# Patient Record
Sex: FEMALE | Race: WHITE | NOT HISPANIC OR LATINO | Employment: OTHER | URBAN - METROPOLITAN AREA
[De-identification: names, ages, dates, MRNs, and addresses within clinical notes are randomized per-mention and may not be internally consistent; named-entity substitution may affect disease eponyms.]

---

## 2017-02-09 ENCOUNTER — OFFICE VISIT (OUTPATIENT)
Dept: PSYCHIATRY | Facility: CLINIC | Age: 62
End: 2017-02-09
Payer: MEDICARE

## 2017-02-09 VITALS
DIASTOLIC BLOOD PRESSURE: 69 MMHG | HEART RATE: 94 BPM | SYSTOLIC BLOOD PRESSURE: 132 MMHG | HEIGHT: 66 IN | BODY MASS INDEX: 23.03 KG/M2 | WEIGHT: 143.31 LBS

## 2017-02-09 DIAGNOSIS — F42.9 OBSESSIVE-COMPULSIVE DISORDER, UNSPECIFIED TYPE: ICD-10-CM

## 2017-02-09 DIAGNOSIS — F33.1 MODERATE RECURRENT MAJOR DEPRESSION: ICD-10-CM

## 2017-02-09 DIAGNOSIS — F41.1 GENERALIZED ANXIETY DISORDER: ICD-10-CM

## 2017-02-09 PROCEDURE — 99213 OFFICE O/P EST LOW 20 MIN: CPT | Mod: S$GLB,,, | Performed by: PSYCHIATRY & NEUROLOGY

## 2017-02-09 PROCEDURE — 99999 PR PBB SHADOW E&M-EST. PATIENT-LVL III: CPT | Mod: PBBFAC,,, | Performed by: PSYCHIATRY & NEUROLOGY

## 2017-02-09 PROCEDURE — 99499 UNLISTED E&M SERVICE: CPT | Mod: S$GLB,,, | Performed by: PSYCHIATRY & NEUROLOGY

## 2017-02-09 RX ORDER — LAMOTRIGINE 100 MG/1
TABLET ORAL
Qty: 270 TABLET | Refills: 1 | Status: SHIPPED | OUTPATIENT
Start: 2017-02-09 | End: 2017-11-09 | Stop reason: SDUPTHER

## 2017-02-09 RX ORDER — VENLAFAXINE HYDROCHLORIDE 75 MG/1
75 CAPSULE, EXTENDED RELEASE ORAL DAILY
Qty: 90 CAPSULE | Refills: 1 | Status: SHIPPED | OUTPATIENT
Start: 2017-02-09 | End: 2017-08-01 | Stop reason: SDUPTHER

## 2017-02-09 RX ORDER — MIRTAZAPINE 30 MG/1
30 TABLET, FILM COATED ORAL NIGHTLY
Qty: 90 TABLET | Refills: 1 | Status: SHIPPED | OUTPATIENT
Start: 2017-02-09 | End: 2017-08-01 | Stop reason: SDUPTHER

## 2017-02-09 RX ORDER — DEXTROAMPHETAMINE SACCHARATE, AMPHETAMINE ASPARTATE, DEXTROAMPHETAMINE SULFATE AND AMPHETAMINE SULFATE 3.75; 3.75; 3.75; 3.75 MG/1; MG/1; MG/1; MG/1
15 TABLET ORAL 2 TIMES DAILY
Qty: 60 TABLET | Refills: 0 | Status: SHIPPED | OUTPATIENT
Start: 2017-02-09 | End: 2017-03-11

## 2017-02-09 RX ORDER — DEXTROAMPHETAMINE SACCHARATE, AMPHETAMINE ASPARTATE, DEXTROAMPHETAMINE SULFATE AND AMPHETAMINE SULFATE 3.75; 3.75; 3.75; 3.75 MG/1; MG/1; MG/1; MG/1
15 TABLET ORAL 2 TIMES DAILY
Qty: 60 TABLET | Refills: 0 | Status: SHIPPED | OUTPATIENT
Start: 2017-03-11 | End: 2017-03-14

## 2017-02-09 RX ORDER — CLONAZEPAM 1 MG/1
1 TABLET ORAL 3 TIMES DAILY PRN
Qty: 270 TABLET | Refills: 0 | Status: SHIPPED | OUTPATIENT
Start: 2017-02-09 | End: 2017-05-04 | Stop reason: SDUPTHER

## 2017-02-09 RX ORDER — DEXTROAMPHETAMINE SACCHARATE, AMPHETAMINE ASPARTATE, DEXTROAMPHETAMINE SULFATE AND AMPHETAMINE SULFATE 3.75; 3.75; 3.75; 3.75 MG/1; MG/1; MG/1; MG/1
15 TABLET ORAL 2 TIMES DAILY
Qty: 60 TABLET | Refills: 0 | Status: SHIPPED | OUTPATIENT
Start: 2017-04-10 | End: 2017-05-04 | Stop reason: SDUPTHER

## 2017-02-09 RX ORDER — SERTRALINE HYDROCHLORIDE 100 MG/1
TABLET, FILM COATED ORAL
Qty: 180 TABLET | Refills: 1 | Status: SHIPPED | OUTPATIENT
Start: 2017-02-09 | End: 2017-05-04 | Stop reason: SDUPTHER

## 2017-02-09 NOTE — PATIENT INSTRUCTIONS
Philomena Granda, [facility]  [phone]  Fiona Hook, [facility] [phone]  Karen Patricia [facility] [phone]  Cyndy Ca, [facility]   [phone]  Diana Atkins, [facility]  ·[phone]

## 2017-02-09 NOTE — PROGRESS NOTES
"Outpatient Psychiatry Follow-Up Visit (MD/NP)    2/9/2017    Clinical Status of Patient:  Outpatient (Ambulatory)    Chief Complaint:  Sheri Broderick is a 61 y.o. female who presents today for follow-up of anxiety, depression.   Met with patient.      Interval History and Content of Current Session:  Interim Events/Subjective Report/Content of Current Session:  follow up appointment.     No medication changes last visit.     Ups and downs;  She has not seen much of Dr Jennings.  Her therapy sessions are frequently cancelled.  She is getting discouraged.    Once she gets to core therapy issues; appointments get cancelled by provider, typically due to illness.     She has noticed some improvement with titration of Sertraline;  She alternates dose of Mirtazapine to 15 mg x 7 days on occasion to mitigate side effects (wt gain)    Mood is brighter, sings to cats;  Peaks and valleys now;  She is feeling better b/c Wally is now cancer free with  Every 3 month appts ; he has accepted a job and left today;   She helped him to get ready - he is out of [place].   He made reservations for a week.   She is hoping that work continues for Wally.  She is smiling, bright today.  She tends to be fearful of him at times;  She thinks he has violent tendencies in past - not to her.  He has been in her face.   He is very emotionally reactive.      She would like him out of her life long term;.  She has put her looking on hold; one was a foreclosure     Sleep is fair  Occasional SI - a bad January - Wally said he did not go the MD b/c of her;  No active SI. "I just don't want to wake up, she has an outfit she wants to be buried in."  She dwells on her death - some emptiness vs peace with this; she feels sad with being alone   Hopeless, status quo, I will be here forever; God put me here for a reason   Appetite - high - tapered back on chocolate some days - craves   She is more active now - pushes herself - does not nap a lot "   She remains irritable with Wally and situation;  On edge, tense, panic attacks are a whole lot better   Nightmares are infrequent   Muscle relaxants  are helping - taking BID     Still hears music and background noises - always at night - stillness - she cannot identify it (before it was [place])     Seen by Dr Cruz Pitts 2 - Cardiology, ECHO, EKG - all negative     Adderall: helps her keep her focus and get things done - this has significantly helped her to function.  Only takes it once daily when she is very anxious.  Still unable to read   Denies alcohol/drug use.    Prior meds: Abilify (no benefit), Buspirone (intolerance) Trazodone (intolerance), Paroxetine (intolerance), Quetiapine, Bupropion (intolerance), Fluoxetine (no benefit), Duloxetine Foltx (no longer covered)     Review of Systems   · PSYCHIATRIC: Pertinant items are noted in the narrative.  · CONSTITUTIONAL:  Wt stable  · MUSCULOSKELETAL: right shoulder,elbow, back,  low back pain, bilateral UE pain - pain level 3/10    · CARDIOVASCULAR: no current chest pain, no palpitations       Past Medical, Family and Social History: The patient's past medical, family and social history have been reviewed and updated as appropriate within the electronic medical record - see encounter notes.    Effexor XR 75 mg daily  Sertraline 200 mg daily   Klonopin 1 mg bid - tid prn anxiety   Remeron 30 mg QHS   Lamictal 100 mg QAM, 200 mg QHS   Adderall 15 mg once to twice daily     Compliance: yes     Side effects: dry mouth (uses biotene), ? Elevated HR, no rashes     Risk Parameters:  Patient reports no suicidal ideations today   Patient reports no homicidal ideation  Patient reports no self-injurious behavior  Patient reports no violent behavior      Exam (detailed: at least 9 elements; comprehensive: all 15 elements)   Constitutional  Vitals:  Most recent vital signs, dated less than 90 days prior to this appointment, were reviewed.     Epic for today's  "vitals.       General:  age appropriate, casual attire, improved grooming, good eye contact, anxious         Musculoskeletal  Muscle Strength/Tone:  No tremor noted today, intermittently tapping her right leg on floor, PMA    Gait & Station:  non-ataxic     Psychiatric  Speech:  no latency; no press , spontaneous, talkative, conversational    Mood & Affect:  "ok"  Brighter today    Thought Process:  Linear   Associations:  intact   Thought Content:  no SI, no homicidality, delusions, or paranoia, hallucinations: (auditory: no currently, visual: no) - none currently       Insight:  Improved    Judgement: Adequate to circumstances    Orientation:  grossly intact. Alert and oriented x 4    Memory: intact for content of interview   Language: grossly intact, can repeat    Attention Span & Concentration:  able to focus   Fund of Knowledge:  intact and appropriate to age and level of education     Assessment and Diagnosis   Status/Progress: Based on the examination today, the patient's problem(s) is/are inadequately controlled, but some improvement.    New problems have not been presented today.   Comorbidities are complicating management of the primary condition.    The working differential for this patient includes Cluster B and C traits, personality disorder, PTSD, MDD, OCD    General Impression :  Pt improved with addition of SNRI and potential for decreasing stressor with roommate going back to work - she plans to ask him to move out soon;  She is having difficulty accessing care with therapist; safe for outpatient treatment     Major Depressive Disorder, Recurrent, partial remission   Panic Disorder without Agoraphobia.   Generalized Anxiety Disorder.   R/O PTSD  Hx OCD  Personality Disorder, unspecified     Hepaitis B, tremors,  osteoporosis, myalgias, arthralgia, poor balance, back pain, bulging disc, fibromyalgia, osteoarthritis, neuoropathy    GAF: 53   Intervention/Counseling/Treatment Plan   · Medication " Management: The risks and benefits of medication were discussed with the patient.  · Counseling provided with patient as follows: importance of compliance with chosen treatment options was emphasized, risks and benefits of treatment options, including medications, were discussed with the patient     1. Continue Effexor XR 75 mg daily for now   2. Continue Remeron 30 mg QHS   3. Continue Klonopin 1 mg BID - TID prn.  Discussed risk of decreased RT, sedation, addictive potential, and not to mix with alcohol.   4. Continue Lamictal 100 mg QAM and  200 mg daily.  Discussed risks of life threatening SJS, need for compliance.   5. Continue Psychotherapy with Kaye Jennings   6. Adderall 15 mg BID - Target  low energy, motivation, depression.  Discussed risks of abuse potential, insomnia, anxiety, elevated BP, HR, arrthymias, MI, stroke, sudden death.    7.  Pt instructed to go to ED/911 with thoughts of wanting harm self/others  8. Call to report any worsening of symptoms or problems with the medication  9. Continue Sertraline 200 mg daily.   Target depression, anxiety. Discussed risk of serotonin syndrome, GI upset, headaches   10. Patient returned Rx for Adderall dated 12/13/16 which will be shredded   11. Pt given contact names/# for multiple local therapists should she continue to have difficulty scheduling with current provider - she is encouraged to discuss issue directly with therapist     Return to Clinic: 3 months

## 2017-02-09 NOTE — MR AVS SNAPSHOT
[facility]  [address]  [place] [address]  Phone: [phone]                  Sheri Broderick   2017 10:00 AM   Office Visit    Description:  Female : 1955   Provider:  Beulah Hawley MD   Department:  [facility]           Diagnoses this Visit        Comments    Generalized anxiety disorder         Moderate recurrent major depression         Obsessive-compulsive disorder, unspecified type                To Do List           Future Appointments        Provider Department Dept Phone    3/7/2017 11:00 AM RAÚL ROMERO 1 [facility] [phone]    3/14/2017 10:40 AM Francis Pack Jr., MD [facility] [phone]    2017 10:30 AM Demetrio Pleitez Jr., MD [facility] [phone]      Goals (5 Years of Data)     None      Follow-Up and Disposition     Return in about 3 months (around 2017).       These Medications        Disp Refills Start End    venlafaxine (EFFEXOR-XR) 75 MG 24 hr capsule 90 capsule 1 2017     Take 1 capsule (75 mg total) by mouth once daily. - Oral    Pharmacy: [facility] - [address] Ph #: [phone]       sertraline (ZOLOFT) 100 MG tablet 180 tablet 1 2017     Take two tablets PO daily    Pharmacy: [facility] [place] [id number] [facility] Ph #: [phone]       mirtazapine (REMERON) 30 MG tablet 90 tablet 1 2017     Take 1 tablet (30 mg total) by mouth every evening. - Oral    Pharmacy: [facility] [place] [id number] [facility] Ph #: [phone]       lamotrigine (LAMICTAL) 100 MG tablet 270 tablet 1 2017     Take one tablet PO in the morning and two tablets PO in the evening    Pharmacy: [facility] [place] [id number] [facility] Ph #: [phone]       clonazePAM (KLONOPIN) 1 MG tablet 270 tablet 0 2017     Take 1 tablet (1 mg total) by  mouth 3 (three) times daily as needed for Anxiety. - Oral    Pharmacy: [facility] - [facility] [address] [facility] Ph #: [phone]       dextroamphetamine-amphetamine (ADDERALL) 15 mg tablet 60 tablet 0 2/9/2017 3/11/2017    Take 1 tablet (15 mg total) by mouth 2 (two) times daily. - Oral    Pharmacy: [id number] - [place], [place] - [address] Ph #: [phone]       dextroamphetamine-amphetamine (ADDERALL) 15 mg tablet 60 tablet 0 3/11/2017 4/10/2017    Take 1 tablet (15 mg total) by mouth 2 (two) times daily. - Oral    Pharmacy: [id number] - [place], [place] - [address] Ph #: [phone]       dextroamphetamine-amphetamine (ADDERALL) 15 mg tablet 60 tablet 0 4/10/2017 5/10/2017    Take 1 tablet (15 mg total) by mouth 2 (two) times daily. - Oral    Pharmacy: [id number] - [place] - [address] Ph #: [phone]         [facility]     [facility] Nurse [facility] - 24/7 Assistance  Registered nurses in the [facility] provide clinical advisement, health education, appointment booking, and other advisory services.  Call for this free service at [phone].             Medications           Message regarding Medications     Verify the changes and/or additions to your medication regime listed below are the same as discussed with your clinician today.  If any of these changes or additions are incorrect, please notify your healthcare provider.        START taking these NEW medications        Refills    dextroamphetamine-amphetamine (ADDERALL) 15 mg tablet 0    Starting on: 3/11/2017    Sig: Take 1 tablet (15 mg total) by mouth 2 (two) times daily.    Class: Print    Route: Oral    dextroamphetamine-amphetamine (ADDERALL) 15 mg tablet 0    Starting on: 4/10/2017    Sig: Take 1 tablet (15 mg total) by mouth 2 (two) times daily.    Class: Print    Route: Oral           Verify that the below list of medications is an accurate  representation of the medications you are currently taking.  If none reported, the list may be blank. If incorrect, please contact your healthcare provider. Carry this list with you in case of emergency.           Current Medications     b complex vitamins tablet Take 1 tablet by mouth once daily.    CHEWABLE VITAMIN C 500 mg Chew     clonazePAM (KLONOPIN) 1 MG tablet Take 1 tablet (1 mg total) by mouth 3 (three) times daily as needed for Anxiety.    dextroamphetamine-amphetamine (ADDERALL) 15 mg tablet Take 1 tablet (15 mg total) by mouth 2 (two) times daily.    dextroamphetamine-amphetamine (ADDERALL) 15 mg tablet Starting on Mar 11, 2017. Take 1 tablet (15 mg total) by mouth 2 (two) times daily.    dextroamphetamine-amphetamine (ADDERALL) 15 mg tablet Starting on Apr 10, 2017. Take 1 tablet (15 mg total) by mouth 2 (two) times daily.    ibuprofen (ADVIL,MOTRIN) 800 MG tablet Take 1 tablet (800 mg total) by mouth 2 (two) times daily as needed for Pain.    lamotrigine (LAMICTAL) 100 MG tablet Take one tablet PO in the morning and two tablets PO in the evening    LINZESS 145 mcg Cap capsule Take 1 capsule by mouth once daily.    MAALOX ADVANCED 1,000-60 mg Chew     meclizine (ANTIVERT) 25 mg tablet Take 1 tablet (25 mg total) by mouth every 8 (eight) hours as needed for Dizziness. Twice a day    mirtazapine (REMERON) 30 MG tablet Take 1 tablet (30 mg total) by mouth every evening.    OXYMETAZOLINE HCL (DECONGESTANT NASAL SPRAY NASL)     PEPTO-BISMOL 262 mg Chew     sertraline (ZOLOFT) 100 MG tablet Take two tablets PO daily    tizanidine (ZANAFLEX) 4 MG tablet Take 1 tablet (4 mg total) by mouth 2 (two) times daily.    venlafaxine (EFFEXOR-XR) 75 MG 24 hr capsule Take 1 capsule (75 mg total) by mouth once daily.    ZYRTEC 10 mg Cap continuous prn.    minocycline (MINOCIN,DYNACIN) 100 MG capsule Take 1 capsule (100 mg total) by mouth once daily.    multivitamin Chew Take by mouth once daily.           Clinical  "Reference Information           Your Vitals Were     BP Pulse Height Weight BMI    132/69 94 5' 5.5" (1.664 m) 65 kg (143 lb 4.8 oz) 23.48 kg/m2      Blood Pressure          Most Recent Value    BP  132/69      Allergies as of 2/9/2017     Penicillins    Trazodone      Immunizations Administered on Date of Encounter - 2/9/2017     None      Instructions    Philomena Jesus Alberto, [facility]  [phone]  Fiona Hook, [facility] [phone]  Karen Patricia [facility] [phone]  Cyndy Ca, [facility]   [phone]  Diana Atkins, [facility]  ·[phone]       Language Assistance Services     ATTENTION: Language assistance services are available, free of charge. Please call [phone].      ATENCIÓN: Si habla rosenda, tiene a gunter disposición servicios gratuitos de asistencia lingüística. Llame al [phone].     CHÚ Ý: N?u b?n nói Ti?ng Vi?t, có các d?ch v? h? tr? ngôn ng? mi?n phí dành cho b?n. G?i s? [phone].         [facility] complies with applicable Federal civil rights laws and does not discriminate on the basis of race, color, national origin, age, disability, or sex.        "

## 2017-04-19 ENCOUNTER — TELEPHONE (OUTPATIENT)
Dept: FAMILY MEDICINE | Facility: CLINIC | Age: 62
End: 2017-04-19

## 2017-04-19 NOTE — TELEPHONE ENCOUNTER
Patient informed, verbalized understanding.  We will call her as soon as Dr. Pleitez gives us a date to reschedule people.

## 2017-04-19 NOTE — TELEPHONE ENCOUNTER
----- Message from Jigar David sent at 4/19/2017  4:14 PM CDT -----  Contact: Patient  Patient states that she had an appointment for tomorrow in which was cancelled and the next available appointment is not until November.  Can you please call the patient to re-schedule the 6 month follow up.  Her number is [phone].  Thank you

## 2017-05-04 ENCOUNTER — OFFICE VISIT (OUTPATIENT)
Dept: PSYCHIATRY | Facility: CLINIC | Age: 62
End: 2017-05-04
Payer: MEDICARE

## 2017-05-04 VITALS
BODY MASS INDEX: 22.71 KG/M2 | WEIGHT: 141.31 LBS | SYSTOLIC BLOOD PRESSURE: 105 MMHG | HEIGHT: 66 IN | DIASTOLIC BLOOD PRESSURE: 65 MMHG | HEART RATE: 103 BPM

## 2017-05-04 DIAGNOSIS — F42.9 OBSESSIVE-COMPULSIVE DISORDER, UNSPECIFIED TYPE: ICD-10-CM

## 2017-05-04 DIAGNOSIS — F33.1 MAJOR DEPRESSIVE DISORDER, RECURRENT EPISODE, MODERATE: ICD-10-CM

## 2017-05-04 DIAGNOSIS — F41.1 GENERALIZED ANXIETY DISORDER: ICD-10-CM

## 2017-05-04 PROCEDURE — 99499 UNLISTED E&M SERVICE: CPT | Mod: S$GLB,,, | Performed by: PSYCHIATRY & NEUROLOGY

## 2017-05-04 PROCEDURE — 99999 PR PBB SHADOW E&M-EST. PATIENT-LVL III: CPT | Mod: PBBFAC,,, | Performed by: PSYCHIATRY & NEUROLOGY

## 2017-05-04 PROCEDURE — 99213 OFFICE O/P EST LOW 20 MIN: CPT | Mod: S$GLB,,, | Performed by: PSYCHIATRY & NEUROLOGY

## 2017-05-04 PROCEDURE — 1160F RVW MEDS BY RX/DR IN RCRD: CPT | Mod: S$GLB,,, | Performed by: PSYCHIATRY & NEUROLOGY

## 2017-05-04 RX ORDER — SERTRALINE HYDROCHLORIDE 100 MG/1
TABLET, FILM COATED ORAL
Qty: 180 TABLET | Refills: 1 | Status: SHIPPED | OUTPATIENT
Start: 2017-05-04 | End: 2017-08-01 | Stop reason: SDUPTHER

## 2017-05-04 RX ORDER — DEXTROAMPHETAMINE SACCHARATE, AMPHETAMINE ASPARTATE, DEXTROAMPHETAMINE SULFATE AND AMPHETAMINE SULFATE 3.75; 3.75; 3.75; 3.75 MG/1; MG/1; MG/1; MG/1
15 TABLET ORAL 2 TIMES DAILY
Qty: 60 TABLET | Refills: 0 | Status: SHIPPED | OUTPATIENT
Start: 2017-07-10 | End: 2017-08-01 | Stop reason: SDUPTHER

## 2017-05-04 RX ORDER — DEXTROAMPHETAMINE SACCHARATE, AMPHETAMINE ASPARTATE, DEXTROAMPHETAMINE SULFATE AND AMPHETAMINE SULFATE 3.75; 3.75; 3.75; 3.75 MG/1; MG/1; MG/1; MG/1
15 TABLET ORAL 2 TIMES DAILY
Qty: 60 TABLET | Refills: 0 | Status: SHIPPED | OUTPATIENT
Start: 2017-06-10 | End: 2017-07-10

## 2017-05-04 RX ORDER — DEXTROAMPHETAMINE SACCHARATE, AMPHETAMINE ASPARTATE, DEXTROAMPHETAMINE SULFATE AND AMPHETAMINE SULFATE 3.75; 3.75; 3.75; 3.75 MG/1; MG/1; MG/1; MG/1
15 TABLET ORAL 2 TIMES DAILY
Qty: 60 TABLET | Refills: 0 | Status: SHIPPED | OUTPATIENT
Start: 2017-05-11 | End: 2017-06-10

## 2017-05-04 RX ORDER — CLONAZEPAM 1 MG/1
1 TABLET ORAL 3 TIMES DAILY PRN
Qty: 270 TABLET | Refills: 0 | Status: SHIPPED | OUTPATIENT
Start: 2017-05-04 | End: 2017-08-01 | Stop reason: SDUPTHER
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
## 2017-05-04 NOTE — Clinical Note
Pt is requesting to be contacted about titrating her tizanidine dose; her appt with PCP was rescheduled for November (from 4/17)

## 2017-05-04 NOTE — MR AVS SNAPSHOT
[facility]  [address]  [place] [address]  Phone: [phone]                  Sheri Broderick   2017 10:30 AM   Office Visit    Description:  Female : 1955   Provider:  Beulah Hawley MD   Department:  [facility]           Diagnoses this Visit        Comments    Obsessive-compulsive disorder, unspecified type         Generalized anxiety disorder         Major depressive disorder, recurrent episode, moderate                To Do List           Future Appointments        Provider Department Dept Phone    2017 8:30 AM Demetrio Pleitez Jr., MD [facility] [phone]      Goals (5 Years of Data)     None      Follow-Up and Disposition     Return in about 3 months (around 2017).       These Medications        Disp Refills Start End    dextroamphetamine-amphetamine (ADDERALL) 15 mg tablet 60 tablet 0 2017 6/10/2017    Take 1 tablet (15 mg total) by mouth 2 (two) times daily. - Oral    Pharmacy: [id number] - [place] - [address] Ph #: [phone]       dextroamphetamine-amphetamine (ADDERALL) 15 mg tablet 60 tablet 0 6/10/2017 7/10/2017    Take 1 tablet (15 mg total) by mouth 2 (two) times daily. - Oral    Pharmacy: [id number] - [place] - [address] Ph #: [phone]       dextroamphetamine-amphetamine (ADDERALL) 15 mg tablet 60 tablet 0 7/10/2017 2017    Take 1 tablet (15 mg total) by mouth 2 (two) times daily. - Oral    Pharmacy: [id number] - [place] - [address] Ph #: [phone]       sertraline (ZOLOFT) 100 MG tablet 180 tablet 1 2017     Take two tablets PO daily    Pharmacy: [facility] - [facility] [address] [facility] Ph #: [phone]       clonazePAM (KLONOPIN) 1 MG tablet 270 tablet 0 2017     Take 1 tablet (1 mg total) by mouth 3 (three) times daily as needed for Anxiety. - Oral    Pharmacy: [facility] -  [place], [place] - [address] [facility] #: [phone]         [facility]     [facility] Nurse Care Line - 24/7 Assistance  Unless otherwise directed by your provider, please contact [facility], our nurse care line that is available for 24/7 assistance.     Registered nurses in the [facility] provide: appointment scheduling, clinical advisement, health education, and other advisory services.  Call: [phone] (toll free)               Medications           Message regarding Medications     Verify the changes and/or additions to your medication regime listed below are the same as discussed with your clinician today.  If any of these changes or additions are incorrect, please notify your healthcare provider.        START taking these NEW medications        Refills    dextroamphetamine-amphetamine (ADDERALL) 15 mg tablet 0    Starting on: 6/10/2017    Sig: Take 1 tablet (15 mg total) by mouth 2 (two) times daily.    Class: Print    Route: Oral    dextroamphetamine-amphetamine (ADDERALL) 15 mg tablet 0    Starting on: 7/10/2017    Sig: Take 1 tablet (15 mg total) by mouth 2 (two) times daily.    Class: Print    Route: Oral           Verify that the below list of medications is an accurate representation of the medications you are currently taking.  If none reported, the list may be blank. If incorrect, please contact your healthcare provider. Carry this list with you in case of emergency.           Current Medications     B-complex with vitamin C (Z-BEC OR EQUIV) tablet Take 1 tablet by mouth once daily.    CHEWABLE VITAMIN C 500 mg Chew     clonazePAM (KLONOPIN) 1 MG tablet Take 1 tablet (1 mg total) by mouth 3 (three) times daily as needed for Anxiety.    dextroamphetamine-amphetamine (ADDERALL) 15 mg tablet Starting on May 11, 2017. Take 1 tablet (15 mg total) by mouth 2 (two) times daily.    dextroamphetamine-amphetamine (ADDERALL) 15 mg tablet Starting on Marquis 10, 2017. Take 1 tablet  "(15 mg total) by mouth 2 (two) times daily.    dextroamphetamine-amphetamine (ADDERALL) 15 mg tablet Starting on Jul 10, 2017. Take 1 tablet (15 mg total) by mouth 2 (two) times daily.    ibuprofen (ADVIL,MOTRIN) 800 MG tablet Take 1 tablet (800 mg total) by mouth 2 (two) times daily as needed for Pain.    lamotrigine (LAMICTAL) 100 MG tablet Take one tablet PO in the morning and two tablets PO in the evening    LINZESS 145 mcg Cap capsule Take 1 capsule by mouth once daily.    MAALOX ADVANCED 1,000-60 mg Chew     meclizine (ANTIVERT) 25 mg tablet Take 1 tablet (25 mg total) by mouth every 8 (eight) hours as needed for Dizziness. Twice a day    mirtazapine (REMERON) 30 MG tablet Take 1 tablet (30 mg total) by mouth every evening.    OXYMETAZOLINE HCL (DECONGESTANT NASAL SPRAY NASL)     PEPTO-BISMOL 262 mg Chew     sertraline (ZOLOFT) 100 MG tablet Take two tablets PO daily    tizanidine (ZANAFLEX) 4 MG tablet Take 1 tablet (4 mg total) by mouth 2 (two) times daily.    venlafaxine (EFFEXOR-XR) 75 MG 24 hr capsule Take 1 capsule (75 mg total) by mouth once daily.    ZYRTEC 10 mg Cap continuous prn.           Clinical Reference Information           Your Vitals Were     BP Pulse Height Weight BMI    105/65 103 5' 5.5" (1.664 m) 64.1 kg (141 lb 5 oz) 23.16 kg/m2      Blood Pressure          Most Recent Value    BP  105/65      Allergies as of 5/4/2017     Penicillins    Trazodone      Immunizations Administered on Date of Encounter - 5/4/2017     None      Language Assistance Services     ATTENTION: Language assistance services are available, free of charge. Please call [phone].      ATENCIÓN: Si amaris herzog, tiene a gunter disposición servicios gratuitos de asistencia lingüística. Llame al [phone].     TUSHAR Ý: N?u b?n nói Ti?ng Vi?t, có các d?ch v? h? tr? ngôn ng? mi?n phí dành cho b?n. G?i s? [phone].         [facility] complies with applicable Federal civil rights laws and does not " discriminate on the basis of race, color, national origin, age, disability, or sex.
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
## 2017-06-20 ENCOUNTER — DOCUMENTATION ONLY (OUTPATIENT)
Dept: FAMILY MEDICINE | Facility: CLINIC | Age: 62
End: 2017-06-20

## 2017-06-20 NOTE — PROGRESS NOTES
Pre-Visit Chart Review  For Appointment Scheduled on (date) 6/24/17    Health Maintenance Due   Topic Date Due    Pap Smear with HPV Cotest  08/29/1975

## 2017-06-24 ENCOUNTER — OFFICE VISIT (OUTPATIENT)
Dept: FAMILY MEDICINE | Facility: CLINIC | Age: 62
End: 2017-06-24
Payer: MEDICARE

## 2017-06-24 VITALS
DIASTOLIC BLOOD PRESSURE: 71 MMHG | BODY MASS INDEX: 22.92 KG/M2 | HEART RATE: 101 BPM | HEIGHT: 66 IN | WEIGHT: 142.63 LBS | SYSTOLIC BLOOD PRESSURE: 113 MMHG

## 2017-06-24 DIAGNOSIS — F33.1 MODERATE RECURRENT MAJOR DEPRESSION: ICD-10-CM

## 2017-06-24 DIAGNOSIS — M81.0 OSTEOPOROSIS, SENILE: ICD-10-CM

## 2017-06-24 DIAGNOSIS — S54.12XD: ICD-10-CM

## 2017-06-24 DIAGNOSIS — M79.7 FIBROMYALGIA: Primary | ICD-10-CM

## 2017-06-24 DIAGNOSIS — F41.1 GENERALIZED ANXIETY DISORDER: ICD-10-CM

## 2017-06-24 PROCEDURE — 99499 UNLISTED E&M SERVICE: CPT | Mod: S$GLB,,, | Performed by: FAMILY MEDICINE

## 2017-06-24 PROCEDURE — 99213 OFFICE O/P EST LOW 20 MIN: CPT | Mod: S$GLB,,, | Performed by: FAMILY MEDICINE

## 2017-06-24 PROCEDURE — 99999 PR PBB SHADOW E&M-EST. PATIENT-LVL III: CPT | Mod: PBBFAC,,, | Performed by: FAMILY MEDICINE

## 2017-06-24 RX ORDER — HYDROCODONE BITARTRATE AND ACETAMINOPHEN 10; 325 MG/1; MG/1
TABLET ORAL
COMMUNITY
Start: 2017-06-15 | End: 2017-08-01

## 2017-06-24 RX ORDER — MECLIZINE HYDROCHLORIDE 25 MG/1
25 TABLET ORAL EVERY 8 HOURS PRN
Qty: 90 TABLET | Refills: 3 | Status: SHIPPED | OUTPATIENT
Start: 2017-06-24 | End: 2019-05-28 | Stop reason: SDUPTHER

## 2017-06-24 NOTE — PROGRESS NOTES
Subjective:       Patient ID: Sheri Broderick is a 61 y.o. female.    Chief Complaint: Fibromyalgia; Depression; Anxiety; and Osteoporosis    Patient presents here for six-month follow-up of fibromyalgia, depression, anxiety, and osteoporosis.  She underwent carpal tunnel surgery on the left hand on 6/15/17 and is recovering well.  This has resolved the pain that she was having her left hand and arm.  She also saw neurology at my suggestion after her last visit due to some abnormal neurological findings, but nothing was found except the carpal tunnel syndrome.  Her fibromyalgia is stable with her present medications.  She has gotten some relief with the use of tizanidine, a muscle relaxant.  She continues to be followed by psychiatry for her depression and anxiety and these are stable on her present medications.  She is having some left hip pain at times, but it is usually at rest and not when she is ambulating.  She did have some hydrocodone from her carpal tunnel syndrome and she has used this occasionally but I have encouraged her not to use this on a regular basis as it can be habit forming.  She is urged only to use Tylenol at this time.  She cannot take any anti-inflammatories right now due to the fact that she is having some GERD symptoms, and I don't want to use a short course of steroids due to her recent surgery, as it made her leg wound healing.  She is having some GERD symptoms which is usually worse at night.  She does get some relief with antiacids, although it is very short-lived.  As far as screening, she is up-to-date with all of her routine screening exams and immunizations.      Review of Systems   Constitutional: Positive for fatigue. Negative for chills, fever and unexpected weight change.   HENT: Negative for congestion, ear pain, postnasal drip and sore throat.    Respiratory: Negative for cough and shortness of breath.    Cardiovascular: Negative for chest pain and palpitations.    Gastrointestinal: Positive for abdominal pain (epigastric pain). Negative for diarrhea, nausea and vomiting.   Genitourinary: Negative for difficulty urinating, dysuria, flank pain and pelvic pain.   Musculoskeletal: Positive for arthralgias, back pain and myalgias. Negative for neck pain.   Neurological: Positive for numbness (still with mild numbness in left hand after carpal tunnel surgery). Negative for dizziness, tremors, light-headedness and headaches.   Psychiatric/Behavioral: Positive for sleep disturbance. The patient is nervous/anxious.         Depression is stable on her present medications.       Objective:      Physical Exam   Constitutional: She is oriented to person, place, and time. She appears well-developed and well-nourished.   HENT:   Head: Normocephalic and atraumatic.   Right Ear: External ear normal.   Left Ear: External ear normal.   Nose: Nose normal.   Mouth/Throat: Oropharynx is clear and moist.   Neck: Normal range of motion. Neck supple. No thyromegaly present.   Cardiovascular: Normal rate, regular rhythm, normal heart sounds and intact distal pulses.    No murmur heard.  Pulmonary/Chest: Effort normal and breath sounds normal. She has no wheezes. She has no rales.   Abdominal: Soft. Bowel sounds are normal. She exhibits no mass. There is tenderness (epigastric tenderness). There is no rebound.   Musculoskeletal: Normal range of motion. She exhibits no edema or tenderness.   Lymphadenopathy:     She has no cervical adenopathy.   Neurological: She is alert and oriented to person, place, and time. No cranial nerve deficit.   Psychiatric: She has a normal mood and affect. Her behavior is normal.   Vitals reviewed.      Assessment:       1. Fibromyalgia    2. Neuropraxia of median nerve, left, subsequent encounter    3. Moderate recurrent major depression    4. Generalized anxiety disorder    5. Osteoporosis, senile        Plan:       1.  Continue present medications as her chronic  medical problems noted above are stable  2.  Continue follow-up with orthopedics as she continues to recover from her carpal tunnel syndrome surgery  3.  Trial of Zantac 300 mg daily at bedtime ×2 months  4.  Continue follow-up with psychiatry  5.  Follow-up with me in 6 months or when necessary

## 2017-08-01 ENCOUNTER — OFFICE VISIT (OUTPATIENT)
Dept: PSYCHIATRY | Facility: CLINIC | Age: 62
End: 2017-08-01
Payer: MEDICARE

## 2017-08-01 ENCOUNTER — TELEPHONE (OUTPATIENT)
Dept: PSYCHIATRY | Facility: CLINIC | Age: 62
End: 2017-08-01

## 2017-08-01 ENCOUNTER — LAB VISIT (OUTPATIENT)
Dept: LAB | Facility: HOSPITAL | Age: 62
End: 2017-08-01
Attending: PSYCHIATRY & NEUROLOGY
Payer: MEDICARE

## 2017-08-01 VITALS
BODY MASS INDEX: 23.06 KG/M2 | HEART RATE: 86 BPM | DIASTOLIC BLOOD PRESSURE: 70 MMHG | WEIGHT: 143.5 LBS | HEIGHT: 66 IN | SYSTOLIC BLOOD PRESSURE: 124 MMHG

## 2017-08-01 DIAGNOSIS — F33.1 MDD (MAJOR DEPRESSIVE DISORDER), RECURRENT EPISODE, MODERATE: ICD-10-CM

## 2017-08-01 DIAGNOSIS — R79.89 LOW VITAMIN D LEVEL: ICD-10-CM

## 2017-08-01 DIAGNOSIS — F42.9 OBSESSIVE-COMPULSIVE DISORDER, UNSPECIFIED TYPE: ICD-10-CM

## 2017-08-01 DIAGNOSIS — G62.9 NEUROPATHY: ICD-10-CM

## 2017-08-01 DIAGNOSIS — F41.1 GENERALIZED ANXIETY DISORDER: ICD-10-CM

## 2017-08-01 LAB — 25(OH)D3+25(OH)D2 SERPL-MCNC: 51 NG/ML

## 2017-08-01 PROCEDURE — 99213 OFFICE O/P EST LOW 20 MIN: CPT | Mod: S$GLB,,, | Performed by: PSYCHIATRY & NEUROLOGY

## 2017-08-01 PROCEDURE — 99999 PR PBB SHADOW E&M-EST. PATIENT-LVL III: CPT | Mod: PBBFAC,,, | Performed by: PSYCHIATRY & NEUROLOGY

## 2017-08-01 PROCEDURE — 99499 UNLISTED E&M SERVICE: CPT | Mod: S$GLB,,, | Performed by: PSYCHIATRY & NEUROLOGY

## 2017-08-01 PROCEDURE — 82306 VITAMIN D 25 HYDROXY: CPT

## 2017-08-01 PROCEDURE — 36415 COLL VENOUS BLD VENIPUNCTURE: CPT | Mod: PO

## 2017-08-01 RX ORDER — MIRTAZAPINE 30 MG/1
30 TABLET, FILM COATED ORAL NIGHTLY
Qty: 90 TABLET | Refills: 1 | Status: SHIPPED | OUTPATIENT
Start: 2017-08-01 | End: 2017-11-09 | Stop reason: SDUPTHER

## 2017-08-01 RX ORDER — VENLAFAXINE HYDROCHLORIDE 75 MG/1
75 CAPSULE, EXTENDED RELEASE ORAL DAILY
Qty: 90 CAPSULE | Refills: 1 | Status: SHIPPED | OUTPATIENT
Start: 2017-08-01 | End: 2017-11-09 | Stop reason: DRUGHIGH

## 2017-08-01 RX ORDER — DEXTROAMPHETAMINE SACCHARATE, AMPHETAMINE ASPARTATE, DEXTROAMPHETAMINE SULFATE AND AMPHETAMINE SULFATE 3.75; 3.75; 3.75; 3.75 MG/1; MG/1; MG/1; MG/1
15 TABLET ORAL 2 TIMES DAILY
Qty: 60 TABLET | Refills: 0 | Status: SHIPPED | OUTPATIENT
Start: 2017-08-01 | End: 2017-10-13 | Stop reason: SDUPTHER

## 2017-08-01 RX ORDER — SERTRALINE HYDROCHLORIDE 100 MG/1
TABLET, FILM COATED ORAL
Qty: 180 TABLET | Refills: 1 | Status: SHIPPED | OUTPATIENT
Start: 2017-08-01 | End: 2017-11-09 | Stop reason: SDUPTHER

## 2017-08-01 RX ORDER — CLONAZEPAM 1 MG/1
1 TABLET ORAL 3 TIMES DAILY PRN
Qty: 270 TABLET | Refills: 0 | Status: SHIPPED | OUTPATIENT
Start: 2017-08-01 | End: 2017-11-09 | Stop reason: SDUPTHER
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
## 2017-08-02 ENCOUNTER — PATIENT MESSAGE (OUTPATIENT)
Dept: PSYCHIATRY | Facility: CLINIC | Age: 62
End: 2017-08-02

## 2017-08-02 NOTE — TELEPHONE ENCOUNTER
Please continue to try to reach the patient.  If you are unable to reach her by phone, please try to verify through My Chart that she has received her test result - thank you

## 2017-08-04 ENCOUNTER — PATIENT MESSAGE (OUTPATIENT)
Dept: PSYCHIATRY | Facility: CLINIC | Age: 62
End: 2017-08-04

## 2017-08-13 RX ORDER — TIZANIDINE 4 MG/1
TABLET ORAL
Qty: 180 TABLET | Refills: 3 | Status: SHIPPED | OUTPATIENT
Start: 2017-08-13 | End: 2018-08-22

## 2017-10-13 RX ORDER — DEXTROAMPHETAMINE SACCHARATE, AMPHETAMINE ASPARTATE, DEXTROAMPHETAMINE SULFATE AND AMPHETAMINE SULFATE 3.75; 3.75; 3.75; 3.75 MG/1; MG/1; MG/1; MG/1
TABLET ORAL
Qty: 60 TABLET | Refills: 0 | Status: SHIPPED | OUTPATIENT
Start: 2017-10-13 | End: 2017-11-09 | Stop reason: ALTCHOICE

## 2017-10-14 ENCOUNTER — PATIENT MESSAGE (OUTPATIENT)
Dept: FAMILY MEDICINE | Facility: CLINIC | Age: 62
End: 2017-10-14

## 2017-10-14 ENCOUNTER — PATIENT MESSAGE (OUTPATIENT)
Dept: PSYCHIATRY | Facility: CLINIC | Age: 62
End: 2017-10-14

## 2017-10-15 ENCOUNTER — PATIENT MESSAGE (OUTPATIENT)
Dept: PSYCHIATRY | Facility: CLINIC | Age: 62
End: 2017-10-15

## 2017-10-16 ENCOUNTER — DOCUMENTATION ONLY (OUTPATIENT)
Dept: FAMILY MEDICINE | Facility: CLINIC | Age: 62
End: 2017-10-16

## 2017-10-16 ENCOUNTER — PATIENT MESSAGE (OUTPATIENT)
Dept: PSYCHIATRY | Facility: CLINIC | Age: 62
End: 2017-10-16

## 2017-10-16 RX ORDER — SCOLOPAMINE TRANSDERMAL SYSTEM 1 MG/1
1 PATCH, EXTENDED RELEASE TRANSDERMAL
Qty: 4 PATCH | Refills: 1 | Status: SHIPPED | OUTPATIENT
Start: 2017-10-16 | End: 2017-11-09 | Stop reason: SDUPTHER

## 2017-10-16 NOTE — PROGRESS NOTES
Pre-Visit Chart Review  For Appointment Scheduled on 10/17/2017      Health Maintenance Due   Topic Date Due    Influenza Vaccine  08/01/2017

## 2017-10-17 ENCOUNTER — OFFICE VISIT (OUTPATIENT)
Dept: FAMILY MEDICINE | Facility: CLINIC | Age: 62
End: 2017-10-17
Payer: MEDICARE

## 2017-10-17 VITALS
TEMPERATURE: 99 F | HEART RATE: 96 BPM | HEIGHT: 66 IN | SYSTOLIC BLOOD PRESSURE: 118 MMHG | DIASTOLIC BLOOD PRESSURE: 69 MMHG

## 2017-10-17 DIAGNOSIS — Z00.00 ENCOUNTER FOR PREVENTIVE HEALTH EXAMINATION: ICD-10-CM

## 2017-10-17 DIAGNOSIS — F33.1 MODERATE RECURRENT MAJOR DEPRESSION: Primary | ICD-10-CM

## 2017-10-17 DIAGNOSIS — E78.5 HYPERLIPIDEMIA, UNSPECIFIED HYPERLIPIDEMIA TYPE: ICD-10-CM

## 2017-10-17 DIAGNOSIS — F42.9 OBSESSIVE-COMPULSIVE DISORDER, UNSPECIFIED TYPE: ICD-10-CM

## 2017-10-17 DIAGNOSIS — F13.20 BENZODIAZEPINE DEPENDENCE: ICD-10-CM

## 2017-10-17 DIAGNOSIS — M81.0 OSTEOPOROSIS, SENILE: ICD-10-CM

## 2017-10-17 DIAGNOSIS — F41.1 GENERALIZED ANXIETY DISORDER: ICD-10-CM

## 2017-10-17 DIAGNOSIS — M79.7 FIBROMYALGIA: ICD-10-CM

## 2017-10-17 PROCEDURE — 99999 PR PBB SHADOW E&M-EST. PATIENT-LVL V: CPT | Mod: PBBFAC,,, | Performed by: PHYSICIAN ASSISTANT

## 2017-10-17 PROCEDURE — 99499 UNLISTED E&M SERVICE: CPT | Mod: S$GLB,,, | Performed by: PHYSICIAN ASSISTANT

## 2017-10-17 PROCEDURE — G0439 PPPS, SUBSEQ VISIT: HCPCS | Mod: S$GLB,,, | Performed by: PHYSICIAN ASSISTANT

## 2017-10-17 RX ORDER — ALENDRONATE SODIUM 35 MG/1
35 TABLET ORAL
Qty: 4 TABLET | Refills: 11 | Status: SHIPPED | OUTPATIENT
Start: 2017-10-17 | End: 2018-01-09 | Stop reason: SDUPTHER

## 2017-10-17 NOTE — PATIENT INSTRUCTIONS
Counseling and Referral of Other Preventative  (Italic type indicates deductible and co-insurance are waived)    Patient Name: Sheri Broderick  Today's Date: 10/17/2017      SERVICE LIMITATIONS RECOMMENDATION    Vaccines    · Pneumococcal (once after 65)    · Influenza (annually)    · Hepatitis B (if medium/high risk)    · Prevnar 13      Hepatitis B medium/high risk factors:       - End-stage renal disease       - Hemophiliacs who received Factor VII or         IX concentrates       - Clients of institutions for the mentally             retarded       - Persons who live in the same house as          a HepB carrier       - Homosexual men       - Illicit injectable drug abusers     Pneumococcal: N/A     Influenza: Recommended to patient, declined     Hepatitis B: N/A     Prevnar 13: N/A    Mammogram (biennial age 50-74)  Annually (age 40 or over)  Last done 9/1/17, recommend to repeat every 2  years    Pap (up to age 70 and after 70 if unknown history or abnormal study last 10 years)    Last done 3/14/17, recommend to repeat every 3  years     The USPSTF recommends screening for cervical cancer in women age 21 to 65 years with cytology (Pap smear) every 3 years.     Colorectal cancer screening (to age 75)    · Fecal occult blood test (annual)  · Flexible sigmoidoscopy (5y)  · Screening colonoscopy (10y)  · Barium enema   Last done 4/14/14, recommend to repeat every 5  years    Diabetes self-management training (no USPSTF recommendations)  Requires referral by treating physician for patient with diabetes or renal disease. 10 hours of initial DSMT sessions of no less than 30 minutes each in a continuous 12-month period. 2 hours of follow-up DSMT in subsequent years.  N/A    Bone mass measurements (age 65 & older, biennial)  Requires diagnosis related to osteoporosis or estrogen deficiency. Biennial benefit unless patient has history of long-term glucocorticoid  Last done 9/1/17, recommend to repeat every 2  years     Glaucoma screening (no USPSTF recommendation)  Diabetes mellitus, family history   , age 50 or over    American, age 65 or over  Recommend follow up with eye care professional regularly    Medical nutrition therapy for diabetes or renal disease (no recommended schedule)  Requires referral by treating physician for patient with diabetes or renal disease or kidney transplant within the past 3 years.  Can be provided in same year as diabetes self-management training (DSMT), and CMS recommends medical nutrition therapy take place after DSMT. Up to 3 hours for initial year and 2 hours in subsequent years.  Done this year, repeat every year    Cardiovascular screening blood tests (every 5 years)  · Fasting lipid panel  Order as a panel if possible  Done this year, repeat every year    Diabetes screening tests (at least every 3 years, Medicare covers annually or at 6-month intervals for prediabetic patients)  · Fasting blood sugar (FBS) or glucose tolerance test (GTT)  Patient must be diagnosed with one of the following:       - Hypertension       - Dyslipidemia       - Obesity (BMI 30kg/m2)       - Previous elevated impaired FBS or GTT       ... or any two of the following:       - Overweight (BMI 25 but <30)       - Family history of diabetes       - Age 65 or older       - History of gestational diabetes or birth of baby weighing more than 9 pounds  N/A    HIV screening (annually for increased risk patients)  · HIV-1 and HIV-2 by EIA, or LESTER, rapid antibody test or oral mucosa transudate  Patients must be at increased risk for HIV infection per USPSTF guidelines or pregnant. Tests covered annually for patient at increased risk or as requested by the patient. Pregnant patients may receive up to 3 tests during pregnancy.  Risks discussed, screening is not recommended    Smoking cessation counseling (up to 8 sessions per year)  Patients must be asymptomatic of tobacco-related conditions to  receive as a preventative service.  Non-smoker    Subsequent annual wellness visit  At least 12 months since last AWV  Return in one year     The following information is provided to all patients.  This information is to help you find resources for any of the problems found today that may be affecting your health:                Living healthy guide: www.[facility].[place].[facility]      Understanding Diabetes: www.diabetes.org      Eating healthy: www.cdc.gov/healthyweight      CDC home safety checklist: www.cdc.gov/steadi/patient.html      Agency on Aging: www.goea.[place].[facility]      Alcoholics anonymous (AA): www.aa.org      Physical Activity: www.salome.nih.gov/wd7tidp      Tobacco use: www.quitwithusla.org

## 2017-10-17 NOTE — PROGRESS NOTES
"Sheri Broderick presented for a  Medicare AWV and comprehensive Health Risk Assessment today. The following components were reviewed and updated:    · Medical history  · Family History  · Social history  · Allergies and Current Medications  · Health Risk Assessment  · Health Maintenance  · Care Team     ** See Completed Assessments for Annual Wellness Visit within the encounter summary.**       The following assessments were completed:  · Living Situation  · CAGE  · Depression Screening  · Timed Get Up and Go  · Whisper Test  · Cognitive Function Screening  · Nutrition Screening  · ADL Screening  · PAQ Screening    Vitals:    10/17/17 1016   BP: 118/69   BP Location: Left arm   Patient Position: Sitting   BP Method: Large (Automatic)   Pulse: 96   Temp: 98.5 °F (36.9 °C)   TempSrc: Oral   Height: 5' 5.5" (1.664 m)     There is no height or weight on file to calculate BMI.  Physical Exam   Constitutional: She is oriented to person, place, and time. She appears well-developed and well-nourished.   HENT:   Head: Normocephalic and atraumatic.   Eyes: Conjunctivae are normal. Pupils are equal, round, and reactive to light.   Neck: Normal range of motion. Neck supple. No JVD present.   Cardiovascular: Normal rate and regular rhythm.  Exam reveals no gallop and no friction rub.    No murmur heard.  Pulmonary/Chest: Effort normal and breath sounds normal. No respiratory distress. She has no wheezes. She has no rales.   Neurological: She is alert and oriented to person, place, and time.   Skin: Skin is warm and dry.   Psychiatric: She has a normal mood and affect. Her behavior is normal. Judgment and thought content normal.               Diagnoses and health risks identified today and associated recommendations/orders:    Sheri was seen today for health risk assessment.    Diagnoses and all orders for this visit:    Moderate recurrent major depression  Comments:  stable, followed by psych    Benzodiazepine " dependence  Comments:  improving, patient decreasing use    Hyperlipidemia, unspecified hyperlipidemia type  Comments:  stable, continue to monitor    Osteoporosis, senile  Comments:  uncontrolled, start Fosamax    Obsessive-compulsive disorder, unspecified type  Comments:  stable, followed by psych    Generalized anxiety disorder  Comments:  stable, monitored by psych    Fibromyalgia  Comments:  uncontrolled, encouraged light exercise    Encounter for preventive health examination    Other orders  -     alendronate (FOSAMAX) 35 MG tablet; Take 1 tablet (35 mg total) by mouth every 7 days.        Provided Sheri with a 5-10 year written screening schedule and personal prevention plan. Recommendations were developed using the USPSTF age appropriate recommendations. Education, counseling, and referrals were provided as needed. After Visit Summary printed and given to patient which includes a list of additional screenings\tests needed.    No Follow-up on file.    JASPAL Snow

## 2017-10-17 NOTE — Clinical Note
Primary Care Providers: Demetrio Pleitez Jr., MD, MD (General)  Your patient was seen today for a HRA visit. Gap(s) in care (HEDIS gaps) have been identified during this visit that require additional testing and possible follow up.  No orders of the defined types were placed in this encounter.   These orders were placed using [facility] approved protocol and any results will be forwarded to your office for appropriate follow up. I have included a copy of my visit note; please review the note and feel free to contact me with any questions.   Thank you for allowing me to participate in the care of your patients. JASPAL Snow

## 2017-10-23 NOTE — TELEPHONE ENCOUNTER
Per Stephanie Justice PA-C's MA and she states that this was handled by Stephanie and the results were sent to scanning.

## 2017-10-31 ENCOUNTER — DOCUMENTATION ONLY (OUTPATIENT)
Dept: FAMILY MEDICINE | Facility: CLINIC | Age: 62
End: 2017-10-31

## 2017-10-31 NOTE — PROGRESS NOTES
Pre-Visit Chart Review  For Appointment Scheduled on 11/2/17    There are no preventive care reminders to display for this patient.

## 2017-11-09 ENCOUNTER — OFFICE VISIT (OUTPATIENT)
Dept: FAMILY MEDICINE | Facility: CLINIC | Age: 62
End: 2017-11-09
Payer: MEDICARE

## 2017-11-09 ENCOUNTER — OFFICE VISIT (OUTPATIENT)
Dept: PSYCHIATRY | Facility: CLINIC | Age: 62
End: 2017-11-09
Payer: MEDICARE

## 2017-11-09 ENCOUNTER — LAB VISIT (OUTPATIENT)
Dept: LAB | Facility: HOSPITAL | Age: 62
End: 2017-11-09
Attending: PSYCHIATRY & NEUROLOGY
Payer: MEDICARE

## 2017-11-09 VITALS
RESPIRATION RATE: 16 BRPM | BODY MASS INDEX: 24.38 KG/M2 | TEMPERATURE: 98 F | SYSTOLIC BLOOD PRESSURE: 123 MMHG | HEIGHT: 66 IN | HEART RATE: 100 BPM | WEIGHT: 151.69 LBS | DIASTOLIC BLOOD PRESSURE: 79 MMHG

## 2017-11-09 VITALS
SYSTOLIC BLOOD PRESSURE: 122 MMHG | RESPIRATION RATE: 19 BRPM | DIASTOLIC BLOOD PRESSURE: 76 MMHG | WEIGHT: 151.88 LBS | HEIGHT: 65 IN | BODY MASS INDEX: 25.3 KG/M2 | HEART RATE: 107 BPM

## 2017-11-09 DIAGNOSIS — G60.9 IDIOPATHIC PERIPHERAL NEUROPATHY: Primary | ICD-10-CM

## 2017-11-09 DIAGNOSIS — F33.1 MDD (MAJOR DEPRESSIVE DISORDER), RECURRENT EPISODE, MODERATE: ICD-10-CM

## 2017-11-09 DIAGNOSIS — F41.1 GENERALIZED ANXIETY DISORDER: ICD-10-CM

## 2017-11-09 DIAGNOSIS — Z00.00 PREVENTATIVE HEALTH CARE: Primary | ICD-10-CM

## 2017-11-09 DIAGNOSIS — R79.9 ABNORMAL FINDING OF BLOOD CHEMISTRY: ICD-10-CM

## 2017-11-09 LAB
ESTIMATED AVG GLUCOSE: 120 MG/DL
FOLATE SERPL-MCNC: 18.6 NG/ML
HBA1C MFR BLD HPLC: 5.8 %
VIT B12 SERPL-MCNC: 1163 PG/ML

## 2017-11-09 PROCEDURE — 86334 IMMUNOFIX E-PHORESIS SERUM: CPT | Mod: 26,,, | Performed by: PATHOLOGY

## 2017-11-09 PROCEDURE — 86334 IMMUNOFIX E-PHORESIS SERUM: CPT

## 2017-11-09 PROCEDURE — 83036 HEMOGLOBIN GLYCOSYLATED A1C: CPT

## 2017-11-09 PROCEDURE — 99499 UNLISTED E&M SERVICE: CPT | Mod: S$GLB,,, | Performed by: FAMILY MEDICINE

## 2017-11-09 PROCEDURE — 82746 ASSAY OF FOLIC ACID SERUM: CPT

## 2017-11-09 PROCEDURE — 99999 PR PBB SHADOW E&M-EST. PATIENT-LVL III: CPT | Mod: PBBFAC,,, | Performed by: FAMILY MEDICINE

## 2017-11-09 PROCEDURE — 82607 VITAMIN B-12: CPT

## 2017-11-09 PROCEDURE — 36415 COLL VENOUS BLD VENIPUNCTURE: CPT | Mod: PO

## 2017-11-09 PROCEDURE — 99999 PR PBB SHADOW E&M-EST. PATIENT-LVL III: CPT | Mod: PBBFAC,,, | Performed by: PSYCHIATRY & NEUROLOGY

## 2017-11-09 PROCEDURE — 99499 UNLISTED E&M SERVICE: CPT | Mod: S$GLB,,, | Performed by: PSYCHIATRY & NEUROLOGY

## 2017-11-09 PROCEDURE — 86592 SYPHILIS TEST NON-TREP QUAL: CPT

## 2017-11-09 PROCEDURE — 99213 OFFICE O/P EST LOW 20 MIN: CPT | Mod: S$GLB,,, | Performed by: PSYCHIATRY & NEUROLOGY

## 2017-11-09 PROCEDURE — 99396 PREV VISIT EST AGE 40-64: CPT | Mod: S$GLB,,, | Performed by: FAMILY MEDICINE

## 2017-11-09 RX ORDER — METHYLPHENIDATE HYDROCHLORIDE 10 MG/1
TABLET ORAL
Qty: 60 TABLET | Refills: 0 | Status: SHIPPED | OUTPATIENT
Start: 2017-11-09 | End: 2017-11-09 | Stop reason: SDUPTHER

## 2017-11-09 RX ORDER — MIRTAZAPINE 30 MG/1
TABLET, FILM COATED ORAL
Qty: 1 TABLET | Refills: 0
Start: 2017-11-09 | End: 2017-12-20 | Stop reason: SDUPTHER

## 2017-11-09 RX ORDER — SERTRALINE HYDROCHLORIDE 100 MG/1
TABLET, FILM COATED ORAL
Qty: 1 TABLET | Refills: 0
Start: 2017-11-09 | End: 2017-12-20 | Stop reason: SDUPTHER

## 2017-11-09 RX ORDER — LAMOTRIGINE 100 MG/1
TABLET ORAL
Qty: 270 TABLET | Refills: 1 | Status: SHIPPED | OUTPATIENT
Start: 2017-11-09 | End: 2018-06-20 | Stop reason: SDUPTHER

## 2017-11-09 RX ORDER — VENLAFAXINE HYDROCHLORIDE 150 MG/1
150 CAPSULE, EXTENDED RELEASE ORAL DAILY
Qty: 1 CAPSULE | Refills: 0
Start: 2017-11-09 | End: 2017-12-20 | Stop reason: DRUGHIGH

## 2017-11-09 RX ORDER — METHYLPHENIDATE HYDROCHLORIDE 10 MG/1
TABLET ORAL
Qty: 60 TABLET | Refills: 0 | Status: SHIPPED | OUTPATIENT
Start: 2017-11-09 | End: 2017-12-20 | Stop reason: ALTCHOICE

## 2017-11-09 NOTE — PROGRESS NOTES
Outpatient Psychiatry Follow-Up Visit (MD/NP)    11/9/2017    Clinical Status of Patient:  Outpatient (Ambulatory)    Chief Complaint:  Sheri Broderick is a 62 y.o. female who presents today for follow-up of anxiety, depression.   Met with patient.      Interval History and Content of Current Session:      Interim Events/Subjective Report/Content of Current Session:      61 yo  female presents for follow up appointment for treatment of major depression, generalized anxiety disorder, panic disorder.     Past Psychiatric Hx:   No prior psychiatric hospitalizations   Suicide risk assessment:   Risks: , age, chronic depression with passive SI  Protective: no prior suicide attempts, strong skip, female, no substance abuse or impulsivity, health issues present both not disabling at this time  She is at low to moderate risk of suicide acutely, and moderate risk over long term     Prior meds: Abilify (no benefit), Buspirone (intolerance) Trazodone (intolerance), Paroxetine (intolerance), Quetiapine, Bupropion (intolerance), Fluoxetine (no benefit), Duloxetine Foltx (no longer covered)       Past Medical History   Allergy [T78.40XA]   Anxiety [F41.9]   Arthritis [M19.90]   Back pain [M54.9]   Dizziness [R42]   Fibromyalgia [M79.7]   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 [S62.101K]   Fracture of right foot [S92.901A]   Hep B w/o coma [B19.10]   Hyperlipidemia [E78.5]   Major depressive disorder, recurrent episode, in partial remission [F33.41]   Myalgia and myositis, unspecified [PQP6368]   OCD (obsessive compulsive disorder) [F42.9]   Osteoporosis   Polyneuropathy [G62.9]   Trouble in sleeping [G47.9]   Urinary incontinence [R32]       Past Surgical History Laterality Date Comments   TUBAL LIGATION[SHX77]      EYE SURGERY[KOQ186]   RK   FRACTURE SURGERY[WXY553] Right  wrist orif   CARPAL TUNNEL RELEASE[OWP314] Bilateral 07/18/2014      Interim Hx:   Medication changes last visit: none.    Pt is medication compliant, but taking only half tablet of Mirtazapine for concern about weight gain (8 lbs in interim).       I received a [employer] [facility] message from pt 10/15/17:       FYI, I fractured my right foot.  I am in a walking boot. I go back to Dr. Salazar  November 16th.       I feel sad, alone & empty. Oh well what's new. I will see you at next appointment, November 9th.      Pt denies that she fell.  Seen by PCP this morning: She is a difficult management issue due to the fact that she had an ALLERGIC reaction to the IV treatment for osteoporosis and she also has reflux disease which makes treatment with medications such as Fosamax more risky.  However, considering her young age and her osteoporosis, we did discuss treatment with a lower dose of generic Fosamax and she is agreeable to trying this          She is seen by neurology for idiopathic peripheral neuropathy. She will have lab work today: B12, folate, HbA1c, MILADIS, UIFE  Pt is still having sleep issues per her Fitbit - sleep is restless. She remains depressed;  Roommate Wally is still living in her home. He can be aggressive with her, but not violent.  He will not leave despite her requests - she plans to try to sell her room in the new year and move.  Wally has called the police on her when they are arguing. She feels trapped, unable to get him to leave, but also does not feel she can take more aggressive measures at this time to get him to leave legally.   She does not feel Adderall is helping like it was.  Overwhelmed, trapped, + racing and catastrophic thouhts.   Denies suicidal/homicidal ideations.  She has occasional panic attacks - Clonazepam is effective   Denies symptoms of brad/psychosis.   She cannot recall things, doctor's instructions, she does recall to pay meds, pay bills.  Only 2 times recently forgot med. Pt reports she does not see Dr Jennings regularly and therapist has been distracted with personal matters.    She may  "relocate to [place] with friend Jeri.    No active or passive SI, no HI, no violence, no psychosis     Denies alcohol/drug use.  Poor memory: ST memory lapses   No issues driving.      Seen by Dr Arroyo Nov 2 2016 - Cardiology, ECHO, EKG - all negative     Denies alcohol/drug use.  No tobacco     Review of Systems   · PSYCHIATRIC: Pertinant items are noted in the narrative.  · CONSTITUTIONAL:  Wt gain   · Neuro: b/l hand paresthesais   · CARDIOVASCULAR: no current chest pain, no palpitations   · M/S: back 8/10       Past Medical, Family and Social History: The patient's past medical, family and social history have been reviewed and updated as appropriate within the electronic medical record - see encounter notes.    Effexor XR 75 mg daily  Sertraline 200 mg daily   Klonopin 1 mg bid - tid prn anxiety - most days two   Remeron 30 mg QHS - taking only 15 mg nightly   Lamictal 100 mg QAM, 200 mg QHS   Adderall 15 mg once to twice daily - usually once to twice a day (taken today)    Compliance: yes     Side effects: dry mouth (uses biotene), ? Elevated HR - normal today, no rashes     Risk Parameters:  Patient reports no suicidal ideations today   Patient reports no homicidal ideation  Patient reports no self-injurious behavior  Patient reports no violent behavior      Exam (detailed: at least 9 elements; comprehensive: all 15 elements)   Constitutional  Vitals:  Most recent vital signs, dated less than 90 days prior to this appointment, were reviewed.    Se Epic for today's vitals.       General:  age appropriate, casual attire, adequate grooming, good eye contact, less anxious than in past         Musculoskeletal  Muscle Strength/Tone:  No tremor noted today,  Less PMA    Gait & Station:  non-ataxic     Psychiatric  Speech:  no latency; no press , spontaneous, talkative, conversational    Mood & Affect:  "trapped"  Smiles, mostly full   Thought Process:  Linear   Associations:  intact   Thought Content:  no SI, no " homicidality, delusions, or paranoia, hallucinations: (auditory: no currently, visual: no) - none currently       Insight:  Improved    Judgement: Adequate to circumstances    Orientation:  grossly intact. Alert and oriented x 4    Memory: intact for content of interview   Language: grossly intact   Attention Span & Concentration:  able to focus   Fund of Knowledge:  intact and appropriate to age and level of education     Assessment and Diagnosis   Status/Progress: Based on the examination today, the patient's problem(s) is/are inadequately controlled, have been treatment resistant    New problems have not been presented today.   Comorbidities are complicating management of the primary condition.    The working differential for this patient includes Cluster B and C traits, personality disorder, PTSD, MDD, OCD    General Impression :  Pt improved with addition of SNRI to SSRI; she declines atypical antipsychotic augmentation and titration of Mirtazapine; she has been mostly stable, focused on home renovation and hopefully sale; considering a move to [place] near a friend (which is a sign of progress). No SI today.  She is concerned about wt gain     Major Depressive Disorder, Recurrent, moderate   Panic Disorder without Agoraphobia.   Generalized Anxiety Disorder.   R/O PTSD  Hx OCD  Personality Disorder, unspecified     Hepaitis B, tremors,  osteoporosis, myalgias, arthralgia, poor balance, back pain, bulging disc, fibromyalgia, osteoarthritis, neuoropathy    GAF: 55   Intervention/Counseling/Treatment Plan   · Medication Management: The risks and benefits of medication were discussed with the patient.  · Counseling provided with patient as follows: importance of compliance with chosen treatment options was emphasized, risks and benefits of treatment options, including medications, were discussed with the patient     1.Titrate Effexor XR to 150 mg daily  2. Reduce Remeron to 7.5 mg QHS x 2 weeks, then stop - pt  concerned about wt gain   3. Continue Klonopin 1 mg BID - TID prn.  Discussed risk of decreased RT, sedation, addictive potential, and not to mix with alcohol. No Rx today.   4. Continue Lamictal 100 mg QAM and  200 mg daily.  Discussed risks of life threatening SJS, need for compliance.   5. Continue Psychotherapy with Kaye Jennings; discussed referral to YAZMIN Yadav LCSW who will be [facility] 2/18 at [facility] - she will consider   6. Stop Adderall.  Start Methylphenidate 10 mg:  Take one-half tablet PO twice daily x 1 week. Then increase to one tablet PO twice daily, Target  low energy, motivation, depression.  Discussed risks of abuse potential, insomnia, anxiety, elevated BP, HR, arrthymias, MI, stroke, sudden death.   Pt returned Adderall Rx dated 5/4/17 - with start date 6/10/17 which will be shredded.   7.  Pt instructed to go to ED/911 with thoughts of wanting harm self/others  8. Call to report any worsening of symptoms or problems with the medication  9. Reduce Sertraline to 100 mg daily.   Target depression, anxiety. Discussed risk of serotonin syndrome, GI upset, headaches   10. Follow up lab work ordered by Neurologist  11. MOCA future visit.    Return to Clinic: 1 month

## 2017-11-10 LAB
INTERPRETATION SERPL IFE-IMP: NORMAL
PATHOLOGIST INTERPRETATION IFE: NORMAL
RPR SER QL: NORMAL

## 2017-11-12 NOTE — PROGRESS NOTES
Subjective:       Patient ID: Sheri Broderick is a 62 y.o. female.    Chief Complaint: Annual Exam    Patient presents here for her annual preventive medicine exam.  She does state that most of her chronic medical problems are stable at this time.  She is followed on a regular basis by psychiatry for her depression and anxiety.  She also has a history of osteopenia but she recently did have a bone density which now reveals osteoporosis.  She is presently in a boot because she has a fracture of a bone in her right foot.  She is a difficult management issue due to the fact that she had an ALLERGIC reaction to the IV treatment for osteoporosis and she also has reflux disease which makes treatment with medications such as Fosamax more risky.  However, considering her young age and her osteoporosis, we did discuss treatment with a lower dose of generic Fosamax and she is agreeable to trying this.  I did go over in detail the way to take it safely and for maximum effectiveness and she voices understanding.  She also has been seeing neurology for an idiopathic peripheral neuropathy.  She brought in a copy of the lab work that he had requested and she will get this done today.  Her anxiety and depression are stable.  She does tend to eat a healthy diet but it is difficult for her to get any true exercise.  She is up-to-date with all of her recommended screening exams.      Review of Systems   Constitutional: Positive for fatigue. Negative for activity change and unexpected weight change.   HENT: Positive for rhinorrhea. Negative for congestion, hearing loss, sinus pressure, sore throat and trouble swallowing.    Eyes: Negative for discharge and visual disturbance.   Respiratory: Negative for cough, chest tightness, shortness of breath and wheezing.    Cardiovascular: Negative for chest pain and palpitations.   Gastrointestinal: Positive for constipation. Negative for abdominal pain, blood in stool, diarrhea, nausea and  vomiting.   Endocrine: Negative for polydipsia and polyuria.   Genitourinary: Negative for difficulty urinating, dysuria, flank pain, hematuria, menstrual problem and pelvic pain.   Musculoskeletal: Positive for arthralgias, joint swelling, myalgias and neck pain.   Skin: Negative for rash.   Neurological: Positive for headaches. Negative for dizziness, weakness and light-headedness.   Hematological: Negative for adenopathy. Bruises/bleeds easily.   Psychiatric/Behavioral: Positive for dysphoric mood and sleep disturbance. The patient is nervous/anxious.        Objective:      Physical Exam   Constitutional: She is oriented to person, place, and time. She appears well-developed and well-nourished.   HENT:   Head: Normocephalic and atraumatic.   Right Ear: External ear normal.   Left Ear: External ear normal.   Nose: Nose normal.   Mouth/Throat: Oropharynx is clear and moist.   Eyes: Conjunctivae and EOM are normal. Pupils are equal, round, and reactive to light.   Neck: Normal range of motion. Neck supple. No thyromegaly present.   Cardiovascular: Normal rate, regular rhythm, normal heart sounds and intact distal pulses.    No murmur heard.  Pulmonary/Chest: Effort normal and breath sounds normal. She has no wheezes. She has no rales.   Abdominal: Soft. Bowel sounds are normal. She exhibits no mass. There is no tenderness. There is no rebound.   Musculoskeletal: Normal range of motion. She exhibits no edema or tenderness.   Lymphadenopathy:     She has no cervical adenopathy.   Neurological: She is alert and oriented to person, place, and time. She has normal reflexes. No cranial nerve deficit.   Skin: Skin is warm and dry. No rash noted.   Psychiatric: She has a normal mood and affect. Her behavior is normal.   Vitals reviewed.      Assessment:       1. Preventative health care        Plan:       1.  Continue present medication as all of her chronic medical problems are stable at this time  2.  She does not need  any blood work from me but she will get the blood work done that was requested by her neurologist for her idiopathic peripheral neuropathy.  3.  Trial of alendronate 35 mg every 7 days  4.  Instructed on taking calcium and vitamin D also  5.  Follow-up with me in 6 months or when necessary

## 2017-11-13 ENCOUNTER — PATIENT MESSAGE (OUTPATIENT)
Dept: PSYCHIATRY | Facility: CLINIC | Age: 62
End: 2017-11-13

## 2017-11-13 RX ORDER — CLONAZEPAM 1 MG/1
1 TABLET ORAL 3 TIMES DAILY PRN
Qty: 1 TABLET | Refills: 0
Start: 2017-11-13 | End: 2018-01-18 | Stop reason: SDUPTHER

## 2017-11-29 ENCOUNTER — PATIENT MESSAGE (OUTPATIENT)
Dept: PSYCHIATRY | Facility: CLINIC | Age: 62
End: 2017-11-29

## 2017-11-29 ENCOUNTER — PATIENT MESSAGE (OUTPATIENT)
Dept: FAMILY MEDICINE | Facility: CLINIC | Age: 62
End: 2017-11-29

## 2017-12-07 ENCOUNTER — TELEPHONE (OUTPATIENT)
Dept: NEUROLOGY | Facility: CLINIC | Age: 62
End: 2017-12-07

## 2017-12-12 ENCOUNTER — PATIENT MESSAGE (OUTPATIENT)
Dept: PSYCHIATRY | Facility: CLINIC | Age: 62
End: 2017-12-12

## 2017-12-13 NOTE — TELEPHONE ENCOUNTER
Left message for pt that her form was ready for  at the .  Also sent her a [facility] message.

## 2017-12-14 ENCOUNTER — PATIENT MESSAGE (OUTPATIENT)
Dept: PSYCHIATRY | Facility: CLINIC | Age: 62
End: 2017-12-14

## 2017-12-18 ENCOUNTER — PATIENT MESSAGE (OUTPATIENT)
Dept: PSYCHIATRY | Facility: CLINIC | Age: 62
End: 2017-12-18

## 2017-12-20 ENCOUNTER — PATIENT MESSAGE (OUTPATIENT)
Dept: PSYCHIATRY | Facility: CLINIC | Age: 62
End: 2017-12-20

## 2017-12-20 RX ORDER — MIRTAZAPINE 30 MG/1
TABLET, FILM COATED ORAL
Qty: 1 TABLET | Refills: 0
Start: 2017-12-20 | End: 2017-12-29 | Stop reason: DRUGHIGH

## 2017-12-20 RX ORDER — DEXTROAMPHETAMINE SACCHARATE, AMPHETAMINE ASPARTATE, DEXTROAMPHETAMINE SULFATE AND AMPHETAMINE SULFATE 3.75; 3.75; 3.75; 3.75 MG/1; MG/1; MG/1; MG/1
TABLET ORAL
Qty: 45 TABLET | Refills: 0 | Status: SHIPPED | OUTPATIENT
Start: 2017-12-20 | End: 2017-12-29 | Stop reason: SDUPTHER

## 2017-12-20 RX ORDER — VENLAFAXINE HYDROCHLORIDE 75 MG/1
75 CAPSULE, EXTENDED RELEASE ORAL DAILY
Qty: 1 CAPSULE | Refills: 0
Start: 2017-12-20 | End: 2018-06-20 | Stop reason: SDUPTHER

## 2017-12-20 RX ORDER — SERTRALINE HYDROCHLORIDE 100 MG/1
TABLET, FILM COATED ORAL
Qty: 1 TABLET | Refills: 0
Start: 2017-12-20 | End: 2017-12-29 | Stop reason: SDUPTHER

## 2017-12-29 ENCOUNTER — OFFICE VISIT (OUTPATIENT)
Dept: PSYCHIATRY | Facility: CLINIC | Age: 62
End: 2017-12-29
Payer: MEDICARE

## 2017-12-29 ENCOUNTER — HOSPITAL ENCOUNTER (OUTPATIENT)
Dept: RADIOLOGY | Facility: CLINIC | Age: 62
Discharge: HOME | End: 2017-12-29
Attending: PSYCHIATRY & NEUROLOGY
Payer: MEDICARE

## 2017-12-29 VITALS
DIASTOLIC BLOOD PRESSURE: 84 MMHG | WEIGHT: 149.25 LBS | HEIGHT: 65 IN | HEART RATE: 98 BPM | BODY MASS INDEX: 24.87 KG/M2 | SYSTOLIC BLOOD PRESSURE: 138 MMHG

## 2017-12-29 DIAGNOSIS — F41.1 GENERALIZED ANXIETY DISORDER: ICD-10-CM

## 2017-12-29 DIAGNOSIS — F33.1 MDD (MAJOR DEPRESSIVE DISORDER), RECURRENT EPISODE, MODERATE: ICD-10-CM

## 2017-12-29 DIAGNOSIS — G60.9 HEREDITARY AND IDIOPATHIC NEUROPATHY: ICD-10-CM

## 2017-12-29 DIAGNOSIS — F41.0 PANIC DISORDER WITHOUT AGORAPHOBIA: ICD-10-CM

## 2017-12-29 DIAGNOSIS — G60.9 HEREDITARY AND IDIOPATHIC NEUROPATHY: Primary | ICD-10-CM

## 2017-12-29 PROCEDURE — 90833 PSYTX W PT W E/M 30 MIN: CPT | Mod: S$GLB,,, | Performed by: PSYCHIATRY & NEUROLOGY

## 2017-12-29 PROCEDURE — 71020 XR CHEST PA AND LATERAL: CPT | Mod: 26,,, | Performed by: RADIOLOGY

## 2017-12-29 PROCEDURE — 71020 XR CHEST PA AND LATERAL: CPT | Mod: TC,PO

## 2017-12-29 PROCEDURE — 99999 PR PBB SHADOW E&M-EST. PATIENT-LVL III: CPT | Mod: PBBFAC,,, | Performed by: PSYCHIATRY & NEUROLOGY

## 2017-12-29 PROCEDURE — 99499 UNLISTED E&M SERVICE: CPT | Mod: S$GLB,,, | Performed by: PSYCHIATRY & NEUROLOGY

## 2017-12-29 PROCEDURE — 99213 OFFICE O/P EST LOW 20 MIN: CPT | Mod: S$GLB,,, | Performed by: PSYCHIATRY & NEUROLOGY

## 2017-12-29 RX ORDER — MIRTAZAPINE 30 MG/1
TABLET, FILM COATED ORAL
Qty: 1 TABLET | Refills: 0
Start: 2017-12-29 | End: 2018-06-20 | Stop reason: SDUPTHER

## 2017-12-29 RX ORDER — DEXTROAMPHETAMINE SACCHARATE, AMPHETAMINE ASPARTATE, DEXTROAMPHETAMINE SULFATE AND AMPHETAMINE SULFATE 3.75; 3.75; 3.75; 3.75 MG/1; MG/1; MG/1; MG/1
TABLET ORAL
Qty: 60 TABLET | Refills: 0 | Status: SHIPPED | OUTPATIENT
Start: 2017-12-29 | End: 2018-01-18 | Stop reason: SDUPTHER

## 2017-12-29 RX ORDER — SERTRALINE HYDROCHLORIDE 100 MG/1
TABLET, FILM COATED ORAL
Qty: 1 TABLET | Refills: 0
Start: 2017-12-29 | End: 2018-01-18 | Stop reason: SDUPTHER

## 2017-12-30 ENCOUNTER — PATIENT MESSAGE (OUTPATIENT)
Dept: FAMILY MEDICINE | Facility: CLINIC | Age: 62
End: 2017-12-30

## 2017-12-30 DIAGNOSIS — R91.8 HILAR MASS: Primary | ICD-10-CM

## 2018-01-02 NOTE — TELEPHONE ENCOUNTER
Spoke with patient and she wants to check with Dr. Florian's office first before scheduling.  She requested scheduling's number to schedule her CT if Dr. Florian wasn't going to order/schedule it.

## 2018-01-03 DIAGNOSIS — R93.89 ABNORMAL X-RAY: Primary | ICD-10-CM

## 2018-01-04 ENCOUNTER — LAB VISIT (OUTPATIENT)
Dept: LAB | Facility: HOSPITAL | Age: 63
End: 2018-01-04
Attending: FAMILY MEDICINE
Payer: MEDICARE

## 2018-01-04 DIAGNOSIS — R93.89 ABNORMAL RADIOLOGICAL FINDINGS IN SKIN AND SUBCUTANEOUS TISSUE: Primary | ICD-10-CM

## 2018-01-04 LAB — BUN SERPL-MCNC: 19 MG/DL

## 2018-01-04 PROCEDURE — 84520 ASSAY OF UREA NITROGEN: CPT

## 2018-01-04 PROCEDURE — 36415 COLL VENOUS BLD VENIPUNCTURE: CPT
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
## 2018-01-06 ENCOUNTER — HOSPITAL ENCOUNTER (OUTPATIENT)
Dept: RADIOLOGY | Facility: HOSPITAL | Age: 63
Discharge: HOME | End: 2018-01-06
Attending: PSYCHIATRY & NEUROLOGY
Payer: MEDICARE

## 2018-01-06 DIAGNOSIS — R93.89 ABNORMAL X-RAY: ICD-10-CM

## 2018-01-06 RX ORDER — SODIUM CHLORIDE 9 MG/ML
INJECTION, SOLUTION INTRAVENOUS
Status: DISCONTINUED
Start: 2018-01-06 | End: 2018-01-06 | Stop reason: WASHOUT

## 2018-01-09 ENCOUNTER — TELEPHONE (OUTPATIENT)
Dept: FAMILY MEDICINE | Facility: CLINIC | Age: 63
End: 2018-01-09

## 2018-01-09 ENCOUNTER — PATIENT MESSAGE (OUTPATIENT)
Dept: FAMILY MEDICINE | Facility: CLINIC | Age: 63
End: 2018-01-09

## 2018-01-09 RX ORDER — ALENDRONATE SODIUM 35 MG/1
35 TABLET ORAL
Qty: 12 TABLET | Refills: 3 | Status: SHIPPED | OUTPATIENT
Start: 2018-01-09 | End: 2018-02-09

## 2018-01-09 NOTE — TELEPHONE ENCOUNTER
----- Message from Monae Penn sent at 1/9/2018  2:59 PM CST -----  Contact: self  Patient called regarding discussing an issue with her stomach. Please contact [phone] (home)

## 2018-01-09 NOTE — TELEPHONE ENCOUNTER
"Right side of stomach is really large and "lopsided", just noticed it today.  Denies any pain. She is questioning whether or not it could be a hernia.    Per LPN, she would need to be seen. Appt made with patient to see Denae Cabrera NP tomorrow, 1/10/18 at 8:00 am.  "

## 2018-01-10 ENCOUNTER — HOSPITAL ENCOUNTER (OUTPATIENT)
Dept: RADIOLOGY | Facility: HOSPITAL | Age: 63
Discharge: HOME | End: 2018-01-10
Attending: PSYCHIATRY & NEUROLOGY
Payer: MEDICARE

## 2018-01-10 DIAGNOSIS — R93.89 ABNORMAL X-RAY: ICD-10-CM

## 2018-01-10 PROCEDURE — 25500020 PHARM REV CODE 255

## 2018-01-10 PROCEDURE — 71260 CT THORAX DX C+: CPT | Mod: 26,,, | Performed by: RADIOLOGY

## 2018-01-10 PROCEDURE — 71260 CT THORAX DX C+: CPT | Mod: TC

## 2018-01-10 RX ORDER — SODIUM CHLORIDE 9 MG/ML
INJECTION, SOLUTION INTRAVENOUS
Status: DISPENSED
Start: 2018-01-10 | End: 2018-01-10

## 2018-01-10 RX ADMIN — IOHEXOL 75 ML: 350 INJECTION, SOLUTION INTRAVENOUS at 09:01

## 2018-01-12 ENCOUNTER — PATIENT MESSAGE (OUTPATIENT)
Dept: FAMILY MEDICINE | Facility: CLINIC | Age: 63
End: 2018-01-12

## 2018-01-17 ENCOUNTER — PATIENT MESSAGE (OUTPATIENT)
Dept: PSYCHIATRY | Facility: CLINIC | Age: 63
End: 2018-01-17

## 2018-01-18 RX ORDER — DEXTROAMPHETAMINE SACCHARATE, AMPHETAMINE ASPARTATE, DEXTROAMPHETAMINE SULFATE AND AMPHETAMINE SULFATE 3.75; 3.75; 3.75; 3.75 MG/1; MG/1; MG/1; MG/1
TABLET ORAL
Qty: 60 TABLET | Refills: 0 | Status: SHIPPED | OUTPATIENT
Start: 2018-01-18 | End: 2018-03-09 | Stop reason: SDUPTHER

## 2018-01-18 RX ORDER — CLONAZEPAM 1 MG/1
TABLET ORAL
Qty: 180 TABLET | Refills: 0 | Status: SHIPPED | OUTPATIENT
Start: 2018-01-18 | End: 2019-07-22 | Stop reason: SDUPTHER

## 2018-01-18 RX ORDER — SERTRALINE HYDROCHLORIDE 100 MG/1
TABLET, FILM COATED ORAL
Qty: 180 TABLET | Refills: 1 | Status: SHIPPED | OUTPATIENT
Start: 2018-01-18 | End: 2018-04-24 | Stop reason: SDUPTHER

## 2018-02-02 ENCOUNTER — TELEPHONE (OUTPATIENT)
Dept: FAMILY MEDICINE | Facility: CLINIC | Age: 63
End: 2018-02-02

## 2018-02-02 NOTE — TELEPHONE ENCOUNTER
"Patient came in with a copy of the PET scan she had at [facility] on 2/1/18.  She said her pulmonologist has not called her with results and she wants your opinion on it.  She is very concerned since the Impression says, "Findings are compatible with lung malignancy"    Report on your desk.  "

## 2018-02-06 ENCOUNTER — PATIENT MESSAGE (OUTPATIENT)
Dept: FAMILY MEDICINE | Facility: CLINIC | Age: 63
End: 2018-02-06

## 2018-02-09 ENCOUNTER — OFFICE VISIT (OUTPATIENT)
Dept: PSYCHIATRY | Facility: CLINIC | Age: 63
End: 2018-02-09
Payer: MEDICARE

## 2018-02-09 VITALS
HEART RATE: 110 BPM | DIASTOLIC BLOOD PRESSURE: 85 MMHG | HEIGHT: 65 IN | BODY MASS INDEX: 24.36 KG/M2 | WEIGHT: 146.19 LBS | SYSTOLIC BLOOD PRESSURE: 145 MMHG

## 2018-02-09 DIAGNOSIS — F41.1 GENERALIZED ANXIETY DISORDER: ICD-10-CM

## 2018-02-09 DIAGNOSIS — F33.1 MDD (MAJOR DEPRESSIVE DISORDER), RECURRENT EPISODE, MODERATE: ICD-10-CM

## 2018-02-09 PROCEDURE — 90833 PSYTX W PT W E/M 30 MIN: CPT | Mod: S$GLB,,, | Performed by: PSYCHIATRY & NEUROLOGY

## 2018-02-09 PROCEDURE — 99499 UNLISTED E&M SERVICE: CPT | Mod: S$GLB,,, | Performed by: PSYCHIATRY & NEUROLOGY

## 2018-02-09 PROCEDURE — 99213 OFFICE O/P EST LOW 20 MIN: CPT | Mod: S$GLB,,, | Performed by: PSYCHIATRY & NEUROLOGY

## 2018-02-09 PROCEDURE — 3008F BODY MASS INDEX DOCD: CPT | Mod: S$GLB,,, | Performed by: PSYCHIATRY & NEUROLOGY

## 2018-02-09 PROCEDURE — 99999 PR PBB SHADOW E&M-EST. PATIENT-LVL III: CPT | Mod: PBBFAC,,, | Performed by: PSYCHIATRY & NEUROLOGY
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
## 2018-03-09 RX ORDER — DEXTROAMPHETAMINE SACCHARATE, AMPHETAMINE ASPARTATE, DEXTROAMPHETAMINE SULFATE AND AMPHETAMINE SULFATE 3.75; 3.75; 3.75; 3.75 MG/1; MG/1; MG/1; MG/1
TABLET ORAL
Qty: 60 TABLET | Refills: 0 | Status: SHIPPED | OUTPATIENT
Start: 2018-03-09 | End: 2018-08-22 | Stop reason: DRUGHIGH

## 2018-03-16 ENCOUNTER — PATIENT MESSAGE (OUTPATIENT)
Dept: PSYCHIATRY | Facility: CLINIC | Age: 63
End: 2018-03-16

## 2018-03-16 NOTE — TELEPHONE ENCOUNTER
SPOKE TO PATIENT AND ALREADY RESCHEDULED HER AND PUT ON WAIT LIST SHE WILL CONTACT IF SHE NEEDS MEDICATION REFILLED BEFORE HER APPOINTMENT

## 2018-04-04 ENCOUNTER — PATIENT MESSAGE (OUTPATIENT)
Dept: FAMILY MEDICINE | Facility: CLINIC | Age: 63
End: 2018-04-04

## 2018-04-12 ENCOUNTER — PATIENT MESSAGE (OUTPATIENT)
Dept: FAMILY MEDICINE | Facility: CLINIC | Age: 63
End: 2018-04-12

## 2018-04-13 ENCOUNTER — TELEPHONE (OUTPATIENT)
Dept: FAMILY MEDICINE | Facility: CLINIC | Age: 63
End: 2018-04-13

## 2018-04-13 NOTE — TELEPHONE ENCOUNTER
----- Message from Ellen Canchola sent at 4/13/2018  2:28 PM CDT -----  Type: Needs Medical Advice    Who Called: Patient   Symptoms (please be specific):  See my chart message  How long has patient had these symptoms:  See my chart message  Pharmacy name and phone #:  n/a  Best Call Back Number: [phone]  Additional Information: Patient sent 2 messages thru MY Chart and is requesting a response, please contact patient.     Thank you

## 2018-04-13 NOTE — TELEPHONE ENCOUNTER
Patient informed that her e-mail has been forwarded to Dr. Pleitez, but he has not yet reviewed. Advised that we will contact her once he has. She verbalized understanding.

## 2018-04-17 ENCOUNTER — TELEPHONE (OUTPATIENT)
Dept: PSYCHIATRY | Facility: CLINIC | Age: 63
End: 2018-04-17

## 2018-04-17 NOTE — TELEPHONE ENCOUNTER
To advise patient We have moved to   [address] [place] [address] drive  [address]  Raúl Villatoro  [phone]

## 2018-04-24 ENCOUNTER — OFFICE VISIT (OUTPATIENT)
Dept: PSYCHIATRY | Facility: CLINIC | Age: 63
End: 2018-04-24
Payer: MEDICARE

## 2018-04-24 VITALS — HEIGHT: 65 IN | SYSTOLIC BLOOD PRESSURE: 147 MMHG | DIASTOLIC BLOOD PRESSURE: 80 MMHG | HEART RATE: 95 BPM

## 2018-04-24 DIAGNOSIS — F60.9 PERSONALITY DISORDER: ICD-10-CM

## 2018-04-24 DIAGNOSIS — F41.1 GENERALIZED ANXIETY DISORDER: ICD-10-CM

## 2018-04-24 DIAGNOSIS — F33.1 MDD (MAJOR DEPRESSIVE DISORDER), RECURRENT EPISODE, MODERATE: ICD-10-CM

## 2018-04-24 PROCEDURE — 99499 UNLISTED E&M SERVICE: CPT | Mod: S$GLB,,, | Performed by: PSYCHIATRY & NEUROLOGY

## 2018-04-24 PROCEDURE — 99999 PR PBB SHADOW E&M-EST. PATIENT-LVL III: CPT | Mod: PBBFAC,,, | Performed by: PSYCHIATRY & NEUROLOGY

## 2018-04-24 PROCEDURE — 90833 PSYTX W PT W E/M 30 MIN: CPT | Mod: S$GLB,,, | Performed by: PSYCHIATRY & NEUROLOGY

## 2018-04-24 PROCEDURE — 99213 OFFICE O/P EST LOW 20 MIN: CPT | Mod: S$GLB,,, | Performed by: PSYCHIATRY & NEUROLOGY

## 2018-04-24 RX ORDER — SERTRALINE HYDROCHLORIDE 100 MG/1
TABLET, FILM COATED ORAL
Qty: 1 TABLET | Refills: 0
Start: 2018-04-24 | End: 2018-06-20 | Stop reason: SDUPTHER
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
## 2018-04-24 NOTE — PATIENT INSTRUCTIONS
Sertraline:   Take two tablets in the morning and one-half tablet nightly.     Hold Adderall    Continue other medications     Avoid Clonazepam if you have taken pain medications

## 2018-05-02 ENCOUNTER — TELEPHONE (OUTPATIENT)
Dept: PSYCHIATRY | Facility: CLINIC | Age: 63
End: 2018-05-02

## 2018-05-02 NOTE — TELEPHONE ENCOUNTER
Spoke to the patient; she is s/p VATS.  She was dx with a carcinoma, awaiting pathology.  She was d/c today from hospital and will follow up with Dr Sutton next week to determine treatment plan.  Pt reports she is doing ok, grateful for call.

## 2018-05-02 NOTE — TELEPHONE ENCOUNTER
----- Message from Beulah Hawley MD sent at 4/29/2018  1:59 PM CDT -----  Contact pt late next week for follow up on VATS

## 2018-05-13 ENCOUNTER — NURSE TRIAGE (OUTPATIENT)
Dept: ADMINISTRATIVE | Facility: CLINIC | Age: 63
End: 2018-05-13

## 2018-05-13 NOTE — TELEPHONE ENCOUNTER
Reason for Disposition   Side (flank) or lower back pain present    Protocols used: ST URINATION PAIN - FEMALE-A-AH    Pt calling regarding possible urinary tract infection.  Pt having few drops of urine, severe pain-back pain.  Care advice given.  Pt states she cannot drive due to surgery on 5/2/2018.

## 2018-05-14 ENCOUNTER — TELEPHONE (OUTPATIENT)
Dept: FAMILY MEDICINE | Facility: CLINIC | Age: 63
End: 2018-05-14

## 2018-05-14 NOTE — TELEPHONE ENCOUNTER
"----- Message from Monae Carrillo sent at 5/14/2018  1:04 PM CDT -----  Type:  Patient Returning Call    Who Left Message for Patient:  Patient doesn't know  Does the patient know what this is regarding?:  "  Best Call Back Number:  [phone] (home)     "

## 2018-05-14 NOTE — TELEPHONE ENCOUNTER
Spoke with patient, who states that she had lung surgery on 4/27/18 at [facility] with Dr. Sutton. She states that yesterday she started having some lower back pain and thought she had a UTI, so she started taking AZO. She states that the pain continued and she went tho [facility] ED last night/early this morning, and was diagnosed with pleurisy. She states that she was advised by the ED to schedule a sooner follow up appt than the 5/30/18 appt that has been scheduled. Patient states that she was hoping to talk to  before she saw Dr. Sutton again. She was informed that Dr. Pleitez is out of the office this weekend and that she really needs to follow up with Dr. Sutton. She is inquiring about seeing a different pulmonologist here in [place], but was informed that she would have to go to [place] to see a new doctor. She verbalized understanding and agreed to contact Dr. Sutton's office. She also states that it has been recommended that she follow up with Dr. Zhang, Oncology. She was encouraged to make that appointment as well, and informed that Dr. Zhang is now on the same computer system that Dr. Pleitez uses, so he will be able to see the noted. She states that knowing that makes her feel better and she will make the appointment.

## 2018-05-21 ENCOUNTER — OFFICE VISIT (OUTPATIENT)
Dept: HEMATOLOGY/ONCOLOGY | Facility: CLINIC | Age: 63
End: 2018-05-21
Payer: MEDICARE

## 2018-05-21 VITALS
DIASTOLIC BLOOD PRESSURE: 79 MMHG | HEART RATE: 108 BPM | BODY MASS INDEX: 23.54 KG/M2 | SYSTOLIC BLOOD PRESSURE: 158 MMHG | HEIGHT: 65 IN | WEIGHT: 141.31 LBS | TEMPERATURE: 98 F

## 2018-05-21 DIAGNOSIS — C34.31 MALIGNANT NEOPLASM OF LOWER LOBE OF RIGHT LUNG: Chronic | ICD-10-CM

## 2018-05-21 DIAGNOSIS — T45.1X5A CHEMOTHERAPY-INDUCED NEUTROPENIA: ICD-10-CM

## 2018-05-21 DIAGNOSIS — D70.1 CHEMOTHERAPY-INDUCED NEUTROPENIA: ICD-10-CM

## 2018-05-21 PROCEDURE — 3008F BODY MASS INDEX DOCD: CPT | Mod: ,,, | Performed by: INTERNAL MEDICINE

## 2018-05-21 PROCEDURE — 99215 OFFICE O/P EST HI 40 MIN: CPT | Mod: ,,, | Performed by: INTERNAL MEDICINE

## 2018-05-21 RX ORDER — HEPARIN 100 UNIT/ML
500 SYRINGE INTRAVENOUS
Status: CANCELLED | OUTPATIENT
Start: 2018-06-05

## 2018-05-21 RX ORDER — HEPARIN 100 UNIT/ML
500 SYRINGE INTRAVENOUS
Status: CANCELLED | OUTPATIENT
Start: 2018-06-06

## 2018-05-21 RX ORDER — FENTANYL 25 UG/1
PATCH TRANSDERMAL
Refills: 0 | COMMUNITY
Start: 2018-05-02 | End: 2018-06-07

## 2018-05-21 RX ORDER — OXYCODONE AND ACETAMINOPHEN 5; 325 MG/1; MG/1
TABLET ORAL
Refills: 0 | COMMUNITY
Start: 2018-05-02 | End: 2018-08-22

## 2018-05-21 RX ORDER — SODIUM CHLORIDE 0.9 % (FLUSH) 0.9 %
10 SYRINGE (ML) INJECTION
Status: CANCELLED | OUTPATIENT
Start: 2018-06-06

## 2018-05-21 RX ORDER — SODIUM CHLORIDE 0.9 % (FLUSH) 0.9 %
10 SYRINGE (ML) INJECTION
Status: CANCELLED | OUTPATIENT
Start: 2018-06-05

## 2018-05-21 NOTE — PROGRESS NOTES
INITIAL [facility] HEM/ONC CONSULTATION      Subjective:       Patient ID: Sheri Broderick is a 62 y.o. female.    Chief Complaint: Initial Visit      Patient is a 63yo female who was seeing neurology for nerve conduction study and had routine CXR which found a lung lesion.  This was confirmed on chest CT and PET and was referred to Dr. Sutton.  She did a bronchoscopy which was unremarkable so she was referred to Dr. SHRAVAN Welch who performed a lung biopsy followed by immediate RLL lobectomy on April 27, 2018.  Path showed a T2aN1M0-stage IIB adenocarcinoma of the lung with LVI.  Patient here for further recommendations on treatment.          Past Medical History:   Diagnosis Date    Allergy     seasonal    Anxiety     Arthritis     Back pain     Chemotherapy-induced neutropenia 5/21/2018    Dizziness     Fibromyalgia     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     Fracture of right foot     Hep B w/o coma     Hyperlipidemia     Major depressive disorder, recurrent episode, in partial remission     Malignant neoplasm of lower lobe of right lung-Adenocarcinoma 5/21/2018    Myalgia and myositis, unspecified     OCD (obsessive compulsive disorder)     Osteoporosis     Polyneuropathy     Trouble in sleeping     Urinary incontinence        Past Surgical History:   Procedure Laterality Date    CARPAL TUNNEL RELEASE Bilateral 07/18/2014    EYE SURGERY      RK    FRACTURE SURGERY Right     wrist orif    TUBAL LIGATION         Social History     Social History    Marital status:      Spouse name: N/A    Number of children: N/A    Years of education: N/A     Social History Main Topics    Smoking status: Never Smoker    Smokeless tobacco: Never Used    Alcohol use No    Drug use: No    Sexual activity: Yes     Birth control/ protection: None     Other Topics Concern    None     Social History Narrative    None       Family History   Problem Relation Age of Onset    Heart  disease Mother          to to coma (hypothermia)     Heart disease Father 57        heart attack    Heart disease Sister         stents    Diabetes Sister     Parkinsonism Sister     Heart disease Brother 45        death at 45 years old due to hearth disease     Diabetes Maternal Grandmother        Review of patient's allergies indicates:   Allergen Reactions    Penicillins      Other reaction(s): Rash    Trazodone Anxiety     Severe anxiety     Prolia [denosumab] Other (See Comments)     myalgias       Current Outpatient Prescriptions:     B-complex with vitamin C (Z-BEC OR EQUIV) tablet, Take 1 tablet by mouth once daily., Disp: , Rfl:     CHEWABLE VITAMIN C 500 mg Chew, , Disp: , Rfl:     clonazePAM (KLONOPIN) 1 MG tablet, Take one-half to one tablet two to three times daily only as needed for anxiety, Disp: 180 tablet, Rfl: 0    dextroamphetamine-amphetamine (ADDERALL) 15 mg tablet, Take one tablet PO twice daily.  Take second dose by 2 pm, Disp: 60 tablet, Rfl: 0    fentaNYL (DURAGESIC) 25 mcg/hr, UNW AND CYNDI 1 PA TO SKIN Q 72 H, Disp: , Rfl: 0    ibuprofen (ADVIL,MOTRIN) 800 MG tablet, Take 1 tablet (800 mg total) by mouth 2 (two) times daily as needed for Pain., Disp: 180 tablet, Rfl: 3    lamoTRIgine (LAMICTAL) 100 MG tablet, Take one tablet PO in the morning and two tablets PO in the evening, Disp: 270 tablet, Rfl: 1    LINZESS 145 mcg Cap capsule, Take 1 capsule by mouth once daily., Disp: , Rfl:     MAALOX ADVANCED 1,000-60 mg Chew, , Disp: , Rfl:     meclizine (ANTIVERT) 25 mg tablet, Take 1 tablet (25 mg total) by mouth every 8 (eight) hours as needed for Dizziness. Twice a day, Disp: 90 tablet, Rfl: 3    mirtazapine (REMERON) 30 MG tablet, Take one-half tablet PO nightly, Disp: 1 tablet, Rfl: 0    oxyCODONE-acetaminophen (PERCOCET) 5-325 mg per tablet, TK 1 T PO Q 6 H PRN P, Disp: , Rfl: 0    OXYMETAZOLINE HCL (DECONGESTANT NASAL SPRAY NASL), , Disp: , Rfl:     PEPTO-BISMOL  "262 mg Chew, , Disp: , Rfl:     ranitidine (ZANTAC) 150 MG capsule, , Disp: , Rfl:     sertraline (ZOLOFT) 100 MG tablet, Take two tablets PO in the morning and one-half tablet PO in evening, Disp: 1 tablet, Rfl: 0    tizanidine (ZANAFLEX) 4 MG tablet, TAKE 1 TABLET TWICE DAILY, Disp: 180 tablet, Rfl: 3    venlafaxine (EFFEXOR-XR) 75 MG 24 hr capsule, Take 1 capsule (75 mg total) by mouth once daily., Disp: 1 capsule, Rfl: 0    ZYRTEC 10 mg Cap, continuous prn., Disp: , Rfl:     All medications and past history have been reviewed.    Review of Systems   Constitutional: Negative for activity change, appetite change, diaphoresis, fatigue, fever and unexpected weight change.   HENT: Negative for congestion and hearing loss.    Eyes: Negative for visual disturbance.   Respiratory: Negative for cough, chest tightness and shortness of breath.    Cardiovascular: Negative for chest pain and leg swelling.   Gastrointestinal: Negative for abdominal pain, blood in stool, diarrhea, nausea and vomiting.   Endocrine: Negative for cold intolerance and heat intolerance.   Genitourinary: Negative for difficulty urinating, dysuria and hematuria.   Neurological: Negative for dizziness and headaches.   Hematological: Negative for adenopathy. Does not bruise/bleed easily.   Psychiatric/Behavioral: Negative for behavioral problems.       Objective:        BP (!) 158/79   Pulse 108   Temp 98.3 °F (36.8 °C)   Ht 5' 5" (1.651 m)   Wt 64.1 kg (141 lb 4.8 oz)   BMI 23.51 kg/m²     Physical Exam   Constitutional: She appears well-developed and well-nourished.   HENT:   Head: Normocephalic and atraumatic.   Right Ear: External ear normal.   Left Ear: External ear normal.   Mouth/Throat: Oropharynx is clear and moist.   Eyes: Conjunctivae are normal. Pupils are equal, round, and reactive to light.   Neck: No tracheal deviation present. No thyromegaly present.   Cardiovascular: Normal rate, regular rhythm and normal heart sounds.  "   Pulmonary/Chest: Effort normal and breath sounds normal.   Abdominal: Soft. Bowel sounds are normal. She exhibits no distension and no mass. There is no tenderness.   Musculoskeletal: She exhibits no edema.   Neurological:   Neuro intact througout   Skin: No rash noted.   Psychiatric: She has a normal mood and affect. Her behavior is normal. Judgment and thought content normal.         Lab  No results found for this or any previous visit (from the past 336 hour(s)).  CMP  Sodium   Date Value Ref Range Status   03/03/2016 141 136 - 145 mmol/L Final     Potassium   Date Value Ref Range Status   03/03/2016 4.3 3.5 - 5.1 mmol/L Final     Chloride   Date Value Ref Range Status   03/03/2016 106 95 - 110 mmol/L Final     CO2   Date Value Ref Range Status   03/03/2016 25 23 - 29 mmol/L Final     Glucose   Date Value Ref Range Status   03/03/2016 91 70 - 110 mg/dL Final     BUN, Bld   Date Value Ref Range Status   01/06/2018 18 8 - 23 mg/dL Final     Creatinine   Date Value Ref Range Status   01/06/2018 0.8 0.5 - 1.4 mg/dL Final   11/29/2012 0.7 0.5 - 1.4 mg/dL Final     Calcium   Date Value Ref Range Status   03/03/2016 9.4 8.7 - 10.5 mg/dL Final   11/29/2012 9.0 8.7 - 10.5 mg/dL Final     Total Protein   Date Value Ref Range Status   03/03/2016 6.8 6.0 - 8.4 g/dL Final     Albumin   Date Value Ref Range Status   03/03/2016 3.8 3.5 - 5.2 g/dL Final     Total Bilirubin   Date Value Ref Range Status   03/03/2016 0.3 0.1 - 1.0 mg/dL Final     Comment:     For infants and newborns, interpretation of results should be based  on gestational age, weight and in agreement with clinical  observations.  Premature Infant recommended reference ranges:  Up to 24 hours.............<8.0 mg/dL  Up to 48 hours............<12.0 mg/dL  3-5 days..................<15.0 mg/dL  6-29 days.................<15.0 mg/dL       Alkaline Phosphatase   Date Value Ref Range Status   03/03/2016 106 55 - 135 U/L Final     AST   Date Value Ref Range Status    03/03/2016 23 10 - 40 U/L Final     ALT   Date Value Ref Range Status   03/03/2016 14 10 - 44 U/L Final     Anion Gap   Date Value Ref Range Status   03/03/2016 10 8 - 16 mmol/L Final   11/29/2012 10 5 - 15 meq/L Final     eGFR if    Date Value Ref Range Status   01/06/2018 >60 >60 mL/min/1.73 m^2 Final     eGFR if non    Date Value Ref Range Status   01/06/2018 >60 >60 mL/min/1.73 m^2 Final     Comment:     Calculation used to obtain the estimated glomerular filtration  rate (eGFR) is the CKD-EPI equation.            Specimen (12h ago through future)    None                All lab results and imaging results have been reviewed and discussed with the patient.     Assessment:       1. Malignant neoplasm of lower lobe of right lung-Adenocarcinoma    2. Chemotherapy-induced neutropenia      Problem List Items Addressed This Visit     Malignant neoplasm of lower lobe of right lung-Adenocarcinoma (Chronic)     I had a long discussion with the patient and her friend who is here in support of her today.  She has stage IIB adenocarcinoma in a never smoker and I feel needs adjuvant chemotherapy. I will get EGFR on path speciment and today  I discussed the risks and benefits of cisplatin/alimta therapy which I would give for four treatments. EGFR therapy not currently approved in adjuvant setting.   I reviewed the risks/benefits in detail and specifically discussed the risk to the kidneys, immune system.  I reviewed side effects and treatment for such.  Will have Dr. Welch place a PORT and refer to navigator for resource and chemotherapy education.  Would like to begin in 2 weeks.      Lastly, I would like rad onc to review her case for the need for radiation therapy given the close margin, pos not and visceral pleura invasion.           Relevant Orders    CBC auto differential    Comprehensive metabolic panel    Ambulatory referral to Chemo School    Ambulatory referral to Radiation  Oncology    Magnesium    Phosphorus    Chemotherapy-induced neutropenia        Cancer Staging  Malignant neoplasm of lower lobe of right lung-Adenocarcinoma  Staging form: Lung, AJCC 8th Edition  - Clinical: No stage assigned - Unsigned  - Pathologic stage from 5/21/2018: Stage IIB (pT2a, pN1, cM0) - Signed by Segundo Zhang MD on 5/21/2018        Plan:         Follow-up in about 4 weeks (around 6/18/2018).       The plan was discussed with the patient and all questions/concerns have been answered to the patient's satisfaction.

## 2018-05-21 NOTE — LETTER
May 21, 2018      Demetrio Pleitez Jr., MD  [address] [address]           [facility]  [address]  [address]  [facility] [address]  Phone: [phone]  Fax: [phone]          Patient: Sheri Broderick   MR Number: [id number]   YOB: 1955   Date of Visit: 5/21/2018       Dear Dr. Demetrio Pleitez Jr.:    Thank you for referring Sheri Broderick to me for evaluation. Attached you will find relevant portions of my assessment and plan of care.    If you have questions, please do not hesitate to call me. I look forward to following Shrei Broderick along with you.    Sincerely,    Segundo Zhang MD    Enclosure  CC:  No Recipients    If you would like to receive this communication electronically, please contact [email or web address] or [phone] to request more information on [employer] access.    For providers and/or their staff who would like to refer a patient to [facility], please contact us through our one-stop-shop provider referral line, [facility] [place], at [phone].    If you feel you have received this communication in error or would no longer like to receive these types of communications, please e-mail [email or web address]

## 2018-05-21 NOTE — ASSESSMENT & PLAN NOTE
I had a long discussion with the patient and her friend who is here in support of her today.  She has stage IIB adenocarcinoma in a never smoker and I feel needs adjuvant chemotherapy. I will get EGFR on path speciment and today  I discussed the risks and benefits of cisplatin/alimta therapy which I would give for four treatments. EGFR therapy not currently approved in adjuvant setting.   I reviewed the risks/benefits in detail and specifically discussed the risk to the kidneys, immune system.  I reviewed side effects and treatment for such.  Will have Dr. Welch place a PORT and refer to navigator for resource and chemotherapy education.  Would like to begin in 2 weeks.      Lastly, I would like rad onc to review her case for the need for radiation therapy given the close margin, pos not and visceral pleura invasion.

## 2018-05-23 DIAGNOSIS — C34.31 MALIGNANT NEOPLASM OF LOWER LOBE OF RIGHT LUNG: Primary | ICD-10-CM

## 2018-05-23 RX ORDER — ONDANSETRON HYDROCHLORIDE 8 MG/1
8 TABLET, FILM COATED ORAL EVERY 8 HOURS PRN
Qty: 90 TABLET | Refills: 3 | Status: CANCELLED | OUTPATIENT
Start: 2018-05-23

## 2018-05-23 RX ORDER — PROMETHAZINE HYDROCHLORIDE 25 MG/1
25 TABLET ORAL EVERY 6 HOURS PRN
COMMUNITY
End: 2018-05-23 | Stop reason: SDUPTHER

## 2018-05-23 RX ORDER — PROMETHAZINE HYDROCHLORIDE 25 MG/1
25 TABLET ORAL EVERY 6 HOURS PRN
Qty: 90 TABLET | Refills: 2 | Status: CANCELLED | OUTPATIENT
Start: 2018-05-23

## 2018-05-23 RX ORDER — ONDANSETRON HYDROCHLORIDE 8 MG/1
8 TABLET, FILM COATED ORAL EVERY 12 HOURS PRN
Qty: 90 TABLET | Refills: 3 | Status: CANCELLED | OUTPATIENT
Start: 2018-05-23 | End: 2019-05-23

## 2018-05-23 RX ORDER — PROMETHAZINE HYDROCHLORIDE 25 MG/1
25 TABLET ORAL EVERY 6 HOURS PRN
Qty: 90 TABLET | Refills: 3 | Status: CANCELLED | OUTPATIENT
Start: 2018-05-23

## 2018-05-23 RX ORDER — ONDANSETRON HYDROCHLORIDE 8 MG/1
8 TABLET, FILM COATED ORAL EVERY 8 HOURS PRN
COMMUNITY
End: 2018-05-23 | Stop reason: SDUPTHER

## 2018-05-23 RX ORDER — PROMETHAZINE HYDROCHLORIDE 25 MG/1
25 TABLET ORAL EVERY 6 HOURS PRN
Qty: 90 TABLET | Refills: 3 | Status: SHIPPED | OUTPATIENT
Start: 2018-05-23 | End: 2018-10-15

## 2018-05-23 RX ORDER — ONDANSETRON HYDROCHLORIDE 8 MG/1
8 TABLET, FILM COATED ORAL EVERY 8 HOURS PRN
Qty: 90 TABLET | Refills: 3 | Status: SHIPPED | OUTPATIENT
Start: 2018-05-23 | End: 2019-08-28

## 2018-05-23 NOTE — TELEPHONE ENCOUNTER
----- Message from Sakshi Wiggins sent at 5/23/2018 10:30 AM CDT -----  Patient called in requesting nurse please send a 90 day supply of the zofran and phenergan to [facility] specialty pharmacy. She stated that her insurance told her that it would be cheaper for her. Please contact patient once completed at [phone]. Thanks

## 2018-05-23 NOTE — TELEPHONE ENCOUNTER
----- Message from Sakshi Wiggins sent at 5/23/2018 10:30 AM CDT -----  Patient called in requesting nurse please send a 90 day supply of the zofran and phenergan to [facility] specialty pharmacy. She stated that her insurance told her that it would be cheaper for her. Please contact patient once completed at [phone]. Thanks

## 2018-05-24 ENCOUNTER — OFFICE VISIT (OUTPATIENT)
Dept: PSYCHIATRY | Facility: CLINIC | Age: 63
End: 2018-05-24
Payer: MEDICARE

## 2018-05-24 VITALS
HEIGHT: 65 IN | SYSTOLIC BLOOD PRESSURE: 131 MMHG | DIASTOLIC BLOOD PRESSURE: 83 MMHG | WEIGHT: 141 LBS | BODY MASS INDEX: 23.49 KG/M2 | HEART RATE: 92 BPM

## 2018-05-24 DIAGNOSIS — F41.0 PANIC DISORDER WITHOUT AGORAPHOBIA: ICD-10-CM

## 2018-05-24 DIAGNOSIS — F33.1 MDD (MAJOR DEPRESSIVE DISORDER), RECURRENT EPISODE, MODERATE: ICD-10-CM

## 2018-05-24 DIAGNOSIS — F41.1 GENERALIZED ANXIETY DISORDER: ICD-10-CM

## 2018-05-24 DIAGNOSIS — C34.91 ADENOCARCINOMA OF RIGHT LUNG: Primary | ICD-10-CM

## 2018-05-24 PROCEDURE — 3008F BODY MASS INDEX DOCD: CPT | Mod: CPTII,S$GLB,, | Performed by: PSYCHIATRY & NEUROLOGY

## 2018-05-24 PROCEDURE — 99999 PR PBB SHADOW E&M-EST. PATIENT-LVL III: CPT | Mod: PBBFAC,,, | Performed by: PSYCHIATRY & NEUROLOGY

## 2018-05-24 PROCEDURE — 99499 UNLISTED E&M SERVICE: CPT | Mod: S$GLB,,, | Performed by: PSYCHIATRY & NEUROLOGY

## 2018-05-24 PROCEDURE — 99214 OFFICE O/P EST MOD 30 MIN: CPT | Mod: S$GLB,,, | Performed by: PSYCHIATRY & NEUROLOGY

## 2018-05-24 RX ORDER — VITAMIN E 268 MG
400 CAPSULE ORAL DAILY
COMMUNITY
End: 2019-08-23

## 2018-05-24 NOTE — PROGRESS NOTES
Outpatient Psychiatry Follow-Up Visit (MD/NP)    5/24/2018    Clinical Status of Patient:  Outpatient (Ambulatory)    Chief Complaint:  Sheri Broderick is a 62 y.o. female who presents today for follow-up of anxiety, depression.   Met with patient.      Interval History and Content of Current Session:      Interim Events/Subjective Report/Content of Current Session:      63 yo  female presents for follow up appointment for treatment of major depression, generalized anxiety disorder, panic disorder.     Past Psychiatric Hx:   No prior psychiatric hospitalizations   Suicide risk assessment:   Risks: , age, chronic depression with passive SI  Protective: no prior suicide attempts, strong skip, female, no substance abuse or impulsivity, health issues present both not disabling at this time  She is at low to moderate risk of suicide acutely, and moderate risk over long term     Prior meds: Abilify (no benefit), Buspirone (intolerance) Trazodone (intolerance), Paroxetine (intolerance), Quetiapine, Bupropion (intolerance), Fluoxetine (no benefit), Duloxetine Foltx (no longer covered)       Past Medical History   Allergy [T78.40XA]   Anxiety [F41.9]   Arthritis [M19.90]   Back pain [M54.9]   Dizziness [R42]   Fibromyalgia [M79.7]   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 [S62.101K]   Fracture of right foot [S92.901A]   Hep B w/o coma [B19.10]   Hyperlipidemia [E78.5]   Major depressive disorder, recurrent episode, in partial remission [F33.41]   Myalgia and myositis, unspecified [CGX8672]   OCD (obsessive compulsive disorder) [F42.9]   Osteoporosis   Polyneuropathy [G62.9]   Trouble in sleeping [G47.9]   Urinary incontinence [R32]       Past Surgical History Laterality Date Comments   TUBAL LIGATION[SHX77]      EYE SURGERY[ZWF247]   RK   FRACTURE SURGERY[RQB763] Right  wrist orif   CARPAL TUNNEL RELEASE[SPS432] Bilateral 07/18/2014      Interim Hx:   Med Changes: Sertraline increased to  "200 mg in am and 50 mg in pm last visit. She has been taking Clonazepam 1.5 tabs daily recently due to increased stress/anxiety.     Pt presents for follow up visit.  She is s/p VATS surgery and has been dx with Stage II Adenocarcinoma of right lung.  She has no personal hx of smoking, but does have hx of second hand smoke exposure.  She will be getting a port and begin chemotherapy with Alimta and Cisplatin.  She will also be meeting with Radiation Oncologist.  She reports surgery was very painful.  She went to the ER once following the procedure due to intense pain.   She is not taking opioids daily. Her support system is limited; she has close friends in [place] and her niece has been helpful.  Roommate Wally is working out of town and only briefly came home "he had his own appointment."   Pt is very upset about losing her hair - has considered not moving forward with chemo at times.   Pt has been in contact with  from her [employer] and he did visit her in the hospital.     Pt reports having difficulty seeing therapist - from her description; therapist has issues w/ boundaries - she texts on her phone during their sessions, cancels appts as she is on the way to the session, shares personal information.     Pt reports she has been very anxious, concerned how she will afford chemotherapy, accepting cancer dx "the [facility] C."  She is upset about hair loss, what she always felt most proud about (her hair).  She has been having crying spells, restlessness, depressed, has been more forgetful than she already was, ? Hope.  She is not taking Adderall for now, but will resume at some point when her health stabilizes because she is missing it.   Denies symptoms of brad/psychosis.  Denies suicidal/homicidal ideations. She does not want to suffer or have poor quality of life from treatments if outcomes are not significantly affected.  She feels her main purpose right now is to open cat food cans for her pets.     Denies " "alcohol/drug use. No tobacco. No recent caffeine use.   No issues driving.      Pt previously brought in copy of ECHO done 11/7/16 - normal LV systolic function. EF 55-60%.  Mitral valve leaflets are mildly thickened. Mild mitral annular calcification.  Moderate MVP.  Trace MV regurgitation, trace pulmonic regurgitation.  Seen by Dr Arroyo Nov 2 2016 - Cardiology, ECHO, EKG - all negative     Review of Systems   · PSYCHIATRIC: Pertinant items are noted in the narrative.  · CONSTITUTIONAL:  Wt loss  · CARDIOVASCULAR: no current chest pain, no palpitations   · M/S: 8/10 generalized pain today   · RESP: + cough     Past Medical, Family and Social History: The patient's past medical, family and social history have been reviewed and updated as appropriate within the electronic medical record - see encounter notes.    Psychiatric Medications:   Effexor XR 75 mg daily  Sertraline 200 mg in am and 50 mg in PM   Klonopin 1 mg bid - tid prn anxiety - taking 1.5 tabs daily recently   Remeron 15 mg QHS   Lamictal 100 mg QAM, 200 mg QHS   Adderall 15 mg once to twice daily - not taking recently     Compliance: yes     Side effects: dry mouth (uses biotene)    Risk Parameters:  Patient reports no suicidal ideations today   Patient reports no homicidal ideation  Patient reports no self-injurious behavior  Patient reports no violent behavior      Exam (detailed: at least 9 elements; comprehensive: all 15 elements)   Constitutional  Vitals:  Most recent vital signs, dated less than 90 days prior to this appointment, were reviewed.    Se Epic for today's vitals.       General:  age appropriate, casual attire, adequate grooming, good eye contact, calm, not tearful         Musculoskeletal  Muscle Strength/Tone:  No tremor noted today,  Less PMA    Gait & Station:  non-ataxic     Psychiatric  Speech:  no latency; no press , spontaneous, talkative, conversational, soft spoken    Mood & Affect:  "anxious"  Restricted    Thought Process:  " Linear   Associations:  intact   Thought Content:  no SI, no homicidality, delusions, or paranoia, hallucinations: (auditory: no currently, visual: no) - none currently       Insight:  Fair    Judgement: Adequate to circumstances    Orientation:  grossly intact. Alert and oriented x 4    Memory: intact for content of interview   Language: grossly intact   Attention Span & Concentration:  able to focus   Fund of Knowledge:  intact and appropriate to age and level of education     Assessment and Diagnosis   Status/Progress: Based on the examination today, the patient's problem(s) is/are inadequately controlled, have been treatment resistant in past.   New problems have been not presented today.   Comorbidities are complicating management of the primary condition.    The working differential for this patient includes Cluster B and C traits, personality disorder, PTSD, MDD, OCD    General Impression :  Pt improved with addition of SNRI to SSRI; she declines atypical antipsychotic augmentation. Did not tolerate recent medication adjustments. Stabilizing with return to prior regimen.  Adderall does help pt function better, she does not feel it worsens anxiety.  Pt with significant negative cognitive distortions evident throughout interview.  Pt dx with Stage II Adenocarcinoma of the lung     Major Depressive Disorder, Recurrent, moderate   Panic Disorder without Agoraphobia.   Generalized Anxiety Disorder.   R/O Cognitive Disorder   R/O PTSD  Hx OCD  Personality Disorder, unspecified, Cluster B and C traits     Hepaitis B, tremors,  osteoporosis, myalgias, arthralgia, poor balance, back pain, bulging disc, fibromyalgia, osteoarthritis, neuropathy, LE fracture, Adenocarcinoma of Lung    GAF: 58   Intervention/Counseling/Treatment Plan   · Medication Management: The risks and benefits of medication were discussed with the patient.  · Counseling provided with patient as follows: importance of compliance with chosen treatment  options was emphasized, risks and benefits of treatment options, including medications, were discussed with the patient     1. Continue Effexor XR 75 mg daily  2. Continue Remeron 15 mg QHS   3. Continue Klonopin 1 mg BID - TID prn.  Discussed risk of decreased RT, sedation, addictive potential, and not to mix with alcohol. No Rx today.  Advised to avoid this medication when she is taking pain meds (risks of resp depression discussed) and she verbalized an understanding   4. Continue Lamictal 100 mg QAM and  200 mg daily.  Discussed risks of life threatening SJS, need for compliance.   5. Continue Psychotherapy with Kaye Jennings - encouraged to address her concerns with her therapist  6. Hold Adderall for now until further assessment.  7.  Pt instructed to go to ED/911 with thoughts of wanting harm self/others  8. Call to report any worsening of symptoms or problems with the medication  9. Continue  Sertraline to 200 mg and 50 mg in pm.   Target depression, anxiety. Discussed risk of serotonin syndrome, GI upset, headaches and that this dose is > FDA max rec dosing.   10. MOCA and memory loss work up - defer for now (not related to metastatic disease - pt has had cancer staging)  11. Refer to case management for assistance with community based resources during cancer treatment   12. Pt will let me know as refills are needed.     Return to Clinic: 6 weeks

## 2018-05-28 ENCOUNTER — TELEPHONE (OUTPATIENT)
Dept: PSYCHIATRY | Facility: CLINIC | Age: 63
End: 2018-05-28

## 2018-05-28 ENCOUNTER — OUTPATIENT CASE MANAGEMENT (OUTPATIENT)
Dept: ADMINISTRATIVE | Facility: OTHER | Age: 63
End: 2018-05-28

## 2018-05-28 NOTE — PROGRESS NOTES
Thank you for the referral.  Patient has been assigned to Leia Benoit LMSW for low risk screening for Outpatient Case Management.     Reason for referral:Pt was recently dx with Stage II adenocarcinoma of lung; She will be starting chemotherapy through [facility]. Pt has very limited local social support and financial concerns - please evaluate for any community based resources which may help - thank you    Please contact [facility] at [phone] with any questions.    Thank you,    Stephanie Maciel LMSW

## 2018-05-28 NOTE — TELEPHONE ENCOUNTER
----- Message from Stephanie Maciel LMSW sent at 5/28/2018  8:54 AM CDT -----  Thank you for the referral.  Patient has been assigned to Leia Benoit LMSW for low risk screening for Outpatient Case Management.     Reason for referral:Pt was recently dx with Stage II adenocarcinoma of lung; She will be starting chemotherapy through [facility]. Pt has very limited local social support and financial concerns - please evaluate for any community based resources which may help - thank you    Please contact [facility] at [phone] with any questions.    Thank you,    Stephanie Maciel LMSW

## 2018-05-28 NOTE — TELEPHONE ENCOUNTER
----- Message from Leia Benoit LMSW sent at 5/28/2018  2:45 PM CDT -----  This LMSW received a referral on the above patient. This SW provided patient/caregiver with the following resource: [facility] Advance Directive Brochure, [facility] Financial Assistance Application, [employer] Brochure (senior centers, utility assistance, homemaker), [place] Assistance Application, Road to Recovery, Cancer Resources, Crisis/Help Lines.     Thank you for the referral,    Leia Benoit LMSW

## 2018-05-28 NOTE — PROGRESS NOTES
This LMSW received a referral on the above patient.   Reason for referral: Low risk, Generalized anxiety disorder, MDD (major depressive disorder), recurrent episode, moderate, Panic disorder without agoraphobia, Adenocarcinoma of right lung, please evaluate for any community based resources which may help   Name of the community resource that was provided: [facility] Advance Directive Brochure, [facility] Financial Assistance Application, [facility] Brochure (senior centers, utility assistance, homemaker), [employer] Assistance Application, Road to Recovery, Cancer Resources, Crisis/Help Lines  Resource given to: Patient via email to: [email or web address]    LMSW completed assessment with patient. Patient reports living alone and reports she is independent with ADLs. Patient does not use any assistance devices at this time and provides own transportation. Patient reports she is unsure of she will have outstanding bills with [facility] after recent hospital visit and was interested in receiving [facility] Financial Assisitance Application. Patient reports not having any close family and reports she has a niece in the area that occasionally checks on her. Patient reports that she does attend [employer] however she is not involved with activities at the [employer]. Patient was tearfully when describing current challenges she is facing states she can benefit from resources for community agencies that offer social and financial support. LMSW discussed programs offered at the [facility] [facility] along with [employer] assistance program. LMSW also encouraged patient to utilize supports from [employer] community and discussed use of help lines and crisis counselor during this time. Patient requested LMSW send resources to her e-mail address and LMSW also offered to send hard copy of resources to patient's home address on file. Patient verbalized agreement with this plan and did not identify further needs. Patient was advised to contact [facility] if further  resources or assistance needed in the future. Referral source notified.

## 2018-05-29 ENCOUNTER — DOCUMENTATION ONLY (OUTPATIENT)
Dept: RADIATION ONCOLOGY | Facility: CLINIC | Age: 63
End: 2018-05-29

## 2018-05-29 ENCOUNTER — CLINICAL SUPPORT (OUTPATIENT)
Dept: HEMATOLOGY/ONCOLOGY | Facility: CLINIC | Age: 63
End: 2018-05-29
Payer: MEDICARE

## 2018-05-29 ENCOUNTER — OFFICE VISIT (OUTPATIENT)
Dept: RADIATION ONCOLOGY | Facility: CLINIC | Age: 63
End: 2018-05-29
Payer: MEDICARE

## 2018-05-29 VITALS
HEART RATE: 104 BPM | WEIGHT: 141 LBS | BODY MASS INDEX: 23.46 KG/M2 | SYSTOLIC BLOOD PRESSURE: 132 MMHG | DIASTOLIC BLOOD PRESSURE: 86 MMHG

## 2018-05-29 DIAGNOSIS — C34.31 MALIGNANT NEOPLASM OF LOWER LOBE OF RIGHT LUNG: ICD-10-CM

## 2018-05-29 PROCEDURE — 3008F BODY MASS INDEX DOCD: CPT | Mod: ,,, | Performed by: RADIOLOGY

## 2018-05-29 PROCEDURE — 99205 OFFICE O/P NEW HI 60 MIN: CPT | Mod: ,,, | Performed by: RADIOLOGY

## 2018-05-29 NOTE — PROGRESS NOTES
"NUTRITION NOTE - CHEMO SCHOOL    Sheri is a 62 year old female with lung cancer s/p lobectomy getting Cisplatin and Alimta.  Weight: 141#. Her appetite is fair, has lost 4# attributed to surgery.  She eats small meals, does not eat "sit down" meals. She has had constipation in the past due to IBS and has taken Linzess.  Constipation has improved as she has been eating dried prunes.     Plan: Discussed importance of nutrition during cancer treatment and discouraged intentional weight loss.  2. Discussed importance of hydration and advised she aim for 8-9 cups or 70 ounces daily.  3. Discouraged use of vitamin and herbal supplements, but did advise she take folic acid and B12 as prescribed to reduce side effects of Alimta.  4. Discussed safe food handling and gave tips on nausea and taste changes.  5. Gave samples of nutrition supplements and coupons.  6. Educated on the Fiber One and Senokot-S protocol for constipation.    "

## 2018-05-29 NOTE — PROGRESS NOTES
Sheri ZOYA Broderick  [id number]    [facility]   Cancer Center    TITLE: PLAN OF CARE FOR THE CHEMOTHERAPY PATIENT / TEACHING PROTOCOL    PURPOSE: To involve the patient / significant other in the plan of care and to provide teaching to the significant other & patient receiving chemotherapy.    LEVEL: Independent.    CONTENT: The Plan of Care for the chemotherapy patient is individualized and appropriate to the patients needs, strengths, limitations, & goals.  Education includes information regarding chemotherapy side effects, the treatment itself, and self-care  Activities.    GOAL / OUTCOME STANDARDS    PHYSIOLOGIC: The client will remain free or experience minimal side effects or toxicities throughout the chemotherapy treatment period.     PSYCHOLOGIC: The client/significant others will demonstrate positive coping mechanisms in relation to chemotherapy and its side effects.      COGINITIVE: The client/significant others will verbalize understanding of self-care measure to avoid/minimize side effects of the chemotherapy regime.    EVALUATION / COMMENT KEY:    V = Audiovisual/Video  S = Successfully meets outcome  N = Needs further instruction  NA = Not applicable to the patient  P = Previous knowledge  U = Unable to comprehend  * = See progress notes          PLAN OF CARE  INFORMATION TO BE DELIVERED / NURSING INTERVENTIONS DATE EVALUATION   1. Assessment of client/caregiver,         knowledge of cancer diagnosis,         and chemotherapy as a treatment. 1a. Evaluate patient/caregiver learning ability    b. Plan teaching sessions with patient/caregiver according to needs and present anxiety level/ability to learn.    c. Provide Chemotherapy Education Packet,        Mouth Care Protocol,         Specific Patient Education Sheets. 05/29/2018 S   2. Individual chemotherapy treatment         plan. 2a. Review of Chemotherapy Education handout from [employer]            05/29/2018   S   3. Knowledge  Deficit & Self-Management of general side effects common to all chemotherapy:  a. Nausea/Vomiting  b.   Diarrhea  c. Mouth Care  d. Dental care  e. Constipation  f. Hair Loss  g. Potential for infection  h. Fatigue   3a. Reinforce that the majority of side effects from chemotherapy are reversible and are  controlled both in the hospital and at home        (blood counts recover, hair grows back).   b.  Refer to the following for reinforcement of         information post-treatment:  1. Mouth Care Protocol.  2. Bowel Protocol for constipation or diarrhea.  3.  Drug Specific Chemotherapy Information Sheets for each medication patient receiving.    05/29/2018     S     PLAN OF CARE  INFORMATION TO BE DELIVERED / NURSING INTERVENTIONS DATE EVALUATION   h. Potential for bleeding         i. Potential anemia/fatigue         j. Potential sunburn         k. Birth control measures  l. Safety measures post treatment 4.  Chemotherapy Home Care Instruction  and Safety Information Sheet.  A. patient/caregivers to thoroughly cook shellfish (shrimp, crab, etc) to decrease the chance of infection.    B.  Use sunscreen and protective clothing while in the sun.   05/29/2018      4. Knowledge deficit & Self Management of Drug Specific  Side Effects.    a. BLADDER EFFECTS        (Hemorrhagic Cystitis)                  Preventable with adequate hydration; occurs 2-3 days or more post treatment.   1.  Instruct patient to:  a.   Void at least every 2 hours; increase intake.  b.   DO NOT hold urine; go when urge is felt.  c.    Empty bladder at bedtime and on         awakening.  d.   Observe for color changes (red to tea           colored), amount and frequency changes.  e.   Notify oncologist of any abnormalities           in urine or voiding or if you cannot               drink adequate fluids.   05/29/2018   S   b.   CHANGES IN URINE        COLOR:      1.   Instruct patient:  a.   Most evident in first 2-3 voidings after            administration.  b. Lasts less than 24 hours.  c. If urine is discolored 2 or more days post- treatment, notify oncologist.      05/29/2018 S   c.    KIDNEY EFFECTS           (Nephrotoxicity)   1.  Instruct patient to:  a.   Drink 8-16 glasses of fluid/day the day   pre-treatment and 3-4 days post-treatment to maintain hydration; the best way to minimize kidney problems.  b.   Notify oncologist immediately if unable to drink fluids or if changes are noted in urinary elimination.     05/29/2018   S   a. PULMONARY TOXICITY    1. Instruct patient to report symptoms such as shortness of breath, chest pain, shallow breathing, or chest wall discomfort to physician.  2. Reinforce preventative measures used by the health care team.  a. Baseline and periodic PFT and chest x-ray.   05/29/2018   S     PLAN OF CARE INFORMATION TO BE DELIVERED / NURSING INTERVENTIONS DATE EVALUATION   b. NERVE & MUSCLE EFFECTS (neurotoxocity; neuropathy, possible visual/hearing changes)        3. Instruct patient to:    a. Report numbness or tingling of the hands/feet, loss of fine motor movement (buttoning shirt, tying shoelaces), or gait changes to your oncologist.  b. If numbness/tingling are present:  1. protect feet with shoes at all times.  2. Use gloves for washing dishes/gardening & potholders in kitchen.       05/29/2018   S   c. CARDIOTOXICITY  Decreased effectiveness of             cardiac function. Effective are                  cumulative and irreversible.                                    CARDIAC ARRYTHMIAS              4   Instruct:  a. Heart function may be tested before treatment and perdiocally during treatment.  b. Notify oncologist of irregular pulse, palpitations, shortness of breath, or swelling in lower extremities/feet.          Taxol and Taxotere can cause arrhythmias on infusion that resolve once infusion discontinued. Instruct nurse if any irregularity felt.    05/29/2018   S   d. EXTRAVASTION  Occurs when vesicants  leak outside of vein and cause damage to the skin and underlying tissues.   1. Reinforce preventive measures used to avoid complications.  a. Fresh IV site or central line monitored continuously with vesicant IVP.  b. Continuous infusion via central line site and blood return monitored periodically around the clock.  2. Instruct to:  a. Notify nurse of any discomfort, burning, stinging, etc. at IV site during chemotherapy administration.  b. Notify oncologist of any redness, pain, or swelling at IV site after discharge from hospital.   05/29/2018   S   e. HYPERSENSITIVITY can happen with any medication.   1. Instruct patient:  a. Nurse is with them during the initial part of treatment and will be close by to monitor.  b. Pre-medication ordered by the oncologist must be taken on time. If doses are missed, treatment will need to be re-scheduled.  c. Skin redness, itching, or hives appearing after discharge should be reported to oncologist. 05/29/2018   S       PLAN OF CARE INFORMATION TO BE DELIVERED / NURSING INTERVENTIONS DATE EVALUATION   f. FLU-LIKE SYNDROME      1. Instruct patient symptoms are hard to prevent and may include fever, shaking chills, muscle and body aches.  a. Taking prescribed medications from physician if needed.  b. Adequate fluids are important.    2. Reinforce the need to call if temperature is         elevated to 100.4 or more  05/29/2018   S   g. HAND-FOOT SYNDROME  causes painful, symmetric swelling and redness of palms and soles                  5. Instruct patient to report any numbness or tingling in the hands or feet.  6. Explain prevention techniques, such as     a. Use heavy moisturizers to lessen skin dryness and itching, but to avoid if skin is cracked or broken  b. Bathe in tepid water, use non-perfumed soap, and wash gently. Baths with oatmeal or diluted baking soda may be soothing.  c. Avoid tight fitting shoes and repetitive actions, such as rubbing hands or applying pressure to  hands/feet.  7. Review measures to take should syndrome occur:  a. Cold compresses and elevation for          edema  b. Pain medications and other measures as ordered by oncologist.   4.   Syndrome resolves few weeks after therapy. 05/29/2018   S   5. DISCHARGE PLANNING /        EDUCATION 1.    Explain importance of compliance with follow- up  tests (CBC, CMP).  2.    Verify patient/caregiver know:  a.    Oncologists office phone number.  b.    Dates of follow-up appointments.  c.    Prescriptions given for nausea  3.   Review side effects to monitor and notify          oncologist about.  4.   Reinforce the need for patient and caregivers to:  a.    Review information given.  b.    Call oncologists office with questions          or symptoms  5.   Provide Cancer Resource [employer]chure make referrals if needed for financial or .   05/29/2018   S     PROGRESS NOTES: I met with the patient today for chemotherapy education. she will be starting treatment with Cisplatin/Alimta/Neulasta . We discussed the mechanism of action, potential side effects of this treatment as well as ways she can manage them at home. Some of these side effects include but or not limited to fever, nausea, vomiting, decreased appetite, fatigue, weakness, cytopenias, myalgia/arthralgia, constipation, diarrhea, bleeding, headache, shortness of breath, nail changes, taste change, hair thinning/loss, mood disturbances, or edema. We also discussed dietary modifications she should make although this will be discussed in more detail with the dietician. she was provided with anti-emetic medication, a copy of all of the information we discussed today as well as our contact information. she will be provided a schedule on his first day of treatment. We will obtain labs on a weekly basis and the patient will follow-up with the physician for toxicity monitoring throughout treatment. All questions were answered and an informed consent was  obtained. she was reminded to certainly contact us sooner if needed. Patient expressed concerns over the financial impact as well as her taking the chemotherapy and living alone. Annamarie Sarkar met with the patient to discuss the financial aspects. Patient expressed the possibly did not want treatment if she could not afford it. Patient notified we are here to give her the information about her treatment options and it is up to her to make the best decision for her in her situation. Dr. Zhang notified about her concerns.

## 2018-05-29 NOTE — PROGRESS NOTES
Met with patient to complete New Patient Orientation and distress screening; she indicated a distress rating of 10.  Patient is concerned about the cost of treatment; she met with Annamarie Sarkar, , regarding her insurance questions/concerns.  Patient signed consent form to receive information from the [employer].  She is looking forward to attending [facility] and possibly some of the other groups.  Patient is currently under psychiatric care with Beulah Hawley MD.

## 2018-05-29 NOTE — PROGRESS NOTES
Pt very concerned with her insurance plan and what they will cover, had her [employer] insurance booklet with her but would not let me look at it to find her OOP expenses related to chemotherapy.  Explained and printed out her sheet from [employer] that she has $6700 OOP, pays 80/20%, and has $4520 remaining until insurance would pay 100%.  Explained that there was currently no NSCLC grants to help pay for the chemo medications but she could call and get a financial aid application for her balances.  She was became upset and said she would go to [facility] where her friends said it was free or at least the drugs were.  I printed out an Alimta application to try for free drug through [employer]; pt signed and will give to Dr. Tyler to fill out his portion.  Also explained there was not any free drug for the Cisplatin and it getting the free drug might delay her start date (next Tuesday - maybe by a week) as this takes a little while to set up.  Explained she will still owe 20% for the administration and other chemo drugs.  Pt unsure of all this and wanted to call her insurance company and see what they had to say.

## 2018-05-29 NOTE — PROGRESS NOTES
Sheri Broderick  [id number]  1955 5/29/2018  [facility]  [address]  [address]  [place] [id number]    REASON FOR CONSULTATION: Right-sided lung cancer, postoperative  TREATMENT GOAL: adjuvant    HISTORY OF PRESENT ILLNESS:   62-year-old patient was undergoing routine surgery for carpal tunnel when a routine chest x-ray revealed a 2 cm right lower lung mass.  She was seen by Dr. Sutton will subsequent CAT scan and PET scan ordered. This study revealed a hypermetabolic spiculated 2 x 1.6 and a meter mass in the superior segment of the right lower lobe with an SUV of 5.3. There is also a right hilar lymph node measuring 1.3 cm with an SUV of 3.1. This no other evidence of mediastinal hilar or supra clavicular adenopathy and no evidence of metastatic disease.  She underwent bronchoscopy in early February however it was nondiagnostic in terms of pathology..    She is then seen by Dr. Welch and taken to surgery on 04/27/2018 for a right lower lobe lobectomy.  The report indicates that the tumor was removed in total. The station 10 lymph node which was enlarged and identified and imaging was removed.  The pathology report in case that the right lower lobe mass was a invasive adenocarcinoma. The lobectomy specimen shows that the tumor was 2.4 cm. It was focally involving the visceral pleura but the margin was clear by 2 mm. The parenchymal and bronchial vascular margins were negative.  There were 2 peribronchial lymph nodes removed and they were negative.    Postoperatively she has done well. She does have some minor soreness in the chest wall. With the status is stable without cough pleurisy hemoptysis. She remains active. Energy level is good. She has had no weight loss.    Review of Systems   Constitutional: Negative for appetite change and chills.   HENT:   Negative for mouth sores and nosebleeds.    Eyes: Negative for eye problems and icterus.   Respiratory: Negative for chest tightness,  cough, hemoptysis and shortness of breath.    Cardiovascular: Negative for chest pain and leg swelling.   Gastrointestinal: Negative for abdominal distention and abdominal pain.   Genitourinary: Negative for dysuria and frequency.    Musculoskeletal: Negative for arthralgias, back pain and gait problem.   Skin: Negative for itching and rash.   Neurological: Negative for dizziness and gait problem.   Hematological: Negative for adenopathy. Does not bruise/bleed easily.   Psychiatric/Behavioral: Negative for confusion. The patient is not nervous/anxious.      Past Medical History:   Diagnosis Date    Allergy     seasonal    Anxiety     Arthritis     Back pain     Chemotherapy-induced neutropenia 2018    Dizziness     Fibromyalgia     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     Fracture of right foot     Hep B w/o coma     Hyperlipidemia     Major depressive disorder, recurrent episode, in partial remission     Malignant neoplasm of lower lobe of right lung-Adenocarcinoma 2018    Myalgia and myositis, unspecified     OCD (obsessive compulsive disorder)     Osteoporosis     Polyneuropathy     Trouble in sleeping     Urinary incontinence      Past Surgical History:   Procedure Laterality Date    CARPAL TUNNEL RELEASE Bilateral 2014    EYE SURGERY      RK    FRACTURE SURGERY Right     wrist orif    TUBAL LIGATION       Social History     Social History    Marital status:      Spouse name: N/A    Number of children: N/A    Years of education: N/A     Social History Main Topics    Smoking status: Never Smoker    Smokeless tobacco: Never Used    Alcohol use No    Drug use: No    Sexual activity: Yes     Birth control/ protection: None     Other Topics Concern    Not on file     Social History Narrative    No narrative on file     Family History   Problem Relation Age of Onset    Heart disease Mother          to to coma (hypothermia)     Heart  disease Father 57        heart attack    Heart disease Sister         stents    Diabetes Sister     Parkinsonism Sister     Heart disease Brother 45        death at 45 years old due to hearth disease     Diabetes Maternal Grandmother        PRIOR HISTORY OF CHEMOTHERAPY OR RADIOTHERAPY: Please see HPI for patients prior oncologic history.    Medication List with Changes/Refills   Current Medications    CA CMB [id number] 120-1,000-10 MG-UNIT-MG TAB    Take 1 tablet by mouth.    CHEWABLE VITAMIN C 500 MG CHEW        CLONAZEPAM (KLONOPIN) 1 MG TABLET    Take one-half to one tablet two to three times daily only as needed for anxiety    DEXTROAMPHETAMINE-AMPHETAMINE (ADDERALL) 15 MG TABLET    Take one tablet PO twice daily.  Take second dose by 2 pm    FENTANYL (DURAGESIC) 25 MCG/HR    UNW AND CYNDI 1 PA TO SKIN Q 72 H    IBUPROFEN (ADVIL,MOTRIN) 800 MG TABLET    Take 1 tablet (800 mg total) by mouth 2 (two) times daily as needed for Pain.    LAMOTRIGINE (LAMICTAL) 100 MG TABLET    Take one tablet PO in the morning and two tablets PO in the evening    LINZESS 145 MCG CAP CAPSULE    Take 1 capsule by mouth once daily.    MAALOX ADVANCED 1,000-60 MG CHEW        MECLIZINE (ANTIVERT) 25 MG TABLET    Take 1 tablet (25 mg total) by mouth every 8 (eight) hours as needed for Dizziness. Twice a day    MIRTAZAPINE (REMERON) 30 MG TABLET    Take one-half tablet PO nightly    ONDANSETRON (ZOFRAN) 8 MG TABLET    Take 1 tablet (8 mg total) by mouth every 8 (eight) hours as needed for Nausea.    OXYCODONE-ACETAMINOPHEN (PERCOCET) 5-325 MG PER TABLET    TK 1 T PO Q 6 H PRN P    OXYMETAZOLINE HCL (DECONGESTANT NASAL SPRAY NASL)        PEPTO-BISMOL 262 MG CHEW        PROMETHAZINE (PHENERGAN) 25 MG TABLET    Take 1 tablet (25 mg total) by mouth every 6 (six) hours as needed for Nausea.    RANITIDINE (ZANTAC) 150 MG CAPSULE        SERTRALINE (ZOLOFT) 100 MG TABLET    Take two tablets PO in the morning and one-half tablet PO  in evening    TIZANIDINE (ZANAFLEX) 4 MG TABLET    TAKE 1 TABLET TWICE DAILY    VENLAFAXINE (EFFEXOR-XR) 75 MG 24 HR CAPSULE    Take 1 capsule (75 mg total) by mouth once daily.    VITAMIN E 400 UNIT CAPSULE    Take 400 Units by mouth once daily.    ZYRTEC 10 MG CAP    continuous prn.     Review of patient's allergies indicates:   Allergen Reactions    Penicillins      Other reaction(s): Rash    Trazodone Anxiety     Severe anxiety     Prolia [denosumab] Other (See Comments)     myalgias       QUALITY OF LIFE: 90%- Able to Carry on Normal Activity: Minor Symptoms of Disease    Vitals:    05/29/18 0906   BP: 132/86   Pulse: 104   Weight: 64 kg (141 lb)   PainSc: 0-No pain       PHYSICAL EXAM: Well-groomed alert answers questions well  GENERAL: alert; in no apparent distress.   HEAD: normocephalic, atraumatic.  EYES: pupils are equal, round, reactive to light and accommodation. Sclera anicteric. Conjunctiva not injected.   NOSE/THROAT: no nasal erythema or rhinorrhea. Oropharynx pink, without erythema, ulcerations or thrush.   NECK: no cervical motion rigidity; supple with no masses.  CHEST: clear to auscultation bilaterally; no wheezes, crackles or rubs. Patient is speaking comfortably on room air with normal work of breathing without using accessory muscles of respiration.  CARDIOVASCULAR: regular rate and rhythm; no murmurs, rubs or gallops.  ABDOMEN: soft, nontender, nondistended. Bowel sounds present.   MUSCULOSKELETAL: no tenderness to palpation along the spine or scapulae. Normal range of motion. Mild right-sided chest wall pain. No palpable mass in the chest wall area  NEUROLOGIC: cranial nerves II-XII intact bilaterally. Strength 5/5 in bilateral upper and lower extremities. No sensory deficits appreciated. Reflexes globally intact. No cerebellar signs. Normal gait.  LYMPHATIC: no cervical, supraclavicular or axillary adenopathy appreciated bilaterally.   EXTREMITIES: no clubbing, cyanosis, edema.  SKIN:  no erythema, rashes, ulcerations noted.     REVIEW OF IMAGING/PATHOLOGY/LABS: Please see HPI. All images reviewed personally by dictating physician.     ASSESSMENT: Very pleasant patient with right lower lobe adenocarcinoma stage, pT2 pN0M0, margin clear to 2 mm, no metastatic disease, good KPS  PLAN: I had a long discussion with the patient using diagrams showing her the location of lesion and the indications for postoperative radiation therapy for non-small cell lung cancer.. I don't believe she is a candidate for postoperative radiation therapy since she did have a small lesion with a clear margin of 2 mm. The lesion was focally involving the visceral pleura but there was no invasion into the chest wall. I told her that greater margins are usually required for tumors that are invading into the chest wall or T3 lesions.    Her lymph node status is negative. I believe the PET positive lymph node was enlarged and seen at the time of surgery and removed.    I believe she has follow-up with Dr. Nogueira for consideration of postoperative chemotherapy.      We discussed the risks and benefits of the above treatment and have gone over in detail the acute and late toxicities of radiation therapy to the chest. The patient expressed  understanding and has signed a consent form which is included in the patients chart. The patient has our contact information and understands that they are free to contact us at any time with questions or concerns regarding radiation therapy.    DISPOSITION: RTC PRN    TIME SPENT WITH PATIENT: I have personally seen and evaluated this patient. Greater than 50% of this time was spent discussing coordination of care and/or counseling.     PHYSICIAN: Carlos Enciso MD

## 2018-06-01 ENCOUNTER — CLINICAL SUPPORT (OUTPATIENT)
Dept: HEMATOLOGY/ONCOLOGY | Facility: CLINIC | Age: 63
End: 2018-06-01
Payer: MEDICARE

## 2018-06-01 DIAGNOSIS — C34.31 SQUAMOUS CELL CARCINOMA OF BRONCHUS IN RIGHT LOWER LOBE: Primary | ICD-10-CM

## 2018-06-01 PROCEDURE — 96372 THER/PROPH/DIAG INJ SC/IM: CPT | Mod: ,,, | Performed by: INTERNAL MEDICINE

## 2018-06-01 RX ORDER — CYANOCOBALAMIN 1000 UG/ML
1000 INJECTION, SOLUTION INTRAMUSCULAR; SUBCUTANEOUS
Status: DISCONTINUED | OUTPATIENT
Start: 2018-06-01 | End: 2018-10-15

## 2018-06-04 ENCOUNTER — DOCUMENTATION ONLY (OUTPATIENT)
Dept: FAMILY MEDICINE | Facility: CLINIC | Age: 63
End: 2018-06-04

## 2018-06-04 NOTE — PROGRESS NOTES
Pre-Visit Chart Review  For Appointment Scheduled on (date) 6/7/18    Health Maintenance Due   Topic Date Due    Pneumococcal PCV13 (High Risk) (1 - PCV13 Required) 08/29/1956    Pneumococcal PPSV23 (High Risk) (1) 08[birth year]/1972

## 2018-06-05 DIAGNOSIS — Z51.89 ENCOUNTER FOR OTHER SPECIFIED AFTERCARE: ICD-10-CM

## 2018-06-06 ENCOUNTER — SOCIAL WORK (OUTPATIENT)
Dept: HEMATOLOGY/ONCOLOGY | Facility: CLINIC | Age: 63
End: 2018-06-06

## 2018-06-06 RX ORDER — ONDANSETRON 2 MG/ML
16 INJECTION INTRAMUSCULAR; INTRAVENOUS ONCE
Status: CANCELLED
Start: 2018-06-06 | End: 2018-06-06

## 2018-06-06 NOTE — PROGRESS NOTES
I met with patient on 6/5/18, during her first chemo infusion.  She stated that she is anxious but ready to get treatment started.  She was in good spirits.  I provided her with a Mindful Meditation flyer and encouraged her to attend as it might help her with her with the symptoms associated with anxiety.  I introduced her to Sharifa Blum, Art Therapist, and encouraged her to spend some time with Sharifa as Art Therapy is also beneficial in reducing anxiety.

## 2018-06-07 ENCOUNTER — TELEPHONE (OUTPATIENT)
Dept: HEMATOLOGY/ONCOLOGY | Facility: CLINIC | Age: 63
End: 2018-06-07

## 2018-06-07 ENCOUNTER — OFFICE VISIT (OUTPATIENT)
Dept: FAMILY MEDICINE | Facility: CLINIC | Age: 63
End: 2018-06-07
Payer: MEDICARE

## 2018-06-07 VITALS
TEMPERATURE: 99 F | HEIGHT: 64 IN | DIASTOLIC BLOOD PRESSURE: 75 MMHG | OXYGEN SATURATION: 97 % | SYSTOLIC BLOOD PRESSURE: 129 MMHG | RESPIRATION RATE: 18 BRPM | WEIGHT: 141.75 LBS | BODY MASS INDEX: 24.2 KG/M2 | HEART RATE: 113 BPM

## 2018-06-07 DIAGNOSIS — M79.7 FIBROMYALGIA: Primary | ICD-10-CM

## 2018-06-07 DIAGNOSIS — C34.31 MALIGNANT NEOPLASM OF LOWER LOBE OF RIGHT LUNG: Primary | ICD-10-CM

## 2018-06-07 DIAGNOSIS — F33.1 MODERATE RECURRENT MAJOR DEPRESSION: ICD-10-CM

## 2018-06-07 DIAGNOSIS — M81.0 OSTEOPOROSIS, SENILE: ICD-10-CM

## 2018-06-07 DIAGNOSIS — C34.31 MALIGNANT NEOPLASM OF LOWER LOBE OF RIGHT LUNG: Chronic | ICD-10-CM

## 2018-06-07 DIAGNOSIS — E78.2 MIXED HYPERLIPIDEMIA: ICD-10-CM

## 2018-06-07 DIAGNOSIS — F41.1 GENERALIZED ANXIETY DISORDER: ICD-10-CM

## 2018-06-07 LAB
ALBUMIN SERPL-MCNC: 4.5 G/DL (ref 3.1–4.7)
ALP SERPL-CCNC: 71 IU/L (ref 40–104)
ALT (SGPT): 50 IU/L (ref 3–33)
AST SERPL-CCNC: 31 IU/L (ref 10–40)
BASOPHILS NFR BLD: 0 K/UL (ref 0–0.2)
BASOPHILS NFR BLD: 0.2 %
BILIRUB SERPL-MCNC: 0.5 MG/DL (ref 0.3–1)
BUN SERPL-MCNC: 24 MG/DL (ref 8–20)
CALCIUM SERPL-MCNC: 10.3 MG/DL (ref 7.7–10.4)
CHLORIDE: 104 MMOL/L (ref 98–110)
CO2 SERPL-SCNC: 25.7 MMOL/L (ref 22.8–31.6)
CREATININE: 1.09 MG/DL (ref 0.6–1.4)
EOSINOPHIL NFR BLD: 0 K/UL (ref 0–0.7)
EOSINOPHIL NFR BLD: 0.2 %
ERYTHROCYTE [DISTWIDTH] IN BLOOD BY AUTOMATED COUNT: 14.4 % (ref 11.7–14.9)
GLUCOSE: 94 MG/DL (ref 70–99)
GRAN #: 19.7 K/UL (ref 1.4–6.5)
GRAN%: 82.5 %
HCT VFR BLD AUTO: 35.4 % (ref 36–48)
HGB BLD-MCNC: 11.1 G/DL (ref 12–15)
IMMATURE GRANS (ABS): 2.4 K/UL (ref 0–1)
IMMATURE GRANULOCYTES: 10 %
LYMPH #: 1.3 K/UL (ref 1.2–3.4)
LYMPH%: 5.4 %
MCH RBC QN AUTO: 29.3 PG (ref 25–35)
MCHC RBC AUTO-ENTMCNC: 31.4 G/DL (ref 31–36)
MCV RBC AUTO: 93.4 FL (ref 79–98)
MONO #: 0.4 K/UL (ref 0.1–0.6)
MONO%: 1.7 %
NUCLEATED RBCS: 0 %
PERFORMED BY:: NORMAL
PLATELET # BLD AUTO: 289 K/UL (ref 140–440)
PMV BLD AUTO: 9.1 FL (ref 8.8–12.7)
POTASSIUM SERPL-SCNC: 4.1 MMOL/L (ref 3.5–5)
PROT SERPL-MCNC: 7.8 G/DL (ref 6–8.2)
RBC # BLD AUTO: 3.79 M/UL (ref 3.5–5.5)
SODIUM: 138 MMOL/L (ref 134–144)
WBC # BLD AUTO: 23.8 K/UL (ref 5–10)

## 2018-06-07 PROCEDURE — 99999 PR PBB SHADOW E&M-EST. PATIENT-LVL III: CPT | Mod: PBBFAC,,, | Performed by: FAMILY MEDICINE

## 2018-06-07 PROCEDURE — 99214 OFFICE O/P EST MOD 30 MIN: CPT | Mod: S$GLB,,, | Performed by: FAMILY MEDICINE

## 2018-06-07 PROCEDURE — 3008F BODY MASS INDEX DOCD: CPT | Mod: CPTII,S$GLB,, | Performed by: FAMILY MEDICINE

## 2018-06-07 PROCEDURE — 99499 UNLISTED E&M SERVICE: CPT | Mod: S$GLB,,, | Performed by: FAMILY MEDICINE

## 2018-06-07 RX ORDER — BENZONATATE 100 MG/1
100 CAPSULE ORAL EVERY 6 HOURS PRN
Qty: 60 CAPSULE | Refills: 3 | Status: SHIPPED | OUTPATIENT
Start: 2018-06-07 | End: 2018-06-17

## 2018-06-07 RX ORDER — ALBUTEROL SULFATE 90 UG/1
2 AEROSOL, METERED RESPIRATORY (INHALATION) EVERY 6 HOURS PRN
Qty: 18 G | Refills: 5 | Status: SHIPPED | OUTPATIENT
Start: 2018-06-07 | End: 2019-08-28

## 2018-06-08 ENCOUNTER — TELEPHONE (OUTPATIENT)
Dept: HEMATOLOGY/ONCOLOGY | Facility: CLINIC | Age: 63
End: 2018-06-08

## 2018-06-08 NOTE — TELEPHONE ENCOUNTER
Patient called stating she had a rash to her chest. She has been having nausea and taking her Zofan and Phenergan. Notified the patient she can take Benadryl for the rash. Spoke with Dr. Zhang and called Dexamethasone 4 mg po BID for 3 days. Patient was also notified to take the Dexamethasone starting the day before each chemo for 3 days. Notified her this can also help with her nausea. If the rash does not get better to call or go to the ER.

## 2018-06-11 RX ADMIN — CYANOCOBALAMIN 1000 MCG: 1000 INJECTION, SOLUTION INTRAMUSCULAR; SUBCUTANEOUS at 01:06

## 2018-06-11 NOTE — PROGRESS NOTES
Subjective:       Patient ID: Sheri Broderick is a 62 y.o. female.    Chief Complaint: Follow-up (6 month f/u)    Patient presents here for six-month follow-up of fibromyalgia, hyperlipidemia, and osteoporosis.  She continues to be followed by psychiatry for her significant depression and generalized anxiety disorder as well as upset also disorder.  These are relatively stable although she has recently been diagnosed with lung cancer which has upset her.  She was found to have a right lung nodule and underwent bronchoscopy with biopsy without any results.  She then had a VATS procedure and this returned a malignant adenocarcinoma.  She has been seen by Dr. Zhang for hematology/oncology and she just started her first chemotherapy treatment several days ago.  She was evaluated by radiation therapy and was felt not to need radiation at this point.  She is having some persistent nausea from the chemotherapy.  Her fibromyalgia is about the same as it has been, was still some achiness but she is on appropriate medical therapy at this point in time.  As far as her hyperlipidemia, she is on diet control.  As far as her osteoporosis, she could not tolerate Prolia as it gave her significant increase in joint aches as well as body aches for an extended period time.  At her last visit, she was started on alendronate at a low dose of 35 mg weekly, but she still had significant problems with GI distress with this so this was discontinued.  She does complain of some persistent coughing over the last few weeks as well as a tickling and occasional wheezing.  As far as her psychiatric diagnoses, Dr. Hawley's notes have been reviewed.  As far as screening, she is up-to-date with all of her recommended screening exams except for a pneumococcal vaccine.  I will have to check with her oncologist to see if she can get this since she is presently taking chemotherapy.      Review of Systems   Constitutional: Positive for activity  change and fatigue. Negative for fever and unexpected weight change.   HENT: Negative for congestion, hearing loss, rhinorrhea, sore throat and trouble swallowing.    Eyes: Positive for visual disturbance. Negative for discharge.   Respiratory: Positive for cough, chest tightness and wheezing.    Cardiovascular: Positive for chest pain. Negative for palpitations.   Gastrointestinal: Positive for constipation. Negative for abdominal pain, blood in stool, diarrhea and vomiting.   Endocrine: Positive for polydipsia and polyuria.   Genitourinary: Negative for difficulty urinating, dysuria, hematuria and menstrual problem.   Musculoskeletal: Positive for arthralgias, myalgias and neck pain. Negative for joint swelling.   Neurological: Positive for weakness and headaches. Negative for dizziness.   Psychiatric/Behavioral: Positive for dysphoric mood and sleep disturbance. Negative for confusion. The patient is nervous/anxious.         Major depression stable at this point; she continues to be followed by psychiatry       Objective:      Physical Exam   Constitutional: She is oriented to person, place, and time. She appears well-developed and well-nourished.   HENT:   Head: Normocephalic and atraumatic.   Right Ear: External ear normal.   Left Ear: External ear normal.   Nose: Nose normal.   Mouth/Throat: Oropharynx is clear and moist.   Neck: Neck supple. No thyromegaly present.   Decreased range of motion of cervical spine; tenderness to palpation over the bilateral paracervical musculature   Cardiovascular: Normal rate, regular rhythm, normal heart sounds and intact distal pulses.    No murmur heard.  Pulmonary/Chest: Effort normal and breath sounds normal. She has no wheezes. She has no rales.   Musculoskeletal: Normal range of motion. She exhibits tenderness. She exhibits no edema.   Lymphadenopathy:     She has no cervical adenopathy.   Neurological: She is alert and oriented to person, place, and time. She has normal  reflexes. No cranial nerve deficit.   Psychiatric: She has a normal mood and affect. Her behavior is normal.   Vitals reviewed.      Assessment:       1. Fibromyalgia    2. Mixed hyperlipidemia    3. Osteoporosis, senile    4. Malignant neoplasm of lower lobe of right lung-Adenocarcinoma    5. Moderate recurrent major depression    6. Generalized anxiety disorder        Plan:       1.  Continue present treatment by oncology  2.  Continue present medication for her anxiety, depression, and fibromyalgia as these are stable  3.  Continue low-fat low-cholesterol diet  4.  Tessalon Perles 100 mg every 6 hours as needed for cough  5.  Albuterol inhaler as needed for wheezing  6.  Follow-up with me in 6 months or when necessary  7.  Follow-up with psychiatry as scheduled

## 2018-06-12 ENCOUNTER — OUTPATIENT CASE MANAGEMENT (OUTPATIENT)
Dept: ADMINISTRATIVE | Facility: OTHER | Age: 63
End: 2018-06-12

## 2018-06-14 ENCOUNTER — OFFICE VISIT (OUTPATIENT)
Dept: HEMATOLOGY/ONCOLOGY | Facility: CLINIC | Age: 63
End: 2018-06-14
Payer: MEDICARE

## 2018-06-14 VITALS
TEMPERATURE: 98 F | RESPIRATION RATE: 18 BRPM | DIASTOLIC BLOOD PRESSURE: 73 MMHG | HEART RATE: 121 BPM | WEIGHT: 138.31 LBS | SYSTOLIC BLOOD PRESSURE: 110 MMHG | BODY MASS INDEX: 23.74 KG/M2

## 2018-06-14 DIAGNOSIS — C34.31 MALIGNANT NEOPLASM OF LOWER LOBE OF RIGHT LUNG: Chronic | ICD-10-CM

## 2018-06-14 LAB
ALBUMIN SERPL-MCNC: 3.9 G/DL (ref 3.1–4.7)
ALP SERPL-CCNC: 133 IU/L (ref 40–104)
ALT (SGPT): 13 IU/L (ref 3–33)
AST SERPL-CCNC: 17 IU/L (ref 10–40)
BASOPHILS NFR BLD: 0 K/UL (ref 0–0.2)
BASOPHILS NFR BLD: 0.3 %
BILIRUB SERPL-MCNC: 0.3 MG/DL (ref 0.3–1)
BUN SERPL-MCNC: 12 MG/DL (ref 8–20)
CALCIUM SERPL-MCNC: 9.5 MG/DL (ref 7.7–10.4)
CHLORIDE: 104 MMOL/L (ref 98–110)
CO2 SERPL-SCNC: 30.8 MMOL/L (ref 22.8–31.6)
CREATININE: 0.68 MG/DL (ref 0.6–1.4)
EOSINOPHIL NFR BLD: 0.1 K/UL (ref 0–0.7)
EOSINOPHIL NFR BLD: 0.5 %
ERYTHROCYTE [DISTWIDTH] IN BLOOD BY AUTOMATED COUNT: 14.2 % (ref 12.5–14.5)
GLUCOSE: 91 MG/DL (ref 70–99)
GRAN #: 10.9 K/UL (ref 1.4–6.5)
GRAN%: 81.7 %
HCT VFR BLD AUTO: 34.7 % (ref 36–48)
HGB BLD-MCNC: 11 G/DL (ref 12–15)
IMMATURE GRANS (ABS): 0.1 K/UL (ref 0–1)
IMMATURE GRANULOCYTES: 1 %
LYMPH #: 1.4 K/UL (ref 1.2–3.4)
LYMPH%: 10.2 %
MCH RBC QN AUTO: 29.3 PG (ref 25–35)
MCHC RBC AUTO-ENTMCNC: 31.7 G/DL (ref 31–36)
MCV RBC AUTO: 92.3 FL (ref 79–98)
MONO #: 0.8 K/UL (ref 0.1–0.6)
MONO%: 6.3 %
NUCLEATED RBCS: 0 %
NUCLEATED RED BLOOD CELLS: 0 /100 WBC
PERFORMED BY:: ABNORMAL
PLATELET # BLD AUTO: 172 K/UL (ref 140–440)
PMV BLD AUTO: 8.7 FL (ref 8.8–12.7)
POTASSIUM SERPL-SCNC: 4.2 MMOL/L (ref 3.5–5)
PROT SERPL-MCNC: 7.5 G/DL (ref 6–8.2)
RBC # BLD AUTO: 3.76 M/UL (ref 3.5–5.5)
SODIUM: 139 MMOL/L (ref 134–144)
WBC # BLD: 13.3 K/UL (ref 5–10)

## 2018-06-14 PROCEDURE — 3008F BODY MASS INDEX DOCD: CPT | Mod: ,,, | Performed by: INTERNAL MEDICINE

## 2018-06-14 PROCEDURE — 99214 OFFICE O/P EST MOD 30 MIN: CPT | Mod: ,,, | Performed by: INTERNAL MEDICINE

## 2018-06-14 RX ORDER — DEXAMETHASONE 4 MG/1
TABLET ORAL
COMMUNITY
Start: 2018-06-08 | End: 2019-02-07

## 2018-06-14 NOTE — PROGRESS NOTES
PROGRESS NOTE    Subjective:       Patient ID: Sheri Broderick is a 62 y.o. female.    Chief Complaint:  Follow-up and Results  lung cancer, chemo follow up.     History of Present Illness:   Sheri Broderick is a 62 y.o. female who presents after one cycle of cisplatin/Alimta.  Tolerated with expected toxicity.         Family and Social history reviewed and is unchanged from May 21, 2018      ROS:  Review of Systems   Constitutional: Negative for fever.   Respiratory: Negative for shortness of breath.    Cardiovascular: Negative for chest pain and leg swelling.   Gastrointestinal: Negative for abdominal pain and blood in stool.   Genitourinary: Negative for hematuria.   Skin: Negative for rash.          Current Outpatient Prescriptions:     albuterol 90 mcg/actuation inhaler, Inhale 2 puffs into the lungs every 6 (six) hours as needed for Wheezing. Rescue, Disp: 18 g, Rfl: 5    benzonatate (TESSALON) 100 MG capsule, Take 1 capsule (100 mg total) by mouth every 6 (six) hours as needed for Cough., Disp: 60 capsule, Rfl: 3    Ca cmb no.5-X5-J-9-IQ-L39-aloe 120-1,000-10 mg-unit-mg Tab, Take 1 tablet by mouth., Disp: , Rfl:     CHEWABLE VITAMIN C 500 mg Chew, , Disp: , Rfl:     clonazePAM (KLONOPIN) 1 MG tablet, Take one-half to one tablet two to three times daily only as needed for anxiety, Disp: 180 tablet, Rfl: 0    dexamethasone (DECADRON) 4 MG Tab, , Disp: , Rfl:     dextroamphetamine-amphetamine (ADDERALL) 15 mg tablet, Take one tablet PO twice daily.  Take second dose by 2 pm, Disp: 60 tablet, Rfl: 0    ibuprofen (ADVIL,MOTRIN) 800 MG tablet, Take 1 tablet (800 mg total) by mouth 2 (two) times daily as needed for Pain., Disp: 180 tablet, Rfl: 3    lamoTRIgine (LAMICTAL) 100 MG tablet, Take one tablet PO in the morning and two tablets PO in the evening, Disp: 270 tablet, Rfl: 1    LINZESS 145 mcg Cap capsule, Take 1 capsule by mouth once  daily., Disp: , Rfl:     MAALOX ADVANCED 1,000-60 mg Chew, , Disp: , Rfl:     meclizine (ANTIVERT) 25 mg tablet, Take 1 tablet (25 mg total) by mouth every 8 (eight) hours as needed for Dizziness. Twice a day, Disp: 90 tablet, Rfl: 3    mirtazapine (REMERON) 30 MG tablet, Take one-half tablet PO nightly, Disp: 1 tablet, Rfl: 0    ondansetron (ZOFRAN) 8 MG tablet, Take 1 tablet (8 mg total) by mouth every 8 (eight) hours as needed for Nausea., Disp: 90 tablet, Rfl: 3    oxyCODONE-acetaminophen (PERCOCET) 5-325 mg per tablet, TK 1 T PO Q 6 H PRN P, Disp: , Rfl: 0    PEPTO-BISMOL 262 mg Chew, , Disp: , Rfl:     promethazine (PHENERGAN) 25 MG tablet, Take 1 tablet (25 mg total) by mouth every 6 (six) hours as needed for Nausea., Disp: 90 tablet, Rfl: 3    ranitidine (ZANTAC) 150 MG capsule, , Disp: , Rfl:     sertraline (ZOLOFT) 100 MG tablet, Take two tablets PO in the morning and one-half tablet PO in evening, Disp: 1 tablet, Rfl: 0    tizanidine (ZANAFLEX) 4 MG tablet, TAKE 1 TABLET TWICE DAILY (Patient taking differently: TAKE 1 TABLET TWICE DAILY PRN), Disp: 180 tablet, Rfl: 3    venlafaxine (EFFEXOR-XR) 75 MG 24 hr capsule, Take 1 capsule (75 mg total) by mouth once daily., Disp: 1 capsule, Rfl: 0    vitamin E 400 UNIT capsule, Take 400 Units by mouth once daily., Disp: , Rfl:     ZYRTEC 10 mg Cap, continuous prn., Disp: , Rfl:     Current Facility-Administered Medications:     cyanocobalamin injection 1,000 mcg, 1,000 mcg, Subcutaneous, Q30 Days, Segundo Zhang MD, 1,000 mcg at 06/11/18 1346        Objective:       Physical Examination:     /73   Pulse (!) 121   Temp 98.1 °F (36.7 °C)   Resp 18   Wt 62.7 kg (138 lb 4.8 oz)   BMI 23.74 kg/m²     Physical Exam   Constitutional: She appears well-developed and well-nourished.   HENT:   Head: Normocephalic and atraumatic.   Right Ear: External ear normal.   Left Ear: External ear normal.   Mouth/Throat: Oropharynx is clear and moist.    Eyes: Conjunctivae are normal. Pupils are equal, round, and reactive to light.   Neck: No tracheal deviation present. No thyromegaly present.   Cardiovascular: Normal rate, regular rhythm and normal heart sounds.    Pulmonary/Chest: Effort normal and breath sounds normal.   Abdominal: Soft. Bowel sounds are normal. She exhibits no distension and no mass. There is no tenderness.   Musculoskeletal: She exhibits no edema.   Neurological:   Neuro intact througout   Skin: No rash noted.   Psychiatric: She has a normal mood and affect. Her behavior is normal. Judgment and thought content normal.       Labs:   Recent Results (from the past 336 hour(s))   CBC auto differential    Collection Time: 06/07/18  2:20 PM   Result Value Ref Range    WBC 23.8 (H) 5.0 - 10.0 K/uL    Hemoglobin 11.1 (L) 12.0 - 15.0 g/dL    Hematocrit 35.4 (L) 36.0 - 48.0 %    Platelets 289 140 - 440 K/uL   CBC auto differential    Collection Time: 06/01/18 10:20 AM   Result Value Ref Range    WBC 6.8 3.8 - 10.8 Thousand/uL    Hemoglobin 11.1 (L) 11.7 - 15.5 g/dL    Hematocrit 34.7 (L) 35.0 - 45.0 %    Platelets 268 140 - 400 Thousand/uL     CMP  Sodium   Date Value Ref Range Status   06/07/2018 138 134 - 144 mmol/L      Potassium   Date Value Ref Range Status   06/07/2018 4.1 3.5 - 5.0 mmol/L      Chloride   Date Value Ref Range Status   06/07/2018 104 98 - 110 mmol/L      CO2   Date Value Ref Range Status   06/07/2018 25.7 22.8 - 31.6 mmol/L      Glucose   Date Value Ref Range Status   06/07/2018 94 70 - 99 mg/dL      BUN, Bld   Date Value Ref Range Status   06/07/2018 24 (H) 8 - 20 mg/dL      Creatinine   Date Value Ref Range Status   06/07/2018 1.09 0.60 - 1.40 mg/dL    11/29/2012 0.7 0.5 - 1.4 mg/dL Final     Calcium   Date Value Ref Range Status   06/07/2018 10.3 7.7 - 10.4 mg/dL    11/29/2012 9.0 8.7 - 10.5 mg/dL Final     Total Protein   Date Value Ref Range Status   06/07/2018 7.8 6.0 - 8.2 g/dL      Albumin   Date Value Ref Range Status    06/07/2018 4.5 3.1 - 4.7 g/dL      Total Bilirubin   Date Value Ref Range Status   06/07/2018 0.5 0.3 - 1.0 mg/dL      Alkaline Phosphatase   Date Value Ref Range Status   06/07/2018 71 40 - 104 IU/L      AST   Date Value Ref Range Status   06/07/2018 31 10 - 40 IU/L      ALT   Date Value Ref Range Status   06/01/2018 10 6 - 29 U/L Final     Anion Gap   Date Value Ref Range Status   03/03/2016 10 8 - 16 mmol/L Final   11/29/2012 10 5 - 15 meq/L Final     eGFR if    Date Value Ref Range Status   06/01/2018 94 > OR = 60 mL/min/1.73m2 Final     eGFR if non    Date Value Ref Range Status   06/01/2018 81 > OR = 60 mL/min/1.73m2 Final     No results found for: CEA  No results found for: PSA        Assessment/Plan:     Problem List Items Addressed This Visit     Malignant neoplasm of lower lobe of right lung-Adenocarcinoma (Chronic)     Patient is s/p one cycle of cisplatin/alimta and tolerated this with expected tox.  Her renal function is good and will plan to continue with cycle 2 on the June 26th. Plan is to compete four of these treatments.                 Discussion:     Follow-up in about 5 weeks (around 7/16/2018).      Electronically signed by Segundo Lamb

## 2018-06-14 NOTE — LETTER
June 14, 2018      Demetrio Pleitez Jr., MD  [address]  [place] [address]           [facility] - Hematology Oncology  [address]  [address]  [place] [address]  Phone: [phone]  Fax: [phone]          Patient: Sheri Broderick   MR Number: [id number]   YOB: 1955   Date of Visit: 6/14/2018       Dear Dr. Demetrio Pleitez Jr.:    Thank you for referring Sheri Broderick to me for evaluation. Attached you will find relevant portions of my assessment and plan of care.    If you have questions, please do not hesitate to call me. I look forward to following Sheri Broderick along with you.    Sincerely,    Segundo Zhang MD    Enclosure  CC:  No Recipients    If you would like to receive this communication electronically, please contact [email or web address] or [phone] to request more information on [employer] Link access.    For providers and/or their staff who would like to refer a patient to [facility], please contact us through our one-stop-shop provider referral line, [facility], at [phone].    If you feel you have received this communication in error or would no longer like to receive these types of communications, please e-mail [email or web address]

## 2018-06-15 ENCOUNTER — OUTPATIENT CASE MANAGEMENT (OUTPATIENT)
Dept: ADMINISTRATIVE | Facility: OTHER | Age: 63
End: 2018-06-15

## 2018-06-15 NOTE — PROGRESS NOTES
This LMSW received a referral from Robert on the above patient.  Robert rep  reportsmbr, seeking coverage/benefits info r/t support programs such as Well Dine and/or  to assist/resources and gen. health care.       LMSW Franck Benoit, previously provided this patient with resources in the Community on 05/28/2018. Patient  has not accessed these community resources. LMSW encouraged patient to contact [facility] to determine what services are available.  Patient is not eligible for the well dine with Robert. Patient has to be I/P to qualify for services.  LMSW also sent an in basket message to Alona FOX to reach out to patient on next office visit to offer assistance.

## 2018-06-15 NOTE — PROGRESS NOTES
Thank you for the referral.  Patient has been assigned to Stephanie Maciel LMSW for low risk screening for Outpatient Case Management.     Reason for referral:    Phone call from [facility]/IPA mbr, seeking coverage/benefits info r/t support programs such as Well Dine and/or  to assist/resources and gen. health care, had lung removed in early May and just recently started chemotherapy, lives alone, is weak and doesnt feel well but will call for family support through the weekend. Educated member on availability of case mgmt. for support.   Verified correct PN on file.          Thank You,    Gabo Fitzgerald, RN  Clinical Advisor  Health Planning and Support & Access    Clinical Operations  [employer]  T [phone]  [email or web address]      Please contact [facility] at ext. [phone] with any questions.    Thank you,    Desi Kimball, [facility]  Outpatient Complex Care Mgmt  Ext [phone]

## 2018-06-20 ENCOUNTER — PATIENT MESSAGE (OUTPATIENT)
Dept: PSYCHIATRY | Facility: CLINIC | Age: 63
End: 2018-06-20

## 2018-06-20 RX ORDER — VENLAFAXINE HYDROCHLORIDE 75 MG/1
CAPSULE, EXTENDED RELEASE ORAL
Qty: 90 CAPSULE | Refills: 1 | Status: SHIPPED | OUTPATIENT
Start: 2018-06-20 | End: 2018-10-15 | Stop reason: SDUPTHER

## 2018-06-20 RX ORDER — MIRTAZAPINE 30 MG/1
TABLET, FILM COATED ORAL
Qty: 90 TABLET | Refills: 0 | Status: SHIPPED | OUTPATIENT
Start: 2018-06-20 | End: 2018-10-15 | Stop reason: SDUPTHER

## 2018-06-20 RX ORDER — SERTRALINE HYDROCHLORIDE 100 MG/1
TABLET, FILM COATED ORAL
Qty: 225 TABLET | Refills: 0 | Status: SHIPPED | OUTPATIENT
Start: 2018-06-20 | End: 2018-10-15 | Stop reason: SDUPTHER

## 2018-06-20 RX ORDER — LAMOTRIGINE 100 MG/1
TABLET ORAL
Qty: 270 TABLET | Refills: 0 | Status: SHIPPED | OUTPATIENT
Start: 2018-06-20 | End: 2018-10-15 | Stop reason: SDUPTHER

## 2018-06-21 LAB
ALBUMIN SERPL-MCNC: 3.6 G/DL (ref 3.1–4.7)
ALP SERPL-CCNC: 111 IU/L (ref 40–104)
ALT (SGPT): 14 IU/L (ref 3–33)
AST SERPL-CCNC: 20 IU/L (ref 10–40)
BASOPHILS NFR BLD: 0 K/UL (ref 0–0.2)
BASOPHILS NFR BLD: 0.4 %
BILIRUB SERPL-MCNC: 0.4 MG/DL (ref 0.3–1)
BUN SERPL-MCNC: 11 MG/DL (ref 8–20)
CALCIUM SERPL-MCNC: 9.2 MG/DL (ref 7.7–10.4)
CHLORIDE: 107 MMOL/L (ref 98–110)
CO2 SERPL-SCNC: 30 MMOL/L (ref 22.8–31.6)
CREATININE: 0.69 MG/DL (ref 0.6–1.4)
EOSINOPHIL NFR BLD: 0.1 K/UL (ref 0–0.7)
EOSINOPHIL NFR BLD: 0.8 %
ERYTHROCYTE [DISTWIDTH] IN BLOOD BY AUTOMATED COUNT: 14.8 % (ref 12.5–14.5)
GLUCOSE: 83 MG/DL (ref 70–99)
GRAN #: 5.5 K/UL (ref 1.4–6.5)
GRAN%: 69.9 %
HCT VFR BLD AUTO: 34.5 % (ref 36–48)
HGB BLD-MCNC: 10.6 G/DL (ref 12–15)
IMMATURE GRANS (ABS): 0.1 K/UL (ref 0–1)
IMMATURE GRANULOCYTES: 1.3 %
LYMPH #: 1.6 K/UL (ref 1.2–3.4)
LYMPH%: 19.8 %
MCH RBC QN AUTO: 28.8 PG (ref 25–35)
MCHC RBC AUTO-ENTMCNC: 30.7 G/DL (ref 31–36)
MCV RBC AUTO: 93.8 FL (ref 79–98)
MONO #: 0.6 K/UL (ref 0.1–0.6)
MONO%: 7.8 %
NUCLEATED RBCS: 0 %
NUCLEATED RED BLOOD CELLS: 0 /100 WBC
PERFORMED BY:: ABNORMAL
PLATELET # BLD AUTO: 313 K/UL (ref 140–440)
PMV BLD AUTO: 8.5 FL (ref 8.8–12.7)
POTASSIUM SERPL-SCNC: 4.2 MMOL/L (ref 3.5–5)
PROT SERPL-MCNC: 7 G/DL (ref 6–8.2)
RBC # BLD AUTO: 3.68 M/UL (ref 3.5–5.5)
SODIUM: 144 MMOL/L (ref 134–144)
WBC # BLD: 7.8 K/UL (ref 5–10)

## 2018-06-21 RX ORDER — HEPARIN 100 UNIT/ML
500 SYRINGE INTRAVENOUS
Status: CANCELLED | OUTPATIENT
Start: 2018-06-26

## 2018-06-21 RX ORDER — SODIUM CHLORIDE 0.9 % (FLUSH) 0.9 %
10 SYRINGE (ML) INJECTION
Status: CANCELLED | OUTPATIENT
Start: 2018-06-27

## 2018-06-21 RX ORDER — ONDANSETRON 2 MG/ML
16 INJECTION INTRAMUSCULAR; INTRAVENOUS ONCE
Status: CANCELLED
Start: 2018-06-27 | End: 2018-06-27

## 2018-06-21 RX ORDER — HEPARIN 100 UNIT/ML
500 SYRINGE INTRAVENOUS
Status: CANCELLED | OUTPATIENT
Start: 2018-06-27

## 2018-06-21 RX ORDER — SODIUM CHLORIDE 0.9 % (FLUSH) 0.9 %
10 SYRINGE (ML) INJECTION
Status: CANCELLED | OUTPATIENT
Start: 2018-06-26

## 2018-06-21 RX ORDER — ONDANSETRON 2 MG/ML
16 INJECTION INTRAMUSCULAR; INTRAVENOUS ONCE
Status: CANCELLED
Start: 2018-06-26 | End: 2018-06-26

## 2018-06-29 LAB
ALBUMIN SERPL-MCNC: 3.9 G/DL (ref 3.1–4.7)
ALP SERPL-CCNC: 116 IU/L (ref 40–104)
ALT (SGPT): 18 IU/L (ref 3–33)
AST SERPL-CCNC: 17 IU/L (ref 10–40)
BASOPHILS NFR BLD: 0.1 K/UL (ref 0–0.2)
BASOPHILS NFR BLD: 0.3 %
BILIRUB SERPL-MCNC: 0.4 MG/DL (ref 0.3–1)
BUN SERPL-MCNC: 22 MG/DL (ref 8–20)
CALCIUM SERPL-MCNC: 9.2 MG/DL (ref 7.7–10.4)
CHLORIDE: 97 MMOL/L (ref 98–110)
CO2 SERPL-SCNC: 29.3 MMOL/L (ref 22.8–31.6)
CREATININE: 0.94 MG/DL (ref 0.6–1.4)
EOSINOPHIL NFR BLD: 0 K/UL (ref 0–0.7)
EOSINOPHIL NFR BLD: 0.1 %
ERYTHROCYTE [DISTWIDTH] IN BLOOD BY AUTOMATED COUNT: 15.9 % (ref 12.5–14.5)
GLUCOSE: 125 MG/DL (ref 70–99)
GRAN #: 34 K/UL (ref 1.4–6.5)
GRAN%: 83 %
HCT VFR BLD AUTO: 37.1 % (ref 36–48)
HGB BLD-MCNC: 11.6 G/DL (ref 12–15)
IMMATURE GRANS (ABS): 3.4 K/UL (ref 0–1)
IMMATURE GRANULOCYTES: 8.2 %
LYMPH #: 3.1 K/UL (ref 1.2–3.4)
LYMPH%: 7.5 %
MCH RBC QN AUTO: 29.1 PG (ref 25–35)
MCHC RBC AUTO-ENTMCNC: 31.3 G/DL (ref 31–36)
MCV RBC AUTO: 93.2 FL (ref 79–98)
MONO #: 0.4 K/UL (ref 0.1–0.6)
MONO%: 0.9 %
NUCLEATED RBCS: 0 %
NUCLEATED RED BLOOD CELLS: 0 /100 WBC
PERFORMED BY:: ABNORMAL
PLATELET # BLD AUTO: 301 K/UL (ref 140–440)
PMV BLD AUTO: 8.4 FL (ref 8.8–12.7)
POTASSIUM SERPL-SCNC: 3.6 MMOL/L (ref 3.5–5)
PROT SERPL-MCNC: 7.4 G/DL (ref 6–8.2)
RBC # BLD AUTO: 3.98 M/UL (ref 3.5–5.5)
SODIUM: 137 MMOL/L (ref 134–144)
WBC # BLD: 41 K/UL (ref 5–10)

## 2018-07-02 ENCOUNTER — TELEPHONE (OUTPATIENT)
Dept: HEMATOLOGY/ONCOLOGY | Facility: CLINIC | Age: 63
End: 2018-07-02

## 2018-07-03 ENCOUNTER — TELEPHONE (OUTPATIENT)
Dept: HEMATOLOGY/ONCOLOGY | Facility: CLINIC | Age: 63
End: 2018-07-03

## 2018-07-03 NOTE — TELEPHONE ENCOUNTER
Patient states that she has some sinus congestion.  No fever.  No colored mucus.  Patient states that she goes to Dr. Pleitez for her PCP and that she would try to get in to see him.  Advised patient to call me back if she can't get in to see him.  Also advised patient to notify Dr. Zhang if she develops a temperature.  Patient verbalized understanding.

## 2018-07-05 ENCOUNTER — TELEPHONE (OUTPATIENT)
Dept: HEMATOLOGY/ONCOLOGY | Facility: CLINIC | Age: 63
End: 2018-07-05

## 2018-07-05 NOTE — TELEPHONE ENCOUNTER
Called in Sancuso transdermal patch #3 to help patient with nausea/vomiting for next cycle of chemotherapy per Dr. Zhang.

## 2018-07-06 LAB
ALBUMIN SERPL-MCNC: 4 G/DL (ref 3.1–4.7)
ALP SERPL-CCNC: 141 IU/L (ref 40–104)
ALT (SGPT): 13 IU/L (ref 3–33)
AST SERPL-CCNC: 16 IU/L (ref 10–40)
BASOPHILS NFR BLD: 0 K/UL (ref 0–0.2)
BASOPHILS NFR BLD: 0.2 %
BILIRUB SERPL-MCNC: 0.2 MG/DL (ref 0.3–1)
BUN SERPL-MCNC: 21 MG/DL (ref 8–20)
CALCIUM SERPL-MCNC: 9.6 MG/DL (ref 7.7–10.4)
CHLORIDE: 102 MMOL/L (ref 98–110)
CO2 SERPL-SCNC: 31.4 MMOL/L (ref 22.8–31.6)
CREATININE: 0.95 MG/DL (ref 0.6–1.4)
EOSINOPHIL NFR BLD: 0 K/UL (ref 0–0.7)
EOSINOPHIL NFR BLD: 0.2 %
ERYTHROCYTE [DISTWIDTH] IN BLOOD BY AUTOMATED COUNT: 15.3 % (ref 12.5–14.5)
GLUCOSE: 89 MG/DL (ref 70–99)
GRAN #: 9.7 K/UL (ref 1.4–6.5)
GRAN%: 76.4 %
HCT VFR BLD AUTO: 33.3 % (ref 36–48)
HGB BLD-MCNC: 10.5 G/DL (ref 12–15)
IMMATURE GRANS (ABS): 0.2 K/UL (ref 0–1)
IMMATURE GRANULOCYTES: 1.6 %
LYMPH #: 1.7 K/UL (ref 1.2–3.4)
LYMPH%: 13.4 %
MCH RBC QN AUTO: 29.2 PG (ref 25–35)
MCHC RBC AUTO-ENTMCNC: 31.5 G/DL (ref 31–36)
MCV RBC AUTO: 92.8 FL (ref 79–98)
MONO #: 1 K/UL (ref 0.1–0.6)
MONO%: 8.2 %
NUCLEATED RBCS: 0 %
NUCLEATED RED BLOOD CELLS: 0 /100 WBC
PERFORMED BY:: ABNORMAL
PLATELET # BLD AUTO: 121 K/UL (ref 140–440)
PMV BLD AUTO: 9.1 FL (ref 8.8–12.7)
POTASSIUM SERPL-SCNC: 4.6 MMOL/L (ref 3.5–5)
PROT SERPL-MCNC: 7.2 G/DL (ref 6–8.2)
RBC # BLD AUTO: 3.59 M/UL (ref 3.5–5.5)
SODIUM: 142 MMOL/L (ref 134–144)
WBC # BLD: 12.7 K/UL (ref 5–10)

## 2018-07-13 LAB
ALBUMIN SERPL-MCNC: 4.1 G/DL (ref 3.1–4.7)
ALP SERPL-CCNC: 128 IU/L (ref 40–104)
ALT (SGPT): 14 IU/L (ref 3–33)
AST SERPL-CCNC: 21 IU/L (ref 10–40)
BASOPHILS NFR BLD: 0 K/UL (ref 0–0.2)
BASOPHILS NFR BLD: 0.2 %
BILIRUB SERPL-MCNC: 0.3 MG/DL (ref 0.3–1)
BUN SERPL-MCNC: 12 MG/DL (ref 8–20)
CALCIUM SERPL-MCNC: 9.4 MG/DL (ref 7.7–10.4)
CHLORIDE: 103 MMOL/L (ref 98–110)
CO2 SERPL-SCNC: 29 MMOL/L (ref 22.8–31.6)
CREATININE: 0.84 MG/DL (ref 0.6–1.4)
EOSINOPHIL NFR BLD: 0.1 K/UL (ref 0–0.7)
EOSINOPHIL NFR BLD: 0.9 %
ERYTHROCYTE [DISTWIDTH] IN BLOOD BY AUTOMATED COUNT: 17.5 % (ref 12.5–14.5)
GLUCOSE: 89 MG/DL (ref 70–99)
GRAN #: 6.9 K/UL (ref 1.4–6.5)
GRAN%: 67.3 %
HCT VFR BLD AUTO: 35.2 % (ref 36–48)
HGB BLD-MCNC: 11 G/DL (ref 12–15)
IMMATURE GRANS (ABS): 0.4 K/UL (ref 0–1)
IMMATURE GRANULOCYTES: 3.9 %
LYMPH #: 2.1 K/UL (ref 1.2–3.4)
LYMPH%: 20.2 %
MCH RBC QN AUTO: 29.6 PG (ref 25–35)
MCHC RBC AUTO-ENTMCNC: 31.3 G/DL (ref 31–36)
MCV RBC AUTO: 94.6 FL (ref 79–98)
MONO #: 0.8 K/UL (ref 0.1–0.6)
MONO%: 7.5 %
NUCLEATED RBCS: 0 %
NUCLEATED RED BLOOD CELLS: 0 /100 WBC
PERFORMED BY:: ABNORMAL
PLATELET # BLD AUTO: 274 K/UL (ref 140–440)
PMV BLD AUTO: 7.9 FL (ref 8.8–12.7)
POTASSIUM SERPL-SCNC: 4.2 MMOL/L (ref 3.5–5)
PROT SERPL-MCNC: 7.2 G/DL (ref 6–8.2)
RBC # BLD AUTO: 3.72 M/UL (ref 3.5–5.5)
SODIUM: 140 MMOL/L (ref 134–144)
WBC # BLD: 10.2 K/UL (ref 5–10)

## 2018-07-18 ENCOUNTER — OFFICE VISIT (OUTPATIENT)
Dept: HEMATOLOGY/ONCOLOGY | Facility: CLINIC | Age: 63
End: 2018-07-18
Payer: MEDICARE

## 2018-07-18 VITALS
RESPIRATION RATE: 18 BRPM | SYSTOLIC BLOOD PRESSURE: 139 MMHG | DIASTOLIC BLOOD PRESSURE: 79 MMHG | WEIGHT: 145.69 LBS | HEART RATE: 123 BPM | TEMPERATURE: 99 F | BODY MASS INDEX: 25.01 KG/M2

## 2018-07-18 DIAGNOSIS — C34.31 MALIGNANT NEOPLASM OF LOWER LOBE OF RIGHT LUNG: Chronic | ICD-10-CM

## 2018-07-18 DIAGNOSIS — D64.81 ANEMIA ASSOCIATED WITH CHEMOTHERAPY: ICD-10-CM

## 2018-07-18 DIAGNOSIS — T45.1X5A ANEMIA ASSOCIATED WITH CHEMOTHERAPY: ICD-10-CM

## 2018-07-18 PROCEDURE — 3008F BODY MASS INDEX DOCD: CPT | Mod: ,,, | Performed by: INTERNAL MEDICINE

## 2018-07-18 PROCEDURE — 99214 OFFICE O/P EST MOD 30 MIN: CPT | Mod: ,,, | Performed by: INTERNAL MEDICINE

## 2018-07-18 RX ORDER — FOLIC ACID 0.8 MG
800 TABLET ORAL DAILY
COMMUNITY
Start: 2018-07-01 | End: 2019-08-23

## 2018-07-18 NOTE — ASSESSMENT & PLAN NOTE
Patient is doing well and is s/p 2 cycles of cisplatin/Alimta and is tolerating with expected toxicity.  Her labs have been doing very well and she will proceed with cycle 3/4 this week.  No new issues otherwise today.

## 2018-07-18 NOTE — PROGRESS NOTES
PROGRESS NOTE    Subjective:       Patient ID: Sheri Broderick is a 62 y.o. female.    Chief Complaint:  Follow-up and Results  lung cancer, chemo follow up.     History of Present Illness:   Sheri Broderick is a 62 y.o. female who presents after her second cycle of cis/alimta. She states she had more n/v with this cycle which has now resolved.      Family and Social history reviewed and is unchanged from May 21, 2018      ROS:  Review of Systems   Constitutional: Negative for fever.   Respiratory: Negative for shortness of breath.    Cardiovascular: Negative for chest pain and leg swelling.   Gastrointestinal: Negative for abdominal pain and blood in stool.   Genitourinary: Negative for hematuria.   Skin: Negative for rash.          Current Outpatient Prescriptions:     albuterol 90 mcg/actuation inhaler, Inhale 2 puffs into the lungs every 6 (six) hours as needed for Wheezing. Rescue, Disp: 18 g, Rfl: 5    Ca cmb no.4-Y8-C-3-MB-D73-aloe 120-1,000-10 mg-unit-mg Tab, Take 1 tablet by mouth., Disp: , Rfl:     CHEWABLE VITAMIN C 500 mg Chew, , Disp: , Rfl:     clonazePAM (KLONOPIN) 1 MG tablet, Take one-half to one tablet two to three times daily only as needed for anxiety, Disp: 180 tablet, Rfl: 0    dexamethasone (DECADRON) 4 MG Tab, , Disp: , Rfl:     dextroamphetamine-amphetamine (ADDERALL) 15 mg tablet, Take one tablet PO twice daily.  Take second dose by 2 pm, Disp: 60 tablet, Rfl: 0    folic acid (FOLVITE) 800 MCG Tab, , Disp: , Rfl:     ibuprofen (ADVIL,MOTRIN) 800 MG tablet, Take 1 tablet (800 mg total) by mouth 2 (two) times daily as needed for Pain., Disp: 180 tablet, Rfl: 3    lamoTRIgine (LAMICTAL) 100 MG tablet, Take one tablet PO in the morning and two tablets PO in the evening, Disp: 270 tablet, Rfl: 0    LINZESS 145 mcg Cap capsule, Take 1 capsule by mouth once daily., Disp: , Rfl:     MAALOX ADVANCED 1,000-60 mg Chew, , Disp:  , Rfl:     meclizine (ANTIVERT) 25 mg tablet, Take 1 tablet (25 mg total) by mouth every 8 (eight) hours as needed for Dizziness. Twice a day, Disp: 90 tablet, Rfl: 3    mirtazapine (REMERON) 30 MG tablet, Take one-half tablet PO nightly, Disp: 90 tablet, Rfl: 0    ondansetron (ZOFRAN) 8 MG tablet, Take 1 tablet (8 mg total) by mouth every 8 (eight) hours as needed for Nausea., Disp: 90 tablet, Rfl: 3    oxyCODONE-acetaminophen (PERCOCET) 5-325 mg per tablet, TK 1 T PO Q 6 H PRN P, Disp: , Rfl: 0    PEPTO-BISMOL 262 mg Chew, , Disp: , Rfl:     promethazine (PHENERGAN) 25 MG tablet, Take 1 tablet (25 mg total) by mouth every 6 (six) hours as needed for Nausea., Disp: 90 tablet, Rfl: 3    ranitidine (ZANTAC) 150 MG capsule, , Disp: , Rfl:     sertraline (ZOLOFT) 100 MG tablet, Take two tablets PO in the morning and one-half tablet PO in evening, Disp: 225 tablet, Rfl: 0    tizanidine (ZANAFLEX) 4 MG tablet, TAKE 1 TABLET TWICE DAILY (Patient taking differently: TAKE 1 TABLET TWICE DAILY PRN), Disp: 180 tablet, Rfl: 3    venlafaxine (EFFEXOR-XR) 75 MG 24 hr capsule, TAKE 1 CAPSULE ONE TIME DAILY, Disp: 90 capsule, Rfl: 1    vitamin E 400 UNIT capsule, Take 400 Units by mouth once daily., Disp: , Rfl:     ZYRTEC 10 mg Cap, continuous prn., Disp: , Rfl:     Current Facility-Administered Medications:     cyanocobalamin injection 1,000 mcg, 1,000 mcg, Subcutaneous, Q30 Days, Segundo Zhang MD, 1,000 mcg at 06/11/18 1346        Objective:       Physical Examination:     /79   Pulse (!) 123   Temp 98.5 °F (36.9 °C)   Resp 18   Wt 66.1 kg (145 lb 11.2 oz)   BMI 25.01 kg/m²     Physical Exam   Constitutional: She appears well-developed and well-nourished.   HENT:   Head: Normocephalic and atraumatic.   Right Ear: External ear normal.   Left Ear: External ear normal.   Mouth/Throat: Oropharynx is clear and moist.   Eyes: Conjunctivae are normal. Pupils are equal, round, and reactive to light.    Neck: No tracheal deviation present. No thyromegaly present.   Cardiovascular: Normal rate, regular rhythm and normal heart sounds.    Pulmonary/Chest: Effort normal and breath sounds normal.   Abdominal: Soft. Bowel sounds are normal. She exhibits no distension and no mass. There is no tenderness.   Musculoskeletal: She exhibits no edema.   Neurological:   Neuro intact througout   Skin: No rash noted.   Psychiatric: She has a normal mood and affect. Her behavior is normal. Judgment and thought content normal.       Labs:   Recent Results (from the past 336 hour(s))   CBC auto differential    Collection Time: 07/13/18 11:17 AM   Result Value Ref Range    WBC 10.2 (H) 5.0 - 10.0 K/ul    Hemoglobin 11.0 (L) 12.0 - 15.0 g/dl    Hematocrit 35.2 (L) 36.0 - 48.0 %    Platelets 274 140 - 440 K/ul   CBC auto differential    Collection Time: 07/06/18  2:20 PM   Result Value Ref Range    WBC 12.7 (H) 5.0 - 10.0 K/ul    Hemoglobin 10.5 (L) 12.0 - 15.0 g/dl    Hematocrit 33.3 (L) 36.0 - 48.0 %    Platelets 121 (L) 140 - 440 K/ul     CMP  Sodium   Date Value Ref Range Status   07/13/2018 140 134 - 144 mmol/L      Potassium   Date Value Ref Range Status   07/13/2018 4.2 3.5 - 5.0 mmol/L      Chloride   Date Value Ref Range Status   07/13/2018 103 98 - 110 mmol/L      CO2   Date Value Ref Range Status   07/13/2018 29.0 22.8 - 31.6 mmol/L      Glucose   Date Value Ref Range Status   07/13/2018 89 70 - 99 mg/dL      BUN, Bld   Date Value Ref Range Status   07/13/2018 12 8 - 20 mg/dL      Creatinine   Date Value Ref Range Status   07/13/2018 0.84 0.60 - 1.40 mg/dL    11/29/2012 0.7 0.5 - 1.4 mg/dL Final     Calcium   Date Value Ref Range Status   07/13/2018 9.4 7.7 - 10.4 mg/dL    11/29/2012 9.0 8.7 - 10.5 mg/dL Final     Total Protein   Date Value Ref Range Status   07/13/2018 7.2 6.0 - 8.2 g/dL      Albumin   Date Value Ref Range Status   07/13/2018 4.1 3.1 - 4.7 g/dL      Total Bilirubin   Date Value Ref Range Status    07/13/2018 0.3 0.3 - 1.0 mg/dL      Alkaline Phosphatase   Date Value Ref Range Status   07/13/2018 128 (H) 40 - 104 IU/L      AST   Date Value Ref Range Status   07/13/2018 21 10 - 40 IU/L      ALT   Date Value Ref Range Status   06/01/2018 10 6 - 29 U/L Final     Anion Gap   Date Value Ref Range Status   03/03/2016 10 8 - 16 mmol/L Final   11/29/2012 10 5 - 15 meq/L Final     eGFR if    Date Value Ref Range Status   06/01/2018 94 > OR = 60 mL/min/1.73m2 Final     eGFR if non    Date Value Ref Range Status   06/01/2018 81 > OR = 60 mL/min/1.73m2 Final     No results found for: CEA  No results found for: PSA        Assessment/Plan:     Problem List Items Addressed This Visit     Malignant neoplasm of lower lobe of right lung-Adenocarcinoma (Chronic)     Patient is doing well and is s/p 2 cycles of cisplatin/Alimta and is tolerating with expected toxicity.  Her labs have been doing very well and she will proceed with cycle 3/4 this week.  No new issues otherwise today.           Anemia associated with chemotherapy     At this point this is mild to moderate.  Will continue to watch and address if counts fall.                 Discussion:   High complexity due to intense lab monitoring, high risk medications.   Follow-up in about 3 weeks (around 8/8/2018).      Electronically signed by Segundo Lamb

## 2018-07-18 NOTE — LETTER
July 18, 2018      Demetrio Pleitez Jr., MD  [address]  [place] [address]           [facility] - Hematology Oncology  [address]  [address]  [place] [address]  Phone: [phone]  Fax: [phone]          Patient: Sheri Broderick   MR Number: [id number]   YOB: 1955   Date of Visit: 7/18/2018       Dear Dr. Demetrio Pleitez Jr.:    Thank you for referring Sheri Broderick to me for evaluation. Attached you will find relevant portions of my assessment and plan of care.    If you have questions, please do not hesitate to call me. I look forward to following Sheri Broderick along with you.    Sincerely,    Segundo Zhang MD    Enclosure  CC:  No Recipients    If you would like to receive this communication electronically, please contact [email or web address] or [phone] to request more information on [employer] Link access.    For providers and/or their staff who would like to refer a patient to [facility], please contact us through our one-stop-shop provider referral line, [facility], at [phone].    If you feel you have received this communication in error or would no longer like to receive these types of communications, please e-mail [email or web address]

## 2018-07-19 RX ORDER — ONDANSETRON 2 MG/ML
16 INJECTION INTRAMUSCULAR; INTRAVENOUS ONCE
Status: CANCELLED
Start: 2018-07-20 | End: 2018-07-20

## 2018-07-19 RX ORDER — HEPARIN 100 UNIT/ML
500 SYRINGE INTRAVENOUS
Status: CANCELLED | OUTPATIENT
Start: 2018-07-20

## 2018-07-19 RX ORDER — HEPARIN 100 UNIT/ML
500 SYRINGE INTRAVENOUS
Status: CANCELLED | OUTPATIENT
Start: 2018-07-19

## 2018-07-19 RX ORDER — SODIUM CHLORIDE 0.9 % (FLUSH) 0.9 %
10 SYRINGE (ML) INJECTION
Status: CANCELLED | OUTPATIENT
Start: 2018-07-19

## 2018-07-19 RX ORDER — SODIUM CHLORIDE 0.9 % (FLUSH) 0.9 %
10 SYRINGE (ML) INJECTION
Status: CANCELLED | OUTPATIENT
Start: 2018-07-20

## 2018-07-19 RX ORDER — ONDANSETRON 2 MG/ML
16 INJECTION INTRAMUSCULAR; INTRAVENOUS ONCE
Status: CANCELLED
Start: 2018-07-19 | End: 2018-07-19

## 2018-07-19 RX ORDER — CYANOCOBALAMIN 1000 UG/ML
1000 INJECTION, SOLUTION INTRAMUSCULAR; SUBCUTANEOUS
Status: CANCELLED | OUTPATIENT
Start: 2018-07-19

## 2018-07-26 LAB
ALBUMIN SERPL-MCNC: 4.1 G/DL (ref 3.1–4.7)
ALP SERPL-CCNC: 168 IU/L (ref 40–104)
ALT (SGPT): 13 IU/L (ref 3–33)
AST SERPL-CCNC: 20 IU/L (ref 10–40)
BASOPHILS NFR BLD: 0.1 K/UL (ref 0–0.2)
BASOPHILS NFR BLD: 0.5 %
BILIRUB SERPL-MCNC: 0.4 MG/DL (ref 0.3–1)
BUN SERPL-MCNC: 12 MG/DL (ref 8–20)
CALCIUM SERPL-MCNC: 9.2 MG/DL (ref 7.7–10.4)
CHLORIDE: 102 MMOL/L (ref 98–110)
CO2 SERPL-SCNC: 28.7 MMOL/L (ref 22.8–31.6)
CREATININE: 0.75 MG/DL (ref 0.6–1.4)
EOSINOPHIL NFR BLD: 0.1 K/UL (ref 0–0.7)
EOSINOPHIL NFR BLD: 0.2 %
ERYTHROCYTE [DISTWIDTH] IN BLOOD BY AUTOMATED COUNT: 18 % (ref 12.5–14.5)
GLUCOSE: 110 MG/DL (ref 70–99)
GRAN #: 21 K/UL (ref 1.4–6.5)
GRAN%: 81.1 %
HCT VFR BLD AUTO: 34.4 % (ref 36–48)
HGB BLD-MCNC: 10.9 G/DL (ref 12–15)
IMMATURE GRANS (ABS): 0.4 K/UL (ref 0–1)
IMMATURE GRANULOCYTES: 1.5 %
LYMPH #: 2.1 K/UL (ref 1.2–3.4)
LYMPH%: 8.1 %
MCH RBC QN AUTO: 30 PG (ref 25–35)
MCHC RBC AUTO-ENTMCNC: 31.7 G/DL (ref 31–36)
MCV RBC AUTO: 94.8 FL (ref 79–98)
MONO #: 2.2 K/UL (ref 0.1–0.6)
MONO%: 8.6 %
NUCLEATED RBCS: 0 %
NUCLEATED RED BLOOD CELLS: 0 /100 WBC
PERFORMED BY:: ABNORMAL
PLATELET # BLD AUTO: 119 K/UL (ref 140–440)
PMV BLD AUTO: 8.7 FL (ref 8.8–12.7)
POTASSIUM SERPL-SCNC: 4.3 MMOL/L (ref 3.5–5)
PROT SERPL-MCNC: 7.2 G/DL (ref 6–8.2)
RBC # BLD AUTO: 3.63 M/UL (ref 3.5–5.5)
SODIUM: 141 MMOL/L (ref 134–144)
WBC # BLD: 25.8 K/UL (ref 5–10)

## 2018-08-03 ENCOUNTER — TELEPHONE (OUTPATIENT)
Dept: FAMILY MEDICINE | Facility: CLINIC | Age: 63
End: 2018-08-03

## 2018-08-03 LAB
ALBUMIN SERPL-MCNC: 4 G/DL (ref 3.1–4.7)
ALP SERPL-CCNC: 137 IU/L (ref 40–104)
ALT (SGPT): 13 IU/L (ref 3–33)
AST SERPL-CCNC: 23 IU/L (ref 10–40)
BASOPHILS NFR BLD: 0 K/UL (ref 0–0.2)
BASOPHILS NFR BLD: 0.3 %
BILIRUB SERPL-MCNC: 0.4 MG/DL (ref 0.3–1)
BUN SERPL-MCNC: 11 MG/DL (ref 8–20)
CALCIUM SERPL-MCNC: 9.5 MG/DL (ref 7.7–10.4)
CHLORIDE: 102 MMOL/L (ref 98–110)
CO2 SERPL-SCNC: 28.8 MMOL/L (ref 22.8–31.6)
CREATININE: 0.76 MG/DL (ref 0.6–1.4)
EOSINOPHIL NFR BLD: 0.2 K/UL (ref 0–0.7)
EOSINOPHIL NFR BLD: 1.3 %
ERYTHROCYTE [DISTWIDTH] IN BLOOD BY AUTOMATED COUNT: 19.7 % (ref 12.5–14.5)
GLUCOSE: 85 MG/DL (ref 70–99)
GRAN #: 8.7 K/UL (ref 1.4–6.5)
GRAN%: 76.7 %
HCT VFR BLD AUTO: 32.7 % (ref 36–48)
HGB BLD-MCNC: 10.4 G/DL (ref 12–15)
IMMATURE GRANS (ABS): 0.1 K/UL (ref 0–1)
IMMATURE GRANULOCYTES: 1.1 %
LYMPH #: 1.5 K/UL (ref 1.2–3.4)
LYMPH%: 13.2 %
MCH RBC QN AUTO: 30.8 PG (ref 25–35)
MCHC RBC AUTO-ENTMCNC: 31.8 G/DL (ref 31–36)
MCV RBC AUTO: 96.7 FL (ref 79–98)
MONO #: 0.8 K/UL (ref 0.1–0.6)
MONO%: 7.4 %
NUCLEATED RBCS: 0 %
NUCLEATED RED BLOOD CELLS: 0 /100 WBC
PERFORMED BY:: ABNORMAL
PLATELET # BLD AUTO: 232 K/UL (ref 140–440)
PMV BLD AUTO: 9.1 FL (ref 8.8–12.7)
POTASSIUM SERPL-SCNC: 4.7 MMOL/L (ref 3.5–5)
PROT SERPL-MCNC: 7.5 G/DL (ref 6–8.2)
RBC # BLD AUTO: 3.38 M/UL (ref 3.5–5.5)
SODIUM: 139 MMOL/L (ref 134–144)
WBC # BLD: 11.3 K/UL (ref 5–10)

## 2018-08-03 NOTE — TELEPHONE ENCOUNTER
Called pt regarding below message. Left voicemail with return number.         ----- Message from Cece Portillo sent at 8/3/2018  9:23 AM CDT -----  Contact: Pt  Name of Who is Calling: Sheri      What is the request in detail: Sheri is calling states if she can't be seen on today, then Monday is fine.      Can the clinic reply by [facility]: no      What [facility] to Call Back [facility]: [phone] (home)

## 2018-08-03 NOTE — TELEPHONE ENCOUNTER
Called pt regarding below message.  Informed pt we do not have any available appts today    ----- Message from RT Tomas sent at 8/3/2018  8:09 AM CDT -----  Contact: pt    pt , requesting an appt to be worked in today for have ABD and Back pain issues, thanks.

## 2018-08-07 ENCOUNTER — OFFICE VISIT (OUTPATIENT)
Dept: HEMATOLOGY/ONCOLOGY | Facility: CLINIC | Age: 63
End: 2018-08-07
Payer: MEDICARE

## 2018-08-07 VITALS
SYSTOLIC BLOOD PRESSURE: 139 MMHG | HEART RATE: 104 BPM | WEIGHT: 149.69 LBS | BODY MASS INDEX: 25.7 KG/M2 | TEMPERATURE: 99 F | DIASTOLIC BLOOD PRESSURE: 72 MMHG | RESPIRATION RATE: 18 BRPM

## 2018-08-07 DIAGNOSIS — M62.838 MUSCLE SPASM: ICD-10-CM

## 2018-08-07 DIAGNOSIS — C34.31 MALIGNANT NEOPLASM OF LOWER LOBE OF RIGHT LUNG: Chronic | ICD-10-CM

## 2018-08-07 PROCEDURE — 3008F BODY MASS INDEX DOCD: CPT | Mod: ,,, | Performed by: INTERNAL MEDICINE

## 2018-08-07 PROCEDURE — 99214 OFFICE O/P EST MOD 30 MIN: CPT | Mod: ,,, | Performed by: INTERNAL MEDICINE

## 2018-08-07 RX ORDER — HEPARIN 100 UNIT/ML
500 SYRINGE INTRAVENOUS
Status: CANCELLED | OUTPATIENT
Start: 2018-08-09

## 2018-08-07 RX ORDER — CARISOPRODOL 350 MG/1
350 TABLET ORAL 4 TIMES DAILY PRN
Qty: 20 TABLET | Refills: 0 | Status: SHIPPED | OUTPATIENT
Start: 2018-08-07 | End: 2018-08-17

## 2018-08-07 RX ORDER — SODIUM CHLORIDE 0.9 % (FLUSH) 0.9 %
10 SYRINGE (ML) INJECTION
Status: CANCELLED | OUTPATIENT
Start: 2018-08-09

## 2018-08-07 RX ORDER — SODIUM CHLORIDE 0.9 % (FLUSH) 0.9 %
10 SYRINGE (ML) INJECTION
Status: CANCELLED | OUTPATIENT
Start: 2018-08-10

## 2018-08-07 RX ORDER — HEPARIN 100 UNIT/ML
500 SYRINGE INTRAVENOUS
Status: CANCELLED | OUTPATIENT
Start: 2018-08-10

## 2018-08-07 RX ORDER — ONDANSETRON 2 MG/ML
16 INJECTION INTRAMUSCULAR; INTRAVENOUS ONCE
Status: CANCELLED
Start: 2018-08-09 | End: 2018-08-07

## 2018-08-07 RX ORDER — ONDANSETRON 2 MG/ML
16 INJECTION INTRAMUSCULAR; INTRAVENOUS ONCE
Status: CANCELLED
Start: 2018-08-10 | End: 2018-08-08

## 2018-08-07 NOTE — ASSESSMENT & PLAN NOTE
Patient is s/p three of four cycles of cis/alimta.  She has some side effects building with n/v.  Will continue with sancuso patch and antiemetics as previous.  Can also arrange iVFs as needed after chemotherapy.  Labs ok for chemo this week.

## 2018-08-07 NOTE — PROGRESS NOTES
PROGRESS NOTE    Subjective:       Patient ID: Sheri Broderick is a 62 y.o. female.    Chief Complaint:  Follow-up and Results  lung cancer, chemo follow up.     History of Present Illness:   Sheri Broderick is a 62 y.o. female who presents for follow up for chemotherapy.  She has received 3/4 cycles of cis/alimta.  States she had severe n/v after the 3rd cycle and has developed joint pain starting in her hips on both sides and down her legs.  This developed apprx five days ago and is not relieved by otc meds.  She has no fever with this.       Family and Social history reviewed and is unchanged from May 21, 2018      ROS:  Review of Systems   Constitutional: Negative for fever.   Respiratory: Negative for shortness of breath.    Cardiovascular: Negative for chest pain and leg swelling.   Gastrointestinal: Negative for abdominal pain and blood in stool.   Genitourinary: Negative for hematuria.   Skin: Negative for rash.          Current Outpatient Prescriptions:     albuterol 90 mcg/actuation inhaler, Inhale 2 puffs into the lungs every 6 (six) hours as needed for Wheezing. Rescue, Disp: 18 g, Rfl: 5    Ca cmb no.9-I6-L-5-OJ-G02-aloe 120-1,000-10 mg-unit-mg Tab, Take 1 tablet by mouth., Disp: , Rfl:     CHEWABLE VITAMIN C 500 mg Chew, , Disp: , Rfl:     clonazePAM (KLONOPIN) 1 MG tablet, Take one-half to one tablet two to three times daily only as needed for anxiety, Disp: 180 tablet, Rfl: 0    dexamethasone (DECADRON) 4 MG Tab, , Disp: , Rfl:     dextroamphetamine-amphetamine (ADDERALL) 15 mg tablet, Take one tablet PO twice daily.  Take second dose by 2 pm, Disp: 60 tablet, Rfl: 0    folic acid (FOLVITE) 800 MCG Tab, , Disp: , Rfl:     ibuprofen (ADVIL,MOTRIN) 800 MG tablet, Take 1 tablet (800 mg total) by mouth 2 (two) times daily as needed for Pain., Disp: 180 tablet, Rfl: 3    lamoTRIgine (LAMICTAL) 100 MG tablet, Take one tablet PO in  the morning and two tablets PO in the evening, Disp: 270 tablet, Rfl: 0    LINZESS 145 mcg Cap capsule, Take 1 capsule by mouth once daily., Disp: , Rfl:     MAALOX ADVANCED 1,000-60 mg Chew, , Disp: , Rfl:     meclizine (ANTIVERT) 25 mg tablet, Take 1 tablet (25 mg total) by mouth every 8 (eight) hours as needed for Dizziness. Twice a day, Disp: 90 tablet, Rfl: 3    mirtazapine (REMERON) 30 MG tablet, Take one-half tablet PO nightly, Disp: 90 tablet, Rfl: 0    ondansetron (ZOFRAN) 8 MG tablet, Take 1 tablet (8 mg total) by mouth every 8 (eight) hours as needed for Nausea., Disp: 90 tablet, Rfl: 3    oxyCODONE-acetaminophen (PERCOCET) 5-325 mg per tablet, TK 1 T PO Q 6 H PRN P, Disp: , Rfl: 0    PEPTO-BISMOL 262 mg Chew, , Disp: , Rfl:     promethazine (PHENERGAN) 25 MG tablet, Take 1 tablet (25 mg total) by mouth every 6 (six) hours as needed for Nausea., Disp: 90 tablet, Rfl: 3    ranitidine (ZANTAC) 150 MG capsule, , Disp: , Rfl:     sertraline (ZOLOFT) 100 MG tablet, Take two tablets PO in the morning and one-half tablet PO in evening, Disp: 225 tablet, Rfl: 0    tizanidine (ZANAFLEX) 4 MG tablet, TAKE 1 TABLET TWICE DAILY (Patient taking differently: TAKE 1 TABLET TWICE DAILY PRN), Disp: 180 tablet, Rfl: 3    venlafaxine (EFFEXOR-XR) 75 MG 24 hr capsule, TAKE 1 CAPSULE ONE TIME DAILY, Disp: 90 capsule, Rfl: 1    vitamin E 400 UNIT capsule, Take 400 Units by mouth once daily., Disp: , Rfl:     ZYRTEC 10 mg Cap, continuous prn., Disp: , Rfl:     carisoprodol (SOMA) 350 MG tablet, Take 1 tablet (350 mg total) by mouth 4 (four) times daily as needed for Muscle spasms., Disp: 20 tablet, Rfl: 0    Current Facility-Administered Medications:     cyanocobalamin injection 1,000 mcg, 1,000 mcg, Subcutaneous, Q30 Days, Segundo Zhang MD, 1,000 mcg at 06/11/18 1346        Objective:       Physical Examination:     /72   Pulse 104   Temp 98.5 °F (36.9 °C)   Resp 18   Wt 67.9 kg (149 lb 11.2  oz)   BMI 25.70 kg/m²     Physical Exam   Constitutional: She appears well-developed and well-nourished.   HENT:   Head: Normocephalic and atraumatic.   Right Ear: External ear normal.   Left Ear: External ear normal.   Mouth/Throat: Oropharynx is clear and moist.   Eyes: Conjunctivae are normal. Pupils are equal, round, and reactive to light.   Neck: No tracheal deviation present. No thyromegaly present.   Cardiovascular: Normal rate, regular rhythm and normal heart sounds.    Pulmonary/Chest: Effort normal and breath sounds normal.   Abdominal: Soft. Bowel sounds are normal. She exhibits no distension and no mass. There is no tenderness.   Musculoskeletal: She exhibits no edema.   Neurological:   Neuro intact througout   Skin: No rash noted.   Psychiatric: She has a normal mood and affect. Her behavior is normal. Judgment and thought content normal.       Labs:   Recent Results (from the past 336 hour(s))   CBC auto differential    Collection Time: 08/03/18 11:11 AM   Result Value Ref Range    WBC 11.3 (H) 5.0 - 10.0 K/ul    Hemoglobin 10.4 (L) 12.0 - 15.0 g/dl    Hematocrit 32.7 (L) 36.0 - 48.0 %    Platelets 232 140 - 440 K/ul   CBC auto differential    Collection Time: 07/26/18  3:07 PM   Result Value Ref Range    WBC 25.8 (H) 5.0 - 10.0 K/ul    Hemoglobin 10.9 (L) 12.0 - 15.0 g/dl    Hematocrit 34.4 (L) 36.0 - 48.0 %    Platelets 119 (L) 140 - 440 K/ul     CMP  Sodium   Date Value Ref Range Status   08/03/2018 139 134 - 144 mmol/L      Potassium   Date Value Ref Range Status   08/03/2018 4.7 3.5 - 5.0 mmol/L      Chloride   Date Value Ref Range Status   08/03/2018 102 98 - 110 mmol/L      CO2   Date Value Ref Range Status   08/03/2018 28.8 22.8 - 31.6 mmol/L      Glucose   Date Value Ref Range Status   08/03/2018 85 70 - 99 mg/dL      BUN, Bld   Date Value Ref Range Status   08/03/2018 11 8 - 20 mg/dL      Creatinine   Date Value Ref Range Status   08/03/2018 0.76 0.60 - 1.40 mg/dL    11/29/2012 0.7 0.5 - 1.4  mg/dL Final     Calcium   Date Value Ref Range Status   08/03/2018 9.5 7.7 - 10.4 mg/dL    11/29/2012 9.0 8.7 - 10.5 mg/dL Final     Total Protein   Date Value Ref Range Status   08/03/2018 7.5 6.0 - 8.2 g/dL      Albumin   Date Value Ref Range Status   08/03/2018 4.0 3.1 - 4.7 g/dL      Total Bilirubin   Date Value Ref Range Status   08/03/2018 0.4 0.3 - 1.0 mg/dL      Alkaline Phosphatase   Date Value Ref Range Status   08/03/2018 137 (H) 40 - 104 IU/L      AST   Date Value Ref Range Status   08/03/2018 23 10 - 40 IU/L      ALT   Date Value Ref Range Status   06/01/2018 10 6 - 29 U/L Final     Anion Gap   Date Value Ref Range Status   03/03/2016 10 8 - 16 mmol/L Final   11/29/2012 10 5 - 15 meq/L Final     eGFR if    Date Value Ref Range Status   06/01/2018 94 > OR = 60 mL/min/1.73m2 Final     eGFR if non    Date Value Ref Range Status   06/01/2018 81 > OR = 60 mL/min/1.73m2 Final     No results found for: CEA  No results found for: PSA        Assessment/Plan:     Problem List Items Addressed This Visit     Malignant neoplasm of lower lobe of right lung-Adenocarcinoma (Chronic)     Patient is s/p three of four cycles of cis/alimta.  She has some side effects building with n/v.  Will continue with sancuso patch and antiemetics as previous.  Can also arrange iVFs as needed after chemotherapy.  Labs ok for chemo this week.          Muscle spasm     This may be due to chemotherapy or Neulasta.  Will prescribe flexeril to see if it has some effect.  I suspect it will resolve when chemotherapy is complete and discussed this today.           Relevant Medications    carisoprodol (SOMA) 350 MG tablet            Discussion:   High complexity due to intense lab monitoring, high risk medications.   Follow-up in about 4 weeks (around 9/4/2018).      Electronically signed by Segundo Lamb

## 2018-08-07 NOTE — LETTER
August 7, 2018      Demetrio Pleitez Jr., MD  [address]  [place] [address]           [facility] - Hematology Oncology  [address]  [address]  [place] [address]  Phone: [phone]  Fax: [phone]          Patient: Sheri Broderick   MR Number: [id number]   YOB: 1955   Date of Visit: 8/7/2018       Dear Dr. Demetrio Pleitez Jr.:    Thank you for referring Sheri Broderick to me for evaluation. Attached you will find relevant portions of my assessment and plan of care.    If you have questions, please do not hesitate to call me. I look forward to following Sheri Broderick along with you.    Sincerely,    Segundo Zhang MD    Enclosure  CC:  No Recipients    If you would like to receive this communication electronically, please contact [email or web address] or [phone] to request more information on [employer] Link access.    For providers and/or their staff who would like to refer a patient to [facility], please contact us through our one-stop-shop provider referral line, [facility], at [phone].    If you feel you have received this communication in error or would no longer like to receive these types of communications, please e-mail [email or web address]

## 2018-08-07 NOTE — ASSESSMENT & PLAN NOTE
This may be due to chemotherapy or Neulasta.  Will prescribe flexeril to see if it has some effect.  I suspect it will resolve when chemotherapy is complete and discussed this today.

## 2018-08-10 ENCOUNTER — PES CALL (OUTPATIENT)
Dept: ADMINISTRATIVE | Facility: CLINIC | Age: 63
End: 2018-08-10

## 2018-08-17 LAB
ALBUMIN SERPL-MCNC: 4.1 G/DL (ref 3.1–4.7)
ALP SERPL-CCNC: 172 IU/L (ref 40–104)
ALT (SGPT): 13 IU/L (ref 3–33)
AST SERPL-CCNC: 21 IU/L (ref 10–40)
BASOPHILS NFR BLD: 0.1 K/UL (ref 0–0.2)
BASOPHILS NFR BLD: 0.4 %
BILIRUB SERPL-MCNC: 0.5 MG/DL (ref 0.3–1)
BUN SERPL-MCNC: 14 MG/DL (ref 8–20)
CALCIUM SERPL-MCNC: 9.5 MG/DL (ref 7.7–10.4)
CHLORIDE: 99 MMOL/L (ref 98–110)
CO2 SERPL-SCNC: 26.9 MMOL/L (ref 22.8–31.6)
CREATININE: 0.77 MG/DL (ref 0.6–1.4)
EOSINOPHIL NFR BLD: 0 K/UL (ref 0–0.7)
EOSINOPHIL NFR BLD: 0.2 %
ERYTHROCYTE [DISTWIDTH] IN BLOOD BY AUTOMATED COUNT: 18.6 % (ref 12.5–14.5)
GLUCOSE: 98 MG/DL (ref 70–99)
GRAN #: 13.8 K/UL (ref 1.4–6.5)
GRAN%: 81.3 %
HCT VFR BLD AUTO: 32.4 % (ref 36–48)
HGB BLD-MCNC: 10.3 G/DL (ref 12–15)
IMMATURE GRANS (ABS): 0.1 K/UL (ref 0–1)
IMMATURE GRANULOCYTES: 0.8 %
LYMPH #: 1.8 K/UL (ref 1.2–3.4)
LYMPH%: 10.3 %
MCH RBC QN AUTO: 30.4 PG (ref 25–35)
MCHC RBC AUTO-ENTMCNC: 31.8 G/DL (ref 31–36)
MCV RBC AUTO: 95.6 FL (ref 79–98)
MONO #: 1.2 K/UL (ref 0.1–0.6)
MONO%: 7 %
NUCLEATED RBCS: 0 %
NUCLEATED RED BLOOD CELLS: 0 /100 WBC
PERFORMED BY:: ABNORMAL
PLATELET # BLD AUTO: 140 K/UL (ref 140–440)
PMV BLD AUTO: 8.8 FL (ref 8.8–12.7)
POTASSIUM SERPL-SCNC: 4.6 MMOL/L (ref 3.5–5)
PROT SERPL-MCNC: 7.9 G/DL (ref 6–8.2)
RBC # BLD AUTO: 3.39 M/UL (ref 3.5–5.5)
SODIUM: 136 MMOL/L (ref 134–144)
WBC # BLD: 17 K/UL (ref 5–10)

## 2018-08-21 ENCOUNTER — TELEPHONE (OUTPATIENT)
Dept: HEMATOLOGY/ONCOLOGY | Facility: CLINIC | Age: 63
End: 2018-08-21

## 2018-08-21 NOTE — TELEPHONE ENCOUNTER
Patient called asking when she was going to start gettiing her strength back? She completed her 4th cycle of chemotherapy on 8/10/18. Notified her it would take several weeks/months for her to start feeling better she just finished chemotherapy. She states she is having heart burn and taking tums or mylanta. She sees the psychiatrist tomorrow notified her she can take Zantac if it is ok with her. She also asked if it was ok to restart her Adderall. She said Dr. Sutton had taken her off of it prior to her lung surgery. Notified her she would need to talk with her Psychiatrist about restarting it. She verbalized understanding.

## 2018-08-22 ENCOUNTER — OFFICE VISIT (OUTPATIENT)
Dept: PSYCHIATRY | Facility: CLINIC | Age: 63
End: 2018-08-22
Payer: MEDICARE

## 2018-08-22 ENCOUNTER — TELEPHONE (OUTPATIENT)
Dept: PSYCHIATRY | Facility: CLINIC | Age: 63
End: 2018-08-22

## 2018-08-22 VITALS
BODY MASS INDEX: 25.7 KG/M2 | DIASTOLIC BLOOD PRESSURE: 75 MMHG | HEIGHT: 64 IN | HEART RATE: 109 BPM | SYSTOLIC BLOOD PRESSURE: 134 MMHG

## 2018-08-22 DIAGNOSIS — F33.1 MDD (MAJOR DEPRESSIVE DISORDER), RECURRENT EPISODE, MODERATE: ICD-10-CM

## 2018-08-22 DIAGNOSIS — F60.9 PERSONALITY DISORDER: ICD-10-CM

## 2018-08-22 DIAGNOSIS — F41.0 PANIC DISORDER WITHOUT AGORAPHOBIA: ICD-10-CM

## 2018-08-22 DIAGNOSIS — F41.1 GENERALIZED ANXIETY DISORDER: ICD-10-CM

## 2018-08-22 DIAGNOSIS — Z79.899 HIGH RISK MEDICATION USE: ICD-10-CM

## 2018-08-22 LAB
AMP D-AMPHETAMINE 1000 NG/ML: NEGATIVE
AMPHET+METHAMPHET UR QL: NEGATIVE
BAR SECOBARBITAL 300 NG/ML: NEGATIVE
BARBITURATES UR QL SCN>200 NG/ML: NEGATIVE
BENZODIAZ UR QL SCN>200 NG/ML: NEGATIVE
BUP BUPRENORPHINE 10 NG/ML: NEGATIVE
BZE UR QL SCN: NEGATIVE
BZO OXAZEPAM 300 NG/ML: NEGATIVE
CANNABINOIDS UR QL SCN: NEGATIVE
COC BENZOYLECGONINE 300 NG/ML: NEGATIVE
CREAT UR-MCNC: 266 MG/DL
CTP QC/QA: YES
ETHANOL UR-MCNC: <10 MG/DL
MET D-METHAMPHETAMINE 500 NG/ML: NEGATIVE
METHADONE UR QL SCN>300 NG/ML: NEGATIVE
MOP MORPHINE 300 NG/ML: NEGATIVE
MTD METHADONE 300 NG/ML: NEGATIVE
OPIATES UR QL SCN: NEGATIVE
PCP UR QL SCN>25 NG/ML: NEGATIVE
QXY OXYCODONE 100 NG/ML: NEGATIVE
THC 11-NOR-9-TETRAHYDROCANNABINOL-9-CARBOXYLIC ACID: NEGATIVE
TOXICOLOGY INFORMATION: NORMAL

## 2018-08-22 PROCEDURE — 80305 DRUG TEST PRSMV DIR OPT OBS: CPT | Mod: PBBFAC,PN | Performed by: PSYCHIATRY & NEUROLOGY

## 2018-08-22 PROCEDURE — 99499 UNLISTED E&M SERVICE: CPT | Mod: S$GLB,,, | Performed by: PSYCHIATRY & NEUROLOGY

## 2018-08-22 PROCEDURE — 99214 OFFICE O/P EST MOD 30 MIN: CPT | Mod: S$PBB,,, | Performed by: PSYCHIATRY & NEUROLOGY

## 2018-08-22 PROCEDURE — 3008F BODY MASS INDEX DOCD: CPT | Mod: CPTII,,, | Performed by: PSYCHIATRY & NEUROLOGY

## 2018-08-22 PROCEDURE — 99999 PR PBB SHADOW E&M-EST. PATIENT-LVL III: CPT | Mod: PBBFAC,,, | Performed by: PSYCHIATRY & NEUROLOGY

## 2018-08-22 PROCEDURE — 80307 DRUG TEST PRSMV CHEM ANLYZR: CPT

## 2018-08-22 RX ORDER — DEXTROAMPHETAMINE SACCHARATE, AMPHETAMINE ASPARTATE, DEXTROAMPHETAMINE SULFATE AND AMPHETAMINE SULFATE 2.5; 2.5; 2.5; 2.5 MG/1; MG/1; MG/1; MG/1
TABLET ORAL
Qty: 30 TABLET | Refills: 0 | Status: SHIPPED | OUTPATIENT
Start: 2018-08-22 | End: 2018-10-15 | Stop reason: SDUPTHER

## 2018-08-22 NOTE — TELEPHONE ENCOUNTER
Rapid urine drug screen in clinic shows: Methamphetamines, Methadone, and THC.  Will urine send for confirmation at lab.

## 2018-08-23 ENCOUNTER — TELEPHONE (OUTPATIENT)
Dept: PSYCHIATRY | Facility: CLINIC | Age: 63
End: 2018-08-23

## 2018-08-23 ENCOUNTER — PATIENT MESSAGE (OUTPATIENT)
Dept: PSYCHIATRY | Facility: CLINIC | Age: 63
End: 2018-08-23

## 2018-08-23 NOTE — TELEPHONE ENCOUNTER
I spoke to the patient and explained that urine toxicology was sent to the lab for confirmation and was completely negative.  The patient is understandably upset by the false positive toxicology.  I have a call into [facility] lab regarding the problems with the rapid urine toxicology.  Pt verbalized an understanding.  After I hung up with her, I was able to later with Epic support amend the results of this test.  I tried to reach the patient to inform her of corrected POCT urine tox result in Epic - but she did not answer. I left a message for call back and will also send a response via My Chart.

## 2018-08-23 NOTE — PROGRESS NOTES
Outpatient Psychiatry Follow-Up Visit (MD/NP)    8/22/2018    Clinical Status of Patient:  Outpatient (Ambulatory)    Chief Complaint:  Sheri Broderick is a 62 y.o. female who presents today for follow-up of anxiety, depression.   Met with patient.      Interval History and Content of Current Session:      Interim Events/Subjective Report/Content of Current Session:      61 yo  female presents for follow up appointment for treatment of major depression, generalized anxiety disorder, panic disorder.     Past Psychiatric Hx:   No prior psychiatric hospitalizations   Suicide risk assessment:   Risks: , age, chronic depression with passive SI  Protective: no prior suicide attempts, strong skip, female, no substance abuse or impulsivity, health issues present both not disabling at this time  She is at low to moderate risk of suicide acutely, and moderate risk over long term     Prior meds: Abilify (no benefit), Buspirone (intolerance) Trazodone (intolerance), Paroxetine (intolerance), Quetiapine, Bupropion (intolerance), Fluoxetine (no benefit), Duloxetine Foltx (no longer covered)       Past Medical History   Allergy [T78.40XA]   Anxiety [F41.9]   Arthritis [M19.90]   Back pain [M54.9]   Dizziness [R42]   Fibromyalgia [M79.7]   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 [S62.101K]   Fracture of right foot [S92.901A]   Hep B w/o coma [B19.10]   Hyperlipidemia [E78.5]   Major depressive disorder, recurrent episode, in partial remission [F33.41]   Myalgia and myositis, unspecified [RNS1686]   OCD (obsessive compulsive disorder) [F42.9]   Osteoporosis   Polyneuropathy [G62.9]   Trouble in sleeping [G47.9]   Urinary incontinence [R32]       Past Surgical History Laterality Date Comments   TUBAL LIGATION[SHX77]      EYE SURGERY[TLO961]   RK   FRACTURE SURGERY[GPB331] Right  wrist orif   CARPAL TUNNEL RELEASE[BGW873] Bilateral 07/18/2014      Interim Hx:   Pt did not titrate Sertraline to 200  mg in am and 50 mg in pm last visit. She has not been taking Clonazepam recently.  She has not been taking Adderall recently but would like to reconsider starting it.   She has been taking Mirtazapine, Effexor, and Lamictal daily.     Pt presents for follow up visit.  Pt has Stage II Adenocarcinoma of right lung.  She has no personal hx of smoking, but does have hx of second hand smoke exposure.  She has had four cycles of Alimta and Cisplatin.  Pt is under the care of Dr Delgadillo and will follow up next 9/4/18.  She will not have radiation therapy.    She did not lose her hair, but has had severe nausea and vomiting.  Her cough has been improving.   She has had weight gain associated with steroids and declined to weigh in today.  Per home scale, she was 146.6 lbs this am.  She is taking Carisoprodol for muscle spasms and was prescribed hydrocodone-CHLORPHEN ER SUSP for cough.   Pt reports she was told it is ok to resume Adderall per Oncology team. Lab work has been stable per pt report.     She has been very depressed.  She misses Adderall RX.  It has been harder to focus.  Energy, motivation, and sleep have all been affected.   + anticipatory anxiety with treatments.  Anxiety has been high. + hopelessness. Appetite is poor. Sleeps solid from 1 am to 5:30 am.  She frequently does not get dressed.  Panic attacks are less intense, but more frequent.  She remains hypervigilant (nail gun in lobby with work being done frightened her).  Nightmares occur, but less often.  + irritability.  Not accepting of her sister's efforts to reach out to her - she feels sister was doing this for herself.   She feels others are blaming her for her cancer b/c they assume she smokes. Had to introduce herself at support group explaining she was not a smoker.   Denies suicidal/homicidal ideations.  Denies symptoms of brad/psychosis.     Denies alcohol/drug use. No tobacco. No recent caffeine use.   No issues driving.      Pt is under the  "care of Dr Jennings.     Pt previously brought in copy of ECHO done 11/7/16 - normal LV systolic function. EF 55-60%.  Mitral valve leaflets are mildly thickened. Mild mitral annular calcification.  Moderate MVP.  Trace MV regurgitation, trace pulmonic regurgitation.  Seen by Dr Arroyo Nov 2 2016 - Cardiology, ECHO, EKG - all negative     Review of Systems   · PSYCHIATRIC: Pertinant items are noted in the narrative.  · CONSTITUTIONAL:  Wt gain   · CARDIOVASCULAR: no current chest pain, no palpitations   · GI:  Abdominal pain, nausea  · RESP: + cough     Past Medical, Family and Social History: The patient's past medical, family and social history have been reviewed and updated as appropriate within the electronic medical record - see encounter notes.    Psychiatric Medications:   Effexor XR 75 mg daily  Sertraline 200 mg in am and 50 mg in PM (not taking 50 mg in PM)  Klonopin 1 mg bid - tid prn anxiety - not taking recently   Remeron 15 mg QHS   Lamictal 100 mg QAM, 200 mg QHS   Adderall 15 mg once to twice daily - not taking recently     Compliance: see above     Side effects: dry mouth (uses biotene)    Risk Parameters:  Patient reports no suicidal ideations today   Patient reports no homicidal ideation  Patient reports no self-injurious behavior  Patient reports no violent behavior      Exam (detailed: at least 9 elements; comprehensive: all 15 elements)   Constitutional  Vitals:  Most recent vital signs, dated less than 90 days prior to this appointment, were reviewed.    Se Epic for today's vitals.       General:  age appropriate, casual attire, adequate grooming, good eye contact, appears calm, not tearful         Musculoskeletal  Muscle Strength/Tone:  No tremor noted today,  No PMA    Gait & Station:  non-ataxic     Psychiatric  Speech:  no latency; no press , spontaneous, talkative, conversational, soft spoken    Mood & Affect:  "very depressed"  Restricted    Thought Process:  Linear   Associations:  " intact   Thought Content:  no SI, no homicidality, delusions, or paranoia, hallucinations: (auditory: no currently, visual: no) - none currently       Insight:  Fair    Judgement: Fair    Orientation:  grossly intact. Alert and oriented x 4    Memory: intact for content of interview   Language: grossly intact   Attention Span & Concentration:  able to focus   Fund of Knowledge:  intact and appropriate to age and level of education     Assessment and Diagnosis   Status/Progress: Based on the examination today, the patient's problem(s) is/are inadequately controlled, have been treatment resistant in past.   New problems have been not presented today.   Comorbidities are complicating management of the primary condition.    The working differential for this patient includes Cluster B and C traits, personality disorder, PTSD, MDD, OCD    General Impression :  Pt improved with addition of SNRI to SSRI; she declines atypical antipsychotic augmentation. Did not tolerate recent medication adjustments. Stabilizing with return to prior regimen.  Adderall does help pt function better, she does not feel it worsens anxiety.  Pt with significant negative cognitive distortions evident throughout interview.  Pt dx with Stage II Adenocarcinoma of the lung and undergoing treatment.  Support system is limited, pt remains very depressed, anxious     Major Depressive Disorder, Recurrent, moderate   Panic Disorder without Agoraphobia.   Generalized Anxiety Disorder.   R/O Cognitive Disorder   R/O PTSD  Hx OCD  Personality Disorder, unspecified, Cluster B and C traits     Hepaitis B, tremors,  osteoporosis, myalgias, arthralgia, poor balance, back pain, bulging disc, fibromyalgia, osteoarthritis, neuropathy, LE fracture, Adenocarcinoma of Lung    GAF: 55   Intervention/Counseling/Treatment Plan   · Medication Management: The risks and benefits of medication were discussed with the patient.  · Counseling provided with patient as follows:  importance of compliance with chosen treatment options was emphasized, risks and benefits of treatment options, including medications, were discussed with the patient     1. Continue Effexor XR 75 mg daily  2. Continue Remeron 15 mg QHS   3. Continue Klonopin 1 mg BID - TID prn.  Discussed risk of decreased RT, sedation, addictive potential, and not to mix with alcohol. No Rx today.  Advised to avoid this medication when she is taking pain meds (risks of resp depression discussed) and she verbalized an understanding   4. Continue Lamictal 100 mg QAM and  200 mg daily.  Discussed risks of life threatening SJS, need for compliance.   5. Continue Psychotherapy with Kaye Jennings   6. Ok to resume low dose Adderall 5 mg daily x 7 days, then 10 mg daily.   7.  Pt instructed to go to ED/911 with thoughts of wanting harm self/others  8. Call to report any worsening of symptoms or problems with the medication  9. Continue  Sertraline to 200 mg and and encouraged to try 50 mg in pm.   Target depression, anxiety. Discussed risk of serotonin syndrome, GI upset, headaches and that this dose is > FDA max rec dosing.   10. MOCA and memory loss work up - defer for now (not related to metastatic disease - pt has had cancer staging)  11. High risk medication monitoring:  POCT urine toxicology     Return to Clinic: 4-6 weeks

## 2018-08-23 NOTE — TELEPHONE ENCOUNTER
Spoke with patient who was upset with a POCT test that showed false positive screens.    Advised patient that we are aware of problem and getting this issue fixed    Advised I will let dr. ruiz know her concerns so they can further discuss  Please advise

## 2018-08-24 ENCOUNTER — TELEPHONE (OUTPATIENT)
Dept: PSYCHIATRY | Facility: CLINIC | Age: 63
End: 2018-08-24

## 2018-08-24 ENCOUNTER — PATIENT MESSAGE (OUTPATIENT)
Dept: PSYCHIATRY | Facility: CLINIC | Age: 63
End: 2018-08-24

## 2018-08-24 LAB
ALBUMIN SERPL-MCNC: 3.8 G/DL (ref 3.1–4.7)
ALP SERPL-CCNC: 122 IU/L (ref 40–104)
ALT (SGPT): 14 IU/L (ref 3–33)
AST SERPL-CCNC: 25 IU/L (ref 10–40)
BASOPHILS NFR BLD: 0 K/UL (ref 0–0.2)
BASOPHILS NFR BLD: 0.1 %
BILIRUB SERPL-MCNC: 0.5 MG/DL (ref 0.3–1)
BUN SERPL-MCNC: 12 MG/DL (ref 8–20)
CALCIUM SERPL-MCNC: 9.3 MG/DL (ref 7.7–10.4)
CHLORIDE: 104 MMOL/L (ref 98–110)
CO2 SERPL-SCNC: 28.3 MMOL/L (ref 22.8–31.6)
CREATININE: 0.72 MG/DL (ref 0.6–1.4)
EOSINOPHIL NFR BLD: 0.1 K/UL (ref 0–0.7)
EOSINOPHIL NFR BLD: 0.5 %
ERYTHROCYTE [DISTWIDTH] IN BLOOD BY AUTOMATED COUNT: 18.9 % (ref 12.5–14.5)
GLUCOSE: 99 MG/DL (ref 70–99)
GRAN #: 7.3 K/UL (ref 1.4–6.5)
GRAN%: 77.1 %
HCT VFR BLD AUTO: 32.6 % (ref 36–48)
HGB BLD-MCNC: 10.3 G/DL (ref 12–15)
IMMATURE GRANS (ABS): 0.1 K/UL (ref 0–1)
IMMATURE GRANULOCYTES: 0.5 %
LYMPH #: 1.4 K/UL (ref 1.2–3.4)
LYMPH%: 15.2 %
MCH RBC QN AUTO: 30.9 PG (ref 25–35)
MCHC RBC AUTO-ENTMCNC: 31.6 G/DL (ref 31–36)
MCV RBC AUTO: 97.9 FL (ref 79–98)
MONO #: 0.6 K/UL (ref 0.1–0.6)
MONO%: 6.6 %
NUCLEATED RBCS: 0 %
NUCLEATED RED BLOOD CELLS: 0 /100 WBC
PERFORMED BY:: ABNORMAL
PLATELET # BLD AUTO: 197 K/UL (ref 140–440)
PMV BLD AUTO: 9.1 FL (ref 8.8–12.7)
POTASSIUM SERPL-SCNC: 4.1 MMOL/L (ref 3.5–5)
PROT SERPL-MCNC: 7.3 G/DL (ref 6–8.2)
RBC # BLD AUTO: 3.33 M/UL (ref 3.5–5.5)
SODIUM: 141 MMOL/L (ref 134–144)
WBC # BLD: 9.5 K/UL (ref 5–10)

## 2018-08-24 NOTE — TELEPHONE ENCOUNTER
I tried to reach the patient to follow up on phone call yesterday - no answer, left voicemail message and will respond via My Chart.  POCT urine toxicology corrected in Epic based on confirmation test at lab.

## 2018-08-24 NOTE — TELEPHONE ENCOUNTER
Per Epic help desk they edited the patient results yesterday. This is the incident report resolved email they sent me in regards to this    Your Incident Reference Number [id number] has been resolved.  Summary: Need to figure a way to remove a result of a point of care urine drug screen, because it was a false positive. They know its an issue with the particular brand. Need to remove to this particular test out of a chart MRN [id number]  Resolution: Helped the user via the Service Desk.

## 2018-08-28 ENCOUNTER — OFFICE VISIT (OUTPATIENT)
Dept: PULMONOLOGY | Facility: CLINIC | Age: 63
End: 2018-08-28
Payer: MEDICARE

## 2018-08-28 VITALS
SYSTOLIC BLOOD PRESSURE: 120 MMHG | HEIGHT: 65 IN | DIASTOLIC BLOOD PRESSURE: 76 MMHG | HEART RATE: 86 BPM | WEIGHT: 149 LBS | BODY MASS INDEX: 24.83 KG/M2 | OXYGEN SATURATION: 98 %

## 2018-08-28 DIAGNOSIS — C34.31 MALIGNANT NEOPLASM OF LOWER LOBE OF RIGHT LUNG: Primary | Chronic | ICD-10-CM

## 2018-08-28 DIAGNOSIS — R05.9 COUGH: ICD-10-CM

## 2018-08-28 PROCEDURE — 99204 OFFICE O/P NEW MOD 45 MIN: CPT | Mod: ,,, | Performed by: INTERNAL MEDICINE

## 2018-08-28 PROCEDURE — 3008F BODY MASS INDEX DOCD: CPT | Mod: ,,, | Performed by: INTERNAL MEDICINE

## 2018-08-28 RX ORDER — FLUTICASONE PROPIONATE 50 MCG
1 SPRAY, SUSPENSION (ML) NASAL DAILY
COMMUNITY
End: 2020-05-12

## 2018-08-28 NOTE — PROGRESS NOTES
New Office Visit/Consultation Note    Patient Name: Sheri Broderick  MRN: [id number]  : 1955      Reason for visit: Lung cancer    HPI:     2018 - Pt with h/o adenocarcinoma of lung (RLL, s/p resection 2018), had + lymph nodes and has had chemotherapy (per Dr Zhang - last 2018), next visit in September.  Has a h/o childhood asthma, no known h/o COPD - was never a smoker but did have some second hand exposure.  Has had chroinic cough which has been worse since her surgery - has associated postnasal drip/allergies, + reflux.  Cough is mostly nonproductive.  Has also noted some increased trouble with SOB and WISDOM.  Has a ventolin inhaler but hasn't really used it.    Past Medical History    Past Medical History:   Diagnosis Date    Allergy     seasonal    Anemia associated with chemotherapy 2018    Anxiety     Arthritis     Back pain     Chemotherapy-induced neutropenia 2018    Dizziness     Fibromyalgia     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     Fracture of right foot     Hep B w/o coma     Hyperlipidemia     Major depressive disorder, recurrent episode, in partial remission     Malignant neoplasm of lower lobe of right lung-Adenocarcinoma 2018    Myalgia and myositis, unspecified     OCD (obsessive compulsive disorder)     Osteoporosis     Polyneuropathy     Trouble in sleeping     Urinary incontinence        Past Surgical History    Past Surgical History:   Procedure Laterality Date    CARPAL TUNNEL RELEASE Bilateral 2014    EYE SURGERY      RK    FRACTURE SURGERY Right     wrist orif    LUNG REMOVAL, PARTIAL  2018    Dr. Brant Welch    TUBAL LIGATION         Medications      Current Outpatient Medications:     albuterol 90 mcg/actuation inhaler, Inhale 2 puffs into the lungs every 6 (six) hours as needed for Wheezing. Rescue, Disp: 18 g, Rfl: 5    Ca cmb no.3-E6-T-9-ZV-B57-aloe 120-1,000-10 mg-unit-mg Tab, Take 1 tablet  by mouth., Disp: , Rfl:     CHEWABLE VITAMIN C 500 mg Chew, , Disp: , Rfl:     clonazePAM (KLONOPIN) 1 MG tablet, Take one-half to one tablet two to three times daily only as needed for anxiety, Disp: 180 tablet, Rfl: 0    dextroamphetamine-amphetamine 10 mg Tab, Take one-half tablet PO daily for one week, then increase to one tablet PO daily, Disp: 30 tablet, Rfl: 0    fluticasone (FLONASE) 50 mcg/actuation nasal spray, 1 spray by Each Nare route once daily., Disp: , Rfl:     folic acid (FOLVITE) 800 MCG Tab, , Disp: , Rfl:     ibuprofen (ADVIL,MOTRIN) 800 MG tablet, Take 1 tablet (800 mg total) by mouth 2 (two) times daily as needed for Pain., Disp: 180 tablet, Rfl: 3    lamoTRIgine (LAMICTAL) 100 MG tablet, Take one tablet PO in the morning and two tablets PO in the evening, Disp: 270 tablet, Rfl: 0    LINZESS 145 mcg Cap capsule, Take 1 capsule by mouth once daily., Disp: , Rfl:     MAALOX ADVANCED 1,000-60 mg Chew, , Disp: , Rfl:     meclizine (ANTIVERT) 25 mg tablet, Take 1 tablet (25 mg total) by mouth every 8 (eight) hours as needed for Dizziness. Twice a day, Disp: 90 tablet, Rfl: 3    mirtazapine (REMERON) 30 MG tablet, Take one-half tablet PO nightly, Disp: 90 tablet, Rfl: 0    ondansetron (ZOFRAN) 8 MG tablet, Take 1 tablet (8 mg total) by mouth every 8 (eight) hours as needed for Nausea., Disp: 90 tablet, Rfl: 3    PEPTO-BISMOL 262 mg Chew, , Disp: , Rfl:     promethazine (PHENERGAN) 25 MG tablet, Take 1 tablet (25 mg total) by mouth every 6 (six) hours as needed for Nausea., Disp: 90 tablet, Rfl: 3    ranitidine (ZANTAC) 150 MG capsule, , Disp: , Rfl:     sertraline (ZOLOFT) 100 MG tablet, Take two tablets PO in the morning and one-half tablet PO in evening, Disp: 225 tablet, Rfl: 0    venlafaxine (EFFEXOR-XR) 75 MG 24 hr capsule, TAKE 1 CAPSULE ONE TIME DAILY, Disp: 90 capsule, Rfl: 1    vitamin E 400 UNIT capsule, Take 400 Units by mouth once daily., Disp: , Rfl:     ZYRTEC 10 mg  Cap, continuous prn., Disp: , Rfl:     dexamethasone (DECADRON) 4 MG Tab, , Disp: , Rfl:     Current Facility-Administered Medications:     cyanocobalamin injection 1,000 mcg, 1,000 mcg, Subcutaneous, Q30 Days, Segundo Zhang MD, 1,000 mcg at 18 1346    Allergies    Review of patient's allergies indicates:   Allergen Reactions    Penicillins      Other reaction(s): Rash    Trazodone Anxiety     Severe anxiety     Prolia [denosumab] Other (See Comments)     myalgias       SocHx    Social History     Tobacco Use   Smoking Status Never Smoker   Smokeless Tobacco Never Used       Social History     Substance and Sexual Activity   Alcohol Use No       Drug Use - no  Occupation - retired,   Asbestos exposure - no  Pets - 2 cats     FMHx    Family History   Problem Relation Age of Onset    Heart disease Mother          to to coma (hypothermia)     Heart disease Father 57        heart attack    Heart disease Sister         stents    Diabetes Sister     Parkinsonism Sister     Heart disease Brother 45        death at 45 years old due to hearth disease     Diabetes Maternal Grandmother          Review of Systems  Review of Systems   Constitutional: Positive for malaise/fatigue. Negative for chills, diaphoresis, fever and weight loss.   HENT: Negative for congestion.    Eyes: Negative for pain.   Respiratory: Positive for cough and shortness of breath. Negative for hemoptysis, sputum production, wheezing and stridor.    Cardiovascular: Negative for chest pain, palpitations, orthopnea, claudication, leg swelling and PND.   Gastrointestinal: Positive for heartburn. Negative for abdominal pain, constipation, diarrhea, nausea and vomiting.        Reflux   Genitourinary: Negative for dysuria, frequency and urgency.   Musculoskeletal: Negative for falls and myalgias.   Neurological: Negative for sensory change, focal weakness and weakness.   Psychiatric/Behavioral: Negative for  "depression. The patient is not nervous/anxious.        Physical Exam    Vitals:    08/28/18 1023   BP: 120/76   Pulse: 86   SpO2: 98%   Weight: 67.6 kg (149 lb)   Height: 5' 5" (1.651 m)       Physical Exam    Labs    Lab Results   Component Value Date    WBC 9.5 08/24/2018    HGB 10.3 (L) 08/24/2018    HCT 32.6 (L) 08/24/2018     08/24/2018       Sodium   Date Value Ref Range Status   08/24/2018 141 134 - 144 mmol/L      Potassium   Date Value Ref Range Status   08/24/2018 4.1 3.5 - 5.0 mmol/L      Chloride   Date Value Ref Range Status   08/24/2018 104 98 - 110 mmol/L      CO2   Date Value Ref Range Status   08/24/2018 28.3 22.8 - 31.6 mmol/L      Glucose   Date Value Ref Range Status   08/24/2018 99 70 - 99 mg/dL      BUN, Bld   Date Value Ref Range Status   08/24/2018 12 8 - 20 mg/dL      Creatinine   Date Value Ref Range Status   08/24/2018 0.72 0.60 - 1.40 mg/dL    11/29/2012 0.7 0.5 - 1.4 mg/dL Final     Calcium   Date Value Ref Range Status   08/24/2018 9.3 7.7 - 10.4 mg/dL    11/29/2012 9.0 8.7 - 10.5 mg/dL Final     Total Protein   Date Value Ref Range Status   08/24/2018 7.3 6.0 - 8.2 g/dL      Albumin   Date Value Ref Range Status   08/24/2018 3.8 3.1 - 4.7 g/dL      Total Bilirubin   Date Value Ref Range Status   08/24/2018 0.5 0.3 - 1.0 mg/dL      Alkaline Phosphatase   Date Value Ref Range Status   08/24/2018 122 (H) 40 - 104 IU/L      AST   Date Value Ref Range Status   08/24/2018 25 10 - 40 IU/L      ALT   Date Value Ref Range Status   06/01/2018 10 6 - 29 U/L Final     Anion Gap   Date Value Ref Range Status   03/03/2016 10 8 - 16 mmol/L Final   11/29/2012 10 5 - 15 meq/L Final       Xrays        Impression/Plan    Problem List Items Addressed This Visit        Pulmonary    Cough  - likely post nasal drip and reflux  - will treat with flonase, zyrtec and nexium  - call me in 2 weeks and will see how she is doing  - RTC about 4-6 weeks  - may need PFT and/or possible bronchoscopy    Relevant " Orders    X-Ray Chest PA And Lateral       Oncology    Malignant neoplasm of lower lobe of right lung-Adenocarcinoma  - s/p surgery 4/2018  - chemo per Dr Zhang          I have spent about 45 minutes with the patient taking the history and examining the patient.  We have discussed the diagnoses and current plan and all questions have been answered.  We have discussed the follow up plan.  The patient and family (if present) know to contact the office with any questions they may have.        Segundo Colon MD

## 2018-08-31 LAB
ALBUMIN SERPL-MCNC: 4.4 G/DL (ref 3.1–4.7)
ALP SERPL-CCNC: 117 IU/L (ref 40–104)
ALT (SGPT): 12 IU/L (ref 3–33)
AST SERPL-CCNC: 23 IU/L (ref 10–40)
BASOPHILS NFR BLD: 0 K/UL (ref 0–0.2)
BASOPHILS NFR BLD: 0.4 %
BILIRUB SERPL-MCNC: 0.3 MG/DL (ref 0.3–1)
BUN SERPL-MCNC: 13 MG/DL (ref 8–20)
CALCIUM SERPL-MCNC: 9.8 MG/DL (ref 7.7–10.4)
CHLORIDE: 100 MMOL/L (ref 98–110)
CO2 SERPL-SCNC: 31.6 MMOL/L (ref 22.8–31.6)
CREATININE: 0.9 MG/DL (ref 0.6–1.4)
EOSINOPHIL NFR BLD: 0.1 K/UL (ref 0–0.7)
EOSINOPHIL NFR BLD: 0.9 %
ERYTHROCYTE [DISTWIDTH] IN BLOOD BY AUTOMATED COUNT: 18.9 % (ref 12.5–14.5)
GLUCOSE: 97 MG/DL (ref 70–99)
GRAN #: 5.7 K/UL (ref 1.4–6.5)
GRAN%: 68.9 %
HCT VFR BLD AUTO: 34.6 % (ref 36–48)
HGB BLD-MCNC: 10.9 G/DL (ref 12–15)
IMMATURE GRANS (ABS): 0.1 K/UL (ref 0–1)
IMMATURE GRANULOCYTES: 0.7 %
LYMPH #: 1.7 K/UL (ref 1.2–3.4)
LYMPH%: 20.6 %
MCH RBC QN AUTO: 31 PG (ref 25–35)
MCHC RBC AUTO-ENTMCNC: 31.5 G/DL (ref 31–36)
MCV RBC AUTO: 98.3 FL (ref 79–98)
MONO #: 0.7 K/UL (ref 0.1–0.6)
MONO%: 8.5 %
NUCLEATED RBCS: 0 %
NUCLEATED RED BLOOD CELLS: 0 /100 WBC
PERFORMED BY:: ABNORMAL
PLATELET # BLD AUTO: 265 K/UL (ref 140–440)
PMV BLD AUTO: 8.1 FL (ref 8.8–12.7)
POTASSIUM SERPL-SCNC: 4.5 MMOL/L (ref 3.5–5)
PROT SERPL-MCNC: 8 G/DL (ref 6–8.2)
RBC # BLD AUTO: 3.52 M/UL (ref 3.5–5.5)
SODIUM: 139 MMOL/L (ref 134–144)
WBC # BLD: 8.2 K/UL (ref 5–10)

## 2018-09-04 ENCOUNTER — OFFICE VISIT (OUTPATIENT)
Dept: HEMATOLOGY/ONCOLOGY | Facility: CLINIC | Age: 63
End: 2018-09-04
Payer: MEDICARE

## 2018-09-04 VITALS
RESPIRATION RATE: 18 BRPM | SYSTOLIC BLOOD PRESSURE: 139 MMHG | HEART RATE: 116 BPM | TEMPERATURE: 98 F | DIASTOLIC BLOOD PRESSURE: 83 MMHG | WEIGHT: 148.69 LBS | BODY MASS INDEX: 24.74 KG/M2

## 2018-09-04 DIAGNOSIS — C34.31 MALIGNANT NEOPLASM OF LOWER LOBE OF RIGHT LUNG: Chronic | ICD-10-CM

## 2018-09-04 PROCEDURE — 3008F BODY MASS INDEX DOCD: CPT | Mod: ,,, | Performed by: INTERNAL MEDICINE

## 2018-09-04 PROCEDURE — 99213 OFFICE O/P EST LOW 20 MIN: CPT | Mod: ,,, | Performed by: INTERNAL MEDICINE

## 2018-09-04 NOTE — LETTER
September 4, 2018      Demetrio Pleitez Jr., MD  [address]  [place] [address]           [facility] - Hematology Oncology  [address]  [address]  [facility] [address]  Phone: [phone]  Fax: [phone]          Patient: Sheri Broderick   MR Number: [id number]   YOB: 1955   Date of Visit: 9/4/2018       Dear Dr. Demetrio Pleitez Jr.:    Thank you for referring Sheri Broderick to me for evaluation. Attached you will find relevant portions of my assessment and plan of care.    If you have questions, please do not hesitate to call me. I look forward to following Sheri Broderick along with you.    Sincerely,    Segundo Zhang MD    Enclosure  CC:  No Recipients    If you would like to receive this communication electronically, please contact [email or web address] or [phone] to request more information on [employer] Link access.    For providers and/or their staff who would like to refer a patient to [facility], please contact us through our one-stop-shop provider referral line, [facility], at [phone].    If you feel you have received this communication in error or would no longer like to receive these types of communications, please e-mail [email or web address]

## 2018-09-04 NOTE — PROGRESS NOTES
PROGRESS NOTE    Subjective:       Patient ID: Sheri Broderick is a 63 y.o. female.    Chief Complaint:  Follow-up and Results  lung cancer, chemo follow up.     History of Present Illness:   Sheri Broderick is a 63 y.o. female who presents for follow up for chemotherapy.  She completed her 4/4 cycle of cis/alimta on 8/10/2018.  Still complains of fatigue.        Family and Social history reviewed and is unchanged from May 21, 2018      ROS:  Review of Systems   Constitutional: Negative for fever.   Respiratory: Negative for shortness of breath.    Cardiovascular: Negative for chest pain and leg swelling.   Gastrointestinal: Negative for abdominal pain and blood in stool.   Genitourinary: Negative for hematuria.   Skin: Negative for rash.          Current Outpatient Medications:     albuterol 90 mcg/actuation inhaler, Inhale 2 puffs into the lungs every 6 (six) hours as needed for Wheezing. Rescue, Disp: 18 g, Rfl: 5    Ca cmb no.3-Q3-P-5-YR-Q69-aloe 120-1,000-10 mg-unit-mg Tab, Take 1 tablet by mouth., Disp: , Rfl:     CHEWABLE VITAMIN C 500 mg Chew, , Disp: , Rfl:     clonazePAM (KLONOPIN) 1 MG tablet, Take one-half to one tablet two to three times daily only as needed for anxiety, Disp: 180 tablet, Rfl: 0    dexamethasone (DECADRON) 4 MG Tab, , Disp: , Rfl:     dextroamphetamine-amphetamine 10 mg Tab, Take one-half tablet PO daily for one week, then increase to one tablet PO daily, Disp: 30 tablet, Rfl: 0    fluticasone (FLONASE) 50 mcg/actuation nasal spray, 1 spray by Each Nare route once daily., Disp: , Rfl:     folic acid (FOLVITE) 800 MCG Tab, , Disp: , Rfl:     ibuprofen (ADVIL,MOTRIN) 800 MG tablet, Take 1 tablet (800 mg total) by mouth 2 (two) times daily as needed for Pain., Disp: 180 tablet, Rfl: 3    lamoTRIgine (LAMICTAL) 100 MG tablet, Take one tablet PO in the morning and two tablets PO in the evening, Disp: 270 tablet, Rfl:  0    LINZESS 145 mcg Cap capsule, Take 1 capsule by mouth once daily., Disp: , Rfl:     MAALOX ADVANCED 1,000-60 mg Chew, , Disp: , Rfl:     meclizine (ANTIVERT) 25 mg tablet, Take 1 tablet (25 mg total) by mouth every 8 (eight) hours as needed for Dizziness. Twice a day, Disp: 90 tablet, Rfl: 3    mirtazapine (REMERON) 30 MG tablet, Take one-half tablet PO nightly, Disp: 90 tablet, Rfl: 0    ondansetron (ZOFRAN) 8 MG tablet, Take 1 tablet (8 mg total) by mouth every 8 (eight) hours as needed for Nausea., Disp: 90 tablet, Rfl: 3    PEPTO-BISMOL 262 mg Chew, , Disp: , Rfl:     promethazine (PHENERGAN) 25 MG tablet, Take 1 tablet (25 mg total) by mouth every 6 (six) hours as needed for Nausea., Disp: 90 tablet, Rfl: 3    ranitidine (ZANTAC) 150 MG capsule, , Disp: , Rfl:     sertraline (ZOLOFT) 100 MG tablet, Take two tablets PO in the morning and one-half tablet PO in evening, Disp: 225 tablet, Rfl: 0    venlafaxine (EFFEXOR-XR) 75 MG 24 hr capsule, TAKE 1 CAPSULE ONE TIME DAILY, Disp: 90 capsule, Rfl: 1    vitamin E 400 UNIT capsule, Take 400 Units by mouth once daily., Disp: , Rfl:     ZYRTEC 10 mg Cap, continuous prn., Disp: , Rfl:     Current Facility-Administered Medications:     cyanocobalamin injection 1,000 mcg, 1,000 mcg, Subcutaneous, Q30 Days, Segundo Zhang MD, 1,000 mcg at 06/11/18 1346        Objective:       Physical Examination:     /83   Pulse (!) 116   Temp 98 °F (36.7 °C)   Resp 18   Wt 67.4 kg (148 lb 11.2 oz)   BMI 24.74 kg/m²     Physical Exam   Constitutional: She appears well-developed and well-nourished.   HENT:   Head: Normocephalic and atraumatic.   Right Ear: External ear normal.   Left Ear: External ear normal.   Mouth/Throat: Oropharynx is clear and moist.   Eyes: Conjunctivae are normal. Pupils are equal, round, and reactive to light.   Neck: No tracheal deviation present. No thyromegaly present.   Cardiovascular: Normal rate, regular rhythm and normal  heart sounds.   Pulmonary/Chest: Effort normal and breath sounds normal.   Abdominal: Soft. Bowel sounds are normal. She exhibits no distension and no mass. There is no tenderness.   Musculoskeletal: She exhibits no edema.   Neurological:   Neuro intact througout   Skin: No rash noted.   Psychiatric: She has a normal mood and affect. Her behavior is normal. Judgment and thought content normal.       Labs:   Recent Results (from the past 336 hour(s))   CBC auto differential    Collection Time: 08/31/18  2:38 PM   Result Value Ref Range    WBC 8.2 5.0 - 10.0 K/ul    Hemoglobin 10.9 (L) 12.0 - 15.0 g/dl    Hematocrit 34.6 (L) 36.0 - 48.0 %    Platelets 265 140 - 440 K/ul   CBC auto differential    Collection Time: 08/24/18  1:35 PM   Result Value Ref Range    WBC 9.5 5.0 - 10.0 K/ul    Hemoglobin 10.3 (L) 12.0 - 15.0 g/dl    Hematocrit 32.6 (L) 36.0 - 48.0 %    Platelets 197 140 - 440 K/ul     CMP  Sodium   Date Value Ref Range Status   08/31/2018 139 134 - 144 mmol/L      Potassium   Date Value Ref Range Status   08/31/2018 4.5 3.5 - 5.0 mmol/L      Chloride   Date Value Ref Range Status   08/31/2018 100 98 - 110 mmol/L      CO2   Date Value Ref Range Status   08/31/2018 31.6 22.8 - 31.6 mmol/L      Glucose   Date Value Ref Range Status   08/31/2018 97 70 - 99 mg/dL      BUN, Bld   Date Value Ref Range Status   08/31/2018 13 8 - 20 mg/dL      Creatinine   Date Value Ref Range Status   08/31/2018 0.90 0.60 - 1.40 mg/dL    11/29/2012 0.7 0.5 - 1.4 mg/dL Final     Calcium   Date Value Ref Range Status   08/31/2018 9.8 7.7 - 10.4 mg/dL    11/29/2012 9.0 8.7 - 10.5 mg/dL Final     Total Protein   Date Value Ref Range Status   08/31/2018 8.0 6.0 - 8.2 g/dL      Albumin   Date Value Ref Range Status   08/31/2018 4.4 3.1 - 4.7 g/dL      Total Bilirubin   Date Value Ref Range Status   08/31/2018 0.3 0.3 - 1.0 mg/dL      Alkaline Phosphatase   Date Value Ref Range Status   08/31/2018 117 (H) 40 - 104 IU/L      AST   Date Value  Ref Range Status   08/31/2018 23 10 - 40 IU/L      ALT   Date Value Ref Range Status   06/01/2018 10 6 - 29 U/L Final     Anion Gap   Date Value Ref Range Status   03/03/2016 10 8 - 16 mmol/L Final   11/29/2012 10 5 - 15 meq/L Final     eGFR if    Date Value Ref Range Status   06/01/2018 94 > OR = 60 mL/min/1.73m2 Final     eGFR if non    Date Value Ref Range Status   06/01/2018 81 > OR = 60 mL/min/1.73m2 Final     No results found for: CEA  No results found for: PSA        Assessment/Plan:     Problem List Items Addressed This Visit     Malignant neoplasm of lower lobe of right lung-Adenocarcinoma (Chronic)     Patient  Has completed her chemotherapy with cis/alimta x 4.  She is T2N1 disease and does not have positive margins so I don't feel that xrt is needed here.  Will plan scans in a month and will have her back after this.  She would like to have port removed if this scan is good.          Relevant Orders    CT Abdomen Pelvis With Contrast    CT Chest With Contrast    Creatinine, serum    CT Head With Contrast    Creatinine, serum            Discussion:   High complexity due to intense lab monitoring, high risk medications.   Follow-up in about 4 weeks (around 10/2/2018).      Electronically signed by Segundo Lamb

## 2018-09-04 NOTE — ASSESSMENT & PLAN NOTE
Patient  Has completed her chemotherapy with cis/alimta x 4.  She is T2N1 disease and does not have positive margins so I don't feel that xrt is needed here.  Will plan scans in a month and will have her back after this.  She would like to have port removed if this scan is good.

## 2018-09-06 DIAGNOSIS — C34.31 SQUAMOUS CELL CARCINOMA OF BRONCHUS IN RIGHT LOWER LOBE: Primary | ICD-10-CM

## 2018-09-06 DIAGNOSIS — R51.9 NONINTRACTABLE EPISODIC HEADACHE, UNSPECIFIED HEADACHE TYPE: ICD-10-CM

## 2018-09-18 ENCOUNTER — PATIENT OUTREACH (OUTPATIENT)
Dept: ADMINISTRATIVE | Facility: HOSPITAL | Age: 63
End: 2018-09-18

## 2018-10-01 ENCOUNTER — PES CALL (OUTPATIENT)
Dept: ADMINISTRATIVE | Facility: CLINIC | Age: 63
End: 2018-10-01

## 2018-10-03 ENCOUNTER — TELEPHONE (OUTPATIENT)
Dept: PULMONOLOGY | Facility: CLINIC | Age: 63
End: 2018-10-03

## 2018-10-04 LAB — ISTAT CREATININE: 0.8 MG/DL (ref 0.6–1.4)

## 2018-10-08 ENCOUNTER — TELEPHONE (OUTPATIENT)
Dept: HEMATOLOGY/ONCOLOGY | Facility: CLINIC | Age: 63
End: 2018-10-08

## 2018-10-09 ENCOUNTER — OFFICE VISIT (OUTPATIENT)
Dept: HEMATOLOGY/ONCOLOGY | Facility: CLINIC | Age: 63
End: 2018-10-09
Payer: MEDICARE

## 2018-10-09 ENCOUNTER — PATIENT MESSAGE (OUTPATIENT)
Dept: FAMILY MEDICINE | Facility: CLINIC | Age: 63
End: 2018-10-09

## 2018-10-09 VITALS
TEMPERATURE: 98 F | DIASTOLIC BLOOD PRESSURE: 82 MMHG | WEIGHT: 151.31 LBS | HEIGHT: 65 IN | SYSTOLIC BLOOD PRESSURE: 137 MMHG | HEART RATE: 99 BPM | BODY MASS INDEX: 25.21 KG/M2

## 2018-10-09 DIAGNOSIS — C34.31 MALIGNANT NEOPLASM OF LOWER LOBE OF RIGHT LUNG: Chronic | ICD-10-CM

## 2018-10-09 DIAGNOSIS — D64.81 ANEMIA ASSOCIATED WITH CHEMOTHERAPY: ICD-10-CM

## 2018-10-09 DIAGNOSIS — T45.1X5A ANEMIA ASSOCIATED WITH CHEMOTHERAPY: ICD-10-CM

## 2018-10-09 LAB
ALBUMIN SERPL-MCNC: 4.1 G/DL (ref 3.1–4.7)
ALP SERPL-CCNC: 104 IU/L (ref 40–104)
ALT (SGPT): 15 IU/L (ref 3–33)
AST SERPL-CCNC: 23 IU/L (ref 10–40)
BASOPHILS NFR BLD: 0 K/UL (ref 0–0.2)
BASOPHILS NFR BLD: 0.4 %
BILIRUB SERPL-MCNC: 0.6 MG/DL (ref 0.3–1)
BUN SERPL-MCNC: 17 MG/DL (ref 8–20)
CALCIUM SERPL-MCNC: 9.4 MG/DL (ref 7.7–10.4)
CHLORIDE: 105 MMOL/L (ref 98–110)
CO2 SERPL-SCNC: 29.3 MMOL/L (ref 22.8–31.6)
CREATININE: 0.8 MG/DL (ref 0.6–1.4)
EOSINOPHIL NFR BLD: 0.1 K/UL (ref 0–0.7)
EOSINOPHIL NFR BLD: 1.3 %
ERYTHROCYTE [DISTWIDTH] IN BLOOD BY AUTOMATED COUNT: 13.7 % (ref 12.5–14.5)
GLUCOSE: 101 MG/DL (ref 70–99)
GRAN #: 2.7 K/UL (ref 1.4–6.5)
GRAN%: 57.5 %
HCT VFR BLD AUTO: 35.4 % (ref 36–48)
HGB BLD-MCNC: 11.3 G/DL (ref 12–15)
IMMATURE GRANS (ABS): 0 K/UL (ref 0–1)
IMMATURE GRANULOCYTES: 0.2 %
LYMPH #: 1.5 K/UL (ref 1.2–3.4)
LYMPH%: 31 %
MCH RBC QN AUTO: 31.5 PG (ref 25–35)
MCHC RBC AUTO-ENTMCNC: 31.9 G/DL (ref 31–36)
MCV RBC AUTO: 98.6 FL (ref 79–98)
MONO #: 0.5 K/UL (ref 0.1–0.6)
MONO%: 9.6 %
NUCLEATED RBCS: 0 %
NUCLEATED RED BLOOD CELLS: 0 /100 WBC
PERFORMED BY:: ABNORMAL
PLATELET # BLD AUTO: 200 K/UL (ref 140–440)
PMV BLD AUTO: 8.5 FL (ref 8.8–12.7)
POTASSIUM SERPL-SCNC: 4.7 MMOL/L (ref 3.5–5)
PROT SERPL-MCNC: 7.6 G/DL (ref 6–8.2)
RBC # BLD AUTO: 3.59 M/UL (ref 3.5–5.5)
SODIUM: 140 MMOL/L (ref 134–144)
WBC # BLD: 4.7 K/UL (ref 5–10)

## 2018-10-09 PROCEDURE — 3008F BODY MASS INDEX DOCD: CPT | Mod: ,,, | Performed by: INTERNAL MEDICINE

## 2018-10-09 PROCEDURE — 99214 OFFICE O/P EST MOD 30 MIN: CPT | Mod: ,,, | Performed by: INTERNAL MEDICINE

## 2018-10-09 RX ORDER — SODIUM CHLORIDE 0.9 % (FLUSH) 0.9 %
10 SYRINGE (ML) INJECTION
Status: CANCELLED | OUTPATIENT
Start: 2018-10-09

## 2018-10-09 RX ORDER — HEPARIN 100 UNIT/ML
500 SYRINGE INTRAVENOUS
Status: CANCELLED | OUTPATIENT
Start: 2018-10-09

## 2018-10-09 NOTE — LETTER
October 9, 2018      Demetrio Pleitez Jr., MD  [address]  [place] [address]           [facility] - Hematology Oncology  [address]  [address]  [facility] [address]  Phone: [phone]  Fax: [phone]          Patient: Sheri Broderick   MR Number: [id number]   YOB: 1955   Date of Visit: 10/9/2018       Dear Dr. Demetrio Pleitez Jr.:    Thank you for referring Sheri Broderick to me for evaluation. Attached you will find relevant portions of my assessment and plan of care.    If you have questions, please do not hesitate to call me. I look forward to following Sheri Broderick along with you.    Sincerely,    Segundo Zhang MD    Enclosure  CC:  No Recipients    If you would like to receive this communication electronically, please contact [email or web address] or [phone] to request more information on [employer] Link access.    For providers and/or their staff who would like to refer a patient to [facility], please contact us through our one-stop-shop provider referral line, [facility], at [phone].    If you feel you have received this communication in error or would no longer like to receive these types of communications, please e-mail [email or web address]

## 2018-10-09 NOTE — ASSESSMENT & PLAN NOTE
CT scan of the chest/a/p are negative for recurrence.  Discussed this today and will plan on scans every six months for now.  Will have her get labs to evaluate for continued recovery.

## 2018-10-09 NOTE — PROGRESS NOTES
PROGRESS NOTE    Subjective:       Patient ID: Sheri Broderick is a 63 y.o. female.    Chief Complaint:  Follow-up and Results  lung cancer, chemo follow up.     History of Present Illness:   Sheri Broderick is a 63 y.o. female who presents for follow up. She has completed her chemotherapy and had CT scans since last visit.  She is feeling better at this time.        Family and Social history reviewed and is unchanged from May 21, 2018      ROS:  Review of Systems   Constitutional: Negative for fever.   Respiratory: Negative for shortness of breath.    Cardiovascular: Negative for chest pain and leg swelling.   Gastrointestinal: Negative for abdominal pain and blood in stool.   Genitourinary: Negative for hematuria.   Skin: Negative for rash.          Current Outpatient Medications:     albuterol 90 mcg/actuation inhaler, Inhale 2 puffs into the lungs every 6 (six) hours as needed for Wheezing. Rescue, Disp: 18 g, Rfl: 5    Ca cmb no.0-X0-U-2-NV-G28-aloe 120-1,000-10 mg-unit-mg Tab, Take 1 tablet by mouth., Disp: , Rfl:     CHEWABLE VITAMIN C 500 mg Chew, , Disp: , Rfl:     clonazePAM (KLONOPIN) 1 MG tablet, Take one-half to one tablet two to three times daily only as needed for anxiety, Disp: 180 tablet, Rfl: 0    dexamethasone (DECADRON) 4 MG Tab, , Disp: , Rfl:     dextroamphetamine-amphetamine 10 mg Tab, Take one-half tablet PO daily for one week, then increase to one tablet PO daily, Disp: 30 tablet, Rfl: 0    fluticasone (FLONASE) 50 mcg/actuation nasal spray, 1 spray by Each Nare route once daily., Disp: , Rfl:     folic acid (FOLVITE) 800 MCG Tab, , Disp: , Rfl:     ibuprofen (ADVIL,MOTRIN) 800 MG tablet, Take 1 tablet (800 mg total) by mouth 2 (two) times daily as needed for Pain., Disp: 180 tablet, Rfl: 3    lamoTRIgine (LAMICTAL) 100 MG tablet, Take one tablet PO in the morning and two tablets PO in the evening, Disp: 270 tablet,  "Rfl: 0    LINZESS 145 mcg Cap capsule, Take 1 capsule by mouth once daily., Disp: , Rfl:     MAALOX ADVANCED 1,000-60 mg Chew, , Disp: , Rfl:     meclizine (ANTIVERT) 25 mg tablet, Take 1 tablet (25 mg total) by mouth every 8 (eight) hours as needed for Dizziness. Twice a day, Disp: 90 tablet, Rfl: 3    mirtazapine (REMERON) 30 MG tablet, Take one-half tablet PO nightly, Disp: 90 tablet, Rfl: 0    ondansetron (ZOFRAN) 8 MG tablet, Take 1 tablet (8 mg total) by mouth every 8 (eight) hours as needed for Nausea., Disp: 90 tablet, Rfl: 3    PEPTO-BISMOL 262 mg Chew, , Disp: , Rfl:     promethazine (PHENERGAN) 25 MG tablet, Take 1 tablet (25 mg total) by mouth every 6 (six) hours as needed for Nausea., Disp: 90 tablet, Rfl: 3    ranitidine (ZANTAC) 150 MG capsule, , Disp: , Rfl:     sertraline (ZOLOFT) 100 MG tablet, Take two tablets PO in the morning and one-half tablet PO in evening, Disp: 225 tablet, Rfl: 0    venlafaxine (EFFEXOR-XR) 75 MG 24 hr capsule, TAKE 1 CAPSULE ONE TIME DAILY, Disp: 90 capsule, Rfl: 1    vitamin E 400 UNIT capsule, Take 400 Units by mouth once daily., Disp: , Rfl:     ZYRTEC 10 mg Cap, continuous prn., Disp: , Rfl:     Current Facility-Administered Medications:     cyanocobalamin injection 1,000 mcg, 1,000 mcg, Subcutaneous, Q30 Days, Segundo Zhang MD, 1,000 mcg at 06/11/18 1346        Objective:       Physical Examination:     /82   Pulse 99   Temp 98.2 °F (36.8 °C)   Ht 5' 5" (1.651 m)   Wt 68.6 kg (151 lb 4.8 oz)   BMI 25.18 kg/m²     Physical Exam   Constitutional: She appears well-developed and well-nourished.   HENT:   Head: Normocephalic and atraumatic.   Right Ear: External ear normal.   Left Ear: External ear normal.   Mouth/Throat: Oropharynx is clear and moist.   Eyes: Conjunctivae are normal. Pupils are equal, round, and reactive to light.   Neck: No tracheal deviation present. No thyromegaly present.   Cardiovascular: Normal rate, regular rhythm and " normal heart sounds.   Pulmonary/Chest: Effort normal and breath sounds normal.   Abdominal: Soft. Bowel sounds are normal. She exhibits no distension and no mass. There is no tenderness.   Musculoskeletal: She exhibits no edema.   Neurological:   Neuro intact througout   Skin: No rash noted.   Psychiatric: She has a normal mood and affect. Her behavior is normal. Judgment and thought content normal.       Labs:   No results found for this or any previous visit (from the past 336 hour(s)).  CMP  Sodium   Date Value Ref Range Status   08/31/2018 139 134 - 144 mmol/L      Potassium   Date Value Ref Range Status   08/31/2018 4.5 3.5 - 5.0 mmol/L      Chloride   Date Value Ref Range Status   08/31/2018 100 98 - 110 mmol/L      CO2   Date Value Ref Range Status   08/31/2018 31.6 22.8 - 31.6 mmol/L      Glucose   Date Value Ref Range Status   08/31/2018 97 70 - 99 mg/dL      BUN, Bld   Date Value Ref Range Status   08/31/2018 13 8 - 20 mg/dL      Creatinine   Date Value Ref Range Status   08/31/2018 0.90 0.60 - 1.40 mg/dL    11/29/2012 0.7 0.5 - 1.4 mg/dL Final     Calcium   Date Value Ref Range Status   08/31/2018 9.8 7.7 - 10.4 mg/dL    11/29/2012 9.0 8.7 - 10.5 mg/dL Final     Total Protein   Date Value Ref Range Status   08/31/2018 8.0 6.0 - 8.2 g/dL      Albumin   Date Value Ref Range Status   08/31/2018 4.4 3.1 - 4.7 g/dL      Total Bilirubin   Date Value Ref Range Status   08/31/2018 0.3 0.3 - 1.0 mg/dL      Alkaline Phosphatase   Date Value Ref Range Status   08/31/2018 117 (H) 40 - 104 IU/L      AST   Date Value Ref Range Status   08/31/2018 23 10 - 40 IU/L      ALT   Date Value Ref Range Status   06/01/2018 10 6 - 29 U/L Final     Anion Gap   Date Value Ref Range Status   03/03/2016 10 8 - 16 mmol/L Final   11/29/2012 10 5 - 15 meq/L Final     eGFR if    Date Value Ref Range Status   06/01/2018 94 > OR = 60 mL/min/1.73m2 Final     eGFR if non    Date Value Ref Range Status    06/01/2018 81 > OR = 60 mL/min/1.73m2 Final     No results found for: CEA  No results found for: PSA        Assessment/Plan:     Problem List Items Addressed This Visit     Malignant neoplasm of lower lobe of right lung-Adenocarcinoma (Chronic)     CT scan of the chest/a/p are negative for recurrence.  Discussed this today and will plan on scans every six months for now.  Will have her get labs to evaluate for continued recovery.           Relevant Orders    CT Abdomen Pelvis With Contrast    CT Chest With Contrast    CBC auto differential    Comprehensive metabolic panel    Anemia associated with chemotherapy     Will recheck labs currently as patient had not fully recovered as of last labs in August.  Doing well physically.                  Discussion:   High complexity due to intense lab monitoring, high risk medications.   Follow-up in about 6 months (around 4/9/2019).      Electronically signed by Segundo Lamb

## 2018-10-09 NOTE — ASSESSMENT & PLAN NOTE
Will recheck labs currently as patient had not fully recovered as of last labs in August.  Doing well physically.

## 2018-10-12 ENCOUNTER — PES CALL (OUTPATIENT)
Dept: ADMINISTRATIVE | Facility: CLINIC | Age: 63
End: 2018-10-12

## 2018-10-15 ENCOUNTER — OFFICE VISIT (OUTPATIENT)
Dept: PSYCHIATRY | Facility: CLINIC | Age: 63
End: 2018-10-15
Payer: MEDICARE

## 2018-10-15 VITALS
WEIGHT: 150.44 LBS | HEIGHT: 65 IN | BODY MASS INDEX: 25.07 KG/M2 | DIASTOLIC BLOOD PRESSURE: 76 MMHG | HEART RATE: 90 BPM | SYSTOLIC BLOOD PRESSURE: 127 MMHG

## 2018-10-15 DIAGNOSIS — F33.1 MODERATE RECURRENT MAJOR DEPRESSION: ICD-10-CM

## 2018-10-15 DIAGNOSIS — F41.0 PANIC DISORDER WITHOUT AGORAPHOBIA: ICD-10-CM

## 2018-10-15 DIAGNOSIS — F41.1 GENERALIZED ANXIETY DISORDER: ICD-10-CM

## 2018-10-15 PROCEDURE — 3008F BODY MASS INDEX DOCD: CPT | Mod: CPTII,,, | Performed by: PSYCHIATRY & NEUROLOGY

## 2018-10-15 PROCEDURE — 90833 PSYTX W PT W E/M 30 MIN: CPT | Mod: PBBFAC,PN | Performed by: PSYCHIATRY & NEUROLOGY

## 2018-10-15 PROCEDURE — 99213 OFFICE O/P EST LOW 20 MIN: CPT | Mod: S$PBB,,, | Performed by: PSYCHIATRY & NEUROLOGY

## 2018-10-15 PROCEDURE — 99499 UNLISTED E&M SERVICE: CPT | Mod: HCNC,S$GLB,, | Performed by: PSYCHIATRY & NEUROLOGY

## 2018-10-15 PROCEDURE — 90833 PSYTX W PT W E/M 30 MIN: CPT | Mod: S$PBB,,, | Performed by: PSYCHIATRY & NEUROLOGY

## 2018-10-15 PROCEDURE — 99213 OFFICE O/P EST LOW 20 MIN: CPT | Mod: PBBFAC,PN | Performed by: PSYCHIATRY & NEUROLOGY

## 2018-10-15 PROCEDURE — 99999 PR PBB SHADOW E&M-EST. PATIENT-LVL III: CPT | Mod: PBBFAC,,, | Performed by: PSYCHIATRY & NEUROLOGY

## 2018-10-15 RX ORDER — ESOMEPRAZOLE MAGNESIUM 20 MG/1
20 TABLET, DELAYED RELEASE ORAL DAILY
COMMUNITY
End: 2021-04-12

## 2018-10-15 RX ORDER — MULTIVIT WITH MINERALS/HERBS
1 TABLET ORAL DAILY
COMMUNITY
End: 2019-02-07

## 2018-10-15 RX ORDER — DEXTROAMPHETAMINE SACCHARATE, AMPHETAMINE ASPARTATE, DEXTROAMPHETAMINE SULFATE AND AMPHETAMINE SULFATE 2.5; 2.5; 2.5; 2.5 MG/1; MG/1; MG/1; MG/1
TABLET ORAL
Qty: 30 TABLET | Refills: 0 | Status: SHIPPED | OUTPATIENT
Start: 2018-10-15 | End: 2018-11-09 | Stop reason: SDUPTHER

## 2018-10-15 RX ORDER — VENLAFAXINE HYDROCHLORIDE 75 MG/1
75 CAPSULE, EXTENDED RELEASE ORAL DAILY
Qty: 90 CAPSULE | Refills: 1 | Status: SHIPPED | OUTPATIENT
Start: 2018-10-15 | End: 2019-03-14 | Stop reason: SDUPTHER

## 2018-10-15 RX ORDER — MIRTAZAPINE 30 MG/1
TABLET, FILM COATED ORAL
Qty: 90 TABLET | Refills: 0 | Status: SHIPPED | OUTPATIENT
Start: 2018-10-15 | End: 2019-03-14 | Stop reason: SDUPTHER

## 2018-10-15 RX ORDER — ACETAMINOPHEN AND PHENYLEPHRINE HCL 325; 5 MG/1; MG/1
TABLET ORAL
COMMUNITY
End: 2019-08-23

## 2018-10-15 RX ORDER — SERTRALINE HYDROCHLORIDE 100 MG/1
TABLET, FILM COATED ORAL
Qty: 225 TABLET | Refills: 0 | Status: SHIPPED | OUTPATIENT
Start: 2018-10-15 | End: 2019-03-14 | Stop reason: SDUPTHER

## 2018-10-15 RX ORDER — LAMOTRIGINE 100 MG/1
TABLET ORAL
Qty: 270 TABLET | Refills: 0 | Status: SHIPPED | OUTPATIENT
Start: 2018-10-15 | End: 2019-03-14 | Stop reason: SDUPTHER

## 2018-10-15 RX ORDER — HYDROCODONE POLISTIREX AND CHLORPHENIRAMINE POLISTIREX 10; 8 MG/5ML; MG/5ML
5 SUSPENSION, EXTENDED RELEASE ORAL EVERY 12 HOURS PRN
Status: ON HOLD | COMMUNITY
End: 2019-08-05 | Stop reason: ALTCHOICE

## 2018-10-15 NOTE — PATIENT INSTRUCTIONS
Ok to increase Sertraline back to two tabs in am and 1/2 tab at night     Ok to resume Adderall 1/2 tab daily for about one week, then increase to one tablet daily     Will check memory screen last visit     Continue other medications

## 2018-10-15 NOTE — PROGRESS NOTES
Outpatient Psychiatry Follow-Up Visit (MD/NP)    10/15/2018    Clinical Status of Patient:  Outpatient (Ambulatory)    Chief Complaint:  Sheri Broderick is a 63 y.o. female who presents today for follow-up of anxiety, depression.   Met with patient.      Interval History and Content of Current Session:      Interim Events/Subjective Report/Content of Current Session:      62 yo  female presents for follow up appointment for treatment of major depression, generalized anxiety disorder, panic disorder.     Past Psychiatric Hx:   No prior psychiatric hospitalizations   Suicide risk assessment:   Risks: , age, chronic depression with passive SI  Protective: no prior suicide attempts, strong skip, female, no substance abuse or impulsivity, health issues present both not disabling at this time  She is at low to moderate risk of suicide acutely, and moderate risk over long term     Prior meds: Abilify (no benefit), Buspirone (intolerance) Trazodone (intolerance), Paroxetine (intolerance), Quetiapine, Bupropion (intolerance), Fluoxetine (no benefit), Duloxetine Foltx (no longer covered)       Past Medical History   Allergy [T78.40XA]   Anxiety [F41.9]   Arthritis [M19.90]   Back pain [M54.9]   Dizziness [R42]   Fibromyalgia [M79.7]   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 [S62.101K]   Fracture of right foot [S92.901A]   Hep B w/o coma [B19.10]   Hyperlipidemia [E78.5]   Major depressive disorder, recurrent episode, in partial remission [F33.41]   Myalgia and myositis, unspecified [UGW4930]   OCD (obsessive compulsive disorder) [F42.9]   Osteoporosis   Polyneuropathy [G62.9]   Trouble in sleeping [G47.9]   Urinary incontinence [R32]       Past Surgical History Laterality Date Comments   TUBAL LIGATION[SHX77]      EYE SURGERY[HVA525]   RK   FRACTURE SURGERY[GJU550] Right  wrist orif   CARPAL TUNNEL RELEASE[JZR547] Bilateral 07/18/2014      Interim Hx:   Pt has not been taking Sertraline  200 mg in am and 50 mg in pm. Has restarted Adderall typically 1-2 times/day.  Not taking Clonazepam daily, more infrequent lately.   She has been taking Mirtazapine, Effexor, and Lamictal daily.     Chart reviewed.  She has completed chemotherapy and repeat scans are negative.  Recently seen by Hem-Onc.   Went to [place] x 8 days flight to visit with Jeri (high school friend) and her  who have been very supportive to her.     She reports that she feels like a blimp - gained about 6 lbs.  Body mass index is 25.04 kg/m².  Pt is no longer on steroids.    Takes her days to do things, some days, she feels hopeless.   Cats are working on her nerves (oldest 17 yo has hyperthyroidism, the other is 13 yo, has a mucus plug)     Out of Adderall now - missed last appt here due to travels.   Trying to stay active, went to air show this past weekend.   Has had $6000+ out of pocket expenses from cancer treatments. Trying to work on this monthly. Getting phone calls from [facility] , has made arrangements directly with hospital. Asked to not receive these collections calls any longer.      Pt is having daily headaches. Still coughs nightly. + pain right shoulder.   Fatigue is lifting.  Sleep varies. Discusses lack of support she received from [employer] and [employer].   did come to hospital, but has not checked on her since.  She has not returned to services there recently.   Denies suicidal/homicidal ideations.  Denies symptoms of brad/psychosis.     She is doing reasonably well psychiatrically considering what she has been through this year.  Never once felt overwhelmed by treatments, but now is by financial concerns/bills. No recent panic attacks.  Does tend to worry excessively.   She is very forgetful - had this prior to treatment, seems worse since her chemotherapy.  Having ST memory issues. Not really LT memory problems. Denies problems driving. No fam hx of dementia, however her father  relatively young  at 58 yo.  Her memory does worry her.    Denies alcohol/drug use. No tobacco. No recent caffeine use.   No issues driving.      Pt is under the care of Dr Jennings whom she sees fairly regularly - provider cancels appts more than patient does.    Pt previously brought in copy of ECHO done 11/7/16 - normal LV systolic function. EF 55-60%.  Mitral valve leaflets are mildly thickened. Mild mitral annular calcification.  Moderate MVP.  Trace MV regurgitation, trace pulmonic regurgitation.  Seen by Dr Arroyo Nov 2 2016 - Cardiology, ECHO, EKG - all negative     Psychotherapy:   · Target symptoms: depression, anxiety  · Why chosen therapy is appropriate versus another modality: relevant to diagnosis, patient responds to this modality  · Outcome monitoring methods: self-report, observation  · Therapeutic intervention type: supportive psychotherapy  · Topics discussed/themes: building skills sets for symptom management, symptom recognition, stressors at [employer].with  and some members of Shinto.   · The patient's response to the intervention is accepting. The patient's progress toward treatment goals is fair progress.  · Duration of intervention: 25 minutes    Review of Systems   · PSYCHIATRIC: Pertinant items are noted in the narrative.  · CONSTITUTIONAL:  Wt gain   · CARDIOVASCULAR: no current chest pain, no palpitations   · NEURO: daily HA  · RESP: + cough     Past Medical, Family and Social History: The patient's past medical, family and social history have been reviewed and updated as appropriate within the electronic medical record - see encounter notes.    Psychiatric Medications:   Effexor XR 75 mg daily  Sertraline 200 mg in am and 50 mg in PM (not taking 50 mg in PM)  Klonopin 1 mg bid - tid prn anxiety - not taking recently   Remeron 15 mg QHS   Lamictal 100 mg QAM, 200 mg QHS   Adderall 15 mg once to twice daily    Compliance: yes     Side effects: dry mouth (uses biotene)    Risk  "Parameters:  Patient reports no suicidal ideations today   Patient reports no homicidal ideation  Patient reports no self-injurious behavior  Patient reports no violent behavior      Exam (detailed: at least 9 elements; comprehensive: all 15 elements)   Constitutional  Vitals:  Most recent vital signs, dated less than 90 days prior to this appointment, were reviewed.    Se Epic for today's vitals.       General:  age appropriate, casual attire, adequate grooming, good eye contact, appears calm, not tearful, has not lost her hair, pt looks remarkably good s/p cancer tx        Musculoskeletal  Muscle Strength/Tone:  No tremor noted today,  No PMA    Gait & Station:  non-ataxic     Psychiatric  Speech:  no latency; no press , spontaneous, talkative, conversational, soft spoken    Mood & Affect:  "ok"  Full, smiling today   Thought Process:  Linear   Associations:  intact   Thought Content:  no SI, no homicidality, delusions, or paranoia, hallucinations: (auditory: no currently, visual: no) - none currently       Insight:  Fair    Judgement: Fair    Orientation:  grossly intact. Alert and oriented x 4    Memory: intact for content of interview   Language: grossly intact   Attention Span & Concentration:  able to focus   Fund of Knowledge:  intact and appropriate to age and level of education     Assessment and Diagnosis   Status/Progress: Based on the examination today, the patient's problem(s) is/are inadequately controlled, but improved today, have been treatment resistant in past.   New problems have been not presented today.   Comorbidities are complicating management of the primary condition.    The working differential for this patient includes Cluster B and C traits, personality disorder, PTSD, MDD, OCD    General Impression :  Pt improved with addition of SNRI to SSRI; she declines atypical antipsychotic augmentation. Did not tolerate recent medication adjustments. Stabilizing with return to prior regimen.  " Adderall does help pt function better, she does not feel it worsens anxiety.  Pt with significant negative cognitive distortions evident throughout interview.  Pt dx with Stage II Adenocarcinoma of the lung and is now s/p treatment.  Support system is limited, pt appears less depressed today, becoming more active (travel, weekend activities). Resuming Adderall has helped.     Major Depressive Disorder, Recurrent, mild to moderate   Panic Disorder without Agoraphobia.   Generalized Anxiety Disorder.   R/O Cognitive Disorder   R/O PTSD  Hx OCD  Personality Disorder, unspecified, Cluster B and C traits     Hepaitis B, tremors,  osteoporosis, myalgias, arthralgia, poor balance, back pain, bulging disc, fibromyalgia, osteoarthritis, neuropathy, LE fracture, Adenocarcinoma of Lung    GAF: 60  Intervention/Counseling/Treatment Plan   · Medication Management: The risks and benefits of medication were discussed with the patient.  · Counseling provided with patient as follows: importance of compliance with chosen treatment options was emphasized, risks and benefits of treatment options, including medications, were discussed with the patient     1. Continue Effexor XR 75 mg daily  2. Continue Remeron 15 mg QHS   3. Continue Klonopin 1 mg BID - TID prn.  Discussed risk of decreased RT, sedation, addictive potential, and not to mix with alcohol. No Rx today.  Advised to avoid this medication when she is taking pain meds (risks of resp depression discussed) and she verbalized an understanding   4. Continue Lamictal 100 mg QAM and  200 mg daily.  Discussed risks of life threatening SJS, need for compliance.   5. Continue Psychotherapy with Kaye Jennings   6. Ok to continue Adderall 10 mg once to BID - corrected from AVS - to be taken daily (not nightly)   7.  Pt instructed to go to ED/911 with thoughts of wanting harm self/others  8. Call to report any worsening of symptoms or problems with the medication  9. Continue   Sertraline to 200 mg and and encouraged to try to resume 50 mg in pm.   Target depression, anxiety. Discussed risk of serotonin syndrome, GI upset, headaches and that this dose is > FDA max rec dosing.   10. MOCA and memory loss work up - defer for now (not related to metastatic disease - pt has had cancer staging) - will proceed with MOCA next visit.     Return to Clinic: 4-6 weeks

## 2018-10-17 ENCOUNTER — OFFICE VISIT (OUTPATIENT)
Dept: PULMONOLOGY | Facility: CLINIC | Age: 63
End: 2018-10-17
Payer: MEDICARE

## 2018-10-17 VITALS
OXYGEN SATURATION: 97 % | HEART RATE: 92 BPM | DIASTOLIC BLOOD PRESSURE: 70 MMHG | HEIGHT: 65 IN | SYSTOLIC BLOOD PRESSURE: 114 MMHG | WEIGHT: 150 LBS | BODY MASS INDEX: 24.99 KG/M2

## 2018-10-17 DIAGNOSIS — C34.31 MALIGNANT NEOPLASM OF LOWER LOBE OF RIGHT LUNG: ICD-10-CM

## 2018-10-17 DIAGNOSIS — R05.9 COUGH: Primary | ICD-10-CM

## 2018-10-17 PROCEDURE — 3008F BODY MASS INDEX DOCD: CPT | Mod: ,,, | Performed by: INTERNAL MEDICINE

## 2018-10-17 PROCEDURE — 99214 OFFICE O/P EST MOD 30 MIN: CPT | Mod: ,,, | Performed by: INTERNAL MEDICINE

## 2018-10-17 NOTE — PROGRESS NOTES
Office Visit    Patient Name: Sheri Broderick  MRN: [id number]  : 1955      Reason for visit: Lung cancer    HPI:     2018 - Pt with h/o adenocarcinoma of lung (RLL, s/p resection 2018), had + lymph nodes and has had chemotherapy (per Dr Zhang - last 2018), next visit in September.  Has a h/o childhood asthma, no known h/o COPD - was never a smoker but did have some second hand exposure.  Has had chroinic cough which has been worse since her surgery - has associated postnasal drip/allergies, + reflux.  Cough is mostly nonproductive.  Has also noted some increased trouble with SOB and WISDOM.  Has a ventolin inhaler but hasn't really used it.    10/17/2018 - Here for follow up, doing better with cough overall but not resolved.  Post nasal drip still present - using flonase.  Reflux is better.  Has noted some wheezing and chest tightness (? If there is a component of asthma here as well).  Saw Dr Zhang and had CT scan (see below) - looks good.    Past Medical History    Past Medical History:   Diagnosis Date    Allergy     seasonal    Anemia associated with chemotherapy 2018    Anxiety     Arthritis     Back pain     Chemotherapy-induced neutropenia 2018    Dizziness     Fibromyalgia     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     Fracture of right foot     Hep B w/o coma     Hyperlipidemia     Major depressive disorder, recurrent episode, in partial remission     Malignant neoplasm of lower lobe of right lung-Adenocarcinoma 2018    Myalgia and myositis, unspecified     OCD (obsessive compulsive disorder)     Osteoporosis     Polyneuropathy     Trouble in sleeping     Urinary incontinence        Past Surgical History    Past Surgical History:   Procedure Laterality Date    CARPAL TUNNEL RELEASE Bilateral 2014    EYE SURGERY      RK    FRACTURE SURGERY Right     wrist orif    LUNG REMOVAL, PARTIAL  2018    Dr. Brant ALLISON,  WRIST Right 12/3/2012    Performed by Cy Florian Jr., MD at [facility] OR    RELEASE-CARPAL TUNNEL  Right Right 7/18/2014    Per[facility] by [facility] at [facility] OR    TUBAL LIGATION         Medications      Current Outpatient Medications:     albuterol 90 mcg/actuation inhaler, Inhale 2 puffs into the lungs every 6 (six) hours as needed for Wheezing. Rescue, Disp: 18 g, Rfl: 5    b complex vitamins tablet, Take 1 tablet by mouth once daily., Disp: , Rfl:     biotin 10,000 mcg Cap, , Disp: , Rfl:     Ca cmb no.4-S8-I-8-GC-S40-aloe 120-1,000-10 mg-unit-mg Tab, Take 1 tablet by mouth., Disp: , Rfl:     CHEWABLE VITAMIN C 500 mg Chew, , Disp: , Rfl:     clonazePAM (KLONOPIN) 1 MG tablet, Take one-half to one tablet two to three times daily only as needed for anxiety, Disp: 180 tablet, Rfl: 0    dexamethasone (DECADRON) 4 MG Tab, , Disp: , Rfl:     dextroamphetamine-amphetamine 10 mg Tab, Take one-half tablet PO daily for one week, then increase to one tablet PO daily, Disp: 30 tablet, Rfl: 0    esomeprazole magnesium (NEXIUM 24HR) 20 mg TbEC, , Disp: , Rfl:     fluticasone (FLONASE) 50 mcg/actuation nasal spray, 1 spray by Each Nare route once daily., Disp: , Rfl:     folic acid (FOLVITE) 800 MCG Tab, , Disp: , Rfl:     hydrocodone-chlorpheniramine (TUSSIONEX) 10-8 mg/5 mL suspension, Take 5 mLs by mouth every 12 (twelve) hours as needed for Cough., Disp: , Rfl:     ibuprofen (ADVIL,MOTRIN) 800 MG tablet, Take 1 tablet (800 mg total) by mouth 2 (two) times daily as needed for Pain., Disp: 180 tablet, Rfl: 3    lamoTRIgine (LAMICTAL) 100 MG tablet, Take one tablet PO in the morning and two tablets PO in the evening, Disp: 270 tablet, Rfl: 0    LINZESS 145 mcg Cap capsule, Take 1 capsule by mouth once daily., Disp: , Rfl:     meclizine (ANTIVERT) 25 mg tablet, Take 1 tablet (25 mg total) by mouth every 8 (eight) hours as needed for Dizziness. Twice a day, Disp: 90 tablet, Rfl: 3     mirtazapine (REMERON) 30 MG tablet, Take one-half tablet PO nightly, Disp: 90 tablet, Rfl: 0    ondansetron (ZOFRAN) 8 MG tablet, Take 1 tablet (8 mg total) by mouth every 8 (eight) hours as needed for Nausea., Disp: 90 tablet, Rfl: 3    sertraline (ZOLOFT) 100 MG tablet, Take two tablets PO in the morning and one-half tablet PO in evening, Disp: 225 tablet, Rfl: 0    venlafaxine (EFFEXOR-XR) 75 MG 24 hr capsule, Take 1 capsule (75 mg total) by mouth once daily., Disp: 90 capsule, Rfl: 1    vitamin E 400 UNIT capsule, Take 400 Units by mouth once daily., Disp: , Rfl:     ZYRTEC 10 mg Cap, continuous prn., Disp: , Rfl:     Allergies    Review of patient's allergies indicates:   Allergen Reactions    Penicillins      Other reaction(s): Rash    Trazodone Anxiety     Severe anxiety     Prolia [denosumab] Other (See Comments)     myalgias       SocHx    Social History     Tobacco Use   Smoking Status Never Smoker   Smokeless Tobacco Never Used       Social History     Substance and Sexual Activity   Alcohol Use No       Drug Use - no  Occupation - retired,   Asbestos exposure - no  Pets - 2 cats     FMHx    Family History   Problem Relation Age of Onset    Heart disease Mother          to to coma (hypothermia)     Heart disease Father 57        heart attack    Heart disease Sister         stents    Diabetes Sister     Parkinsonism Sister     Heart disease Brother 45        death at 45 years old due to hearth disease     Diabetes Maternal Grandmother          Review of Systems  Review of Systems   Constitutional: Positive for malaise/fatigue. Negative for chills, diaphoresis, fever and weight loss.   HENT: Negative for congestion.    Eyes: Negative for pain.   Respiratory: Positive for cough and shortness of breath. Negative for hemoptysis, sputum production, wheezing and stridor.    Cardiovascular: Negative for chest pain, palpitations, orthopnea, claudication, leg swelling and  "PND.   Gastrointestinal: Positive for heartburn. Negative for abdominal pain, constipation, diarrhea, nausea and vomiting.        Reflux   Genitourinary: Negative for dysuria, frequency and urgency.   Musculoskeletal: Negative for falls and myalgias.   Neurological: Negative for sensory change, focal weakness and weakness.   Psychiatric/Behavioral: Negative for depression. The patient is not nervous/anxious.        Physical Exam    Vitals:    10/17/18 1056   BP: 114/70   Pulse: 92   SpO2: 97%   Weight: 68 kg (150 lb)   Height: 5' 5" (1.651 m)       Physical Exam   Constitutional: She is oriented to person, place, and time. She appears well-developed and well-nourished. No distress.   HENT:   Head: Normocephalic and atraumatic.   Nose: Nose normal.   Mouth/Throat: Oropharynx is clear and moist.   O/P - some post nasal drip   Eyes: Conjunctivae and EOM are normal. Pupils are equal, round, and reactive to light.   Neck: Normal range of motion. Neck supple. No JVD present. No tracheal deviation present. No thyromegaly present.   Cardiovascular: Normal rate, regular rhythm, normal heart sounds and intact distal pulses. Exam reveals no gallop and no friction rub.   No murmur heard.  Pulmonary/Chest: Effort normal and breath sounds normal. No stridor. No respiratory distress. She has no wheezes. She has no rales. She exhibits no tenderness.   Abdominal: Soft. Bowel sounds are normal. She exhibits no distension. There is no tenderness.   Musculoskeletal: Normal range of motion. She exhibits no edema or tenderness.   Lymphadenopathy:     She has no cervical adenopathy.   Neurological: She is alert and oriented to person, place, and time. No cranial nerve deficit.   Skin: Skin is warm and dry. She is not diaphoretic.   Psychiatric: She has a normal mood and affect. Her behavior is normal.   Nursing note and vitals reviewed.      Labs    Lab Results   Component Value Date    WBC 4.7 (L) 10/09/2018    HGB 11.3 (L) 10/09/2018    " HCT 35.4 (L) 10/09/2018     10/09/2018       Sodium   Date Value Ref Range Status   10/09/2018 140 134 - 144 mmol/L      Potassium   Date Value Ref Range Status   10/09/2018 4.7 3.5 - 5.0 mmol/L      Chloride   Date Value Ref Range Status   10/09/2018 105 98 - 110 mmol/L      CO2   Date Value Ref Range Status   10/09/2018 29.3 22.8 - 31.6 mmol/L      Glucose   Date Value Ref Range Status   10/09/2018 101 (H) 70 - 99 mg/dL      BUN, Bld   Date Value Ref Range Status   10/09/2018 17 8 - 20 mg/dL      Creatinine   Date Value Ref Range Status   10/09/2018 0.80 0.60 - 1.40 mg/dL    11/29/2012 0.7 0.5 - 1.4 mg/dL Final     Calcium   Date Value Ref Range Status   10/09/2018 9.4 7.7 - 10.4 mg/dL    11/29/2012 9.0 8.7 - 10.5 mg/dL Final     Total Protein   Date Value Ref Range Status   10/09/2018 7.6 6.0 - 8.2 g/dL      Albumin   Date Value Ref Range Status   10/09/2018 4.1 3.1 - 4.7 g/dL      Total Bilirubin   Date Value Ref Range Status   10/09/2018 0.6 0.3 - 1.0 mg/dL      Alkaline Phosphatase   Date Value Ref Range Status   10/09/2018 104 40 - 104 IU/L      AST   Date Value Ref Range Status   10/09/2018 23 10 - 40 IU/L      ALT   Date Value Ref Range Status   06/01/2018 10 6 - 29 U/L Final     Anion Gap   Date Value Ref Range Status   03/03/2016 10 8 - 16 mmol/L Final   11/29/2012 10 5 - 15 meq/L Final       Xrays    FINDINGS:.  CT Chest:  Lines and tubes: There is a left-sided subclavian medical infusion port with  tip at the level of the SVC.  Visualized neck: Within normal limits.  Lungs: The patient is status post right-sided pneumonectomy with linear  bandlike areas of atelectasis versus scarring in the right lung. No discrete  pulmonary nodule or mass is identified. There is mild volume loss and  cardiomediastinal shift to the right.  Airway: The trachea and central bronchial tree appear normal.  Pleura: There is a trace right-sided pleural effusion There is no pneumothorax.  Cardiovascular: The heart is  normal in size. There is no pericardial effusion.  The thoracic aorta and great vessels are normal in course and caliber.  Mediastinum: There is no supraclavicular, axillary, mediastinal, or hilar  lymphadenopathy by size criteria, the largest lymph node appears to be a 10 x 5  mm right hilar lymph node..  Soft tissues: The peripheral soft tissues appear normal.  Musculoskeletal: There are moderate degenerative changes of the thoracic spine.  There are no suspicious osseous lesions.  Esophagus: The esophagus is grossly normal.  There is a moderate hiatal hernia.    CT Abdomen:  Liver: The liver is normal in size and imaging appearance.  Gallbladder: The gallbladder is within normal limits.  Biliary Tree: No intra or extrahepatic ductal dilation.  Spleen: Within normal limits.  Pancreas: The pancreas is normal.  Adrenal Glands: The adrenal glands appear within normal limits.  Kidneys: The kidneys are normal in imaging appearance without hydronephrosis or  hydroureter.  Vasculature: The aorta is normal in course and caliber.  Lymph nodes: No abdominal lymphadenopathy is seen.  Intraperitoneal structures: There is no ascites.  Bowel: The partially filled bowel is within normal limits with a nonobstructive  bowel gas pattern.  Abdominal wall: The abdominal wall and musculature are normal.  Musculoskeletal: There is degenerative disc disease and facet arthropathy in  the lumbar spine with no aggressive appearing lytic or blastic lesions.    CT Pelvis:  Bladder: The urinary bladder is within normal limits.  Reproductive Organs: The uterus and ovaries are unremarkable.  Pelvic Lymph nodes: No pelvic lymphadenopathy or pelvic mass is identified.    IMPRESSION:  Status post right-sided pneumonectomy (right lower lobectomy) with linear  bandlike areas of likely atelectasis versus scarring but no finding to  specifically suggest residual/recurrent or metastatic disease. Additional and  incidental findings are as outlined  above.                    .    Read and electronically signed by: Raz Rebollar    Impression/Plan    Problem List Items Addressed This Visit        Pulmonary    Cough  - likely post nasal drip and reflux  - will treat with flonase, zyrtec and nexium  - better  - ? If there is a cough variant asthma issue here - will give a trial of BREO (instructed pt and gave sample)  - call in about 3 weeks and we will decide if we will continue BREO  - RTC 3 months  - may need to consider PFT    Relevant Orders    X-Ray Chest PA And Lateral       Oncology    Malignant neoplasm of lower lobe of right lung-Adenocarcinoma  - s/p surgery 4/2018  - last scan looks good  - Dr Zhang's note read                Segundo Colon MD

## 2018-10-21 PROBLEM — F41.0 PANIC DISORDER WITHOUT AGORAPHOBIA: Status: ACTIVE | Noted: 2018-10-21

## 2018-11-07 ENCOUNTER — PATIENT MESSAGE (OUTPATIENT)
Dept: PSYCHIATRY | Facility: CLINIC | Age: 63
End: 2018-11-07

## 2018-11-09 ENCOUNTER — PATIENT MESSAGE (OUTPATIENT)
Dept: PSYCHIATRY | Facility: CLINIC | Age: 63
End: 2018-11-09

## 2018-11-09 RX ORDER — DEXTROAMPHETAMINE SACCHARATE, AMPHETAMINE ASPARTATE, DEXTROAMPHETAMINE SULFATE AND AMPHETAMINE SULFATE 2.5; 2.5; 2.5; 2.5 MG/1; MG/1; MG/1; MG/1
TABLET ORAL
Qty: 45 TABLET | Refills: 0 | Status: SHIPPED | OUTPATIENT
Start: 2018-11-09 | End: 2018-12-26 | Stop reason: SDUPTHER

## 2018-11-12 ENCOUNTER — PATIENT MESSAGE (OUTPATIENT)
Dept: PSYCHIATRY | Facility: CLINIC | Age: 63
End: 2018-11-12

## 2018-12-11 ENCOUNTER — OFFICE VISIT (OUTPATIENT)
Dept: PSYCHIATRY | Facility: CLINIC | Age: 63
End: 2018-12-11
Payer: MEDICARE

## 2018-12-11 ENCOUNTER — PATIENT MESSAGE (OUTPATIENT)
Dept: PSYCHIATRY | Facility: CLINIC | Age: 63
End: 2018-12-11

## 2018-12-11 VITALS
DIASTOLIC BLOOD PRESSURE: 75 MMHG | SYSTOLIC BLOOD PRESSURE: 130 MMHG | BODY MASS INDEX: 24.63 KG/M2 | WEIGHT: 148.06 LBS | HEART RATE: 105 BPM

## 2018-12-11 DIAGNOSIS — F33.1 MDD (MAJOR DEPRESSIVE DISORDER), RECURRENT EPISODE, MODERATE: ICD-10-CM

## 2018-12-11 DIAGNOSIS — F41.1 GENERALIZED ANXIETY DISORDER: ICD-10-CM

## 2018-12-11 DIAGNOSIS — Z79.899 HIGH RISK MEDICATION USE: ICD-10-CM

## 2018-12-11 DIAGNOSIS — F42.9 OBSESSIVE-COMPULSIVE DISORDER, UNSPECIFIED TYPE: ICD-10-CM

## 2018-12-11 PROCEDURE — 99214 OFFICE O/P EST MOD 30 MIN: CPT | Mod: S$GLB,,, | Performed by: PSYCHIATRY & NEUROLOGY

## 2018-12-11 PROCEDURE — 99499 UNLISTED E&M SERVICE: CPT | Mod: HCNC,S$GLB,, | Performed by: PSYCHIATRY & NEUROLOGY

## 2018-12-11 PROCEDURE — 3008F BODY MASS INDEX DOCD: CPT | Mod: CPTII,S$GLB,, | Performed by: PSYCHIATRY & NEUROLOGY

## 2018-12-11 PROCEDURE — 99999 PR PBB SHADOW E&M-EST. PATIENT-LVL III: CPT | Mod: PBBFAC,HCWC,, | Performed by: PSYCHIATRY & NEUROLOGY
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
## 2018-12-26 ENCOUNTER — PATIENT MESSAGE (OUTPATIENT)
Dept: PSYCHIATRY | Facility: CLINIC | Age: 63
End: 2018-12-26

## 2018-12-26 RX ORDER — DEXTROAMPHETAMINE SACCHARATE, AMPHETAMINE ASPARTATE, DEXTROAMPHETAMINE SULFATE AND AMPHETAMINE SULFATE 2.5; 2.5; 2.5; 2.5 MG/1; MG/1; MG/1; MG/1
TABLET ORAL
Qty: 45 TABLET | Refills: 0 | Status: SHIPPED | OUTPATIENT
Start: 2018-12-26 | End: 2019-02-01 | Stop reason: SDUPTHER

## 2018-12-27 NOTE — TELEPHONE ENCOUNTER
Called and spoke to patient and she is going tomorrow 12/28/18 to [facility] to get labs drawn.  Faxed orders over for Lamictal level to [phone]

## 2018-12-31 ENCOUNTER — PATIENT MESSAGE (OUTPATIENT)
Dept: PSYCHIATRY | Facility: CLINIC | Age: 63
End: 2018-12-31

## 2019-01-04 ENCOUNTER — TELEPHONE (OUTPATIENT)
Dept: PSYCHIATRY | Facility: CLINIC | Age: 64
End: 2019-01-04

## 2019-01-04 NOTE — TELEPHONE ENCOUNTER
Received fax today from [facility] lab.  Patient Lamotrigine Level   4.8   REFERENCE RANGE IS   2.0-20.0 UG/ML  DETECTION LIMIT =1.0   received the faxed result today

## 2019-01-04 NOTE — TELEPHONE ENCOUNTER
Called and spoke to patient and informed her of her test results and explained what Dr Hawley advised and patient understood.

## 2019-01-04 NOTE — TELEPHONE ENCOUNTER
Please advise the patient that her lamotrigine level is within acceptable range.  Please continue her current medication.

## 2019-01-10 ENCOUNTER — DOCUMENTATION ONLY (OUTPATIENT)
Dept: PSYCHIATRY | Facility: CLINIC | Age: 64
End: 2019-01-10

## 2019-01-10 NOTE — PROGRESS NOTES
I received the following labwork from [facility]:     Dated 12/28/18     TSH: 2.59  Thyroxine, free: 0.77    Vitamin B12: 1334  RPR: non reactive   Lamotrigine level: 4.8 (2.0-20.0)  HIV 1/2: non reactive    Lab work faxed to 's Neurologist, Dr Florian per her request.

## 2019-02-01 RX ORDER — DEXTROAMPHETAMINE SACCHARATE, AMPHETAMINE ASPARTATE, DEXTROAMPHETAMINE SULFATE AND AMPHETAMINE SULFATE 2.5; 2.5; 2.5; 2.5 MG/1; MG/1; MG/1; MG/1
TABLET ORAL
Qty: 45 TABLET | Refills: 0 | Status: SHIPPED | OUTPATIENT
Start: 2019-02-01 | End: 2019-05-02 | Stop reason: SDUPTHER

## 2019-02-01 NOTE — TELEPHONE ENCOUNTER
Pt is requesting medication refill on Adderall 10 mg   Last refill: 12/26/18  Last visit: 12/11/18  Follow Up: 3/14/19

## 2019-02-07 ENCOUNTER — OFFICE VISIT (OUTPATIENT)
Dept: FAMILY MEDICINE | Facility: CLINIC | Age: 64
End: 2019-02-07
Payer: MEDICARE

## 2019-02-07 VITALS
WEIGHT: 145.94 LBS | BODY MASS INDEX: 24.32 KG/M2 | HEART RATE: 93 BPM | HEIGHT: 65 IN | DIASTOLIC BLOOD PRESSURE: 81 MMHG | SYSTOLIC BLOOD PRESSURE: 129 MMHG | TEMPERATURE: 98 F

## 2019-02-07 DIAGNOSIS — M81.0 OSTEOPOROSIS, SENILE: ICD-10-CM

## 2019-02-07 DIAGNOSIS — C34.31 MALIGNANT NEOPLASM OF LOWER LOBE OF RIGHT LUNG: ICD-10-CM

## 2019-02-07 DIAGNOSIS — Z00.00 PREVENTATIVE HEALTH CARE: Primary | ICD-10-CM

## 2019-02-07 DIAGNOSIS — F33.1 MDD (MAJOR DEPRESSIVE DISORDER), RECURRENT EPISODE, MODERATE: ICD-10-CM

## 2019-02-07 DIAGNOSIS — F13.20 BENZODIAZEPINE DEPENDENCE: ICD-10-CM

## 2019-02-07 DIAGNOSIS — M79.7 FIBROMYALGIA: ICD-10-CM

## 2019-02-07 PROCEDURE — 99499 RISK ADDL DX/OHS AUDIT: ICD-10-PCS | Mod: HCNC,S$GLB,, | Performed by: FAMILY MEDICINE

## 2019-02-07 PROCEDURE — 99396 PREV VISIT EST AGE 40-64: CPT | Mod: HCNC,S$GLB,, | Performed by: FAMILY MEDICINE

## 2019-02-07 PROCEDURE — 99396 PR PREVENTIVE VISIT,EST,40-64: ICD-10-PCS | Mod: HCNC,S$GLB,, | Performed by: FAMILY MEDICINE

## 2019-02-07 PROCEDURE — 99999 PR PBB SHADOW E&M-EST. PATIENT-LVL III: CPT | Mod: PBBFAC,HCNC,, | Performed by: FAMILY MEDICINE

## 2019-02-07 PROCEDURE — 99499 UNLISTED E&M SERVICE: CPT | Mod: HCNC,S$GLB,, | Performed by: FAMILY MEDICINE

## 2019-02-07 PROCEDURE — 99999 PR PBB SHADOW E&M-EST. PATIENT-LVL III: ICD-10-PCS | Mod: PBBFAC,HCNC,, | Performed by: FAMILY MEDICINE

## 2019-02-09 NOTE — PROGRESS NOTES
Subjective:       Patient ID: Sheri Broderick is a 63 y.o. female.    Chief Complaint: Annual Exam    Patient presents here for her annual preventive medicine exam.  I have reviewed her chart since I last saw her 6 months ago.  At the time I saw her last, she had just completed her 1st dose of chemotherapy.  She has since completed her complete course of chemotherapy for her lung cancer and is doing well.  She tolerated the whole course without any significant side effects and without any hair loss.  Her last follow-up note from October 2018 by her oncologist was reviewed and she will be followed up every 6 months.  She also continues to see psychiatry for her depression and anxiety disorder, and these notes were reviewed also.  She is stable from this standpoint on these medications has been on these for quite some time.  She also has changed pulmonologist and is now seeing Dr. Colon, and his last note was reviewed.  He did place her on Breo on an as-needed basis at the last visit and she states this is helping.  She does complain of fatigue.  I have reviewed all of her lab work and all of this is in order I do not see anything that could be contributing to this.  It could be a lasting effect from her chemotherapy as well as her lack of any significant exercise.  Some of the antidepressant medications can cause this but she has been on the same dose for quite some time and there been no recent changes.  She did bring in some lab work from her neurologist who she has been seeing for headaches.  Her vitamin B12 was above the normal range, RPR was nonreactive, TSH was normal at 2.59, and free thyroxine was normal at 0.77.  As far as health maintenance, she is up-to-date with all of her recommended screening exams and immunizations except that she has not had a flu vaccine or pneumonia vaccine.  She declines flu vaccine.  She does not want to do a pneumonia vaccine at this time.      Review of Systems    Constitutional: Positive for fatigue. Negative for chills, fever and unexpected weight change.   HENT: Negative for congestion, ear pain, postnasal drip and sore throat.    Eyes: Negative for pain and visual disturbance.   Respiratory: Negative for cough, shortness of breath and wheezing.    Cardiovascular: Negative for chest pain and palpitations.   Gastrointestinal: Positive for constipation. Negative for abdominal pain, diarrhea, nausea and vomiting.   Endocrine: Negative for polydipsia and polyuria.   Genitourinary: Negative for difficulty urinating, dysuria, flank pain and pelvic pain.   Musculoskeletal: Positive for arthralgias, myalgias and neck pain. Negative for back pain.   Skin: Negative for pallor and rash.   Neurological: Positive for headaches. Negative for dizziness, weakness and light-headedness.   Hematological: Negative for adenopathy. Does not bruise/bleed easily.   Psychiatric/Behavioral: Positive for decreased concentration. Negative for sleep disturbance. The patient is nervous/anxious.         Depression stable on present medications       Objective:      Physical Exam   Constitutional: She is oriented to person, place, and time. She appears well-developed and well-nourished.   HENT:   Head: Normocephalic and atraumatic.   Right Ear: External ear normal.   Left Ear: External ear normal.   Nose: Nose normal.   Mouth/Throat: Oropharynx is clear and moist.   Eyes: Conjunctivae and EOM are normal. Pupils are equal, round, and reactive to light.   Neck: Normal range of motion. Neck supple. No thyromegaly present.   Cardiovascular: Normal rate, regular rhythm, normal heart sounds and intact distal pulses.   No murmur heard.  Pulmonary/Chest: Effort normal and breath sounds normal. She has no wheezes. She has no rales.   Abdominal: Soft. Bowel sounds are normal. She exhibits no mass. There is tenderness (Mild generalized tenderness). There is no rebound and no guarding.   Musculoskeletal: Normal  range of motion. She exhibits tenderness ( muscle tenderness consistent with her fibromyalgia). She exhibits no edema.   Lymphadenopathy:     She has no cervical adenopathy.   Neurological: She is alert and oriented to person, place, and time. She has normal reflexes. No cranial nerve deficit.   Skin: Skin is warm and dry. No rash noted.   Psychiatric: She has a normal mood and affect. Her behavior is normal.   Vitals reviewed.      Assessment:       1. Preventative health care    2. Malignant neoplasm of lower lobe of right lung    3. Benzodiazepine dependence    4. MDD (major depressive disorder), recurrent episode, moderate    5. Osteoporosis, senile    6. Fibromyalgia        Plan:       1.  Continue present medication as all of her chronic medical problems are stable at this time  2.  Continue heart healthy diet but increase exercise as this will help with optimal weight but I think this will also give her some energy.  This needs to be low impact and resistance exercises so as not to cause significant flare of her fibromyalgia  3.  Continue follow-up with Hematology-Oncology, pulmonology, and psychiatry  4.  Follow up with me in 6 months or p.r.n.  5.  Patient declines flu vaccine today and will consider pneumonia vaccine at a later date

## 2019-03-14 ENCOUNTER — OFFICE VISIT (OUTPATIENT)
Dept: PSYCHIATRY | Facility: CLINIC | Age: 64
End: 2019-03-14
Payer: MEDICARE

## 2019-03-14 VITALS
WEIGHT: 147.63 LBS | DIASTOLIC BLOOD PRESSURE: 75 MMHG | SYSTOLIC BLOOD PRESSURE: 132 MMHG | BODY MASS INDEX: 24.6 KG/M2 | HEIGHT: 65 IN | HEART RATE: 102 BPM

## 2019-03-14 DIAGNOSIS — F41.0 PANIC DISORDER WITHOUT AGORAPHOBIA: ICD-10-CM

## 2019-03-14 DIAGNOSIS — F41.1 GENERALIZED ANXIETY DISORDER: ICD-10-CM

## 2019-03-14 DIAGNOSIS — F33.1 MDD (MAJOR DEPRESSIVE DISORDER), RECURRENT EPISODE, MODERATE: ICD-10-CM

## 2019-03-14 DIAGNOSIS — F43.10 PTSD (POST-TRAUMATIC STRESS DISORDER): ICD-10-CM

## 2019-03-14 PROCEDURE — 99999 PR PBB SHADOW E&M-EST. PATIENT-LVL III: ICD-10-PCS | Mod: PBBFAC,HCNC,, | Performed by: PSYCHIATRY & NEUROLOGY

## 2019-03-14 PROCEDURE — 3008F BODY MASS INDEX DOCD: CPT | Mod: HCNC,CPTII,S$GLB, | Performed by: PSYCHIATRY & NEUROLOGY

## 2019-03-14 PROCEDURE — 99214 OFFICE O/P EST MOD 30 MIN: CPT | Mod: HCNC,S$GLB,, | Performed by: PSYCHIATRY & NEUROLOGY

## 2019-03-14 PROCEDURE — 99499 RISK ADDL DX/OHS AUDIT: ICD-10-PCS | Mod: HCNC,S$GLB,, | Performed by: PSYCHIATRY & NEUROLOGY

## 2019-03-14 PROCEDURE — 99499 UNLISTED E&M SERVICE: CPT | Mod: HCNC,S$GLB,, | Performed by: PSYCHIATRY & NEUROLOGY

## 2019-03-14 PROCEDURE — 3008F PR BODY MASS INDEX (BMI) DOCUMENTED: ICD-10-PCS | Mod: HCNC,CPTII,S$GLB, | Performed by: PSYCHIATRY & NEUROLOGY

## 2019-03-14 PROCEDURE — 99214 PR OFFICE/OUTPT VISIT, EST, LEVL IV, 30-39 MIN: ICD-10-PCS | Mod: HCNC,S$GLB,, | Performed by: PSYCHIATRY & NEUROLOGY

## 2019-03-14 PROCEDURE — 99999 PR PBB SHADOW E&M-EST. PATIENT-LVL III: CPT | Mod: PBBFAC,HCNC,, | Performed by: PSYCHIATRY & NEUROLOGY

## 2019-03-14 RX ORDER — LAMOTRIGINE 100 MG/1
TABLET ORAL
Qty: 270 TABLET | Refills: 0 | Status: SHIPPED | OUTPATIENT
Start: 2019-03-14 | End: 2019-07-22 | Stop reason: SDUPTHER

## 2019-03-14 RX ORDER — PRAZOSIN HYDROCHLORIDE 1 MG/1
1 CAPSULE ORAL NIGHTLY
Qty: 30 CAPSULE | Refills: 0 | Status: SHIPPED | OUTPATIENT
Start: 2019-03-14 | End: 2019-05-02

## 2019-03-14 RX ORDER — VENLAFAXINE HYDROCHLORIDE 75 MG/1
75 CAPSULE, EXTENDED RELEASE ORAL DAILY
Qty: 90 CAPSULE | Refills: 1 | Status: SHIPPED | OUTPATIENT
Start: 2019-03-14 | End: 2019-07-22 | Stop reason: SDUPTHER

## 2019-03-14 RX ORDER — MIRTAZAPINE 30 MG/1
TABLET, FILM COATED ORAL
Qty: 90 TABLET | Refills: 0 | Status: SHIPPED | OUTPATIENT
Start: 2019-03-14 | End: 2019-07-22 | Stop reason: SDUPTHER

## 2019-03-14 RX ORDER — SERTRALINE HYDROCHLORIDE 100 MG/1
TABLET, FILM COATED ORAL
Qty: 225 TABLET | Refills: 0 | Status: SHIPPED | OUTPATIENT
Start: 2019-03-14 | End: 2019-05-02 | Stop reason: SDUPTHER

## 2019-03-14 NOTE — PROGRESS NOTES
Outpatient Psychiatry Follow-Up Visit (MD/NP)    3/14/2019    Clinical Status of Patient:  Outpatient (Ambulatory)    Chief Complaint:  Sheri Broderick is a 63 y.o. female who presents today for follow-up of anxiety, depression.   Met with patient.      Interval History and Content of Current Session:      Interim Events/Subjective Report/Content of Current Session:      62 yo  female presents for follow up appointment for treatment of major depression, generalized anxiety disorder, panic disorder.     Past Psychiatric Hx:   No prior psychiatric hospitalizations   Suicide risk assessment:   Risks: , age, chronic depression with passive SI  Protective: no prior suicide attempts, strong skip, female, no substance abuse or impulsivity, health issues present both not disabling at this time  She is at low to moderate risk of suicide acutely, and moderate risk over long term     Prior meds: Abilify (no benefit), Buspirone (intolerance) Trazodone (intolerance), Paroxetine (intolerance), Quetiapine, Bupropion (intolerance), Fluoxetine (no benefit), Duloxetine Foltx (no longer covered)       Past Medical History   adenocarcinoma of lung (RLL, s/p resection 4/2018),    Allergy [T78.40XA]   Anxiety [F41.9]   Arthritis [M19.90]   Back pain [M54.9]   Dizziness [R42]   Fibromyalgia [M79.7]   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 [S62.101K]   Fracture of right foot [S92.901A]   Hep B w/o coma [B19.10]   Hyperlipidemia [E78.5]   Major depressive disorder, recurrent episode, in partial remission [F33.41]   Myalgia and myositis, unspecified [IBK1444]   OCD (obsessive compulsive disorder) [F42.9]   Osteoporosis   Polyneuropathy [G62.9]   Trouble in sleeping [G47.9]   Urinary incontinence [R32]       Past Surgical History Laterality Date Comments   S/p lung resection       TUBAL LIGATION[SHX77]      EYE SURGERY[NWM607]   RK   FRACTURE SURGERY[QQY878] Right  wrist orif   CARPAL TUNNEL  "RELEASE[UNL738] Bilateral 07/18/2014      Interim Hx:   Pt presents well groomed for follow up.   Pt has been taking Sertraline 200 mg in am and 50 mg in pm. Adderall typically 2 times/day (10 mg in am and sometimes 5 mg in afternoon).  Taking Clonazepam daily to BID but not always daily.  She has been also been taking Mirtazapine, Effexor, and Lamictal daily.   Additional 50 mg of Sertraline is helpful "holds everything together."     She feels she is shaking - drops med cups, pills.  Unclear if b/l or left more than right affected.   Continues to report memory impairment - pt seen by Neuro in interim. Was told nothing significant was found on eval but to schedule follow up.   She is having bad dreams again - really really bad, the night her  was shut, people kicked door in.    So emotionally distraught  Difficult neighbor - tries to avoid her. Had verbal confrontation with him and his wife - few weeks ago.  She received a certified letter in mail from him, that he will call police on her because she is cutting his grass.     More upset in last few weeks.  Cannot go outside home. Feels neighbors are constantly watching her.  "Maybe I should not be on face of earth."  She had a dream about her soul lifting out of her - so scary -  "I was going to die, my body was going to stay there."   Dreamed this only once. Does not want to die now.  Several times a week she is having nightmares.   Some trauma is coming back   + Hypervigilance, constant    More depressed.  Really trying to get up and do things.  By 4 pm, napping 30 mins.  Sleeps at night - 1 am most nights, usually 8 am - other days, 10 am, rare 12 pm forcea self up.   Appetite is increased "I eat and eat and eat."  What else can I do>   Friend Jeri wants her in [place] - "that is not an easy task."     Memory issues - ST and LT long term issues.  Driving - very nervous. No accidents, but "you should see my bumper."      Denies passive/active SI.   Does not " "mind dying. Does not want it feel like it did in that dream.   Irritable only with her neighbor.   Always audience - his children watch her  Not hopeless, Angry with self for reacting to neighbor.  Feels stupid.  "This horrible person in me comes out," he called her stupid, she lost it, took it to street.    "My home since , am I supposed to just give that up."    Denies auditory/visual hallucinations  Peripheral vision not good -eye exam Friday   No brad, no psychosis     Will have f/up PET scan   Pt's 17 yo cat was put to sleep in interim.   Pt shares pic on her cell phone of [employer] outside of therapist's place of business/home.    MOCA done last visit:         No fam hx of dementia, however her father  relatively young at 58 yo.      Denies alcohol/drug use. No tobacco. No recent caffeine use.     Pt previously brought in copy of ECHO done 16 - normal LV systolic function. EF 55-60%.  Mitral valve leaflets are mildly thickened. Mild mitral annular calcification.  Moderate MVP.  Trace MV regurgitation, trace pulmonic regurgitation.  Seen by Dr Arroyo Nov 2016 - Cardiology, ECHO, EKG - all negative     Review of Systems   · PSYCHIATRIC: Pertinant items are noted in the narrative.  · CONSTITUTIONAL:  Wt gain   · CARDIOVASCULAR: no current chest pain, no palpitations   · NEURO: frequent HA  · RESP: + cough   · M/S: 8/10 back pain     Past Medical, Family and Social History: The patient's past medical, family and social history have been reviewed and updated as appropriate within the electronic medical record - see encounter notes.    Psychiatric Medications:   Effexor XR 75 mg daily  Sertraline 200 mg in am and 50 mg in PM  Klonopin 1 mg bid - tid prn anxiety  Remeron 15 mg QHS   Lamictal 100 mg QAM, 200 mg QHS   Adderall 10 mg in am and 5 mg in afternoon     Compliance: yes     Side effects: dry mouth (uses biotene), wt gain    Risk Parameters:  Patient reports no suicidal ideations today " "  Patient reports no homicidal ideation  Patient reports no self-injurious behavior  Patient reports no violent behavior      Exam (detailed: at least 9 elements; comprehensive: all 15 elements)   Constitutional  Vitals:  Most recent vital signs, dated less than 90 days prior to this appointment, were reviewed.    Se Epic for today's vitals.       General:  age appropriate, casual attire, well groomed, good eye contact, appears calm, not tearful, pt looks remarkably good s/p cancer tx        Musculoskeletal  Muscle Strength/Tone:  No tremor noted today,  No PMA    Gait & Station:  non-ataxic     Psychiatric  Speech:  no latency; no press , spontaneous, talkative, conversational, soft spoken    Mood & Affect:  "not good"  Full   Thought Process:  Linear   Associations:  intact   Thought Content:  no SI, no homicidality, delusions, or paranoia, hallucinations: (auditory: no currently, visual: no) - none currently       Insight:  Fair   Judgement: Adequate to circumstances    Orientation:  grossly intact. Alert and oriented x 4    Memory: intact for content of interview   Language: grossly intact   Attention Span & Concentration:  able to focus   Fund of Knowledge:  intact and appropriate to age and level of education     Assessment and Diagnosis   Status/Progress: Based on the examination today, the patient's problem(s) is/are under fair control, have been treatment resistant in past.   New problems have been not presented today.   Comorbidities are complicating management of the primary condition.    The working differential for this patient includes Cluster B and C traits, personality disorder, PTSD, MDD, OCD    General Impression :  Pt improved with addition of SNRI to SSRI; she declines atypical antipsychotic augmentation. Did not tolerate recent medication adjustments. Stabilizing with return to prior regimen.  Adderall does help pt function better, she does not feel it worsens anxiety.  Pt with significant " negative cognitive distortions evident throughout interview.  Pt dx with Stage II Adenocarcinoma of the lung and is now s/p treatment.  Support system is limited, pt appears less depressed today, becoming more active (travel, weekend activities). Resuming Adderall has helped. Pt concerned about her cognition, reports recent clumsiiness, word finding issues. Unrevealing Neuro work up.     Major Depressive Disorder, Recurrent, mild   Panic Disorder without Agoisorder.   raphobia.   Generalized Anxiety DR/O Cognitive Disorder   PTSD  Hx OCD  Personality Disorder, unspecified, Cluster B and C traits     Hepaitis B, tremors,  osteoporosis, myalgias, arthralgia, poor balance, back pain, bulging disc, fibromyalgia, osteoarthritis, neuropathy, LE fracture, Adenocarcinoma of Lung    GAF: 60  Intervention/Counseling/Treatment Plan   · Medication Management: The risks and benefits of medication were discussed with the patient.  · Counseling provided with patient as follows: importance of compliance with chosen treatment options was emphasized, risks and benefits of treatment options, including medications, were discussed with the patient     1. Continue Effexor XR 75 mg daily  2. Continue Remeron 15 mg QHS   3. Continue Klonopin 1 mg BID - TID prn.  Discussed risk of decreased RT, sedation, addictive potential, and not to mix with alcohol. No Rx today. Has not been filled since 1/18.  Advised to avoid this medication when she is taking pain meds (risks of resp depression discussed) and she verbalized an understanding. Advised to minimize this med can impact cognition.  4. Continue Lamictal 100 mg QAM and  200 mg daily.  Discussed risks of life threatening SJS, need for compliance.   5. Continue Psychotherapy with Kaye Jennings   6. Ok to continue Adderall 10 mg once in AM and 5 mg in afternoon. Monitor BP/HR  - patient instructed to see a PCP regularly because she is on a stimulant. Discussed risks of abuse,  insomnia, anxiety, elevated BP, HR, MI, stroke, and sudden death.   7.  Pt instructed to go to ED/911 with thoughts of wanting harm self/others  8. Call to report any worsening of symptoms or problems with the medication  9. Continue  Sertraline to 200 mg and and 50 mg in pm.   Target depression, anxiety. Discussed risk of serotonin syndrome, GI upset, headaches and that this dose is > FDA max rec dosing.   10. Add Prazosin 1 mg nightly to target recurrent trauma based nightmares.  Typical CASPER discussed including hypotension, syncope, dizziness, sedation   11. Follow up with Neuro    Return to Clinic 6 weeks - 2 months

## 2019-03-26 ENCOUNTER — OFFICE VISIT (OUTPATIENT)
Dept: PULMONOLOGY | Facility: CLINIC | Age: 64
End: 2019-03-26
Payer: MEDICARE

## 2019-03-26 VITALS
HEIGHT: 65 IN | SYSTOLIC BLOOD PRESSURE: 140 MMHG | DIASTOLIC BLOOD PRESSURE: 78 MMHG | WEIGHT: 148 LBS | OXYGEN SATURATION: 98 % | HEART RATE: 98 BPM | BODY MASS INDEX: 24.66 KG/M2

## 2019-03-26 DIAGNOSIS — R05.9 COUGH: Primary | ICD-10-CM

## 2019-03-26 DIAGNOSIS — C34.31 MALIGNANT NEOPLASM OF LOWER LOBE OF RIGHT LUNG: ICD-10-CM

## 2019-03-26 PROCEDURE — 3008F BODY MASS INDEX DOCD: CPT | Mod: ,,, | Performed by: INTERNAL MEDICINE

## 2019-03-26 PROCEDURE — 3008F PR BODY MASS INDEX (BMI) DOCUMENTED: ICD-10-PCS | Mod: ,,, | Performed by: INTERNAL MEDICINE

## 2019-03-26 PROCEDURE — 99214 OFFICE O/P EST MOD 30 MIN: CPT | Mod: ,,, | Performed by: INTERNAL MEDICINE

## 2019-03-26 PROCEDURE — 99214 PR OFFICE/OUTPT VISIT, EST, LEVL IV, 30-39 MIN: ICD-10-PCS | Mod: ,,, | Performed by: INTERNAL MEDICINE

## 2019-03-26 RX ORDER — FLUTICASONE FUROATE AND VILANTEROL 100; 25 UG/1; UG/1
1 POWDER RESPIRATORY (INHALATION) DAILY
Qty: 90 EACH | Refills: 3 | Status: SHIPPED | OUTPATIENT
Start: 2019-03-26 | End: 2019-05-02

## 2019-03-26 NOTE — PROGRESS NOTES
Office Visit    Patient Name: Sheri Broderick  MRN: [id number]  : 1955      Reason for visit: Lung cancer    HPI:     2018 - Pt with h/o adenocarcinoma of lung (RLL, s/p resection 2018), had + lymph nodes and has had chemotherapy (per Dr Zhang - last 2018), next visit in September.  Has a h/o childhood asthma, no known h/o COPD - was never a smoker but did have some second hand exposure.  Has had chroinic cough which has been worse since her surgery - has associated postnasal drip/allergies, + reflux.  Cough is mostly nonproductive.  Has also noted some increased trouble with SOB and WISDOM.  Has a ventolin inhaler but hasn't really used it.    10/17/2018 - Here for follow up, doing better with cough overall but not resolved.  Post nasal drip still present - using flonase.  Reflux is better.  Has noted some wheezing and chest tightness (? If there is a component of asthma here as well).  Saw Dr Zhang and had CT scan (see below) - looks good.    3/26/2018 - Here for follow up, no new issues , some cough, congestion, some SOB at times, some allergy related symptoms.  She is disappointed that she has not recovered better from a respiratory standpoint.  Has had issues with depression and is seeing psychiatry.    Past Medical History    Past Medical History:   Diagnosis Date    Allergy     seasonal    Anemia associated with chemotherapy 2018    Anxiety     Arthritis     Back pain     Chemotherapy-induced neutropenia 2018    Dizziness     Fibromyalgia     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     Fracture of right foot     Hep B w/o coma     Hyperlipidemia     Major depressive disorder, recurrent episode, in partial remission     Malignant neoplasm of lower lobe of right lung-Adenocarcinoma 2018    Myalgia and myositis, unspecified     OCD (obsessive compulsive disorder)     Osteoporosis     Polyneuropathy     Trouble in sleeping     Urinary  incontinence        Past Surgical History    Past Surgical History:   Procedure Laterality Date    CARPAL TUNNEL RELEASE Bilateral 07/18/2014    EYE SURGERY      RK    FRACTURE SURGERY Right     wrist orif    LUNG REMOVAL, PARTIAL  04/27/2018    Dr. Brant Welch    ORIF, WRIST Right 12/3/2012    [facility]    RELEASE-CARPAL TUNNEL  Right Right 7/18/2014    Per[facility] Cy [facility]ian Jr[facility] at [facility] OR    TUBAL LIGATION         Medications      Current Outpatient Medications:     albuterol 90 mcg/actuation inhaler, Inhale 2 puffs into the lungs every 6 (six) hours as needed for Wheezing. Rescue, Disp: 18 g, Rfl: 5    biotin 10,000 mcg Cap, , Disp: , Rfl:     Ca cmb no.7-Z9-B-4-NY-M48-aloe 120-1,000-10 mg-unit-mg Tab, Take 1 tablet by mouth., Disp: , Rfl:     CHEWABLE VITAMIN C 500 mg Chew, , Disp: , Rfl:     clonazePAM (KLONOPIN) 1 MG tablet, Take one-half to one tablet two to three times daily only as needed for anxiety, Disp: 180 tablet, Rfl: 0    dextroamphetamine-amphetamine 10 mg Tab, Take one tablet PO in the morning, and one-half tablet PO in the afternoon, Disp: 45 tablet, Rfl: 0    esomeprazole magnesium (NEXIUM 24HR) 20 mg TbEC, Take 20 mg by mouth once daily. , Disp: , Rfl:     fluticasone (FLONASE) 50 mcg/actuation nasal spray, 1 spray by Each Nare route once daily., Disp: , Rfl:     folic acid (FOLVITE) 800 MCG Tab, Take 800 mcg by mouth once daily. , Disp: , Rfl:     ibuprofen (ADVIL,MOTRIN) 800 MG tablet, Take 1 tablet (800 mg total) by mouth 2 (two) times daily as needed for Pain., Disp: 180 tablet, Rfl: 3    lamoTRIgine (LAMICTAL) 100 MG tablet, Take one tablet PO in the morning and two tablets PO in the evening, Disp: 270 tablet, Rfl: 0    LINZESS 145 mcg Cap capsule, Take 1 capsule by mouth daily as needed. , Disp: , Rfl:     meclizine (ANTIVERT) 25 mg tablet, Take 1 tablet (25 mg total) by mouth every 8 (eight) hours as needed for  Dizziness. Twice a day, Disp: 90 tablet, Rfl: 3    mirtazapine (REMERON) 30 MG tablet, Take one-half tablet PO nightly, Disp: 90 tablet, Rfl: 0    ondansetron (ZOFRAN) 8 MG tablet, Take 1 tablet (8 mg total) by mouth every 8 (eight) hours as needed for Nausea., Disp: 90 tablet, Rfl: 3    prazosin (MINIPRESS) 1 MG Cap, Take 1 capsule (1 mg total) by mouth every evening., Disp: 30 capsule, Rfl: 0    sertraline (ZOLOFT) 100 MG tablet, Take two tablets PO in the morning and one-half tablet PO in evening, Disp: 225 tablet, Rfl: 0    venlafaxine (EFFEXOR-XR) 75 MG 24 hr capsule, Take 1 capsule (75 mg total) by mouth once daily., Disp: 90 capsule, Rfl: 1    vitamin E 400 UNIT capsule, Take 400 Units by mouth once daily., Disp: , Rfl:     ZYRTEC 10 mg Cap, continuous prn., Disp: , Rfl:     hydrocodone-chlorpheniramine (TUSSIONEX) 10-8 mg/5 mL suspension, Take 5 mLs by mouth every 12 (twelve) hours as needed for Cough., Disp: , Rfl:     Allergies    Review of patient's allergies indicates:   Allergen Reactions    Penicillins      Other reaction(s): Rash    Trazodone Anxiety     Severe anxiety     Prolia [denosumab] Other (See Comments)     myalgias       SocHx    Social History     Tobacco Use   Smoking Status Never Smoker   Smokeless Tobacco Never Used       Social History     Substance and Sexual Activity   Alcohol Use No       Drug Use - no  Occupation - retired,   Asbestos exposure - no  Pets - 2 cats     FMHx    Family History   Problem Relation Age of Onset    Heart disease Mother          to to coma (hypothermia)     Heart disease Father 57        heart attack    Heart disease Sister         stents    Diabetes Sister     Parkinsonism Sister     Heart disease Brother 45        death at 45 years old due to hearth disease     Diabetes Maternal Grandmother          Review of Systems  Review of Systems   Constitutional: Positive for malaise/fatigue. Negative for chills,  "diaphoresis, fever and weight loss.   HENT: Negative for congestion.    Eyes: Negative for pain.   Respiratory: Positive for cough and shortness of breath. Negative for hemoptysis, sputum production, wheezing and stridor.    Cardiovascular: Negative for chest pain, palpitations, orthopnea, claudication, leg swelling and PND.   Gastrointestinal: Positive for heartburn. Negative for abdominal pain, constipation, diarrhea, nausea and vomiting.        Reflux   Genitourinary: Negative for dysuria, frequency and urgency.   Musculoskeletal: Negative for falls and myalgias.   Neurological: Negative for sensory change, focal weakness and weakness.   Psychiatric/Behavioral: Negative for depression. The patient is not nervous/anxious.        Physical Exam    Vitals:    03/26/19 1024   BP: (!) 140/78   Pulse: 98   SpO2: 98%   Weight: 67.1 kg (148 lb)   Height: 5' 5" (1.651 m)       Physical Exam   Constitutional: She is oriented to person, place, and time. She appears well-developed and well-nourished. No distress.   HENT:   Head: Normocephalic and atraumatic.   Nose: Nose normal.   Mouth/Throat: Oropharynx is clear and moist.   O/P - some post nasal drip   Eyes: Pupils are equal, round, and reactive to light. Conjunctivae and EOM are normal.   Neck: Normal range of motion. Neck supple. No JVD present. No tracheal deviation present. No thyromegaly present.   Cardiovascular: Normal rate, regular rhythm, normal heart sounds and intact distal pulses. Exam reveals no gallop and no friction rub.   No murmur heard.  Pulmonary/Chest: Effort normal and breath sounds normal. No stridor. No respiratory distress. She has no wheezes. She has no rales. She exhibits no tenderness.   Abdominal: Soft. Bowel sounds are normal. She exhibits no distension. There is no tenderness.   Musculoskeletal: Normal range of motion. She exhibits no edema or tenderness.   Lymphadenopathy:     She has no cervical adenopathy.   Neurological: She is alert and " oriented to person, place, and time. No cranial nerve deficit.   Skin: Skin is warm and dry. She is not diaphoretic.   Psychiatric: She has a normal mood and affect. Her behavior is normal.   Nursing note and vitals reviewed.      Labs    Lab Results   Component Value Date    WBC 4.7 (L) 10/09/2018    HGB 11.3 (L) 10/09/2018    HCT 35.4 (L) 10/09/2018     10/09/2018       Sodium   Date Value Ref Range Status   10/09/2018 140 134 - 144 mmol/L      Potassium   Date Value Ref Range Status   10/09/2018 4.7 3.5 - 5.0 mmol/L      Chloride   Date Value Ref Range Status   10/09/2018 105 98 - 110 mmol/L      CO2   Date Value Ref Range Status   10/09/2018 29.3 22.8 - 31.6 mmol/L      Glucose   Date Value Ref Range Status   10/09/2018 101 (H) 70 - 99 mg/dL      BUN, Bld   Date Value Ref Range Status   10/09/2018 17 8 - 20 mg/dL      Creatinine   Date Value Ref Range Status   10/09/2018 0.80 0.60 - 1.40 mg/dL    11/29/2012 0.7 0.5 - 1.4 mg/dL Final     Calcium   Date Value Ref Range Status   10/09/2018 9.4 7.7 - 10.4 mg/dL    11/29/2012 9.0 8.7 - 10.5 mg/dL Final     Total Protein   Date Value Ref Range Status   10/09/2018 7.6 6.0 - 8.2 g/dL      Albumin   Date Value Ref Range Status   10/09/2018 4.1 3.1 - 4.7 g/dL      Total Bilirubin   Date Value Ref Range Status   10/09/2018 0.6 0.3 - 1.0 mg/dL      Alkaline Phosphatase   Date Value Ref Range Status   10/09/2018 104 40 - 104 IU/L      AST   Date Value Ref Range Status   10/09/2018 23 10 - 40 IU/L      ALT   Date Value Ref Range Status   06/01/2018 10 6 - 29 U/L Final     Anion Gap   Date Value Ref Range Status   03/03/2016 10 8 - 16 mmol/L Final   11/29/2012 10 5 - 15 meq/L Final       Xrays    FINDINGS:.  CT Chest:  Lines and tubes: There is a left-sided subclavian medical infusion port with  tip at the level of the SVC.  Visualized neck: Within normal limits.  Lungs: The patient is status post right-sided pneumonectomy with linear  bandlike areas of atelectasis  versus scarring in the right lung. No discrete  pulmonary nodule or mass is identified. There is mild volume loss and  cardiomediastinal shift to the right.  Airway: The trachea and central bronchial tree appear normal.  Pleura: There is a trace right-sided pleural effusion There is no pneumothorax.  Cardiovascular: The heart is normal in size. There is no pericardial effusion.  The thoracic aorta and great vessels are normal in course and caliber.  Mediastinum: There is no supraclavicular, axillary, mediastinal, or hilar  lymphadenopathy by size criteria, the largest lymph node appears to be a 10 x 5  mm right hilar lymph node..  Soft tissues: The peripheral soft tissues appear normal.  Musculoskeletal: There are moderate degenerative changes of the thoracic spine.  There are no suspicious osseous lesions.  Esophagus: The esophagus is grossly normal.  There is a moderate hiatal hernia.    CT Abdomen:  Liver: The liver is normal in size and imaging appearance.  Gallbladder: The gallbladder is within normal limits.  Biliary Tree: No intra or extrahepatic ductal dilation.  Spleen: Within normal limits.  Pancreas: The pancreas is normal.  Adrenal Glands: The adrenal glands appear within normal limits.  Kidneys: The kidneys are normal in imaging appearance without hydronephrosis or  hydroureter.  Vasculature: The aorta is normal in course and caliber.  Lymph nodes: No abdominal lymphadenopathy is seen.  Intraperitoneal structures: There is no ascites.  Bowel: The partially filled bowel is within normal limits with a nonobstructive  bowel gas pattern.  Abdominal wall: The abdominal wall and musculature are normal.  Musculoskeletal: There is degenerative disc disease and facet arthropathy in  the lumbar spine with no aggressive appearing lytic or blastic lesions.    CT Pelvis:  Bladder: The urinary bladder is within normal limits.  Reproductive Organs: The uterus and ovaries are unremarkable.  Pelvic Lymph nodes: No  pelvic lymphadenopathy or pelvic mass is identified.    IMPRESSION:  Status post right-sided pneumonectomy (right lower lobectomy) with linear  bandlike areas of likely atelectasis versus scarring but no finding to  specifically suggest residual/recurrent or metastatic disease. Additional and  incidental findings are as outlined above.                    .    Read and electronically signed by: Raz Rebollar    Impression/Plan    Problem List Items Addressed This Visit        Pulmonary    Cough  - likely post nasal drip and reflux  - will treat with flonase, zyrtec and nexium  - did feel better with BREO - will reorder  - will check PFT               Oncology    Malignant neoplasm of lower lobe of right lung-Adenocarcinoma  - s/p surgery 4/2018  - started chemo in June  - to repeat scans next month and see Dr Leatha Colon MD

## 2019-04-09 LAB — ISTAT CREATININE: 0.8 MG/DL (ref 0.6–1.4)

## 2019-04-11 ENCOUNTER — OFFICE VISIT (OUTPATIENT)
Dept: HEMATOLOGY/ONCOLOGY | Facility: CLINIC | Age: 64
End: 2019-04-11
Payer: MEDICARE

## 2019-04-11 VITALS
BODY MASS INDEX: 24.61 KG/M2 | WEIGHT: 147.88 LBS | RESPIRATION RATE: 20 BRPM | SYSTOLIC BLOOD PRESSURE: 142 MMHG | TEMPERATURE: 99 F | DIASTOLIC BLOOD PRESSURE: 78 MMHG | HEART RATE: 98 BPM

## 2019-04-11 DIAGNOSIS — C34.31 MALIGNANT NEOPLASM OF LOWER LOBE OF RIGHT LUNG: Chronic | ICD-10-CM

## 2019-04-11 DIAGNOSIS — I10 ESSENTIAL HYPERTENSION: ICD-10-CM

## 2019-04-11 PROCEDURE — 3077F PR MOST RECENT SYSTOLIC BLOOD PRESSURE >= 140 MM HG: ICD-10-PCS | Mod: ,,, | Performed by: INTERNAL MEDICINE

## 2019-04-11 PROCEDURE — 3078F PR MOST RECENT DIASTOLIC BLOOD PRESSURE < 80 MM HG: ICD-10-PCS | Mod: ,,, | Performed by: INTERNAL MEDICINE

## 2019-04-11 PROCEDURE — 3008F BODY MASS INDEX DOCD: CPT | Mod: ,,, | Performed by: INTERNAL MEDICINE

## 2019-04-11 PROCEDURE — 99214 PR OFFICE/OUTPT VISIT, EST, LEVL IV, 30-39 MIN: ICD-10-PCS | Mod: ,,, | Performed by: INTERNAL MEDICINE

## 2019-04-11 PROCEDURE — 3078F DIAST BP <80 MM HG: CPT | Mod: ,,, | Performed by: INTERNAL MEDICINE

## 2019-04-11 PROCEDURE — 3008F PR BODY MASS INDEX (BMI) DOCUMENTED: ICD-10-PCS | Mod: ,,, | Performed by: INTERNAL MEDICINE

## 2019-04-11 PROCEDURE — 3077F SYST BP >= 140 MM HG: CPT | Mod: ,,, | Performed by: INTERNAL MEDICINE

## 2019-04-11 PROCEDURE — 99214 OFFICE O/P EST MOD 30 MIN: CPT | Mod: ,,, | Performed by: INTERNAL MEDICINE

## 2019-04-11 NOTE — PROGRESS NOTES
PROGRESS NOTE    Subjective:       Patient ID: Sheri Broderick is a 63 y.o. female.    Chief Complaint:  Follow-up  lung cancer, chemo follow up.     History of Present Illness:   Sheri Broderick is a 63 y.o. female who presents for follow up. Patient is feeling well at this time with no new complaints.         Family and Social history reviewed and is unchanged from May 21, 2018      ROS:  Review of Systems   Constitutional: Negative for fever.   Respiratory: Negative for shortness of breath.    Cardiovascular: Negative for chest pain and leg swelling.   Gastrointestinal: Negative for abdominal pain and blood in stool.   Genitourinary: Negative for hematuria.   Skin: Negative for rash.          Current Outpatient Medications:     albuterol 90 mcg/actuation inhaler, Inhale 2 puffs into the lungs every 6 (six) hours as needed for Wheezing. Rescue, Disp: 18 g, Rfl: 5    biotin 10,000 mcg Cap, , Disp: , Rfl:     Ca cmb no.9-F6-B-2-EP-J21-aloe 120-1,000-10 mg-unit-mg Tab, Take 1 tablet by mouth., Disp: , Rfl:     CHEWABLE VITAMIN C 500 mg Chew, , Disp: , Rfl:     clonazePAM (KLONOPIN) 1 MG tablet, Take one-half to one tablet two to three times daily only as needed for anxiety, Disp: 180 tablet, Rfl: 0    dextroamphetamine-amphetamine 10 mg Tab, Take one tablet PO in the morning, and one-half tablet PO in the afternoon, Disp: 45 tablet, Rfl: 0    esomeprazole magnesium (NEXIUM 24HR) 20 mg TbEC, Take 20 mg by mouth once daily. , Disp: , Rfl:     fluticasone (FLONASE) 50 mcg/actuation nasal spray, 1 spray by Each Nare route once daily., Disp: , Rfl:     fluticasone-vilanterol (BREO ELLIPTA) 100-25 mcg/dose diskus inhaler, Inhale 1 puff into the lungs once daily. Controller, Disp: 90 each, Rfl: 3    folic acid (FOLVITE) 800 MCG Tab, Take 800 mcg by mouth once daily. , Disp: , Rfl:     hydrocodone-chlorpheniramine (TUSSIONEX) 10-8 mg/5 mL suspension,  Take 5 mLs by mouth every 12 (twelve) hours as needed for Cough., Disp: , Rfl:     ibuprofen (ADVIL,MOTRIN) 800 MG tablet, Take 1 tablet (800 mg total) by mouth 2 (two) times daily as needed for Pain., Disp: 180 tablet, Rfl: 3    lamoTRIgine (LAMICTAL) 100 MG tablet, Take one tablet PO in the morning and two tablets PO in the evening, Disp: 270 tablet, Rfl: 0    LINZESS 145 mcg Cap capsule, Take 1 capsule by mouth daily as needed. , Disp: , Rfl:     meclizine (ANTIVERT) 25 mg tablet, Take 1 tablet (25 mg total) by mouth every 8 (eight) hours as needed for Dizziness. Twice a day, Disp: 90 tablet, Rfl: 3    mirtazapine (REMERON) 30 MG tablet, Take one-half tablet PO nightly, Disp: 90 tablet, Rfl: 0    ondansetron (ZOFRAN) 8 MG tablet, Take 1 tablet (8 mg total) by mouth every 8 (eight) hours as needed for Nausea., Disp: 90 tablet, Rfl: 3    prazosin (MINIPRESS) 1 MG Cap, Take 1 capsule (1 mg total) by mouth every evening., Disp: 30 capsule, Rfl: 0    sertraline (ZOLOFT) 100 MG tablet, Take two tablets PO in the morning and one-half tablet PO in evening, Disp: 225 tablet, Rfl: 0    venlafaxine (EFFEXOR-XR) 75 MG 24 hr capsule, Take 1 capsule (75 mg total) by mouth once daily., Disp: 90 capsule, Rfl: 1    vitamin E 400 UNIT capsule, Take 400 Units by mouth once daily., Disp: , Rfl:     ZYRTEC 10 mg Cap, continuous prn., Disp: , Rfl:         Objective:       Physical Examination:     BP (!) 142/78 (BP Location: Right arm, Patient Position: Sitting)   Pulse 98   Temp 98.7 °F (37.1 °C)   Resp 20   Wt 67.1 kg (147 lb 14.4 oz)   BMI 24.61 kg/m²     Physical Exam   Constitutional: She appears well-developed and well-nourished.   HENT:   Head: Normocephalic and atraumatic.   Right Ear: External ear normal.   Left Ear: External ear normal.   Mouth/Throat: Oropharynx is clear and moist.   Eyes: Pupils are equal, round, and reactive to light. Conjunctivae are normal.   Neck: No tracheal deviation present. No  thyromegaly present.   Cardiovascular: Normal rate, regular rhythm and normal heart sounds.   Pulmonary/Chest: Effort normal and breath sounds normal.   Abdominal: Soft. Bowel sounds are normal. She exhibits no distension and no mass. There is no tenderness.   Musculoskeletal: She exhibits no edema.   Neurological:   Neuro intact througout   Skin: No rash noted.   Psychiatric: She has a normal mood and affect. Her behavior is normal. Judgment and thought content normal.       Labs:   No results found for this or any previous visit (from the past 336 hour(s)).  CMP  Sodium   Date Value Ref Range Status   10/09/2018 140 134 - 144 mmol/L      Potassium   Date Value Ref Range Status   10/09/2018 4.7 3.5 - 5.0 mmol/L      Chloride   Date Value Ref Range Status   10/09/2018 105 98 - 110 mmol/L      CO2   Date Value Ref Range Status   10/09/2018 29.3 22.8 - 31.6 mmol/L      Glucose   Date Value Ref Range Status   10/09/2018 101 (H) 70 - 99 mg/dL      BUN, Bld   Date Value Ref Range Status   10/09/2018 17 8 - 20 mg/dL      Creatinine   Date Value Ref Range Status   10/09/2018 0.80 0.60 - 1.40 mg/dL    11/29/2012 0.7 0.5 - 1.4 mg/dL Final     Calcium   Date Value Ref Range Status   10/09/2018 9.4 7.7 - 10.4 mg/dL    11/29/2012 9.0 8.7 - 10.5 mg/dL Final     Total Protein   Date Value Ref Range Status   10/09/2018 7.6 6.0 - 8.2 g/dL      Albumin   Date Value Ref Range Status   10/09/2018 4.1 3.1 - 4.7 g/dL      Total Bilirubin   Date Value Ref Range Status   10/09/2018 0.6 0.3 - 1.0 mg/dL      Alkaline Phosphatase   Date Value Ref Range Status   10/09/2018 104 40 - 104 IU/L      AST   Date Value Ref Range Status   10/09/2018 23 10 - 40 IU/L      ALT   Date Value Ref Range Status   06/01/2018 10 6 - 29 U/L Final     Anion Gap   Date Value Ref Range Status   03/03/2016 10 8 - 16 mmol/L Final   11/29/2012 10 5 - 15 meq/L Final     eGFR if    Date Value Ref Range Status   06/01/2018 94 > OR = 60 mL/min/1.73m2  Final     eGFR if non    Date Value Ref Range Status   06/01/2018 81 > OR = 60 mL/min/1.73m2 Final     No results found for: CEA  No results found for: PSA        Assessment/Plan:     Problem List Items Addressed This Visit     Malignant neoplasm of lower lobe of right lung-Adenocarcinoma (Chronic)     I had a long discussion with the patient about the enlargement of the mediastinal lymph nodes and that at this point i'm unsure of the significance.  I will get a PET scan done but will wait three months given the overall small size of the nodes in question.  Patient understands and is ok with this plan.           Relevant Orders    NM PET CT Routine Skull to Mid Thigh    Essential hypertension     BP is slightly up at this time but I suspect this is white coat syndrome.  Will observe for now.                   Discussion:   High complexity due to intense lab monitoring, high risk medications.   Follow up in about 3 months (around 7/11/2019).      Electronically signed by Segundo Lamb

## 2019-04-11 NOTE — ASSESSMENT & PLAN NOTE
BP is slightly up at this time but I suspect this is white coat syndrome.  Will observe for now.

## 2019-04-11 NOTE — LETTER
April 11, 2019      Demetrio Pleitez Jr., MD  [address]  [place] [address]           [facility]  [address]  [address]  [place] [address]  Phone: [phone]  Fax: [phone]          Patient: Sheri Broderick   MR Number: [id number]   YOB: 1955   Date of Visit: 4/11/2019       Dear Dr. Demetrio Pleitez Jr.:    Thank you for referring Sheri Broderick to me for evaluation. Attached you will find relevant portions of my assessment and plan of care.    If you have questions, please do not hesitate to call me. I look forward to following Sheri Broderick along with you.    Sincerely,    Segundo Zhang MD    Enclosure  CC:  No Recipients    If you would like to receive this communication electronically, please contact [email or web address] or [phone] to request more information on [facility] [employer] access.    For providers and/or their staff who would like to refer a patient to [facility], please contact us through our one-stop-shop provider referral line, [facility], at [phone].    If you feel you have received this communication in error or would no longer like to receive these types of communications, please e-mail [email or web address]

## 2019-04-11 NOTE — ASSESSMENT & PLAN NOTE
I had a long discussion with the patient about the enlargement of the mediastinal lymph nodes and that at this point i'm unsure of the significance.  I will get a PET scan done but will wait three months given the overall small size of the nodes in question.  Patient understands and is ok with this plan.

## 2019-04-22 ENCOUNTER — TELEPHONE (OUTPATIENT)
Dept: ALLERGY | Facility: CLINIC | Age: 64
End: 2019-04-22

## 2019-04-22 NOTE — TELEPHONE ENCOUNTER
Notified of result. Robert is still charging >$100 co-pay, cant afford, will call insurance co and see if any cheaper inhalers co-pay, will call back and notify us.

## 2019-05-02 ENCOUNTER — OFFICE VISIT (OUTPATIENT)
Dept: PSYCHIATRY | Facility: CLINIC | Age: 64
End: 2019-05-02
Payer: MEDICARE

## 2019-05-02 VITALS
SYSTOLIC BLOOD PRESSURE: 137 MMHG | HEIGHT: 65 IN | HEART RATE: 96 BPM | BODY MASS INDEX: 24.15 KG/M2 | DIASTOLIC BLOOD PRESSURE: 85 MMHG | WEIGHT: 144.94 LBS

## 2019-05-02 DIAGNOSIS — F43.10 PTSD (POST-TRAUMATIC STRESS DISORDER): ICD-10-CM

## 2019-05-02 DIAGNOSIS — F33.1 MDD (MAJOR DEPRESSIVE DISORDER), RECURRENT EPISODE, MODERATE: ICD-10-CM

## 2019-05-02 DIAGNOSIS — F41.1 GENERALIZED ANXIETY DISORDER: ICD-10-CM

## 2019-05-02 PROCEDURE — 99999 PR PBB SHADOW E&M-EST. PATIENT-LVL III: CPT | Mod: PBBFAC,,, | Performed by: PSYCHIATRY & NEUROLOGY

## 2019-05-02 PROCEDURE — 3075F SYST BP GE 130 - 139MM HG: CPT | Mod: CPTII,S$GLB,, | Performed by: PSYCHIATRY & NEUROLOGY

## 2019-05-02 PROCEDURE — 3079F DIAST BP 80-89 MM HG: CPT | Mod: CPTII,S$GLB,, | Performed by: PSYCHIATRY & NEUROLOGY

## 2019-05-02 PROCEDURE — 99999 PR PBB SHADOW E&M-EST. PATIENT-LVL III: ICD-10-PCS | Mod: PBBFAC,,, | Performed by: PSYCHIATRY & NEUROLOGY

## 2019-05-02 PROCEDURE — 3075F PR MOST RECENT SYSTOLIC BLOOD PRESS GE 130-139MM HG: ICD-10-PCS | Mod: CPTII,S$GLB,, | Performed by: PSYCHIATRY & NEUROLOGY

## 2019-05-02 PROCEDURE — 3008F BODY MASS INDEX DOCD: CPT | Mod: CPTII,S$GLB,, | Performed by: PSYCHIATRY & NEUROLOGY

## 2019-05-02 PROCEDURE — 99214 OFFICE O/P EST MOD 30 MIN: CPT | Mod: S$GLB,,, | Performed by: PSYCHIATRY & NEUROLOGY

## 2019-05-02 PROCEDURE — 3079F PR MOST RECENT DIASTOLIC BLOOD PRESSURE 80-89 MM HG: ICD-10-PCS | Mod: CPTII,S$GLB,, | Performed by: PSYCHIATRY & NEUROLOGY

## 2019-05-02 PROCEDURE — 99214 PR OFFICE/OUTPT VISIT, EST, LEVL IV, 30-39 MIN: ICD-10-PCS | Mod: S$GLB,,, | Performed by: PSYCHIATRY & NEUROLOGY

## 2019-05-02 PROCEDURE — 3008F PR BODY MASS INDEX (BMI) DOCUMENTED: ICD-10-PCS | Mod: CPTII,S$GLB,, | Performed by: PSYCHIATRY & NEUROLOGY

## 2019-05-02 RX ORDER — DEXTROAMPHETAMINE SACCHARATE, AMPHETAMINE ASPARTATE, DEXTROAMPHETAMINE SULFATE AND AMPHETAMINE SULFATE 2.5; 2.5; 2.5; 2.5 MG/1; MG/1; MG/1; MG/1
TABLET ORAL
Qty: 45 TABLET | Refills: 0 | Status: SHIPPED | OUTPATIENT
Start: 2019-05-02 | End: 2019-07-22

## 2019-05-02 RX ORDER — SERTRALINE HYDROCHLORIDE 100 MG/1
TABLET, FILM COATED ORAL
Qty: 1 TABLET | Refills: 0
Start: 2019-05-02 | End: 2019-11-04 | Stop reason: SDUPTHER
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
## 2019-05-22 ENCOUNTER — OFFICE VISIT (OUTPATIENT)
Dept: PODIATRY | Facility: CLINIC | Age: 64
End: 2019-05-22
Payer: MEDICARE

## 2019-05-22 VITALS
SYSTOLIC BLOOD PRESSURE: 147 MMHG | RESPIRATION RATE: 16 BRPM | HEIGHT: 65 IN | WEIGHT: 144 LBS | HEART RATE: 96 BPM | BODY MASS INDEX: 23.99 KG/M2 | DIASTOLIC BLOOD PRESSURE: 74 MMHG

## 2019-05-22 DIAGNOSIS — M25.572 ACUTE LEFT ANKLE PAIN: ICD-10-CM

## 2019-05-22 DIAGNOSIS — S92.115A CLOSED NONDISPLACED FRACTURE OF NECK OF LEFT TALUS, INITIAL ENCOUNTER: Primary | ICD-10-CM

## 2019-05-22 DIAGNOSIS — M79.672 FOOT PAIN, LEFT: ICD-10-CM

## 2019-05-22 PROCEDURE — 99213 PR OFFICE/OUTPT VISIT, EST, LEVL III, 20-29 MIN: ICD-10-PCS | Mod: 25,,, | Performed by: PODIATRIST

## 2019-05-22 PROCEDURE — 73610 PR  X-RAY ANKLE 3+ VW: ICD-10-PCS | Mod: LT,,, | Performed by: PODIATRIST

## 2019-05-22 PROCEDURE — 73630 PR  X-RAY FOOT 3+ VW: ICD-10-PCS | Mod: LT,,, | Performed by: PODIATRIST

## 2019-05-22 PROCEDURE — 3078F DIAST BP <80 MM HG: CPT | Mod: ,,, | Performed by: PODIATRIST

## 2019-05-22 PROCEDURE — 3008F BODY MASS INDEX DOCD: CPT | Mod: ,,, | Performed by: PODIATRIST

## 2019-05-22 PROCEDURE — 3078F PR MOST RECENT DIASTOLIC BLOOD PRESSURE < 80 MM HG: ICD-10-PCS | Mod: ,,, | Performed by: PODIATRIST

## 2019-05-22 PROCEDURE — 99213 OFFICE O/P EST LOW 20 MIN: CPT | Mod: 25,,, | Performed by: PODIATRIST

## 2019-05-22 PROCEDURE — 3077F SYST BP >= 140 MM HG: CPT | Mod: ,,, | Performed by: PODIATRIST

## 2019-05-22 PROCEDURE — 73630 X-RAY EXAM OF FOOT: CPT | Mod: LT,,, | Performed by: PODIATRIST

## 2019-05-22 PROCEDURE — 3008F PR BODY MASS INDEX (BMI) DOCUMENTED: ICD-10-PCS | Mod: ,,, | Performed by: PODIATRIST

## 2019-05-22 PROCEDURE — 3077F PR MOST RECENT SYSTOLIC BLOOD PRESSURE >= 140 MM HG: ICD-10-PCS | Mod: ,,, | Performed by: PODIATRIST

## 2019-05-22 PROCEDURE — 73610 X-RAY EXAM OF ANKLE: CPT | Mod: LT,,, | Performed by: PODIATRIST

## 2019-05-22 RX ORDER — HYDROCODONE BITARTRATE AND ACETAMINOPHEN 5; 325 MG/1; MG/1
1 TABLET ORAL EVERY 6 HOURS PRN
Qty: 30 TABLET | Refills: 0 | Status: SHIPPED | OUTPATIENT
Start: 2019-05-22 | End: 2019-09-09

## 2019-05-22 NOTE — PROGRESS NOTES
[address] [facility] [address]  [place] [address]  Phone: [phone]   [phone]    Patient's PCP:Demetrio Pleitez Jr, MD  Referring Provider: No ref. provider found    Subjective:      Chief Complaint:: Foot Pain (left foot)    SANDIE Broderick is a 63 y.o. female who presents with a complaint of  Left foot and ankle pain lasting for a couple of hours. Onset of the symptoms was fell through deck when working on it today.  Current symptoms include pain and swelling and toes feel numb. Aggravating factors are moving it and weight bearing. Symptoms have remained same.Treatment to date have included ice. Patients rates pain 9/10 on pain scale.        Vitals:    05/22/19 1556   BP: (!) 147/74   Pulse: 96   Resp: 16     Shoe Size: 7.8-8    Past Surgical History:   Procedure Laterality Date    CARPAL TUNNEL RELEASE Bilateral 07/18/2014    EYE SURGERY      RK    FRACTURE SURGERY Right     wrist orif    LUNG REMOVAL, PARTIAL  04/27/2018    Dr. Brant Welch    ORIF, WRIST Right 12/3/2012    [facility]    RELEASE-CARPAL TUNNEL  Right Right 7/18/2014    [facility]    TUBAL LIGATION       Past Medical History:   Diagnosis Date    Allergy     seasonal    Anemia associated with chemotherapy 7/18/2018    Anxiety     Arthritis     Back pain     Chemotherapy-induced neutropenia 5/21/2018    Dizziness     Essential hypertension 4/11/2019    Fibromyalgia     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     Fracture of right foot     Hep B w/o coma     Hyperlipidemia     Major depressive disorder, recurrent episode, in partial remission     Malignant neoplasm of lower lobe of right lung-Adenocarcinoma 5/21/2018    Myalgia and myositis, unspecified     OCD (obsessive compulsive disorder)     Osteoporosis     Polyneuropathy     Trouble in sleeping     Urinary incontinence      Family History   Problem  Relation Age of Onset    Heart disease Mother          to to coma (hypothermia)     Heart disease Father 57        heart attack    Heart disease Sister         stents    Diabetes Sister     Parkinsonism Sister     Heart disease Brother 45        death at 45 years old due to hearth disease     Diabetes Maternal Grandmother         Social History:   Marital Status:   Alcohol History:  reports that she does not drink alcohol.  Tobacco History:  reports that she has never smoked. She has never used smokeless tobacco.  Drug History:  reports that she does not use drugs.    Review of patient's allergies indicates:   Allergen Reactions    Penicillins      Other reaction(s): Rash    Trazodone Anxiety     Severe anxiety     Prolia [denosumab] Other (See Comments)     myalgias       Current Outpatient Medications   Medication Sig Dispense Refill    albuterol 90 mcg/actuation inhaler Inhale 2 puffs into the lungs every 6 (six) hours as needed for Wheezing. Rescue 18 g 5    biotin 10,000 mcg Cap       Ca cmb no.8-[id number]-aloe 120-1,000-10 mg-unit-mg Tab Take 1 tablet by mouth.      CHEWABLE VITAMIN C 500 mg Chew       clonazePAM (KLONOPIN) 1 MG tablet Take one-half to one tablet two to three times daily only as needed for anxiety 180 tablet 0    dextroamphetamine-amphetamine 10 mg Tab Take one tablet PO in the morning, and one-half tablet PO in the afternoon 45 tablet 0    esomeprazole magnesium (NEXIUM 24HR) 20 mg TbEC Take 20 mg by mouth once daily.       fluticasone (FLONASE) 50 mcg/actuation nasal spray 1 spray by Each Nare route once daily.      folic acid (FOLVITE) 800 MCG Tab Take 800 mcg by mouth once daily.       hydrocodone-chlorpheniramine (TUSSIONEX) 10-8 mg/5 mL suspension Take 5 mLs by mouth every 12 (twelve) hours as needed for Cough.      ibuprofen (ADVIL,MOTRIN) 800 MG tablet Take 1 tablet (800 mg total) by mouth 2 (two) times daily as needed for Pain. 180 tablet 3     lamoTRIgine (LAMICTAL) 100 MG tablet Take one tablet PO in the morning and two tablets PO in the evening 270 tablet 0    LINZESS 145 mcg Cap capsule Take 1 capsule by mouth daily as needed.       meclizine (ANTIVERT) 25 mg tablet Take 1 tablet (25 mg total) by mouth every 8 (eight) hours as needed for Dizziness. Twice a day 90 tablet 3    mirtazapine (REMERON) 30 MG tablet Take one-half tablet PO nightly 90 tablet 0    ondansetron (ZOFRAN) 8 MG tablet Take 1 tablet (8 mg total) by mouth every 8 (eight) hours as needed for Nausea. 90 tablet 3    sertraline (ZOLOFT) 100 MG tablet Take two tablets PO in the morning 1 tablet 0    venlafaxine (EFFEXOR-XR) 75 MG 24 hr capsule Take 1 capsule (75 mg total) by mouth once daily. 90 capsule 1    vitamin E 400 UNIT capsule Take 400 Units by mouth once daily.      ZYRTEC 10 mg Cap continuous prn.      HYDROcodone-acetaminophen (NORCO) 5-325 mg per tablet Take 1 tablet by mouth every 6 (six) hours as needed for Pain. 30 tablet 0     No current facility-administered medications for this visit.        Review of Systems      Objective:        Physical Exam:   Foot Exam    General  General Appearance: appears stated age and healthy   Orientation: alert and oriented to person, place, and time   Affect: appropriate   Gait: antalgic       Left Foot/Ankle      Inspection and Palpation  Left foot ecchymosis: Dorsal foot and anterior ankle.  Tenderness: (Tenderness throughout the midfoot and hindfoot and ankle joint)  Swelling: (Moderate edema to the mid foot and ankle)  Arch: normal  Hammertoes: absent  Claw toes: absent  Hallux valgus: no  Hallux limitus: no  Skin Exam: skin intact;     Neurovascular  Dorsalis pedis: 2+  Posterior tibial: 2+  Saphenous nerve sensation: normal  Tibial nerve sensation: normal  Superficial peroneal nerve sensation: normal  Deep peroneal nerve sensation: normal  Sural nerve sensation: normal    Muscle Strength  Ankle dorsiflexion: 5  Ankle plantar  flexion: 5  Ankle inversion: 5  Ankle eversion: 5  Great toe extension: 5  Great toe flexion: 5    Tests  Anterior drawer: negative   Varus Tilt: negative   Syndesmosis squeeze test: negative   Talar tilt: negative     Comments  No gross deformity of the foot or ankle is noted.    Significant tenderness with range of motion of the ankle and subtalar joints.    Capillary refill is normal to all digits.    Physical Exam   Cardiovascular:   Pulses:       Dorsalis pedis pulses are 2+ on the left side.        Posterior tibial pulses are 2+ on the left side.       Imaging: X-Ray Ankle Complete Left  AP, Lat, and mortise views of the ankle are obtained and reviewed personally by me today.     There is a faint radiolucent line running obliquely through the talar neck seen on lateral view. No significant displacement is noted.     No other fractures or dislocations noted.    No bone tumors or soft tissue masses.    Joint spaces relatively well preserved.    Electronically Signed By: Andrade Beard DPM  X-Ray Foot Complete Left  X-ray 3 views of the left foot were taken AP, lateral, and oblique, weight bearing showing:    There is a faint radiolucent line running obliquely through the talar neck seen on lateral view. No significant displacement is noted.     No other fractures or dislocations noted.    No bone tumors or soft tissue masses.    Joint spaces relatively well preserved.    Electronically Signed by: Andrade Beard DPM               Assessment:       1. Closed nondisplaced fracture of neck of left talus, initial encounter    2. Foot pain, left    3. Acute left ankle pain      Plan:   Closed nondisplaced fracture of neck of left talus, initial encounter  -     AIR CAST WALKER BOOT FOR HOME USE  -     CT Ankle (Including Hindfoot) Without Contrast Left; Future; Expected date: 05/22/2019    Foot pain, left  -     X-Ray Foot Complete Left  -     HYDROcodone-acetaminophen (NORCO) 5-325 mg per tablet; Take 1 tablet by mouth  every 6 (six) hours as needed for Pain.  Dispense: 30 tablet; Refill: 0    Acute left ankle pain  -     X-Ray Ankle Complete Left  -     HYDROcodone-acetaminophen (NORCO) 5-325 mg per tablet; Take 1 tablet by mouth every 6 (six) hours as needed for Pain.  Dispense: 30 tablet; Refill: 0      Follow up pending CT results .    Procedures - None    CAM walker boot with non-weight bearing  Ice to painful area, 15 minutes at a time  Ace-wrap to help with swelling  No barefoot   Keep affected extremity elevated while seated      Counseling:     I provided patient education verbally regarding:   Patient diagnosis, treatment options, as well as alternatives, risks, and benefits.     I discussed with the pt. the type of fracture and the treatment and time required for healing of the the specific type fracture.    This note was created using Dragon voice recognition software that occasionally misinterpreted phrases or words.

## 2019-05-22 NOTE — PATIENT INSTRUCTIONS
Foot Fracture  You have a broken bone (fracture) in your foot. This will cause pain, swelling, and often bruising. It will usually take about 4 to 8 weeks to heal. A foot fracture may be treated with a special shoe, splint, cast, or boot.  Home care  Follow these guidelines when caring for yourself at home:  · You may be given a splint, cast, shoe, or boot to keep the injured area from moving. Unless you were told otherwise, use crutches or a walker. Dont put weight on the injured foot until your health care provider says you can do so. (You can rent crutches and a walker at many pharmacies and surgical or orthopedic supply stores.) Dont put weight on a splint, or it will break.  · Keep your leg elevated to reduce pain and swelling. When sleeping, put a pillow under the injured leg. When sitting, support the injured leg so it is above your waist. This is very important during the first 2 days (48 hours).  · Put an ice pack on the injured area. Do this for 20 minutes every 1 to 2 hours the first day for pain relief. You can make an ice pack by wrapping a plastic bag of ice cubes in a thin towel. As the ice melts, be careful that the splint, cast, boot, or shoe doesnt get wet. You can place the ice pack directly over the splint or cast. Unless told otherwise, you can open the boot or shoe to apply the ice pack. Continue using the ice pack 3 to 4 times a day for the next 2 days. Then use the ice pack as needed to ease pain and swelling.  · Keep the splint, cast, boot, or shoe dry. When bathing, protect it with a large plastic bag, rubber-banded at the top end. If a fiberglass splint or cast or boot gets wet, you can dry it with a hair dryer. Unless told otherwise, you can take off the boot or shoe to bathe.  · You may use acetaminophen or ibuprofen to control pain, unless another pain medicine was prescribed. If you have chronic liver or kidney disease, talk with your healthcare provider before using these  medicines. Also talk with your provider if youve had a stomach ulcer or gastrointestinal bleeding.  · Dont put creams or objects under the cast if you have itching.  Follow-up care  Follow up with your healthcare provider, or as advised. This is to make sure the bone is healing the way it should. If you were given a splint, it may be changed to a cast or boot at your follow-up visit.  X-rays may be taken. You will be told of any new findings that may affect your care.  When to seek medical advice  Call your healthcare provider right away if any of these occur:  · The cast or splint cracks  · The plaster cast or splint becomes wet or soft  · The fiberglass cast or splint stays wet for more than 24 hours  · Bad odor from the cast or wound fluid stains the cast  · Tightness or pain under the cast or splint gets worse  · Toes become swollen, cold, blue, numb, or tingly  · You cant move your toes  · Skin around cast or splint becomes red  · Fever of 100.4ºF (38ºC) or higher, or as directed by your healthcare provider  Date Last Reviewed: 2/1/2017  © 9322-8333 [employer]. [address] [place] [address], [place] [address]. All rights reserved. This information is not intended as a substitute for professional medical care. Always follow your healthcare professional's instructions.

## 2019-05-28 RX ORDER — IBUPROFEN 800 MG/1
800 TABLET ORAL 2 TIMES DAILY PRN
Qty: 180 TABLET | Refills: 3 | Status: SHIPPED | OUTPATIENT
Start: 2019-05-28 | End: 2021-04-06

## 2019-05-28 RX ORDER — MECLIZINE HYDROCHLORIDE 25 MG/1
25 TABLET ORAL EVERY 8 HOURS PRN
Qty: 90 TABLET | Refills: 3 | Status: SHIPPED | OUTPATIENT
Start: 2019-05-28 | End: 2019-06-06 | Stop reason: SDUPTHER

## 2019-05-29 ENCOUNTER — TELEPHONE (OUTPATIENT)
Dept: PODIATRY | Facility: CLINIC | Age: 64
End: 2019-05-29

## 2019-05-29 NOTE — TELEPHONE ENCOUNTER
----- Message from Andrade Beard DPM sent at 5/28/2019  5:47 PM CDT -----  Let pt know to follow up to go over ct results

## 2019-05-30 ENCOUNTER — OFFICE VISIT (OUTPATIENT)
Dept: PODIATRY | Facility: CLINIC | Age: 64
End: 2019-05-30
Payer: MEDICARE

## 2019-05-30 VITALS
HEIGHT: 65 IN | WEIGHT: 144 LBS | SYSTOLIC BLOOD PRESSURE: 147 MMHG | HEART RATE: 96 BPM | DIASTOLIC BLOOD PRESSURE: 74 MMHG | BODY MASS INDEX: 23.99 KG/M2

## 2019-05-30 DIAGNOSIS — S92.025D CLOSED NONDISPLACED FRACTURE OF ANTERIOR PROCESS OF LEFT CALCANEUS WITH ROUTINE HEALING, SUBSEQUENT ENCOUNTER: Primary | ICD-10-CM

## 2019-05-30 DIAGNOSIS — M79.672 FOOT PAIN, LEFT: ICD-10-CM

## 2019-05-30 DIAGNOSIS — M25.572 ACUTE LEFT ANKLE PAIN: ICD-10-CM

## 2019-05-30 PROCEDURE — 99214 OFFICE O/P EST MOD 30 MIN: CPT | Mod: ,,, | Performed by: PODIATRIST

## 2019-05-30 PROCEDURE — 99214 PR OFFICE/OUTPT VISIT, EST, LEVL IV, 30-39 MIN: ICD-10-PCS | Mod: ,,, | Performed by: PODIATRIST

## 2019-05-30 PROCEDURE — 3078F DIAST BP <80 MM HG: CPT | Mod: ,,, | Performed by: PODIATRIST

## 2019-05-30 PROCEDURE — 3008F PR BODY MASS INDEX (BMI) DOCUMENTED: ICD-10-PCS | Mod: ,,, | Performed by: PODIATRIST

## 2019-05-30 PROCEDURE — 3077F PR MOST RECENT SYSTOLIC BLOOD PRESSURE >= 140 MM HG: ICD-10-PCS | Mod: ,,, | Performed by: PODIATRIST

## 2019-05-30 PROCEDURE — 3077F SYST BP >= 140 MM HG: CPT | Mod: ,,, | Performed by: PODIATRIST

## 2019-05-30 PROCEDURE — 3008F BODY MASS INDEX DOCD: CPT | Mod: ,,, | Performed by: PODIATRIST

## 2019-05-30 PROCEDURE — 3078F PR MOST RECENT DIASTOLIC BLOOD PRESSURE < 80 MM HG: ICD-10-PCS | Mod: ,,, | Performed by: PODIATRIST

## 2019-05-30 NOTE — PROGRESS NOTES
[address] [facility] [address]  [place] [address]  Phone: [phone]   [phone]    Patient's PCP:Demetrio Pleitez Jr, MD  Referring Provider: No ref. provider found    Subjective:      Chief Complaint:: Results (CT results)    SANDIE Broderick is a 63 y.o. female who presents to clinic to follow-up for CT results. Patient says after walking for some time, pain is pulsating and throbbing. Patient states that she did some yardwork in the boot. She also states that she has not been icing the foot as she was told by another doctor to discontinue icing.      Vitals:    19 1538   BP: (!) 147/74   Pulse: 96     Shoe Size:  8    Past Surgical History:   Procedure Laterality Date    CARPAL TUNNEL RELEASE Bilateral 2014    EYE SURGERY      RK    FRACTURE SURGERY Right     wrist orif    LUNG REMOVAL, PARTIAL  2018    Dr. Brant Welch    ORIF, WRIST Right 12/3/2012    Performed [facility] OR    RELEASE-CARPAL TUNNEL  Right Right 2014    [facility]    TUBAL LIGATION       Past Medical History:   Diagnosis Date    Allergy     seasonal    Anemia associated with chemotherapy 2018    Anxiety     Arthritis     Back pain     Chemotherapy-induced neutropenia 2018    Dizziness     Essential hypertension 2019    Fibromyalgia     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     Fracture of right foot     Hep B w/o coma     Hyperlipidemia     Major depressive disorder, recurrent episode, in partial remission     Malignant neoplasm of lower lobe of right lung-Adenocarcinoma 2018    Myalgia and myositis, unspecified     OCD (obsessive compulsive disorder)     Osteoporosis     Polyneuropathy     Trouble in sleeping     Urinary incontinence      Family History   Problem Relation Age of Onset    Heart disease Mother          to to coma (hypothermia)     Heart disease Father  57        heart attack    Heart disease Sister         stents    Diabetes Sister     Parkinsonism Sister     Heart disease Brother 45        death at 45 years old due to hearth disease     Diabetes Maternal Grandmother         Social History:   Marital Status:   Alcohol History:  reports that she does not drink alcohol.  Tobacco History:  reports that she has never smoked. She has never used smokeless tobacco.  Drug History:  reports that she does not use drugs.    Review of patient's allergies indicates:   Allergen Reactions    Penicillins      Other reaction(s): Rash    Trazodone Anxiety     Severe anxiety     Prolia [denosumab] Other (See Comments)     myalgias       Current Outpatient Medications   Medication Sig Dispense Refill    albuterol 90 mcg/actuation inhaler Inhale 2 puffs into the lungs every 6 (six) hours as needed for Wheezing. Rescue 18 g 5    biotin 10,000 mcg Cap       Ca cmb no.3-[id number]-B86-aloe 120-1,000-10 mg-unit-mg Tab Take 1 tablet by mouth.      CHEWABLE VITAMIN C 500 mg Chew       clonazePAM (KLONOPIN) 1 MG tablet Take one-half to one tablet two to three times daily only as needed for anxiety 180 tablet 0    dextroamphetamine-amphetamine 10 mg Tab Take one tablet PO in the morning, and one-half tablet PO in the afternoon 45 tablet 0    esomeprazole magnesium (NEXIUM 24HR) 20 mg TbEC Take 20 mg by mouth once daily.       fluticasone (FLONASE) 50 mcg/actuation nasal spray 1 spray by Each Nare route once daily.      folic acid (FOLVITE) 800 MCG Tab Take 800 mcg by mouth once daily.       HYDROcodone-acetaminophen (NORCO) 5-325 mg per tablet Take 1 tablet by mouth every 6 (six) hours as needed for Pain. 30 tablet 0    hydrocodone-chlorpheniramine (TUSSIONEX) 10-8 mg/5 mL suspension Take 5 mLs by mouth every 12 (twelve) hours as needed for Cough.      ibuprofen (ADVIL,MOTRIN) 800 MG tablet Take 1 tablet (800 mg total) by mouth 2 (two) times daily as needed for Pain.  180 tablet 3    lamoTRIgine (LAMICTAL) 100 MG tablet Take one tablet PO in the morning and two tablets PO in the evening 270 tablet 0    LINZESS 145 mcg Cap capsule Take 1 capsule by mouth daily as needed.       meclizine (ANTIVERT) 25 mg tablet Take 1 tablet (25 mg total) by mouth every 8 (eight) hours as needed for Dizziness. Twice a day 90 tablet 3    mirtazapine (REMERON) 30 MG tablet Take one-half tablet PO nightly 90 tablet 0    ondansetron (ZOFRAN) 8 MG tablet Take 1 tablet (8 mg total) by mouth every 8 (eight) hours as needed for Nausea. 90 tablet 3    sertraline (ZOLOFT) 100 MG tablet Take two tablets PO in the morning 1 tablet 0    venlafaxine (EFFEXOR-XR) 75 MG 24 hr capsule Take 1 capsule (75 mg total) by mouth once daily. 90 capsule 1    vitamin E 400 UNIT capsule Take 400 Units by mouth once daily.      ZYRTEC 10 mg Cap continuous prn.       No current facility-administered medications for this visit.        Review of Systems      Objective:        Physical Exam:   Foot Exam    General  General Appearance: appears stated age and healthy   Orientation: alert and oriented to person, place, and time   Affect: appropriate   Gait: antalgic       Left Foot/Ankle      Inspection and Palpation  Left foot ecchymosis: Dorsal foot and anterior ankle.  Tenderness: (Tenderness throughout the midfoot and hindfoot and ankle joint)  Swelling: (Moderate edema to the mid foot and ankle)  Arch: normal  Hammertoes: absent  Claw toes: absent  Hallux valgus: no  Hallux limitus: no  Skin Exam: skin intact;     Neurovascular  Dorsalis pedis: 2+  Posterior tibial: 2+  Saphenous nerve sensation: normal  Tibial nerve sensation: normal  Superficial peroneal nerve sensation: normal  Deep peroneal nerve sensation: normal  Sural nerve sensation: normal    Muscle Strength  Ankle dorsiflexion: 5  Ankle plantar flexion: 5  Ankle inversion: 5  Ankle eversion: 5  Great toe extension: 5  Great toe flexion: 5    Comments  Edema  and ecchymosis has improved from the last examination.    Physical Exam   Cardiovascular:   Pulses:       Dorsalis pedis pulses are 2+ on the left side.        Posterior tibial pulses are 2+ on the left side.       Imaging: CT Lower Extremity Without Contrast Left  CMS MANDATED QUALITY DATA - CT RADIATION - 436    All CT scans at this facility utilize dose modulation, iterative reconstruction,  and/or weight based dosing when appropriate to reduce radiation dose to as low  as reasonably achievable.    MDI LOWER EXTREMITY LEFT WO CONTRAST CT    3-D volume rendered images were created at a separate workstation and stored in  the patient's permanent medical record.    Indication: Closed fracture talus, neck.    Comparison: None    Findings: Acute comminuted fracture involving the anterior process of the  calcaneus which is nondisplaced. Fracture extends to involve the calcaneocuboid  articulation. There is also nondisplaced small avulsion fracture involving the  posterior aspect of the cuboid, just medial to the calcaneocuboid articulation  (image 139 series 4). Talus, navicular, and cuneiforms appear intact. Small os  peroneus incidentally noted. There is a tiny accessory navicular. There is a  small focus of corticated heterotopic ossification adjacent to the lateral  malleolus likely reflecting sequela of remote trauma. Similar corticated  ossific focus is evident inferior to the medial malleolus, also likely on the  basis of remote trauma.    There is mild soft tissue swelling/subcutaneous edema about the ankle. No soft  tissue gas or radiopaque foreign body. Limited assessment of flexor and  extensor tendons, grossly intact. Mild calcaneal enthesopathy.    IMPRESSION:    1. Acute comminuted fracture involving the anterior process of calcaneus,  nondisplaced.  2. Minute avulsion fracture involving the posterior aspect of the cuboid.  3. Additional findings as above.    Read and electronically signed by: Jeovanny  MD Corwin on 5/27/2019 1:01 PM CDT    PIA LORD MD               Assessment:       1. Closed nondisplaced fracture of anterior process of left calcaneus with routine healing, subsequent encounter - Left Foot    2. Foot pain, left - Left Foot    3. Acute left ankle pain - Left Foot      Plan:   Closed nondisplaced fracture of anterior process of left calcaneus with routine healing, subsequent encounter - Left Foot    Foot pain, left - Left Foot    Acute left ankle pain - Left Foot      Follow up in about 5 weeks (around 7/4/2019).    Procedures - None    CT results were reviewed in detail with the patient and her . I also explained in detail the patient that she needs to remain nonweightbearing to the foot to prevent potential complications with the bone healing.    CAM walker boot with non-weight bearing  Ice to painful area, 15 minutes at a time  Ace-wrap to help with swelling  No barefoot   Keep affected extremity elevated while seated      Counseling:     I provided patient education verbally regarding:   Patient diagnosis, treatment options, as well as alternatives, risks, and benefits.     I discussed with the pt. the type of fracture and the treatment and time required for healing of the the specific type fracture.      This note was created using Dragon voice recognition software that occasionally misinterpreted phrases or words.

## 2019-06-06 ENCOUNTER — TELEPHONE (OUTPATIENT)
Dept: FAMILY MEDICINE | Facility: CLINIC | Age: 64
End: 2019-06-06

## 2019-06-06 RX ORDER — MECLIZINE HYDROCHLORIDE 25 MG/1
25 TABLET ORAL EVERY 8 HOURS PRN
Qty: 90 TABLET | Refills: 3 | Status: SHIPPED | OUTPATIENT
Start: 2019-06-06 | End: 2021-06-23

## 2019-06-06 NOTE — TELEPHONE ENCOUNTER
----- Message from Marianne Lyles sent at 6/5/2019  4:31 PM CDT -----  Contact: Estelle with [facility]   Rx Meclizine 25 mg and want to verify directions and please advise [phone] and fax # [phone]

## 2019-07-01 ENCOUNTER — TELEPHONE (OUTPATIENT)
Dept: PODIATRY | Facility: CLINIC | Age: 64
End: 2019-07-01

## 2019-07-01 NOTE — TELEPHONE ENCOUNTER
----- Message from Chika Donaldson sent at 7/1/2019  3:14 PM CDT -----  Contact: self  Patient asks for a nurse to call her back. The bootcast was making her back and hips hurt so she took it off a few days ago.  Thank you,  Chika

## 2019-07-02 ENCOUNTER — OFFICE VISIT (OUTPATIENT)
Dept: PODIATRY | Facility: CLINIC | Age: 64
End: 2019-07-02
Payer: MEDICARE

## 2019-07-02 VITALS
RESPIRATION RATE: 17 BRPM | BODY MASS INDEX: 23.99 KG/M2 | HEART RATE: 101 BPM | DIASTOLIC BLOOD PRESSURE: 72 MMHG | HEIGHT: 65 IN | SYSTOLIC BLOOD PRESSURE: 126 MMHG | WEIGHT: 144 LBS

## 2019-07-02 DIAGNOSIS — S92.115A CLOSED NONDISPLACED FRACTURE OF NECK OF LEFT TALUS, INITIAL ENCOUNTER: ICD-10-CM

## 2019-07-02 DIAGNOSIS — M79.672 FOOT PAIN, LEFT: ICD-10-CM

## 2019-07-02 DIAGNOSIS — M25.572 ACUTE LEFT ANKLE PAIN: ICD-10-CM

## 2019-07-02 DIAGNOSIS — S92.025D CLOSED NONDISPLACED FRACTURE OF ANTERIOR PROCESS OF LEFT CALCANEUS WITH ROUTINE HEALING, SUBSEQUENT ENCOUNTER: Primary | ICD-10-CM

## 2019-07-02 PROCEDURE — 3074F SYST BP LT 130 MM HG: CPT | Mod: ,,, | Performed by: PODIATRIST

## 2019-07-02 PROCEDURE — 99213 OFFICE O/P EST LOW 20 MIN: CPT | Mod: 25,,, | Performed by: PODIATRIST

## 2019-07-02 PROCEDURE — 3008F PR BODY MASS INDEX (BMI) DOCUMENTED: ICD-10-PCS | Mod: ,,, | Performed by: PODIATRIST

## 2019-07-02 PROCEDURE — 3078F PR MOST RECENT DIASTOLIC BLOOD PRESSURE < 80 MM HG: ICD-10-PCS | Mod: ,,, | Performed by: PODIATRIST

## 2019-07-02 PROCEDURE — 73610 PR  X-RAY ANKLE 3+ VW: ICD-10-PCS | Mod: LT,,, | Performed by: PODIATRIST

## 2019-07-02 PROCEDURE — 99213 PR OFFICE/OUTPT VISIT, EST, LEVL III, 20-29 MIN: ICD-10-PCS | Mod: 25,,, | Performed by: PODIATRIST

## 2019-07-02 PROCEDURE — 3008F BODY MASS INDEX DOCD: CPT | Mod: ,,, | Performed by: PODIATRIST

## 2019-07-02 PROCEDURE — 3078F DIAST BP <80 MM HG: CPT | Mod: ,,, | Performed by: PODIATRIST

## 2019-07-02 PROCEDURE — 3074F PR MOST RECENT SYSTOLIC BLOOD PRESSURE < 130 MM HG: ICD-10-PCS | Mod: ,,, | Performed by: PODIATRIST

## 2019-07-02 PROCEDURE — 73630 PR  X-RAY FOOT 3+ VW: ICD-10-PCS | Mod: LT,,, | Performed by: PODIATRIST

## 2019-07-02 PROCEDURE — 73610 X-RAY EXAM OF ANKLE: CPT | Mod: LT,,, | Performed by: PODIATRIST

## 2019-07-02 PROCEDURE — 73630 X-RAY EXAM OF FOOT: CPT | Mod: LT,,, | Performed by: PODIATRIST

## 2019-07-02 RX ORDER — HYDROCODONE BITARTRATE AND ACETAMINOPHEN 7.5; 325 MG/1; MG/1
1 TABLET ORAL EVERY 6 HOURS PRN
Qty: 28 TABLET | Refills: 0 | Status: SHIPPED | OUTPATIENT
Start: 2019-07-02 | End: 2019-07-09

## 2019-07-02 RX ORDER — IBUPROFEN AND FAMOTIDINE 26.6; 8 MG/1; MG/1
1 TABLET ORAL 3 TIMES DAILY
Qty: 90 TABLET | Refills: 1 | Status: SHIPPED | OUTPATIENT
Start: 2019-07-02 | End: 2019-07-22

## 2019-07-02 NOTE — PROGRESS NOTES
[address] [facility] [address]  [place] [address]  Phone: [phone]   [phone]    Patient's PCP:Demetrio Pleitez Jr, MD  Referring Provider: No ref. provider found    Subjective:      Chief Complaint:: Follow-up (fx of left calcaneus and neck of left talus)    SANDIE Broderick is a 63 y.o. female who presents for a follow up of closed fracture of left calcaneus and neck of talus. Onset of the symptoms was stepping in a hole.  Current symptoms include radiating pain, swelling, unable to bear weight.  Aggravating factors are bearing weight and bootcast is causing back pain. Symptoms have not improved since last visit.Treatment to date have included ice, ace wrap, bootcast, rest as needed. Patients rates pain 10/10 on pain scale.      Vitals:    07/02/19 0856   Resp: 17     Shoe Size:     Past Surgical History:   Procedure Laterality Date    CARPAL TUNNEL RELEASE Bilateral 07/18/2014    EYE SURGERY      RK    FRACTURE SURGERY Right     wrist orif    LUNG REMOVAL, PARTIAL  04/27/2018    Dr. Brant Welch    ORIF, WRIST Right 12/3/2012    Performed by [facility] OR    RELEASE-CARPAL TUNNEL  Right Right 7/18/2014    [facility] at [facility] OR    TUBAL LIGATION       Past Medical History:   Diagnosis Date    Allergy     seasonal    Anemia associated with chemotherapy 7/18/2018    Anxiety     Arthritis     Back pain     Chemotherapy-induced neutropenia 5/21/2018    Dizziness     Essential hypertension 4/11/2019    Fibromyalgia     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     Fracture of right foot     Hep B w/o coma     Hyperlipidemia     Major depressive disorder, recurrent episode, in partial remission     Malignant neoplasm of lower lobe of right lung-Adenocarcinoma 5/21/2018    Myalgia and myositis, unspecified     OCD (obsessive compulsive disorder)     Osteoporosis     Polyneuropathy     Trouble in sleeping      Urinary incontinence      Family History   Problem Relation Age of Onset    Heart disease Mother          to to coma (hypothermia)     Heart disease Father 57        heart attack    Heart disease Sister         stents    Diabetes Sister     Parkinsonism Sister     Heart disease Brother 45        death at 45 years old due to hearth disease     Diabetes Maternal Grandmother         Social History:   Marital Status:   Alcohol History:  reports that she does not drink alcohol.  Tobacco History:  reports that she has never smoked. She has never used smokeless tobacco.  Drug History:  reports that she does not use drugs.    Review of patient's allergies indicates:   Allergen Reactions    Penicillins      Other reaction(s): Rash    Trazodone Anxiety     Severe anxiety     Prolia [denosumab] Other (See Comments)     myalgias       Current Outpatient Medications   Medication Sig Dispense Refill    biotin 10,000 mcg Cap       Ca cmb no.7-O6-P-7-MG-T16-aloe 120-1,000-10 mg-unit-mg Tab Take 1 tablet by mouth.      CHEWABLE VITAMIN C 500 mg Chew       clonazePAM (KLONOPIN) 1 MG tablet Take one-half to one tablet two to three times daily only as needed for anxiety 180 tablet 0    dextroamphetamine-amphetamine 10 mg Tab Take one tablet PO in the morning, and one-half tablet PO in the afternoon 45 tablet 0    esomeprazole magnesium (NEXIUM 24HR) 20 mg TbEC Take 20 mg by mouth once daily.       fluticasone (FLONASE) 50 mcg/actuation nasal spray 1 spray by Each Nare route once daily.      folic acid (FOLVITE) 800 MCG Tab Take 800 mcg by mouth once daily.       ibuprofen (ADVIL,MOTRIN) 800 MG tablet Take 1 tablet (800 mg total) by mouth 2 (two) times daily as needed for Pain. 180 tablet 3    lamoTRIgine (LAMICTAL) 100 MG tablet Take one tablet PO in the morning and two tablets PO in the evening 270 tablet 0    LINZESS 145 mcg Cap capsule Take 1 capsule by mouth daily as needed.       meclizine  (ANTIVERT) 25 mg tablet Take 1 tablet (25 mg total) by mouth every 8 (eight) hours as needed for Dizziness. 90 tablet 3    mirtazapine (REMERON) 30 MG tablet Take one-half tablet PO nightly 90 tablet 0    ondansetron (ZOFRAN) 8 MG tablet Take 1 tablet (8 mg total) by mouth every 8 (eight) hours as needed for Nausea. 90 tablet 3    sertraline (ZOLOFT) 100 MG tablet Take two tablets PO in the morning 1 tablet 0    venlafaxine (EFFEXOR-XR) 75 MG 24 hr capsule Take 1 capsule (75 mg total) by mouth once daily. 90 capsule 1    vitamin E 400 UNIT capsule Take 400 Units by mouth once daily.      ZYRTEC 10 mg Cap continuous prn.      albuterol 90 mcg/actuation inhaler Inhale 2 puffs into the lungs every 6 (six) hours as needed for Wheezing. Rescue 18 g 5    HYDROcodone-acetaminophen (NORCO) 5-325 mg per tablet Take 1 tablet by mouth every 6 (six) hours as needed for Pain. 30 tablet 0    hydrocodone-chlorpheniramine (TUSSIONEX) 10-8 mg/5 mL suspension Take 5 mLs by mouth every 12 (twelve) hours as needed for Cough.       No current facility-administered medications for this visit.        Review of Systems      Objective:        Physical Exam:   Foot Exam  Physical Exam   Musculoskeletal:        Feet:        Imaging:   X-Ray Foot Complete Left  There is a healing comminuted fracture a avulsion type of the distal superior calcaneus. No other abnormality seen today.  X-Ray Ankle Complete Left  There is no signs of any fractures of the ankle no dislocations.           Assessment:       1. Closed nondisplaced fracture of anterior process of left calcaneus with routine healing, subsequent encounter - Left Foot    2. Closed nondisplaced fracture of neck of left talus, initial encounter    3. Foot pain, left - Left Foot    4. Acute left ankle pain - Left Foot      Plan:   Closed nondisplaced fracture of anterior process of left calcaneus with routine healing, subsequent encounter - Left Foot    Closed nondisplaced fracture  of neck of left talus, initial encounter    Foot pain, left - Left Foot    Acute left ankle pain - Left Foot    Will transition to ankle brace with shoe continue to ice area prescription for pain pills today return in 4 weeks for evaluation with Dr. Beard.  Patient may need MRI for evaluation of peroneal tendons.  No follow-ups on file.    Procedures - None    Counseling:   I discussed with the pt. the type of fracture and the treatment and time required for healing of the the specific type fracture.  I provided patient education verbally regarding:   Patient diagnosis, treatment options, as well as alternatives, risks, and benefits.     This note was created using Dragon voice recognition software that occasionally misinterpreted phrases or words.

## 2019-07-02 NOTE — TELEPHONE ENCOUNTER
----- Message from Pauline Huitron sent at 7/2/2019  1:58 PM CDT -----  Contact: patient  Mrs Broderick called and mentioned that Dr Salazar told her about an anti-inflammatory with a coating to help with her stomach because she cant take ibuprofen, shes wondering if she can go ahead and get that sent in for her. Also she asked if we could call her letting her know the name of it and if it was sent to her pharmacy or a specialty pharmacy    Thanks  Karla

## 2019-07-10 ENCOUNTER — OFFICE VISIT (OUTPATIENT)
Dept: HEMATOLOGY/ONCOLOGY | Facility: CLINIC | Age: 64
End: 2019-07-10
Payer: MEDICARE

## 2019-07-10 VITALS
RESPIRATION RATE: 18 BRPM | DIASTOLIC BLOOD PRESSURE: 75 MMHG | HEART RATE: 93 BPM | TEMPERATURE: 99 F | SYSTOLIC BLOOD PRESSURE: 136 MMHG

## 2019-07-10 DIAGNOSIS — C34.31 MALIGNANT NEOPLASM OF LOWER LOBE OF RIGHT LUNG: Chronic | ICD-10-CM

## 2019-07-10 PROCEDURE — 3078F DIAST BP <80 MM HG: CPT | Mod: ,,, | Performed by: INTERNAL MEDICINE

## 2019-07-10 PROCEDURE — 3075F SYST BP GE 130 - 139MM HG: CPT | Mod: ,,, | Performed by: INTERNAL MEDICINE

## 2019-07-10 PROCEDURE — 3075F PR MOST RECENT SYSTOLIC BLOOD PRESS GE 130-139MM HG: ICD-10-PCS | Mod: ,,, | Performed by: INTERNAL MEDICINE

## 2019-07-10 PROCEDURE — 99214 OFFICE O/P EST MOD 30 MIN: CPT | Mod: ,,, | Performed by: INTERNAL MEDICINE

## 2019-07-10 PROCEDURE — 3078F PR MOST RECENT DIASTOLIC BLOOD PRESSURE < 80 MM HG: ICD-10-PCS | Mod: ,,, | Performed by: INTERNAL MEDICINE

## 2019-07-10 PROCEDURE — 99214 PR OFFICE/OUTPT VISIT, EST, LEVL IV, 30-39 MIN: ICD-10-PCS | Mod: ,,, | Performed by: INTERNAL MEDICINE

## 2019-07-10 RX ORDER — ZINC GLUCONATE 50 MG
500 TABLET ORAL DAILY
COMMUNITY
Start: 2019-07-07 | End: 2021-04-06 | Stop reason: ALTCHOICE

## 2019-07-10 NOTE — PROGRESS NOTES
PROGRESS NOTE    Subjective:       Patient ID: Sheri Broderick is a 63 y.o. female.    Chief Complaint:  Follow-up and Results  lung cancer, chemo follow up.     History of Present Illness:   Sheri Broderick is a 63 y.o. female who presents for follow up. Patient fell last month while working on a deck outside her home.  She fell approx 24 inches and landed directly on her feet.  Her lower back has been hurting since that time.  She is here for PET scan today.       Family and Social history reviewed and is unchanged from May 21, 2018      ROS:  Review of Systems   Constitutional: Negative for fever.   Respiratory: Negative for shortness of breath.    Cardiovascular: Negative for chest pain and leg swelling.   Gastrointestinal: Negative for abdominal pain and blood in stool.   Genitourinary: Negative for hematuria.   Skin: Negative for rash.          Current Outpatient Medications:     albuterol 90 mcg/actuation inhaler, Inhale 2 puffs into the lungs every 6 (six) hours as needed for Wheezing. Rescue, Disp: 18 g, Rfl: 5    biotin 10,000 mcg Cap, , Disp: , Rfl:     Ca cmb no.1-Q1-Y-2-RE-N90-aloe 120-1,000-10 mg-unit-mg Tab, Take 1 tablet by mouth., Disp: , Rfl:     CHEWABLE VITAMIN C 500 mg Chew, , Disp: , Rfl:     clonazePAM (KLONOPIN) 1 MG tablet, Take one-half to one tablet two to three times daily only as needed for anxiety, Disp: 180 tablet, Rfl: 0    dextroamphetamine-amphetamine 10 mg Tab, Take one tablet PO in the morning, and one-half tablet PO in the afternoon, Disp: 45 tablet, Rfl: 0    esomeprazole magnesium (NEXIUM 24HR) 20 mg TbEC, Take 20 mg by mouth once daily. , Disp: , Rfl:     fluticasone (FLONASE) 50 mcg/actuation nasal spray, 1 spray by Each Nare route once daily., Disp: , Rfl:     folic acid (FOLVITE) 800 MCG Tab, Take 800 mcg by mouth once daily. , Disp: , Rfl:     HYDROcodone-acetaminophen (NORCO) 5-325 mg per tablet,  Take 1 tablet by mouth every 6 (six) hours as needed for Pain., Disp: 30 tablet, Rfl: 0    hydrocodone-chlorpheniramine (TUSSIONEX) 10-8 mg/5 mL suspension, Take 5 mLs by mouth every 12 (twelve) hours as needed for Cough., Disp: , Rfl:     ibuprofen (ADVIL,MOTRIN) 800 MG tablet, Take 1 tablet (800 mg total) by mouth 2 (two) times daily as needed for Pain., Disp: 180 tablet, Rfl: 3    ibuprofen-famotidine (DUEXIS) 800-26.6 mg Tab, Take 1 tablet by mouth 3 (three) times daily., Disp: 90 tablet, Rfl: 1    lamoTRIgine (LAMICTAL) 100 MG tablet, Take one tablet PO in the morning and two tablets PO in the evening, Disp: 270 tablet, Rfl: 0    LINZESS 145 mcg Cap capsule, Take 1 capsule by mouth daily as needed. , Disp: , Rfl:     meclizine (ANTIVERT) 25 mg tablet, Take 1 tablet (25 mg total) by mouth every 8 (eight) hours as needed for Dizziness., Disp: 90 tablet, Rfl: 3    mirtazapine (REMERON) 30 MG tablet, Take one-half tablet PO nightly, Disp: 90 tablet, Rfl: 0    ondansetron (ZOFRAN) 8 MG tablet, Take 1 tablet (8 mg total) by mouth every 8 (eight) hours as needed for Nausea., Disp: 90 tablet, Rfl: 3    sertraline (ZOLOFT) 100 MG tablet, Take two tablets PO in the morning, Disp: 1 tablet, Rfl: 0    venlafaxine (EFFEXOR-XR) 75 MG 24 hr capsule, Take 1 capsule (75 mg total) by mouth once daily., Disp: 90 capsule, Rfl: 1    VITAMIN B-12 1000 MCG tablet, , Disp: , Rfl:     vitamin E 400 UNIT capsule, Take 400 Units by mouth once daily., Disp: , Rfl:     ZYRTEC 10 mg Cap, continuous prn., Disp: , Rfl:         Objective:       Physical Examination:     /75   Pulse 93   Temp 98.8 °F (37.1 °C)   Resp 18     Physical Exam   Constitutional: She appears well-developed and well-nourished.   HENT:   Head: Normocephalic and atraumatic.   Right Ear: External ear normal.   Left Ear: External ear normal.   Mouth/Throat: Oropharynx is clear and moist.   Eyes: Pupils are equal, round, and reactive to light.  Conjunctivae are normal.   Neck: No tracheal deviation present. No thyromegaly present.   Cardiovascular: Normal rate, regular rhythm and normal heart sounds.   Pulmonary/Chest: Effort normal and breath sounds normal.   Abdominal: Soft. Bowel sounds are normal. She exhibits no distension and no mass. There is no tenderness.   Musculoskeletal: She exhibits no edema.   Neurological:   Neuro intact througout   Skin: No rash noted.   Psychiatric: She has a normal mood and affect. Her behavior is normal. Judgment and thought content normal.       Labs:   No results found for this or any previous visit (from the past 336 hour(s)).  CMP  Sodium   Date Value Ref Range Status   10/09/2018 140 134 - 144 mmol/L      Potassium   Date Value Ref Range Status   10/09/2018 4.7 3.5 - 5.0 mmol/L      Chloride   Date Value Ref Range Status   10/09/2018 105 98 - 110 mmol/L      CO2   Date Value Ref Range Status   10/09/2018 29.3 22.8 - 31.6 mmol/L      Glucose   Date Value Ref Range Status   10/09/2018 101 (H) 70 - 99 mg/dL      BUN, Bld   Date Value Ref Range Status   10/09/2018 17 8 - 20 mg/dL      Creatinine   Date Value Ref Range Status   10/09/2018 0.80 0.60 - 1.40 mg/dL    11/29/2012 0.7 0.5 - 1.4 mg/dL Final     Calcium   Date Value Ref Range Status   10/09/2018 9.4 7.7 - 10.4 mg/dL    11/29/2012 9.0 8.7 - 10.5 mg/dL Final     Total Protein   Date Value Ref Range Status   10/09/2018 7.6 6.0 - 8.2 g/dL      Albumin   Date Value Ref Range Status   10/09/2018 4.1 3.1 - 4.7 g/dL      Total Bilirubin   Date Value Ref Range Status   10/09/2018 0.6 0.3 - 1.0 mg/dL      Alkaline Phosphatase   Date Value Ref Range Status   10/09/2018 104 40 - 104 IU/L      AST   Date Value Ref Range Status   10/09/2018 23 10 - 40 IU/L      ALT   Date Value Ref Range Status   06/01/2018 10 6 - 29 U/L Final     Anion Gap   Date Value Ref Range Status   03/03/2016 10 8 - 16 mmol/L Final   11/29/2012 10 5 - 15 meq/L Final     eGFR if    Date  Value Ref Range Status   06/01/2018 94 > OR = 60 mL/min/1.73m2 Final     eGFR if non    Date Value Ref Range Status   06/01/2018 81 > OR = 60 mL/min/1.73m2 Final     No results found for: CEA  No results found for: PSA        Assessment/Plan:     Problem List Items Addressed This Visit     Malignant neoplasm of lower lobe of right lung-Adenocarcinoma (Chronic)     Ms. Broderick's PET scan shows some concerning LNs and possible spine mets to the thoracic and lumbar spine.  She does have lower back pain and did have a fall as outlined above but there is concern for mets as well.  I discussed the case with radiologist as far as a biopsy is concerned and he is requesting I get an MRI of her lower back to better define the lesion.  I will have this done and if there is an area amenable to this then will get biopsy as stage here would be important.  If there is no safe area to biopsy on her back then a referral to Dr. Marmolejo in [place] will be made to get her opinion on EBUS and obtaining a biopsy that way.  We did have a long discussion about stage IV cancer and there terminal nature it represents and the approach toward therapy is certainly different if she clearly has bone mets.  Will await MRI.                   Discussion:   High complexity due to intense lab monitoring, high risk medications.   No follow-ups on file.      Electronically signed by Segundo Lamb

## 2019-07-10 NOTE — LETTER
July 12, 2019      Demetrio Pleitez Jr., MD  [address]  [place] [address]           [facility] - Hematology Oncology  [address]  [address]  [place] [address]  Phone: [phone]  Fax: [phone]          Patient: Sheri Broderick   MR Number: [id number]   YOB: 1955   Date of Visit: 7/10/2019       Dear Dr. Demetrio Pleitez Jr.:    Thank you for referring Sheri Broderick to me for evaluation. Attached you will find relevant portions of my assessment and plan of care.    If you have questions, please do not hesitate to call me. I look forward to following Sheri Broderick along with you.    Sincerely,    Segundo Zhang MD    Enclosure  CC:  No Recipients    If you would like to receive this communication electronically, please contact [email or web address] or [phone] to request more information on [employer] access.    For providers and/or their staff who would like to refer a patient to [facility], please contact us through our one-stop-shop provider referral line, [facility], at [phone].    If you feel you have received this communication in error or would no longer like to receive these types of communications, please e-mail [email or web address]

## 2019-07-11 ENCOUNTER — TELEPHONE (OUTPATIENT)
Dept: HEMATOLOGY/ONCOLOGY | Facility: CLINIC | Age: 64
End: 2019-07-11

## 2019-07-11 DIAGNOSIS — R94.8 ABNORMAL POSITRON EMISSION TOMOGRAPHY (PET) SCAN: Primary | ICD-10-CM

## 2019-07-12 NOTE — ASSESSMENT & PLAN NOTE
Ms. Broderick's PET scan shows some concerning LNs and possible spine mets to the thoracic and lumbar spine.  She does have lower back pain and did have a fall as outlined above but there is concern for mets as well.  I discussed the case with radiologist as far as a biopsy is concerned and he is requesting I get an MRI of her lower back to better define the lesion.  I will have this done and if there is an area amenable to this then will get biopsy as stage here would be important.  If there is no safe area to biopsy on her back then a referral to Dr. Marmolejo in [place] will be made to get her opinion on EBUS and obtaining a biopsy that way.  We did have a long discussion about stage IV cancer and there terminal nature it represents and the approach toward therapy is certainly different if she clearly has bone mets.  Will await MRI.

## 2019-07-18 ENCOUNTER — TELEPHONE (OUTPATIENT)
Dept: HEMATOLOGY/ONCOLOGY | Facility: CLINIC | Age: 64
End: 2019-07-18

## 2019-07-18 LAB — ISTAT CREATININE: 0.9 MG/DL (ref 0.6–1.4)

## 2019-07-18 NOTE — TELEPHONE ENCOUNTER
----- Message from Joaquim Tyler MD sent at 7/18/2019  2:54 PM CDT -----  DARLENE        I called and spoke to Chante in Interventional Rad. I let her know that the MRI of spine was done with results in chart so that she can have her doctor look at it to see if there is an area to biopsy.

## 2019-07-22 ENCOUNTER — OFFICE VISIT (OUTPATIENT)
Dept: PSYCHIATRY | Facility: CLINIC | Age: 64
End: 2019-07-22
Payer: MEDICARE

## 2019-07-22 VITALS
SYSTOLIC BLOOD PRESSURE: 135 MMHG | DIASTOLIC BLOOD PRESSURE: 83 MMHG | HEART RATE: 88 BPM | WEIGHT: 145.81 LBS | HEIGHT: 65 IN | BODY MASS INDEX: 24.29 KG/M2

## 2019-07-22 DIAGNOSIS — F41.1 GENERALIZED ANXIETY DISORDER: ICD-10-CM

## 2019-07-22 DIAGNOSIS — F41.0 PANIC DISORDER WITHOUT AGORAPHOBIA: ICD-10-CM

## 2019-07-22 DIAGNOSIS — F33.1 MDD (MAJOR DEPRESSIVE DISORDER), RECURRENT EPISODE, MODERATE: ICD-10-CM

## 2019-07-22 DIAGNOSIS — F43.10 PTSD (POST-TRAUMATIC STRESS DISORDER): ICD-10-CM

## 2019-07-22 PROCEDURE — 99999 PR PBB SHADOW E&M-EST. PATIENT-LVL III: ICD-10-PCS | Mod: PBBFAC,HCNC,, | Performed by: PSYCHIATRY & NEUROLOGY

## 2019-07-22 PROCEDURE — 3008F BODY MASS INDEX DOCD: CPT | Mod: HCNC,CPTII,S$GLB, | Performed by: PSYCHIATRY & NEUROLOGY

## 2019-07-22 PROCEDURE — 99499 RISK ADDL DX/OHS AUDIT: ICD-10-PCS | Mod: HCNC,S$GLB,, | Performed by: PSYCHIATRY & NEUROLOGY

## 2019-07-22 PROCEDURE — 3079F PR MOST RECENT DIASTOLIC BLOOD PRESSURE 80-89 MM HG: ICD-10-PCS | Mod: HCNC,CPTII,S$GLB, | Performed by: PSYCHIATRY & NEUROLOGY

## 2019-07-22 PROCEDURE — 99499 UNLISTED E&M SERVICE: CPT | Mod: HCNC,S$GLB,, | Performed by: PSYCHIATRY & NEUROLOGY

## 2019-07-22 PROCEDURE — 3008F PR BODY MASS INDEX (BMI) DOCUMENTED: ICD-10-PCS | Mod: HCNC,CPTII,S$GLB, | Performed by: PSYCHIATRY & NEUROLOGY

## 2019-07-22 PROCEDURE — 3075F PR MOST RECENT SYSTOLIC BLOOD PRESS GE 130-139MM HG: ICD-10-PCS | Mod: HCNC,CPTII,S$GLB, | Performed by: PSYCHIATRY & NEUROLOGY

## 2019-07-22 PROCEDURE — 99214 OFFICE O/P EST MOD 30 MIN: CPT | Mod: HCNC,S$GLB,, | Performed by: PSYCHIATRY & NEUROLOGY

## 2019-07-22 PROCEDURE — 99214 PR OFFICE/OUTPT VISIT, EST, LEVL IV, 30-39 MIN: ICD-10-PCS | Mod: HCNC,S$GLB,, | Performed by: PSYCHIATRY & NEUROLOGY

## 2019-07-22 PROCEDURE — 99999 PR PBB SHADOW E&M-EST. PATIENT-LVL III: CPT | Mod: PBBFAC,HCNC,, | Performed by: PSYCHIATRY & NEUROLOGY

## 2019-07-22 PROCEDURE — 3075F SYST BP GE 130 - 139MM HG: CPT | Mod: HCNC,CPTII,S$GLB, | Performed by: PSYCHIATRY & NEUROLOGY

## 2019-07-22 PROCEDURE — 3079F DIAST BP 80-89 MM HG: CPT | Mod: HCNC,CPTII,S$GLB, | Performed by: PSYCHIATRY & NEUROLOGY

## 2019-07-22 RX ORDER — VENLAFAXINE HYDROCHLORIDE 75 MG/1
75 CAPSULE, EXTENDED RELEASE ORAL DAILY
Qty: 90 CAPSULE | Refills: 1 | Status: SHIPPED | OUTPATIENT
Start: 2019-07-22 | End: 2019-12-11 | Stop reason: DRUGHIGH

## 2019-07-22 RX ORDER — LAMOTRIGINE 100 MG/1
TABLET ORAL
Qty: 270 TABLET | Refills: 0 | Status: SHIPPED | OUTPATIENT
Start: 2019-07-22 | End: 2019-09-24 | Stop reason: SDUPTHER

## 2019-07-22 RX ORDER — CLONAZEPAM 1 MG/1
TABLET ORAL
Qty: 180 TABLET | Refills: 0 | Status: SHIPPED | OUTPATIENT
Start: 2019-07-22 | End: 2019-12-19 | Stop reason: SDUPTHER

## 2019-07-22 RX ORDER — MIRTAZAPINE 30 MG/1
TABLET, FILM COATED ORAL
Qty: 90 TABLET | Refills: 0 | Status: SHIPPED | OUTPATIENT
Start: 2019-07-22 | End: 2020-02-06 | Stop reason: SDUPTHER

## 2019-07-22 RX ORDER — DEXTROAMPHETAMINE SACCHARATE, AMPHETAMINE ASPARTATE, DEXTROAMPHETAMINE SULFATE AND AMPHETAMINE SULFATE 2.5; 2.5; 2.5; 2.5 MG/1; MG/1; MG/1; MG/1
TABLET ORAL
Qty: 30 TABLET | Refills: 0 | Status: SHIPPED | OUTPATIENT
Start: 2019-07-22 | End: 2019-08-23 | Stop reason: SDUPTHER

## 2019-07-22 NOTE — Clinical Note
Dr Zhang, I recently saw Ms Broderick - she continues to report memory impairment, falls, poor comprehension for at least one year, and feels it is getting worse.  She is under care of Neurology. I believe work up has been unremarkable. I wanted to make you aware - I do not believe she has had MRI brain recently.  I have encouraged her to contact her Neurologist.

## 2019-07-22 NOTE — PROGRESS NOTES
Outpatient Psychiatry Follow-Up Visit (MD/NP)    7/22/2019    Clinical Status of Patient:  Outpatient (Ambulatory)    Chief Complaint:  Sheri Broderick is a 63 y.o. female who presents today for follow-up of anxiety, depression.   Met with patient.      Interval History and Content of Current Session:      Interim Events/Subjective Report/Content of Current Session:      62 yo  female presents for follow up appointment for treatment of major depression, generalized anxiety disorder, panic disorder.     Past Psychiatric Hx:   No prior psychiatric hospitalizations   Suicide risk assessment:   Risks: , age, chronic depression with passive SI  Protective: no prior suicide attempts, strong skip, female, no substance abuse or impulsivity, health issues present both not disabling at this time  She is at low to moderate risk of suicide acutely, and moderate risk over long term     Prior meds: Abilify (no benefit), Buspirone (intolerance) Trazodone (intolerance), Paroxetine (intolerance), Quetiapine, Bupropion (intolerance), Fluoxetine (no benefit), Duloxetine Foltx (no longer covered)       Past Medical History   adenocarcinoma of lung (RLL, s/p resection 4/2018),    Allergy [T78.40XA]   Anxiety [F41.9]   Arthritis [M19.90]   Back pain [M54.9]   Dizziness [R42]   Fibromyalgia [M79.7]   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 [S62.101K]   Fracture of right foot [S92.901A]   Hep B w/o coma [B19.10]   Hyperlipidemia [E78.5]   Major depressive disorder, recurrent episode, in partial remission [F33.41]   Myalgia and myositis, unspecified [MII6849]   OCD (obsessive compulsive disorder) [F42.9]   Osteoporosis   Polyneuropathy [G62.9]   Trouble in sleeping [G47.9]   Urinary incontinence [R32]       Past Surgical History Laterality Date Comments   S/p lung resection       TUBAL LIGATION[SHX77]      EYE SURGERY[FQR612]   RK   FRACTURE SURGERY[UPA898] Right  wrist orif   CARPAL TUNNEL  "RELEASE[NJG890] Bilateral 07/18/2014      Interim Hx:   Pt presents for follow up.   Pt has been taking Sertraline lowered to 200 mg daily. She is taking Adderall on PRN basis (not recently).   Taking Clonazepam "a piece at bedtime to relax."   She has been also been taking Mirtazapine, Effexor, and Lamictal daily.   She does feel better with reduction in SSRI.      Pt learned in interim that she likely has metastatic disease.    PET Scan:    1. FDG avid enlarged lymph nodes in right hilum and lower paratracheal region  of mediastinal, and additional foci of FDG uptake involving L3 vertebral body  and anterior left acetabulum also suspicious for new metastatic disease.    2. Nonspecific FDG uptake associated with right T2 transverse process near  articulation with posterior right second rib as above. Attention to this on  follow-up PET/CT is suggested.    3. Recent compression fracture of L2 vertebral body superior endplate since  04/09/2019, with associated FDG uptake which is inflammatory in nature.    MRI  Lumbar spine:    IMPRESSION:     1. Enhancing, marrow replacing lesion within the rightward aspect of the   posterior superior L3 vertebral body correlates to the hypermetabolic lesion on   PET and is consistent with osseous metastatic disease.   2. Subacute L2 compression fracture, stable from prior.   3. Mild multilevel lumbar spondylosis as outlined above      Pt reports she will have a bone biopsy next, but is concerned that an orthopedist in addition to interventional radiologist should perform procedure.  She is concerned about falls, memory loss, poor comprehension.   Saw Neurology (Dr Florian).   She feels her neuro c/o are not being taken seriously.  Has not had MRI brain.   She has significant back and left ankle pain - not taking Norco daily and when she takes it, it is only a small amount.   Also under care of Dr Salazar, Podiatry.    She feels she is managing stress. Trying to stay busy, " functioning.  This is a very difficult time.  Roommate Wally is supportive - more so than others. Pt continues to see therapist, Dr Jennings, but has felt therapist is wrapped up in her own life.    She feels inferior to others.    Denies suicidal/homicidal ideations.  Denies symptoms of brad/psychosis.   No self harm or violence.     ANISH-7: 22     Memory issues - ST and LT long term issues. Memory is not worse since she had chemo  Sleep/appetite fair. Wt is stable.   She has had a few episodes at night when she has felt her body is lifting out of her - it jolts her, has happened x 2.  Feels her soul has been taken.         MOCA done :          No fam hx of dementia, however her father  relatively young at 56 yo.      Denies alcohol/drug use. No tobacco. No recent caffeine use.     Review of Systems   · PSYCHIATRIC: Pertinant items are noted in the narrative.  · CONSTITUTIONAL:  Wt stable   · CARDIOVASCULAR: no current chest pain, no palpitations   · NEURO: frequent HA, memory loss   · RESP: intermittent cough   · M/S: 10/10 back pain     Past Medical, Family and Social History: The patient's past medical, family and social history have been reviewed and updated as appropriate within the electronic medical record - see encounter notes.    Psychiatric Medications:   Effexor XR 75 mg daily  Sertraline 200 mg in am   Klonopin 1 mg bid - tid prn anxiety- taking piece of tab nightly   Remeron 15 mg QHS   Lamictal 100 mg QAM, 200 mg QHS   Adderall 10 mg in am and 5 mg in afternoon     Compliance: not taking Adderall     Side effects: dry mouth (uses biotene), wt gain    Risk Parameters:  Patient reports no suicidal ideations today   Patient reports no homicidal ideation  Patient reports no self-injurious behavior  Patient reports no violent behavior      Exam (detailed: at least 9 elements; comprehensive: all 15 elements)   Constitutional  Vitals:  Most recent vital signs, dated less than 90 days  "prior to this appointment, were reviewed.    Se Epic for today's vitals.       General:  age appropriate, casual attire, adequately groomed, good eye contact, appears calm, not tearful        Musculoskeletal  Muscle Strength/Tone:  No tremor noted today,  No PMA    Gait & Station:  non-ataxic     Psychiatric  Speech:  no latency; no press , spontaneous, talkative, soft spoken    Mood & Affect:  "not good at all"  Restricted    Thought Process:  Linear   Associations:  intact   Thought Content:  no active or passive SI today, no homicidality, delusions, or paranoia, hallucinations: (auditory: no currently, visual: no) - none currently       Insight:  Fair   Judgement: Adequate to circumstances   Orientation:  grossly intact. Alert and oriented x 4    Memory: intact for content of interview   Language: grossly intact   Attention Span & Concentration:  able to focus   Fund of Knowledge:  intact and appropriate to age and level of education     Assessment and Diagnosis   Status/Progress: Based on the examination today, the patient's problem(s) is/are under inadequate control, has been treatment resistant in past.   New problems have been presented today.   Comorbidities are complicating management of the primary condition.    The working differential for this patient includes Cluster B and C traits, personality disorder, PTSD, MDD, OCD    General Impression :  Pt improved with addition of SNRI to SSRI; she declines atypical antipsychotic augmentation. Did not tolerate recent medication adjustments. Stabilizing with return to prior regimen.  Adderall does help pt function better, she does not feel it worsens anxiety.  Pt with significant negative cognitive distortions evident throughout interview.  Pt dx with Stage II Adenocarcinoma of the lung and is now s/p treatment.  Support system is limited, pt appears less depressed today, becoming more active (travel, weekend activities). Resuming Adderall has helped. Pt " concerned about her cognition, reports recent clumsiiness, word finding issues. Unrevealing Neuro work up.  Pt reports she learned she likely has metastatic disease in interim - will need bone biopsy.  Managing, but overwhelmed by pain.  No acute safety concerns.     Major Depressive Disorder, Recurrent, moderate   Panic Disorder without Agoraphobia   Generalized Anxiety Disorder    PTSD  Hx OCD  R/O Cognitive Disorder  Personality Disorder, unspecified, Cluster B and C traits     Hepaitis B, tremors,  osteoporosis, myalgias, arthralgia, poor balance, back pain, bulging disc, fibromyalgia, osteoarthritis, neuropathy, LE fracture, Adenocarcinoma of Lung    GAF: 56  Intervention/Counseling/Treatment Plan   · Medication Management: The risks and benefits of medication were discussed with the patient.  · Counseling provided with patient as follows: importance of compliance with chosen treatment options was emphasized, risks and benefits of treatment options, including medications, were discussed with the patient     1. Continue Effexor XR 75 mg daily  2. Continue Remeron 15 mg QHS   3. Continue Klonopin 1 mg BID - TID prn.  Discussed risk of decreased RT, sedation, addictive potential, and not to mix with alcohol. Rx today.  Advised to avoid this medication when she is taking pain meds (risks of resp depression discussed) and she verbalized an understanding. Advised this med can impact cognition.  4. Continue Lamictal 100 mg QAM and  200 mg daily.  Discussed risks of life threatening SJS, need for compliance.   5. Continue Psychotherapy with Kaye Jennings - encouraged to address her concerns about lack of progress with her therapist.  Discussed also reaching out to therapist at [facility] with cancer program, support groups.   6. Reduce Adderall to 10 mg once in AM.  Monitor BP/HR  - patient instructed to see a PCP regularly because she is on a stimulant. Discussed risks of abuse, insomnia, anxiety, elevated BP, HR,  MI, stroke, and sudden death. Repeat VS acceptable.  Pt reports this med keeps her functioning.   7.  Pt instructed to go to ED/911 with thoughts of wanting harm self/others  8. Call to report any worsening of symptoms or problems with the medication  9. Continue Sertraline 200 mg daily.  Target depression, anxiety. Discussed risk of serotonin syndrome, GI upset, headaches   10. Follow up with Neuro, PCP, Oncology - will also coordinate with her Oncologist about her symptoms, MRI brain and NP testing would be appropriate given pt's symptoms   11. If not improved, consider med change next visit - she declines today     Return to Clinic 6 weeks - 2 months

## 2019-07-22 NOTE — PATIENT INSTRUCTIONS
Given symptoms you are reporting - MRI brain and Neuropsychological testing would make sense - please discuss this with Hem Oncology and Neurology

## 2019-07-23 ENCOUNTER — PATIENT MESSAGE (OUTPATIENT)
Dept: HEMATOLOGY/ONCOLOGY | Facility: CLINIC | Age: 64
End: 2019-07-23

## 2019-07-25 DIAGNOSIS — C34.31 SQUAMOUS CELL CARCINOMA OF BRONCHUS IN RIGHT LOWER LOBE: Primary | ICD-10-CM

## 2019-08-01 ENCOUNTER — DOCUMENTATION ONLY (OUTPATIENT)
Dept: RADIOLOGY | Facility: HOSPITAL | Age: 64
End: 2019-08-01

## 2019-08-01 ENCOUNTER — TELEPHONE (OUTPATIENT)
Dept: RADIOLOGY | Facility: HOSPITAL | Age: 64
End: 2019-08-01

## 2019-08-01 RX ORDER — CLINDAMYCIN HYDROCHLORIDE 300 MG/1
300 CAPSULE ORAL 3 TIMES DAILY
COMMUNITY
End: 2019-08-28

## 2019-08-03 ENCOUNTER — DOCUMENTATION ONLY (OUTPATIENT)
Dept: HEMATOLOGY/ONCOLOGY | Facility: HOSPITAL | Age: 64
End: 2019-08-03

## 2019-08-03 NOTE — PROGRESS NOTES
Note from Dr. Hawley:7/22/2019    Dr Zhang, I recently saw Ms Broderick - she continues to report memory impairment, falls, poor comprehension for at least one year, and feels it is getting worse.  She is under care of Neurology. I believe work up has been unremarkable. I wanted to make you aware - I do not believe she has had MRI brain recently.  I have encouraged her to contact her Neurologist.

## 2019-08-05 ENCOUNTER — TELEPHONE (OUTPATIENT)
Dept: PREADMISSION TESTING | Facility: HOSPITAL | Age: 64
End: 2019-08-05

## 2019-08-05 ENCOUNTER — HOSPITAL ENCOUNTER (OUTPATIENT)
Facility: HOSPITAL | Age: 64
Discharge: HOME | End: 2019-08-05
Attending: RADIOLOGY | Admitting: FAMILY MEDICINE
Payer: MEDICARE

## 2019-08-05 VITALS
SYSTOLIC BLOOD PRESSURE: 123 MMHG | RESPIRATION RATE: 20 BRPM | WEIGHT: 146 LBS | BODY MASS INDEX: 24.32 KG/M2 | DIASTOLIC BLOOD PRESSURE: 73 MMHG | HEART RATE: 72 BPM | HEIGHT: 65 IN | OXYGEN SATURATION: 98 % | TEMPERATURE: 98 F

## 2019-08-05 DIAGNOSIS — C34.31 SQUAMOUS CELL CARCINOMA OF BRONCHUS IN RIGHT LOWER LOBE: ICD-10-CM

## 2019-08-05 LAB
APTT PPP: 26.9 SEC (ref 26.2–34.7)
BASOPHILS # BLD AUTO: 0.01 K/UL (ref 0–0.2)
BASOPHILS NFR BLD: 0.2 % (ref 0–1.9)
DIFFERENTIAL METHOD: ABNORMAL
EOSINOPHIL # BLD AUTO: 0.1 K/UL (ref 0–0.5)
EOSINOPHIL NFR BLD: 1.7 % (ref 0–8)
ERYTHROCYTE [DISTWIDTH] IN BLOOD BY AUTOMATED COUNT: 15.2 % (ref 11.5–14.5)
HCT VFR BLD AUTO: 36.9 % (ref 37–48.5)
HGB BLD-MCNC: 11.7 G/DL (ref 12–16)
IMM GRANULOCYTES # BLD AUTO: 0.02 K/UL (ref 0–0.04)
IMM GRANULOCYTES NFR BLD AUTO: 0.4 % (ref 0–0.5)
INR PPP: 0.9
LYMPHOCYTES # BLD AUTO: 1.5 K/UL (ref 1–4.8)
LYMPHOCYTES NFR BLD: 31.7 % (ref 18–48)
MCH RBC QN AUTO: 29 PG (ref 27–31)
MCHC RBC AUTO-ENTMCNC: 31.7 G/DL (ref 32–36)
MCV RBC AUTO: 91 FL (ref 82–98)
MONOCYTES # BLD AUTO: 0.4 K/UL (ref 0.3–1)
MONOCYTES NFR BLD: 8.4 % (ref 4–15)
NEUTROPHILS # BLD AUTO: 2.7 K/UL (ref 1.8–7.7)
NEUTROPHILS NFR BLD: 57.6 % (ref 38–73)
NRBC BLD-RTO: 0 /100 WBC
PLATELET # BLD AUTO: 223 K/UL (ref 150–350)
PMV BLD AUTO: 8.7 FL (ref 9.2–12.9)
PROTHROMBIN TIME: 12.2 SEC (ref 11.7–14)
RBC # BLD AUTO: 4.04 M/UL (ref 4–5.4)
WBC # BLD AUTO: 4.67 K/UL (ref 3.9–12.7)

## 2019-08-05 PROCEDURE — 25000003 PHARM REV CODE 250: Performed by: RADIOLOGY

## 2019-08-05 PROCEDURE — 71000016 HC POSTOP RECOV ADDL HR

## 2019-08-05 PROCEDURE — 85025 COMPLETE CBC W/AUTO DIFF WBC: CPT

## 2019-08-05 PROCEDURE — 85730 THROMBOPLASTIN TIME PARTIAL: CPT | Mod: GZ

## 2019-08-05 PROCEDURE — 88311 DECALCIFY TISSUE: CPT | Mod: TC,59

## 2019-08-05 PROCEDURE — 63600175 PHARM REV CODE 636 W HCPCS: Performed by: RADIOLOGY

## 2019-08-05 PROCEDURE — 85610 PROTHROMBIN TIME: CPT

## 2019-08-05 PROCEDURE — 71000015 HC POSTOP RECOV 1ST HR

## 2019-08-05 RX ORDER — SODIUM CHLORIDE 9 MG/ML
INJECTION, SOLUTION INTRAVENOUS
Status: COMPLETED | OUTPATIENT
Start: 2019-08-05 | End: 2019-08-05

## 2019-08-05 RX ORDER — ACETAMINOPHEN 500 MG
1000 TABLET ORAL ONCE
Status: COMPLETED | OUTPATIENT
Start: 2019-08-05 | End: 2019-08-05

## 2019-08-05 RX ORDER — MIDAZOLAM HYDROCHLORIDE 1 MG/ML
INJECTION INTRAMUSCULAR; INTRAVENOUS CODE/TRAUMA/SEDATION MEDICATION
Status: COMPLETED | OUTPATIENT
Start: 2019-08-05 | End: 2019-08-05

## 2019-08-05 RX ORDER — FENTANYL CITRATE 50 UG/ML
INJECTION, SOLUTION INTRAMUSCULAR; INTRAVENOUS CODE/TRAUMA/SEDATION MEDICATION
Status: COMPLETED | OUTPATIENT
Start: 2019-08-05 | End: 2019-08-05

## 2019-08-05 RX ADMIN — FENTANYL CITRATE 25 MCG: 50 INJECTION INTRAMUSCULAR; INTRAVENOUS at 09:08

## 2019-08-05 RX ADMIN — ACETAMINOPHEN 1000 MG: 500 TABLET, FILM COATED ORAL at 01:08

## 2019-08-05 RX ADMIN — MIDAZOLAM HYDROCHLORIDE 1 MG: 1 INJECTION, SOLUTION INTRAMUSCULAR; INTRAVENOUS at 09:08

## 2019-08-05 RX ADMIN — FENTANYL CITRATE 50 MCG: 50 INJECTION INTRAMUSCULAR; INTRAVENOUS at 09:08

## 2019-08-05 RX ADMIN — SODIUM CHLORIDE 100 ML/HR: 9 INJECTION, SOLUTION INTRAVENOUS at 09:08

## 2019-08-14 ENCOUNTER — TELEPHONE (OUTPATIENT)
Dept: HEMATOLOGY/ONCOLOGY | Facility: CLINIC | Age: 64
End: 2019-08-14

## 2019-08-14 NOTE — TELEPHONE ENCOUNTER
----- Message from Elida Velázquez sent at 8/13/2019 10:00 AM CDT -----  SOO:  Pt would like to discuss the appt that you set for her.  Pls call her back to discuss      CB# [phone]

## 2019-08-14 NOTE — TELEPHONE ENCOUNTER
Pt called requesting results of her BX. I notified her that the results are still pending at this time. I spoke with the Pathology dept at [facility] who stated the additional tests that were ordered are pending authorization with the insurance company. The patient called the insurance company who stated they have up to 14 business days to make a decision. We may have to r/s patients appt on Monday.

## 2019-08-19 ENCOUNTER — OFFICE VISIT (OUTPATIENT)
Dept: HEMATOLOGY/ONCOLOGY | Facility: CLINIC | Age: 64
End: 2019-08-19
Payer: MEDICARE

## 2019-08-19 VITALS
TEMPERATURE: 98 F | BODY MASS INDEX: 24.66 KG/M2 | DIASTOLIC BLOOD PRESSURE: 79 MMHG | WEIGHT: 148.19 LBS | SYSTOLIC BLOOD PRESSURE: 130 MMHG | RESPIRATION RATE: 18 BRPM | HEART RATE: 111 BPM

## 2019-08-19 DIAGNOSIS — C34.31 MALIGNANT NEOPLASM OF LOWER LOBE OF RIGHT LUNG: Primary | Chronic | ICD-10-CM

## 2019-08-19 PROCEDURE — 3008F PR BODY MASS INDEX (BMI) DOCUMENTED: ICD-10-PCS | Mod: S$GLB,,, | Performed by: INTERNAL MEDICINE

## 2019-08-19 PROCEDURE — 99214 PR OFFICE/OUTPT VISIT, EST, LEVL IV, 30-39 MIN: ICD-10-PCS | Mod: S$GLB,,, | Performed by: INTERNAL MEDICINE

## 2019-08-19 PROCEDURE — 99214 OFFICE O/P EST MOD 30 MIN: CPT | Mod: S$GLB,,, | Performed by: INTERNAL MEDICINE

## 2019-08-19 PROCEDURE — 3078F PR MOST RECENT DIASTOLIC BLOOD PRESSURE < 80 MM HG: ICD-10-PCS | Mod: S$GLB,,, | Performed by: INTERNAL MEDICINE

## 2019-08-19 PROCEDURE — 3075F PR MOST RECENT SYSTOLIC BLOOD PRESS GE 130-139MM HG: ICD-10-PCS | Mod: S$GLB,,, | Performed by: INTERNAL MEDICINE

## 2019-08-19 PROCEDURE — 3008F BODY MASS INDEX DOCD: CPT | Mod: S$GLB,,, | Performed by: INTERNAL MEDICINE

## 2019-08-19 PROCEDURE — 3078F DIAST BP <80 MM HG: CPT | Mod: S$GLB,,, | Performed by: INTERNAL MEDICINE

## 2019-08-19 PROCEDURE — 3075F SYST BP GE 130 - 139MM HG: CPT | Mod: S$GLB,,, | Performed by: INTERNAL MEDICINE

## 2019-08-19 NOTE — ASSESSMENT & PLAN NOTE
I had a long discussion with Ms. Broderick about this unfortunate situation.  She no has metastatic disease which is uncurable and terminal.  I discussed that she has options for chemo and immunotherapy and will await mutational studies prior to making a final decision.  I will refer her to radiation therapy due to pain associated with hip lesion.  L3 can also be radiated as well.  Plan to begin systemic therapy after this treatment.

## 2019-08-19 NOTE — PROGRESS NOTES
PROGRESS NOTE    Subjective:       Patient ID: Sheri Broderick is a 63 y.o. female.    Chief Complaint:  No chief complaint on file.  lung cancer, chemo follow up.     History of Present Illness:   Sheri Broderick is a 63 y.o. female who presents for follow up. Patient fell last month while working on a deck outside her home.  She fell approx 24 inches and landed directly on her feet.  Her lower back has been hurting since that time.  She is here for PET scan today.     L3 lesion biopsy 8/7/2019:  Adenocarcinoma c/w lung primary    Family and Social history reviewed and is unchanged from May 21, 2018      ROS:  Review of Systems   Constitutional: Negative for fever.   Respiratory: Negative for shortness of breath.    Cardiovascular: Negative for chest pain and leg swelling.   Gastrointestinal: Negative for abdominal pain and blood in stool.   Genitourinary: Negative for hematuria.   Skin: Negative for rash.          Current Outpatient Medications:     albuterol 90 mcg/actuation inhaler, Inhale 2 puffs into the lungs every 6 (six) hours as needed for Wheezing. Rescue, Disp: 18 g, Rfl: 5    biotin 10,000 mcg Cap, , Disp: , Rfl:     Ca cmb no.9-[id number]-V37-aloe 120-1,000-10 mg-unit-mg Tab, Take 1 tablet by mouth., Disp: , Rfl:     CHEWABLE VITAMIN C 500 mg Chew, , Disp: , Rfl:     clindamycin (CLEOCIN) 300 MG capsule, Take 300 mg by mouth 3 (three) times daily., Disp: , Rfl:     clonazePAM (KLONOPIN) 1 MG tablet, Take one-half to one tablet two to three times daily only as needed for anxiety, Disp: 180 tablet, Rfl: 0    dextroamphetamine-amphetamine 10 mg Tab, Take one tablet PO in the morning, Disp: 30 tablet, Rfl: 0    esomeprazole magnesium (NEXIUM 24HR) 20 mg TbEC, Take 20 mg by mouth once daily. , Disp: , Rfl:     fluticasone (FLONASE) 50 mcg/actuation nasal spray, 1 spray by Each Nare route once daily., Disp: , Rfl:     folic acid  (FOLVITE) 800 MCG Tab, Take 800 mcg by mouth once daily. , Disp: , Rfl:     HYDROcodone-acetaminophen (NORCO) 5-325 mg per tablet, Take 1 tablet by mouth every 6 (six) hours as needed for Pain., Disp: 30 tablet, Rfl: 0    ibuprofen (ADVIL,MOTRIN) 800 MG tablet, Take 1 tablet (800 mg total) by mouth 2 (two) times daily as needed for Pain., Disp: 180 tablet, Rfl: 3    lamoTRIgine (LAMICTAL) 100 MG tablet, Take one tablet PO in the morning and two tablets PO in the evening, Disp: 270 tablet, Rfl: 0    LINZESS 145 mcg Cap capsule, Take 1 capsule by mouth daily as needed. , Disp: , Rfl:     meclizine (ANTIVERT) 25 mg tablet, Take 1 tablet (25 mg total) by mouth every 8 (eight) hours as needed for Dizziness., Disp: 90 tablet, Rfl: 3    mirtazapine (REMERON) 30 MG tablet, Take one-half tablet PO nightly, Disp: 90 tablet, Rfl: 0    ondansetron (ZOFRAN) 8 MG tablet, Take 1 tablet (8 mg total) by mouth every 8 (eight) hours as needed for Nausea., Disp: 90 tablet, Rfl: 3    sertraline (ZOLOFT) 100 MG tablet, Take two tablets PO in the morning, Disp: 1 tablet, Rfl: 0    venlafaxine (EFFEXOR-XR) 75 MG 24 hr capsule, Take 1 capsule (75 mg total) by mouth once daily., Disp: 90 capsule, Rfl: 1    VITAMIN B-12 1000 MCG tablet, , Disp: , Rfl:     vitamin E 400 UNIT capsule, Take 400 Units by mouth once daily., Disp: , Rfl:     ZYRTEC 10 mg Cap, continuous prn., Disp: , Rfl:         Objective:       Physical Examination:     /79   Pulse (!) 111   Temp 97.5 °F (36.4 °C)   Resp 18   Wt 67.2 kg (148 lb 3.2 oz)   BMI 24.66 kg/m²     Physical Exam   Constitutional: She appears well-developed and well-nourished.   HENT:   Head: Normocephalic and atraumatic.   Right Ear: External ear normal.   Left Ear: External ear normal.   Mouth/Throat: Oropharynx is clear and moist.   Eyes: Pupils are equal, round, and reactive to light. Conjunctivae are normal.   Neck: No tracheal deviation present. No thyromegaly present.    Cardiovascular: Normal rate, regular rhythm and normal heart sounds.   Pulmonary/Chest: Effort normal and breath sounds normal.   Abdominal: Soft. Bowel sounds are normal. She exhibits no distension and no mass. There is no tenderness.   Musculoskeletal: She exhibits no edema.   Neurological:   Neuro intact througout   Skin: No rash noted.   Psychiatric: She has a normal mood and affect. Her behavior is normal. Judgment and thought content normal.       Labs:   No results found for this or any previous visit (from the past 336 hour(s)).  CMP  Sodium   Date Value Ref Range Status   10/09/2018 140 134 - 144 mmol/L      Potassium   Date Value Ref Range Status   10/09/2018 4.7 3.5 - 5.0 mmol/L      Chloride   Date Value Ref Range Status   10/09/2018 105 98 - 110 mmol/L      CO2   Date Value Ref Range Status   10/09/2018 29.3 22.8 - 31.6 mmol/L      Glucose   Date Value Ref Range Status   10/09/2018 101 (H) 70 - 99 mg/dL      BUN, Bld   Date Value Ref Range Status   10/09/2018 17 8 - 20 mg/dL      Creatinine   Date Value Ref Range Status   10/09/2018 0.80 0.60 - 1.40 mg/dL    11/29/2012 0.7 0.5 - 1.4 mg/dL Final     Calcium   Date Value Ref Range Status   10/09/2018 9.4 7.7 - 10.4 mg/dL    11/29/2012 9.0 8.7 - 10.5 mg/dL Final     Total Protein   Date Value Ref Range Status   10/09/2018 7.6 6.0 - 8.2 g/dL      Albumin   Date Value Ref Range Status   10/09/2018 4.1 3.1 - 4.7 g/dL      Total Bilirubin   Date Value Ref Range Status   10/09/2018 0.6 0.3 - 1.0 mg/dL      Alkaline Phosphatase   Date Value Ref Range Status   10/09/2018 104 40 - 104 IU/L      AST   Date Value Ref Range Status   10/09/2018 23 10 - 40 IU/L      ALT   Date Value Ref Range Status   06/01/2018 10 6 - 29 U/L Final     Anion Gap   Date Value Ref Range Status   03/03/2016 10 8 - 16 mmol/L Final   11/29/2012 10 5 - 15 meq/L Final     eGFR if    Date Value Ref Range Status   06/01/2018 94 > OR = 60 mL/min/1.73m2 Final     eGFR if non     Date Value Ref Range Status   06/01/2018 81 > OR = 60 mL/min/1.73m2 Final     No results found for: CEA  No results found for: PSA        Assessment/Plan:     Problem List Items Addressed This Visit     Malignant neoplasm of lower lobe of right lung-Adenocarcinoma - Primary (Chronic)     I had a long discussion with Ms. Broderick about this unfortunate situation.  She no has metastatic disease which is uncurable and terminal.  I discussed that she has options for chemo and immunotherapy and will await mutational studies prior to making a final decision.  I will refer her to radiation therapy due to pain associated with hip lesion.  L3 can also be radiated as well.  Plan to begin systemic therapy after this treatment.           Relevant Orders    Ambulatory referral to Radiation Oncology            Discussion:   High complexity due to intense lab monitoring, high risk medications.   Follow up in about 2 weeks (around 9/2/2019).      Electronically signed by Segundo Labm

## 2019-08-23 ENCOUNTER — SOCIAL WORK (OUTPATIENT)
Dept: HEMATOLOGY/ONCOLOGY | Facility: CLINIC | Age: 64
End: 2019-08-23

## 2019-08-23 ENCOUNTER — DOCUMENTATION ONLY (OUTPATIENT)
Dept: RADIATION ONCOLOGY | Facility: CLINIC | Age: 64
End: 2019-08-23

## 2019-08-23 ENCOUNTER — OFFICE VISIT (OUTPATIENT)
Dept: RADIATION ONCOLOGY | Facility: CLINIC | Age: 64
End: 2019-08-23
Payer: MEDICARE

## 2019-08-23 VITALS
TEMPERATURE: 98 F | WEIGHT: 144.88 LBS | HEART RATE: 103 BPM | SYSTOLIC BLOOD PRESSURE: 129 MMHG | DIASTOLIC BLOOD PRESSURE: 76 MMHG | BODY MASS INDEX: 24.11 KG/M2 | OXYGEN SATURATION: 99 %

## 2019-08-23 DIAGNOSIS — C79.52 SECONDARY MALIGNANT NEOPLASM OF BONE AND BONE MARROW: ICD-10-CM

## 2019-08-23 DIAGNOSIS — C79.51 SECONDARY MALIGNANT NEOPLASM OF BONE AND BONE MARROW: ICD-10-CM

## 2019-08-23 DIAGNOSIS — C34.31 MALIGNANT NEOPLASM OF LOWER LOBE OF RIGHT LUNG: Primary | ICD-10-CM

## 2019-08-23 DIAGNOSIS — C34.31 SQUAMOUS CELL CARCINOMA OF BRONCHUS IN RIGHT LOWER LOBE: ICD-10-CM

## 2019-08-23 PROCEDURE — 3074F SYST BP LT 130 MM HG: CPT | Mod: S$GLB,,, | Performed by: RADIOLOGY

## 2019-08-23 PROCEDURE — 99214 OFFICE O/P EST MOD 30 MIN: CPT | Mod: S$GLB,,, | Performed by: RADIOLOGY

## 2019-08-23 PROCEDURE — 3074F PR MOST RECENT SYSTOLIC BLOOD PRESSURE < 130 MM HG: ICD-10-PCS | Mod: S$GLB,,, | Performed by: RADIOLOGY

## 2019-08-23 PROCEDURE — 3008F PR BODY MASS INDEX (BMI) DOCUMENTED: ICD-10-PCS | Mod: S$GLB,,, | Performed by: RADIOLOGY

## 2019-08-23 PROCEDURE — 3008F BODY MASS INDEX DOCD: CPT | Mod: S$GLB,,, | Performed by: RADIOLOGY

## 2019-08-23 PROCEDURE — 3078F PR MOST RECENT DIASTOLIC BLOOD PRESSURE < 80 MM HG: ICD-10-PCS | Mod: S$GLB,,, | Performed by: RADIOLOGY

## 2019-08-23 PROCEDURE — 99214 PR OFFICE/OUTPT VISIT, EST, LEVL IV, 30-39 MIN: ICD-10-PCS | Mod: S$GLB,,, | Performed by: RADIOLOGY

## 2019-08-23 PROCEDURE — 3078F DIAST BP <80 MM HG: CPT | Mod: S$GLB,,, | Performed by: RADIOLOGY

## 2019-08-23 RX ORDER — HYDROCODONE BITARTRATE AND ACETAMINOPHEN 5; 325 MG/1; MG/1
1 TABLET ORAL
Qty: 60 TABLET | Refills: 0 | Status: SHIPPED | OUTPATIENT
Start: 2019-08-23 | End: 2019-08-28 | Stop reason: SDUPTHER

## 2019-08-23 RX ORDER — DEXTROAMPHETAMINE SACCHARATE, AMPHETAMINE ASPARTATE, DEXTROAMPHETAMINE SULFATE AND AMPHETAMINE SULFATE 2.5; 2.5; 2.5; 2.5 MG/1; MG/1; MG/1; MG/1
TABLET ORAL
Qty: 30 TABLET | Refills: 0 | Status: SHIPPED | OUTPATIENT
Start: 2019-08-23 | End: 2019-11-04 | Stop reason: SDUPTHER

## 2019-08-23 NOTE — PROGRESS NOTES
I met with patient to update her distress screening and provide her with an updated community events.  Patient indicated a rating of 5.  Patient stated that she is under the care of a psychiatrist and therapist for psychosocial support.  She is prescribed antidepressants and anti-anxiety medications.  She is interested in attending yoga and meditation for depression and anxiety.  She does not feed additional psychosocial support is needed at this time.  I provided her with my contact information in the event she needs assistance in the future.

## 2019-08-23 NOTE — PROGRESS NOTES
Sheri Broderick  [id number]  1955 8/23/2019  No referring provider defined for this encounter.    DIAGNOSIS: Metastatic lung adenocarcinoma  TREATMENT SITE(S): L2- L4, L hip and acetabulum    INTENT: PALLIATIVE    TREATMENT SETTING: RT ALONE     MODALITY: PHOTON    TECHNIQUE:  3D CONFORMAL RADIOTHERAPY (3DCRT)    IMRT MEDICAL NECESSITY:IMRT MEDICAL NECESSITY: N/A     HPI: 63 at oh patient with non-small cell lung cancer stage originally T2a N1 M0 now with metastatic disease in multiple bony sites, including L3 which is biopsy-proven, the left acetabulum and T2 with pain in the lumbar spine and hip area more severe. KPS 90    I have personally performed treatment planning for the patient, reviewing relevant history/physical and imaging. I have defined GTV, CTV, PTV and organs at risk.     In order to accomplish this plan, I am ordering:  SIMULATION: CT SIMULATION FOR PLACEMENT OF TREATMENT FIELDS    CONTRAST: none    TO ACCOMPLISH REPRODUCIBLE POSITION: VACLOC    DEVICES FOR BEAM SHAPING: CUSTOMIZED MLC    CUSTOMIZED BOLUS: none    IMAGING: DAILY KV/KV OBI    I have ordered a weekly physics check.  SPECIAL PHYSICS CONSULT: NO  REASON: N/A    SPECIAL TREATMENT CIRCUMSTANCE: NO   Concurrent or recent administration of chemotherapeutic agents which are  known potent radiosensitizers and thus will require vigilant monitoring for  exaggerated radiation toxicities.    LABS: NONE    ANTICIPATED PRESCRIPTION: 30 gray in 10 fractions    TREATMENT: DAILY    PHYSICIAN: Carlos Enciso MD

## 2019-08-23 NOTE — PROGRESS NOTES
Sheri Broderick  [id number]  1955 8/23/2019  Segundo Lamb Md  [address]  [address]  [place] [id number]    DIAGNOSIS: Lung cancer  REASON FOR VISIT: Routine scheduled follow-up.    HISTORY OF PRESENT ILLNESS:   62-year-old patient was undergoing routine surgery for carpal tunnel when a routine chest x-ray revealed a 2 cm right lower lung mass.  She was seen by Dr. Sutton will subsequent CAT scan and PET scan ordered. This study revealed a hypermetabolic spiculated 2 x 1.6 and a meter mass in the superior segment of the right lower lobe with an SUV of 5.3. There is also a right hilar lymph node measuring 1.3 cm with an SUV of 3.1. This no other evidence of mediastinal hilar or supra clavicular adenopathy and no evidence of metastatic disease.  She underwent bronchoscopy in early February however it was nondiagnostic in terms of pathology..     She is then seen by Dr. Welch and taken to surgery on 04/27/2018 for a right lower lobe lobectomy.  The report indicates that the tumor was removed in total. The station 10 lymph node which was enlarged and identified and imaging was removed.  The pathology report in case that the right lower lobe mass was a invasive adenocarcinoma. The lobectomy specimen shows that the tumor was 2.4 cm. It was focally involving the visceral pleura but the margin was clear by 2 mm. The parenchymal and bronchial vascular margins were negative.  There were 2 peribronchial lymph nodes removed and they were negative.    Again all her margins were negative and she did have one positive hilar node making her stage pT2a pN1 M0 (IIA).  She was not a candidate for postoperative radiation therapy noted that she had no mediastinal bonifacio involvement at that time. She was seen by me in consultation in May 2018.       INTERVAL HISTORY:   Repeat imaging in October 2018 specifically CAT scan showed no evidence of local or distant metastatic disease.:  IMPRESSION:  Status post right-sided  pneumonectomy (right lower lobectomy) with linear  bandlike areas of likely atelectasis versus scarring but no finding to  specifically suggest residual/recurrent or metastatic disease. Additional and  incidental findings are as outlined above    She underwent repeat imaging in April 2019 which did show some enlargement of her mediastinal node at 1.1 cm.    She suddenly developed pain in her left hip and lower back area. Repeat pet imaging in July 2019 is now showing metastatic disease:  1. FDG avid enlarged lymph nodes in right hilum and lower paratracheal region  of mediastinal, and additional foci of FDG uptake involving L3 vertebral body  and anterior left acetabulum also suspicious for new metastatic disease.    2. Nonspecific FDG uptake associated with right T2 transverse process near  articulation with posterior right second rib as above. Attention to this on  follow-up PET/CT is suggested.    3. Recent compression fracture of L2 vertebral body superior endplate since  04/09/2019, with associated FDG uptake which is inflammatory in nature.    Most for pain is centered around the L3 vertebrae. This lesion was biopsied improving to be metastatic non-small cell lung cancer.  She does complain of pain in her left hip but she does not have any limitation in on ambulation. She does not take any pain medication at present. She is a very small mild pain in her lower neck consistent with a T2 lesion but she has no limitation of motion.    She is not lost any weight. She has a very good performance status, KPS 90. She did fall at home recently injuring her back and her left leg. She does have a compression fracture noted at L2        Review of Systems   Constitutional: Negative for fever and unexpected weight change.   HENT:   Negative for sore throat and trouble swallowing.    Respiratory: Negative for cough and shortness of breath.    Cardiovascular: Negative for chest pain and leg swelling.   Gastrointestinal:  Negative for abdominal distention and abdominal pain.   Genitourinary: Positive for pelvic pain. Negative for dysuria.    Musculoskeletal: Positive for back pain and neck pain. Negative for arthralgias and neck stiffness.   Skin: Negative for itching and rash.   Neurological: Negative for headaches, seizures and speech difficulty.   Psychiatric/Behavioral: Negative for confusion. The patient is not nervous/anxious.      Past Medical History:   Diagnosis Date    Allergy     seasonal    Anemia associated with chemotherapy 7/18/2018    Anxiety     Arthritis     Back pain     Chemotherapy-induced neutropenia 5/21/2018    Dizziness     Essential hypertension 4/11/2019    Fall 05/2019    fx of lumbar spine, left foot and ankle    Fibromyalgia     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     Fracture of right foot     Hep B w/o coma     Hyperlipidemia     Major depressive disorder, recurrent episode, in partial remission     Malignant neoplasm of lower lobe of right lung-Adenocarcinoma 5/21/2018    Myalgia and myositis, unspecified     OCD (obsessive compulsive disorder)     Osteoporosis     Polyneuropathy     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 8/23/2019    Trouble in sleeping     Urinary incontinence      Past Surgical History:   Procedure Laterality Date    BIOPSY, BONE N/A 8/5/2019    Per[facility]    CARPAL TUNNEL RELEASE Bilateral 07/18/2014    EYE SURGERY      RK    FRACTURE SURGERY Right     wrist orif    LUNG REMOVAL, PARTIAL  04/27/2018    Dr. Brant Welch    ORIF, WRIST Right 12/3/2012    [facility] OR    RELEASE-CARPAL TUNNEL  Right Right 7/18/2014    [facility]    TUBAL LIGATION       Social History     Socioeconomic History    Marital status:      Spouse name: Not on file    Number of children: Not on file    Years of  education: Not on file    Highest education level: Not on file   Occupational History    Not on file   Social Needs    Financial resource strain: Not on file    Food insecurity:     Worry: Not on file     Inability: Not on file    Transportation needs:     Medical: Not on file     Non-medical: Not on file   Tobacco Use    Smoking status: Never Smoker    Smokeless tobacco: Never Used   Substance and Sexual Activity    Alcohol use: No    Drug use: No    Sexual activity: Yes     Birth control/protection: None   Lifestyle    Physical activity:     Days per week: Not on file     Minutes per session: Not on file    Stress: Not on file   Relationships    Social connections:     Talks on phone: Not on file     Gets together: Not on file     Attends Latter-day service: Not on file     Active member of club or organization: Not on file     Attends meetings of clubs or organizations: Not on file     Relationship status: Not on file   Other Topics Concern    Caffeine Use Not Asked    Financial Status: Employed Not Asked    Home situation: homeless Not Asked    Caffeine Use: Frequent Not Asked    Financial Status: Unemployed Not Asked    Legal: Arrest history Not Asked    Caffeine Use: Moderate Not Asked    Financial Status: Disabled Not Asked    Legal: Involved in civil litigation Not Asked    Caffeine Use: Substantial Not Asked    Financial Status: Other Not Asked    Legal: Involved in criminal litigation Not Asked    Patient feels they ought to cut down on drinking/drug use Not Asked    Firearms: Does patient have access to a firearm? Not Asked    Legal: Other Not Asked    Patient annoyed by others criticizing their drinking/drug use Not Asked    Home situation: lives with family Not Asked    Leisure: Time with family Not Asked    Patient has felt bad or guilty about drinking/drug use Not Asked    Home situation: lives alone Not Asked    Leisure: Exercise Not Asked    Patient has had a  drink/used drugs as an eye opener in the AM Not Asked    Home situation: lives with friends Not Asked    Leisure: Sports Not Asked    Childhood History: Raised by parents Not Asked    Home situation: lives with significant other Not Asked    Leisure: Fishing Not Asked    Childhood History: Uneventful Not Asked    Home situation: lives with spouse Not Asked    Leisure: Hunting Not Asked    Childhood History: Adopted Not Asked    Home situation: lives in group home Not Asked    Leisure: Movie Watching Not Asked    Childhood History: Early trauma Not Asked    Home situation: lives in shelter Not Asked    Leisure: Shopping Not Asked    Childhood History: Other Not Asked    Home situation: lives in nursing home Not Asked     Service Not Asked    Legal consequences of chemical use Not Asked   Social History Narrative    Not on file     Family History   Problem Relation Age of Onset    Heart disease Mother          to to coma (hypothermia)     Heart disease Father 57        heart attack    Heart disease Sister         stents    Diabetes Sister     Parkinsonism Sister     Heart disease Brother 45        death at 45 years old due to hearth disease     Diabetes Maternal Grandmother      Medication List with Changes/Refills   Current Medications    ALBUTEROL 90 MCG/ACTUATION INHALER    Inhale 2 puffs into the lungs every 6 (six) hours as needed for Wheezing. Rescue    CA CMB NO.0-[id number]-[id number] 120-1,000-10 MG-UNIT-MG TAB    Take 1 tablet by mouth.    CHEWABLE VITAMIN C 500 MG CHEW        CLINDAMYCIN (CLEOCIN) 300 MG CAPSULE    Take 300 mg by mouth 3 (three) times daily.    CLONAZEPAM (KLONOPIN) 1 MG TABLET    Take one-half to one tablet two to three times daily only as needed for anxiety    DEXTROAMPHETAMINE-AMPHETAMINE 10 MG TAB    Take one tablet PO in the morning    ESOMEPRAZOLE MAGNESIUM (NEXIUM 24HR) 20 MG TBEC    Take 20 mg by mouth once daily.     FLUTICASONE (FLONASE) 50  MCG/ACTUATION NASAL SPRAY    1 spray by Each Nare route once daily.    HYDROCODONE-ACETAMINOPHEN (NORCO) 5-325 MG PER TABLET    Take 1 tablet by mouth every 6 (six) hours as needed for Pain.    IBUPROFEN (ADVIL,MOTRIN) 800 MG TABLET    Take 1 tablet (800 mg total) by mouth 2 (two) times daily as needed for Pain.    LAMOTRIGINE (LAMICTAL) 100 MG TABLET    Take one tablet PO in the morning and two tablets PO in the evening    LINZESS 145 MCG CAP CAPSULE    Take 1 capsule by mouth daily as needed.     MECLIZINE (ANTIVERT) 25 MG TABLET    Take 1 tablet (25 mg total) by mouth every 8 (eight) hours as needed for Dizziness.    MIRTAZAPINE (REMERON) 30 MG TABLET    Take one-half tablet PO nightly    ONDANSETRON (ZOFRAN) 8 MG TABLET    Take 1 tablet (8 mg total) by mouth every 8 (eight) hours as needed for Nausea.    SERTRALINE (ZOLOFT) 100 MG TABLET    Take two tablets PO in the morning    VENLAFAXINE (EFFEXOR-XR) 75 MG 24 HR CAPSULE    Take 1 capsule (75 mg total) by mouth once daily.    VITAMIN B-12 1000 MCG TABLET        ZYRTEC 10 MG CAP    continuous prn.   Discontinued Medications    BIOTIN 10,000 MCG CAP        FOLIC ACID (FOLVITE) 800 MCG TAB    Take 800 mcg by mouth once daily.     VITAMIN E 400 UNIT CAPSULE    Take 400 Units by mouth once daily.     Review of patient's allergies indicates:   Allergen Reactions    Penicillins      Other reaction(s): Rash    Trazodone Anxiety     Severe anxiety     Prolia [denosumab] Other (See Comments)     myalgias       QUALITY OF LIFE: 90%- Able to Carry on Normal Activity: Minor Symptoms of Disease    Vitals:    08/23/19 1004   BP: 129/76   Pulse: 103   Temp: 98.2 °F (36.8 °C)   TempSrc: Oral   SpO2: 99%   Weight: 65.7 kg (144 lb 14.4 oz)   PainSc:   7   PainLoc: Hip       PHYSICAL EXAM: Well groomed oriented alert  GENERAL: alert; in no apparent distress.   HEAD: normocephalic, atraumatic.  EYES: pupils are equal, round, reactive to light and accommodation. Sclera anicteric.  Conjunctiva not injected.   NOSE/THROAT: no nasal erythema or rhinorrhea. Oropharynx pink, without erythema, ulcerations or thrush.   NECK: no cervical motion rigidity; supple with no masses.  CHEST: clear to auscultation bilaterally; no wheezes, crackles or rubs. Patient is speaking comfortably on room air with normal work of breathing without using accessory muscles of respiration.  CARDIOVASCULAR: regular rate and rhythm; no murmurs, rubs or gallops.  ABDOMEN: soft, nontender, nondistended. Bowel sounds present.   MUSCULOSKELETAL: no tenderness to palpation along  scapulae. Normal range of motion. Pain on rotation of the left hip. Pain on palpation of the mid lumbar spine and left hip region  NEUROLOGIC: cranial nerves II-XII intact bilaterally. Strength 5/5 in bilateral upper and lower extremities. No sensory deficits appreciated. Reflexes globally intact. No cerebellar signs. Normal gait.  LYMPHATIC: no cervical, supraclavicular or axillary adenopathy appreciated bilaterally.   EXTREMITIES: no clubbing, cyanosis, edema.  SKIN: no erythema, rashes, ulcerations noted.     ANCILLARY DATA:     ASSESSMENT: 63 old patient with a diagnosis of lung adenocarcinoma status post resection, staged originally pT2a pN1M0 now with metastatic disease identified on PET and biopsy-proven L3 lesion, pain in the left hip lumbar spine region minimal pain in the thoracic spine  PLAN:  I recommend palliative radiation therapy to the lumbar spine encompassing L2 through L4. I do note that L2 is a compression fracture which may be related to her recent fall. Recommend radiation therapy also to the left hip encompassing the acetabulum  I recommend 30 gray in 10 fractions to these areas.    Her area T2 is very minimally painful. Explained to her that hopefully her chemotherapy or   Immunotherapy contrast this area. However because more painful we can certainly palliate that with radiation therapy as well.    I discussed the side effects  and possible comp occasions of ration therapy to her lumbar spine and hip on the left. All questions were addressed. Additional started on pain medication hydrocodone 5 mg.      All questions answered and contact information provided. Patient understands free to call us anytime with any questions or concerns regarding radiation therapy.    TIME SPENT WITH PATIENT: I have personally seen and evaluated this patient. Greater than 50% of this time was spent discussing coordination of care and/or counseling.      PHYSICIAN: Carlos Enciso MD

## 2019-08-28 ENCOUNTER — HOSPITAL ENCOUNTER (OUTPATIENT)
Dept: PREADMISSION TESTING | Facility: HOSPITAL | Age: 64
Discharge: HOME | End: 2019-08-28
Attending: THORACIC SURGERY (CARDIOTHORACIC VASCULAR SURGERY)
Payer: MEDICARE

## 2019-08-28 VITALS
BODY MASS INDEX: 24.87 KG/M2 | DIASTOLIC BLOOD PRESSURE: 85 MMHG | OXYGEN SATURATION: 100 % | HEART RATE: 92 BPM | WEIGHT: 149.25 LBS | TEMPERATURE: 98 F | SYSTOLIC BLOOD PRESSURE: 132 MMHG | RESPIRATION RATE: 18 BRPM | HEIGHT: 65 IN

## 2019-08-28 DIAGNOSIS — C34.31 MALIGNANT NEOPLASM OF LOWER LOBE OF RIGHT LUNG: Chronic | ICD-10-CM

## 2019-08-28 DIAGNOSIS — C34.31 SQUAMOUS CELL CARCINOMA OF BRONCHUS IN RIGHT LOWER LOBE: Primary | ICD-10-CM

## 2019-08-28 PROCEDURE — 93005 ELECTROCARDIOGRAM TRACING: CPT

## 2019-08-28 RX ORDER — CEFAZOLIN SODIUM 1 G/50ML
1 SOLUTION INTRAVENOUS ONCE
Status: CANCELLED | OUTPATIENT
Start: 2019-08-30

## 2019-08-28 NOTE — DISCHARGE INSTRUCTIONS
To confirm, Your doctor has instructed you that surgery is scheduled for:     Please report to [facility] on [address] the morning of surgery.     Pre-Op will call the afternoon prior to surgery between 4:00 and 6:00 PM with the final arrival time.      PLEASE NOTE:  The surgery schedule has many variables which may affect the time of your surgery case.  Family members should be available if your surgery time changes.  Plan to be here the day of your procedure between 4-6 hours.    MEDICATIONS:  TAKE ONLY THESE MEDICATIONS WITH A SMALL SIP OF WATER THE MORNING OF YOUR PROCEDURE: Nexium, Flonase, Lamictal, Zoloft, Effexor    DO NOT TAKE THESE MEDICATIONS 5-7 DAYS PRIOR to your procedure or per your surgeon's request: ASPIRIN, ALEVE, ADVIL, IBUPROFEN, FISH OIL VITAMIN E, HERBALS  (May take Tylenol)    ONLY if you are prescribed any types of blood thinners such as:  Aspirin, Coumadin, Plavix, Pradaxa, Xarelto, Aggrenox, Effient, Eliquis, Savasya, Brilinta, or any other, ask your surgeon whether you should stop taking them and how long before surgery you should stop.  You may also need to verify with the prescribing physician if it is ok to stop your medication.      INSTRUCTIONS IMPORTANT!!  · Do not eat or drink anything between midnight and the time of your procedure- this includes gum, mints, and candy.  · ONLY if you are diabetic, check your sugar in the morning before your procedure.  · Do not smoke, vape or drink alcoholic beverages 24 hours prior to your procedure.  · Shower the night before AND the morning of your procedure with a Chlorhexidine wash such as Hibiclens or Dial antibacterial soap from the neck down.  Do not get it on your face or in your eyes.  You may use your own shampoo and face wash. This helps your skin to be as bacteria free as possible.    · If you wear contact lenses, dentures, hearing aids or glasses, bring a container to put them in during surgery and give to a family member for  safe keeping.  Please leave all jewelry, piercing's and valuables at home.   · DO NOT remove hair from the surgery site.  Do not shave the incision site unless you are given specific instructions to do so.    · ONLY if you have been diagnosed with sleep apnea please bring your C-PAP machine.  · ONLY if you wear home oxygen please bring your portable oxygen tank the day of your procedure.   · ONLY for patients requiring bowel prep, written instructions will be given by your doctor's office.  · ONLY if you have a neuro stimulator, please bring the controller with you the morning of surgery  · ONLY if a type and screen test is needed before surgery, please return:  · If your doctor has scheduled you for an overnight stay, bring a small overnight bag with any personal items you need.  · Make arrangements in advance for transportation home by a responsible adult.  · You must make arrangements for transportation, TAXI'S, UBER'S OR LYFTS ARE NOT ALLOWED.          If you have any questions about these instructions, call Pre-Op Admit  Nursing at [phone] or the Pre-Op Day Surgery Unit at [phone].

## 2019-08-29 ENCOUNTER — TELEPHONE (OUTPATIENT)
Dept: HEMATOLOGY/ONCOLOGY | Facility: CLINIC | Age: 64
End: 2019-08-29

## 2019-08-29 NOTE — TELEPHONE ENCOUNTER
----- Message from Elida Velázquez sent at 8/29/2019 10:41 AM CDT -----  Pts dentist is requesting a clearance for a root canal.  The appointment for root canal is Tuesday 9/3.  The dentist office closes at noon today and is closed on Fridays.  Please call pt and let her know if clearance can be given.     Dentist office is Dr. Geeta Espana    # [phone] for pt.

## 2019-08-29 NOTE — TELEPHONE ENCOUNTER
I LEFT A MESSAGE FOR PT INFORMING HER THAT I FAXED OVER A DENTAL CLEARANCE FOR HER TO HAVE A ROOT CANAL

## 2019-08-30 ENCOUNTER — ANESTHESIA EVENT (OUTPATIENT)
Dept: SURGERY | Facility: HOSPITAL | Age: 64
End: 2019-08-30
Payer: MEDICARE

## 2019-08-30 ENCOUNTER — HOSPITAL ENCOUNTER (OUTPATIENT)
Facility: HOSPITAL | Age: 64
Discharge: HOME | End: 2019-08-30
Attending: THORACIC SURGERY (CARDIOTHORACIC VASCULAR SURGERY) | Admitting: THORACIC SURGERY (CARDIOTHORACIC VASCULAR SURGERY)
Payer: MEDICARE

## 2019-08-30 ENCOUNTER — ANESTHESIA (OUTPATIENT)
Dept: SURGERY | Facility: HOSPITAL | Age: 64
End: 2019-08-30
Payer: MEDICARE

## 2019-08-30 VITALS
OXYGEN SATURATION: 96 % | HEART RATE: 92 BPM | TEMPERATURE: 98 F | DIASTOLIC BLOOD PRESSURE: 70 MMHG | RESPIRATION RATE: 18 BRPM | SYSTOLIC BLOOD PRESSURE: 126 MMHG

## 2019-08-30 DIAGNOSIS — C34.31 SQUAMOUS CELL CARCINOMA OF BRONCHUS IN RIGHT LOWER LOBE: Primary | ICD-10-CM

## 2019-08-30 PROCEDURE — 63600175 PHARM REV CODE 636 W HCPCS: Performed by: ANESTHESIOLOGY

## 2019-08-30 PROCEDURE — 71000015 HC POSTOP RECOV 1ST HR: Performed by: THORACIC SURGERY (CARDIOTHORACIC VASCULAR SURGERY)

## 2019-08-30 PROCEDURE — 25000242 PHARM REV CODE 250 ALT 637 W/ HCPCS: Performed by: THORACIC SURGERY (CARDIOTHORACIC VASCULAR SURGERY)

## 2019-08-30 PROCEDURE — S0028 INJECTION, FAMOTIDINE, 20 MG: HCPCS | Performed by: NURSE ANESTHETIST, CERTIFIED REGISTERED

## 2019-08-30 PROCEDURE — 27202107 HC XP QUATRO SENSOR: Performed by: ANESTHESIOLOGY

## 2019-08-30 PROCEDURE — 27000673 HC TUBING BLOOD Y: Performed by: ANESTHESIOLOGY

## 2019-08-30 PROCEDURE — 36000707: Performed by: THORACIC SURGERY (CARDIOTHORACIC VASCULAR SURGERY)

## 2019-08-30 PROCEDURE — 63600175 PHARM REV CODE 636 W HCPCS: Performed by: THORACIC SURGERY (CARDIOTHORACIC VASCULAR SURGERY)

## 2019-08-30 PROCEDURE — 63600175 PHARM REV CODE 636 W HCPCS: Performed by: NURSE ANESTHETIST, CERTIFIED REGISTERED

## 2019-08-30 PROCEDURE — 27201107 HC STYLET, STANDARD: Performed by: ANESTHESIOLOGY

## 2019-08-30 PROCEDURE — 71000033 HC RECOVERY, INTIAL HOUR: Performed by: THORACIC SURGERY (CARDIOTHORACIC VASCULAR SURGERY)

## 2019-08-30 PROCEDURE — 27000671 HC TUBING MICROBORE EXT: Performed by: ANESTHESIOLOGY

## 2019-08-30 PROCEDURE — 25000003 PHARM REV CODE 250: Performed by: THORACIC SURGERY (CARDIOTHORACIC VASCULAR SURGERY)

## 2019-08-30 PROCEDURE — S0077 INJECTION, CLINDAMYCIN PHOSP: HCPCS | Performed by: NURSE ANESTHETIST, CERTIFIED REGISTERED

## 2019-08-30 PROCEDURE — C1788 PORT, INDWELLING, IMP: HCPCS | Performed by: THORACIC SURGERY (CARDIOTHORACIC VASCULAR SURGERY)

## 2019-08-30 PROCEDURE — 71000016 HC POSTOP RECOV ADDL HR: Performed by: THORACIC SURGERY (CARDIOTHORACIC VASCULAR SURGERY)

## 2019-08-30 PROCEDURE — 71000039 HC RECOVERY, EACH ADD'L HOUR: Performed by: THORACIC SURGERY (CARDIOTHORACIC VASCULAR SURGERY)

## 2019-08-30 PROCEDURE — 37000008 HC ANESTHESIA 1ST 15 MINUTES: Performed by: THORACIC SURGERY (CARDIOTHORACIC VASCULAR SURGERY)

## 2019-08-30 PROCEDURE — 25000003 PHARM REV CODE 250: Performed by: NURSE ANESTHETIST, CERTIFIED REGISTERED

## 2019-08-30 PROCEDURE — 37000009 HC ANESTHESIA EA ADD 15 MINS: Performed by: THORACIC SURGERY (CARDIOTHORACIC VASCULAR SURGERY)

## 2019-08-30 PROCEDURE — 36000706: Performed by: THORACIC SURGERY (CARDIOTHORACIC VASCULAR SURGERY)

## 2019-08-30 DEVICE — PORT 8FR 0607540: Type: IMPLANTABLE DEVICE | Site: CHEST  WALL | Status: FUNCTIONAL

## 2019-08-30 RX ORDER — MEPERIDINE HYDROCHLORIDE 50 MG/ML
12.5 INJECTION INTRAMUSCULAR; INTRAVENOUS; SUBCUTANEOUS EVERY 10 MIN PRN
Status: DISCONTINUED | OUTPATIENT
Start: 2019-08-30 | End: 2019-08-30 | Stop reason: HOSPADM

## 2019-08-30 RX ORDER — DIPHENHYDRAMINE HYDROCHLORIDE 50 MG/ML
6.25 INJECTION INTRAMUSCULAR; INTRAVENOUS ONCE
Status: DISCONTINUED | OUTPATIENT
Start: 2019-08-30 | End: 2019-08-30 | Stop reason: HOSPADM

## 2019-08-30 RX ORDER — ROCURONIUM BROMIDE 10 MG/ML
INJECTION, SOLUTION INTRAVENOUS
Status: DISCONTINUED | OUTPATIENT
Start: 2019-08-30 | End: 2019-08-30

## 2019-08-30 RX ORDER — LIDOCAINE HYDROCHLORIDE 40 MG/ML
SOLUTION TOPICAL
Status: DISCONTINUED | OUTPATIENT
Start: 2019-08-30 | End: 2019-08-30

## 2019-08-30 RX ORDER — OXYCODONE HYDROCHLORIDE 5 MG/1
5 TABLET ORAL
Status: DISCONTINUED | OUTPATIENT
Start: 2019-08-30 | End: 2019-08-30 | Stop reason: HOSPADM

## 2019-08-30 RX ORDER — PROPOFOL 10 MG/ML
VIAL (ML) INTRAVENOUS
Status: DISCONTINUED | OUTPATIENT
Start: 2019-08-30 | End: 2019-08-30

## 2019-08-30 RX ORDER — DEXAMETHASONE SODIUM PHOSPHATE 4 MG/ML
INJECTION, SOLUTION INTRA-ARTICULAR; INTRALESIONAL; INTRAMUSCULAR; INTRAVENOUS; SOFT TISSUE
Status: DISCONTINUED | OUTPATIENT
Start: 2019-08-30 | End: 2019-08-30

## 2019-08-30 RX ORDER — BACITRACIN 50000 [IU]/1
INJECTION, POWDER, FOR SOLUTION INTRAMUSCULAR
Status: DISCONTINUED | OUTPATIENT
Start: 2019-08-30 | End: 2019-08-30 | Stop reason: HOSPADM

## 2019-08-30 RX ORDER — FENTANYL CITRATE 50 UG/ML
INJECTION, SOLUTION INTRAMUSCULAR; INTRAVENOUS
Status: DISCONTINUED | OUTPATIENT
Start: 2019-08-30 | End: 2019-08-30

## 2019-08-30 RX ORDER — ONDANSETRON 2 MG/ML
4 INJECTION INTRAMUSCULAR; INTRAVENOUS DAILY PRN
Status: DISCONTINUED | OUTPATIENT
Start: 2019-08-30 | End: 2019-08-30 | Stop reason: HOSPADM

## 2019-08-30 RX ORDER — HEPARIN SODIUM 1000 [USP'U]/ML
INJECTION, SOLUTION INTRAVENOUS; SUBCUTANEOUS
Status: DISCONTINUED | OUTPATIENT
Start: 2019-08-30 | End: 2019-08-30 | Stop reason: HOSPADM

## 2019-08-30 RX ORDER — SODIUM CHLORIDE, SODIUM LACTATE, POTASSIUM CHLORIDE, CALCIUM CHLORIDE 600; 310; 30; 20 MG/100ML; MG/100ML; MG/100ML; MG/100ML
INJECTION, SOLUTION INTRAVENOUS CONTINUOUS PRN
Status: DISCONTINUED | OUTPATIENT
Start: 2019-08-30 | End: 2019-08-30

## 2019-08-30 RX ORDER — FENTANYL CITRATE 50 UG/ML
25 INJECTION, SOLUTION INTRAMUSCULAR; INTRAVENOUS
Status: DISCONTINUED | OUTPATIENT
Start: 2019-08-30 | End: 2019-08-30 | Stop reason: HOSPADM

## 2019-08-30 RX ORDER — FAMOTIDINE 10 MG/ML
INJECTION INTRAVENOUS
Status: DISCONTINUED | OUTPATIENT
Start: 2019-08-30 | End: 2019-08-30

## 2019-08-30 RX ORDER — LIDOCAINE HCL/PF 100 MG/5ML
SYRINGE (ML) INTRAVENOUS
Status: DISCONTINUED | OUTPATIENT
Start: 2019-08-30 | End: 2019-08-30

## 2019-08-30 RX ORDER — LIDOCAINE HYDROCHLORIDE 20 MG/ML
JELLY TOPICAL
Status: DISCONTINUED | OUTPATIENT
Start: 2019-08-30 | End: 2019-08-30

## 2019-08-30 RX ORDER — MIDAZOLAM HYDROCHLORIDE 1 MG/ML
INJECTION, SOLUTION INTRAMUSCULAR; INTRAVENOUS
Status: DISCONTINUED | OUTPATIENT
Start: 2019-08-30 | End: 2019-08-30

## 2019-08-30 RX ORDER — SUCCINYLCHOLINE CHLORIDE 20 MG/ML
INJECTION INTRAMUSCULAR; INTRAVENOUS
Status: DISCONTINUED | OUTPATIENT
Start: 2019-08-30 | End: 2019-08-30

## 2019-08-30 RX ORDER — LEVALBUTEROL 1.25 MG/.5ML
1.25 SOLUTION, CONCENTRATE RESPIRATORY (INHALATION) ONCE
Status: COMPLETED | OUTPATIENT
Start: 2019-08-30 | End: 2019-08-30

## 2019-08-30 RX ORDER — HYDROCODONE BITARTRATE AND ACETAMINOPHEN 5; 325 MG/1; MG/1
1 TABLET ORAL EVERY 6 HOURS PRN
Qty: 24 TABLET | Refills: 0
Start: 2019-08-30 | End: 2019-12-11

## 2019-08-30 RX ORDER — CLINDAMYCIN PHOSPHATE 600 MG/50ML
INJECTION, SOLUTION INTRAVENOUS
Status: DISCONTINUED | OUTPATIENT
Start: 2019-08-30 | End: 2019-08-30

## 2019-08-30 RX ORDER — SODIUM CHLORIDE 0.9 % (FLUSH) 0.9 %
10 SYRINGE (ML) INJECTION
Status: DISCONTINUED | OUTPATIENT
Start: 2019-08-30 | End: 2019-08-30 | Stop reason: HOSPADM

## 2019-08-30 RX ADMIN — SODIUM CHLORIDE, SODIUM LACTATE, POTASSIUM CHLORIDE, AND CALCIUM CHLORIDE: .6; .31; .03; .02 INJECTION, SOLUTION INTRAVENOUS at 12:08

## 2019-08-30 RX ADMIN — FENTANYL CITRATE 25 MCG: 50 INJECTION, SOLUTION INTRAMUSCULAR; INTRAVENOUS at 02:08

## 2019-08-30 RX ADMIN — SUCCINYLCHOLINE CHLORIDE 120 MG: 20 INJECTION, SOLUTION INTRAMUSCULAR; INTRAVENOUS at 12:08

## 2019-08-30 RX ADMIN — LIDOCAINE HYDROCHLORIDE 160 MG: 40 SOLUTION TOPICAL at 12:08

## 2019-08-30 RX ADMIN — ROCURONIUM BROMIDE 5 MG: 10 INJECTION, SOLUTION INTRAVENOUS at 12:08

## 2019-08-30 RX ADMIN — FAMOTIDINE 20 MG: 10 INJECTION, SOLUTION INTRAVENOUS at 12:08

## 2019-08-30 RX ADMIN — LIDOCAINE HYDROCHLORIDE 2 ML: 20 JELLY TOPICAL at 12:08

## 2019-08-30 RX ADMIN — CLINDAMYCIN IN 5 PERCENT DEXTROSE 600 MG: 12 INJECTION, SOLUTION INTRAVENOUS at 12:08

## 2019-08-30 RX ADMIN — FENTANYL CITRATE 50 MCG: 50 INJECTION INTRAMUSCULAR; INTRAVENOUS at 12:08

## 2019-08-30 RX ADMIN — MIDAZOLAM 2 MG: 1 INJECTION INTRAMUSCULAR; INTRAVENOUS at 12:08

## 2019-08-30 RX ADMIN — ONDANSETRON 4 MG: 2 INJECTION INTRAMUSCULAR; INTRAVENOUS at 12:08

## 2019-08-30 RX ADMIN — LIDOCAINE HYDROCHLORIDE 60 MG: 20 INJECTION, SOLUTION INTRAVENOUS at 12:08

## 2019-08-30 RX ADMIN — DEXAMETHASONE SODIUM PHOSPHATE 8 MG: 4 INJECTION, SOLUTION INTRAMUSCULAR; INTRAVENOUS at 12:08

## 2019-08-30 RX ADMIN — LEVALBUTEROL HYDROCHLORIDE 1.25 MG: 1.25 SOLUTION, CONCENTRATE RESPIRATORY (INHALATION) at 02:08

## 2019-08-30 RX ADMIN — PROPOFOL 110 MG: 10 INJECTION, EMULSION INTRAVENOUS at 12:08

## 2019-08-30 NOTE — OP NOTE
Date of operation:  August 30, 2019    Preoperative diagnosis:  Stage IV / Metastatic non-small cell lung carcinoma    Postoperative diagnosis:  Stage IV / Metastatic non-small cell lung carcinoma    Operation:  Insertion of an 8 Vietnamese tunneled left subclavian Port-A-Cath    Surgeon: Brant Welch MD    Anesthesia: General     Estimated blood loss:  15 cc    Indication for procedure:       The patient is a 64-year-old female who is status post right lung lower lobectomy for non-small-cell carcinoma in April 2018, followed by adjuvant chemotherapy.  She has recently been found to have bone metastasis.  The patient is brought to the operating theater today for insertion of a Port-A-Cath in anticipation of upcoming chemotherapy.         Procedure in detail:  The patient was brought to the operating theater and placed on the operating table in the supine position.  EKG, pulse oximetry monitoring line, and a noninvasive blood pressure cuff for applied.  The patient underwent induction, intubation, and administration of general anesthesia via oral endotracheal tube.  The patient's arms were carefully tucked by her side, adequate support was placed behind her head.  The patient's skin was prepped from her chin to her umbilicus with Hibiclens scrub solution, followed by Betadine, followed by alcohol, and then skin .  The operative field was draped in a sterile standard fashion with an Ioban drape. A time-out was performed confirming the patient's identity and planned procedure.    At the site of the patient's previous Port-A-Cath site, the left subclavian vein was percutaneously cannulated with a thin walled needle.  A guidewire was introduced into the central venous circulation, and this was confirmed with intraoperative fluoroscopy.  The previous left  infraclavicular incision was re-incised and dissection was continued down to the pectoralis muscle.  A sub cutaneous pocket was bluntly dissected.  The 8  Georgian catheter was attached to the Port-A-Cath reservoir.  With the assistance of fluoroscopy, the appropriate length of the catheter was determined to be 17 cm.  The reservoir was secured in the subcutaneous pocket with three 2.0 Prolene sutures.  A dilator and peel-away sheath were advanced over the guidewire, the guidewire was removed, and the catheter was tunneled to the peel-away sheath where it was advanced into the central venous circulation.  The peel-away sheath was removed.  Follow-up for flouroscopy revealed the catheter to be in appropriate position without evidence of pneumothorax.  The wound was irrigated with antibiotic irrigation.  The catheter aspirated and flushed easily.  The fascia was closed with a running 3.0 Vicryl suture.  The subcuticular was closed with a 4.0 Vicryl suture and a sterile dressing was applied.  The patient was awoken from anesthesia, extubated in the operating theater, and then transferred to the Post Anesthesia Care Unit for further care.

## 2019-08-30 NOTE — ANESTHESIA PREPROCEDURE EVALUATION
08/30/2019  Sheri Broderick is a 64 y.o., female.    Anesthesia Evaluation    I have reviewed the Patient Summary Reports.    I have reviewed the Nursing Notes.   I have reviewed the Medications.     Review of Systems  Anesthesia Hx:  Hx of Anesthetic complications Slow to awaken History of prior surgery of interest to airway management or planning: Previous anesthesia: MAC  11/2018 PAC REMOVAL with MAC.  Denies Family Hx of Anesthesia complications.   Denies Personal Hx of Anesthesia complications.   Social:  Non-Smoker, No Alcohol Use    Hematology/Oncology:  Hematology Normal      Current/Recent Cancer. --  Cancer in past history:  right chemotherapy and surgery    EENT/Dental:EENT/Dental Normal   Cardiovascular:   Exercise tolerance: poor Hypertension, well controlled Valvular problems/Murmurs, MVP ECG has been reviewed.    Pulmonary:   Asthma asymptomatic Shortness of breath Chronic Dry Cough  SOB with exertion   Renal/:  Renal/ Normal     Hepatic/GI:   Hiatal Hernia, GERD, well controlled Liver Disease, Hepatitis, B    Musculoskeletal:   Arthritis  Bilat. Hips  Fibromyalgia   Neurological:   Neuromuscular Disease, Headaches LBP withLLE radicular pain and numbness  Hx. Vertigo  Chronic Pain Syndrome   Endocrine:  Endocrine Normal    Psych:   Psychiatric History anxiety depression          Physical Exam  General:  Well nourished    Airway/Jaw/Neck:  Airway Findings: Mouth Opening: Normal Tongue: Normal  General Airway Assessment: Adult  Mallampati: III  Improves to II with phonation.  TM Distance: < 4 cm  Jaw/Neck Findings:  Neck ROM: Normal ROM      Dental:  Dental Findings: Molar caps    Chest/Lungs:  Chest/Lungs Findings: Clear to auscultation, Normal Respiratory Rate     Heart/Vascular:  Heart Findings: Rate: Normal  Rhythm: Regular Rhythm  Sounds: Normal  Vascular Findings: Normal     Abdomen:  Abdomen Findings: Normal    Musculoskeletal:  Musculoskeletal Findings: Normal   Skin:  Skin Findings: Normal    Mental Status:  Mental Status Findings:  Cooperative, Alert and Oriented, Anxious         Anesthesia Plan  Type of Anesthesia, risks & benefits discussed:  Anesthesia Type:  general  Patient's Preference: General  Intra-op Monitoring Plan: standard ASA monitors  Intra-op Monitoring Plan Comments:   Post Op Pain Control Plan: multimodal analgesia, per primary service following discharge from PACU and IV/PO Opioids PRN  Post Op Pain Control Plan Comments:   Induction:   IV  Beta Blocker:  Patient is not currently on a Beta-Blocker (No further documentation required).       Informed Consent: Patient understands risks and agrees with Anesthesia plan.  Questions answered. Anesthesia consent signed with patient.  ASA Score: 3     Day of Surgery Review of History & Physical: I have interviewed and examined the patient. I have reviewed the patient's H&P dated:  There are no significant changes.      Anesthesia Plan Notes: Decadron 8mg, iv Zofran 4mg iv, Pepcid 20mg iv LTA          Ready For Surgery From Anesthesia Perspective.

## 2019-08-30 NOTE — DISCHARGE SUMMARY
Date of Admission:  August 30, 2019    Date of Discharge: August 30, 2019    Admission diagnosis:  Stage IV non-small cell lung carcinoma    Discharge diagnosis:  Stage IV non-small cell lung carcinoma    Procedure performed:  Insertion of an 8 Sinhala left subclavian Port-A-Cath on August 30, 2019    Clinical summary:       The patient is a 64-year-old female who is status post right lung lower lobectomy for non-small-cell carcinoma in April 2018, followed by adjuvant chemotherapy.  Recently, she has been found to have bone metastases.  The [facility] for scheduled insertion of a Port-A-Cath device in anticipation of upcoming chemotherapy.  The patient underwent the prescribed operation without immediate complication.  She remained stable and will be ready for discharge to home.

## 2019-08-30 NOTE — TRANSFER OF CARE
Anesthesia Transfer of Care Note    Patient: Sheri Broderick    Procedure(s) Performed: Procedure(s) (LRB):  INSERTION, PORT-A-CATH (Left)    Patient location: PACU    Anesthesia Type: general    Transport from OR: Transported from OR on room air with adequate spontaneous ventilation    Post pain: adequate analgesia    Post assessment: no apparent anesthetic complications    Post vital signs: stable    Level of consciousness: awake and alert    Nausea/Vomiting: no nausea/vomiting    Complications: none    Transfer of care protocol was followed      Last vitals:   Visit Vitals  /77   Pulse 84   Temp 36.7 °C (98 °F) (Oral)   Resp 18   SpO2 97%   Breastfeeding? No

## 2019-08-30 NOTE — H&P
Date of admission: August 30, 2019    Chief complaint:  The patient is here for insertion of a Port-A-Cath for upcoming chemotherapy.    HPI:  The patient is a 64-year-old female never smoker who is status post right lung lower lobectomy for adenocarcinoma in the spring of 2018, followed by adjuvant chemotherapy.  She has recently been found to have bone metastasis and has been referred to the Thoracic Surgery service for insertion of a Port-A-Cath in preparation for upcoming chemotherapy.    Prior Medical History:    Depression  History of hepatitis-B  Mitral valve prolapse  Fibromyalgia  PTSD  Anxiety    Prior surgical history:  Right lung lower lobectomy in April 2018  Insertion of left subclavian Port-A-Cath in May 2018  Removal of Port-A-Cath in November 2018  Sinus surgery  Orthopedic surgery    Social History:  Never smoker    Allergies:  Penicillin  Trazodone  Prolia    Review of systems the patient denies any chest pain, acknowledges dyspnea with exertion and dry cough, no unusual bleeding tendencies, no reported bowel or bladder dysfunction, no focal neurologic events    Exam:    General -  Well developed well-nourished adult female appearing stated age alert orient x3 and cooperative examination  HEENT - Atraumatic, normocephalic with extraocular muscles intact and nonicteric sclera  Chest - Clear to auscultation bilaterally, well-healed right thoracotomy incision and well-healed left infraclavicular incision  Cardiac - Regular rate and rhythm without murmur   Abdomen- Soft  Extremities - No edema  Neuro - Grossly intact    Assessment:  Metastatic / stage IV non small cell lung carcinoma    Plan:  Insertion of Port-A-Cath today for upcoming chemotherapy

## 2019-08-30 NOTE — ANESTHESIA POSTPROCEDURE EVALUATION
Anesthesia Post Evaluation    Patient: Sheri Broderick    Procedure(s) Performed: Procedure(s) (LRB):  INSERTION, PORT-A-CATH (Left)    Final Anesthesia Type: general  Patient location during evaluation: PACU  Patient participation: Yes- Able to Participate  Level of consciousness: awake and alert, oriented and awake  Post-procedure vital signs: reviewed and stable  Pain management: adequate  Airway patency: patent  PONV status at discharge: No PONV  Anesthetic complications: no      Cardiovascular status: hemodynamically stable  Respiratory status: unassisted, room air and spontaneous ventilation  Hydration status: euvolemic  Follow-up not needed.          Vitals Value Taken Time   /61 8/30/2019  2:30 PM   Temp 36.6 °C (97.8 °F) 8/30/2019  2:15 PM   Pulse 90 8/30/2019  2:43 PM   Resp 15 8/30/2019  2:43 PM   SpO2 97 % 8/30/2019  2:43 PM   Vitals shown include unvalidated device data.      No case tracking events are documented in the log.      Pain/Regino Score: Pain Rating Prior to Med Admin: 4 (8/30/2019  2:15 PM)  Regino Score: 9 (8/30/2019  2:15 PM)

## 2019-08-30 NOTE — H&P
Chief Complaint:  No chief complaint on file.  lung cancer, chemo follow up.      History of Present Illness:   Sheri Broderick is a 63 y.o. female who presents for follow up. Patient fell last month while working on a deck outside her home.  She fell approx 24 inches and landed directly on her feet.  Her lower back has been hurting since that time.  She is here for PET scan today.      L3 lesion biopsy 8/7/2019:  Adenocarcinoma c/w lung primary     Family and Social history reviewed and is unchanged from May 21, 2018        ROS:  Review of Systems   Constitutional: Negative for fever.   Respiratory: Negative for shortness of breath.    Cardiovascular: Negative for chest pain and leg swelling.   Gastrointestinal: Negative for abdominal pain and blood in stool.   Genitourinary: Negative for hematuria.   Skin: Negative for rash.            Current Outpatient Medications:     albuterol 90 mcg/actuation inhaler, Inhale 2 puffs into the lungs every 6 (six) hours as needed for Wheezing. Rescue, Disp: 18 g, Rfl: 5    biotin 10,000 mcg Cap, , Disp: , Rfl:     Ca cmb no.1-G0-Z-6-FT-Z19-aloe 120-1,000-10 mg-unit-mg Tab, Take 1 tablet by mouth., Disp: , Rfl:     CHEWABLE VITAMIN C 500 mg Chew, , Disp: , Rfl:     clindamycin (CLEOCIN) 300 MG capsule, Take 300 mg by mouth 3 (three) times daily., Disp: , Rfl:     clonazePAM (KLONOPIN) 1 MG tablet, Take one-half to one tablet two to three times daily only as needed for anxiety, Disp: 180 tablet, Rfl: 0    dextroamphetamine-amphetamine 10 mg Tab, Take one tablet PO in the morning, Disp: 30 tablet, Rfl: 0    esomeprazole magnesium (NEXIUM 24HR) 20 mg TbEC, Take 20 mg by mouth once daily. , Disp: , Rfl:     fluticasone (FLONASE) 50 mcg/actuation nasal spray, 1 spray by Each Nare route once daily., Disp: , Rfl:     folic acid (FOLVITE) 800 MCG Tab, Take 800 mcg by mouth once daily. , Disp: , Rfl:     HYDROcodone-acetaminophen (NORCO) 5-325 mg per tablet, Take 1 tablet  by mouth every 6 (six) hours as needed for Pain., Disp: 30 tablet, Rfl: 0    ibuprofen (ADVIL,MOTRIN) 800 MG tablet, Take 1 tablet (800 mg total) by mouth 2 (two) times daily as needed for Pain., Disp: 180 tablet, Rfl: 3    lamoTRIgine (LAMICTAL) 100 MG tablet, Take one tablet PO in the morning and two tablets PO in the evening, Disp: 270 tablet, Rfl: 0    LINZESS 145 mcg Cap capsule, Take 1 capsule by mouth daily as needed. , Disp: , Rfl:     meclizine (ANTIVERT) 25 mg tablet, Take 1 tablet (25 mg total) by mouth every 8 (eight) hours as needed for Dizziness., Disp: 90 tablet, Rfl: 3    mirtazapine (REMERON) 30 MG tablet, Take one-half tablet PO nightly, Disp: 90 tablet, Rfl: 0    ondansetron (ZOFRAN) 8 MG tablet, Take 1 tablet (8 mg total) by mouth every 8 (eight) hours as needed for Nausea., Disp: 90 tablet, Rfl: 3    sertraline (ZOLOFT) 100 MG tablet, Take two tablets PO in the morning, Disp: 1 tablet, Rfl: 0    venlafaxine (EFFEXOR-XR) 75 MG 24 hr capsule, Take 1 capsule (75 mg total) by mouth once daily., Disp: 90 capsule, Rfl: 1    VITAMIN B-12 1000 MCG tablet, , Disp: , Rfl:     vitamin E 400 UNIT capsule, Take 400 Units by mouth once daily., Disp: , Rfl:     ZYRTEC 10 mg Cap, continuous prn., Disp: , Rfl:            Objective:         Physical Examination:      /79   Pulse (!) 111   Temp 97.5 °F (36.4 °C)   Resp 18   Wt 67.2 kg (148 lb 3.2 oz)   BMI 24.66 kg/m²      Physical Exam   Constitutional: She appears well-developed and well-nourished.   HENT:   Head: Normocephalic and atraumatic.   Right Ear: External ear normal.   Left Ear: External ear normal.   Mouth/Throat: Oropharynx is clear and moist.   Eyes: Pupils are equal, round, and reactive to light. Conjunctivae are normal.   Neck: No tracheal deviation present. No thyromegaly present.   Cardiovascular: Normal rate, regular rhythm and normal heart sounds.   Pulmonary/Chest: Effort normal and breath sounds normal.   Abdominal:  Soft. Bowel sounds are normal. She exhibits no distension and no mass. There is no tenderness.   Musculoskeletal: She exhibits no edema.   Neurological:   Neuro intact througout   Skin: No rash noted.   Psychiatric: She has a normal mood and affect. Her behavior is normal. Judgment and thought content normal.         Labs:   No results found for this or any previous visit (from the past 336 hour(s)).  CMP        Sodium   Date Value Ref Range Status   10/09/2018 140 134 - 144 mmol/L              Potassium   Date Value Ref Range Status   10/09/2018 4.7 3.5 - 5.0 mmol/L              Chloride   Date Value Ref Range Status   10/09/2018 105 98 - 110 mmol/L              CO2   Date Value Ref Range Status   10/09/2018 29.3 22.8 - 31.6 mmol/L              Glucose   Date Value Ref Range Status   10/09/2018 101 (H) 70 - 99 mg/dL              BUN, Bld   Date Value Ref Range Status   10/09/2018 17 8 - 20 mg/dL              Creatinine   Date Value Ref Range Status   10/09/2018 0.80 0.60 - 1.40 mg/dL     11/29/2012 0.7 0.5 - 1.4 mg/dL Final            Calcium   Date Value Ref Range Status   10/09/2018 9.4 7.7 - 10.4 mg/dL     11/29/2012 9.0 8.7 - 10.5 mg/dL Final            Total Protein   Date Value Ref Range Status   10/09/2018 7.6 6.0 - 8.2 g/dL              Albumin   Date Value Ref Range Status   10/09/2018 4.1 3.1 - 4.7 g/dL              Total Bilirubin   Date Value Ref Range Status   10/09/2018 0.6 0.3 - 1.0 mg/dL              Alkaline Phosphatase   Date Value Ref Range Status   10/09/2018 104 40 - 104 IU/L              AST   Date Value Ref Range Status   10/09/2018 23 10 - 40 IU/L              ALT   Date Value Ref Range Status   06/01/2018 10 6 - 29 U/L Final            Anion Gap   Date Value Ref Range Status   03/03/2016 10 8 - 16 mmol/L Final   11/29/2012 10 5 - 15 meq/L Final            eGFR if    Date Value Ref Range Status   06/01/2018 94 > OR = 60 mL/min/1.73m2 Final            eGFR if non African  American   Date Value Ref Range Status   06/01/2018 81 > OR = 60 mL/min/1.73m2 Final      No results found for: CEA  No results found for: PSA           Assessment/Plan:           Problem List Items Addressed This Visit           Malignant neoplasm of lower lobe of right lung-Adenocarcinoma - Primary (Chronic)        I had a long discussion with Ms. Broderick about this unfortunate situation.  She no has metastatic disease which is uncurable and terminal.  I discussed that she has options for chemo and immunotherapy and will await mutational studies prior to making a final decision.  I will refer her to radiation therapy due to pain associated with hip lesion.  L3 can also be radiated as well.  Plan to begin systemic therapy after this treatment.              Relevant Orders      Ambulatory referral to Radiation Oncology

## 2019-09-03 ENCOUNTER — TELEPHONE (OUTPATIENT)
Dept: PSYCHIATRY | Facility: CLINIC | Age: 64
End: 2019-09-03

## 2019-09-04 ENCOUNTER — OFFICE VISIT (OUTPATIENT)
Dept: HEMATOLOGY/ONCOLOGY | Facility: CLINIC | Age: 64
End: 2019-09-04
Payer: MEDICARE

## 2019-09-04 VITALS
RESPIRATION RATE: 18 BRPM | HEART RATE: 105 BPM | TEMPERATURE: 99 F | SYSTOLIC BLOOD PRESSURE: 137 MMHG | BODY MASS INDEX: 24.86 KG/M2 | WEIGHT: 149.38 LBS | DIASTOLIC BLOOD PRESSURE: 80 MMHG

## 2019-09-04 DIAGNOSIS — C34.31 MALIGNANT NEOPLASM OF LOWER LOBE OF RIGHT LUNG: Chronic | ICD-10-CM

## 2019-09-04 PROCEDURE — 3075F PR MOST RECENT SYSTOLIC BLOOD PRESS GE 130-139MM HG: ICD-10-PCS | Mod: S$GLB,,, | Performed by: INTERNAL MEDICINE

## 2019-09-04 PROCEDURE — 3008F BODY MASS INDEX DOCD: CPT | Mod: S$GLB,,, | Performed by: INTERNAL MEDICINE

## 2019-09-04 PROCEDURE — 1101F PR PT FALLS ASSESS DOC 0-1 FALLS W/OUT INJ PAST YR: ICD-10-PCS | Mod: S$GLB,,, | Performed by: INTERNAL MEDICINE

## 2019-09-04 PROCEDURE — 3079F DIAST BP 80-89 MM HG: CPT | Mod: S$GLB,,, | Performed by: INTERNAL MEDICINE

## 2019-09-04 PROCEDURE — 99214 OFFICE O/P EST MOD 30 MIN: CPT | Mod: S$GLB,,, | Performed by: INTERNAL MEDICINE

## 2019-09-04 PROCEDURE — 3008F PR BODY MASS INDEX (BMI) DOCUMENTED: ICD-10-PCS | Mod: S$GLB,,, | Performed by: INTERNAL MEDICINE

## 2019-09-04 PROCEDURE — 99214 PR OFFICE/OUTPT VISIT, EST, LEVL IV, 30-39 MIN: ICD-10-PCS | Mod: S$GLB,,, | Performed by: INTERNAL MEDICINE

## 2019-09-04 PROCEDURE — 3079F PR MOST RECENT DIASTOLIC BLOOD PRESSURE 80-89 MM HG: ICD-10-PCS | Mod: S$GLB,,, | Performed by: INTERNAL MEDICINE

## 2019-09-04 PROCEDURE — 3075F SYST BP GE 130 - 139MM HG: CPT | Mod: S$GLB,,, | Performed by: INTERNAL MEDICINE

## 2019-09-04 PROCEDURE — 1101F PT FALLS ASSESS-DOCD LE1/YR: CPT | Mod: S$GLB,,, | Performed by: INTERNAL MEDICINE

## 2019-09-04 NOTE — PROGRESS NOTES
PROGRESS NOTE    Subjective:       Patient ID: Sheri Broderick is a 64 y.o. female.    Chief Complaint:  No chief complaint on file.  lung cancer, chemo follow up.     History of Present Illness:   Sheri Broderick is a 64 y.o. female who presents for follow up. Patient fell last month while working on a deck outside her home.  She fell approx 24 inches and landed directly on her feet.  Her lower back has been hurting since that time.  She is here for PET scan today.       PET Impression 7/8/2019:  1. FDG avid enlarged lymph nodes in right hilum and lower paratracheal region  of mediastinal, and additional foci of FDG uptake involving L3 vertebral body  and anterior left acetabulum also suspicious for new metastatic disease.    2. Nonspecific FDG uptake associated with right T2 transverse process near  articulation with posterior right second rib as above. Attention to this on  follow-up PET/CT is suggested.    3. Recent compression fracture of L2 vertebral body superior endplate since  04/09/2019, with associated FDG uptake which is inflammatory in nature.    L3 lesion biopsy 8/7/2019:  Adenocarcinoma c/w lung primary    Family and Social history reviewed and is unchanged from May 21, 2018      ROS:  Review of Systems   Constitutional: Negative for fever.   Respiratory: Negative for shortness of breath.    Cardiovascular: Negative for chest pain and leg swelling.   Gastrointestinal: Negative for abdominal pain and blood in stool.   Genitourinary: Negative for hematuria.   Skin: Negative for rash.          Current Outpatient Medications:     CHEWABLE VITAMIN C 500 mg Chew, , Disp: , Rfl:     clonazePAM (KLONOPIN) 1 MG tablet, Take one-half to one tablet two to three times daily only as needed for anxiety, Disp: 180 tablet, Rfl: 0    dextroamphetamine-amphetamine 10 mg Tab, Take one tablet PO in the morning, Disp: 30 tablet, Rfl: 0    esomeprazole  magnesium (NEXIUM 24HR) 20 mg TbEC, Take 20 mg by mouth once daily. , Disp: , Rfl:     fluticasone (FLONASE) 50 mcg/actuation nasal spray, 1 spray by Each Nare route once daily., Disp: , Rfl:     HYDROcodone-acetaminophen (NORCO) 5-325 mg per tablet, Take 1 tablet by mouth every 6 (six) hours as needed for Pain., Disp: 24 tablet, Rfl: 0    ibuprofen (ADVIL,MOTRIN) 800 MG tablet, Take 1 tablet (800 mg total) by mouth 2 (two) times daily as needed for Pain., Disp: 180 tablet, Rfl: 3    lamoTRIgine (LAMICTAL) 100 MG tablet, Take one tablet PO in the morning and two tablets PO in the evening, Disp: 270 tablet, Rfl: 0    LINZESS 145 mcg Cap capsule, Take 1 capsule by mouth daily as needed. , Disp: , Rfl:     meclizine (ANTIVERT) 25 mg tablet, Take 1 tablet (25 mg total) by mouth every 8 (eight) hours as needed for Dizziness., Disp: 90 tablet, Rfl: 3    mirtazapine (REMERON) 30 MG tablet, Take one-half tablet PO nightly, Disp: 90 tablet, Rfl: 0    sertraline (ZOLOFT) 100 MG tablet, Take two tablets PO in the morning, Disp: 1 tablet, Rfl: 0    venlafaxine (EFFEXOR-XR) 75 MG 24 hr capsule, Take 1 capsule (75 mg total) by mouth once daily., Disp: 90 capsule, Rfl: 1    VITAMIN B-12 1000 MCG tablet, , Disp: , Rfl:     ZYRTEC 10 mg Cap, continuous prn., Disp: , Rfl:         Objective:       Physical Examination:     /80   Pulse 105   Temp 98.9 °F (37.2 °C)   Resp 18   Wt 67.8 kg (149 lb 6.4 oz)   BMI 24.86 kg/m²     Physical Exam   Constitutional: She appears well-developed and well-nourished.   HENT:   Head: Normocephalic and atraumatic.   Right Ear: External ear normal.   Left Ear: External ear normal.   Mouth/Throat: Oropharynx is clear and moist.   Eyes: Pupils are equal, round, and reactive to light. Conjunctivae are normal.   Neck: No tracheal deviation present. No thyromegaly present.   Cardiovascular: Normal rate, regular rhythm and normal heart sounds.   Pulmonary/Chest: Effort normal and breath  sounds normal.   Abdominal: Soft. Bowel sounds are normal. She exhibits no distension and no mass. There is no tenderness.   Musculoskeletal: She exhibits no edema.   Neurological:   Neuro intact througout   Skin: No rash noted.   Psychiatric: She has a normal mood and affect. Her behavior is normal. Judgment and thought content normal.       Labs:   No results found for this or any previous visit (from the past 336 hour(s)).  CMP  Sodium   Date Value Ref Range Status   08/28/2019 142 136 - 145 mmol/L Final   10/09/2018 140 134 - 144 mmol/L      Potassium   Date Value Ref Range Status   08/28/2019 3.8 3.5 - 5.1 mmol/L Final     Chloride   Date Value Ref Range Status   08/28/2019 107 95 - 110 mmol/L Final   10/09/2018 105 98 - 110 mmol/L      CO2   Date Value Ref Range Status   08/28/2019 30 (H) 23 - 29 mmol/L Final     Glucose   Date Value Ref Range Status   08/28/2019 98 70 - 110 mg/dL Final   10/09/2018 101 (H) 70 - 99 mg/dL      BUN, Bld   Date Value Ref Range Status   08/28/2019 18 8 - 23 mg/dL Final     Creatinine   Date Value Ref Range Status   08/28/2019 0.7 0.5 - 1.4 mg/dL Final   10/09/2018 0.80 0.60 - 1.40 mg/dL    11/29/2012 0.7 0.5 - 1.4 mg/dL Final     Calcium   Date Value Ref Range Status   08/28/2019 9.3 8.7 - 10.5 mg/dL Final   11/29/2012 9.0 8.7 - 10.5 mg/dL Final     Total Protein   Date Value Ref Range Status   10/09/2018 7.6 6.0 - 8.2 g/dL      Albumin   Date Value Ref Range Status   10/09/2018 4.1 3.1 - 4.7 g/dL      Total Bilirubin   Date Value Ref Range Status   10/09/2018 0.6 0.3 - 1.0 mg/dL      Alkaline Phosphatase   Date Value Ref Range Status   10/09/2018 104 40 - 104 IU/L      AST   Date Value Ref Range Status   10/09/2018 23 10 - 40 IU/L      ALT   Date Value Ref Range Status   06/01/2018 10 6 - 29 U/L Final     Anion Gap   Date Value Ref Range Status   08/28/2019 5 (L) 8 - 16 mmol/L Final   11/29/2012 10 5 - 15 meq/L Final     eGFR if    Date Value Ref Range Status    08/28/2019 >60.0 >60 mL/min/1.73 m^2 Final     eGFR if non    Date Value Ref Range Status   08/28/2019 >60.0 >60 mL/min/1.73 m^2 Final     Comment:     Calculation used to obtain the estimated glomerular filtration  rate (eGFR) is the CKD-EPI equation.        No results found for: CEA  No results found for: PSA        Assessment/Plan:     Problem List Items Addressed This Visit     Malignant neoplasm of lower lobe of right lung-Adenocarcinoma (Chronic)     I discussed with Ms. Broderick her scans and the fact that she has stage IV recurrent lung cancer.  I would like to have PDL1, Alk and EGFR done on the biopsy spedicmen but there is not enough tissue present for this.  I will have them send this on her original lung resection specimen and will plan on therapy based on these results.  Most likely regimen will be carbo/taxol/keytruda unless one of these mutations is positive.  Dicussed this with her in detail today and will have her back with me in 2 weeks.           Relevant Orders    CT Guided Needle Placement            Discussion:   High complexity due to intense lab monitoring, high risk medications.   Follow up in about 2 weeks (around 9/18/2019).      Electronically signed by Segundo Lamb

## 2019-09-06 ENCOUNTER — OFFICE VISIT (OUTPATIENT)
Dept: RADIATION ONCOLOGY | Facility: CLINIC | Age: 64
End: 2019-09-06
Payer: MEDICARE

## 2019-09-06 ENCOUNTER — DOCUMENTATION ONLY (OUTPATIENT)
Dept: FAMILY MEDICINE | Facility: CLINIC | Age: 64
End: 2019-09-06

## 2019-09-06 PROCEDURE — 77331 PR  SPECIAL RADIATION DOSIMETRY: ICD-10-PCS | Mod: S$GLB,,, | Performed by: RADIOLOGY

## 2019-09-06 PROCEDURE — G6014 RADIATION TREATMENT DELIVERY: HCPCS | Mod: S$GLB,,, | Performed by: RADIOLOGY

## 2019-09-06 PROCEDURE — G6002 PR STEREOSCOPIC XRAY GUIDE FOR RADIATION TX DELIV: ICD-10-PCS | Mod: S$GLB,,, | Performed by: RADIOLOGY

## 2019-09-06 PROCEDURE — G6002 STEREOSCOPIC X-RAY GUIDANCE: HCPCS | Mod: S$GLB,,, | Performed by: RADIOLOGY

## 2019-09-06 PROCEDURE — 77331 SPECIAL RADIATION DOSIMETRY: CPT | Mod: S$GLB,,, | Performed by: RADIOLOGY

## 2019-09-06 PROCEDURE — G6014 PR RADN TX DELIVERY,  >= 20 MEV, >= 3 TX AREAS: ICD-10-PCS | Mod: S$GLB,,, | Performed by: RADIOLOGY

## 2019-09-06 NOTE — PROGRESS NOTES
Pre-Visit Chart Review  For Appointment Scheduled on 9/9/2019.       Health Maintenance Due   Topic Date Due    Pneumococcal Vaccine (65+ High/Highest Risk) (1 of 2 - PCV13) 08/29/2019

## 2019-09-09 ENCOUNTER — OFFICE VISIT (OUTPATIENT)
Dept: FAMILY MEDICINE | Facility: CLINIC | Age: 64
End: 2019-09-09
Payer: MEDICARE

## 2019-09-09 VITALS
TEMPERATURE: 99 F | HEIGHT: 65 IN | BODY MASS INDEX: 24.83 KG/M2 | HEART RATE: 110 BPM | DIASTOLIC BLOOD PRESSURE: 68 MMHG | SYSTOLIC BLOOD PRESSURE: 122 MMHG | WEIGHT: 149.06 LBS | OXYGEN SATURATION: 97 %

## 2019-09-09 DIAGNOSIS — M81.0 OSTEOPOROSIS, SENILE: ICD-10-CM

## 2019-09-09 DIAGNOSIS — E78.2 MIXED HYPERLIPIDEMIA: ICD-10-CM

## 2019-09-09 DIAGNOSIS — M79.7 FIBROMYALGIA: ICD-10-CM

## 2019-09-09 DIAGNOSIS — C34.31 MALIGNANT NEOPLASM OF LOWER LOBE OF RIGHT LUNG: Primary | ICD-10-CM

## 2019-09-09 DIAGNOSIS — F33.1 MODERATE RECURRENT MAJOR DEPRESSION: ICD-10-CM

## 2019-09-09 PROCEDURE — 3078F PR MOST RECENT DIASTOLIC BLOOD PRESSURE < 80 MM HG: ICD-10-PCS | Mod: HCNC,CPTII,S$GLB, | Performed by: FAMILY MEDICINE

## 2019-09-09 PROCEDURE — 99999 PR PBB SHADOW E&M-EST. PATIENT-LVL III: CPT | Mod: PBBFAC,HCNC,, | Performed by: FAMILY MEDICINE

## 2019-09-09 PROCEDURE — 1100F PR PT FALLS ASSESS DOC 2+ FALLS/FALL W/INJURY/YR: ICD-10-PCS | Mod: HCNC,CPTII,S$GLB, | Performed by: FAMILY MEDICINE

## 2019-09-09 PROCEDURE — 1100F PTFALLS ASSESS-DOCD GE2>/YR: CPT | Mod: HCNC,CPTII,S$GLB, | Performed by: FAMILY MEDICINE

## 2019-09-09 PROCEDURE — 99999 PR PBB SHADOW E&M-EST. PATIENT-LVL III: ICD-10-PCS | Mod: PBBFAC,HCNC,, | Performed by: FAMILY MEDICINE

## 2019-09-09 PROCEDURE — 99499 UNLISTED E&M SERVICE: CPT | Mod: HCNC,S$GLB,, | Performed by: FAMILY MEDICINE

## 2019-09-09 PROCEDURE — 3288F PR FALLS RISK ASSESSMENT DOCUMENTED: ICD-10-PCS | Mod: HCNC,CPTII,S$GLB, | Performed by: FAMILY MEDICINE

## 2019-09-09 PROCEDURE — 99213 OFFICE O/P EST LOW 20 MIN: CPT | Mod: HCNC,S$GLB,, | Performed by: FAMILY MEDICINE

## 2019-09-09 PROCEDURE — 3074F SYST BP LT 130 MM HG: CPT | Mod: HCNC,CPTII,S$GLB, | Performed by: FAMILY MEDICINE

## 2019-09-09 PROCEDURE — 3074F PR MOST RECENT SYSTOLIC BLOOD PRESSURE < 130 MM HG: ICD-10-PCS | Mod: HCNC,CPTII,S$GLB, | Performed by: FAMILY MEDICINE

## 2019-09-09 PROCEDURE — 3288F FALL RISK ASSESSMENT DOCD: CPT | Mod: HCNC,CPTII,S$GLB, | Performed by: FAMILY MEDICINE

## 2019-09-09 PROCEDURE — 3008F PR BODY MASS INDEX (BMI) DOCUMENTED: ICD-10-PCS | Mod: HCNC,CPTII,S$GLB, | Performed by: FAMILY MEDICINE

## 2019-09-09 PROCEDURE — 3078F DIAST BP <80 MM HG: CPT | Mod: HCNC,CPTII,S$GLB, | Performed by: FAMILY MEDICINE

## 2019-09-09 PROCEDURE — 99499 RISK ADDL DX/OHS AUDIT: ICD-10-PCS | Mod: HCNC,S$GLB,, | Performed by: FAMILY MEDICINE

## 2019-09-09 PROCEDURE — 3008F BODY MASS INDEX DOCD: CPT | Mod: HCNC,CPTII,S$GLB, | Performed by: FAMILY MEDICINE

## 2019-09-09 PROCEDURE — 99213 PR OFFICE/OUTPT VISIT, EST, LEVL III, 20-29 MIN: ICD-10-PCS | Mod: HCNC,S$GLB,, | Performed by: FAMILY MEDICINE

## 2019-09-10 NOTE — PROGRESS NOTES
Subjective:       Patient ID: Sheri Broderick is a 64 y.o. female.    Chief Complaint: Lung Cancer; Fibromyalgia; and Depression    Patient presents here for follow-up of lung cancer, fibromyalgia, depression, and osteopenia.  Since I saw her last, she has now developed metastasis of her lung cancer to the bone, most notably in the lumbar spine.  This was proven by bone biopsy and she started radiation last week.  She also had a port placed 2 weeks ago in anticipation of chemotherapy.  She is accepting of the fact that she now has metastatic cancer.  I did discuss with her the fact that she has stage IV cancer and that although we want to be optimistic, we also have to be realistic.  I discussed with her about a power of , both for financial and medical issues, as well as developing a will.  I also discussed with her that it may come a time when she does not want any further treatment and that she should be able to direct that with a living will.  I gave her the web site for [facility], so that she can look at this and decide what she does and does not want done in the future.  I did also state that if she does develop a living will, she should give a copy to both me and her oncologist.  She does have longstanding depression and is on medication and is followed by Psychiatry on a regular basis.  Her fibromyalgia is stable at this time.  As far as health maintenance, she is up-to-date with all of her recommended screening exams and immunizations with the exception of a shingles vaccine, flu vaccine, and Pneumovax.  I will defer what she can get at this time to her oncologist, and either he can give them at his office or we can give it here.    Review of Systems   Constitutional: Positive for fatigue. Negative for chills, fever and unexpected weight change.   HENT: Negative for congestion, ear pain, postnasal drip and sore throat.    Respiratory: Negative for cough.    Gastrointestinal: Positive for  constipation and nausea (with radiation treatments). Negative for abdominal pain, diarrhea and vomiting.   Genitourinary: Negative for difficulty urinating, dysuria and flank pain.   Musculoskeletal: Positive for back pain and myalgias. Negative for arthralgias.   Neurological: Negative for dizziness and light-headedness.   Hematological: Negative for adenopathy. Does not bruise/bleed easily.   Psychiatric/Behavioral: Positive for sleep disturbance. The patient is not nervous/anxious.         Depression relatively stable; seeing Psychiatry regularly       Objective:      Physical Exam   Constitutional: She is oriented to person, place, and time. She appears well-developed and well-nourished.   HENT:   Head: Normocephalic and atraumatic.   Right Ear: External ear normal.   Left Ear: External ear normal.   Nose: Nose normal.   Mouth/Throat: Oropharynx is clear and moist.   Neck: Normal range of motion. Neck supple. No thyromegaly present.   Cardiovascular: Normal rate, regular rhythm, normal heart sounds and intact distal pulses.   No murmur heard.  Pulmonary/Chest: Effort normal and breath sounds normal. She has no wheezes. She has no rales.   Musculoskeletal: Normal range of motion. She exhibits no edema or tenderness.   Lymphadenopathy:     She has no cervical adenopathy.   Neurological: She is alert and oriented to person, place, and time. She has normal reflexes. No cranial nerve deficit.   Psychiatric: She has a normal mood and affect. Her behavior is normal.   Vitals reviewed.      Assessment:       1. Malignant neoplasm of lower lobe of right lung    2. Fibromyalgia    3. Osteoporosis, senile    4. Moderate recurrent major depression    5. Mixed hyperlipidemia        Plan:       1.  Continue radiation treatments and subsequent chemotherapy per Oncology  2.  Also continue follow-up and treatment of her depression by Psychiatry  3.  Encourage patient to get a healthy diet especially high in proteins as this will  help keep her healthy as well as help her hyperlipidemia  4.  Long discussion with the patient as noted above about stage IV cancer, about living will and advanced directives, and about getting someone to be power-of-, both for financial and medical issues  5.  Follow up with me in 6 months  6.  She is due for several vaccinations but I will defer these to the opinion of Oncology on which she can get and which she should not receive at this time.

## 2019-09-13 NOTE — ASSESSMENT & PLAN NOTE
I discussed with Ms. Broderick her scans and the fact that she has stage IV recurrent lung cancer.  I would like to have PDL1, Alk and EGFR done on the biopsy spedicmen but there is not enough tissue present for this.  I will have them send this on her original lung resection specimen and will plan on therapy based on these results.  Most likely regimen will be carbo/taxol/keytruda unless one of these mutations is positive.  Dicussed this with her in detail today and will have her back with me in 2 weeks.

## 2019-09-18 ENCOUNTER — TELEPHONE (OUTPATIENT)
Dept: HEMATOLOGY/ONCOLOGY | Facility: CLINIC | Age: 64
End: 2019-09-18

## 2019-09-18 NOTE — TELEPHONE ENCOUNTER
----- Message from Elida Velázquez sent at 9/18/2019  3:31 PM CDT -----  Contact: [facility]  Returned your call.    CB# [phone]      Spoke to Sandra vasquez verifier at [facility]. She said that the ALK and ROS 1 were just approved today and that these tests will take about 7 days to result.

## 2019-09-18 NOTE — TELEPHONE ENCOUNTER
----- Message from Vika Faith sent at 9/18/2019 11:21 AM CDT -----  Pt called on Monday and would like for someone return her call. She is really upset that nobody has called. This is regarding labs and markers.    CB# [phone]        Called Ms. Wade back and let her know that the 2 additional tests were finally approved today and will result in @ 7 days according to [facility]. She then mentioned that she really feels bad mostly since starting the radiation treatment. She has had dizziness, ringing in ears and a fullness feeling in ears as well. She asked the radiation doctor to check her ears and also described her complaints and was told that it is not associated with the rx. He recommended an ENT but she told me she does not know who to go to. I told her Dr. Zhang recommends either [facility] or [facility] and so she said she would prefer calling them herself and getting an appt. I provided her with their telephone number and also sent Our last office note to them. Sheri will call me back if she has any questions.

## 2019-09-24 RX ORDER — LAMOTRIGINE 100 MG/1
TABLET ORAL
Qty: 270 TABLET | Refills: 0 | Status: SHIPPED | OUTPATIENT
Start: 2019-09-24 | End: 2019-12-11 | Stop reason: DRUGHIGH

## 2019-10-03 ENCOUNTER — INFUSION (OUTPATIENT)
Dept: INFUSION THERAPY | Facility: HOSPITAL | Age: 64
End: 2019-10-03
Attending: INTERNAL MEDICINE
Payer: MEDICARE

## 2019-10-03 ENCOUNTER — OFFICE VISIT (OUTPATIENT)
Dept: HEMATOLOGY/ONCOLOGY | Facility: CLINIC | Age: 64
End: 2019-10-03
Payer: MEDICARE

## 2019-10-03 ENCOUNTER — TELEPHONE (OUTPATIENT)
Dept: HEMATOLOGY/ONCOLOGY | Facility: CLINIC | Age: 64
End: 2019-10-03

## 2019-10-03 VITALS
TEMPERATURE: 98 F | SYSTOLIC BLOOD PRESSURE: 107 MMHG | DIASTOLIC BLOOD PRESSURE: 62 MMHG | RESPIRATION RATE: 18 BRPM | HEART RATE: 106 BPM

## 2019-10-03 VITALS
HEART RATE: 92 BPM | RESPIRATION RATE: 18 BRPM | DIASTOLIC BLOOD PRESSURE: 74 MMHG | SYSTOLIC BLOOD PRESSURE: 137 MMHG | TEMPERATURE: 99 F

## 2019-10-03 DIAGNOSIS — C34.31 MALIGNANT NEOPLASM OF LOWER LOBE OF RIGHT LUNG: Chronic | ICD-10-CM

## 2019-10-03 DIAGNOSIS — C34.31 MALIGNANT NEOPLASM OF LOWER LOBE OF RIGHT LUNG: Primary | ICD-10-CM

## 2019-10-03 PROCEDURE — 3288F FALL RISK ASSESSMENT DOCD: CPT | Mod: S$GLB,,, | Performed by: INTERNAL MEDICINE

## 2019-10-03 PROCEDURE — 99214 PR OFFICE/OUTPT VISIT, EST, LEVL IV, 30-39 MIN: ICD-10-PCS | Mod: S$GLB,,, | Performed by: INTERNAL MEDICINE

## 2019-10-03 PROCEDURE — 3078F DIAST BP <80 MM HG: CPT | Mod: S$GLB,,, | Performed by: INTERNAL MEDICINE

## 2019-10-03 PROCEDURE — 63600175 PHARM REV CODE 636 W HCPCS: Performed by: INTERNAL MEDICINE

## 2019-10-03 PROCEDURE — A4216 STERILE WATER/SALINE, 10 ML: HCPCS | Performed by: INTERNAL MEDICINE

## 2019-10-03 PROCEDURE — 3074F SYST BP LT 130 MM HG: CPT | Mod: S$GLB,,, | Performed by: INTERNAL MEDICINE

## 2019-10-03 PROCEDURE — 99214 OFFICE O/P EST MOD 30 MIN: CPT | Mod: S$GLB,,, | Performed by: INTERNAL MEDICINE

## 2019-10-03 PROCEDURE — 1100F PR PT FALLS ASSESS DOC 2+ FALLS/FALL W/INJURY/YR: ICD-10-PCS | Mod: S$GLB,,, | Performed by: INTERNAL MEDICINE

## 2019-10-03 PROCEDURE — 3288F PR FALLS RISK ASSESSMENT DOCUMENTED: ICD-10-PCS | Mod: S$GLB,,, | Performed by: INTERNAL MEDICINE

## 2019-10-03 PROCEDURE — 3074F PR MOST RECENT SYSTOLIC BLOOD PRESSURE < 130 MM HG: ICD-10-PCS | Mod: S$GLB,,, | Performed by: INTERNAL MEDICINE

## 2019-10-03 PROCEDURE — 25000003 PHARM REV CODE 250: Performed by: INTERNAL MEDICINE

## 2019-10-03 PROCEDURE — 1100F PTFALLS ASSESS-DOCD GE2>/YR: CPT | Mod: S$GLB,,, | Performed by: INTERNAL MEDICINE

## 2019-10-03 PROCEDURE — 3078F PR MOST RECENT DIASTOLIC BLOOD PRESSURE < 80 MM HG: ICD-10-PCS | Mod: S$GLB,,, | Performed by: INTERNAL MEDICINE

## 2019-10-03 PROCEDURE — 96523 IRRIG DRUG DELIVERY DEVICE: CPT

## 2019-10-03 RX ORDER — HEPARIN 100 UNIT/ML
500 SYRINGE INTRAVENOUS
Status: COMPLETED | OUTPATIENT
Start: 2019-10-03 | End: 2019-10-03

## 2019-10-03 RX ORDER — SODIUM CHLORIDE 0.9 % (FLUSH) 0.9 %
10 SYRINGE (ML) INJECTION
Status: CANCELLED | OUTPATIENT
Start: 2019-10-03

## 2019-10-03 RX ORDER — HEPARIN 100 UNIT/ML
500 SYRINGE INTRAVENOUS
Status: CANCELLED | OUTPATIENT
Start: 2019-10-03

## 2019-10-03 RX ORDER — SODIUM CHLORIDE 0.9 % (FLUSH) 0.9 %
10 SYRINGE (ML) INJECTION
Status: COMPLETED | OUTPATIENT
Start: 2019-10-03 | End: 2019-10-03

## 2019-10-03 RX ADMIN — SODIUM CHLORIDE, PRESERVATIVE FREE 10 ML: 5 INJECTION INTRAVENOUS at 11:10

## 2019-10-03 RX ADMIN — HEPARIN 500 UNITS: 100 SYRINGE at 11:10

## 2019-10-03 NOTE — PLAN OF CARE
Problem: Infection  Goal: Infection Symptom Resolution  Outcome: Ongoing, Progressing  Intervention: Prevent or Manage Infection  Flowsheets (Taken 10/3/2019 1117)  Infection Management: aseptic technique maintained

## 2019-10-03 NOTE — PROGRESS NOTES
PROGRESS NOTE    Subjective:       Patient ID: Sheri Broderick is a 64 y.o. female.    Chief Complaint:  Follow-up and Results  lung cancer, chemo follow up.     History of Present Illness:   Sheri Broderick is a 64 y.o. female who presents for follow up. Patient fell last month while working on a deck outside her home.  She fell approx 24 inches and landed directly on her feet.  Her lower back has been hurting since that time.  She is here for PET scan today.       PET Impression 7/8/2019:  1. FDG avid enlarged lymph nodes in right hilum and lower paratracheal region  of mediastinal, and additional foci of FDG uptake involving L3 vertebral body  and anterior left acetabulum also suspicious for new metastatic disease.    2. Nonspecific FDG uptake associated with right T2 transverse process near  articulation with posterior right second rib as above. Attention to this on  follow-up PET/CT is suggested.    3. Recent compression fracture of L2 vertebral body superior endplate since  04/09/2019, with associated FDG uptake which is inflammatory in nature.    L3 lesion biopsy 8/7/2019:  Adenocarcinoma c/w lung primary  PD-L1: 9%  ALK neg  Ros-1 Neg  EGFR Neg    Family and Social history reviewed and is unchanged from May 21, 2018      ROS:  Review of Systems   Constitutional: Negative for fever.   Respiratory: Negative for shortness of breath.    Cardiovascular: Negative for chest pain and leg swelling.   Gastrointestinal: Negative for abdominal pain and blood in stool.   Genitourinary: Negative for hematuria.   Skin: Negative for rash.          Current Outpatient Medications:     CHEWABLE VITAMIN C 500 mg Chew, , Disp: , Rfl:     clonazePAM (KLONOPIN) 1 MG tablet, Take one-half to one tablet two to three times daily only as needed for anxiety, Disp: 180 tablet, Rfl: 0    dextroamphetamine-amphetamine 10 mg Tab, Take one tablet PO in the morning, Disp: 30  tablet, Rfl: 0    esomeprazole magnesium (NEXIUM 24HR) 20 mg TbEC, Take 20 mg by mouth once daily. , Disp: , Rfl:     fluticasone (FLONASE) 50 mcg/actuation nasal spray, 1 spray by Each Nare route once daily., Disp: , Rfl:     HYDROcodone-acetaminophen (NORCO) 5-325 mg per tablet, Take 1 tablet by mouth every 6 (six) hours as needed for Pain., Disp: 24 tablet, Rfl: 0    ibuprofen (ADVIL,MOTRIN) 800 MG tablet, Take 1 tablet (800 mg total) by mouth 2 (two) times daily as needed for Pain., Disp: 180 tablet, Rfl: 3    lamoTRIgine (LAMICTAL) 100 MG tablet, TAKE 1 TABLET IN THE MORNING  AND TAKE 2 TABLETS IN THE EVENING, Disp: 270 tablet, Rfl: 0    LINZESS 145 mcg Cap capsule, Take 1 capsule by mouth daily as needed. , Disp: , Rfl:     meclizine (ANTIVERT) 25 mg tablet, Take 1 tablet (25 mg total) by mouth every 8 (eight) hours as needed for Dizziness., Disp: 90 tablet, Rfl: 3    mirtazapine (REMERON) 30 MG tablet, Take one-half tablet PO nightly, Disp: 90 tablet, Rfl: 0    sertraline (ZOLOFT) 100 MG tablet, Take two tablets PO in the morning, Disp: 1 tablet, Rfl: 0    venlafaxine (EFFEXOR-XR) 75 MG 24 hr capsule, Take 1 capsule (75 mg total) by mouth once daily., Disp: 90 capsule, Rfl: 1    VITAMIN B-12 1000 MCG tablet, , Disp: , Rfl:     ZYRTEC 10 mg Cap, continuous prn., Disp: , Rfl:         Objective:       Physical Examination:     /62   Pulse 106   Temp 98.4 °F (36.9 °C)   Resp 18     Physical Exam   Constitutional: She appears well-developed and well-nourished.   HENT:   Head: Normocephalic and atraumatic.   Right Ear: External ear normal.   Left Ear: External ear normal.   Mouth/Throat: Oropharynx is clear and moist.   Eyes: Pupils are equal, round, and reactive to light. Conjunctivae are normal.   Neck: No tracheal deviation present. No thyromegaly present.   Cardiovascular: Normal rate, regular rhythm and normal heart sounds.   Pulmonary/Chest: Effort normal and breath sounds normal.    Abdominal: Soft. Bowel sounds are normal. She exhibits no distension and no mass. There is no tenderness.   Musculoskeletal: She exhibits no edema.   Neurological:   Neuro intact througout   Skin: No rash noted.   Psychiatric: She has a normal mood and affect. Her behavior is normal. Judgment and thought content normal.       Labs:   No results found for this or any previous visit (from the past 336 hour(s)).  CMP  Sodium   Date Value Ref Range Status   08/28/2019 142 136 - 145 mmol/L Final   10/09/2018 140 134 - 144 mmol/L      Potassium   Date Value Ref Range Status   08/28/2019 3.8 3.5 - 5.1 mmol/L Final     Chloride   Date Value Ref Range Status   08/28/2019 107 95 - 110 mmol/L Final   10/09/2018 105 98 - 110 mmol/L      CO2   Date Value Ref Range Status   08/28/2019 30 (H) 23 - 29 mmol/L Final     Glucose   Date Value Ref Range Status   08/28/2019 98 70 - 110 mg/dL Final   10/09/2018 101 (H) 70 - 99 mg/dL      BUN, Bld   Date Value Ref Range Status   08/28/2019 18 8 - 23 mg/dL Final     Creatinine   Date Value Ref Range Status   08/28/2019 0.7 0.5 - 1.4 mg/dL Final   10/09/2018 0.80 0.60 - 1.40 mg/dL    11/29/2012 0.7 0.5 - 1.4 mg/dL Final     Calcium   Date Value Ref Range Status   08/28/2019 9.3 8.7 - 10.5 mg/dL Final   11/29/2012 9.0 8.7 - 10.5 mg/dL Final     Total Protein   Date Value Ref Range Status   10/09/2018 7.6 6.0 - 8.2 g/dL      Albumin   Date Value Ref Range Status   10/09/2018 4.1 3.1 - 4.7 g/dL      Total Bilirubin   Date Value Ref Range Status   10/09/2018 0.6 0.3 - 1.0 mg/dL      Alkaline Phosphatase   Date Value Ref Range Status   10/09/2018 104 40 - 104 IU/L      AST   Date Value Ref Range Status   10/09/2018 23 10 - 40 IU/L      ALT   Date Value Ref Range Status   06/01/2018 10 6 - 29 U/L Final     Anion Gap   Date Value Ref Range Status   08/28/2019 5 (L) 8 - 16 mmol/L Final   11/29/2012 10 5 - 15 meq/L Final     eGFR if    Date Value Ref Range Status   08/28/2019 >60.0  >60 mL/min/1.73 m^2 Final     eGFR if non    Date Value Ref Range Status   08/28/2019 >60.0 >60 mL/min/1.73 m^2 Final     Comment:     Calculation used to obtain the estimated glomerular filtration  rate (eGFR) is the CKD-EPI equation.        No results found for: CEA  No results found for: PSA        Assessment/Plan:     Problem List Items Addressed This Visit     Malignant neoplasm of lower lobe of right lung-Adenocarcinoma (Chronic)     Had a long discussion Ms. Broderick and given that she is within a year of completion of carbo/alimta, I feel immunotherapy with Opdivo single agent is the most reasonable approach at this time.  I reviewed the risks and benefits in detail with her.  I discussed the risk of autoimmune side effects and that we need weekly labs and frequent visits to monitor for such.  I will begin this next week if insurance allows and will have patient back with me in 2 weeks.  She seems to understand this discussion and willing to move forward.           Relevant Orders    Ambulatory referral to [facility]            Discussion:   High complexity due to intense lab monitoring, high risk medications.   Follow up in about 2 weeks (around 10/17/2019).      Electronically signed by Segundo Lamb

## 2019-10-03 NOTE — ASSESSMENT & PLAN NOTE
Had a long discussion Ms. Broderick and given that she is within a year of completion of carbo/alimta, I feel immunotherapy with Opdivo single agent is the most reasonable approach at this time.  I reviewed the risks and benefits in detail with her.  I discussed the risk of autoimmune side effects and that we need weekly labs and frequent visits to monitor for such.  I will begin this next week if insurance allows and will have patient back with me in 2 weeks.  She seems to understand this discussion and willing to move forward.

## 2019-10-07 ENCOUNTER — TELEPHONE (OUTPATIENT)
Dept: HEMATOLOGY/ONCOLOGY | Facility: CLINIC | Age: 64
End: 2019-10-07

## 2019-10-07 DIAGNOSIS — C34.31 MALIGNANT NEOPLASM OF LOWER LOBE OF RIGHT LUNG: Primary | ICD-10-CM

## 2019-10-07 DIAGNOSIS — Z79.899 NEED FOR PROPHYLACTIC CHEMOTHERAPY: ICD-10-CM

## 2019-10-08 ENCOUNTER — TELEPHONE (OUTPATIENT)
Dept: HEMATOLOGY/ONCOLOGY | Facility: CLINIC | Age: 64
End: 2019-10-08

## 2019-10-14 ENCOUNTER — DOCUMENTATION ONLY (OUTPATIENT)
Dept: HEMATOLOGY/ONCOLOGY | Facility: CLINIC | Age: 64
End: 2019-10-14

## 2019-10-14 NOTE — PROGRESS NOTES
Called [employer] regarding to check status of free-drug, Opdivo status.  Patient application has been received and is still pending.

## 2019-10-16 ENCOUNTER — OFFICE VISIT (OUTPATIENT)
Dept: HEMATOLOGY/ONCOLOGY | Facility: CLINIC | Age: 64
End: 2019-10-16
Payer: MEDICARE

## 2019-10-16 VITALS
WEIGHT: 150.63 LBS | DIASTOLIC BLOOD PRESSURE: 87 MMHG | BODY MASS INDEX: 25.06 KG/M2 | HEART RATE: 93 BPM | RESPIRATION RATE: 20 BRPM | TEMPERATURE: 99 F | SYSTOLIC BLOOD PRESSURE: 142 MMHG

## 2019-10-16 DIAGNOSIS — G89.3 CANCER ASSOCIATED PAIN: ICD-10-CM

## 2019-10-16 DIAGNOSIS — C34.31 MALIGNANT NEOPLASM OF LOWER LOBE OF RIGHT LUNG: Chronic | ICD-10-CM

## 2019-10-16 PROCEDURE — 3077F PR MOST RECENT SYSTOLIC BLOOD PRESSURE >= 140 MM HG: ICD-10-PCS | Mod: S$GLB,,, | Performed by: INTERNAL MEDICINE

## 2019-10-16 PROCEDURE — 3008F PR BODY MASS INDEX (BMI) DOCUMENTED: ICD-10-PCS | Mod: S$GLB,,, | Performed by: INTERNAL MEDICINE

## 2019-10-16 PROCEDURE — 99214 OFFICE O/P EST MOD 30 MIN: CPT | Mod: S$GLB,,, | Performed by: INTERNAL MEDICINE

## 2019-10-16 PROCEDURE — 3077F SYST BP >= 140 MM HG: CPT | Mod: S$GLB,,, | Performed by: INTERNAL MEDICINE

## 2019-10-16 PROCEDURE — 3079F DIAST BP 80-89 MM HG: CPT | Mod: S$GLB,,, | Performed by: INTERNAL MEDICINE

## 2019-10-16 PROCEDURE — 3008F BODY MASS INDEX DOCD: CPT | Mod: S$GLB,,, | Performed by: INTERNAL MEDICINE

## 2019-10-16 PROCEDURE — 3288F PR FALLS RISK ASSESSMENT DOCUMENTED: ICD-10-PCS | Mod: S$GLB,,, | Performed by: INTERNAL MEDICINE

## 2019-10-16 PROCEDURE — 3079F PR MOST RECENT DIASTOLIC BLOOD PRESSURE 80-89 MM HG: ICD-10-PCS | Mod: S$GLB,,, | Performed by: INTERNAL MEDICINE

## 2019-10-16 PROCEDURE — 1100F PTFALLS ASSESS-DOCD GE2>/YR: CPT | Mod: S$GLB,,, | Performed by: INTERNAL MEDICINE

## 2019-10-16 PROCEDURE — 3288F FALL RISK ASSESSMENT DOCD: CPT | Mod: S$GLB,,, | Performed by: INTERNAL MEDICINE

## 2019-10-16 PROCEDURE — 1100F PR PT FALLS ASSESS DOC 2+ FALLS/FALL W/INJURY/YR: ICD-10-PCS | Mod: S$GLB,,, | Performed by: INTERNAL MEDICINE

## 2019-10-16 PROCEDURE — 99214 PR OFFICE/OUTPT VISIT, EST, LEVL IV, 30-39 MIN: ICD-10-PCS | Mod: S$GLB,,, | Performed by: INTERNAL MEDICINE

## 2019-10-16 RX ORDER — SENNOSIDES 8.6 MG/1
2-4 TABLET ORAL 2 TIMES DAILY
COMMUNITY
End: 2022-06-06

## 2019-10-16 RX ORDER — ONDANSETRON 8 MG/1
8 TABLET, ORALLY DISINTEGRATING ORAL ONCE
COMMUNITY
End: 2020-02-12 | Stop reason: SDUPTHER

## 2019-10-16 NOTE — PROGRESS NOTES
PROGRESS NOTE    Subjective:       Patient ID: Sheri Broderick is a 64 y.o. female.    Chief Complaint:  No chief complaint on file.  lung cancer, chemo follow up.     History of Present Illness:   Sheri Broderick is a 64 y.o. female who presents for follow up.     XRT to L2-L4 and Left hip completed 9/18/2019    Nivolumab insurance approval still pending. Did not start.     Nivolumab Therapy:  1: 10/9/2019-planned  2: 10/23/2019-planned    10/23/2019 CBC, CMP unremarkable    PET Impression 7/8/2019:  1. FDG avid enlarged lymph nodes in right hilum and lower paratracheal region  of mediastinal, and additional foci of FDG uptake involving L3 vertebral body  and anterior left acetabulum also suspicious for new metastatic disease.    2. Nonspecific FDG uptake associated with right T2 transverse process near  articulation with posterior right second rib as above. Attention to this on  follow-up PET/CT is suggested.    3. Recent compression fracture of L2 vertebral body superior endplate since  04/09/2019, with associated FDG uptake which is inflammatory in nature.    L3 lesion biopsy 8/7/2019:  Adenocarcinoma c/w lung primary  PD-L1: 9%  ALK neg  Ros-1 Neg  EGFR Neg    Family and Social history reviewed and is unchanged from May 21, 2018      ROS:  Review of Systems   Constitutional: Negative for fever.   Respiratory: Negative for shortness of breath.    Cardiovascular: Negative for chest pain and leg swelling.   Gastrointestinal: Negative for abdominal pain and blood in stool.   Genitourinary: Negative for hematuria.   Skin: Negative for rash.          Current Outpatient Medications:     CHEWABLE VITAMIN C 500 mg Chew, , Disp: , Rfl:     clonazePAM (KLONOPIN) 1 MG tablet, Take one-half to one tablet two to three times daily only as needed for anxiety, Disp: 180 tablet, Rfl: 0    dextroamphetamine-amphetamine 10 mg Tab, Take one tablet PO in the  morning, Disp: 30 tablet, Rfl: 0    esomeprazole magnesium (NEXIUM 24HR) 20 mg TbEC, Take 20 mg by mouth once daily. , Disp: , Rfl:     fluticasone (FLONASE) 50 mcg/actuation nasal spray, 1 spray by Each Nare route once daily., Disp: , Rfl:     ibuprofen (ADVIL,MOTRIN) 800 MG tablet, Take 1 tablet (800 mg total) by mouth 2 (two) times daily as needed for Pain., Disp: 180 tablet, Rfl: 3    Lactobac no.41/Bifidobact no.7 (PROBIOTIC-10 ORAL), Take by mouth., Disp: , Rfl:     lamoTRIgine (LAMICTAL) 100 MG tablet, TAKE 1 TABLET IN THE MORNING  AND TAKE 2 TABLETS IN THE EVENING, Disp: 270 tablet, Rfl: 0    meclizine (ANTIVERT) 25 mg tablet, Take 1 tablet (25 mg total) by mouth every 8 (eight) hours as needed for Dizziness., Disp: 90 tablet, Rfl: 3    mirtazapine (REMERON) 30 MG tablet, Take one-half tablet PO nightly, Disp: 90 tablet, Rfl: 0    ondansetron (ZOFRAN-ODT) 8 MG TbDL, Take 8 mg by mouth once., Disp: , Rfl:     senna (SENOKOT) 8.6 mg tablet, Take 1 tablet by mouth once daily., Disp: , Rfl:     sertraline (ZOLOFT) 100 MG tablet, Take two tablets PO in the morning, Disp: 1 tablet, Rfl: 0    venlafaxine (EFFEXOR-XR) 75 MG 24 hr capsule, Take 1 capsule (75 mg total) by mouth once daily., Disp: 90 capsule, Rfl: 1    VITAMIN B-12 1000 MCG tablet, , Disp: , Rfl:     VITAMIN E ACETATE ORAL, Take by mouth., Disp: , Rfl:     ZYRTEC 10 mg Cap, continuous prn., Disp: , Rfl:     HYDROcodone-acetaminophen (NORCO) 5-325 mg per tablet, Take 1 tablet by mouth every 6 (six) hours as needed for Pain., Disp: 24 tablet, Rfl: 0    LINZESS 145 mcg Cap capsule, Take 1 capsule by mouth daily as needed. , Disp: , Rfl:         Objective:       Physical Examination:     BP (!) 142/87   Pulse 93   Temp 98.6 °F (37 °C) (Oral)   Resp 20   Wt 68.3 kg (150 lb 9.6 oz)   BMI 25.06 kg/m²     Physical Exam   Constitutional: She appears well-developed and well-nourished.   HENT:   Head: Normocephalic and atraumatic.   Right  Ear: External ear normal.   Left Ear: External ear normal.   Mouth/Throat: Oropharynx is clear and moist.   Eyes: Pupils are equal, round, and reactive to light. Conjunctivae are normal.   Neck: No tracheal deviation present. No thyromegaly present.   Cardiovascular: Normal rate, regular rhythm and normal heart sounds.   Pulmonary/Chest: Effort normal and breath sounds normal.   Abdominal: Soft. Bowel sounds are normal. She exhibits no distension and no mass. There is no tenderness.   Musculoskeletal: She exhibits no edema.   Neurological:   Neuro intact througout   Skin: No rash noted.   Psychiatric: She has a normal mood and affect. Her behavior is normal. Judgment and thought content normal.       Labs:   Recent Results (from the past 336 hour(s))   CBC auto differential    Collection Time: 10/11/19 11:37 AM   Result Value Ref Range    WBC 3.7 (L) 3.8 - 10.8 Thousand/uL    Hemoglobin 11.8 11.7 - 15.5 g/dL    Hematocrit 35.2 35.0 - 45.0 %    Platelets 232 140 - 400 Thousand/uL     CMP  Sodium   Date Value Ref Range Status   10/11/2019 138 135 - 146 mmol/L Final   10/09/2018 140 134 - 144 mmol/L      Potassium   Date Value Ref Range Status   10/11/2019 4.7 3.5 - 5.3 mmol/L Final     Chloride   Date Value Ref Range Status   10/11/2019 103 98 - 110 mmol/L Final   10/09/2018 105 98 - 110 mmol/L      CO2   Date Value Ref Range Status   10/11/2019 30 20 - 32 mmol/L Final     Glucose   Date Value Ref Range Status   10/11/2019 91 65 - 139 mg/dL Final     Comment:               Non-fasting reference interval        10/09/2018 101 (H) 70 - 99 mg/dL      BUN, Bld   Date Value Ref Range Status   10/11/2019 14 7 - 25 mg/dL Final     Creatinine   Date Value Ref Range Status   10/11/2019 0.74 0.50 - 0.99 mg/dL Final     Comment:     For patients >49 years of age, the reference limit  for Creatinine is approximately 13% higher for people  identified as -American.        10/09/2018 0.80 0.60 - 1.40 mg/dL    11/29/2012 0.7  0.5 - 1.4 mg/dL Final     Calcium   Date Value Ref Range Status   10/11/2019 9.7 8.6 - 10.4 mg/dL Final   11/29/2012 9.0 8.7 - 10.5 mg/dL Final     Total Protein   Date Value Ref Range Status   10/11/2019 6.9 6.1 - 8.1 g/dL Final     Albumin   Date Value Ref Range Status   10/11/2019 4.1 3.6 - 5.1 g/dL Final   10/09/2018 4.1 3.1 - 4.7 g/dL      Total Bilirubin   Date Value Ref Range Status   10/11/2019 0.4 0.2 - 1.2 mg/dL Final     Alkaline Phosphatase   Date Value Ref Range Status   10/11/2019 100 33 - 130 U/L Final     AST   Date Value Ref Range Status   10/11/2019 19 10 - 35 U/L Final     ALT   Date Value Ref Range Status   10/11/2019 15 6 - 29 U/L Final     Anion Gap   Date Value Ref Range Status   08/28/2019 5 (L) 8 - 16 mmol/L Final   11/29/2012 10 5 - 15 meq/L Final     eGFR if    Date Value Ref Range Status   10/11/2019 99 > OR = 60 mL/min/1.73m2 Final     eGFR if non    Date Value Ref Range Status   10/11/2019 85 > OR = 60 mL/min/1.73m2 Final     No results found for: CEA  No results found for: PSA        Assessment/Plan:     Problem List Items Addressed This Visit     Malignant neoplasm of lower lobe of right lung-Adenocarcinoma (Chronic)     Patient is s/p radiation therapy to the L-spine and left hip.  She has had improvement of the pain in the hip and some residual in the low back.  She has yet to begin Nivo due to insurance approval issues.  I will check on this and discussed this with her today.          Cancer associated pain     Pain is improved after radiation therapy.  Will continue oral pain medication as needed and discussed this today.                   Discussion:   High complexity due to intense lab monitoring, high risk medications.   Follow up in about 3 weeks (around 11/6/2019).      Electronically signed by Segundo Lamb

## 2019-10-16 NOTE — ASSESSMENT & PLAN NOTE
Patient is s/p radiation therapy to the L-spine and left hip.  She has had improvement of the pain in the hip and some residual in the low back.  She has yet to begin Nivo due to insurance approval issues.  I will check on this and discussed this with her today.

## 2019-10-16 NOTE — ASSESSMENT & PLAN NOTE
Pain is improved after radiation therapy.  Will continue oral pain medication as needed and discussed this today.

## 2019-10-21 ENCOUNTER — SOCIAL WORK (OUTPATIENT)
Dept: HEMATOLOGY/ONCOLOGY | Facility: CLINIC | Age: 64
End: 2019-10-21

## 2019-10-22 ENCOUNTER — DOCUMENTATION ONLY (OUTPATIENT)
Dept: HEMATOLOGY/ONCOLOGY | Facility: CLINIC | Age: 64
End: 2019-10-22

## 2019-10-22 ENCOUNTER — INFUSION (OUTPATIENT)
Dept: INFUSION THERAPY | Facility: HOSPITAL | Age: 64
End: 2019-10-22
Attending: INTERNAL MEDICINE
Payer: MEDICARE

## 2019-10-22 ENCOUNTER — CLINICAL SUPPORT (OUTPATIENT)
Dept: HEMATOLOGY/ONCOLOGY | Facility: CLINIC | Age: 64
End: 2019-10-22
Payer: MEDICARE

## 2019-10-22 ENCOUNTER — SOCIAL WORK (OUTPATIENT)
Dept: HEMATOLOGY/ONCOLOGY | Facility: CLINIC | Age: 64
End: 2019-10-22

## 2019-10-22 VITALS
OXYGEN SATURATION: 98 % | RESPIRATION RATE: 18 BRPM | HEART RATE: 90 BPM | SYSTOLIC BLOOD PRESSURE: 140 MMHG | BODY MASS INDEX: 25.53 KG/M2 | WEIGHT: 153.25 LBS | DIASTOLIC BLOOD PRESSURE: 68 MMHG | HEIGHT: 65 IN | TEMPERATURE: 98 F

## 2019-10-22 DIAGNOSIS — Z51.89 ENCOUNTER FOR OTHER SPECIFIED AFTERCARE: Primary | ICD-10-CM

## 2019-10-22 DIAGNOSIS — D70.1 CHEMOTHERAPY-INDUCED NEUTROPENIA: ICD-10-CM

## 2019-10-22 DIAGNOSIS — T45.1X5A CHEMOTHERAPY-INDUCED NEUTROPENIA: ICD-10-CM

## 2019-10-22 DIAGNOSIS — C34.31 MALIGNANT NEOPLASM OF LOWER LOBE OF RIGHT LUNG: ICD-10-CM

## 2019-10-22 PROCEDURE — 96413 CHEMO IV INFUSION 1 HR: CPT

## 2019-10-22 PROCEDURE — 63600175 PHARM REV CODE 636 W HCPCS: Performed by: INTERNAL MEDICINE

## 2019-10-22 RX ORDER — HEPARIN 100 UNIT/ML
500 SYRINGE INTRAVENOUS
Status: CANCELLED | OUTPATIENT
Start: 2019-10-22

## 2019-10-22 RX ORDER — SODIUM CHLORIDE 0.9 % (FLUSH) 0.9 %
10 SYRINGE (ML) INJECTION
Status: DISCONTINUED | OUTPATIENT
Start: 2019-10-22 | End: 2019-10-22 | Stop reason: HOSPADM

## 2019-10-22 RX ORDER — HEPARIN 100 UNIT/ML
500 SYRINGE INTRAVENOUS
Status: DISCONTINUED | OUTPATIENT
Start: 2019-10-22 | End: 2019-10-22 | Stop reason: HOSPADM

## 2019-10-22 RX ORDER — SODIUM CHLORIDE 0.9 % (FLUSH) 0.9 %
10 SYRINGE (ML) INJECTION
Status: CANCELLED | OUTPATIENT
Start: 2019-10-22

## 2019-10-22 RX ADMIN — HEPARIN 500 UNITS: 100 SYRINGE at 12:10

## 2019-10-22 NOTE — PROGRESS NOTES
Patient was provided a [employer] application and a [employer] application for financial assistance.  I provided my contact information in the event she needs assistance with completing the applications.

## 2019-10-22 NOTE — PROGRESS NOTES
Patient attended the Look Good Feel Better workshop and received a make-up kit along with beauty tips from a volunteer licensed .

## 2019-10-22 NOTE — PROGRESS NOTES
Sheri ZOYA Degrootte  [id number]    [facility]   Cancer Center    TITLE: PLAN OF CARE FOR THE CHEMOTHERAPY PATIENT / TEACHING PROTOCOL    PURPOSE: To involve the patient / significant other in the plan of care and to provide teaching to the significant other & patient receiving chemotherapy.    LEVEL: Independent.    CONTENT: The Plan of Care for the chemotherapy patient is individualized and appropriate to the patients needs, strengths, limitations, & goals.  Education includes information regarding chemotherapy side effects, the treatment itself, and self-care  Activities.    GOAL / OUTCOME STANDARDS    PHYSIOLOGIC: The client will remain free or experience minimal side effects or toxicities throughout the chemotherapy treatment period.     PSYCHOLOGIC: The client/significant others will demonstrate positive coping mechanisms in relation to chemotherapy and its side effects.      COGINITIVE: The client/significant others will verbalize understanding of self-care measure to avoid/minimize side effects of the chemotherapy regime.    EVALUATION / COMMENT KEY:    V = Audiovisual/Video  S = Successfully meets outcome  N = Needs further instruction  NA = Not applicable to the patient  P = Previous knowledge  U = Unable to comprehend  * = See progress notes          PLAN OF CARE  INFORMATION TO BE DELIVERED / NURSING INTERVENTIONS DATE EVALUATION   1. Assessment of client/caregiver,         knowledge of cancer diagnosis,         and chemotherapy as a treatment. 1a. Evaluate patient/caregiver learning ability    b. Plan teaching sessions with patient/caregiver according to needs and present anxiety level/ability to learn.    c. Provide Chemotherapy Education Packet,        Mouth Care Protocol,         Specific Patient Education Sheets. 10/22/2019 S   2. Individual chemotherapy treatment         plan. 2a. Review of Chemotherapy Education handout from [employer]            10/22/2019   S   3. Knowledge  Deficit & Self-Management of general side effects common to all chemotherapy:  a. Nausea/Vomiting  b.   Diarrhea  c. Mouth Care  d. Dental care  e. Constipation  f. Hair Loss  g. Potential for infection  h. Fatigue   3a. Reinforce that the majority of side effects from chemotherapy are reversible and are  controlled both in the hospital and at home        (blood counts recover, hair grows back).   b.  Refer to the following for reinforcement of         information post-treatment:  1. Mouth Care Protocol.  2. Bowel Protocol for constipation or diarrhea.  3.  Drug Specific Chemotherapy Information Sheets for each medication patient receiving.    10/22/2019     S     PLAN OF CARE  INFORMATION TO BE DELIVERED / NURSING INTERVENTIONS DATE EVALUATION   h. Potential for bleeding         i. Potential anemia/fatigue         j. Potential sunburn         k. Birth control measures  l. Safety measures post treatment 4.  Chemotherapy Home Care Instruction  and Safety Information Sheet.  A. patient/caregivers to thoroughly cook shellfish (shrimp, crab, etc) to decrease the chance of infection.    B.  Use sunscreen and protective clothing while in the sun.   10/22/2019      4. Knowledge deficit & Self Management of Drug Specific  Side Effects.    a. BLADDER EFFECTS        (Hemorrhagic Cystitis)                  Preventable with adequate hydration; occurs 2-3 days or more post treatment.   1.  Instruct patient to:  a.   Void at least every 2 hours; increase intake.  b.   DO NOT hold urine; go when urge is felt.  c.    Empty bladder at bedtime and on         awakening.  d.   Observe for color changes (red to tea           colored), amount and frequency changes.  e.   Notify oncologist of any abnormalities           in urine or voiding or if you cannot               drink adequate fluids.   10/22/2019   S   b.   CHANGES IN URINE        COLOR:      1.   Instruct patient:  a.   Most evident in first 2-3 voidings after            administration.  b. Lasts less than 24 hours.  c. If urine is discolored 2 or more days post- treatment, notify oncologist.      10/22/2019 S   c.    KIDNEY EFFECTS           (Nephrotoxicity)   1.  Instruct patient to:  a.   Drink 8-16 glasses of fluid/day the day   pre-treatment and 3-4 days post-treatment to maintain hydration; the best way to minimize kidney problems.  b.   Notify oncologist immediately if unable to drink fluids or if changes are noted in urinary elimination.     10/22/2019   S   a. PULMONARY TOXICITY    1. Instruct patient to report symptoms such as shortness of breath, chest pain, shallow breathing, or chest wall discomfort to physician.  2. Reinforce preventative measures used by the health care team.  a. Baseline and periodic PFT and chest x-ray.   10/22/2019   S     PLAN OF CARE INFORMATION TO BE DELIVERED / NURSING INTERVENTIONS DATE EVALUATION   b. NERVE & MUSCLE EFFECTS (neurotoxocity; neuropathy, possible visual/hearing changes)        3. Instruct patient to:    a. Report numbness or tingling of the hands/feet, loss of fine motor movement (buttoning shirt, tying shoelaces), or gait changes to your oncologist.  b. If numbness/tingling are present:  1. protect feet with shoes at all times.  2. Use gloves for washing dishes/gardening & potholders in kitchen.       10/22/2019   S   c. CARDIOTOXICITY  Decreased effectiveness of             cardiac function. Effective are                  cumulative and irreversible.                                    CARDIAC ARRYTHMIAS              4   Instruct:  a. Heart function may be tested before treatment and perdiocally during treatment.  b. Notify oncologist of irregular pulse, palpitations, shortness of breath, or swelling in lower extremities/feet.          Taxol and Taxotere can cause arrhythmias on infusion that resolve once infusion discontinued. Instruct nurse if any irregularity felt.    10/22/2019   S   d. EXTRAVASTION  Occurs when vesicants  leak outside of vein and cause damage to the skin and underlying tissues.   1. Reinforce preventive measures used to avoid complications.  a. Fresh IV site or central line monitored continuously with vesicant IVP.  b. Continuous infusion via central line site and blood return monitored periodically around the clock.  2. Instruct to:  a. Notify nurse of any discomfort, burning, stinging, etc. at IV site during chemotherapy administration.  b. Notify oncologist of any redness, pain, or swelling at IV site after discharge from hospital.   10/22/2019   S   e. HYPERSENSITIVITY can happen with any medication.   1. Instruct patient:  a. Nurse is with them during the initial part of treatment and will be close by to monitor.  b. Pre-medication ordered by the oncologist must be taken on time. If doses are missed, treatment will need to be re-scheduled.  c. Skin redness, itching, or hives appearing after discharge should be reported to oncologist. 10/22/2019   S       PLAN OF CARE INFORMATION TO BE DELIVERED / NURSING INTERVENTIONS DATE EVALUATION   f. FLU-LIKE SYNDROME      1. Instruct patient symptoms are hard to prevent and may include fever, shaking chills, muscle and body aches.  a. Taking prescribed medications from physician if needed.  b. Adequate fluids are important.    2. Reinforce the need to call if temperature is         elevated to 100.4 or more  10/22/2019   S   g. HAND-FOOT SYNDROME  causes painful, symmetric swelling and redness of palms and soles                  5. Instruct patient to report any numbness or tingling in the hands or feet.  6. Explain prevention techniques, such as     a. Use heavy moisturizers to lessen skin dryness and itching, but to avoid if skin is cracked or broken  b. Bathe in tepid water, use non-perfumed soap, and wash gently. Baths with oatmeal or diluted baking soda may be soothing.  c. Avoid tight fitting shoes and repetitive actions, such as rubbing hands or applying pressure to  hands/feet.  7. Review measures to take should syndrome occur:  a. Cold compresses and elevation for          edema  b. Pain medications and other measures as ordered by oncologist.   4.   Syndrome resolves few weeks after therapy. 10/22/2019   S   5. DISCHARGE PLANNING /        EDUCATION 1.    Explain importance of compliance with follow- up  tests (CBC, CMP).  2.    Verify patient/caregiver know:  a.    Oncologists office phone number.  b.    Dates of follow-up appointments.  c.    Prescriptions given for nausea  3.   Review side effects to monitor and notify          oncologist about.  4.   Reinforce the need for patient and caregivers to:  a.    Review information given.  b.    Call oncologists office with questions          or symptoms  5.   Provide [facility] [place] Brochure make referrals if needed for financial or .   10/22/2019   S     PROGRESS NOTES:     I met with the patient  today for chemotherapy education. she will be starting treatment with Opdivo . We discussed the mechanism of action, potential side effects of this treatment as well as ways she can manage them at home. Some of these side effects include but or not limited to fever, nausea, vomiting, decreased appetite, fatigue, weakness, cytopenias, myalgia/arthralgia, constipation, diarrhea, bleeding, headache, shortness of breath, nail changes, taste change, hair thinning/loss, mood disturbances, or edema. We also discussed dietary modifications she should make although this will be discussed in more detail with the dietician. she was provided with a copy of all of the information we discussed today as well as our contact information. she will be provided a schedule on his first day of treatment. We will obtain labs on a weekly basis and the patient will follow-up with the physician for toxicity monitoring throughout treatment. All questions were answered and an informed consent was obtained. she was reminded to certainly  contact us sooner if needed.  Attached to the patients folder and discussed with the patient the 24 hour/ 7 days a week after hours telephone number for the physician.  Patient notified to call anytime 24/7 because their is a physician on call for any problems that may arise.  Patient also notified to report to [facility] if they can not get in touch with a physician after hours.  Discussed the five wishes booklet with the patient and their family.

## 2019-10-22 NOTE — PROGRESS NOTES
CHEMO SCHOOL- NUTRITION    Sheri Broderick is a 64 y.o. female with lung cancer. Pt will be receiving Opdivo. I met with pt & family member for chemo school today. Pt stated that she had previously met with RD during last round of treatment. Pt reported no nutrition-related concerns at this time.     CW: 150#.     Plan:   1. Reviewed chemo school packet & provided copy to pt.   2. Discussed importance of maintaining wt & staying hydrated.   3. Reviewed food safety guidelines recommended during treatment.   4. Provided RD contact info & encouraged pt to call with any questions/concerns.   5. Will f/u prn.       Electronically signed by: Paola Hayes MS, RDN, LDN

## 2019-10-30 ENCOUNTER — TELEPHONE (OUTPATIENT)
Dept: HEMATOLOGY/ONCOLOGY | Facility: CLINIC | Age: 64
End: 2019-10-30

## 2019-11-04 ENCOUNTER — PATIENT MESSAGE (OUTPATIENT)
Dept: PSYCHIATRY | Facility: CLINIC | Age: 64
End: 2019-11-04

## 2019-11-04 ENCOUNTER — TELEPHONE (OUTPATIENT)
Dept: HEMATOLOGY/ONCOLOGY | Facility: CLINIC | Age: 64
End: 2019-11-04

## 2019-11-04 DIAGNOSIS — C34.31 MALIGNANT NEOPLASM OF LOWER LOBE OF RIGHT LUNG: Primary | ICD-10-CM

## 2019-11-04 DIAGNOSIS — Z79.899 ENCOUNTER FOR LONG-TERM (CURRENT) USE OF OTHER MEDICATIONS: ICD-10-CM

## 2019-11-04 DIAGNOSIS — C34.31 SQUAMOUS CELL CARCINOMA OF BRONCHUS IN RIGHT LOWER LOBE: ICD-10-CM

## 2019-11-04 RX ORDER — SODIUM CHLORIDE 0.9 % (FLUSH) 0.9 %
10 SYRINGE (ML) INJECTION
Status: CANCELLED | OUTPATIENT
Start: 2019-11-05

## 2019-11-04 RX ORDER — SERTRALINE HYDROCHLORIDE 100 MG/1
TABLET, FILM COATED ORAL
Qty: 180 TABLET | Refills: 0 | Status: SHIPPED | OUTPATIENT
Start: 2019-11-04 | End: 2019-12-11 | Stop reason: SDUPTHER

## 2019-11-04 RX ORDER — HEPARIN 100 UNIT/ML
500 SYRINGE INTRAVENOUS
Status: CANCELLED | OUTPATIENT
Start: 2019-11-05

## 2019-11-04 RX ORDER — DEXTROAMPHETAMINE SACCHARATE, AMPHETAMINE ASPARTATE, DEXTROAMPHETAMINE SULFATE AND AMPHETAMINE SULFATE 2.5; 2.5; 2.5; 2.5 MG/1; MG/1; MG/1; MG/1
TABLET ORAL
Qty: 30 TABLET | Refills: 0 | Status: SHIPPED | OUTPATIENT
Start: 2019-11-04 | End: 2019-12-19 | Stop reason: SDUPTHER

## 2019-11-04 NOTE — TELEPHONE ENCOUNTER
Pt is requesting medication refill on Sertraline 100 mg, Adderall 10 mg   Last refill: 5/2/19, 8/23/19  Last visit: 7/22/19  Follow Up: 12/11/19

## 2019-11-05 ENCOUNTER — INFUSION (OUTPATIENT)
Dept: INFUSION THERAPY | Facility: HOSPITAL | Age: 64
End: 2019-11-05
Attending: INTERNAL MEDICINE
Payer: MEDICARE

## 2019-11-05 VITALS
TEMPERATURE: 98 F | DIASTOLIC BLOOD PRESSURE: 79 MMHG | RESPIRATION RATE: 18 BRPM | BODY MASS INDEX: 24.79 KG/M2 | HEIGHT: 65 IN | HEART RATE: 88 BPM | WEIGHT: 148.81 LBS | SYSTOLIC BLOOD PRESSURE: 123 MMHG

## 2019-11-05 DIAGNOSIS — Z79.899 ENCOUNTER FOR LONG-TERM (CURRENT) USE OF OTHER MEDICATIONS: Primary | ICD-10-CM

## 2019-11-05 DIAGNOSIS — Z51.89 ENCOUNTER FOR OTHER SPECIFIED AFTERCARE: ICD-10-CM

## 2019-11-05 DIAGNOSIS — T45.1X5A CHEMOTHERAPY-INDUCED NEUTROPENIA: ICD-10-CM

## 2019-11-05 DIAGNOSIS — C34.31 MALIGNANT NEOPLASM OF LOWER LOBE OF RIGHT LUNG: ICD-10-CM

## 2019-11-05 DIAGNOSIS — D70.1 CHEMOTHERAPY-INDUCED NEUTROPENIA: ICD-10-CM

## 2019-11-05 LAB — TSH SERPL DL<=0.005 MIU/L-ACNC: 2.28 UIU/ML (ref 0.34–5.6)

## 2019-11-05 PROCEDURE — 25000003 PHARM REV CODE 250: Performed by: INTERNAL MEDICINE

## 2019-11-05 PROCEDURE — A4216 STERILE WATER/SALINE, 10 ML: HCPCS | Performed by: INTERNAL MEDICINE

## 2019-11-05 PROCEDURE — 63600175 PHARM REV CODE 636 W HCPCS: Performed by: INTERNAL MEDICINE

## 2019-11-05 PROCEDURE — 84443 ASSAY THYROID STIM HORMONE: CPT

## 2019-11-05 PROCEDURE — 96413 CHEMO IV INFUSION 1 HR: CPT

## 2019-11-05 RX ORDER — HEPARIN 100 UNIT/ML
500 SYRINGE INTRAVENOUS
Status: DISCONTINUED | OUTPATIENT
Start: 2019-11-05 | End: 2020-01-23

## 2019-11-05 RX ORDER — SODIUM CHLORIDE 0.9 % (FLUSH) 0.9 %
10 SYRINGE (ML) INJECTION
Status: DISCONTINUED | OUTPATIENT
Start: 2019-11-05 | End: 2020-01-23

## 2019-11-05 RX ADMIN — HEPARIN 500 UNITS: 100 SYRINGE at 12:11

## 2019-11-05 RX ADMIN — SODIUM CHLORIDE, PRESERVATIVE FREE 10 ML: 5 INJECTION INTRAVENOUS at 12:11

## 2019-11-05 NOTE — PLAN OF CARE
Problem: Fatigue  Goal: Improved Activity Tolerance  Outcome: Ongoing, Progressing  Intervention: Promote Energy Conservation  Flowsheets (Taken 11/5/2019 1140)  Fatigue Management: paced activity encouraged; frequent rest breaks encouraged  Sleep/Rest Enhancement: relaxation techniques promoted; noise level reduced; consistent schedule promoted; family presence promoted; regular sleep/rest pattern promoted  Activity Management: activity encouraged

## 2019-11-07 ENCOUNTER — OFFICE VISIT (OUTPATIENT)
Dept: HEMATOLOGY/ONCOLOGY | Facility: CLINIC | Age: 64
End: 2019-11-07
Payer: MEDICARE

## 2019-11-07 ENCOUNTER — SOCIAL WORK (OUTPATIENT)
Dept: HEMATOLOGY/ONCOLOGY | Facility: CLINIC | Age: 64
End: 2019-11-07

## 2019-11-07 VITALS
BODY MASS INDEX: 24.83 KG/M2 | HEART RATE: 97 BPM | WEIGHT: 149.19 LBS | DIASTOLIC BLOOD PRESSURE: 84 MMHG | SYSTOLIC BLOOD PRESSURE: 137 MMHG | RESPIRATION RATE: 18 BRPM | TEMPERATURE: 99 F

## 2019-11-07 DIAGNOSIS — C34.31 MALIGNANT NEOPLASM OF LOWER LOBE OF RIGHT LUNG: Chronic | ICD-10-CM

## 2019-11-07 DIAGNOSIS — G89.3 CANCER ASSOCIATED PAIN: ICD-10-CM

## 2019-11-07 PROCEDURE — 99214 PR OFFICE/OUTPT VISIT, EST, LEVL IV, 30-39 MIN: ICD-10-PCS | Mod: S$GLB,,, | Performed by: INTERNAL MEDICINE

## 2019-11-07 PROCEDURE — 3075F SYST BP GE 130 - 139MM HG: CPT | Mod: S$GLB,,, | Performed by: INTERNAL MEDICINE

## 2019-11-07 PROCEDURE — 3008F PR BODY MASS INDEX (BMI) DOCUMENTED: ICD-10-PCS | Mod: S$GLB,,, | Performed by: INTERNAL MEDICINE

## 2019-11-07 PROCEDURE — 3288F FALL RISK ASSESSMENT DOCD: CPT | Mod: S$GLB,,, | Performed by: INTERNAL MEDICINE

## 2019-11-07 PROCEDURE — 3008F BODY MASS INDEX DOCD: CPT | Mod: S$GLB,,, | Performed by: INTERNAL MEDICINE

## 2019-11-07 PROCEDURE — 3288F PR FALLS RISK ASSESSMENT DOCUMENTED: ICD-10-PCS | Mod: S$GLB,,, | Performed by: INTERNAL MEDICINE

## 2019-11-07 PROCEDURE — 3079F DIAST BP 80-89 MM HG: CPT | Mod: S$GLB,,, | Performed by: INTERNAL MEDICINE

## 2019-11-07 PROCEDURE — 3075F PR MOST RECENT SYSTOLIC BLOOD PRESS GE 130-139MM HG: ICD-10-PCS | Mod: S$GLB,,, | Performed by: INTERNAL MEDICINE

## 2019-11-07 PROCEDURE — 3079F PR MOST RECENT DIASTOLIC BLOOD PRESSURE 80-89 MM HG: ICD-10-PCS | Mod: S$GLB,,, | Performed by: INTERNAL MEDICINE

## 2019-11-07 PROCEDURE — 1100F PR PT FALLS ASSESS DOC 2+ FALLS/FALL W/INJURY/YR: ICD-10-PCS | Mod: S$GLB,,, | Performed by: INTERNAL MEDICINE

## 2019-11-07 PROCEDURE — 1100F PTFALLS ASSESS-DOCD GE2>/YR: CPT | Mod: S$GLB,,, | Performed by: INTERNAL MEDICINE

## 2019-11-07 PROCEDURE — 99214 OFFICE O/P EST MOD 30 MIN: CPT | Mod: S$GLB,,, | Performed by: INTERNAL MEDICINE

## 2019-11-07 RX ORDER — HEPARIN 100 UNIT/ML
500 SYRINGE INTRAVENOUS
Status: CANCELLED | OUTPATIENT
Start: 2019-11-19

## 2019-11-07 RX ORDER — SODIUM CHLORIDE 0.9 % (FLUSH) 0.9 %
10 SYRINGE (ML) INJECTION
Status: CANCELLED | OUTPATIENT
Start: 2019-11-19

## 2019-11-07 NOTE — ASSESSMENT & PLAN NOTE
Patient continues to have some low back and hip pain.  Continues with ibuprofen/tylenol.  Will continue current care.

## 2019-11-07 NOTE — PROGRESS NOTES
I emailed the completed [email or web address] application along with required documents to [email or web address].  I also emailed patient's completed [employer] application to [facility] to [email or web address].  I emailed patient to inform her of aforementioned.

## 2019-11-07 NOTE — PROGRESS NOTES
PROGRESS NOTE    Subjective:       Patient ID: Sheri Broderick is a 64 y.o. female.    Chief Complaint:  No chief complaint on file.  lung cancer, chemo follow up.     History of Present Illness:   Sheri Broderick is a 64 y.o. female who presents for follow up.     Patient started Nivo and is tolerating this well.  No sob and feeling well.        XRT to L2-L4 and Left hip completed 9/18/2019    Nivolumab Therapy:  1: 10/22/2019  2: 11/05/2019  3: 11/19/2019-due    10/23/2019 CBC, CMP unremarkable    PET Impression 7/8/2019:  1. FDG avid enlarged lymph nodes in right hilum and lower paratracheal region  of mediastinal, and additional foci of FDG uptake involving L3 vertebral body  and anterior left acetabulum also suspicious for new metastatic disease.    2. Nonspecific FDG uptake associated with right T2 transverse process near  articulation with posterior right second rib as above. Attention to this on  follow-up PET/CT is suggested.    3. Recent compression fracture of L2 vertebral body superior endplate since  04/09/2019, with associated FDG uptake which is inflammatory in nature.    L3 lesion biopsy 8/7/2019:  Adenocarcinoma c/w lung primary  PD-L1: 9%  ALK neg  Ros-1 Neg  EGFR Neg    Family and Social history reviewed and is unchanged from May 21, 2018      ROS:  Review of Systems   Constitutional: Negative for fever.   Respiratory: Negative for shortness of breath.    Cardiovascular: Negative for chest pain and leg swelling.   Gastrointestinal: Negative for abdominal pain and blood in stool.   Genitourinary: Negative for hematuria.   Skin: Negative for rash.          Current Outpatient Medications:     CHEWABLE VITAMIN C 500 mg Chew, , Disp: , Rfl:     clonazePAM (KLONOPIN) 1 MG tablet, Take one-half to one tablet two to three times daily only as needed for anxiety, Disp: 180 tablet, Rfl: 0    dextroamphetamine-amphetamine 10 mg Tab, Take one  tablet PO in the morning, Disp: 30 tablet, Rfl: 0    esomeprazole magnesium (NEXIUM 24HR) 20 mg TbEC, Take 20 mg by mouth once daily. , Disp: , Rfl:     fluticasone (FLONASE) 50 mcg/actuation nasal spray, 1 spray by Each Nare route once daily., Disp: , Rfl:     HYDROcodone-acetaminophen (NORCO) 5-325 mg per tablet, Take 1 tablet by mouth every 6 (six) hours as needed for Pain., Disp: 24 tablet, Rfl: 0    ibuprofen (ADVIL,MOTRIN) 800 MG tablet, Take 1 tablet (800 mg total) by mouth 2 (two) times daily as needed for Pain., Disp: 180 tablet, Rfl: 3    Lactobac no.41/Bifidobact no.7 (PROBIOTIC-10 ORAL), Take by mouth., Disp: , Rfl:     lamoTRIgine (LAMICTAL) 100 MG tablet, TAKE 1 TABLET IN THE MORNING  AND TAKE 2 TABLETS IN THE EVENING, Disp: 270 tablet, Rfl: 0    LINZESS 145 mcg Cap capsule, Take 1 capsule by mouth daily as needed. , Disp: , Rfl:     meclizine (ANTIVERT) 25 mg tablet, Take 1 tablet (25 mg total) by mouth every 8 (eight) hours as needed for Dizziness., Disp: 90 tablet, Rfl: 3    mirtazapine (REMERON) 30 MG tablet, Take one-half tablet PO nightly, Disp: 90 tablet, Rfl: 0    ondansetron (ZOFRAN-ODT) 8 MG TbDL, Take 8 mg by mouth once., Disp: , Rfl:     senna (SENOKOT) 8.6 mg tablet, Take 1 tablet by mouth once daily., Disp: , Rfl:     sertraline (ZOLOFT) 100 MG tablet, Take two tablets PO in the morning, Disp: 180 tablet, Rfl: 0    venlafaxine (EFFEXOR-XR) 75 MG 24 hr capsule, Take 1 capsule (75 mg total) by mouth once daily., Disp: 90 capsule, Rfl: 1    VITAMIN B-12 1000 MCG tablet, , Disp: , Rfl:     VITAMIN E ACETATE ORAL, Take by mouth., Disp: , Rfl:     ZYRTEC 10 mg Cap, continuous prn., Disp: , Rfl:   No current facility-administered medications for this visit.     Facility-Administered Medications Ordered in Other Visits:     alteplase injection 2 mg, 2 mg, Intra-Catheter, PRN, Segundo Zhang MD    heparin, porcine (PF) 100 unit/mL injection flush 500 Units, 500 Units,  Intravenous, PRN, Segundo Zhang MD, 500 Units at 11/05/19 1200    sodium chloride 0.9% 100 mL flush bag, , Intravenous, 1 time in Clinic/HOD, Segundo Zhang MD    sodium chloride 0.9% flush 10 mL, 10 mL, Intravenous, PRN, Segundo Zhang MD, 10 mL at 11/05/19 1200        Objective:       Physical Examination:     /84   Pulse 97   Temp 98.5 °F (36.9 °C) (Oral)   Resp 18   Wt 67.7 kg (149 lb 3.2 oz)   BMI 24.83 kg/m²     Physical Exam   Constitutional: She appears well-developed and well-nourished.   HENT:   Head: Normocephalic and atraumatic.   Right Ear: External ear normal.   Left Ear: External ear normal.   Mouth/Throat: Oropharynx is clear and moist.   Eyes: Pupils are equal, round, and reactive to light. Conjunctivae are normal.   Neck: No tracheal deviation present. No thyromegaly present.   Cardiovascular: Normal rate, regular rhythm and normal heart sounds.   Pulmonary/Chest: Effort normal and breath sounds normal.   Abdominal: Soft. Bowel sounds are normal. She exhibits no distension and no mass. There is no tenderness.   Musculoskeletal: She exhibits no edema.   Neurological:   Neuro intact througout   Skin: No rash noted.   Psychiatric: She has a normal mood and affect. Her behavior is normal. Judgment and thought content normal.       Labs:   Recent Results (from the past 336 hour(s))   CBC auto differential    Collection Time: 11/02/19  9:58 AM   Result Value Ref Range    WBC 2.8 (L) 3.8 - 10.8 Thousand/uL    Hemoglobin 12.0 11.7 - 15.5 g/dL    Hematocrit 36.7 35.0 - 45.0 %    Platelets 218 140 - 400 Thousand/uL   CBC auto differential    Collection Time: 10/26/19 10:52 AM   Result Value Ref Range    WBC 3.5 (L) 3.8 - 10.8 Thousand/uL    Hemoglobin 11.6 (L) 11.7 - 15.5 g/dL    Hematocrit 34.9 (L) 35.0 - 45.0 %    Platelets 217 140 - 400 Thousand/uL     CMP  Sodium   Date Value Ref Range Status   11/02/2019 140 135 - 146 mmol/L Final   10/09/2018 140 134 - 144 mmol/L       Potassium   Date Value Ref Range Status   11/02/2019 4.5 3.5 - 5.3 mmol/L Final     Chloride   Date Value Ref Range Status   11/02/2019 104 98 - 110 mmol/L Final   10/09/2018 105 98 - 110 mmol/L      CO2   Date Value Ref Range Status   11/02/2019 29 20 - 32 mmol/L Final     Glucose   Date Value Ref Range Status   11/02/2019 94 65 - 99 mg/dL Final     Comment:                   Fasting reference interval        10/09/2018 101 (H) 70 - 99 mg/dL      BUN, Bld   Date Value Ref Range Status   11/02/2019 19 7 - 25 mg/dL Final     Creatinine   Date Value Ref Range Status   11/02/2019 0.88 0.50 - 0.99 mg/dL Final     Comment:     For patients >49 years of age, the reference limit  for Creatinine is approximately 13% higher for people  identified as -American.        10/09/2018 0.80 0.60 - 1.40 mg/dL    11/29/2012 0.7 0.5 - 1.4 mg/dL Final     Calcium   Date Value Ref Range Status   11/02/2019 9.6 8.6 - 10.4 mg/dL Final   11/29/2012 9.0 8.7 - 10.5 mg/dL Final     Total Protein   Date Value Ref Range Status   11/02/2019 7.0 6.1 - 8.1 g/dL Final     Albumin   Date Value Ref Range Status   11/02/2019 4.3 3.6 - 5.1 g/dL Final   10/09/2018 4.1 3.1 - 4.7 g/dL      Total Bilirubin   Date Value Ref Range Status   11/02/2019 0.4 0.2 - 1.2 mg/dL Final     Alkaline Phosphatase   Date Value Ref Range Status   11/02/2019 113 33 - 130 U/L Final     AST   Date Value Ref Range Status   11/02/2019 18 10 - 35 U/L Final     ALT   Date Value Ref Range Status   11/02/2019 16 6 - 29 U/L Final     Anion Gap   Date Value Ref Range Status   08/28/2019 5 (L) 8 - 16 mmol/L Final   11/29/2012 10 5 - 15 meq/L Final     eGFR if    Date Value Ref Range Status   11/02/2019 80 > OR = 60 mL/min/1.73m2 Final     eGFR if non    Date Value Ref Range Status   11/02/2019 69 > OR = 60 mL/min/1.73m2 Final     No results found for: CEA  No results found for: PSA        Assessment/Plan:     Problem List Items Addressed This  Visit     Malignant neoplasm of lower lobe of right lung-Adenocarcinoma (Chronic)     Patient is doing well and has had 2 treatments with nivo.  She is tolerating well with no sign of autoimmune issues.  Will continue treatment and have her back with me in 2 to 3 weeks.  Labs look good as well.           Cancer associated pain     Patient continues to have some low back and hip pain.  Continues with ibuprofen/tylenol.  Will continue current care.                   Discussion:   High complexity due to intense lab monitoring, high risk medications.   Follow up in about 25 days (around 12/2/2019).      Electronically signed by Segundo Lamb

## 2019-11-08 DIAGNOSIS — Z78.0 POST-MENOPAUSAL: Primary | ICD-10-CM

## 2019-11-08 DIAGNOSIS — C34.31 MALIGNANT NEOPLASM OF LOWER LOBE OF RIGHT LUNG: ICD-10-CM

## 2019-11-18 ENCOUNTER — TELEPHONE (OUTPATIENT)
Dept: HEMATOLOGY/ONCOLOGY | Facility: CLINIC | Age: 64
End: 2019-11-18

## 2019-11-18 NOTE — TELEPHONE ENCOUNTER
"Returned call to pt to states she feels her glands in her neck are enlarged. She states that they do not hurt, nor does her throat, and denies any fever. She states "It doesn't bother me".  Pt encouraged to reach out to PCP Dr Pleitez but pt states she feels "ok and she would not get in to see him soon."  Pt states she has an infusion tomorrow & will see how she feels at that time.  Beronica Marmolejo RN  "

## 2019-11-19 ENCOUNTER — INFUSION (OUTPATIENT)
Dept: INFUSION THERAPY | Facility: HOSPITAL | Age: 64
End: 2019-11-19
Attending: INTERNAL MEDICINE
Payer: MEDICARE

## 2019-11-19 VITALS
RESPIRATION RATE: 20 BRPM | TEMPERATURE: 98 F | HEIGHT: 65 IN | WEIGHT: 149.88 LBS | HEART RATE: 101 BPM | BODY MASS INDEX: 24.97 KG/M2 | SYSTOLIC BLOOD PRESSURE: 132 MMHG | DIASTOLIC BLOOD PRESSURE: 76 MMHG

## 2019-11-19 DIAGNOSIS — Z51.89 ENCOUNTER FOR OTHER SPECIFIED AFTERCARE: Primary | ICD-10-CM

## 2019-11-19 DIAGNOSIS — C34.31 MALIGNANT NEOPLASM OF LOWER LOBE OF RIGHT LUNG: ICD-10-CM

## 2019-11-19 DIAGNOSIS — T45.1X5A CHEMOTHERAPY-INDUCED NEUTROPENIA: ICD-10-CM

## 2019-11-19 DIAGNOSIS — D70.1 CHEMOTHERAPY-INDUCED NEUTROPENIA: ICD-10-CM

## 2019-11-19 PROCEDURE — 96413 CHEMO IV INFUSION 1 HR: CPT

## 2019-11-19 PROCEDURE — 63600175 PHARM REV CODE 636 W HCPCS: Performed by: INTERNAL MEDICINE

## 2019-11-19 RX ORDER — HEPARIN 100 UNIT/ML
500 SYRINGE INTRAVENOUS
Status: DISCONTINUED | OUTPATIENT
Start: 2019-11-19 | End: 2019-11-19 | Stop reason: HOSPADM

## 2019-11-19 RX ORDER — SODIUM CHLORIDE 0.9 % (FLUSH) 0.9 %
10 SYRINGE (ML) INJECTION
Status: DISCONTINUED | OUTPATIENT
Start: 2019-11-19 | End: 2019-11-19 | Stop reason: HOSPADM

## 2019-11-19 RX ADMIN — HEPARIN 500 UNITS: 100 SYRINGE at 12:11

## 2019-11-20 ENCOUNTER — HOSPITAL ENCOUNTER (OUTPATIENT)
Dept: RADIOLOGY | Facility: HOSPITAL | Age: 64
Discharge: HOME | End: 2019-11-20
Attending: NURSE PRACTITIONER
Payer: MEDICARE

## 2019-11-20 ENCOUNTER — TELEPHONE (OUTPATIENT)
Dept: HEMATOLOGY/ONCOLOGY | Facility: CLINIC | Age: 64
End: 2019-11-20

## 2019-11-20 DIAGNOSIS — C34.31 MALIGNANT NEOPLASM OF LOWER LOBE OF RIGHT LUNG: ICD-10-CM

## 2019-11-20 DIAGNOSIS — Z78.0 POST-MENOPAUSAL: ICD-10-CM

## 2019-11-20 PROCEDURE — 77080 DXA BONE DENSITY AXIAL: CPT | Mod: TC,PO

## 2019-11-20 NOTE — TELEPHONE ENCOUNTER
----- Message from MANDO Laws sent at 11/20/2019  1:46 PM CST -----  Can you call and let the patient know she has osteoporosis in the lumber spine and is osteopenic in the hips. Make sure she is taking Oscal twice a day and forward this to her PCP. They may want to put her on a bisphosphonate.      Spoke to patient. She said she does not take the Oscal now but will consider taking it. I will forward this report to Dr. Demetrio Pleitez, her PCP. She voiced understanding of it all.

## 2019-11-29 ENCOUNTER — PATIENT MESSAGE (OUTPATIENT)
Dept: HEMATOLOGY/ONCOLOGY | Facility: CLINIC | Age: 64
End: 2019-11-29

## 2019-12-02 RX ORDER — SODIUM CHLORIDE 0.9 % (FLUSH) 0.9 %
10 SYRINGE (ML) INJECTION
Status: CANCELLED | OUTPATIENT
Start: 2019-12-03

## 2019-12-02 RX ORDER — HEPARIN 100 UNIT/ML
500 SYRINGE INTRAVENOUS
Status: CANCELLED | OUTPATIENT
Start: 2019-12-03

## 2019-12-03 ENCOUNTER — OFFICE VISIT (OUTPATIENT)
Dept: HEMATOLOGY/ONCOLOGY | Facility: CLINIC | Age: 64
End: 2019-12-03
Payer: MEDICARE

## 2019-12-03 ENCOUNTER — INFUSION (OUTPATIENT)
Dept: INFUSION THERAPY | Facility: HOSPITAL | Age: 64
End: 2019-12-03
Attending: INTERNAL MEDICINE
Payer: MEDICARE

## 2019-12-03 VITALS
DIASTOLIC BLOOD PRESSURE: 84 MMHG | HEIGHT: 65 IN | RESPIRATION RATE: 18 BRPM | SYSTOLIC BLOOD PRESSURE: 154 MMHG | HEART RATE: 86 BPM | BODY MASS INDEX: 24.99 KG/M2 | TEMPERATURE: 99 F | WEIGHT: 150 LBS

## 2019-12-03 VITALS
HEART RATE: 101 BPM | RESPIRATION RATE: 18 BRPM | SYSTOLIC BLOOD PRESSURE: 143 MMHG | BODY MASS INDEX: 24.96 KG/M2 | WEIGHT: 150 LBS | DIASTOLIC BLOOD PRESSURE: 87 MMHG | TEMPERATURE: 98 F

## 2019-12-03 DIAGNOSIS — G89.3 CANCER ASSOCIATED PAIN: ICD-10-CM

## 2019-12-03 DIAGNOSIS — Z51.89 ENCOUNTER FOR OTHER SPECIFIED AFTERCARE: Primary | ICD-10-CM

## 2019-12-03 DIAGNOSIS — T45.1X5A CHEMOTHERAPY-INDUCED NEUTROPENIA: ICD-10-CM

## 2019-12-03 DIAGNOSIS — C34.31 MALIGNANT NEOPLASM OF LOWER LOBE OF RIGHT LUNG: Chronic | ICD-10-CM

## 2019-12-03 DIAGNOSIS — D70.1 CHEMOTHERAPY-INDUCED NEUTROPENIA: ICD-10-CM

## 2019-12-03 DIAGNOSIS — C34.31 MALIGNANT NEOPLASM OF LOWER LOBE OF RIGHT LUNG: ICD-10-CM

## 2019-12-03 DIAGNOSIS — F41.0 PANIC DISORDER WITHOUT AGORAPHOBIA: ICD-10-CM

## 2019-12-03 PROCEDURE — 3079F PR MOST RECENT DIASTOLIC BLOOD PRESSURE 80-89 MM HG: ICD-10-PCS | Mod: S$GLB,,, | Performed by: INTERNAL MEDICINE

## 2019-12-03 PROCEDURE — 63600175 PHARM REV CODE 636 W HCPCS: Performed by: INTERNAL MEDICINE

## 2019-12-03 PROCEDURE — 3077F PR MOST RECENT SYSTOLIC BLOOD PRESSURE >= 140 MM HG: ICD-10-PCS | Mod: S$GLB,,, | Performed by: INTERNAL MEDICINE

## 2019-12-03 PROCEDURE — 99214 PR OFFICE/OUTPT VISIT, EST, LEVL IV, 30-39 MIN: ICD-10-PCS | Mod: S$GLB,,, | Performed by: INTERNAL MEDICINE

## 2019-12-03 PROCEDURE — 1100F PTFALLS ASSESS-DOCD GE2>/YR: CPT | Mod: S$GLB,,, | Performed by: INTERNAL MEDICINE

## 2019-12-03 PROCEDURE — 3288F PR FALLS RISK ASSESSMENT DOCUMENTED: ICD-10-PCS | Mod: S$GLB,,, | Performed by: INTERNAL MEDICINE

## 2019-12-03 PROCEDURE — 3077F SYST BP >= 140 MM HG: CPT | Mod: S$GLB,,, | Performed by: INTERNAL MEDICINE

## 2019-12-03 PROCEDURE — 3008F PR BODY MASS INDEX (BMI) DOCUMENTED: ICD-10-PCS | Mod: S$GLB,,, | Performed by: INTERNAL MEDICINE

## 2019-12-03 PROCEDURE — 3288F FALL RISK ASSESSMENT DOCD: CPT | Mod: S$GLB,,, | Performed by: INTERNAL MEDICINE

## 2019-12-03 PROCEDURE — 3079F DIAST BP 80-89 MM HG: CPT | Mod: S$GLB,,, | Performed by: INTERNAL MEDICINE

## 2019-12-03 PROCEDURE — 3008F BODY MASS INDEX DOCD: CPT | Mod: S$GLB,,, | Performed by: INTERNAL MEDICINE

## 2019-12-03 PROCEDURE — 1100F PR PT FALLS ASSESS DOC 2+ FALLS/FALL W/INJURY/YR: ICD-10-PCS | Mod: S$GLB,,, | Performed by: INTERNAL MEDICINE

## 2019-12-03 PROCEDURE — 99214 OFFICE O/P EST MOD 30 MIN: CPT | Mod: S$GLB,,, | Performed by: INTERNAL MEDICINE

## 2019-12-03 PROCEDURE — 96413 CHEMO IV INFUSION 1 HR: CPT

## 2019-12-03 RX ORDER — SODIUM CHLORIDE 0.9 % (FLUSH) 0.9 %
10 SYRINGE (ML) INJECTION
Status: DISCONTINUED | OUTPATIENT
Start: 2019-12-03 | End: 2019-12-03 | Stop reason: HOSPADM

## 2019-12-03 RX ORDER — LIDOCAINE AND PRILOCAINE 25; 25 MG/G; MG/G
1 CREAM TOPICAL
COMMUNITY
Start: 2019-09-09 | End: 2021-04-27 | Stop reason: SDUPTHER

## 2019-12-03 RX ORDER — HEPARIN 100 UNIT/ML
500 SYRINGE INTRAVENOUS
Status: DISCONTINUED | OUTPATIENT
Start: 2019-12-03 | End: 2019-12-03 | Stop reason: HOSPADM

## 2019-12-03 RX ADMIN — Medication 500 UNITS: at 11:12

## 2019-12-03 NOTE — ASSESSMENT & PLAN NOTE
Patient is due for the 4th nivo treatment today.  She is tolerating these well with no signs of autoimmune issues.  Labs and TSH are good as well.  Will continue with treatment and plan pet after cycle number 6 is complete.  Discussed this today.

## 2019-12-03 NOTE — ASSESSMENT & PLAN NOTE
Patient continues to have pain but at this point this is not worsening.  Will continue current care approach.

## 2019-12-03 NOTE — PROGRESS NOTES
PROGRESS NOTE    Subjective:       Patient ID: Sheri Broderick is a 64 y.o. female.    Chief Complaint:  Follow-up and Results  lung cancer, chemo follow up.     History of Present Illness:   Sheri Broderick is a 64 y.o. female who presents for follow up.     Patient started Nivo and is tolerating this well.  No sob and feeling well.        XRT to L2-L4 and Left hip completed 9/18/2019    Nivolumab Therapy:  1: 10/22/2019  2: 11/05/2019  3: 11/19/2019  4: 12/3/2019--due    10/23/2019 CBC, CMP unremarkable    PET Impression 7/8/2019:  1. FDG avid enlarged lymph nodes in right hilum and lower paratracheal region  of mediastinal, and additional foci of FDG uptake involving L3 vertebral body  and anterior left acetabulum also suspicious for new metastatic disease.    2. Nonspecific FDG uptake associated with right T2 transverse process near  articulation with posterior right second rib as above. Attention to this on  follow-up PET/CT is suggested.    3. Recent compression fracture of L2 vertebral body superior endplate since  04/09/2019, with associated FDG uptake which is inflammatory in nature.    L3 lesion biopsy 8/7/2019:  Adenocarcinoma c/w lung primary  PD-L1: 9%  ALK neg  Ros-1 Neg  EGFR Neg    Family and Social history reviewed and is unchanged from May 21, 2018      ROS:  Review of Systems   Constitutional: Negative for fever.   Respiratory: Negative for shortness of breath.    Cardiovascular: Negative for chest pain and leg swelling.   Gastrointestinal: Negative for abdominal pain and blood in stool.   Genitourinary: Negative for hematuria.   Skin: Negative for rash.          Current Outpatient Medications:     CHEWABLE VITAMIN C 500 mg Chew, , Disp: , Rfl:     clonazePAM (KLONOPIN) 1 MG tablet, Take one-half to one tablet two to three times daily only as needed for anxiety, Disp: 180 tablet, Rfl: 0    dextroamphetamine-amphetamine 10 mg Tab,  Take one tablet PO in the morning, Disp: 30 tablet, Rfl: 0    esomeprazole magnesium (NEXIUM 24HR) 20 mg TbEC, Take 20 mg by mouth once daily. , Disp: , Rfl:     fluticasone (FLONASE) 50 mcg/actuation nasal spray, 1 spray by Each Nare route once daily., Disp: , Rfl:     HYDROcodone-acetaminophen (NORCO) 5-325 mg per tablet, Take 1 tablet by mouth every 6 (six) hours as needed for Pain., Disp: 24 tablet, Rfl: 0    ibuprofen (ADVIL,MOTRIN) 800 MG tablet, Take 1 tablet (800 mg total) by mouth 2 (two) times daily as needed for Pain., Disp: 180 tablet, Rfl: 3    Lactobac no.41/Bifidobact no.7 (PROBIOTIC-10 ORAL), Take by mouth., Disp: , Rfl:     lamoTRIgine (LAMICTAL) 100 MG tablet, TAKE 1 TABLET IN THE MORNING  AND TAKE 2 TABLETS IN THE EVENING, Disp: 270 tablet, Rfl: 0    lidocaine-prilocaine (EMLA) cream, , Disp: , Rfl:     LINZESS 145 mcg Cap capsule, Take 1 capsule by mouth daily as needed. , Disp: , Rfl:     meclizine (ANTIVERT) 25 mg tablet, Take 1 tablet (25 mg total) by mouth every 8 (eight) hours as needed for Dizziness., Disp: 90 tablet, Rfl: 3    mirtazapine (REMERON) 30 MG tablet, Take one-half tablet PO nightly, Disp: 90 tablet, Rfl: 0    ondansetron (ZOFRAN-ODT) 8 MG TbDL, Take 8 mg by mouth once., Disp: , Rfl:     senna (SENOKOT) 8.6 mg tablet, Take 1 tablet by mouth once daily., Disp: , Rfl:     sertraline (ZOLOFT) 100 MG tablet, Take two tablets PO in the morning, Disp: 180 tablet, Rfl: 0    venlafaxine (EFFEXOR-XR) 75 MG 24 hr capsule, Take 1 capsule (75 mg total) by mouth once daily., Disp: 90 capsule, Rfl: 1    VITAMIN B-12 1000 MCG tablet, , Disp: , Rfl:     VITAMIN E ACETATE ORAL, Take by mouth., Disp: , Rfl:     ZYRTEC 10 mg Cap, continuous prn., Disp: , Rfl:   No current facility-administered medications for this visit.     Facility-Administered Medications Ordered in Other Visits:     alteplase injection 2 mg, 2 mg, Intra-Catheter, PRN, Segundo Zhang MD    heparin,  porcine (PF) 100 unit/mL injection flush 500 Units, 500 Units, Intravenous, PRN, Segundo Zhang MD, 500 Units at 11/05/19 1200    sodium chloride 0.9% 100 mL flush bag, , Intravenous, 1 time in Clinic/HOD, Segundo Zhang MD    sodium chloride 0.9% flush 10 mL, 10 mL, Intravenous, PRN, Segundo Zhang MD, 10 mL at 11/05/19 1200        Objective:       Physical Examination:     BP (!) 143/87   Pulse 101   Temp 98.3 °F (36.8 °C)   Resp 18   Wt 68 kg (150 lb)   BMI 24.96 kg/m²     Physical Exam   Constitutional: She appears well-developed and well-nourished.   HENT:   Head: Normocephalic and atraumatic.   Right Ear: External ear normal.   Left Ear: External ear normal.   Mouth/Throat: Oropharynx is clear and moist.   Eyes: Pupils are equal, round, and reactive to light. Conjunctivae are normal.   Neck: No tracheal deviation present. No thyromegaly present.   Cardiovascular: Normal rate, regular rhythm and normal heart sounds.   Pulmonary/Chest: Effort normal and breath sounds normal.   Abdominal: Soft. Bowel sounds are normal. She exhibits no distension and no mass. There is no tenderness.   Musculoskeletal: She exhibits no edema.   Neurological:   Neuro intact througout   Skin: No rash noted.   Psychiatric: She has a normal mood and affect. Her behavior is normal. Judgment and thought content normal.       Labs:   Recent Results (from the past 336 hour(s))   CBC auto differential    Collection Time: 11/30/19  9:56 AM   Result Value Ref Range    WBC 3.5 (L) 3.8 - 10.8 Thousand/uL    Hemoglobin 11.4 (L) 11.7 - 15.5 g/dL    Hematocrit 35.0 35.0 - 45.0 %    Platelets 234 140 - 400 Thousand/uL     CMP  Sodium   Date Value Ref Range Status   11/30/2019 140 135 - 146 mmol/L Final   10/09/2018 140 134 - 144 mmol/L      Potassium   Date Value Ref Range Status   11/30/2019 4.3 3.5 - 5.3 mmol/L Final     Chloride   Date Value Ref Range Status   11/30/2019 103 98 - 110 mmol/L Final   10/09/2018 105 98 - 110  mmol/L      CO2   Date Value Ref Range Status   11/30/2019 32 20 - 32 mmol/L Final     Glucose   Date Value Ref Range Status   11/30/2019 93 65 - 139 mg/dL Final     Comment:               Non-fasting reference interval        10/09/2018 101 (H) 70 - 99 mg/dL      BUN, Bld   Date Value Ref Range Status   11/30/2019 19 7 - 25 mg/dL Final     Creatinine   Date Value Ref Range Status   11/30/2019 0.79 0.50 - 0.99 mg/dL Final     Comment:     For patients >49 years of age, the reference limit  for Creatinine is approximately 13% higher for people  identified as -American.        10/09/2018 0.80 0.60 - 1.40 mg/dL    11/29/2012 0.7 0.5 - 1.4 mg/dL Final     Calcium   Date Value Ref Range Status   11/30/2019 9.4 8.6 - 10.4 mg/dL Final   11/29/2012 9.0 8.7 - 10.5 mg/dL Final     Total Protein   Date Value Ref Range Status   11/30/2019 6.9 6.1 - 8.1 g/dL Final     Albumin   Date Value Ref Range Status   11/30/2019 4.2 3.6 - 5.1 g/dL Final   10/09/2018 4.1 3.1 - 4.7 g/dL      Total Bilirubin   Date Value Ref Range Status   11/30/2019 0.4 0.2 - 1.2 mg/dL Final     Alkaline Phosphatase   Date Value Ref Range Status   11/30/2019 123 33 - 130 U/L Final     AST   Date Value Ref Range Status   11/30/2019 16 10 - 35 U/L Final     ALT   Date Value Ref Range Status   11/30/2019 12 6 - 29 U/L Final     Anion Gap   Date Value Ref Range Status   08/28/2019 5 (L) 8 - 16 mmol/L Final   11/29/2012 10 5 - 15 meq/L Final     eGFR if    Date Value Ref Range Status   11/30/2019 91 > OR = 60 mL/min/1.73m2 Final     eGFR if non    Date Value Ref Range Status   11/30/2019 79 > OR = 60 mL/min/1.73m2 Final     No results found for: CEA  No results found for: PSA        Assessment/Plan:     Problem List Items Addressed This Visit     Malignant neoplasm of lower lobe of right lung-Adenocarcinoma (Chronic)     Patient is due for the 4th nivo treatment today.  She is tolerating these well with no signs of  autoimmune issues.  Labs and TSH are good as well.  Will continue with treatment and plan pet after cycle number 6 is complete.  Discussed this today.          Relevant Orders    NM PET CT Routine Skull to Mid Thigh    Cancer associated pain     Patient continues to have pain but at this point this is not worsening.  Will continue current care approach.           Panic disorder without agoraphobia     She seems to be doing ok from this standpoint.  Continue to watch.                   Discussion:   High complexity due to intense lab monitoring, high risk medications.   No follow-ups on file.      Electronically signed by Segundo Lamb

## 2019-12-11 ENCOUNTER — OFFICE VISIT (OUTPATIENT)
Dept: PSYCHIATRY | Facility: CLINIC | Age: 64
End: 2019-12-11
Payer: MEDICARE

## 2019-12-11 VITALS
SYSTOLIC BLOOD PRESSURE: 143 MMHG | DIASTOLIC BLOOD PRESSURE: 85 MMHG | HEIGHT: 65 IN | WEIGHT: 149.25 LBS | BODY MASS INDEX: 24.87 KG/M2 | HEART RATE: 107 BPM

## 2019-12-11 DIAGNOSIS — C34.90 ADENOCARCINOMA OF LUNG, UNSPECIFIED LATERALITY: ICD-10-CM

## 2019-12-11 DIAGNOSIS — F33.1 MDD (MAJOR DEPRESSIVE DISORDER), RECURRENT EPISODE, MODERATE: ICD-10-CM

## 2019-12-11 DIAGNOSIS — F41.1 GENERALIZED ANXIETY DISORDER: ICD-10-CM

## 2019-12-11 DIAGNOSIS — F60.9 PERSONALITY DISORDER: ICD-10-CM

## 2019-12-11 DIAGNOSIS — F43.10 PTSD (POST-TRAUMATIC STRESS DISORDER): ICD-10-CM

## 2019-12-11 PROCEDURE — 99214 PR OFFICE/OUTPT VISIT, EST, LEVL IV, 30-39 MIN: ICD-10-PCS | Mod: HCNC,S$GLB,, | Performed by: PSYCHIATRY & NEUROLOGY

## 2019-12-11 PROCEDURE — 99499 UNLISTED E&M SERVICE: CPT | Mod: HCNC,S$GLB,, | Performed by: PSYCHIATRY & NEUROLOGY

## 2019-12-11 PROCEDURE — 3008F BODY MASS INDEX DOCD: CPT | Mod: HCNC,CPTII,S$GLB, | Performed by: PSYCHIATRY & NEUROLOGY

## 2019-12-11 PROCEDURE — 1101F PR PT FALLS ASSESS DOC 0-1 FALLS W/OUT INJ PAST YR: ICD-10-PCS | Mod: HCNC,CPTII,S$GLB, | Performed by: PSYCHIATRY & NEUROLOGY

## 2019-12-11 PROCEDURE — 99999 PR PBB SHADOW E&M-EST. PATIENT-LVL III: ICD-10-PCS | Mod: PBBFAC,HCNC,, | Performed by: PSYCHIATRY & NEUROLOGY

## 2019-12-11 PROCEDURE — 99999 PR PBB SHADOW E&M-EST. PATIENT-LVL III: CPT | Mod: PBBFAC,HCNC,, | Performed by: PSYCHIATRY & NEUROLOGY

## 2019-12-11 PROCEDURE — 1101F PT FALLS ASSESS-DOCD LE1/YR: CPT | Mod: HCNC,CPTII,S$GLB, | Performed by: PSYCHIATRY & NEUROLOGY

## 2019-12-11 PROCEDURE — 99214 OFFICE O/P EST MOD 30 MIN: CPT | Mod: HCNC,S$GLB,, | Performed by: PSYCHIATRY & NEUROLOGY

## 2019-12-11 PROCEDURE — 3077F PR MOST RECENT SYSTOLIC BLOOD PRESSURE >= 140 MM HG: ICD-10-PCS | Mod: HCNC,CPTII,S$GLB, | Performed by: PSYCHIATRY & NEUROLOGY

## 2019-12-11 PROCEDURE — 99499 RISK ADDL DX/OHS AUDIT: ICD-10-PCS | Mod: HCNC,S$GLB,, | Performed by: PSYCHIATRY & NEUROLOGY

## 2019-12-11 PROCEDURE — 3079F DIAST BP 80-89 MM HG: CPT | Mod: HCNC,CPTII,S$GLB, | Performed by: PSYCHIATRY & NEUROLOGY

## 2019-12-11 PROCEDURE — 3079F PR MOST RECENT DIASTOLIC BLOOD PRESSURE 80-89 MM HG: ICD-10-PCS | Mod: HCNC,CPTII,S$GLB, | Performed by: PSYCHIATRY & NEUROLOGY

## 2019-12-11 PROCEDURE — 3008F PR BODY MASS INDEX (BMI) DOCUMENTED: ICD-10-PCS | Mod: HCNC,CPTII,S$GLB, | Performed by: PSYCHIATRY & NEUROLOGY

## 2019-12-11 PROCEDURE — 3077F SYST BP >= 140 MM HG: CPT | Mod: HCNC,CPTII,S$GLB, | Performed by: PSYCHIATRY & NEUROLOGY

## 2019-12-11 RX ORDER — VENLAFAXINE HYDROCHLORIDE 37.5 MG/1
37.5 CAPSULE, EXTENDED RELEASE ORAL DAILY
Qty: 30 CAPSULE | Refills: 1 | Status: SHIPPED | OUTPATIENT
Start: 2019-12-11 | End: 2020-01-13 | Stop reason: ALTCHOICE

## 2019-12-11 RX ORDER — LAMOTRIGINE 100 MG/1
TABLET ORAL
Qty: 1 TABLET | Refills: 0
Start: 2019-12-11 | End: 2020-02-19

## 2019-12-11 RX ORDER — SERTRALINE HYDROCHLORIDE 100 MG/1
TABLET, FILM COATED ORAL
Qty: 180 TABLET | Refills: 0 | Status: SHIPPED | OUTPATIENT
Start: 2019-12-11 | End: 2020-02-24 | Stop reason: SDUPTHER

## 2019-12-11 NOTE — PROGRESS NOTES
Outpatient Psychiatry Follow-Up Visit (MD/NP)    12/11/2019    Clinical Status of Patient:  Outpatient (Ambulatory)    Chief Complaint:  Sheri Broderick is a 64 y.o. female who presents today for follow-up of anxiety, depression.   Met with patient.      Interval History and Content of Current Session:      Interim Events/Subjective Report/Content of Current Session:      63 yo  female presents for follow up appointment for treatment of major depression, generalized anxiety disorder, panic disorder.     Past Psychiatric Hx:   No prior psychiatric hospitalizations   Suicide risk assessment:   Risks: , age, chronic depression with passive SI  Protective: no prior suicide attempts, strong skip, female, no substance abuse or impulsivity, health issues present both not disabling at this time  She is at low to moderate risk of suicide acutely, and moderate risk over long term     Prior meds: Abilify (no benefit), Buspirone (intolerance) Trazodone (intolerance), Paroxetine (intolerance), Quetiapine, Bupropion (intolerance), Fluoxetine (no benefit), Duloxetine Foltx (no longer covered)     Past Medical Hx:  Adenocarcinoma of the lung, metastatic   Osteoporosis   Neuropraxia  HLD  DDD   Fibromyalgia  HTN  Anemia     Interim Hx:   Pt presents for follow up.   Pt has been taking Sertraline previously lowered to 200 mg daily. She is taking Adderall on PRN basis.   Taking Clonazepam increased to BID - TID. She has been also been taking Mirtazapine, Effexor, and Lamictal daily.   She does feel better with reduction in SSRI.      ANGEL Mae was helping her with financial resources.  Approved for $ 125 one time salty.  Frustrated by the care she is receiving.   Triggering feelings that she does not belong - like when she was working.     Fatigue, pain, aching  - immunotherapy x 4, PET scan 1/6/20.          XRT to L2-L4 and left hip 9/18/2019 - she thought it was helpful for pain, pain is back.         Cough is  "back again; anxious, heaviness in chest.   Feels Oncologist does not care, no cure for this.   Anxious, foot tapping in appt.   PCP - encouraged to make end of life preparations.   So much bothering her.      Support system is not there; feels alone. Sister Esme is in contact (18 months older, no time for her).  "She is calling for herself, not me."  Sister Nani - not doing well. 10 yrs older than patient.   Once cat passed 2019, still has one cat.   No motivation, no energy        Meseret is her niece and is supportive of pt, but busy.   Roommate Wally still lives there , some help, still goes ballistic at times.   Tired of leaving home.   Very hester lately. Pt feels she is ready to chomp someone. Irritabile.  Does not feel angry, hurt that she does not have support      GAD7 2019   Feeling nervous, anxious, on edge Nearly everyday   Not being able to stop or control worrying Several days   Worrying too much about different things Nearly everyday   Trouble relaxing Several days   Being so restless that its hard to sit still More than half the days   Becoming easily annoyed or irritable Nearly everyday   Feeling afraid as if something awful might happen Nearly everyday   If you marked you are experiencing any of the aforementioned problems, how difficult have these made it for you to do your work, take care of things at home, or get along with other people? Very difficult   ANISH-7 Score 16   Denies suicidal/homicidal ideations.  Denies symptoms of brad/psychosis.   No self harm or violence.     Memory issues - ST and LT long term issues. Memory is not worse since she had chemo    MOCA done :          No fam hx of dementia, however her father  relatively young at 58 yo.      Denies alcohol/drug use. No tobacco. No recent caffeine use.     Review of Systems   · PSYCHIATRIC: Pertinant items are noted in the narrative.  · CONSTITUTIONAL:  Wt gain   · CARDIOVASCULAR: no current chest pain, no " "awareness of palpitations   · NEURO: frequent HA, memory loss   · RESP: intermittent cough   · M/S: 8/10 generalized pain     Past Medical, Family and Social History: The patient's past medical, family and social history have been reviewed and updated as appropriate within the electronic medical record - see encounter notes.    Psychiatric Medications:   Effexor XR 75 mg daily  Sertraline 200 mg in am   Klonopin 1 mg bid - tid prn anxiety- taking piece of tab nightly   Remeron 15 mg QHS   Lamictal 100 mg QAM, 200 mg QHS   Adderall 10 mg in am      Compliance: yes     Side effects: dry mouth (uses biotene), wt gain    Risk Parameters:  Patient reports no suicidal ideations today   Patient reports no homicidal ideation  Patient reports no self-injurious behavior  Patient reports no violent behavior      Exam (detailed: at least 9 elements; comprehensive: all 15 elements)   Constitutional  Vitals:  Most recent vital signs, dated less than 90 days prior to this appointment, were reviewed.    Se Epic for today's vitals.       General:  age appropriate, casual attire, adequately groomed, good eye contact, appears anxious, not tearful        Musculoskeletal  Muscle Strength/Tone:  No tremor noted today,  + PMA    Gait & Station:  non-ataxic     Psychiatric  Speech:  no latency; no press , spontaneous, talkative, soft spoken    Mood & Affect:  "not good"   Anxious    Thought Process:  Linear   Associations:  intact   Thought Content:  no active or passive SI today, no homicidality, delusions, or paranoia, hallucinations: (auditory: no currently, visual: no) - none currently       Insight:  Fair   Judgement: Adequate to circumstances   Orientation:  grossly intact. Alert and oriented x 4    Memory: intact for content of interview   Language: grossly intact   Attention Span & Concentration:  able to focus   Fund of Knowledge:  intact and appropriate to age and level of education     Assessment and Diagnosis   Status/Progress: " Based on the examination today, the patient's problem(s) is/are under inadequate control, has been treatment resistant in past.   New problems have been presented today.   Comorbidities are complicating management of the primary condition.    The working differential for this patient includes Cluster B and C traits, personality disorder, PTSD, MDD, OCD    General Impression :  Pt improved with addition of SNRI to SSRI; she declines atypical antipsychotic augmentation. Did not tolerate recent medication adjustments. Stabilizing with return to prior regimen.  Adderall does help pt function better, she does not feel it worsens anxiety.  Pt with significant negative cognitive distortions evident throughout interview.  Pt dx with Stage II Adenocarcinoma of the lung and is now s/p treatment.  Support system is limited, pt appears less depressed today, becoming more active (travel, weekend activities). Resuming Adderall has helped. Pt concerned about her cognition, reports recent clumsiiness, word finding issues. Unrevealing Neuro work up.  Pt has metastatic lung cancer, reports she has limited social support, needs assistance with managing finances, applying for assistance.     Major Depressive Disorder, Recurrent, moderate   Panic Disorder without Agoraphobia   Generalized Anxiety Disorder    PTSD  Hx OCD  R/O Cognitive Disorder  Personality Disorder, unspecified, Cluster B and C traits     Hepaitis B, tremors,  osteoporosis, myalgias, arthralgia, poor balance, back pain, bulging disc, fibromyalgia, osteoarthritis, neuropathy, LE fracture, metastatic adenocarcinoma of Lung    GAF: 55   Intervention/Counseling/Treatment Plan   · Medication Management: The risks and benefits of medication were discussed with the patient.  · Counseling provided with patient as follows: importance of compliance with chosen treatment options was emphasized, risks and benefits of treatment options, including medications, were discussed with  the patient     1. Reduce Effexor XR to 37.5 mg daily - consider weaning off, may be contributing to irritability   2. Continue Remeron 15 mg QHS   3. Continue Klonopin 1 mg BID - TID prn.  Discussed risk of decreased RT, sedation, addictive potential, and not to mix with alcohol. No Rx today.  Advised to avoid this medication when she is taking pain meds (risks of resp depression discussed) and she verbalized an understanding. Advised this med can impact cognition.  4. Titrate Lamictal to 200 mg BID.  Discussed risks of life threatening SJS, need for compliance.   5. Continue Psychotherapy with Kaye Jennings   6. Minimize Adderall 10 mg once in AM.  Monitor BP/HR  - patient instructed to see a PCP regularly because she is on a stimulant. Discussed risks of abuse, insomnia, anxiety, elevated BP, HR, MI, stroke, and sudden death.   Pt reports this med keeps her functioning. Advised not to take if HR is > 100 - she will update me on how vitals are running.   7.  Pt instructed to go to ED/911 with thoughts of wanting harm self/others  8. Call to report any worsening of symptoms or problems with the medication  9. Continue Sertraline 200 mg daily.  Target depression, anxiety. Discussed risk of serotonin syndrome, GI upset, headaches   10. Follow up with Neuro, PCP, Oncology  11. Referral to Case Management - pt asking for assistance with applying for grants, navigating cancer treatment, reports she has not been able to get support through psycho-oncology program     Return to Clinic 4 weeks

## 2019-12-15 PROBLEM — F60.9 PERSONALITY DISORDER: Status: ACTIVE | Noted: 2019-12-15

## 2019-12-15 PROBLEM — F43.10 PTSD (POST-TRAUMATIC STRESS DISORDER): Status: ACTIVE | Noted: 2019-12-15

## 2019-12-16 RX ORDER — HEPARIN 100 UNIT/ML
500 SYRINGE INTRAVENOUS
Status: CANCELLED | OUTPATIENT
Start: 2019-12-17

## 2019-12-16 RX ORDER — SODIUM CHLORIDE 0.9 % (FLUSH) 0.9 %
10 SYRINGE (ML) INJECTION
Status: CANCELLED | OUTPATIENT
Start: 2019-12-17

## 2019-12-17 ENCOUNTER — INFUSION (OUTPATIENT)
Dept: INFUSION THERAPY | Facility: HOSPITAL | Age: 64
End: 2019-12-17
Attending: INTERNAL MEDICINE
Payer: MEDICARE

## 2019-12-17 VITALS
OXYGEN SATURATION: 96 % | WEIGHT: 149.69 LBS | RESPIRATION RATE: 18 BRPM | BODY MASS INDEX: 24.94 KG/M2 | HEIGHT: 65 IN | TEMPERATURE: 97 F | DIASTOLIC BLOOD PRESSURE: 84 MMHG | SYSTOLIC BLOOD PRESSURE: 148 MMHG | HEART RATE: 115 BPM

## 2019-12-17 DIAGNOSIS — D70.1 CHEMOTHERAPY-INDUCED NEUTROPENIA: ICD-10-CM

## 2019-12-17 DIAGNOSIS — C34.31 MALIGNANT NEOPLASM OF LOWER LOBE OF RIGHT LUNG: ICD-10-CM

## 2019-12-17 DIAGNOSIS — Z51.89 ENCOUNTER FOR OTHER SPECIFIED AFTERCARE: Primary | ICD-10-CM

## 2019-12-17 DIAGNOSIS — T45.1X5A CHEMOTHERAPY-INDUCED NEUTROPENIA: ICD-10-CM

## 2019-12-17 PROCEDURE — 96413 CHEMO IV INFUSION 1 HR: CPT

## 2019-12-17 PROCEDURE — 63600175 PHARM REV CODE 636 W HCPCS: Performed by: INTERNAL MEDICINE

## 2019-12-17 RX ORDER — HEPARIN 100 UNIT/ML
500 SYRINGE INTRAVENOUS
Status: DISCONTINUED | OUTPATIENT
Start: 2019-12-17 | End: 2019-12-17 | Stop reason: HOSPADM

## 2019-12-17 RX ORDER — SODIUM CHLORIDE 0.9 % (FLUSH) 0.9 %
10 SYRINGE (ML) INJECTION
Status: DISCONTINUED | OUTPATIENT
Start: 2019-12-17 | End: 2019-12-17 | Stop reason: HOSPADM

## 2019-12-17 RX ADMIN — HEPARIN 500 UNITS: 100 SYRINGE at 12:12

## 2019-12-18 ENCOUNTER — OUTPATIENT CASE MANAGEMENT (OUTPATIENT)
Dept: ADMINISTRATIVE | Facility: OTHER | Age: 64
End: 2019-12-18

## 2019-12-18 NOTE — PROGRESS NOTES
Outpatient Care Management   - Low Risk Patient Assessment    Patient: Sheri Broderick  MRN:  [id number]  Date of Service:  12/18/2019  Completed by:  Leia Sal LMSW  Referral Date: 12/15/2019  Program: OPCM Low Risk    Reason for Visit   Patient presents with    OPCM SW First Assessment Attempt    Social Work Assessment - Low/Mod Risk       Patient Summary     OPCM Social Work Assessment (Low/Moderate Risk)    General  Level of Caregiver support:  Member independent and does not need caregiver assistance  Have you had to make a decision between paying for any of the following in the last 2 months?:  Food, Medical Care  Transportation means:  Self  Employment status:  Disabled  Current symptoms:  Sleep disturbances, Restlessness  Assessments  Was the PHQ Depression Screening completed this visit?:  Yes  Was the ANISH-7 Screening completed this visit?:  No       SW completed SW assessment with patient. Pt is known to [facility] and previous assessment was completed on 5/28/18. Pt reports she continues to live alone and reports she is independent with ADLs/IADLs. Pt reports she has walker at home for use however, she is not currently using a walker. Pt reports living on fixed income of 2000 dollars per month. She reports having difficulty affording co-pays for her specialist and imaging visits. She is current cancer patient at [facility]. Pt reports receiving resource sent by this  last year and states she received some financial assistance from [facility]. Pt reports she would like to find resources for financial assistance however, she feels overwhelmed about completing applications for assistance. Pt states she has outstanding bill with [facility]. She reports being told that [facility] does not have financial assistance for outpatient services. [facility] informed Pt that this  will contact hospital to find out what assistance is available. Pt reports she requested assistance  from [facility] and was told she does qualify due to her income. SW informed Pt she is over income for Medicaid and SNAP benefits. SW offered to send Pt resources for local food pantries and advised her food pantries do not typically require full applications but rather an ID and proof of income. Pt verbalized understanding and voiced agreement with LMSW sending information for food pantries. Pt informed that LMSW will follow up with her regarding financial assistance available on tomorrow.    Plan:   LM will contact [facility] and [facility] financial assistance dept tomorrow, 12/19 to determine assistance programs available to Pt.     Complex Care Plan     Care plan was discussed and completed today with input from patient and/or caregiver.    Goals      Patient/caregiver will have knowledge of resources available in order to obtain the services that are needed prior to the end of this encounter.      Overall Time to Completion  1 week from 12/18/2019    Social Work Identified Patient Barriers:             Short Term Goals  Patient/caregiver will verbalize knowledge of available resources prior to the end of this encounter.   Interventions:  · Complete community search for available resources via [facility].  · Discuss and provide community resources by [employer]  Status:  · Partially met              Patient Instructions     No follow-ups on file.    Todays OPCM Self-Management Care Plan was developed with the patients/caregivers input and was based on identified barriers from todays assessment.  Goals were written today with the patient/caregiver and the patient has agreed to work towards these goals to improve his/her overall well-being. Patient verbalized understanding of the care plan, goals, and all of today's instructions. Encouraged patient/caregiver to communicate with his/her physician and health care team about health conditions and the treatment plan.  Provided my contact information today  and encouraged patient/caregiver to call me with any questions as needed.

## 2019-12-19 ENCOUNTER — TELEPHONE (OUTPATIENT)
Dept: PSYCHIATRY | Facility: CLINIC | Age: 64
End: 2019-12-19

## 2019-12-19 ENCOUNTER — OUTPATIENT CASE MANAGEMENT (OUTPATIENT)
Dept: ADMINISTRATIVE | Facility: OTHER | Age: 64
End: 2019-12-19

## 2019-12-19 RX ORDER — CLONAZEPAM 1 MG/1
TABLET ORAL
Qty: 90 TABLET | Refills: 0 | Status: SHIPPED | OUTPATIENT
Start: 2019-12-19 | End: 2020-05-12

## 2019-12-19 RX ORDER — DEXTROAMPHETAMINE SACCHARATE, AMPHETAMINE ASPARTATE, DEXTROAMPHETAMINE SULFATE AND AMPHETAMINE SULFATE 2.5; 2.5; 2.5; 2.5 MG/1; MG/1; MG/1; MG/1
TABLET ORAL
Qty: 30 TABLET | Refills: 0 | Status: SHIPPED | OUTPATIENT
Start: 2019-12-19 | End: 2020-05-12 | Stop reason: DRUGHIGH

## 2019-12-19 NOTE — TELEPHONE ENCOUNTER
Pt is requesting medication refill on Adderall 10 mg , Clonazepam 1 mg   Last refill: 11/4/19 , 7/22/19  Last visit: 12/11/19  Follow Up: 1/13/19

## 2019-12-19 NOTE — PROGRESS NOTES
Follow Up  Reason for referral: Financial Assistance Application    This LMSW received a referral on the above patient.   Reason for referral: low risk, financial assistance  Name of the community resource that was provided: Local Food Pantry, [facility] Financial Assistance, [employer] Assistance  Resource given to: Patient via E-mail: [email or web address]      LMSW contacted [facility] to determine if assistance programs are available to assist patient with her bills. [facility] spoke to Bailey Miller and she reports patient does not appear to have any outstanding bills with [facility]. Bailey confirmed that [facility] financial assistance does not help with co-pays for doctors visits or imaging services.She reports patient applied for financial assistance last year and she still has patient's information on file. Bailey reports patient would need to submit updated proof of income and bank statement for them to assess her eligibility. She reports [facility] may provide patient with her contact information and she will be willing to work patient through process. [facility] contacted patient and informed her of assistance available at [facility]. Patient provided with Bailey's contact information and advised to contact her regarding billing issues. Patient voiced understanding and was agreeable to [facility] to send previously discussed resource to her e-mail address on file. Case closed and referral source notified.

## 2019-12-19 NOTE — TELEPHONE ENCOUNTER
----- Message from Leia Sal LMSW sent at 12/19/2019  3:23 PM CST -----  This LMSW received a referral on the above patient. This SW provided patient/caregiver with the following resource:  [facility] Food Pantry, [facility] Financial Assistance, [facility] Assistance.     Thank you for the referral,    Leia Sal LMSW

## 2019-12-30 RX ORDER — SODIUM CHLORIDE 0.9 % (FLUSH) 0.9 %
10 SYRINGE (ML) INJECTION
Status: CANCELLED | OUTPATIENT
Start: 2020-01-09

## 2019-12-30 RX ORDER — HEPARIN 100 UNIT/ML
500 SYRINGE INTRAVENOUS
Status: CANCELLED | OUTPATIENT
Start: 2020-01-09

## 2019-12-31 ENCOUNTER — TELEPHONE (OUTPATIENT)
Dept: HEMATOLOGY/ONCOLOGY | Facility: CLINIC | Age: 64
End: 2019-12-31

## 2019-12-31 ENCOUNTER — HOSPITAL ENCOUNTER (OUTPATIENT)
Dept: RADIOLOGY | Facility: HOSPITAL | Age: 64
Discharge: HOME | End: 2019-12-31
Attending: INTERNAL MEDICINE
Payer: MEDICARE

## 2019-12-31 DIAGNOSIS — C34.31 MALIGNANT NEOPLASM OF LOWER LOBE OF RIGHT LUNG: ICD-10-CM

## 2019-12-31 DIAGNOSIS — C34.31 MALIGNANT NEOPLASM OF LOWER LOBE OF RIGHT LUNG: Primary | ICD-10-CM

## 2019-12-31 PROCEDURE — 71046 X-RAY EXAM CHEST 2 VIEWS: CPT | Mod: TC

## 2019-12-31 RX ORDER — FENTANYL 12.5 UG/1
1 PATCH TRANSDERMAL
COMMUNITY
End: 2020-03-27

## 2020-01-06 ENCOUNTER — HOSPITAL ENCOUNTER (OUTPATIENT)
Dept: RADIOLOGY | Facility: HOSPITAL | Age: 65
Discharge: HOME | End: 2020-01-06
Attending: INTERNAL MEDICINE
Payer: MEDICARE

## 2020-01-06 VITALS — HEIGHT: 65 IN | BODY MASS INDEX: 24.99 KG/M2 | WEIGHT: 150 LBS

## 2020-01-06 DIAGNOSIS — C34.31 MALIGNANT NEOPLASM OF LOWER LOBE OF RIGHT LUNG: ICD-10-CM

## 2020-01-06 DIAGNOSIS — C34.31 MALIGNANT NEOPLASM OF LOWER LOBE OF RIGHT LUNG: Chronic | ICD-10-CM

## 2020-01-06 LAB — GLUCOSE SERPL-MCNC: 89 MG/DL (ref 70–110)

## 2020-01-06 PROCEDURE — A9552 F18 FDG: HCPCS | Mod: PO

## 2020-01-06 PROCEDURE — 78815 PET IMAGE W/CT SKULL-THIGH: CPT | Mod: TC,PO,PS

## 2020-01-08 ENCOUNTER — OFFICE VISIT (OUTPATIENT)
Dept: HEMATOLOGY/ONCOLOGY | Facility: CLINIC | Age: 65
End: 2020-01-08
Payer: MEDICARE

## 2020-01-08 ENCOUNTER — TELEPHONE (OUTPATIENT)
Dept: INFUSION THERAPY | Facility: HOSPITAL | Age: 65
End: 2020-01-08

## 2020-01-08 VITALS
HEART RATE: 101 BPM | BODY MASS INDEX: 25.24 KG/M2 | TEMPERATURE: 98 F | DIASTOLIC BLOOD PRESSURE: 87 MMHG | RESPIRATION RATE: 18 BRPM | SYSTOLIC BLOOD PRESSURE: 150 MMHG | WEIGHT: 151.69 LBS

## 2020-01-08 DIAGNOSIS — M79.10 MUSCLE PAIN: ICD-10-CM

## 2020-01-08 DIAGNOSIS — C34.31 MALIGNANT NEOPLASM OF LOWER LOBE OF RIGHT LUNG: Chronic | ICD-10-CM

## 2020-01-08 DIAGNOSIS — R05.9 COUGH: ICD-10-CM

## 2020-01-08 PROCEDURE — 3079F PR MOST RECENT DIASTOLIC BLOOD PRESSURE 80-89 MM HG: ICD-10-PCS | Mod: S$GLB,,, | Performed by: INTERNAL MEDICINE

## 2020-01-08 PROCEDURE — 3077F PR MOST RECENT SYSTOLIC BLOOD PRESSURE >= 140 MM HG: ICD-10-PCS | Mod: S$GLB,,, | Performed by: INTERNAL MEDICINE

## 2020-01-08 PROCEDURE — 99214 PR OFFICE/OUTPT VISIT, EST, LEVL IV, 30-39 MIN: ICD-10-PCS | Mod: S$GLB,,, | Performed by: INTERNAL MEDICINE

## 2020-01-08 PROCEDURE — 3008F PR BODY MASS INDEX (BMI) DOCUMENTED: ICD-10-PCS | Mod: S$GLB,,, | Performed by: INTERNAL MEDICINE

## 2020-01-08 PROCEDURE — 99214 OFFICE O/P EST MOD 30 MIN: CPT | Mod: S$GLB,,, | Performed by: INTERNAL MEDICINE

## 2020-01-08 PROCEDURE — 3008F BODY MASS INDEX DOCD: CPT | Mod: S$GLB,,, | Performed by: INTERNAL MEDICINE

## 2020-01-08 PROCEDURE — 3079F DIAST BP 80-89 MM HG: CPT | Mod: S$GLB,,, | Performed by: INTERNAL MEDICINE

## 2020-01-08 PROCEDURE — 1101F PR PT FALLS ASSESS DOC 0-1 FALLS W/OUT INJ PAST YR: ICD-10-PCS | Mod: S$GLB,,, | Performed by: INTERNAL MEDICINE

## 2020-01-08 PROCEDURE — 1101F PT FALLS ASSESS-DOCD LE1/YR: CPT | Mod: S$GLB,,, | Performed by: INTERNAL MEDICINE

## 2020-01-08 PROCEDURE — 3077F SYST BP >= 140 MM HG: CPT | Mod: S$GLB,,, | Performed by: INTERNAL MEDICINE

## 2020-01-08 RX ORDER — BENZONATATE 100 MG/1
100 CAPSULE ORAL EVERY 6 HOURS PRN
Qty: 30 CAPSULE | Refills: 1 | Status: SHIPPED | OUTPATIENT
Start: 2020-01-08 | End: 2020-06-04

## 2020-01-08 RX ORDER — PREDNISONE 20 MG/1
20 TABLET ORAL DAILY
Qty: 14 TABLET | Refills: 0 | Status: SHIPPED | OUTPATIENT
Start: 2020-01-08 | End: 2020-01-22

## 2020-01-08 NOTE — ASSESSMENT & PLAN NOTE
Patient is doing ok but pet shows what may be increasing disease.  Given she has only had five therapies, I will continue with immunotherapy and rescan after four more treatments.  We discussed that if there is not stability or improvement then we will have to return to traditional chemotherapy.

## 2020-01-08 NOTE — ASSESSMENT & PLAN NOTE
Will try her on prednisone 20mg daily for 14 days to see if this will help.  Will have her back with me in 2 weeks.

## 2020-01-08 NOTE — PROGRESS NOTES
PROGRESS NOTE    Subjective:       Patient ID: Sheri Broderick is a 64 y.o. female.    Chief Complaint:  No chief complaint on file.  lung cancer, chemo follow up.     History of Present Illness:   Sheri Broderick is a 64 y.o. female who presents for follow up.     Patient started Nivo and is tolerating this well.  No sob and feeling well.        XRT to L2-L4 and Left hip completed 9/18/2019    Nivolumab Therapy:  1: 10/22/2019  2: 11/05/2019  3: 11/19/2019  4: 12/3/2019  5: 12/17/2019  6: 12/31/2019-held   6: 1/9/2020--due    10/23/2019 CBC, CMP unremarkable    Pet impression 1/6/2019:  1.  Status post right lower lumbar ectomy with no focal increased FDG activity in the lungs.    2.  FDG avid mediastinal and right hilar lymph nodes, similar to the previous exam.    3.  Diffuse scattered increased FDG activity in the axial skeleton, with relative interval worsening from the previous exam.    4.  Interval development of a small FDG avid nodule in the left adrenal gland concerning for metastatic disease.    PET Impression 7/8/2019:  1. FDG avid enlarged lymph nodes in right hilum and lower paratracheal region  of mediastinal, and additional foci of FDG uptake involving L3 vertebral body  and anterior left acetabulum also suspicious for new metastatic disease.    2. Nonspecific FDG uptake associated with right T2 transverse process near  articulation with posterior right second rib as above. Attention to this on  follow-up PET/CT is suggested.    3. Recent compression fracture of L2 vertebral body superior endplate since  04/09/2019, with associated FDG uptake which is inflammatory in nature.    L3 lesion biopsy 8/7/2019:  Adenocarcinoma c/w lung primary  PD-L1: 9%  ALK neg  Ros-1 Neg  EGFR Neg    Family and Social history reviewed and is unchanged from May 21, 2018      ROS:  Review of Systems   Constitutional: Negative for fever.   Respiratory:  Negative for shortness of breath.    Cardiovascular: Negative for chest pain and leg swelling.   Gastrointestinal: Negative for abdominal pain and blood in stool.   Genitourinary: Negative for hematuria.   Skin: Negative for rash.          Current Outpatient Medications:     CHEWABLE VITAMIN C 500 mg Chew, , Disp: , Rfl:     clonazePAM (KLONOPIN) 1 MG tablet, Take one-half to one tablet two to three times daily only as needed for anxiety, Disp: 90 tablet, Rfl: 0    dextroamphetamine-amphetamine 10 mg Tab, Take one tablet PO in the morning, Disp: 30 tablet, Rfl: 0    esomeprazole magnesium (NEXIUM 24HR) 20 mg TbEC, Take 20 mg by mouth once daily. , Disp: , Rfl:     fentaNYL (DURAGESIC) 12 mcg/hr PT72, Place 1 patch onto the skin every 72 hours., Disp: , Rfl:     fluticasone (FLONASE) 50 mcg/actuation nasal spray, 1 spray by Each Nare route once daily., Disp: , Rfl:     ibuprofen (ADVIL,MOTRIN) 800 MG tablet, Take 1 tablet (800 mg total) by mouth 2 (two) times daily as needed for Pain., Disp: 180 tablet, Rfl: 3    Lactobac no.41/Bifidobact no.7 (PROBIOTIC-10 ORAL), Take by mouth., Disp: , Rfl:     lamoTRIgine (LAMICTAL) 100 MG tablet, TAKE 2 TABLETS PO twice daily, Disp: 1 tablet, Rfl: 0    lidocaine-prilocaine (EMLA) cream, , Disp: , Rfl:     LINZESS 145 mcg Cap capsule, Take 1 capsule by mouth daily as needed. , Disp: , Rfl:     meclizine (ANTIVERT) 25 mg tablet, Take 1 tablet (25 mg total) by mouth every 8 (eight) hours as needed for Dizziness., Disp: 90 tablet, Rfl: 3    mirtazapine (REMERON) 30 MG tablet, Take one-half tablet PO nightly, Disp: 90 tablet, Rfl: 0    ondansetron (ZOFRAN-ODT) 8 MG TbDL, Take 8 mg by mouth once., Disp: , Rfl:     senna (SENOKOT) 8.6 mg tablet, Take 1 tablet by mouth once daily., Disp: , Rfl:     sertraline (ZOLOFT) 100 MG tablet, Take two tablets PO in the morning, Disp: 180 tablet, Rfl: 0    venlafaxine (EFFEXOR-XR) 37.5 MG 24 hr capsule, Take 1 capsule (37.5 mg  total) by mouth once daily., Disp: 30 capsule, Rfl: 1    VITAMIN B-12 1000 MCG tablet, , Disp: , Rfl:     VITAMIN E ACETATE ORAL, Take by mouth., Disp: , Rfl:     ZYRTEC 10 mg Cap, continuous prn., Disp: , Rfl:     predniSONE (DELTASONE) 20 MG tablet, Take 1 tablet (20 mg total) by mouth once daily. for 14 days, Disp: 14 tablet, Rfl: 0  No current facility-administered medications for this visit.     Facility-Administered Medications Ordered in Other Visits:     alteplase injection 2 mg, 2 mg, Intra-Catheter, PRN, Segundo Zhang MD    heparin, porcine (PF) 100 unit/mL injection flush 500 Units, 500 Units, Intravenous, PRN, Segundo Zhang MD, 500 Units at 11/05/19 1200    sodium chloride 0.9% 100 mL flush bag, , Intravenous, 1 time in Clinic/HOD, Segundo Zhang MD    sodium chloride 0.9% flush 10 mL, 10 mL, Intravenous, PRN, Segundo Zhang MD, 10 mL at 11/05/19 1200        Objective:       Physical Examination:     BP (!) 150/87   Pulse 101   Temp 98.2 °F (36.8 °C) (Oral)   Resp 18   Wt 68.8 kg (151 lb 11.2 oz)   BMI 25.24 kg/m²     Physical Exam   Constitutional: She appears well-developed and well-nourished.   HENT:   Head: Normocephalic and atraumatic.   Right Ear: External ear normal.   Left Ear: External ear normal.   Mouth/Throat: Oropharynx is clear and moist.   Eyes: Pupils are equal, round, and reactive to light. Conjunctivae are normal.   Neck: No tracheal deviation present. No thyromegaly present.   Cardiovascular: Normal rate, regular rhythm and normal heart sounds.   Pulmonary/Chest: Effort normal and breath sounds normal.   Abdominal: Soft. Bowel sounds are normal. She exhibits no distension and no mass. There is no tenderness.   Musculoskeletal: She exhibits no edema.   Neurological:   Neuro intact througout   Skin: No rash noted.   Psychiatric: She has a normal mood and affect. Her behavior is normal. Judgment and thought content normal.       Labs:   Recent  Results (from the past 336 hour(s))   CBC auto differential    Collection Time: 12/28/19  9:20 AM   Result Value Ref Range    WBC 3.6 (L) 3.8 - 10.8 Thousand/uL    Hemoglobin 11.4 (L) 11.7 - 15.5 g/dL    Hematocrit 35.1 35.0 - 45.0 %    Platelets 221 140 - 400 Thousand/uL     CMP  Sodium   Date Value Ref Range Status   12/28/2019 142 135 - 146 mmol/L Final   10/09/2018 140 134 - 144 mmol/L      Potassium   Date Value Ref Range Status   12/28/2019 4.4 3.5 - 5.3 mmol/L Final     Chloride   Date Value Ref Range Status   12/28/2019 106 98 - 110 mmol/L Final   10/09/2018 105 98 - 110 mmol/L      CO2   Date Value Ref Range Status   12/28/2019 28 20 - 32 mmol/L Final     Glucose   Date Value Ref Range Status   12/28/2019 104 65 - 139 mg/dL Final     Comment:               Non-fasting reference interval        10/09/2018 101 (H) 70 - 99 mg/dL      BUN, Bld   Date Value Ref Range Status   12/28/2019 15 7 - 25 mg/dL Final     Creatinine   Date Value Ref Range Status   12/28/2019 0.78 0.50 - 0.99 mg/dL Final     Comment:     For patients >49 years of age, the reference limit  for Creatinine is approximately 13% higher for people  identified as -American.        10/09/2018 0.80 0.60 - 1.40 mg/dL    11/29/2012 0.7 0.5 - 1.4 mg/dL Final     Calcium   Date Value Ref Range Status   12/28/2019 9.5 8.6 - 10.4 mg/dL Final   11/29/2012 9.0 8.7 - 10.5 mg/dL Final     Total Protein   Date Value Ref Range Status   12/28/2019 6.7 6.1 - 8.1 g/dL Final     Albumin   Date Value Ref Range Status   12/28/2019 3.9 3.6 - 5.1 g/dL Final   10/09/2018 4.1 3.1 - 4.7 g/dL      Total Bilirubin   Date Value Ref Range Status   12/28/2019 0.3 0.2 - 1.2 mg/dL Final     Alkaline Phosphatase   Date Value Ref Range Status   12/28/2019 117 33 - 130 U/L Final     AST   Date Value Ref Range Status   12/28/2019 16 10 - 35 U/L Final     ALT   Date Value Ref Range Status   12/28/2019 12 6 - 29 U/L Final     Anion Gap   Date Value Ref Range Status    08/28/2019 5 (L) 8 - 16 mmol/L Final   11/29/2012 10 5 - 15 meq/L Final     eGFR if    Date Value Ref Range Status   12/28/2019 92 > OR = 60 mL/min/1.73m2 Final     eGFR if non    Date Value Ref Range Status   12/28/2019 80 > OR = 60 mL/min/1.73m2 Final     No results found for: CEA  No results found for: PSA        Assessment/Plan:     Problem List Items Addressed This Visit     Malignant neoplasm of lower lobe of right lung-Adenocarcinoma (Chronic)     Patient is doing ok but pet shows what may be increasing disease.  Given she has only had five therapies, I will continue with immunotherapy and rescan after four more treatments.  We discussed that if there is not stability or improvement then we will have to return to traditional chemotherapy.           Muscle pain     Will try her on prednisone 20mg daily for 14 days to see if this will help.  Will have her back with me in 2 weeks.          Relevant Medications    predniSONE (DELTASONE) 20 MG tablet            Discussion:   High complexity due to intense lab monitoring, high risk medications.   Follow up in about 2 weeks (around 1/22/2020).      Electronically signed by Segundo Lamb

## 2020-01-09 ENCOUNTER — INFUSION (OUTPATIENT)
Dept: INFUSION THERAPY | Facility: HOSPITAL | Age: 65
End: 2020-01-09
Attending: INTERNAL MEDICINE
Payer: MEDICARE

## 2020-01-09 VITALS
HEART RATE: 99 BPM | HEIGHT: 65 IN | SYSTOLIC BLOOD PRESSURE: 164 MMHG | RESPIRATION RATE: 18 BRPM | DIASTOLIC BLOOD PRESSURE: 80 MMHG | WEIGHT: 152 LBS | BODY MASS INDEX: 25.33 KG/M2 | TEMPERATURE: 98 F

## 2020-01-09 DIAGNOSIS — T45.1X5A CHEMOTHERAPY-INDUCED NEUTROPENIA: ICD-10-CM

## 2020-01-09 DIAGNOSIS — Z51.89 ENCOUNTER FOR OTHER SPECIFIED AFTERCARE: Primary | ICD-10-CM

## 2020-01-09 DIAGNOSIS — C34.31 MALIGNANT NEOPLASM OF LOWER LOBE OF RIGHT LUNG: ICD-10-CM

## 2020-01-09 DIAGNOSIS — D70.1 CHEMOTHERAPY-INDUCED NEUTROPENIA: ICD-10-CM

## 2020-01-09 PROCEDURE — A4216 STERILE WATER/SALINE, 10 ML: HCPCS | Performed by: INTERNAL MEDICINE

## 2020-01-09 PROCEDURE — 25000003 PHARM REV CODE 250: Performed by: INTERNAL MEDICINE

## 2020-01-09 PROCEDURE — 96413 CHEMO IV INFUSION 1 HR: CPT

## 2020-01-09 PROCEDURE — 63600175 PHARM REV CODE 636 W HCPCS: Performed by: INTERNAL MEDICINE

## 2020-01-09 RX ORDER — SODIUM CHLORIDE 0.9 % (FLUSH) 0.9 %
10 SYRINGE (ML) INJECTION
Status: DISCONTINUED | OUTPATIENT
Start: 2020-01-09 | End: 2020-01-09 | Stop reason: HOSPADM

## 2020-01-09 RX ORDER — HEPARIN 100 UNIT/ML
500 SYRINGE INTRAVENOUS
Status: DISCONTINUED | OUTPATIENT
Start: 2020-01-09 | End: 2020-01-09 | Stop reason: HOSPADM

## 2020-01-09 RX ADMIN — SODIUM CHLORIDE, PRESERVATIVE FREE 10 ML: 5 INJECTION INTRAVENOUS at 09:01

## 2020-01-09 RX ADMIN — HEPARIN 500 UNITS: 100 SYRINGE at 09:01

## 2020-01-13 ENCOUNTER — OFFICE VISIT (OUTPATIENT)
Dept: PSYCHIATRY | Facility: CLINIC | Age: 65
End: 2020-01-13
Payer: MEDICARE

## 2020-01-13 VITALS
HEART RATE: 103 BPM | DIASTOLIC BLOOD PRESSURE: 76 MMHG | WEIGHT: 150.69 LBS | SYSTOLIC BLOOD PRESSURE: 124 MMHG | HEIGHT: 65 IN | BODY MASS INDEX: 25.11 KG/M2

## 2020-01-13 DIAGNOSIS — F41.0 PANIC DISORDER WITHOUT AGORAPHOBIA: ICD-10-CM

## 2020-01-13 DIAGNOSIS — F41.1 GENERALIZED ANXIETY DISORDER: ICD-10-CM

## 2020-01-13 DIAGNOSIS — F33.1 MDD (MAJOR DEPRESSIVE DISORDER), RECURRENT EPISODE, MODERATE: ICD-10-CM

## 2020-01-13 DIAGNOSIS — F43.10 PTSD (POST-TRAUMATIC STRESS DISORDER): ICD-10-CM

## 2020-01-13 PROCEDURE — 99213 PR OFFICE/OUTPT VISIT, EST, LEVL III, 20-29 MIN: ICD-10-PCS | Mod: HCNC,S$GLB,, | Performed by: PSYCHIATRY & NEUROLOGY

## 2020-01-13 PROCEDURE — 99499 UNLISTED E&M SERVICE: CPT | Mod: HCNC,S$GLB,, | Performed by: PSYCHIATRY & NEUROLOGY

## 2020-01-13 PROCEDURE — 1101F PR PT FALLS ASSESS DOC 0-1 FALLS W/OUT INJ PAST YR: ICD-10-PCS | Mod: HCNC,CPTII,S$GLB, | Performed by: PSYCHIATRY & NEUROLOGY

## 2020-01-13 PROCEDURE — 3078F PR MOST RECENT DIASTOLIC BLOOD PRESSURE < 80 MM HG: ICD-10-PCS | Mod: HCNC,CPTII,S$GLB, | Performed by: PSYCHIATRY & NEUROLOGY

## 2020-01-13 PROCEDURE — 90833 PR PSYCHOTHERAPY W/PATIENT W/E&M, 30 MIN (ADD ON): ICD-10-PCS | Mod: HCNC,S$GLB,, | Performed by: PSYCHIATRY & NEUROLOGY

## 2020-01-13 PROCEDURE — 3078F DIAST BP <80 MM HG: CPT | Mod: HCNC,CPTII,S$GLB, | Performed by: PSYCHIATRY & NEUROLOGY

## 2020-01-13 PROCEDURE — 99499 RISK ADDL DX/OHS AUDIT: ICD-10-PCS | Mod: HCNC,S$GLB,, | Performed by: PSYCHIATRY & NEUROLOGY

## 2020-01-13 PROCEDURE — 99999 PR PBB SHADOW E&M-EST. PATIENT-LVL IV: ICD-10-PCS | Mod: PBBFAC,HCNC,, | Performed by: PSYCHIATRY & NEUROLOGY

## 2020-01-13 PROCEDURE — 90833 PSYTX W PT W E/M 30 MIN: CPT | Mod: HCNC,S$GLB,, | Performed by: PSYCHIATRY & NEUROLOGY

## 2020-01-13 PROCEDURE — 3008F BODY MASS INDEX DOCD: CPT | Mod: HCNC,CPTII,S$GLB, | Performed by: PSYCHIATRY & NEUROLOGY

## 2020-01-13 PROCEDURE — 99999 PR PBB SHADOW E&M-EST. PATIENT-LVL IV: CPT | Mod: PBBFAC,HCNC,, | Performed by: PSYCHIATRY & NEUROLOGY

## 2020-01-13 PROCEDURE — 3008F PR BODY MASS INDEX (BMI) DOCUMENTED: ICD-10-PCS | Mod: HCNC,CPTII,S$GLB, | Performed by: PSYCHIATRY & NEUROLOGY

## 2020-01-13 PROCEDURE — 99213 OFFICE O/P EST LOW 20 MIN: CPT | Mod: HCNC,S$GLB,, | Performed by: PSYCHIATRY & NEUROLOGY

## 2020-01-13 PROCEDURE — 1101F PT FALLS ASSESS-DOCD LE1/YR: CPT | Mod: HCNC,CPTII,S$GLB, | Performed by: PSYCHIATRY & NEUROLOGY

## 2020-01-13 PROCEDURE — 3074F PR MOST RECENT SYSTOLIC BLOOD PRESSURE < 130 MM HG: ICD-10-PCS | Mod: HCNC,CPTII,S$GLB, | Performed by: PSYCHIATRY & NEUROLOGY

## 2020-01-13 PROCEDURE — 3074F SYST BP LT 130 MM HG: CPT | Mod: HCNC,CPTII,S$GLB, | Performed by: PSYCHIATRY & NEUROLOGY

## 2020-01-13 RX ORDER — VENLAFAXINE HYDROCHLORIDE 37.5 MG/1
CAPSULE, EXTENDED RELEASE ORAL
Qty: 1 CAPSULE | Refills: 0
Start: 2020-01-13 | End: 2020-02-06

## 2020-01-13 NOTE — PROGRESS NOTES
Outpatient Psychiatry Follow-Up Visit (MD/NP)    1/13/2020    Clinical Status of Patient:  Outpatient (Ambulatory)    Chief Complaint:  Sheri Broderick is a 64 y.o. female who presents today for follow-up of anxiety, depression.   Met with patient.      Interval History and Content of Current Session:      Interim Events/Subjective Report/Content of Current Session:      65 yo  female presents for follow up appointment for treatment of major depression, generalized anxiety disorder, panic disorder.     Past Psychiatric Hx:   No prior psychiatric hospitalizations   Suicide risk assessment:   Risks: , age, chronic depression with passive SI  Protective: no prior suicide attempts, strong skip, female, no substance abuse or impulsivity, health issues present both not disabling at this time  She is at low to moderate risk of suicide acutely, and moderate risk over long term     Prior meds: Abilify (no benefit), Buspirone (intolerance) Trazodone (intolerance), Paroxetine (intolerance), Quetiapine, Bupropion (intolerance), Fluoxetine (no benefit), Duloxetine Foltx (no longer covered)     Past Medical Hx:  Adenocarcinoma of the lung, metastatic   Osteoporosis   Neuropraxia  HLD  DDD   Fibromyalgia  HTN  Anemia     Interim Hx:   Pt presents for follow up unaccompanied.    Pt has been taking Sertraline previously lowered to 200 mg daily. She is taking Adderall on PRN basis.   Taking Clonazepam increased to BID - TID. She has been also been taking Mirtazapine, Lamictal increased to 200 mg BID. Effexor XR lowered to 37.5 mg daily.   She does feel better with reduction in Effexor.      LCSW at [facility] was helping her with financial resources.  Approved for $ 125 one time salty. Frustrated by the care she is receiving through the cancer center.   Triggering feelings that she does not belong - like when she was working.   She is endorsing fatigue, pain, aching  - immunotherapy x 4, PET scan 1/6/20  "suggestive of increasing metastatic disease.         XRT to L2-L4 and left hip 9/18/2019 - she thought it was helpful for pain, pain is back.         Cough is back again; anxious, heaviness in chest.   Feels Oncologist does not care, no cure for this.  Was prescribed lowest dose Duragesic patch and Hydrocodone for breakthrough pain. She is also taking Prednisone.  Pain is 10/10 at times.   Anxious, foot tapping in appt.   PCP - encouraged to make end of life preparations.   So much is bothering her.      Support system is not there; feels alone. Sister Esme is in contact (18 months older, no time for her).  "She is calling for herself, not me."  Sister Nani - not doing well. 10 yrs older than patient.   Once cat passed Jan 2019, still has one cat.   No motivation, no energy.     Denies suicidal/homicidal ideations.  Denies symptoms of brad/psychosis.   No self harm or violence.   Appetite is not good, but eating junk food when she does eat.   Sleep is disrupted by pain.  Needed a piece of a Clonazepam recently to help her sleep about 4 hrs due to intensity of pain. Sleeping in recliner.     Memory issues - ST and LT long term issues. Memory is not worse since she had chemo  Has not been able to see her therapist whom she reports is also dealing with health issues.    Pt reports she snaps, is irritable,   Roommate Wally is angry at the world.  His words are a knife through her.      Trouble falling or staying asleep, or sleeping too much: (P) More than half the days  Feeling tired or having little energy: (P) Nearly every day  Poor appetite or overeating: (P) Nearly every day  Feeling bad about yourself - or that you are a failure or have let yourself or your family down: (P) More than half the days  Trouble concentrating on things, such as reading the newspaper or watching television: (P) More than half the days  Moving or speaking so slowly that other people could have noticed. Or the opposite - being so " fidgety or restless that you have been moving around a lot more than usual: (P) Several days  Thoughts that you would be better off dead, or of hurting yourself in some way: (P) Several days  PHQ-9 Total Score: (P) 18  If you checked off any problems, how difficult have these problems made it for you to do your work, take care of things at home, or get along with other people?: (P) Extremely dIfficult    GAD7 2020   Feeling nervous, anxious, on edge Nearly everyday   Not being able to stop or control worrying Nearly everyday   Worrying too much about different things Nearly everyday   Trouble relaxing Nearly everyday   Being so restless that its hard to sit still Several days   Becoming easily annoyed or irritable Nearly everyday   Feeling afraid as if something awful might happen Nearly everyday   If you marked you are experiencing any of the aforementioned problems, how difficult have these made it for you to do your work, take care of things at home, or get along with other people? Somewhat difficult   ANISH-7 Score 19       MOCA done :          No fam hx of dementia, however her father  relatively young at 56 yo.      Denies alcohol/drug use. No tobacco. No recent caffeine use.     Psychotherapy:   · Target symptoms: depression, anxiety, coping with pain   · Why chosen therapy is appropriate versus another modality: relevant to diagnosis, patient responds to this modality  · Outcome monitoring methods: self-report, observation  · Therapeutic intervention type: supportive psychotherapy  · Topics discussed/themes: building skills sets for symptom management, symptom recognition  · The patient's response to the intervention is accepting. The patient's progress toward treatment goals is limited progress.  · Duration of intervention: 20 minutes    Review of Systems   · PSYCHIATRIC: Pertinant items are noted in the narrative.  · CONSTITUTIONAL:  Wt stable   · CARDIOVASCULAR: no current chest pain, no  "awareness of palpitations   · NEURO: frequent HA, memory loss   · RESP: intermittent cough   · M/S: 10/10 back pain     Past Medical, Family and Social History: The patient's past medical, family and social history have been reviewed and updated as appropriate within the electronic medical record - see encounter notes.    Psychiatric Medications:   Effexor XR 37.5 mg daily  Sertraline 200 mg in am   Klonopin 1 mg bid - tid prn anxiety- taking piece of tab nightly   Remeron 15 mg QHS   Lamictal 200 mg BID.   Adderall 10 mg in am  - taking PRN     Compliance: yes     Side effects: dry mouth (uses biotene), h/o wt gain    Risk Parameters:  Patient reports no suicidal ideations today   Patient reports no homicidal ideation  Patient reports no self-injurious behavior  Patient reports no violent behavior      Exam (detailed: at least 9 elements; comprehensive: all 15 elements)   Constitutional  Vitals:  Most recent vital signs, dated less than 90 days prior to this appointment, were reviewed.    Se Epic for today's vitals.       General:  age appropriate, casual attire, adequately groomed, good eye contact, appears anxious, not tearful        Musculoskeletal  Muscle Strength/Tone:  No tremor noted today,  + PMA, tapping foot    Gait & Station:  non-ataxic     Psychiatric  Speech:  no latency; no press , spontaneous, talkative, soft spoken    Mood & Affect:  "hurting"   Anxious    Thought Process:  Linear   Associations:  intact   Thought Content:  no active or passive SI today, no homicidality, delusions, or paranoia, hallucinations: (auditory: no currently, visual: no) - none currently       Insight:  Fair   Judgement: Adequate to circumstances   Orientation:  grossly intact. Alert and oriented x 4    Memory: intact for content of interview   Language: grossly intact   Attention Span & Concentration:  able to focus   Fund of Knowledge:  intact and appropriate to age and level of education     Assessment and Diagnosis "   Status/Progress: Based on the examination today, the patient's problem(s) is/are under inadequate control, has been treatment resistant in past.   New problems have not been presented today.   Comorbidities are complicating management of the primary condition.    The working differential for this patient includes Cluster B and C traits, personality disorder, PTSD, MDD, OCD    General Impression :  Pt initially improved with addition of SNRI to SSRI; she has declined atypical antipsychotic augmentation. Adderall does help pt function better, she does not feel it worsens anxiety.  Pt with significant negative cognitive distortions evident throughout interview.  Pt dx with Stage II Adenocarcinoma of the lung and is now s/p treatment.  Support system is limited.  Pt concerned about her cognition, reports recent clumsiiness, word finding issues. Unrevealing Neuro work up.  Pt has metastatic lung cancer, reports she has limited social support.      Major Depressive Disorder, Recurrent, moderate   Panic Disorder without Agoraphobia   Generalized Anxiety Disorder    PTSD  Hx OCD  R/O Cognitive Disorder  Personality Disorder, unspecified, Cluster B and C traits     Hepaitis B, tremors,  osteoporosis, myalgias, arthralgia, poor balance, back pain, bulging disc, fibromyalgia, osteoarthritis, neuropathy, LE fracture, metastatic adenocarcinoma of Lung    GAF: 55   Intervention/Counseling/Treatment Plan   · Medication Management: The risks and benefits of medication were discussed with the patient.  · Counseling provided with patient as follows: importance of compliance with chosen treatment options was emphasized, risks and benefits of treatment options, including medications, were discussed with the patient     1. D/C Effexor XR - monitor for worsening pain   2. Continue Remeron 15 mg QHS   3. Continue Klonopin 1 mg BID - TID prn.  Discussed risk of decreased RT, sedation, addictive potential, and not to mix with alcohol. No  Rx today.  Advised to avoid this medication when she is taking pain meds (risks of resp depression discussed) and she verbalized an understanding. Advised this med can impact cognition.. Advised to obtain Narcan from pharmacy.   4. Continue Lamictal 200 mg BID.  Discussed risks of life threatening SJS, need for compliance.   5. Continue Psychotherapy with Kaye Jennings; advised I can refer to Colleen Bey LCSW if she is having problems scheduling an appts with her current provider.   6. Minimize Adderall 10 mg once in AM.  Monitor BP/HR  - patient instructed to see a PCP regularly because she is on a stimulant. Discussed risks of abuse, insomnia, anxiety, elevated BP, HR, MI, stroke, and sudden death.   Pt reports this med keeps her functioning. Advised not to take if HR is > 100.   7.  Pt instructed to go to ED/911 with thoughts of wanting harm self/others  8. Call to report any worsening of symptoms or problems with the medication  9. Continue Sertraline 200 mg daily.  Target depression, anxiety. Discussed risk of serotonin syndrome, GI upset, headaches       Return to Clinic 4 weeks

## 2020-01-17 ENCOUNTER — TELEPHONE (OUTPATIENT)
Dept: HEMATOLOGY/ONCOLOGY | Facility: CLINIC | Age: 65
End: 2020-01-17

## 2020-01-17 RX ORDER — HYDROCODONE BITARTRATE AND ACETAMINOPHEN 10; 325 MG/1; MG/1
1 TABLET ORAL
COMMUNITY
End: 2020-03-02 | Stop reason: SDUPTHER

## 2020-01-17 RX ORDER — FENTANYL 25 UG/1
1 PATCH TRANSDERMAL
COMMUNITY
End: 2020-03-27

## 2020-01-17 NOTE — TELEPHONE ENCOUNTER
Duragesic 25mcg; Norco 10/325mg given to patient per Dr. Zhang. She called up crying that her pain is out of control. I advised her to go to the [facility] ER. She said she did not think she could get there right now and will wait to send someone to get a increased dose of the pain patch. He also gave her some Norco for breakthrough pain and I asked her to call me back on Monday to let me know how she is feeling. In the mean time she will go to the ER if her pain worsens more.

## 2020-01-20 ENCOUNTER — TELEPHONE (OUTPATIENT)
Dept: HEMATOLOGY/ONCOLOGY | Facility: CLINIC | Age: 65
End: 2020-01-20

## 2020-01-20 NOTE — TELEPHONE ENCOUNTER
Patient called me back this morning. She went up on her Duragesic patch on Friday 1/17/20 to 50 mcq. she called to let me know that she feels better as far as pain goes but that she is still having to take the pain pills as well. She sees Leatha on Thursday this week and will discuss further options with him then. I told her he may need to increase her duragesic again. In the meantime she needs to take dulcolax and miralax daily to help soften her stool as she c/o being constipated with pills.

## 2020-01-22 RX ORDER — HEPARIN 100 UNIT/ML
500 SYRINGE INTRAVENOUS
Status: CANCELLED | OUTPATIENT
Start: 2020-02-01

## 2020-01-22 RX ORDER — SODIUM CHLORIDE 0.9 % (FLUSH) 0.9 %
10 SYRINGE (ML) INJECTION
Status: CANCELLED | OUTPATIENT
Start: 2020-02-01

## 2020-01-23 ENCOUNTER — OFFICE VISIT (OUTPATIENT)
Dept: HEMATOLOGY/ONCOLOGY | Facility: CLINIC | Age: 65
End: 2020-01-23
Payer: MEDICARE

## 2020-01-23 VITALS
WEIGHT: 151.19 LBS | DIASTOLIC BLOOD PRESSURE: 80 MMHG | RESPIRATION RATE: 19 BRPM | SYSTOLIC BLOOD PRESSURE: 169 MMHG | BODY MASS INDEX: 25.16 KG/M2 | TEMPERATURE: 99 F | HEART RATE: 98 BPM

## 2020-01-23 DIAGNOSIS — C34.31 MALIGNANT NEOPLASM OF LOWER LOBE OF RIGHT LUNG: Chronic | ICD-10-CM

## 2020-01-23 PROCEDURE — 3008F PR BODY MASS INDEX (BMI) DOCUMENTED: ICD-10-PCS | Mod: S$GLB,,, | Performed by: INTERNAL MEDICINE

## 2020-01-23 PROCEDURE — 1101F PT FALLS ASSESS-DOCD LE1/YR: CPT | Mod: S$GLB,,, | Performed by: INTERNAL MEDICINE

## 2020-01-23 PROCEDURE — 99214 OFFICE O/P EST MOD 30 MIN: CPT | Mod: S$GLB,,, | Performed by: INTERNAL MEDICINE

## 2020-01-23 PROCEDURE — 1101F PR PT FALLS ASSESS DOC 0-1 FALLS W/OUT INJ PAST YR: ICD-10-PCS | Mod: S$GLB,,, | Performed by: INTERNAL MEDICINE

## 2020-01-23 PROCEDURE — 3008F BODY MASS INDEX DOCD: CPT | Mod: S$GLB,,, | Performed by: INTERNAL MEDICINE

## 2020-01-23 PROCEDURE — 99214 PR OFFICE/OUTPT VISIT, EST, LEVL IV, 30-39 MIN: ICD-10-PCS | Mod: S$GLB,,, | Performed by: INTERNAL MEDICINE

## 2020-01-23 NOTE — PROGRESS NOTES
PROGRESS NOTE    Subjective:       Patient ID: Sheri Broderick is a 64 y.o. female.    Chief Complaint:  No chief complaint on file.  lung cancer, chemo follow up.     History of Present Illness:   Sheri Broderick is a 64 y.o. female who presents for follow up.     Patient is still having significant muscle pain in hips and back pain.  She took prednisone 20mg for 14 days which did not help.  Her patch was increased to 25mcg and this has helped considerably.        XRT to L2-L4 and Left hip completed 9/18/2019    Nivolumab Therapy:  1: 10/22/2019  2: 11/05/2019  3: 11/19/2019  4: 12/3/2019  5: 12/17/2019  6: 12/31/2019-held   6: 1/9/2020  7: 2/1/2020-due    10/23/2019 CBC, CMP unremarkable    Pet impression 1/6/2019:  1.  Status post right lower lumbar ectomy with no focal increased FDG activity in the lungs.    2.  FDG avid mediastinal and right hilar lymph nodes, similar to the previous exam.    3.  Diffuse scattered increased FDG activity in the axial skeleton, with relative interval worsening from the previous exam.    4.  Interval development of a small FDG avid nodule in the left adrenal gland concerning for metastatic disease.    PET Impression 7/8/2019:  1. FDG avid enlarged lymph nodes in right hilum and lower paratracheal region  of mediastinal, and additional foci of FDG uptake involving L3 vertebral body  and anterior left acetabulum also suspicious for new metastatic disease.    2. Nonspecific FDG uptake associated with right T2 transverse process near  articulation with posterior right second rib as above. Attention to this on  follow-up PET/CT is suggested.    3. Recent compression fracture of L2 vertebral body superior endplate since  04/09/2019, with associated FDG uptake which is inflammatory in nature.    L3 lesion biopsy 8/7/2019:  Adenocarcinoma c/w lung primary  PD-L1: 9%  ALK neg  Ros-1 Neg  EGFR Neg    Family and Social history  reviewed and is unchanged from May 21, 2018      ROS:  Review of Systems   Constitutional: Negative for fever.   Respiratory: Negative for shortness of breath.    Cardiovascular: Negative for chest pain and leg swelling.   Gastrointestinal: Negative for abdominal pain and blood in stool.   Genitourinary: Negative for hematuria.   Skin: Negative for rash.          Current Outpatient Medications:     benzonatate (TESSALON PERLES) 100 MG capsule, Take 1 capsule (100 mg total) by mouth every 6 (six) hours as needed for Cough., Disp: 30 capsule, Rfl: 1    CHEWABLE VITAMIN C 500 mg Chew, , Disp: , Rfl:     clonazePAM (KLONOPIN) 1 MG tablet, Take one-half to one tablet two to three times daily only as needed for anxiety, Disp: 90 tablet, Rfl: 0    dextroamphetamine-amphetamine 10 mg Tab, Take one tablet PO in the morning, Disp: 30 tablet, Rfl: 0    esomeprazole magnesium (NEXIUM 24HR) 20 mg TbEC, Take 20 mg by mouth once daily. , Disp: , Rfl:     fentaNYL (DURAGESIC) 12 mcg/hr PT72, Place 1 patch onto the skin every 72 hours., Disp: , Rfl:     fentaNYL (DURAGESIC) 25 mcg/hr, Place 1 patch onto the skin every 72 hours., Disp: , Rfl:     fluticasone (FLONASE) 50 mcg/actuation nasal spray, 1 spray by Each Nare route once daily., Disp: , Rfl:     HYDROcodone-acetaminophen (NORCO)  mg per tablet, Take 1 tablet by mouth every 4 to 6 hours as needed for Pain., Disp: , Rfl:     ibuprofen (ADVIL,MOTRIN) 800 MG tablet, Take 1 tablet (800 mg total) by mouth 2 (two) times daily as needed for Pain., Disp: 180 tablet, Rfl: 3    lamoTRIgine (LAMICTAL) 100 MG tablet, TAKE 2 TABLETS PO twice daily, Disp: 1 tablet, Rfl: 0    lidocaine-prilocaine (EMLA) cream, , Disp: , Rfl:     LINZESS 145 mcg Cap capsule, Take 1 capsule by mouth daily as needed. , Disp: , Rfl:     meclizine (ANTIVERT) 25 mg tablet, Take 1 tablet (25 mg total) by mouth every 8 (eight) hours as needed for Dizziness., Disp: 90 tablet, Rfl: 3     mirtazapine (REMERON) 30 MG tablet, Take one-half tablet PO nightly, Disp: 90 tablet, Rfl: 0    ondansetron (ZOFRAN-ODT) 8 MG TbDL, Take 8 mg by mouth once., Disp: , Rfl:     senna (SENOKOT) 8.6 mg tablet, Take 1 tablet by mouth once daily., Disp: , Rfl:     sertraline (ZOLOFT) 100 MG tablet, Take two tablets PO in the morning, Disp: 180 tablet, Rfl: 0    venlafaxine (EFFEXOR-XR) 37.5 MG 24 hr capsule, Take one capsule every other day until you run out., Disp: 1 capsule, Rfl: 0    VITAMIN B-12 1000 MCG tablet, , Disp: , Rfl:     VITAMIN E ACETATE ORAL, Take by mouth., Disp: , Rfl:     ZYRTEC 10 mg Cap, continuous prn., Disp: , Rfl:   No current facility-administered medications for this visit.         Objective:       Physical Examination:     There were no vitals taken for this visit.    Physical Exam   Constitutional: She appears well-developed and well-nourished.   HENT:   Head: Normocephalic and atraumatic.   Right Ear: External ear normal.   Left Ear: External ear normal.   Mouth/Throat: Oropharynx is clear and moist.   Eyes: Pupils are equal, round, and reactive to light. Conjunctivae are normal.   Neck: No tracheal deviation present. No thyromegaly present.   Cardiovascular: Normal rate, regular rhythm and normal heart sounds.   Pulmonary/Chest: Effort normal and breath sounds normal.   Abdominal: Soft. Bowel sounds are normal. She exhibits no distension and no mass. There is no tenderness.   Musculoskeletal: She exhibits no edema.   Neurological:   Neuro intact througout   Skin: No rash noted.   Psychiatric: She has a normal mood and affect. Her behavior is normal. Judgment and thought content normal.       Labs:   Recent Results (from the past 336 hour(s))   CBC auto differential    Collection Time: 01/21/20 10:00 AM   Result Value Ref Range    WBC 6.8 3.8 - 10.8 Thousand/uL    Hemoglobin 11.8 11.7 - 15.5 g/dL    Hematocrit 35.6 35.0 - 45.0 %    Platelets 261 140 - 400 Thousand/uL     CMP  Sodium    Date Value Ref Range Status   01/21/2020 139 135 - 146 mmol/L Final   10/09/2018 140 134 - 144 mmol/L      Potassium   Date Value Ref Range Status   01/21/2020 4.2 3.5 - 5.3 mmol/L Final     Chloride   Date Value Ref Range Status   01/21/2020 99 98 - 110 mmol/L Final   10/09/2018 105 98 - 110 mmol/L      CO2   Date Value Ref Range Status   01/21/2020 32 20 - 32 mmol/L Final     Glucose   Date Value Ref Range Status   01/21/2020 101 65 - 139 mg/dL Final     Comment:               Non-fasting reference interval        10/09/2018 101 (H) 70 - 99 mg/dL      BUN, Bld   Date Value Ref Range Status   01/21/2020 17 7 - 25 mg/dL Final     Creatinine   Date Value Ref Range Status   01/21/2020 0.88 0.50 - 0.99 mg/dL Final     Comment:     For patients >49 years of age, the reference limit  for Creatinine is approximately 13% higher for people  identified as -American.        10/09/2018 0.80 0.60 - 1.40 mg/dL    11/29/2012 0.7 0.5 - 1.4 mg/dL Final     Calcium   Date Value Ref Range Status   01/21/2020 9.3 8.6 - 10.4 mg/dL Final   11/29/2012 9.0 8.7 - 10.5 mg/dL Final     Total Protein   Date Value Ref Range Status   01/21/2020 6.8 6.1 - 8.1 g/dL Final     Albumin   Date Value Ref Range Status   01/21/2020 4.3 3.6 - 5.1 g/dL Final   10/09/2018 4.1 3.1 - 4.7 g/dL      Total Bilirubin   Date Value Ref Range Status   01/21/2020 0.3 0.2 - 1.2 mg/dL Final     Alkaline Phosphatase   Date Value Ref Range Status   01/21/2020 138 (H) 33 - 130 U/L Final     AST   Date Value Ref Range Status   01/21/2020 14 10 - 35 U/L Final     ALT   Date Value Ref Range Status   01/21/2020 12 6 - 29 U/L Final     Anion Gap   Date Value Ref Range Status   08/28/2019 5 (L) 8 - 16 mmol/L Final   11/29/2012 10 5 - 15 meq/L Final     eGFR if    Date Value Ref Range Status   01/21/2020 80 > OR = 60 mL/min/1.73m2 Final     eGFR if non    Date Value Ref Range Status   01/21/2020 69 > OR = 60 mL/min/1.73m2 Final     No  results found for: CEA  No results found for: PSA        Assessment/Plan:     Problem List Items Addressed This Visit     Malignant neoplasm of lower lobe of right lung-Adenocarcinoma (Chronic)     Patient is doing poorly and is having significant increased pain.  Patch has been increased to 25mcg.  I reviewed the pet images and she appears to have mets in these areas of pain.  I will have rad onc see her again to see if they can further radiate these areas and will plan on changing chemotherapy to carbo/taxol/tecentriq and discussed this today.  Will begin working on this approval while radiation is seeing her.      Continue aggressive pain control.                  Discussion:   High complexity due to intense lab monitoring, high risk medications.   Follow up in about 3 weeks (around 2/13/2020).      Electronically signed by Segundo Lamb

## 2020-01-23 NOTE — ASSESSMENT & PLAN NOTE
Patient is doing poorly and is having significant increased pain.  Patch has been increased to 25mcg.  I reviewed the pet images and she appears to have mets in these areas of pain.  I will have rad onc see her again to see if they can further radiate these areas and will plan on changing chemotherapy to carbo/taxol/tecentriq and discussed this today.  Will begin working on this approval while radiation is seeing her.      Continue aggressive pain control.

## 2020-01-24 ENCOUNTER — OFFICE VISIT (OUTPATIENT)
Dept: RADIATION ONCOLOGY | Facility: CLINIC | Age: 65
End: 2020-01-24
Payer: MEDICARE

## 2020-01-24 ENCOUNTER — DOCUMENTATION ONLY (OUTPATIENT)
Dept: RADIATION ONCOLOGY | Facility: CLINIC | Age: 65
End: 2020-01-24

## 2020-01-24 VITALS
WEIGHT: 151.31 LBS | RESPIRATION RATE: 18 BRPM | HEART RATE: 93 BPM | SYSTOLIC BLOOD PRESSURE: 151 MMHG | OXYGEN SATURATION: 96 % | TEMPERATURE: 98 F | BODY MASS INDEX: 25.18 KG/M2 | DIASTOLIC BLOOD PRESSURE: 79 MMHG

## 2020-01-24 DIAGNOSIS — C34.31 SQUAMOUS CELL CARCINOMA OF BRONCHUS IN RIGHT LOWER LOBE: Primary | ICD-10-CM

## 2020-01-24 PROCEDURE — 3077F SYST BP >= 140 MM HG: CPT | Mod: S$GLB,,, | Performed by: RADIOLOGY

## 2020-01-24 PROCEDURE — 3077F PR MOST RECENT SYSTOLIC BLOOD PRESSURE >= 140 MM HG: ICD-10-PCS | Mod: S$GLB,,, | Performed by: RADIOLOGY

## 2020-01-24 PROCEDURE — 3008F PR BODY MASS INDEX (BMI) DOCUMENTED: ICD-10-PCS | Mod: S$GLB,,, | Performed by: RADIOLOGY

## 2020-01-24 PROCEDURE — 1101F PT FALLS ASSESS-DOCD LE1/YR: CPT | Mod: S$GLB,,, | Performed by: RADIOLOGY

## 2020-01-24 PROCEDURE — 3078F DIAST BP <80 MM HG: CPT | Mod: S$GLB,,, | Performed by: RADIOLOGY

## 2020-01-24 PROCEDURE — 99215 OFFICE O/P EST HI 40 MIN: CPT | Mod: S$GLB,,, | Performed by: RADIOLOGY

## 2020-01-24 PROCEDURE — 3078F PR MOST RECENT DIASTOLIC BLOOD PRESSURE < 80 MM HG: ICD-10-PCS | Mod: S$GLB,,, | Performed by: RADIOLOGY

## 2020-01-24 PROCEDURE — 3008F BODY MASS INDEX DOCD: CPT | Mod: S$GLB,,, | Performed by: RADIOLOGY

## 2020-01-24 PROCEDURE — 99215 PR OFFICE/OUTPT VISIT, EST, LEVL V, 40-54 MIN: ICD-10-PCS | Mod: S$GLB,,, | Performed by: RADIOLOGY

## 2020-01-24 PROCEDURE — 1101F PR PT FALLS ASSESS DOC 0-1 FALLS W/OUT INJ PAST YR: ICD-10-PCS | Mod: S$GLB,,, | Performed by: RADIOLOGY

## 2020-01-24 NOTE — PROGRESS NOTES
Sheri Broderick  [id number]  1955 1/24/2020  No referring provider defined for this encounter.    DIAGNOSIS: Cancer Staging  Malignant neoplasm of lower lobe of right lung-Adenocarcinoma  Staging form: Lung, AJCC 8th Edition  - Clinical: No stage assigned - Unsigned  - Pathologic stage from 5/21/2018: Stage IIB (pT2a, pN1, cM0) - Signed by Segundo Zhang MD on 5/21/2018      TREATMENT SITE(S): T4-7, sacrum    INTENT: PALLIATIVE    TREATMENT SETTING: RT ALONE     MODALITY: PHOTON    TECHNIQUE:  3D CONFORMAL RADIOTHERAPY (3DCRT) with DIODES    IMRT MEDICAL NECESSITY: N/A    I have personally performed treatment planning for the patient, reviewing relevant history/physical and imaging. I have defined GTV, CTV, PTV and organs at risk.     In order to accomplish this plan, I am ordering:  SIMULATION: CT SIM FOR PLACEMENT OF TREATMENT FIELDS    CONTRAST: none    TO ACCOMPLISH REPRODUCIBLE POSITION: VACLOC    DEVICES FOR BEAM SHAPING: CUSTOMIZED MLC    CUSTOMIZED BOLUS: none    IMAGING: DAILY KV/KV OBI    I have ordered a weekly physics check.    SPECIAL PHYSICS CONSULT: YES  REASON: NEAR PRIOR FIELD    SPECIAL TREATMENT CIRCUMSTANCE: YES  Concurrent or recent administration of chemotherapeutic agents which are known potent radiosensitizers and thus will require vigilant monitoring for exaggerated radiation toxicities.    LABS: NONE    ANTICIPATED PRESCRIPTION TO ISOCENTER: 30Gy/10frx    ENERGY: 6/23X    TREATMENT: DAILY    PHYSICIAN: Blaze Santos Jr, MD

## 2020-01-24 NOTE — PROGRESS NOTES
Sheri Broderick  [id number]  1955 1/24/2020  Segundo Zhang Md  [address]  [address]  [place] [address]    REASON FOR CONSULTATION: IV, [id number] adenoca of RLL, PDL-1 9%    TREATMENT GOAL: palliative    HISTORY OF PRESENT ILLNESS:   64F never smoker with incidental chest x-ray with abnormal right lower lobe mass with CT and PET-CT demonstrating a 2.0 x 1.6 cm mass in the right lower lobe with an SUV of 5.3 with a right hilar lymph node, 1.3 cm in size with SUV of 3.1.  No distant disease was appreciated.  Bronchoscopy was nondiagnostic and she proceeded to right lower lobectomy with Dr. Welch on 04/27/2018 that revealed IIB, pT2aN1 adenoca.  She completed 4 cycles of cisplatin and Alimta under the care of Dr. Zhang.  Six month follow-up CT of the chest demonstrated mediastinal lymphadenopathy with short interval PET-CT demonstrating needs to be FDG avid with uptake also appreciated at L3, confirmed by MRI to be a marrow replacing lesion at the vertebral body, biopsy returning metastatic adenocarcinoma.  Original specimen was retested and negative for ALK, ROS and EGFR; PDL-1 was 9%.    She completed palliative radiotherapy, 30 Gy in 10 fractions to L2-4, left hip and acetabulum 9/18/19.  She experience partial pain relief and was started on Opdivo x6 cycles but therapy was held after worsening pain.  Updated PET-CT scan demonstrates stable disease within the mediastinum with interval development of a left adrenal metastasis and worsening of diffuse axial disease including C1-2, T1-T2, T5-6, T8, T11-12, R iliac.    She is referred for consideration of palliative radiotherapy.  Dr. Zhang intends to transition her to carbo Taxol + tecentriq.    Patient denies fever, chills, chest pain, shortness of breath, cough or hemoptysis.   She continues to report pain in the left posterior thorax with extension below the scapula.  She is also complaining of pain in the pelvis with radiation down the  left leg.  She continues with nausea and vertigo.    Review of Systems   Constitutional: Positive for fatigue. Negative for appetite change, chills, fever and unexpected weight change.   HENT:   Negative for lump/mass, mouth sores, sore throat, trouble swallowing and voice change.    Eyes: Negative for eye problems and icterus.   Respiratory: Negative for cough, hemoptysis and shortness of breath.    Cardiovascular: Negative for chest pain and leg swelling.   Gastrointestinal: Positive for nausea. Negative for abdominal pain, constipation, diarrhea and vomiting.   Genitourinary: Negative for dysuria, frequency, hematuria, nocturia and vaginal bleeding.    Musculoskeletal: Positive for arthralgias and back pain. Negative for gait problem, neck pain and neck stiffness.   Neurological: Negative for extremity weakness, gait problem, headaches, numbness and seizures.   Hematological: Negative for adenopathy.     Past Medical History:   Diagnosis Date    Allergy     seasonal    Anemia associated with chemotherapy 7/18/2018    Anxiety 2012    on meds    Arthritis     Asthma 1960    no inhalers    Back pain 2010    PT    Chemotherapy-induced neutropenia 5/21/2018    Dizziness     Essential hypertension 4/11/2019    no meds    Fall 05/2019    fx of lumbar spine, left foot and ankle    Fibromyalgia     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     Fracture of right foot     GERD (gastroesophageal reflux disease) 2015    on meds    Hep B w/o coma 1980    no treatment, hospitalized    Hiatal hernia 2015    on meds    Hyperlipidemia     Major depressive disorder, recurrent episode, in partial remission     Malignant neoplasm of lower lobe of right lung-Adenocarcinoma 5/21/2018    MVP (mitral valve prolapse) 2014    no meds    Myalgia and myositis, unspecified     OCD (obsessive compulsive disorder)     Osteoporosis     Polyneuropathy     SOB (shortness of breath) on exertion 10/2018     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 8/23/2019    Trouble in sleeping     Urinary incontinence      Past Surgical History:   Procedure Laterality Date    BONE BIOPSY N/A 8/5/2019    Procedure: BIOPSY, BONE;  Surgeon: Ashley Diagnostic Provider;  Location: [facility];  Service: General;  Laterality: N/A;    CARPAL TUNNEL RELEASE Bilateral 07/18/2014    EYE SURGERY      RK    FRACTURE SURGERY Right     wrist orif    INSERTION OF TUNNELED CENTRAL VENOUS CATHETER (CVC) WITH SUBCUTANEOUS PORT Left 8/30/2019    Procedure: INSERTION, PORT-A-CATH;  Surgeon: Brant Welch MD;  Location: [facility];  Service: Cardiovascular;  Laterality: Left;    LUNG REMOVAL, PARTIAL  04/27/2018    Dr. Brant Welch    port a cath  06/2018    TUBAL LIGATION       Social History     Socioeconomic History    Marital status:      Spouse name: Not on file    Number of children: Not on file    Years of education: Not on file    Highest education level: Not on file   Occupational History    Not on file   Social Needs    Financial resource strain: Not on file    Food insecurity:     Worry: Not on file     Inability: Not on file    Transportation needs:     Medical: Not on file     Non-medical: Not on file   Tobacco Use    Smoking status: Never Smoker    Smokeless tobacco: Never Used   Substance and Sexual Activity    Alcohol use: No    Drug use: No    Sexual activity: Yes     Birth control/protection: None   Lifestyle    Physical activity:     Days per week: Not on file     Minutes per session: Not on file    Stress: Not on file   Relationships    Social connections:     Talks on phone: Not on file     Gets together: Not on file     Attends [employer] service: Not on file     Active member of club or organization: Not on file     Attends meetings of clubs or organizations: Not on file     Relationship status: Not on file   Other Topics Concern    Caffeine Use Not Asked    Financial Status: Employed Not  Asked    Home situation: homeless Not Asked    Caffeine Use: Frequent Not Asked    Financial Status: Unemployed Not Asked    Legal: Arrest history Not Asked    Caffeine Use: Moderate Not Asked    Financial Status: Disabled Not Asked    Legal: Involved in civil litigation Not Asked    Caffeine Use: Substantial Not Asked    Financial Status: Other Not Asked    Legal: Involved in criminal litigation Not Asked    Patient feels they ought to cut down on drinking/drug use Not Asked    Firearms: Does patient have access to a firearm? Not Asked    Legal: Other Not Asked    Patient annoyed by others criticizing their drinking/drug use Not Asked    Home situation: lives with family Not Asked    Leisure: Time with family Not Asked    Patient has felt bad or guilty about drinking/drug use Not Asked    Home situation: lives alone Not Asked    Leisure: Exercise Not Asked    Patient has had a drink/used drugs as an eye opener in the AM Not Asked    Home situation: lives with friends Not Asked    Leisure: Sports Not Asked    Childhood History: Raised by parents Not Asked    Home situation: lives with significant other Not Asked    Leisure: Fishing Not Asked    Childhood History: Uneventful Not Asked    Home situation: lives with spouse Not Asked    Leisure: Hunting Not Asked    Childhood History: Adopted Not Asked    Home situation: lives in group home Not Asked    Leisure: Movie Watching Not Asked    Childhood History: Early trauma Not Asked    Home situation: lives in shelter Not Asked    Leisure: Shopping Not Asked    Childhood History: Other Not Asked    Home situation: lives in nursing home Not Asked     Service Not Asked    Legal consequences of chemical use Not Asked   Social History Narrative    Not on file     Family History   Problem Relation Age of Onset    Heart disease Mother          to to coma (hypothermia)     Heart disease Father 57        heart attack    Heart  disease Sister         stents    Diabetes Sister     Parkinsonism Sister     Heart disease Brother 45        death at 45 years old due to hearth disease     Diabetes Maternal Grandmother        PRIOR HISTORY OF CHEMOTHERAPY OR RADIOTHERAPY: Please see HPI for patients prior oncologic history.    Medication List with Changes/Refills   Current Medications    BENZONATATE (TESSALON PERLES) 100 MG CAPSULE    Take 1 capsule (100 mg total) by mouth every 6 (six) hours as needed for Cough.    CHEWABLE VITAMIN C 500 MG CHEW        CLONAZEPAM (KLONOPIN) 1 MG TABLET    Take one-half to one tablet two to three times daily only as needed for anxiety    DEXTROAMPHETAMINE-AMPHETAMINE 10 MG TAB    Take one tablet PO in the morning    ESOMEPRAZOLE MAGNESIUM (NEXIUM 24HR) 20 MG TBEC    Take 20 mg by mouth once daily.     FENTANYL (DURAGESIC) 12 MCG/HR PT72    Place 1 patch onto the skin every 72 hours.    FENTANYL (DURAGESIC) 25 MCG/HR    Place 1 patch onto the skin every 72 hours.    FLUTICASONE (FLONASE) 50 MCG/ACTUATION NASAL SPRAY    1 spray by Each Nare route once daily.    HYDROCODONE-ACETAMINOPHEN (NORCO)  MG PER TABLET    Take 1 tablet by mouth every 4 to 6 hours as needed for Pain.    IBUPROFEN (ADVIL,MOTRIN) 800 MG TABLET    Take 1 tablet (800 mg total) by mouth 2 (two) times daily as needed for Pain.    LAMOTRIGINE (LAMICTAL) 100 MG TABLET    TAKE 2 TABLETS PO twice daily    LIDOCAINE-PRILOCAINE (EMLA) CREAM        LINZESS 145 MCG CAP CAPSULE    Take 1 capsule by mouth daily as needed.     MECLIZINE (ANTIVERT) 25 MG TABLET    Take 1 tablet (25 mg total) by mouth every 8 (eight) hours as needed for Dizziness.    MIRTAZAPINE (REMERON) 30 MG TABLET    Take one-half tablet PO nightly    ONDANSETRON (ZOFRAN-ODT) 8 MG TBDL    Take 8 mg by mouth once.    SENNA (SENOKOT) 8.6 MG TABLET    Take 1 tablet by mouth once daily.    SERTRALINE (ZOLOFT) 100 MG TABLET    Take two tablets PO in the morning    VENLAFAXINE  (EFFEXOR-XR) 37.5 MG 24 HR CAPSULE    Take one capsule every other day until you run out.    VITAMIN B-12 1000 MCG TABLET        VITAMIN E ACETATE ORAL    Take by mouth.    ZYRTEC 10 MG CAP    continuous prn.     Review of patient's allergies indicates:   Allergen Reactions    Penicillins Rash     Other reaction(s): Rash    Prolia [denosumab] Other (See Comments)     myalgias    Trazodone Anxiety     Severe anxiety        QUALITY OF LIFE: 70%- Cares for Self: Unable to Carry on Normal Activity or Active Work    Vitals:    01/24/20 0834   BP: (!) 151/79   Pulse: 93   Resp: 18   Temp: 98.1 °F (36.7 °C)   TempSrc: Oral   SpO2: 96%   Weight: 68.6 kg (151 lb 4.8 oz)   PainSc:   7     Body mass index is 25.18 kg/m².    PHYSICAL EXAM:   GENERAL: alert;  Patient is standing during was am and has difficulty sitting secondary to pain  HEAD: normocephalic, atraumatic.  EYES: pupils are equal, round, reactive to light and accommodation. Sclera anicteric. Conjunctiva not injected.   NOSE/THROAT: no nasal erythema or rhinorrhea. Oropharynx pink, without erythema, ulcerations or thrush.   NECK: no cervical motion rigidity; supple with no masses.  CHEST: Patient is speaking comfortably on room air with normal work of breathing without using accessory muscles of respiration.  ABDOMEN: soft, nontender, nondistended.   MUSCULOSKELETAL:   Tender to palpation throughout thoracic spine and left scapula  NEUROLOGIC: cranial nerves II-XII intact bilaterally. Strength 5/5 in bilateral upper and lower extremities. No sensory deficits appreciated.  Normal gait.  LYMPHATIC: no cervical, supraclavicular or axillary adenopathy appreciated bilaterally.   EXTREMITIES: no clubbing, cyanosis, edema.  SKIN: no erythema, rashes, ulcerations noted.     REVIEW OF IMAGING/PATHOLOGY/LABS: Please see HPI. All images reviewed personally by dictating physician.     ASSESSMENT: 64 y.o. female with stage IV, pL3O0V5m adenoca of RLL, PDL-1 9%  Status post  systemic therapy and prior radiotherapy to L2-4 as well as the left hip and acetabulum now with worsening pelvic pain radiating down the left leg as well as midthoracic pain radiating below the left scapula,  refractory to pain medication.  PLAN:  Sheri Broderick   Presents with history as above.  I reviewed her PET-CT scan with her today which demonstrates several FDG avid lesions throughout the skeleton.   I believe we can localize her pain to the T5 lesion which is at the left side of the  Vertebral body at the pedicle and involving the costovertebral junction and also appreciated a large lesion at  T8.  Today I explained our goal is pain relief but I do want to spare her marrow so that she can tolerate further systemic therapy.  Therefore I will restrict our fields to T5-T8.   I have also examined her sacrum which demonstrates bilateral SI disease which is likely causing the pain she is feeling in the pelvis with extension to the left leg.  Previous treatment sites of the lumbar spine and acetabulum have minimal evidence of disease.  I would recommend 30 Gy in 10 fractions to the thoracic field as well as the sacrum delivered concurrently and recommended premedication with Zofran given her nausea sensitivity.  Today we discussed the potential ill effects of therapy and my recommendation for radiotherapy alone without systemic therapy and she voiced understanding.  She understands the goal of treatment is pain relief and local control and that this must be done judiciously again to spare her bone marrow so that she can tolerate systemic therapy.   She would like to proceed.    Given her  Extensive bone metastases,  She may be a candidate for bisphosphonate therapy.   I will carbon copy Dr. Lamb to coordinate care.    I carefully explained the process of simulation and treatment delivery with weekly physician visits.     We discussed the risks and benefits of the above treatment and have gone over in  detail the acute and late toxicities of radiation therapy to the T5-8, sacrum. The patient expressed  understanding and has signed a consent form which is included in the patients chart.     The patient has our contact information and understands that they are free to contact us at any time with questions or concerns regarding radiation therapy.    DISPOSITION: RTC FOR CT SIM    I have personally seen and evaluated this patient. Greater than 50% of this time was spent discussing coordination of care and/or counseling.    PHYSICIAN: Blaze Santos Jr, MD    Thank you for the opportunity to meet and consult with Sheri Broderick.   Please feel free to contact me to discuss the above recommendation further.

## 2020-01-28 DIAGNOSIS — C34.31 MALIGNANT NEOPLASM OF LOWER LOBE OF RIGHT LUNG: Primary | ICD-10-CM

## 2020-01-28 RX ORDER — FENTANYL 50 UG/1
1 PATCH TRANSDERMAL
COMMUNITY
End: 2020-01-28 | Stop reason: SDUPTHER

## 2020-01-28 RX ORDER — FENTANYL 50 UG/1
1 PATCH TRANSDERMAL
Qty: 10 PATCH | Refills: 0 | Status: SHIPPED | OUTPATIENT
Start: 2020-01-28 | End: 2020-02-27 | Stop reason: SDUPTHER

## 2020-01-28 NOTE — TELEPHONE ENCOUNTER
----- Message from Ellen Jones sent at 1/28/2020 10:03 AM CST -----  The patient called and said she is using 2 patches and is still needing to take her pain medications along with them. She said she is out of the 12mg and has been using a 25mg with the 12mg. Please call her back at [phone].        Left message script ready for . Leatha increased her duragesic to 50 mcg.

## 2020-01-28 NOTE — TELEPHONE ENCOUNTER
----- Message from Ellen Jones sent at 1/28/2020 10:03 AM CST -----  The patient called and said she is using 2 patches and is still needing to take her pain medications along with them. She said she is out of the 12mg and has been using a 25mg with the 12mg. Please call her back at [phone].

## 2020-02-06 ENCOUNTER — OFFICE VISIT (OUTPATIENT)
Dept: PSYCHIATRY | Facility: CLINIC | Age: 65
End: 2020-02-06
Payer: MEDICARE

## 2020-02-06 VITALS
BODY MASS INDEX: 25.76 KG/M2 | HEIGHT: 65 IN | SYSTOLIC BLOOD PRESSURE: 132 MMHG | DIASTOLIC BLOOD PRESSURE: 83 MMHG | HEART RATE: 105 BPM | WEIGHT: 154.63 LBS

## 2020-02-06 DIAGNOSIS — F33.1 MDD (MAJOR DEPRESSIVE DISORDER), RECURRENT EPISODE, MODERATE: ICD-10-CM

## 2020-02-06 DIAGNOSIS — F41.0 PANIC DISORDER WITHOUT AGORAPHOBIA: ICD-10-CM

## 2020-02-06 DIAGNOSIS — F43.10 PTSD (POST-TRAUMATIC STRESS DISORDER): ICD-10-CM

## 2020-02-06 DIAGNOSIS — F41.1 GENERALIZED ANXIETY DISORDER: ICD-10-CM

## 2020-02-06 PROCEDURE — 1101F PT FALLS ASSESS-DOCD LE1/YR: CPT | Mod: HCNC,CPTII,S$GLB, | Performed by: PSYCHIATRY & NEUROLOGY

## 2020-02-06 PROCEDURE — 99999 PR PBB SHADOW E&M-EST. PATIENT-LVL III: CPT | Mod: PBBFAC,HCNC,, | Performed by: PSYCHIATRY & NEUROLOGY

## 2020-02-06 PROCEDURE — 99213 OFFICE O/P EST LOW 20 MIN: CPT | Mod: HCNC,S$GLB,, | Performed by: PSYCHIATRY & NEUROLOGY

## 2020-02-06 PROCEDURE — 99999 PR PBB SHADOW E&M-EST. PATIENT-LVL III: ICD-10-PCS | Mod: PBBFAC,HCNC,, | Performed by: PSYCHIATRY & NEUROLOGY

## 2020-02-06 PROCEDURE — 99213 PR OFFICE/OUTPT VISIT, EST, LEVL III, 20-29 MIN: ICD-10-PCS | Mod: HCNC,S$GLB,, | Performed by: PSYCHIATRY & NEUROLOGY

## 2020-02-06 PROCEDURE — 3008F BODY MASS INDEX DOCD: CPT | Mod: HCNC,CPTII,S$GLB, | Performed by: PSYCHIATRY & NEUROLOGY

## 2020-02-06 PROCEDURE — 90833 PSYTX W PT W E/M 30 MIN: CPT | Mod: HCNC,S$GLB,, | Performed by: PSYCHIATRY & NEUROLOGY

## 2020-02-06 PROCEDURE — 3075F SYST BP GE 130 - 139MM HG: CPT | Mod: HCNC,CPTII,S$GLB, | Performed by: PSYCHIATRY & NEUROLOGY

## 2020-02-06 PROCEDURE — 3079F PR MOST RECENT DIASTOLIC BLOOD PRESSURE 80-89 MM HG: ICD-10-PCS | Mod: HCNC,CPTII,S$GLB, | Performed by: PSYCHIATRY & NEUROLOGY

## 2020-02-06 PROCEDURE — 3079F DIAST BP 80-89 MM HG: CPT | Mod: HCNC,CPTII,S$GLB, | Performed by: PSYCHIATRY & NEUROLOGY

## 2020-02-06 PROCEDURE — 1101F PR PT FALLS ASSESS DOC 0-1 FALLS W/OUT INJ PAST YR: ICD-10-PCS | Mod: HCNC,CPTII,S$GLB, | Performed by: PSYCHIATRY & NEUROLOGY

## 2020-02-06 PROCEDURE — 99499 UNLISTED E&M SERVICE: CPT | Mod: HCNC,S$GLB,, | Performed by: PSYCHIATRY & NEUROLOGY

## 2020-02-06 PROCEDURE — 3075F PR MOST RECENT SYSTOLIC BLOOD PRESS GE 130-139MM HG: ICD-10-PCS | Mod: HCNC,CPTII,S$GLB, | Performed by: PSYCHIATRY & NEUROLOGY

## 2020-02-06 PROCEDURE — 90833 PR PSYCHOTHERAPY W/PATIENT W/E&M, 30 MIN (ADD ON): ICD-10-PCS | Mod: HCNC,S$GLB,, | Performed by: PSYCHIATRY & NEUROLOGY

## 2020-02-06 PROCEDURE — 99499 RISK ADDL DX/OHS AUDIT: ICD-10-PCS | Mod: HCNC,S$GLB,, | Performed by: PSYCHIATRY & NEUROLOGY

## 2020-02-06 PROCEDURE — 3008F PR BODY MASS INDEX (BMI) DOCUMENTED: ICD-10-PCS | Mod: HCNC,CPTII,S$GLB, | Performed by: PSYCHIATRY & NEUROLOGY

## 2020-02-06 RX ORDER — MIRTAZAPINE 30 MG/1
TABLET, FILM COATED ORAL
Qty: 90 TABLET | Refills: 0 | Status: SHIPPED | OUTPATIENT
Start: 2020-02-06 | End: 2020-05-12 | Stop reason: SDUPTHER

## 2020-02-06 NOTE — PROGRESS NOTES
Outpatient Psychiatry Follow-Up Visit (MD/NP)    2/6/2020    Clinical Status of Patient:  Outpatient (Ambulatory)    Chief Complaint:  Sheri Broderick is a 64 y.o. female who presents today for follow-up of anxiety, depression.   Met with patient.      Interval History and Content of Current Session:      Interim Events/Subjective Report/Content of Current Session:      63 yo  female presents for follow up appointment for treatment of major depression, generalized anxiety disorder, panic disorder.     Past Psychiatric Hx:   No prior psychiatric hospitalizations   Suicide risk assessment:   Risks: , age, chronic depression with passive SI  Protective: no prior suicide attempts, strong skip, female, no substance abuse or impulsivity, health issues present both not disabling at this time  She is at low to moderate risk of suicide acutely, and moderate risk over long term     Prior meds: Abilify (no benefit), Buspirone (intolerance) Trazodone (intolerance), Paroxetine (intolerance), Quetiapine, Bupropion (intolerance), Fluoxetine (no benefit), Duloxetine Foltx (no longer covered)     Past Medical Hx:  Adenocarcinoma of the lung, metastatic   Osteoporosis   Neuropraxia  HLD  DDD   Fibromyalgia  HTN  Anemia     Interim Hx:   Pt presents for follow up accompanied by roommate Wally who remained in [facility].   Effexor XR d/c in interim.   Pt has been taking Sertraline previously lowered to 200 mg daily. She is taking Adderall on PRN basis.   Taking Clonazepam a few times a week - far less due to needs for pain med. She has been also been taking Mirtazapine, Lamictal increased to 200 mg BID. Effexor XR lowered to 37.5 mg daily.   She does feel better with reduction in Effexor (less irritable).   Stopped taking Adderall    Chart review: Pt seen by Oncologist and Rad Onc in interim.  Extensive bone metastases from adenocarcinoma of lung.  Will have additional XRT and increase in fentanyl patch to 50 mcg is  "helping. Takes hydrocodone for breakthrough pain   Plan for changing chemotherapy to carbo/taxol/tecentriq.     10 treatments of XRT  Very hard time tolerating this.    Has an appointment with Dr Norman 2/13/2020 for second opinion.    Frustrated by lack of clarity/plan.  + wt gain on steroids   "I hurt, it does not matter."   Walks the floors at night, cannot sleep due to pain. Cannot relax, body tense.  Wally has been more supportive.  She has been having panic attacks.  Nauseated.   Denies suicidal/homicidal ideations. Denies symptoms of brad/psychosis.  No self harm or violence.    GAD7 2/6/2020   Feeling nervous, anxious, on edge Nearly everyday   Not being able to stop or control worrying Nearly everyday   Worrying too much about different things Nearly everyday   Trouble relaxing Nearly everyday   Being so restless that its hard to sit still More than half the days   Becoming easily annoyed or irritable Nearly everyday   Feeling afraid as if something awful might happen Nearly everyday   If you marked you are experiencing any of the aforementioned problems, how difficult have these made it for you to do your work, take care of things at home, or get along with other people? Extremely difficult   ANISH-7 Score 20     Trouble falling or staying asleep, or sleeping too much: (P) Nearly every day  Feeling tired or having little energy: (P) Nearly every day  Poor appetite or overeating: (P) Nearly every day  Feeling bad about yourself - or that you are a failure or have let yourself or your family down: (P) Nearly every day  Trouble concentrating on things, such as reading the newspaper or watching television: (P) Nearly every day  Moving or speaking so slowly that other people could have noticed. Or the opposite - being so fidgety or restless that you have been moving around a lot more than usual: (P) Nearly every day  Thoughts that you would be better off dead, or of hurting yourself in some way: (P) Not at " all  PHQ-9 Total Score: (P) 24  If you checked off any problems, how difficult have these problems made it for you to do your work, take care of things at home, or get along with other people?: (P) Extremely dIfficult    MOCA done 12/18:  26/30        Denies alcohol/drug use. No tobacco. No recent caffeine use.     Psychotherapy:   · Target symptoms: depression, anxiety, coping with pain   · Why chosen therapy is appropriate versus another modality: relevant to diagnosis, patient responds to this modality  · Outcome monitoring methods: self-report, observation  · Therapeutic intervention type: supportive psychotherapy  · Topics discussed/themes: building skills sets for symptom management, symptom recognition  · The patient's response to the intervention is accepting. The patient's progress toward treatment goals is limited progress.  · Duration of intervention: 18 minutes    Review of Systems   · PSYCHIATRIC: Pertinant items are noted in the narrative.  · CONSTITUTIONAL:  Wt gain   · CARDIOVASCULAR: no chest pain   · NEURO: memory loss   · RESP: intermittent cough   · M/S: 10/10 back pain     Past Medical, Family and Social History: The patient's past medical, family and social history have been reviewed and updated as appropriate within the electronic medical record - see encounter notes.    Psychiatric Medications:   Sertraline 200 mg in am   Klonopin 1 mg bid - tid prn anxiety- taking prn    Remeron 15 mg QHS   Lamictal 200 mg BID.     Compliance: stopped Adderall.     Side effects: dry mouth, h/o wt gain    Risk Parameters:  Patient reports no suicidal ideations today   Patient reports no homicidal ideation  Patient reports no self-injurious behavior  Patient reports no violent behavior      Exam (detailed: at least 9 elements; comprehensive: all 15 elements)   Constitutional  Vitals:  Most recent vital signs, dated less than 90 days prior to this appointment, were reviewed.    Se Epic for today's vitals.      "  General:  age appropriate, casual attire, adequately groomed, good eye contact, appears anxious, uncomfortable         Musculoskeletal  Muscle Strength/Tone:  No tremor noted today   Gait & Station:  non-ataxic     Psychiatric  Speech:  no latency; no press , spontaneous, talkative, soft spoken    Mood & Affect:  "hurting"   Anxious    Thought Process:  Linear   Associations:  intact   Thought Content:  no active or passive SI today, no homicidality, delusions, or paranoia, hallucinations: (auditory: no currently, visual: no)       Insight:  Reasonable    Judgement: Adequate to circumstances   Orientation:  grossly intact. Alert and oriented x 4    Memory: intact for content of interview   Language: grossly intact   Attention Span & Concentration:  able to focus   Fund of Knowledge:  intact and appropriate to age and level of education     Assessment and Diagnosis   Status/Progress: Based on the examination today, the patient's problem(s) is/are under inadequate control, has been treatment resistant in past.   New problems have not been presented today.   Comorbidities are complicating management of the primary condition.    The working differential for this patient includes Cluster B and C traits, personality disorder, PTSD, MDD, OCD    General Impression :  Pt initially improved with addition of SNRI to SSRI; she has declined atypical antipsychotic augmentation. Adderall does help pt function better, she does not feel it worsens anxiety.  Pt with significant negative cognitive distortions evident throughout interview.  Pt dx with Stage II Adenocarcinoma of the lung and is now s/p treatment.  Support system is limited.  Pt concerned about her cognition, reports recent clumsiiness, word finding issues. Unrevealing Neuro work up.  Pt has metastatic lung cancer, reports she has limited social support.  Undergoing palliative XRT. Less irritable since Effexor was d/c,  Pain has been uncontrolled.     Major Depressive " Disorder, Recurrent, moderate   Panic Disorder without Agoraphobia   Generalized Anxiety Disorder    PTSD  Hx OCD  R/O Cognitive Disorder  Personality Disorder, unspecified, Cluster B and C traits     Hepaitis B, tremors,  osteoporosis, myalgias, arthralgia, poor balance, back pain, bulging disc, fibromyalgia, osteoarthritis, neuropathy, LE fracture, metastatic adenocarcinoma of Lung    GAF: 55   Intervention/Counseling/Treatment Plan   · Medication Management: The risks and benefits of medication were discussed with the patient.  · Counseling provided with patient as follows: importance of compliance with chosen treatment options was emphasized, risks and benefits of treatment options, including medications, were discussed with the patient     1. Continue Sertraline 200 mg daily   2. Increase Remeron to 30 mg QHS to target mood, anxiety, sleep. Monitor for oversedation.   3. Minimize Klonopin 1 mg BID - TID prn.  Discussed risk of decreased RT, sedation, addictive potential, and not to mix with alcohol. No Rx today.  Advised to avoid this medication when she is taking pain meds (risks of resp depression discussed) and she verbalized an understanding. Advised this med can impact cognition.. Advised to obtain Narcan from pharmacy.   4. Continue Lamictal 200 mg BID.  Discussed risks of life threatening SJS, need for compliance.   5. Continue Psychotherapy with Kaye Jennings; advised I can refer to Colleen Bey LCSW if she is having problems scheduling an appts with her current provider.   6. D/C Adderall 10 mg  7.  Pt instructed to go to ED/911 with thoughts of wanting harm self/others  8. Call to report any worsening of symptoms or problems with the medication    Return to Clinic 6 weeks

## 2020-02-12 ENCOUNTER — OFFICE VISIT (OUTPATIENT)
Dept: HEMATOLOGY/ONCOLOGY | Facility: CLINIC | Age: 65
End: 2020-02-12
Payer: MEDICARE

## 2020-02-12 DIAGNOSIS — C34.31 MALIGNANT NEOPLASM OF LOWER LOBE OF RIGHT LUNG: Chronic | ICD-10-CM

## 2020-02-12 DIAGNOSIS — M62.838 MUSCLE SPASM: Primary | ICD-10-CM

## 2020-02-12 DIAGNOSIS — G89.3 CANCER ASSOCIATED PAIN: ICD-10-CM

## 2020-02-12 DIAGNOSIS — C34.31 MALIGNANT NEOPLASM OF LOWER LOBE OF RIGHT LUNG: Primary | ICD-10-CM

## 2020-02-12 PROCEDURE — 1101F PT FALLS ASSESS-DOCD LE1/YR: CPT | Mod: S$GLB,,, | Performed by: INTERNAL MEDICINE

## 2020-02-12 PROCEDURE — 99214 OFFICE O/P EST MOD 30 MIN: CPT | Mod: S$GLB,,, | Performed by: INTERNAL MEDICINE

## 2020-02-12 PROCEDURE — 99214 PR OFFICE/OUTPT VISIT, EST, LEVL IV, 30-39 MIN: ICD-10-PCS | Mod: S$GLB,,, | Performed by: INTERNAL MEDICINE

## 2020-02-12 PROCEDURE — 1101F PR PT FALLS ASSESS DOC 0-1 FALLS W/OUT INJ PAST YR: ICD-10-PCS | Mod: S$GLB,,, | Performed by: INTERNAL MEDICINE

## 2020-02-12 RX ORDER — CARISOPRODOL 350 MG/1
350 TABLET ORAL 3 TIMES DAILY PRN
Qty: 40 TABLET | Refills: 0 | Status: SHIPPED | OUTPATIENT
Start: 2020-02-12 | End: 2020-02-22

## 2020-02-12 RX ORDER — PROMETHAZINE HYDROCHLORIDE 25 MG/1
25 TABLET ORAL EVERY 6 HOURS PRN
COMMUNITY
End: 2020-02-12 | Stop reason: SDUPTHER

## 2020-02-12 RX ORDER — PROMETHAZINE HYDROCHLORIDE 25 MG/1
25 TABLET ORAL EVERY 6 HOURS PRN
Qty: 30 TABLET | Refills: 6 | Status: SHIPPED | OUTPATIENT
Start: 2020-02-12 | End: 2021-04-06 | Stop reason: ALTCHOICE

## 2020-02-12 RX ORDER — ONDANSETRON 8 MG/1
8 TABLET, ORALLY DISINTEGRATING ORAL ONCE
Qty: 30 TABLET | Refills: 6 | Status: SHIPPED | OUTPATIENT
Start: 2020-02-12 | End: 2020-02-13 | Stop reason: CLARIF

## 2020-02-12 NOTE — ASSESSMENT & PLAN NOTE
This has improved with radiation therapy and patient remains on Fentanyl 50mcg and Norco 10mg, usually at bedtime.  Will place her on muscle relaxer as she states she does have some spasm like pain issues.  Will try soma and instructed her to use this approx twice daily.

## 2020-02-12 NOTE — PROGRESS NOTES
PROGRESS NOTE    Subjective:       Patient ID: Sheri Broderick is a 64 y.o. female.    S/P RLL Lobectomy: 4/27/2018  2.4cm adenocarcinoma  1/4 LNs pos for cancer(station 10)  Completed Cis/Alimta x 4 8/10/2018    XRT to L2-L4 and left hip 9/18/2019    Recurrence biopsy proven L3 8/7/2019  L3 lesion biopsy 8/7/2019:  Adenocarcinoma c/w lung primary  PD-L1: 9%  ALK neg  Ros-1 Neg  EGFR Neg    Nivolumab therapy x 6  10/22/2019-1/9/2020--progression    Radiation:  Began 2/4/2020(T5-T8--due to pain)      Chief Complaint:  No chief complaint on file.  lung cancer, chemo follow up.     History of Present Illness:   Sheri Broderick is a 64 y.o. female who presents for follow up.  Patient has continued complaints of fatigue and pain.        Fentanyl 50mcg  Norco 10mg    To Rad/onc: radiation to T5-T8    XRT to L2-L4 and Left hip completed 9/18/2019    10/23/2019 CBC, CMP unremarkable    Pet impression 1/6/2019:  1.  Status post right lower lumbar ectomy with no focal increased FDG activity in the lungs.    2.  FDG avid mediastinal and right hilar lymph nodes, similar to the previous exam.    3.  Diffuse scattered increased FDG activity in the axial skeleton, with relative interval worsening from the previous exam.    4.  Interval development of a small FDG avid nodule in the left adrenal gland concerning for metastatic disease.    PET Impression 7/8/2019:  1. FDG avid enlarged lymph nodes in right hilum and lower paratracheal region  of mediastinal, and additional foci of FDG uptake involving L3 vertebral body  and anterior left acetabulum also suspicious for new metastatic disease.    2. Nonspecific FDG uptake associated with right T2 transverse process near  articulation with posterior right second rib as above. Attention to this on  follow-up PET/CT is suggested.    3. Recent compression fracture of L2 vertebral body superior endplate since  04/09/2019, with  associated FDG uptake which is inflammatory in nature.    L3 lesion biopsy 8/7/2019:  Adenocarcinoma c/w lung primary  PD-L1: 9%  ALK neg  Ros-1 Neg  EGFR Neg    Family and Social history reviewed and is unchanged from May 21, 2018      ROS:  Review of Systems   Constitutional: Negative for fever.   Respiratory: Negative for shortness of breath.    Cardiovascular: Negative for chest pain and leg swelling.   Gastrointestinal: Negative for abdominal pain and blood in stool.   Genitourinary: Negative for hematuria.   Skin: Negative for rash.          Current Outpatient Medications:     benzonatate (TESSALON PERLES) 100 MG capsule, Take 1 capsule (100 mg total) by mouth every 6 (six) hours as needed for Cough., Disp: 30 capsule, Rfl: 1    carisoprodoL (SOMA) 350 MG tablet, Take 1 tablet (350 mg total) by mouth 3 (three) times daily as needed for Muscle spasms., Disp: 40 tablet, Rfl: 0    CHEWABLE VITAMIN C 500 mg Chew, , Disp: , Rfl:     clonazePAM (KLONOPIN) 1 MG tablet, Take one-half to one tablet two to three times daily only as needed for anxiety, Disp: 90 tablet, Rfl: 0    dextroamphetamine-amphetamine 10 mg Tab, Take one tablet PO in the morning, Disp: 30 tablet, Rfl: 0    esomeprazole magnesium (NEXIUM 24HR) 20 mg TbEC, Take 20 mg by mouth once daily. , Disp: , Rfl:     fentaNYL (DURAGESIC) 12 mcg/hr PT72, Place 1 patch onto the skin every 72 hours., Disp: , Rfl:     fentaNYL (DURAGESIC) 25 mcg/hr, Place 1 patch onto the skin every 72 hours., Disp: , Rfl:     fentaNYL (DURAGESIC) 50 mcg/hr, Place 1 patch onto the skin every 72 hours., Disp: 10 patch, Rfl: 0    fluticasone (FLONASE) 50 mcg/actuation nasal spray, 1 spray by Each Nare route once daily., Disp: , Rfl:     HYDROcodone-acetaminophen (NORCO)  mg per tablet, Take 1 tablet by mouth every 4 to 6 hours as needed for Pain., Disp: , Rfl:     ibuprofen (ADVIL,MOTRIN) 800 MG tablet, Take 1 tablet (800 mg total) by mouth 2 (two) times daily as  needed for Pain., Disp: 180 tablet, Rfl: 3    lamoTRIgine (LAMICTAL) 100 MG tablet, TAKE 2 TABLETS PO twice daily, Disp: 1 tablet, Rfl: 0    lidocaine-prilocaine (EMLA) cream, , Disp: , Rfl:     meclizine (ANTIVERT) 25 mg tablet, Take 1 tablet (25 mg total) by mouth every 8 (eight) hours as needed for Dizziness., Disp: 90 tablet, Rfl: 3    mirtazapine (REMERON) 30 MG tablet, Take one tablet PO nightly, Disp: 90 tablet, Rfl: 0    ondansetron (ZOFRAN-ODT) 8 MG TbDL, Take 8 mg by mouth every 8 (eight) hours as needed., Disp: , Rfl:     promethazine (PHENERGAN) 25 MG tablet, Take 1 tablet (25 mg total) by mouth every 6 (six) hours as needed for Nausea., Disp: 30 tablet, Rfl: 6    senna (SENOKOT) 8.6 mg tablet, Take 1 tablet by mouth once daily., Disp: , Rfl:     sertraline (ZOLOFT) 100 MG tablet, Take two tablets PO in the morning, Disp: 180 tablet, Rfl: 0    VITAMIN B-12 1000 MCG tablet, , Disp: , Rfl:     VITAMIN E ACETATE ORAL, Take by mouth., Disp: , Rfl:     ZYRTEC 10 mg Cap, continuous prn., Disp: , Rfl:         Objective:       Physical Examination:     There were no vitals taken for this visit.    Physical Exam   Constitutional: She appears well-developed and well-nourished.   HENT:   Head: Normocephalic and atraumatic.   Right Ear: External ear normal.   Left Ear: External ear normal.   Mouth/Throat: Oropharynx is clear and moist.   Eyes: Pupils are equal, round, and reactive to light. Conjunctivae are normal.   Neck: No tracheal deviation present. No thyromegaly present.   Cardiovascular: Normal rate, regular rhythm and normal heart sounds.   Pulmonary/Chest: Effort normal and breath sounds normal.   Abdominal: Soft. Bowel sounds are normal. She exhibits no distension and no mass. There is no tenderness.   Musculoskeletal: She exhibits no edema.   Neurological:   Neuro intact througout   Skin: No rash noted.   Psychiatric: She has a normal mood and affect. Her behavior is normal. Judgment and  thought content normal.       Labs:   No results found for this or any previous visit (from the past 336 hour(s)).  CMP  Sodium   Date Value Ref Range Status   01/21/2020 139 135 - 146 mmol/L Final   10/09/2018 140 134 - 144 mmol/L      Potassium   Date Value Ref Range Status   01/21/2020 4.2 3.5 - 5.3 mmol/L Final     Chloride   Date Value Ref Range Status   01/21/2020 99 98 - 110 mmol/L Final   10/09/2018 105 98 - 110 mmol/L      CO2   Date Value Ref Range Status   01/21/2020 32 20 - 32 mmol/L Final     Glucose   Date Value Ref Range Status   01/21/2020 101 65 - 139 mg/dL Final     Comment:               Non-fasting reference interval        10/09/2018 101 (H) 70 - 99 mg/dL      BUN, Bld   Date Value Ref Range Status   01/21/2020 17 7 - 25 mg/dL Final     Creatinine   Date Value Ref Range Status   01/21/2020 0.88 0.50 - 0.99 mg/dL Final     Comment:     For patients >49 years of age, the reference limit  for Creatinine is approximately 13% higher for people  identified as -American.        10/09/2018 0.80 0.60 - 1.40 mg/dL    11/29/2012 0.7 0.5 - 1.4 mg/dL Final     Calcium   Date Value Ref Range Status   01/21/2020 9.3 8.6 - 10.4 mg/dL Final   11/29/2012 9.0 8.7 - 10.5 mg/dL Final     Total Protein   Date Value Ref Range Status   01/21/2020 6.8 6.1 - 8.1 g/dL Final     Albumin   Date Value Ref Range Status   01/21/2020 4.3 3.6 - 5.1 g/dL Final   10/09/2018 4.1 3.1 - 4.7 g/dL      Total Bilirubin   Date Value Ref Range Status   01/21/2020 0.3 0.2 - 1.2 mg/dL Final     Alkaline Phosphatase   Date Value Ref Range Status   01/21/2020 138 (H) 33 - 130 U/L Final     AST   Date Value Ref Range Status   01/21/2020 14 10 - 35 U/L Final     ALT   Date Value Ref Range Status   01/21/2020 12 6 - 29 U/L Final     Anion Gap   Date Value Ref Range Status   08/28/2019 5 (L) 8 - 16 mmol/L Final   11/29/2012 10 5 - 15 meq/L Final     eGFR if    Date Value Ref Range Status   01/21/2020 80 > OR = 60  mL/min/1.73m2 Final     eGFR if non    Date Value Ref Range Status   01/21/2020 69 > OR = 60 mL/min/1.73m2 Final     No results found for: CEA  No results found for: PSA        Assessment/Plan:     Problem List Items Addressed This Visit     Malignant neoplasm of lower lobe of right lung-Adenocarcinoma (Chronic)     Tecentriq regimen has been refused by insurance company.  I discussed the use of single agent Taxotere and reviewed the risks and benefits.  She is in agreement with this approach and will plan to start March 2, 2020.  She is to complete radiation then want to give her some time to avoid any radiation recall issues.           Muscle spasm - Primary    Relevant Medications    carisoprodoL (SOMA) 350 MG tablet    Cancer associated pain     This has improved with radiation therapy and patient remains on Fentanyl 50mcg and Norco 10mg, usually at bedtime.  Will place her on muscle relaxer as she states she does have some spasm like pain issues.  Will try soma and instructed her to use this approx twice daily.          Relevant Medications    carisoprodoL (SOMA) 350 MG tablet            Discussion:   High complexity due to intense lab monitoring, high risk medications.   Follow up in about 3 weeks (around 3/4/2020).      Electronically signed by Segundo Lamb

## 2020-02-13 ENCOUNTER — TELEPHONE (OUTPATIENT)
Dept: HEMATOLOGY/ONCOLOGY | Facility: CLINIC | Age: 65
End: 2020-02-13

## 2020-02-13 RX ORDER — ONDANSETRON 8 MG/1
8 TABLET, ORALLY DISINTEGRATING ORAL EVERY 8 HOURS PRN
COMMUNITY
End: 2020-05-11 | Stop reason: SDUPTHER

## 2020-02-17 NOTE — ASSESSMENT & PLAN NOTE
Tecentriq regimen has been refused by insurance company.  I discussed the use of single agent Taxotere and reviewed the risks and benefits.  She is in agreement with this approach and will plan to start March 2, 2020.  She is to complete radiation then want to give her some time to avoid any radiation recall issues.

## 2020-02-19 ENCOUNTER — OFFICE VISIT (OUTPATIENT)
Dept: HEMATOLOGY/ONCOLOGY | Facility: CLINIC | Age: 65
End: 2020-02-19
Payer: MEDICARE

## 2020-02-19 ENCOUNTER — PATIENT MESSAGE (OUTPATIENT)
Dept: PSYCHIATRY | Facility: CLINIC | Age: 65
End: 2020-02-19

## 2020-02-19 ENCOUNTER — PATIENT MESSAGE (OUTPATIENT)
Dept: HEMATOLOGY/ONCOLOGY | Facility: CLINIC | Age: 65
End: 2020-02-19

## 2020-02-19 ENCOUNTER — INFUSION (OUTPATIENT)
Dept: INFUSION THERAPY | Facility: HOSPITAL | Age: 65
End: 2020-02-19
Attending: NURSE PRACTITIONER
Payer: MEDICARE

## 2020-02-19 VITALS
HEART RATE: 103 BPM | HEIGHT: 65 IN | SYSTOLIC BLOOD PRESSURE: 138 MMHG | TEMPERATURE: 99 F | RESPIRATION RATE: 18 BRPM | DIASTOLIC BLOOD PRESSURE: 84 MMHG | BODY MASS INDEX: 25.16 KG/M2 | WEIGHT: 151 LBS

## 2020-02-19 VITALS
BODY MASS INDEX: 25.11 KG/M2 | TEMPERATURE: 99 F | HEART RATE: 108 BPM | DIASTOLIC BLOOD PRESSURE: 79 MMHG | SYSTOLIC BLOOD PRESSURE: 140 MMHG | RESPIRATION RATE: 19 BRPM | WEIGHT: 150.88 LBS

## 2020-02-19 DIAGNOSIS — R11.0 NAUSEA: ICD-10-CM

## 2020-02-19 DIAGNOSIS — C34.31 MALIGNANT NEOPLASM OF LOWER LOBE OF RIGHT LUNG: Primary | ICD-10-CM

## 2020-02-19 DIAGNOSIS — E86.0 DEHYDRATION: ICD-10-CM

## 2020-02-19 DIAGNOSIS — K21.9 GASTROESOPHAGEAL REFLUX DISEASE, ESOPHAGITIS PRESENCE NOT SPECIFIED: ICD-10-CM

## 2020-02-19 DIAGNOSIS — E86.0 DEHYDRATION: Primary | ICD-10-CM

## 2020-02-19 PROCEDURE — 3077F SYST BP >= 140 MM HG: CPT | Mod: S$GLB,,, | Performed by: NURSE PRACTITIONER

## 2020-02-19 PROCEDURE — 99214 PR OFFICE/OUTPT VISIT, EST, LEVL IV, 30-39 MIN: ICD-10-PCS | Mod: S$GLB,,, | Performed by: NURSE PRACTITIONER

## 2020-02-19 PROCEDURE — 3078F DIAST BP <80 MM HG: CPT | Mod: S$GLB,,, | Performed by: NURSE PRACTITIONER

## 2020-02-19 PROCEDURE — 99214 OFFICE O/P EST MOD 30 MIN: CPT | Mod: S$GLB,,, | Performed by: NURSE PRACTITIONER

## 2020-02-19 PROCEDURE — A4216 STERILE WATER/SALINE, 10 ML: HCPCS | Performed by: NURSE PRACTITIONER

## 2020-02-19 PROCEDURE — 96365 THER/PROPH/DIAG IV INF INIT: CPT

## 2020-02-19 PROCEDURE — 3078F PR MOST RECENT DIASTOLIC BLOOD PRESSURE < 80 MM HG: ICD-10-PCS | Mod: S$GLB,,, | Performed by: NURSE PRACTITIONER

## 2020-02-19 PROCEDURE — 63600175 PHARM REV CODE 636 W HCPCS: Performed by: NURSE PRACTITIONER

## 2020-02-19 PROCEDURE — 1101F PR PT FALLS ASSESS DOC 0-1 FALLS W/OUT INJ PAST YR: ICD-10-PCS | Mod: S$GLB,,, | Performed by: NURSE PRACTITIONER

## 2020-02-19 PROCEDURE — 3077F PR MOST RECENT SYSTOLIC BLOOD PRESSURE >= 140 MM HG: ICD-10-PCS | Mod: S$GLB,,, | Performed by: NURSE PRACTITIONER

## 2020-02-19 PROCEDURE — 25000003 PHARM REV CODE 250: Performed by: NURSE PRACTITIONER

## 2020-02-19 PROCEDURE — S0028 INJECTION, FAMOTIDINE, 20 MG: HCPCS | Performed by: NURSE PRACTITIONER

## 2020-02-19 PROCEDURE — 1101F PT FALLS ASSESS-DOCD LE1/YR: CPT | Mod: S$GLB,,, | Performed by: NURSE PRACTITIONER

## 2020-02-19 PROCEDURE — 3008F PR BODY MASS INDEX (BMI) DOCUMENTED: ICD-10-PCS | Mod: S$GLB,,, | Performed by: NURSE PRACTITIONER

## 2020-02-19 PROCEDURE — 96375 TX/PRO/DX INJ NEW DRUG ADDON: CPT

## 2020-02-19 PROCEDURE — 3008F BODY MASS INDEX DOCD: CPT | Mod: S$GLB,,, | Performed by: NURSE PRACTITIONER

## 2020-02-19 PROCEDURE — 96361 HYDRATE IV INFUSION ADD-ON: CPT

## 2020-02-19 RX ORDER — FAMOTIDINE 10 MG/ML
20 INJECTION INTRAVENOUS
Status: CANCELLED
Start: 2020-02-19

## 2020-02-19 RX ORDER — LORAZEPAM 0.5 MG/1
0.5 TABLET ORAL 2 TIMES DAILY
Qty: 60 TABLET | Refills: 0 | Status: SHIPPED | OUTPATIENT
Start: 2020-02-19 | End: 2020-03-02

## 2020-02-19 RX ORDER — FAMOTIDINE 10 MG/ML
20 INJECTION INTRAVENOUS
Status: COMPLETED | OUTPATIENT
Start: 2020-02-19 | End: 2020-02-19

## 2020-02-19 RX ORDER — LAMOTRIGINE 200 MG/1
200 TABLET ORAL 2 TIMES DAILY
Qty: 180 TABLET | Refills: 0 | Status: SHIPPED | OUTPATIENT
Start: 2020-02-19 | End: 2020-05-12 | Stop reason: SDUPTHER

## 2020-02-19 RX ORDER — LAMOTRIGINE 100 MG/1
TABLET ORAL
Qty: 360 TABLET | Refills: 0 | Status: CANCELLED | OUTPATIENT
Start: 2020-02-19

## 2020-02-19 RX ORDER — HEPARIN 100 UNIT/ML
500 SYRINGE INTRAVENOUS
Status: DISCONTINUED | OUTPATIENT
Start: 2020-02-19 | End: 2020-02-19 | Stop reason: HOSPADM

## 2020-02-19 RX ORDER — ONDANSETRON HCL IN 0.9 % NACL 8 MG/50 ML
8 INTRAVENOUS SOLUTION, PIGGYBACK (ML) INTRAVENOUS
Status: COMPLETED | OUTPATIENT
Start: 2020-02-19 | End: 2020-02-19

## 2020-02-19 RX ORDER — FAMOTIDINE 20 MG/1
20 TABLET, FILM COATED ORAL 2 TIMES DAILY
Qty: 60 TABLET | Refills: 1 | Status: SHIPPED | OUTPATIENT
Start: 2020-02-19 | End: 2020-03-02 | Stop reason: SDUPTHER

## 2020-02-19 RX ORDER — SODIUM CHLORIDE 0.9 % (FLUSH) 0.9 %
10 SYRINGE (ML) INJECTION
Status: CANCELLED | OUTPATIENT
Start: 2020-02-19

## 2020-02-19 RX ORDER — SODIUM CHLORIDE 0.9 % (FLUSH) 0.9 %
10 SYRINGE (ML) INJECTION
Status: DISCONTINUED | OUTPATIENT
Start: 2020-02-19 | End: 2020-02-19 | Stop reason: HOSPADM

## 2020-02-19 RX ORDER — HEPARIN 100 UNIT/ML
500 SYRINGE INTRAVENOUS
Status: CANCELLED | OUTPATIENT
Start: 2020-02-19

## 2020-02-19 RX ADMIN — SODIUM CHLORIDE, PRESERVATIVE FREE 10 ML: 5 INJECTION INTRAVENOUS at 04:02

## 2020-02-19 RX ADMIN — HEPARIN 500 UNITS: 100 SYRINGE at 04:02

## 2020-02-19 RX ADMIN — SODIUM CHLORIDE 500 ML: 0.9 INJECTION, SOLUTION INTRAVENOUS at 03:02

## 2020-02-19 RX ADMIN — FAMOTIDINE 20 MG: 10 INJECTION, SOLUTION INTRAVENOUS at 03:02

## 2020-02-19 RX ADMIN — ONDANSETRON 8 MG: 2 INJECTION INTRAMUSCULAR; INTRAVENOUS at 03:02

## 2020-02-19 NOTE — TELEPHONE ENCOUNTER
Please advise pt that I filled lamotrigine as a 200 mg tab - take one tab twice daily.  Thank you

## 2020-02-19 NOTE — TELEPHONE ENCOUNTER
Pt is requesting medication refill on Lamotrigine 100 mg   Last refill: 9/24/19  Last visit: 2/6/20  Follow Up: 3/20/20

## 2020-02-19 NOTE — PROGRESS NOTES
[facility] HEM/ONC PROGRESS NOTE      Subjective:       Patient ID:   Sheri Broderick  64 y.o. female.  1955    Chief Complaint: Nausea/Abd Pain    HPI     63 yo female with Met Lung Cancer returns today for an add on visit for uncontrolled nausea, vomiting and abdominal pain which has been on going for the past few weeks. She is rotating her Zofran and Phenergan every 4 hours and still has little relief. She has a hoarse voice with no coughing. She has not been able to eat well for the past 2 days. She complains of pains and burning in the epigastric region. She is currently taking Nexium daily and using Tums PRN.      S/P RLL Lobectomy: 4/27/2018  2.4cm adenocarcinoma  1/4 LNs pos for cancer(station 10)  Completed Cis/Alimta x 4 8/10/2018     XRT to L2-L4 and left hip 9/18/2019     Recurrence biopsy proven L3 8/7/2019  L3 lesion biopsy 8/7/2019:  Adenocarcinoma c/w lung primary  PD-L1: 9%  ALK neg  Ros-1 Neg  EGFR Neg     Nivolumab therapy x 6  10/22/2019-1/9/2020--progression     Radiation:  Began 2/4/2020(T5-T8--due to pain)         Fentanyl 50mcg  Norco 10mg     To Rad/onc: radiation to T5-T8     XRT to L2-L4 and Left hip completed 9/18/2019       ROS  GEN: normal without any fever, night sweats or weight loss  HEENT: No visual changes.   CV: No chest pain or SOB.   PULM: No SOB or cough.   GI: No abdominal pain, melena, BRBPR.   : normal with no hematuria, dysuria  SKIN: No rash    Allergies:  Review of patient's allergies indicates:   Allergen Reactions    Penicillins Rash     Other reaction(s): Rash    Prolia [denosumab] Other (See Comments)     myalgias    Trazodone Anxiety     Severe anxiety        Medications:    Current Outpatient Medications:     benzonatate (TESSALON PERLES) 100 MG capsule, Take 1 capsule (100 mg total) by mouth every 6 (six) hours as needed for Cough., Disp: 30 capsule, Rfl: 1    carisoprodoL (SOMA) 350 MG tablet, Take 1 tablet (350 mg total) by mouth 3 (three) times  daily as needed for Muscle spasms., Disp: 40 tablet, Rfl: 0    CHEWABLE VITAMIN C 500 mg Chew, , Disp: , Rfl:     clonazePAM (KLONOPIN) 1 MG tablet, Take one-half to one tablet two to three times daily only as needed for anxiety, Disp: 90 tablet, Rfl: 0    dextroamphetamine-amphetamine 10 mg Tab, Take one tablet PO in the morning, Disp: 30 tablet, Rfl: 0    esomeprazole magnesium (NEXIUM 24HR) 20 mg TbEC, Take 20 mg by mouth once daily. , Disp: , Rfl:     fentaNYL (DURAGESIC) 12 mcg/hr PT72, Place 1 patch onto the skin every 72 hours., Disp: , Rfl:     fentaNYL (DURAGESIC) 25 mcg/hr, Place 1 patch onto the skin every 72 hours., Disp: , Rfl:     fentaNYL (DURAGESIC) 50 mcg/hr, Place 1 patch onto the skin every 72 hours., Disp: 10 patch, Rfl: 0    fluticasone (FLONASE) 50 mcg/actuation nasal spray, 1 spray by Each Nare route once daily., Disp: , Rfl:     HYDROcodone-acetaminophen (NORCO)  mg per tablet, Take 1 tablet by mouth every 4 to 6 hours as needed for Pain., Disp: , Rfl:     ibuprofen (ADVIL,MOTRIN) 800 MG tablet, Take 1 tablet (800 mg total) by mouth 2 (two) times daily as needed for Pain., Disp: 180 tablet, Rfl: 3    lamoTRIgine (LAMICTAL) 100 MG tablet, TAKE 2 TABLETS PO twice daily, Disp: 1 tablet, Rfl: 0    lidocaine-prilocaine (EMLA) cream, , Disp: , Rfl:     meclizine (ANTIVERT) 25 mg tablet, Take 1 tablet (25 mg total) by mouth every 8 (eight) hours as needed for Dizziness., Disp: 90 tablet, Rfl: 3    mirtazapine (REMERON) 30 MG tablet, Take one tablet PO nightly, Disp: 90 tablet, Rfl: 0    ondansetron (ZOFRAN-ODT) 8 MG TbDL, Take 8 mg by mouth every 8 (eight) hours as needed., Disp: , Rfl:     promethazine (PHENERGAN) 25 MG tablet, Take 1 tablet (25 mg total) by mouth every 6 (six) hours as needed for Nausea., Disp: 30 tablet, Rfl: 6    senna (SENOKOT) 8.6 mg tablet, Take 1 tablet by mouth once daily., Disp: , Rfl:     sertraline (ZOLOFT) 100 MG tablet, Take two tablets PO in  the morning, Disp: 180 tablet, Rfl: 0    VITAMIN B-12 1000 MCG tablet, , Disp: , Rfl:     VITAMIN E ACETATE ORAL, Take by mouth., Disp: , Rfl:     ZYRTEC 10 mg Cap, continuous prn., Disp: , Rfl:     famotidine (PEPCID) 20 MG tablet, Take 1 tablet (20 mg total) by mouth 2 (two) times daily., Disp: 60 tablet, Rfl: 1    LORazepam (ATIVAN) 0.5 MG tablet, Take 1 tablet (0.5 mg total) by mouth 2 (two) times daily., Disp: 60 tablet, Rfl: 0      Objective:     Vitals:  Blood pressure (!) 140/79, pulse 108, temperature 98.6 °F (37 °C), temperature source Oral, resp. rate 19, weight 68.4 kg (150 lb 14.4 oz).    Physical Exam   GEN: no apparent distress, comfortable; AAOx3  HEAD: atraumatic and normocephalic  EYES: no pallor, no icterus, PERRLA  ENT: OMM, no pharyngeal erythema, external ears WNL; no nasal discharge; no thrush  NECK: no masses, thyroid normal, trachea midline, no LAD/LN's, supple  CV: RRR with no murmur; normal pulse; normal S1 and S2; no pedal edema  CHEST: Normal respiratory effort; CTAB; normal breath sounds; no wheeze or crackles  ABDOM: nontender and nondistended; soft; normal bowel sounds.   EXTREM: no clubbing, cyanosis, inflammation or swelling  NEURO: grossly intact; motor/sensory grossly intact; AAOx3; no tremors  PSYCH: normal mood, affect and behavior  LYMPH: normal with no cervical, supraclavicular, axillary lymph nodes palpable.       Labs:   Lab Results   Component Value Date    WBC 6.8 01/21/2020    HGB 11.8 01/21/2020    HCT 35.6 01/21/2020    MCV 93.4 01/21/2020     01/21/2020    CMP  Sodium   Date Value Ref Range Status   01/21/2020 139 135 - 146 mmol/L Final   10/09/2018 140 134 - 144 mmol/L      Potassium   Date Value Ref Range Status   01/21/2020 4.2 3.5 - 5.3 mmol/L Final     Chloride   Date Value Ref Range Status   01/21/2020 99 98 - 110 mmol/L Final   10/09/2018 105 98 - 110 mmol/L      CO2   Date Value Ref Range Status   01/21/2020 32 20 - 32 mmol/L Final     Glucose    Date Value Ref Range Status   01/21/2020 101 65 - 139 mg/dL Final     Comment:               Non-fasting reference interval        10/09/2018 101 (H) 70 - 99 mg/dL      BUN, Bld   Date Value Ref Range Status   01/21/2020 17 7 - 25 mg/dL Final     Creatinine   Date Value Ref Range Status   01/21/2020 0.88 0.50 - 0.99 mg/dL Final     Comment:     For patients >49 years of age, the reference limit  for Creatinine is approximately 13% higher for people  identified as -American.        10/09/2018 0.80 0.60 - 1.40 mg/dL    11/29/2012 0.7 0.5 - 1.4 mg/dL Final     Calcium   Date Value Ref Range Status   01/21/2020 9.3 8.6 - 10.4 mg/dL Final   11/29/2012 9.0 8.7 - 10.5 mg/dL Final     Total Protein   Date Value Ref Range Status   01/21/2020 6.8 6.1 - 8.1 g/dL Final     Albumin   Date Value Ref Range Status   01/21/2020 4.3 3.6 - 5.1 g/dL Final   10/09/2018 4.1 3.1 - 4.7 g/dL      Total Bilirubin   Date Value Ref Range Status   01/21/2020 0.3 0.2 - 1.2 mg/dL Final     Alkaline Phosphatase   Date Value Ref Range Status   01/21/2020 138 (H) 33 - 130 U/L Final     AST   Date Value Ref Range Status   01/21/2020 14 10 - 35 U/L Final     ALT   Date Value Ref Range Status   01/21/2020 12 6 - 29 U/L Final     Anion Gap   Date Value Ref Range Status   08/28/2019 5 (L) 8 - 16 mmol/L Final   11/29/2012 10 5 - 15 meq/L Final     eGFR if    Date Value Ref Range Status   01/21/2020 80 > OR = 60 mL/min/1.73m2 Final     eGFR if non    Date Value Ref Range Status   01/21/2020 69 > OR = 60 mL/min/1.73m2 Final     I have reviewed all available lab results and radiology reports.    Radiology/Diagnostic Studies:  PET Scan 01/06/2020:    Impression       1.  Status post right lower lumbar ectomy with no focal increased FDG activity in the lungs.    2.  FDG avid mediastinal and right hilar lymph nodes, similar to the previous exam.    3.  Diffuse scattered increased FDG activity in the axial skeleton, with  relative interval worsening from the previous exam.    4.  Interval development of a small FDG avid nodule in the left adrenal gland concerning for metastatic disease         Assessment/Plan:   (1) 64 y.o. female with diagnosis of Met Lung Cancer- MRI Brain to Rule out Brain Mets    (2) Nausea- Zofran IV, Add MRI Brain to rule out Brain Mets, Continue oral Zofran and Phenergan and add Ativan BID.    (3) Acid Reflux- Pepcid IV, Pepcid 20 mg BID    (4) Dehydration- 500 mL NS IV now        ICD-10-CM ICD-9-CM   1. Malignant neoplasm of lower lobe of right lung-Adenocarcinoma C34.31 162.5   2. Nausea R11.0 787.02   3. Gastroesophageal reflux disease, esophagitis presence not specified K21.9 530.81   4. Dehydration E86.0 276.51       Discussion:     I have explained and the patient understands all of  the current recommendation(s). I have answered all of their questions to the best of my ability and to their complete satisfaction.   The patient is to continue with the current management plan.    RTC on 3/2/2020 to start chemotherapy and for a chemo school and on 3/18/2020 for cycle 2 and to see Dr. Zhang.        Electronically signed by: Ellen Dubois, MSN, APRN, AGNP-C, OCN

## 2020-02-21 ENCOUNTER — PATIENT MESSAGE (OUTPATIENT)
Dept: HEMATOLOGY/ONCOLOGY | Facility: CLINIC | Age: 65
End: 2020-02-21

## 2020-02-24 ENCOUNTER — PATIENT MESSAGE (OUTPATIENT)
Dept: PSYCHIATRY | Facility: CLINIC | Age: 65
End: 2020-02-24

## 2020-02-24 RX ORDER — SERTRALINE HYDROCHLORIDE 100 MG/1
200 TABLET, FILM COATED ORAL DAILY
Qty: 28 TABLET | Refills: 0 | Status: SHIPPED | OUTPATIENT
Start: 2020-02-24 | End: 2020-05-12 | Stop reason: SDUPTHER

## 2020-02-24 RX ORDER — SERTRALINE HYDROCHLORIDE 100 MG/1
TABLET, FILM COATED ORAL
Qty: 180 TABLET | Refills: 0 | OUTPATIENT
Start: 2020-02-24

## 2020-02-24 NOTE — TELEPHONE ENCOUNTER
Called pt regarding information from [facility]. Pt states she never received her prescription from [facility] and they will not release another prescription until March 6th. Pt is requesting short 14 day supply until new prescription arrives. [facility] [address] and will use [facility].

## 2020-02-24 NOTE — TELEPHONE ENCOUNTER
Sertraline last filled on 1/6/2020 day 90 supply from [facility]. Called pt regarding this information. Left voicemail requesting return call.

## 2020-02-26 ENCOUNTER — PATIENT MESSAGE (OUTPATIENT)
Dept: HEMATOLOGY/ONCOLOGY | Facility: CLINIC | Age: 65
End: 2020-02-26

## 2020-02-27 ENCOUNTER — HOSPITAL ENCOUNTER (OUTPATIENT)
Dept: RADIOLOGY | Facility: HOSPITAL | Age: 65
Discharge: HOME | End: 2020-02-27
Attending: NURSE PRACTITIONER
Payer: MEDICARE

## 2020-02-27 ENCOUNTER — TELEPHONE (OUTPATIENT)
Dept: HEMATOLOGY/ONCOLOGY | Facility: CLINIC | Age: 65
End: 2020-02-27

## 2020-02-27 DIAGNOSIS — R11.0 NAUSEA: ICD-10-CM

## 2020-02-27 DIAGNOSIS — C34.31 MALIGNANT NEOPLASM OF LOWER LOBE OF RIGHT LUNG: ICD-10-CM

## 2020-02-27 PROCEDURE — 70553 MRI BRAIN STEM W/O & W/DYE: CPT | Mod: TC,PO

## 2020-02-27 PROCEDURE — 25500020 PHARM REV CODE 255: Mod: PO | Performed by: NURSE PRACTITIONER

## 2020-02-27 PROCEDURE — A9585 GADOBUTROL INJECTION: HCPCS | Mod: PO | Performed by: NURSE PRACTITIONER

## 2020-02-27 RX ORDER — FENTANYL 50 UG/1
1 PATCH TRANSDERMAL
Qty: 10 PATCH | Refills: 0 | Status: SHIPPED | OUTPATIENT
Start: 2020-02-27 | End: 2020-03-30 | Stop reason: SDUPTHER

## 2020-02-27 RX ORDER — GADOBUTROL 604.72 MG/ML
7 INJECTION INTRAVENOUS
Status: COMPLETED | OUTPATIENT
Start: 2020-02-27 | End: 2020-02-27

## 2020-02-27 RX ADMIN — GADOBUTROL 7 ML: 604.72 INJECTION INTRAVENOUS at 11:02

## 2020-02-27 NOTE — TELEPHONE ENCOUNTER
----- Message from MANDO Laws sent at 2/27/2020  1:36 PM CST -----  Will you let her know the MRI showed no signs of any cancer in the brain.

## 2020-02-27 NOTE — TELEPHONE ENCOUNTER
----- Message from MANDO Laws sent at 2/27/2020  1:36 PM CST -----  Will you let her know the MRI showed no signs of any cancer in the brain.        Spoke to Sheri and let her know that the MRI shows no cancer in her brain.

## 2020-03-02 ENCOUNTER — OFFICE VISIT (OUTPATIENT)
Dept: HEMATOLOGY/ONCOLOGY | Facility: CLINIC | Age: 65
End: 2020-03-02
Payer: MEDICARE

## 2020-03-02 VITALS
HEART RATE: 108 BPM | RESPIRATION RATE: 19 BRPM | TEMPERATURE: 98 F | WEIGHT: 146.63 LBS | BODY MASS INDEX: 24.4 KG/M2 | SYSTOLIC BLOOD PRESSURE: 127 MMHG | DIASTOLIC BLOOD PRESSURE: 79 MMHG

## 2020-03-02 DIAGNOSIS — C34.31 SQUAMOUS CELL CARCINOMA OF BRONCHUS IN RIGHT LOWER LOBE: ICD-10-CM

## 2020-03-02 DIAGNOSIS — C34.31 MALIGNANT NEOPLASM OF LOWER LOBE OF RIGHT LUNG: Primary | ICD-10-CM

## 2020-03-02 DIAGNOSIS — G89.3 CANCER ASSOCIATED PAIN: Primary | ICD-10-CM

## 2020-03-02 DIAGNOSIS — K21.9 GASTROESOPHAGEAL REFLUX DISEASE, ESOPHAGITIS PRESENCE NOT SPECIFIED: ICD-10-CM

## 2020-03-02 DIAGNOSIS — C34.31 MALIGNANT NEOPLASM OF LOWER LOBE OF RIGHT LUNG: ICD-10-CM

## 2020-03-02 PROCEDURE — 3074F PR MOST RECENT SYSTOLIC BLOOD PRESSURE < 130 MM HG: ICD-10-PCS | Mod: S$GLB,,, | Performed by: NURSE PRACTITIONER

## 2020-03-02 PROCEDURE — 1101F PT FALLS ASSESS-DOCD LE1/YR: CPT | Mod: S$GLB,,, | Performed by: NURSE PRACTITIONER

## 2020-03-02 PROCEDURE — 99214 PR OFFICE/OUTPT VISIT, EST, LEVL IV, 30-39 MIN: ICD-10-PCS | Mod: S$GLB,,, | Performed by: NURSE PRACTITIONER

## 2020-03-02 PROCEDURE — 99214 OFFICE O/P EST MOD 30 MIN: CPT | Mod: S$GLB,,, | Performed by: NURSE PRACTITIONER

## 2020-03-02 PROCEDURE — 1101F PR PT FALLS ASSESS DOC 0-1 FALLS W/OUT INJ PAST YR: ICD-10-PCS | Mod: S$GLB,,, | Performed by: NURSE PRACTITIONER

## 2020-03-02 PROCEDURE — 3074F SYST BP LT 130 MM HG: CPT | Mod: S$GLB,,, | Performed by: NURSE PRACTITIONER

## 2020-03-02 PROCEDURE — 3008F BODY MASS INDEX DOCD: CPT | Mod: S$GLB,,, | Performed by: NURSE PRACTITIONER

## 2020-03-02 PROCEDURE — 3078F PR MOST RECENT DIASTOLIC BLOOD PRESSURE < 80 MM HG: ICD-10-PCS | Mod: S$GLB,,, | Performed by: NURSE PRACTITIONER

## 2020-03-02 PROCEDURE — 3008F PR BODY MASS INDEX (BMI) DOCUMENTED: ICD-10-PCS | Mod: S$GLB,,, | Performed by: NURSE PRACTITIONER

## 2020-03-02 PROCEDURE — 3078F DIAST BP <80 MM HG: CPT | Mod: S$GLB,,, | Performed by: NURSE PRACTITIONER

## 2020-03-02 RX ORDER — HYDROCODONE BITARTRATE AND ACETAMINOPHEN 10; 325 MG/1; MG/1
1 TABLET ORAL
Qty: 60 TABLET | Refills: 0 | Status: SHIPPED | OUTPATIENT
Start: 2020-03-02 | End: 2020-03-30 | Stop reason: SDUPTHER

## 2020-03-02 RX ORDER — FAMOTIDINE 40 MG/1
40 TABLET, FILM COATED ORAL NIGHTLY
Qty: 30 TABLET | Refills: 6 | Status: SHIPPED | OUTPATIENT
Start: 2020-03-02 | End: 2021-04-12

## 2020-03-02 RX ORDER — FAMOTIDINE 20 MG/1
20 TABLET, FILM COATED ORAL 2 TIMES DAILY
Qty: 60 TABLET | Refills: 1 | Status: SHIPPED | OUTPATIENT
Start: 2020-03-02 | End: 2020-03-02

## 2020-03-02 RX ORDER — DEXAMETHASONE 4 MG/1
4 TABLET ORAL 2 TIMES DAILY
Qty: 24 TABLET | Refills: 5 | Status: SHIPPED | OUTPATIENT
Start: 2020-03-02 | End: 2021-03-02

## 2020-03-02 NOTE — PROGRESS NOTES
[facility] HEM/ONC PROGRESS NOTE      Subjective:       Patient ID:   Sheri Broderick  64 y.o. female.  1955    Chief Complaint: Chemo School/Met Lung Cancer  HPI    Patient returns today for a regularly scheduled follow-up visit for a chemo school  She was supposed to start every 3 week Taxotere today but has decided to postpone until she sees Dr. Zhang. She is concerned about the possibility of permanent hair loss and is looking into getting a cold cap. She has concerns about getting the Docetaxel and and would like to wait to sign consent until after she sees Dr. Zhang.      [facility]    TITLE: PLAN OF CARE FOR THE CHEMOTHERAPY PATIENT / TEACHING PROTOCOL    PURPOSE: To involve the patient / significant other in the plan of care and to provide teaching to the significant other & patient receiving chemotherapy.    LEVEL: Independent.    CONTENT: The Plan of Care for the chemotherapy patient is individualized and appropriate to the patients needs, strengths, limitations, & goals.  Education includes information regarding chemotherapy side effects, the treatment itself, and self-care  Activities.    GOAL / OUTCOME STANDARDS    PHYSIOLOGIC: The client will remain free or experience minimal side effects or toxicities throughout the chemotherapy treatment period.     PSYCHOLOGIC: The client/significant others will demonstrate positive coping mechanisms in relation to chemotherapy and its side effects.      COGINITIVE: The client/significant others will verbalize understanding of self-care measure to avoid/minimize side effects of the chemotherapy regime.    EVALUATION / COMMENT KEY:    V = Audiovisual/Video  S = Successfully meets outcome  N = Needs further instruction  NA = Not applicable to the patient  P = Previous knowledge  U = Unable to comprehend  * = See progress notes          PLAN OF CARE  INFORMATION TO BE DELIVERED / NURSING INTERVENTIONS DATE EVALUATION    1. Assessment of client/caregiver,         knowledge of cancer diagnosis,         and chemotherapy as a treatment. 1a. Evaluate patient/caregiver learning ability    b. Plan teaching sessions with patient/caregiver according to needs and present anxiety level/ability to learn.    c. Provide Chemotherapy Education Packet,        Mouth Care Protocol,         Specific Patient Education Sheets. 03/02/2020 S   2. Individual chemotherapy treatment         plan. 2a. Review of Chemotherapy Education handout from [employer]            03/02/2020   S   3. Knowledge Deficit & Self-Management of general side effects common to all chemotherapy:  a. Nausea/Vomiting  b.   Diarrhea  c. Mouth Care  d. Dental care  e. Constipation  f. Hair Loss  g. Potential for infection  h. Fatigue   3a. Reinforce that the majority of side effects from chemotherapy are reversible and are  controlled both in the hospital and at home        (blood counts recover, hair grows back).   b.  Refer to the following for reinforcement of         information post-treatment:  1. Mouth Care Protocol.  2. Bowel Protocol for constipation or diarrhea.  3.  Drug Specific Chemotherapy Information Sheets for each medication patient receiving.   4. Possibility of permanent hair loss.   03/02/2020     S     PLAN OF CARE  INFORMATION TO BE DELIVERED / NURSING INTERVENTIONS DATE EVALUATION   h. Potential for bleeding         i. Potential anemia/fatigue         j. Potential sunburn         k. Birth control measures  l. Safety measures post treatment 4.  Chemotherapy Home Care Instruction  and Safety Information Sheet.  A. patient/caregivers to thoroughly cook shellfish (shrimp, crab, etc) to decrease the chance of infection.    B.  Use sunscreen and protective clothing while in the sun.   03/02/2020      4. Knowledge deficit & Self Management of Drug Specific  Side Effects.    a. BLADDER EFFECTS        (Hemorrhagic Cystitis)                  Preventable with adequate  hydration; occurs 2-3 days or more post treatment.   1.  Instruct patient to:  a.   Void at least every 2 hours; increase intake.  b.   DO NOT hold urine; go when urge is felt.  c.    Empty bladder at bedtime and on         awakening.  d.   Observe for color changes (red to tea           colored), amount and frequency changes.  e.   Notify oncologist of any abnormalities           in urine or voiding or if you cannot               drink adequate fluids.   03/02/2020   S   b.   CHANGES IN URINE        COLOR:      1.   Instruct patient:  a.   Most evident in first 2-3 voidings after           administration.  b. Lasts less than 24 hours.  c. If urine is discolored 2 or more days post- treatment, notify oncologist.      03/02/2020 S   c.    KIDNEY EFFECTS           (Nephrotoxicity)   1.  Instruct patient to:  a.   Drink 8-16 glasses of fluid/day the day   pre-treatment and 3-4 days post-treatment to maintain hydration; the best way to minimize kidney problems.  b.   Notify oncologist immediately if unable to drink fluids or if changes are noted in urinary elimination.     03/02/2020   S   a. PULMONARY TOXICITY    1. Instruct patient to report symptoms such as shortness of breath, chest pain, shallow breathing, or chest wall discomfort to physician.  2. Reinforce preventative measures used by the health care team.  a. Baseline and periodic PFT and chest x-ray if needed.   03/02/2020   S     PLAN OF CARE INFORMATION TO BE DELIVERED / NURSING INTERVENTIONS DATE EVALUATION   b. NERVE & MUSCLE EFFECTS (neurotoxocity; neuropathy, possible visual/hearing changes)        3. Instruct patient to:    a. Report numbness or tingling of the hands/feet, loss of fine motor movement (buttoning shirt, tying shoelaces), or gait changes to your oncologist.  b. If numbness/tingling are present:  1. protect feet with shoes at all times.  2. Use gloves for washing dishes/gardening & potholders in kitchen.       03/02/2020   S    c. CARDIOTOXICITY  Decreased effectiveness of             cardiac function. Effective are                  cumulative and irreversible.                                    CARDIAC ARRYTHMIAS              4   Instruct:  a. Heart function may be tested before treatment and perdiocally during treatment.  b. Notify oncologist of irregular pulse, palpitations, shortness of breath, or swelling in lower extremities/feet.        Taxotere can cause arrhythmias on infusion that resolve once infusion discontinued. Instruct nurse if any irregularity felt.    03/02/2020   S   d. EXTRAVASTION  Occurs when vesicants leak outside of vein and cause damage to the skin and underlying tissues.   1. Reinforce preventive measures used to avoid complications.  a. Fresh IV site or central line monitored continuously with vesicant IVP.  b. Continuous infusion via central line site and blood return monitored periodically around the clock.  2. Instruct to:  a. Notify nurse of any discomfort, burning, stinging, etc. at IV site during chemotherapy administration.  b. Notify oncologist of any redness, pain, or swelling at IV site after discharge from hospital.   03/02/2020   S   e. HYPERSENSITIVITY can happen with any medication.   1. Instruct patient:  a. Nurse is with them during the initial part of treatment and will be close by to monitor.  b. Pre-medication ordered by the oncologist must be taken on time. If doses are missed, treatment will need to be re-scheduled.  c. Skin redness, itching, or hives appearing after discharge should be reported to oncologist. 03/02/2020   S       PLAN OF CARE INFORMATION TO BE DELIVERED / NURSING INTERVENTIONS DATE EVALUATION   f. FLU-LIKE SYNDROME      1. Instruct patient symptoms are hard to prevent and may include fever, shaking chills, muscle and body aches.  a. Taking prescribed medications from physician if needed.  b. Adequate fluids are important.    2. Reinforce the need to call if temperature is          elevated to 100.4 or more  03/02/2020   S   g. HAND-FOOT SYNDROME  causes painful, symmetric swelling and redness of palms and soles                  5. Instruct patient to report any numbness or tingling in the hands or feet.  6. Explain prevention techniques, such as     a. Use heavy moisturizers to lessen skin dryness and itching, but to avoid if skin is cracked or broken  b. Bathe in tepid water, use non-perfumed soap, and wash gently. Baths with oatmeal or diluted baking soda may be soothing.  c. Avoid tight fitting shoes and repetitive actions, such as rubbing hands or applying pressure to hands/feet.  7. Review measures to take should syndrome occur:  a. Cold compresses and elevation for          edema  b. Pain medications and other measures as ordered by oncologist.   4.   Syndrome resolves few weeks after therapy. 03/02/2020   S   5. DISCHARGE PLANNING /        EDUCATION 1.    Explain importance of compliance with follow- up  tests (CBC, CMP).  2.    Verify patient/caregiver know:  a.    Oncologists office phone number.  b.    Dates of follow-up appointments.  c.    Prescriptions given for nausea  3.   Review side effects to monitor and notify          oncologist about.  4.   Reinforce the need for patient and caregivers to:  a.    Review information given.  b.    Call oncologists office with questions          or symptoms  5.   Provide [facility] Brochure make referrals if needed for financial or .   03/02/2020   S     PROGRESS NOTES: I met with the patient and her friend today for chemotherapy education. she will be starting treatment with Docetaxel . We discussed the mechanism of action, potential side effects of this treatment as well as ways she can manage them at home. Some of these side effects include but or not limited to fever, nausea, vomiting, decreased appetite, fatigue, weakness, cytopenias, myalgia/arthralgia, constipation, diarrhea, bleeding, headache,  shortness of breath, nail changes, taste change, hair thinning/loss, mood disturbances, or edema. We also discussed dietary modifications she should make although this will be discussed in more detail with the dietician. she was provided with anti-emetic medication, a copy of all of the information we discussed today as well as our contact information. she will be provided a schedule on his first day of treatment. We will obtain labs on a weekly basis and the patient will follow-up with the physician for toxicity monitoring throughout treatment. All questions were answered and an informed consent was obtained. she was reminded to certainly contact us sooner if needed.  Attached to the patients folder and discussed with the patient the 24 hour/ 7 days a week after hours telephone number for the physician.  Patient notified to call anytime 24/7 because their is a physician on call for any problems that may arise.  Patient also notified to report to [facility] ER if they can not get in touch with a physician after hours.              ROS  GEN: normal without any fever, night sweats or weight loss  HEENT: No visual changes.   CV: No chest pain or SOB.   PULM: No SOB or cough.   GI: No abdominal pain, melena, BRBPR.   : normal with no hematuria, dysuria  SKIN: No rash    Allergies:  Review of patient's allergies indicates:   Allergen Reactions    Penicillins Rash     Other reaction(s): Rash    Prolia [denosumab] Other (See Comments)     myalgias    Trazodone Anxiety     Severe anxiety        Medications:    Current Outpatient Medications:     benzonatate (TESSALON PERLES) 100 MG capsule, Take 1 capsule (100 mg total) by mouth every 6 (six) hours as needed for Cough., Disp: 30 capsule, Rfl: 1    CHEWABLE VITAMIN C 500 mg Chew, , Disp: , Rfl:     clonazePAM (KLONOPIN) 1 MG tablet, Take one-half to one tablet two to three times daily only as needed for anxiety, Disp: 90 tablet, Rfl: 0    dextroamphetamine-amphetamine 10  mg Tab, Take one tablet PO in the morning, Disp: 30 tablet, Rfl: 0    esomeprazole magnesium (NEXIUM 24HR) 20 mg TbEC, Take 20 mg by mouth once daily. , Disp: , Rfl:     fentaNYL (DURAGESIC) 12 mcg/hr PT72, Place 1 patch onto the skin every 72 hours., Disp: , Rfl:     fentaNYL (DURAGESIC) 25 mcg/hr, Place 1 patch onto the skin every 72 hours., Disp: , Rfl:     fentaNYL (DURAGESIC) 50 mcg/hr, Place 1 patch onto the skin every 72 hours., Disp: 10 patch, Rfl: 0    fluticasone (FLONASE) 50 mcg/actuation nasal spray, 1 spray by Each Nare route once daily., Disp: , Rfl:     HYDROcodone-acetaminophen (NORCO)  mg per tablet, Take 1 tablet by mouth every 4 to 6 hours as needed for Pain., Disp: , Rfl:     ibuprofen (ADVIL,MOTRIN) 800 MG tablet, Take 1 tablet (800 mg total) by mouth 2 (two) times daily as needed for Pain., Disp: 180 tablet, Rfl: 3    lamoTRIgine (LAMICTAL) 200 MG tablet, Take 1 tablet (200 mg total) by mouth 2 (two) times daily., Disp: 180 tablet, Rfl: 0    lidocaine-prilocaine (EMLA) cream, , Disp: , Rfl:     meclizine (ANTIVERT) 25 mg tablet, Take 1 tablet (25 mg total) by mouth every 8 (eight) hours as needed for Dizziness., Disp: 90 tablet, Rfl: 3    mirtazapine (REMERON) 30 MG tablet, Take one tablet PO nightly, Disp: 90 tablet, Rfl: 0    ondansetron (ZOFRAN-ODT) 8 MG TbDL, Take 8 mg by mouth every 8 (eight) hours as needed., Disp: , Rfl:     promethazine (PHENERGAN) 25 MG tablet, Take 1 tablet (25 mg total) by mouth every 6 (six) hours as needed for Nausea., Disp: 30 tablet, Rfl: 6    senna (SENOKOT) 8.6 mg tablet, Take 1 tablet by mouth once daily., Disp: , Rfl:     sertraline (ZOLOFT) 100 MG tablet, Take 2 tablets (200 mg total) by mouth once daily. for 14 days, Disp: 28 tablet, Rfl: 0    VITAMIN B-12 1000 MCG tablet, , Disp: , Rfl:     VITAMIN E ACETATE ORAL, Take by mouth., Disp: , Rfl:     ZYRTEC 10 mg Cap, continuous prn., Disp: , Rfl:     dexAMETHasone (DECADRON) 4 MG Tab,  Take 1 tablet (4 mg total) by mouth 2 (two) times daily. Take 2 tabs by mouth twice a day the day before chemo and the day after., Disp: 24 tablet, Rfl: 5    famotidine (PEPCID) 40 MG tablet, Take 1 tablet (40 mg total) by mouth every evening., Disp: 30 tablet, Rfl: 6      Objective:     Vitals:  Blood pressure 127/79, pulse 108, temperature 98.3 °F (36.8 °C), temperature source Oral, resp. rate 19, weight 66.5 kg (146 lb 9.6 oz).    Physical Exam   GEN: no apparent distress, comfortable; AAOx3  HEAD: atraumatic and normocephalic  EYES: no pallor, no icterus, PERRLA  ENT: OMM, no pharyngeal erythema, external ears WNL; no nasal discharge; no thrush  NECK: no masses, thyroid normal, trachea midline, no LAD/LN's, supple  CV: RRR with no murmur; normal pulse; normal S1 and S2; no pedal edema  CHEST: Normal respiratory effort; CTAB; normal breath sounds; no wheeze or crackles  ABDOM: nontender and nondistended; soft; normal bowel sounds.   EXTREM: no clubbing, cyanosis, inflammation or swelling  NEURO: grossly intact; motor/sensory grossly intact; AAOx3; no tremors  PSYCH: normal mood, affect and behavior  LYMPH: normal with no cervical, supraclavicular, axillary lymph nodes palpable.       Labs:   Lab Results   Component Value Date    WBC 6.8 01/21/2020    HGB 11.8 01/21/2020    HCT 35.6 01/21/2020    MCV 93.4 01/21/2020     01/21/2020    CMP  Sodium   Date Value Ref Range Status   01/21/2020 139 135 - 146 mmol/L Final   10/09/2018 140 134 - 144 mmol/L      Potassium   Date Value Ref Range Status   01/21/2020 4.2 3.5 - 5.3 mmol/L Final     Chloride   Date Value Ref Range Status   01/21/2020 99 98 - 110 mmol/L Final   10/09/2018 105 98 - 110 mmol/L      CO2   Date Value Ref Range Status   01/21/2020 32 20 - 32 mmol/L Final     Glucose   Date Value Ref Range Status   01/21/2020 101 65 - 139 mg/dL Final     Comment:               Non-fasting reference interval        10/09/2018 101 (H) 70 - 99 mg/dL      BUN, Bld    Date Value Ref Range Status   01/21/2020 17 7 - 25 mg/dL Final     Creatinine   Date Value Ref Range Status   01/21/2020 0.88 0.50 - 0.99 mg/dL Final     Comment:     For patients >49 years of age, the reference limit  for Creatinine is approximately 13% higher for people  identified as -American.        10/09/2018 0.80 0.60 - 1.40 mg/dL    11/29/2012 0.7 0.5 - 1.4 mg/dL Final     Calcium   Date Value Ref Range Status   01/21/2020 9.3 8.6 - 10.4 mg/dL Final   11/29/2012 9.0 8.7 - 10.5 mg/dL Final     Total Protein   Date Value Ref Range Status   01/21/2020 6.8 6.1 - 8.1 g/dL Final     Albumin   Date Value Ref Range Status   01/21/2020 4.3 3.6 - 5.1 g/dL Final   10/09/2018 4.1 3.1 - 4.7 g/dL      Total Bilirubin   Date Value Ref Range Status   01/21/2020 0.3 0.2 - 1.2 mg/dL Final     Alkaline Phosphatase   Date Value Ref Range Status   01/21/2020 138 (H) 33 - 130 U/L Final     AST   Date Value Ref Range Status   01/21/2020 14 10 - 35 U/L Final     ALT   Date Value Ref Range Status   01/21/2020 12 6 - 29 U/L Final     Anion Gap   Date Value Ref Range Status   08/28/2019 5 (L) 8 - 16 mmol/L Final   11/29/2012 10 5 - 15 meq/L Final     eGFR if    Date Value Ref Range Status   01/21/2020 80 > OR = 60 mL/min/1.73m2 Final     eGFR if non    Date Value Ref Range Status   01/21/2020 69 > OR = 60 mL/min/1.73m2 Final     I have reviewed all available lab results and radiology reports.      Assessment/Plan:   (1) 64 y.o. female with diagnosis of Met Lung Cancer    (2) GERD- Pepcid BID    (3) Constipation- Colace Daily.        ICD-10-CM ICD-9-CM   1. Malignant neoplasm of lower lobe of right lung-Adenocarcinoma C34.31 162.5   2. Gastroesophageal reflux disease, esophagitis presence not specified K21.9 530.81   3. 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 I  have answered all of their questions to the best of my ability and to their complete satisfaction.   The patient is to continue with the current management plan.    RTC 3/18/2020 as scheduled with Dr. Zhang.      Spent 60 Minutes face to face with the patient.      Electronically signed by: Ellen Dubois, MSN, APRN, AGNP-C, OCN

## 2020-03-04 ENCOUNTER — PATIENT MESSAGE (OUTPATIENT)
Dept: HEMATOLOGY/ONCOLOGY | Facility: CLINIC | Age: 65
End: 2020-03-04

## 2020-03-11 ENCOUNTER — PATIENT MESSAGE (OUTPATIENT)
Dept: HEMATOLOGY/ONCOLOGY | Facility: CLINIC | Age: 65
End: 2020-03-11

## 2020-03-16 ENCOUNTER — DOCUMENTATION ONLY (OUTPATIENT)
Dept: RADIATION ONCOLOGY | Facility: CLINIC | Age: 65
End: 2020-03-16

## 2020-03-16 NOTE — PROGRESS NOTES
IV, [id number] adenoca of RLL, PDL-1 9%    Patient completed palliative radiotherapy, 30 Gy in 10 fractions to T4-T7, L5-sacrum on 02/18/2020.  Treatment was well tolerated with nausea and fatigue.  She remained on pain medication throughout.    I called patient today discuss her 3 week follow-up and she reports pain is significantly improved.  She continues on Duragesic patch however at the same dose.  She reports significant improvement in quality of life.  She endorses continued baseline nausea that is responsive to Zofran.  She has follow-up with Dr. Lamb later this week to discuss initiation of systemic therapy.    Today I congratulated patient on her tolerance of treatment as well as palliative pain response.  She will discuss lowering her pain medication dosing with Dr. Lamb.    I advised to practice infectious protocols given COVID-19 in the community.  She will continue to follow with Medical Oncology and follow-up here as needed or should further sites require palliation.

## 2020-03-18 ENCOUNTER — OFFICE VISIT (OUTPATIENT)
Dept: HEMATOLOGY/ONCOLOGY | Facility: CLINIC | Age: 65
End: 2020-03-18
Payer: MEDICARE

## 2020-03-18 ENCOUNTER — PATIENT MESSAGE (OUTPATIENT)
Dept: PSYCHIATRY | Facility: CLINIC | Age: 65
End: 2020-03-18

## 2020-03-18 VITALS
RESPIRATION RATE: 20 BRPM | BODY MASS INDEX: 24.18 KG/M2 | TEMPERATURE: 98 F | SYSTOLIC BLOOD PRESSURE: 149 MMHG | WEIGHT: 145.31 LBS | HEART RATE: 116 BPM | DIASTOLIC BLOOD PRESSURE: 80 MMHG

## 2020-03-18 DIAGNOSIS — C34.31 MALIGNANT NEOPLASM OF LOWER LOBE OF RIGHT LUNG: Chronic | ICD-10-CM

## 2020-03-18 DIAGNOSIS — G89.3 CANCER ASSOCIATED PAIN: ICD-10-CM

## 2020-03-18 PROCEDURE — 3079F PR MOST RECENT DIASTOLIC BLOOD PRESSURE 80-89 MM HG: ICD-10-PCS | Mod: S$GLB,,, | Performed by: INTERNAL MEDICINE

## 2020-03-18 PROCEDURE — 99215 OFFICE O/P EST HI 40 MIN: CPT | Mod: S$GLB,,, | Performed by: INTERNAL MEDICINE

## 2020-03-18 PROCEDURE — 99215 PR OFFICE/OUTPT VISIT, EST, LEVL V, 40-54 MIN: ICD-10-PCS | Mod: S$GLB,,, | Performed by: INTERNAL MEDICINE

## 2020-03-18 PROCEDURE — 3079F DIAST BP 80-89 MM HG: CPT | Mod: S$GLB,,, | Performed by: INTERNAL MEDICINE

## 2020-03-18 PROCEDURE — 1101F PR PT FALLS ASSESS DOC 0-1 FALLS W/OUT INJ PAST YR: ICD-10-PCS | Mod: S$GLB,,, | Performed by: INTERNAL MEDICINE

## 2020-03-18 PROCEDURE — 3077F SYST BP >= 140 MM HG: CPT | Mod: S$GLB,,, | Performed by: INTERNAL MEDICINE

## 2020-03-18 PROCEDURE — 1101F PT FALLS ASSESS-DOCD LE1/YR: CPT | Mod: S$GLB,,, | Performed by: INTERNAL MEDICINE

## 2020-03-18 PROCEDURE — 3008F PR BODY MASS INDEX (BMI) DOCUMENTED: ICD-10-PCS | Mod: S$GLB,,, | Performed by: INTERNAL MEDICINE

## 2020-03-18 PROCEDURE — 3008F BODY MASS INDEX DOCD: CPT | Mod: S$GLB,,, | Performed by: INTERNAL MEDICINE

## 2020-03-18 PROCEDURE — 3077F PR MOST RECENT SYSTOLIC BLOOD PRESSURE >= 140 MM HG: ICD-10-PCS | Mod: S$GLB,,, | Performed by: INTERNAL MEDICINE

## 2020-03-18 NOTE — ASSESSMENT & PLAN NOTE
I had an extensive discussion with Ms. Broderick today about the planned course of action in regard to her cancer.  She was taken off Opdivo in January for radiation therapy and the plan was to try Tecentriq with chemotherapy but this was refused by the insurance company and again on appeal.  Last visit we discussed chemotherapy with Taxotere in an effort to get the tumor in response using a medication which works different mechanistically.  She seemed ok with the decision at that time but now has decided she does not want this treatment due to toxicities and possible permanent hair loss.      I discussed revisiting treatment with Opdivo.  My rational for this is that her progression may have been pseudo progression and she only received five cycles of this.  She may still yet get a response from this medication.  Another reason is that I am highly concerned about the use of traditional chemotherapy in light of the covid-19 pandemic.  Chemotherapy has a much higher chance of worsening her immune system and I would therefore hold this.  Immunotherapy should be better from this standpoint and as stated, may still have some benefit.   She is aggreeable with this plan and will work on getting this started ASAP.   Extensive discussion in a very complicated patient.

## 2020-03-18 NOTE — PROGRESS NOTES
PROGRESS NOTE    Subjective:       Patient ID: Sheri Broderick is a 64 y.o. female.    S/P RLL Lobectomy: 4/27/2018  2.4cm adenocarcinoma  1/4 LNs pos for cancer(station 10)  Completed Cis/Alimta x 4 8/10/2018    XRT to L2-L4 and left hip 9/18/2019    Recurrence biopsy proven L3 8/7/2019  L3 lesion biopsy 8/7/2019:  Adenocarcinoma c/w lung primary  PD-L1: 9%  ALK neg  Ros-1 Neg  EGFR Neg    Nivolumab therapy x 6  10/22/2019-1/9/2020--progression    Radiation x 10:  2/4/2020-2/18/2020(T5-T8--due to pain)      Chief Complaint:  No chief complaint on file.  lung cancer, chemo follow up.     History of Present Illness:   Sheri Broderick is a 64 y.o. female who presents for follow up.  Patient has continued complaints of fatigue and pain.        Fentanyl 50mcg  Norco 10mg    Pet impression 1/6/2019:  1.  Status post right lower lumbar ectomy with no focal increased FDG activity in the lungs.    2.  FDG avid mediastinal and right hilar lymph nodes, similar to the previous exam.    3.  Diffuse scattered increased FDG activity in the axial skeleton, with relative interval worsening from the previous exam.    4.  Interval development of a small FDG avid nodule in the left adrenal gland concerning for metastatic disease.    PET Impression 7/8/2019:  1. FDG avid enlarged lymph nodes in right hilum and lower paratracheal region  of mediastinal, and additional foci of FDG uptake involving L3 vertebral body  and anterior left acetabulum also suspicious for new metastatic disease.    2. Nonspecific FDG uptake associated with right T2 transverse process near  articulation with posterior right second rib as above. Attention to this on  follow-up PET/CT is suggested.    3. Recent compression fracture of L2 vertebral body superior endplate since  04/09/2019, with associated FDG uptake which is inflammatory in nature.    L3 lesion biopsy 8/7/2019:  Adenocarcinoma c/w lung  primary  PD-L1: 9%  ALK neg  Ros-1 Neg  EGFR Neg    Family and Social history reviewed and is unchanged from May 21, 2018      ROS:  Review of Systems   Constitutional: Negative for appetite change, fever and unexpected weight change.   Eyes: Negative for visual disturbance.   Respiratory: Negative for cough and shortness of breath.    Cardiovascular: Negative for chest pain and leg swelling.   Gastrointestinal: Negative for abdominal pain, blood in stool and diarrhea.   Genitourinary: Negative for frequency and hematuria.   Musculoskeletal: Negative for back pain.   Skin: Negative for rash.   Neurological: Positive for headaches.   Hematological: Negative for adenopathy.   Psychiatric/Behavioral: The patient is nervous/anxious.           Current Outpatient Medications:     benzonatate (TESSALON PERLES) 100 MG capsule, Take 1 capsule (100 mg total) by mouth every 6 (six) hours as needed for Cough., Disp: 30 capsule, Rfl: 1    CHEWABLE VITAMIN C 500 mg Chew, , Disp: , Rfl:     clonazePAM (KLONOPIN) 1 MG tablet, Take one-half to one tablet two to three times daily only as needed for anxiety, Disp: 90 tablet, Rfl: 0    dexAMETHasone (DECADRON) 4 MG Tab, Take 1 tablet (4 mg total) by mouth 2 (two) times daily. Take 2 tabs by mouth twice a day the day before chemo and the day after., Disp: 24 tablet, Rfl: 5    dextroamphetamine-amphetamine 10 mg Tab, Take one tablet PO in the morning, Disp: 30 tablet, Rfl: 0    esomeprazole magnesium (NEXIUM 24HR) 20 mg TbEC, Take 20 mg by mouth once daily. , Disp: , Rfl:     famotidine (PEPCID) 40 MG tablet, Take 1 tablet (40 mg total) by mouth every evening., Disp: 30 tablet, Rfl: 6    fentaNYL (DURAGESIC) 12 mcg/hr PT72, Place 1 patch onto the skin every 72 hours., Disp: , Rfl:     fentaNYL (DURAGESIC) 25 mcg/hr, Place 1 patch onto the skin every 72 hours., Disp: , Rfl:     fentaNYL (DURAGESIC) 50 mcg/hr, Place 1 patch onto the skin every 72 hours., Disp: 10 patch, Rfl: 0     fluticasone (FLONASE) 50 mcg/actuation nasal spray, 1 spray by Each Nare route once daily., Disp: , Rfl:     HYDROcodone-acetaminophen (NORCO)  mg per tablet, Take 1 tablet by mouth every 4 to 6 hours as needed for Pain., Disp: 60 tablet, Rfl: 0    ibuprofen (ADVIL,MOTRIN) 800 MG tablet, Take 1 tablet (800 mg total) by mouth 2 (two) times daily as needed for Pain., Disp: 180 tablet, Rfl: 3    lamoTRIgine (LAMICTAL) 200 MG tablet, Take 1 tablet (200 mg total) by mouth 2 (two) times daily., Disp: 180 tablet, Rfl: 0    lidocaine-prilocaine (EMLA) cream, , Disp: , Rfl:     meclizine (ANTIVERT) 25 mg tablet, Take 1 tablet (25 mg total) by mouth every 8 (eight) hours as needed for Dizziness., Disp: 90 tablet, Rfl: 3    mirtazapine (REMERON) 30 MG tablet, Take one tablet PO nightly, Disp: 90 tablet, Rfl: 0    ondansetron (ZOFRAN-ODT) 8 MG TbDL, Take 8 mg by mouth every 8 (eight) hours as needed., Disp: , Rfl:     promethazine (PHENERGAN) 25 MG tablet, Take 1 tablet (25 mg total) by mouth every 6 (six) hours as needed for Nausea., Disp: 30 tablet, Rfl: 6    senna (SENOKOT) 8.6 mg tablet, Take 1 tablet by mouth once daily., Disp: , Rfl:     VITAMIN B-12 1000 MCG tablet, , Disp: , Rfl:     VITAMIN E ACETATE ORAL, Take by mouth., Disp: , Rfl:     ZYRTEC 10 mg Cap, continuous prn., Disp: , Rfl:     sertraline (ZOLOFT) 100 MG tablet, Take 2 tablets (200 mg total) by mouth once daily. for 14 days, Disp: 28 tablet, Rfl: 0        Objective:       Physical Examination:     BP (!) 149/80 (BP Location: Left arm, Patient Position: Sitting)   Pulse (!) 116   Temp 97.8 °F (36.6 °C) (Oral)   Resp 20   Wt 65.9 kg (145 lb 4.8 oz)   BMI 24.18 kg/m²     Physical Exam   Constitutional: She appears well-developed and well-nourished.   HENT:   Head: Normocephalic and atraumatic.   Right Ear: External ear normal.   Left Ear: External ear normal.   Mouth/Throat: Oropharynx is clear and moist.   Eyes: Pupils are equal,  round, and reactive to light. Conjunctivae are normal.   Neck: No tracheal deviation present. No thyromegaly present.   Cardiovascular: Normal rate, regular rhythm and normal heart sounds.   Pulmonary/Chest: Effort normal and breath sounds normal.   Abdominal: Soft. Bowel sounds are normal. She exhibits no distension and no mass. There is no tenderness.   Musculoskeletal: She exhibits no edema.   Neurological:   Neuro intact througout   Skin: No rash noted.   Psychiatric: She has a normal mood and affect. Her behavior is normal. Judgment and thought content normal.       Labs:   No results found for this or any previous visit (from the past 336 hour(s)).  CMP  Sodium   Date Value Ref Range Status   01/21/2020 139 135 - 146 mmol/L Final   10/09/2018 140 134 - 144 mmol/L      Potassium   Date Value Ref Range Status   01/21/2020 4.2 3.5 - 5.3 mmol/L Final     Chloride   Date Value Ref Range Status   01/21/2020 99 98 - 110 mmol/L Final   10/09/2018 105 98 - 110 mmol/L      CO2   Date Value Ref Range Status   01/21/2020 32 20 - 32 mmol/L Final     Glucose   Date Value Ref Range Status   01/21/2020 101 65 - 139 mg/dL Final     Comment:               Non-fasting reference interval        10/09/2018 101 (H) 70 - 99 mg/dL      BUN, Bld   Date Value Ref Range Status   01/21/2020 17 7 - 25 mg/dL Final     Creatinine   Date Value Ref Range Status   01/21/2020 0.88 0.50 - 0.99 mg/dL Final     Comment:     For patients >49 years of age, the reference limit  for Creatinine is approximately 13% higher for people  identified as -American.        10/09/2018 0.80 0.60 - 1.40 mg/dL    11/29/2012 0.7 0.5 - 1.4 mg/dL Final     Calcium   Date Value Ref Range Status   01/21/2020 9.3 8.6 - 10.4 mg/dL Final   11/29/2012 9.0 8.7 - 10.5 mg/dL Final     Total Protein   Date Value Ref Range Status   01/21/2020 6.8 6.1 - 8.1 g/dL Final     Albumin   Date Value Ref Range Status   01/21/2020 4.3 3.6 - 5.1 g/dL Final   10/09/2018 4.1 3.1 -  4.7 g/dL      Total Bilirubin   Date Value Ref Range Status   01/21/2020 0.3 0.2 - 1.2 mg/dL Final     Alkaline Phosphatase   Date Value Ref Range Status   01/21/2020 138 (H) 33 - 130 U/L Final     AST   Date Value Ref Range Status   01/21/2020 14 10 - 35 U/L Final     ALT   Date Value Ref Range Status   01/21/2020 12 6 - 29 U/L Final     Anion Gap   Date Value Ref Range Status   08/28/2019 5 (L) 8 - 16 mmol/L Final   11/29/2012 10 5 - 15 meq/L Final     eGFR if    Date Value Ref Range Status   01/21/2020 80 > OR = 60 mL/min/1.73m2 Final     eGFR if non    Date Value Ref Range Status   01/21/2020 69 > OR = 60 mL/min/1.73m2 Final     No results found for: CEA  No results found for: PSA        Assessment/Plan:     Problem List Items Addressed This Visit     Malignant neoplasm of lower lobe of right lung-Adenocarcinoma (Chronic)     I had an extensive discussion with Ms. Borderick today about the planned course of action in regard to her cancer.  She was taken off Opdivo in January for radiation therapy and the plan was to try Tecentriq with chemotherapy but this was refused by the insurance company and again on appeal.  Last visit we discussed chemotherapy with Taxotere in an effort to get the tumor in response using a medication which works different mechanistically.  She seemed ok with the decision at that time but now has decided she does not want this treatment due to toxicities and possible permanent hair loss.      I discussed revisiting treatment with Opdivo.  My rational for this is that her progression may have been pseudo progression and she only received five cycles of this.  She may still yet get a response from this medication.  Another reason is that I am highly concerned about the use of traditional chemotherapy in light of the covid-19 pandemic.  Chemotherapy has a much higher chance of worsening her immune system and I would therefore hold this.  Immunotherapy should be  better from this standpoint and as stated, may still have some benefit.   She is aggreeable with this plan and will work on getting this started ASAP.   Extensive discussion in a very complicated patient.          Relevant Orders    CT Chest Without Contrast    CBC auto differential    Comprehensive metabolic panel    CT Chest With Contrast    Creatinine, serum    Cancer associated pain     Pain has been doing better and at this point will continue with current pain medication approach.  Radiation likely very helpful here.                   Discussion:   High complexity due to intense lab monitoring, high risk medications.   Follow up in about 3 weeks (around 4/8/2020).      Electronically signed by Segundo Lamb

## 2020-03-18 NOTE — ASSESSMENT & PLAN NOTE
Pain has been doing better and at this point will continue with current pain medication approach.  Radiation likely very helpful here.

## 2020-03-25 ENCOUNTER — INFUSION (OUTPATIENT)
Dept: INFUSION THERAPY | Facility: HOSPITAL | Age: 65
End: 2020-03-25
Attending: INTERNAL MEDICINE
Payer: MEDICARE

## 2020-03-25 ENCOUNTER — HOSPITAL ENCOUNTER (OUTPATIENT)
Dept: RADIOLOGY | Facility: HOSPITAL | Age: 65
Discharge: HOME | End: 2020-03-25
Attending: INTERNAL MEDICINE
Payer: MEDICARE

## 2020-03-25 VITALS
BODY MASS INDEX: 24.77 KG/M2 | HEIGHT: 65 IN | HEART RATE: 102 BPM | TEMPERATURE: 98 F | RESPIRATION RATE: 20 BRPM | SYSTOLIC BLOOD PRESSURE: 146 MMHG | WEIGHT: 148.69 LBS | DIASTOLIC BLOOD PRESSURE: 77 MMHG

## 2020-03-25 DIAGNOSIS — D70.1 CHEMOTHERAPY-INDUCED NEUTROPENIA: ICD-10-CM

## 2020-03-25 DIAGNOSIS — C34.31 MALIGNANT NEOPLASM OF LOWER LOBE OF RIGHT LUNG: ICD-10-CM

## 2020-03-25 DIAGNOSIS — T45.1X5A CHEMOTHERAPY-INDUCED NEUTROPENIA: ICD-10-CM

## 2020-03-25 DIAGNOSIS — C34.31 MALIGNANT NEOPLASM OF LOWER LOBE OF RIGHT LUNG: Chronic | ICD-10-CM

## 2020-03-25 DIAGNOSIS — Z51.89 ENCOUNTER FOR OTHER SPECIFIED AFTERCARE: Primary | ICD-10-CM

## 2020-03-25 LAB
CREAT SERPL-MCNC: 0.9 MG/DL (ref 0.5–1.4)
SAMPLE: NORMAL

## 2020-03-25 PROCEDURE — 25500020 PHARM REV CODE 255: Mod: PO | Performed by: INTERNAL MEDICINE

## 2020-03-25 PROCEDURE — 96413 CHEMO IV INFUSION 1 HR: CPT

## 2020-03-25 PROCEDURE — 63600175 PHARM REV CODE 636 W HCPCS: Performed by: NURSE PRACTITIONER

## 2020-03-25 PROCEDURE — 71260 CT THORAX DX C+: CPT | Mod: TC,PO

## 2020-03-25 RX ORDER — SODIUM CHLORIDE 0.9 % (FLUSH) 0.9 %
10 SYRINGE (ML) INJECTION
Status: DISCONTINUED | OUTPATIENT
Start: 2020-03-25 | End: 2020-03-25 | Stop reason: HOSPADM

## 2020-03-25 RX ORDER — HEPARIN 100 UNIT/ML
500 SYRINGE INTRAVENOUS
Status: DISCONTINUED | OUTPATIENT
Start: 2020-03-25 | End: 2020-03-25 | Stop reason: HOSPADM

## 2020-03-25 RX ORDER — SODIUM CHLORIDE 0.9 % (FLUSH) 0.9 %
10 SYRINGE (ML) INJECTION
Status: CANCELLED | OUTPATIENT
Start: 2020-03-25

## 2020-03-25 RX ORDER — HEPARIN 100 UNIT/ML
500 SYRINGE INTRAVENOUS
Status: CANCELLED | OUTPATIENT
Start: 2020-03-25

## 2020-03-25 RX ADMIN — HEPARIN 500 UNITS: 100 SYRINGE at 03:03

## 2020-03-25 RX ADMIN — IOHEXOL 100 ML: 350 INJECTION, SOLUTION INTRAVENOUS at 08:03

## 2020-03-25 NOTE — PLAN OF CARE
Problem: Fatigue  Goal: Improved Activity Tolerance  Outcome: Ongoing, Progressing  Intervention: Promote Energy Conservation  Flowsheets (Taken 3/25/2020 8917)  Fatigue Management: frequent rest breaks encouraged  Sleep/Rest Enhancement: regular sleep/rest pattern promoted; noise level reduced; reading promoted  Activity Management: ambulated - L4; activity encouraged

## 2020-03-27 ENCOUNTER — PATIENT MESSAGE (OUTPATIENT)
Dept: HEMATOLOGY/ONCOLOGY | Facility: CLINIC | Age: 65
End: 2020-03-27

## 2020-03-27 DIAGNOSIS — C34.31 MALIGNANT NEOPLASM OF LOWER LOBE OF RIGHT LUNG: ICD-10-CM

## 2020-03-27 RX ORDER — FENTANYL 50 UG/1
1 PATCH TRANSDERMAL
Qty: 10 PATCH | Refills: 0 | Status: CANCELLED | OUTPATIENT
Start: 2020-03-27

## 2020-03-30 ENCOUNTER — TELEPHONE (OUTPATIENT)
Dept: HEMATOLOGY/ONCOLOGY | Facility: CLINIC | Age: 65
End: 2020-03-30

## 2020-03-30 ENCOUNTER — HOSPITAL ENCOUNTER (EMERGENCY)
Facility: HOSPITAL | Age: 65
Discharge: HOME | End: 2020-03-30
Attending: EMERGENCY MEDICINE
Payer: MEDICARE

## 2020-03-30 ENCOUNTER — PATIENT MESSAGE (OUTPATIENT)
Dept: HEMATOLOGY/ONCOLOGY | Facility: CLINIC | Age: 65
End: 2020-03-30

## 2020-03-30 VITALS
WEIGHT: 145 LBS | HEART RATE: 68 BPM | SYSTOLIC BLOOD PRESSURE: 141 MMHG | HEIGHT: 65 IN | RESPIRATION RATE: 17 BRPM | BODY MASS INDEX: 24.16 KG/M2 | OXYGEN SATURATION: 98 % | TEMPERATURE: 98 F | DIASTOLIC BLOOD PRESSURE: 68 MMHG

## 2020-03-30 DIAGNOSIS — G89.3 CANCER ASSOCIATED PAIN: ICD-10-CM

## 2020-03-30 DIAGNOSIS — R52 PAIN: ICD-10-CM

## 2020-03-30 DIAGNOSIS — M54.9 BACK PAIN: ICD-10-CM

## 2020-03-30 DIAGNOSIS — C34.31 MALIGNANT NEOPLASM OF LOWER LOBE OF RIGHT LUNG: ICD-10-CM

## 2020-03-30 DIAGNOSIS — G89.3 CHRONIC PAIN DUE TO NEOPLASM: ICD-10-CM

## 2020-03-30 DIAGNOSIS — M54.9 BACK PAIN, UNSPECIFIED BACK LOCATION, UNSPECIFIED BACK PAIN LATERALITY, UNSPECIFIED CHRONICITY: Primary | ICD-10-CM

## 2020-03-30 LAB
ALBUMIN SERPL BCP-MCNC: 3.7 G/DL (ref 3.5–5.2)
ALP SERPL-CCNC: 133 U/L (ref 55–135)
ALT SERPL W/O P-5'-P-CCNC: 9 U/L (ref 10–44)
ANION GAP SERPL CALC-SCNC: 7 MMOL/L (ref 8–16)
AST SERPL-CCNC: 14 U/L (ref 10–40)
BASOPHILS # BLD AUTO: 0.01 K/UL (ref 0–0.2)
BASOPHILS NFR BLD: 0.2 % (ref 0–1.9)
BILIRUB SERPL-MCNC: 0.6 MG/DL (ref 0.1–1)
BUN SERPL-MCNC: 13 MG/DL (ref 8–23)
CALCIUM SERPL-MCNC: 9.1 MG/DL (ref 8.7–10.5)
CHLORIDE SERPL-SCNC: 104 MMOL/L (ref 95–110)
CO2 SERPL-SCNC: 26 MMOL/L (ref 23–29)
CREAT SERPL-MCNC: 0.7 MG/DL (ref 0.5–1.4)
DIFFERENTIAL METHOD: ABNORMAL
EOSINOPHIL # BLD AUTO: 0.1 K/UL (ref 0–0.5)
EOSINOPHIL NFR BLD: 1 % (ref 0–8)
ERYTHROCYTE [DISTWIDTH] IN BLOOD BY AUTOMATED COUNT: 14 % (ref 11.5–14.5)
EST. GFR  (AFRICAN AMERICAN): >60 ML/MIN/1.73 M^2
EST. GFR  (NON AFRICAN AMERICAN): >60 ML/MIN/1.73 M^2
GLUCOSE SERPL-MCNC: 115 MG/DL (ref 70–110)
HCT VFR BLD AUTO: 31.5 % (ref 37–48.5)
HGB BLD-MCNC: 10.4 G/DL (ref 12–16)
IMM GRANULOCYTES # BLD AUTO: 0.03 K/UL (ref 0–0.04)
IMM GRANULOCYTES NFR BLD AUTO: 0.6 % (ref 0–0.5)
LYMPHOCYTES # BLD AUTO: 0.3 K/UL (ref 1–4.8)
LYMPHOCYTES NFR BLD: 6.4 % (ref 18–48)
MCH RBC QN AUTO: 30.5 PG (ref 27–31)
MCHC RBC AUTO-ENTMCNC: 33 G/DL (ref 32–36)
MCV RBC AUTO: 92 FL (ref 82–98)
MONOCYTES # BLD AUTO: 0.4 K/UL (ref 0.3–1)
MONOCYTES NFR BLD: 7.6 % (ref 4–15)
NEUTROPHILS # BLD AUTO: 4.1 K/UL (ref 1.8–7.7)
NEUTROPHILS NFR BLD: 84.2 % (ref 38–73)
NRBC BLD-RTO: 0 /100 WBC
PLATELET # BLD AUTO: 177 K/UL (ref 150–350)
PMV BLD AUTO: 8.8 FL (ref 9.2–12.9)
POTASSIUM SERPL-SCNC: 4.1 MMOL/L (ref 3.5–5.1)
PROT SERPL-MCNC: 7.1 G/DL (ref 6–8.4)
RBC # BLD AUTO: 3.41 M/UL (ref 4–5.4)
SODIUM SERPL-SCNC: 137 MMOL/L (ref 136–145)
WBC # BLD AUTO: 4.88 K/UL (ref 3.9–12.7)

## 2020-03-30 PROCEDURE — 80053 COMPREHEN METABOLIC PANEL: CPT

## 2020-03-30 PROCEDURE — 96376 TX/PRO/DX INJ SAME DRUG ADON: CPT

## 2020-03-30 PROCEDURE — 85025 COMPLETE CBC W/AUTO DIFF WBC: CPT

## 2020-03-30 PROCEDURE — 99284 EMERGENCY DEPT VISIT MOD MDM: CPT | Mod: 25

## 2020-03-30 PROCEDURE — 63600175 PHARM REV CODE 636 W HCPCS: Performed by: PHYSICIAN ASSISTANT

## 2020-03-30 PROCEDURE — 96375 TX/PRO/DX INJ NEW DRUG ADDON: CPT

## 2020-03-30 PROCEDURE — 96374 THER/PROPH/DIAG INJ IV PUSH: CPT

## 2020-03-30 RX ORDER — MORPHINE SULFATE 4 MG/ML
4 INJECTION, SOLUTION INTRAMUSCULAR; INTRAVENOUS
Status: COMPLETED | OUTPATIENT
Start: 2020-03-30 | End: 2020-03-30

## 2020-03-30 RX ORDER — HYDROCODONE BITARTRATE AND ACETAMINOPHEN 10; 325 MG/1; MG/1
1 TABLET ORAL
Qty: 60 TABLET | Refills: 0 | Status: SHIPPED | OUTPATIENT
Start: 2020-03-30 | End: 2020-05-07 | Stop reason: SDUPTHER

## 2020-03-30 RX ORDER — MORPHINE SULFATE 4 MG/ML
2 INJECTION, SOLUTION INTRAMUSCULAR; INTRAVENOUS
Status: COMPLETED | OUTPATIENT
Start: 2020-03-30 | End: 2020-03-30

## 2020-03-30 RX ORDER — FENTANYL 50 UG/1
1 PATCH TRANSDERMAL
Qty: 10 PATCH | Refills: 0 | Status: SHIPPED | OUTPATIENT
Start: 2020-03-30 | End: 2020-04-27 | Stop reason: SDUPTHER

## 2020-03-30 RX ORDER — ONDANSETRON 2 MG/ML
4 INJECTION INTRAMUSCULAR; INTRAVENOUS
Status: COMPLETED | OUTPATIENT
Start: 2020-03-30 | End: 2020-03-30

## 2020-03-30 RX ADMIN — MORPHINE SULFATE 4 MG: 4 INJECTION INTRAVENOUS at 12:03

## 2020-03-30 RX ADMIN — MORPHINE SULFATE 2 MG: 4 INJECTION INTRAVENOUS at 02:03

## 2020-03-30 RX ADMIN — ONDANSETRON 4 MG: 2 INJECTION INTRAMUSCULAR; INTRAVENOUS at 12:03

## 2020-03-30 NOTE — TELEPHONE ENCOUNTER
Patient called in tears stating that she is in so much pain. She asked if anything showed on her CT scan. There are no lesions showing at the spot she states she is having pain. She does have a new compression fracture at the T4. She is in severe pain and taking Fentayl patch 50mcg. Patient notified to go to the ER for IV pain medications that the patch will not immediately relieve her pain. She may need a ct or MRI of her cervical spine. She is having pain in right collar bone and moves done to her buttocks. Patient instructed to go to the ER.

## 2020-03-30 NOTE — TELEPHONE ENCOUNTER
----- Message from Kelly Gandhi, Patient Care Assistant sent at 3/30/2020  9:52 AM CDT -----  Patient called in for a refill on her prescription= Fentanyl patches . She can be reached at [phone]

## 2020-03-30 NOTE — ED NOTES
To er with c/o upper back, r shoulder pain. Pt states + metastatic ca. Pt states told to come to er for pain control. Speech clear. Skin w/dr/pk. rr even/unlab. Majessi. Aaox4. Skin w/dr/pk. Pt denies trauma+ bilat radial pulse. Cap refill <3 sec distal. Moves fingers freely.

## 2020-03-30 NOTE — TELEPHONE ENCOUNTER
----- Message from Kelly Gandhi, Patient Care Assistant sent at 3/27/2020 10:40 AM CDT -----  Patient called in stating she need someone to call her Regarding her CT scan and she looked her results up on [employer] and she is very concerned and need to speak with someone concerning her results. She can be reached at [phone]

## 2020-03-30 NOTE — ED PROVIDER NOTES
Encounter Date: 3/30/2020       History     Chief Complaint   Patient presents with    Back Pain     R  shoulder pain , history of metastatic cancer     64-year-old female presenting with complaints the right back neck pain x1 day has history of lung CA with Mets to bones.  Patient denies any shortness of breath has pain worse with movement movement of right shoulder neck.  Patient has a history of thoracic compression fracture as well.  Oncologist is Dr. Nuñez        Review of patient's allergies indicates:   Allergen Reactions    Penicillins Rash     Other reaction(s): Rash    Prolia [denosumab] Other (See Comments)     myalgias    Trazodone Anxiety     Severe anxiety      Past Medical History:   Diagnosis Date    Allergy     seasonal    Anemia associated with chemotherapy 7/18/2018    Anxiety 2012    on meds    Arthritis     Asthma 1960    no inhalers    Back pain 2010    PT    Chemotherapy-induced neutropenia 5/21/2018    Dizziness     Essential hypertension 4/11/2019    no meds    Fall 05/2019    fx of lumbar spine, left foot and ankle    Fibromyalgia     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     Fracture of right foot     GERD (gastroesophageal reflux disease) 2015    on meds    Hep B w/o coma 1980    no treatment, hospitalized    Hiatal hernia 2015    on meds    Hyperlipidemia     Major depressive disorder, recurrent episode, in partial remission     Malignant neoplasm of lower lobe of right lung-Adenocarcinoma 5/21/2018    MVP (mitral valve prolapse) 2014    no meds    Myalgia and myositis, unspecified     OCD (obsessive compulsive disorder)     Osteoporosis     Polyneuropathy     SOB (shortness of breath) on exertion 10/2018    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 8/23/2019    Trouble in sleeping     Urinary incontinence      Past Surgical History:   Procedure Laterality Date    BONE BIOPSY N/A 8/5/2019    Procedure: BIOPSY, BONE;   Surgeon: Ashley Diagnostic Provider;  Location: [facility] OR;  Service: General;  Laterality: N/A;    CARPAL TUNNEL RELEASE Bilateral 2014    EYE SURGERY      RK    FRACTURE SURGERY Right     wrist orif    INSERTION OF TUNNELED CENTRAL VENOUS CATHETER (CVC) WITH SUBCUTANEOUS PORT Left 2019    Procedure: INSERTION, PORT-A-CATH;  Surgeon: Brant Welch MD;  Location: [facility] OR;  Service: Cardiovascular;  Laterality: Left;    LUNG REMOVAL, PARTIAL  2018    Dr. Brant Welch    port a cath  2018    TUBAL LIGATION       Family History   Problem Relation Age of Onset    Heart disease Mother          to to coma (hypothermia)     Heart disease Father 57        heart attack    Heart disease Sister         stents    Diabetes Sister     Parkinsonism Sister     Heart disease Brother 45        death at 45 years old due to hearth disease     Diabetes Maternal Grandmother      Social History     Tobacco Use    Smoking status: Never Smoker    Smokeless tobacco: Never Used   Substance Use Topics    Alcohol use: No    Drug use: No     Review of Systems   Cardiovascular: Negative.    Gastrointestinal: Negative.    Musculoskeletal: Positive for back pain, myalgias and neck pain. Negative for arthralgias and gait problem.   All other systems reviewed and are negative.      Physical Exam     Initial Vitals [20 1121]   BP Pulse Resp Temp SpO2   130/60 97 (!) 22 98.7 °F (37.1 °C) 95 %      MAP       --         Physical Exam    Nursing note and vitals reviewed.  Constitutional: She appears well-developed and well-nourished.   Patient appears uncomfortable   HENT:   Head: Atraumatic.   Eyes: Conjunctivae and EOM are normal.   Neck: Neck supple.   Midline cervical tenderness trapezius tenderness pain with mid range of motion right shoulder   Cardiovascular: Normal rate and regular rhythm.   Pulmonary/Chest: Breath sounds normal.   Abdominal: Soft. Bowel sounds are normal.   Musculoskeletal: She  exhibits tenderness.   Skin: Skin is warm. Capillary refill takes less than 2 seconds.         ED Course   Procedures  Labs Reviewed   CBC W/ AUTO DIFFERENTIAL - Abnormal; Notable for the following components:       Result Value    RBC 3.41 (*)     Hemoglobin 10.4 (*)     Hematocrit 31.5 (*)     MPV 8.8 (*)     Immature Granulocytes 0.6 (*)     Lymph # 0.3 (*)     Gran% 84.2 (*)     Lymph% 6.4 (*)     All other components within normal limits   COMPREHENSIVE METABOLIC PANEL - Abnormal; Notable for the following components:    Glucose 115 (*)     ALT 9 (*)     Anion Gap 7 (*)     All other components within normal limits          Imaging Results          X-Ray Shoulder Complete 2 View Right (Final result)  Result time 03/30/20 13:24:11    Final result by Ghassan Julio MD (03/30/20 13:24:11)                 Impression:      1. No acute osseous abnormality.  2. Degenerative changes of the AC joint.      Electronically signed by: Ghassan Julio MD  Date:    03/30/2020  Time:    13:24             Narrative:    EXAMINATION:  XR SHOULDER COMPLETE 2 OR MORE VIEWS RIGHT    CLINICAL HISTORY:  Dorsalgia, unspecified    FINDINGS:  Three radiographic views of the right shoulder show no fracture, dislocation, or destructive osseous lesion. Soft tissues are unremarkable. Degenerative changes of the AC joint noted.                               X-Ray Cervical Spine AP And Lateral (Final result)  Result time 03/30/20 13:25:33    Final result by Ghassan Julio MD (03/30/20 13:25:33)                 Impression:      1. No acute osseous abnormality.  2. Degenerative changes as described.      Electronically signed by: Ghassan Julio MD  Date:    03/30/2020  Time:    13:25             Narrative:    EXAMINATION:  XR CERVICAL SPINE AP LATERAL    CLINICAL HISTORY:  Pain, unspecified    FINDINGS:  Four views of cervical spine show normal alignment. No fracture or destructive osseous lesion. Mild disc height loss noted at C5-6 with  associated anterior marginal vertebral body osteophytosis.  Mild multilevel uncovertebral and facet joint arthropathy.    Paraspinal soft tissues are unremarkable.                                 Medical Decision Making:   ED Management:  Call Dr. Nuñez  Office spoke to nurse practitioner Raven, advised pain control given morphine 4 mg with sore 4 mg of Zofran, patient had mild improvement still having pain discussed results of x-ray no fracture DJD.  States may switch off fentanyl patch today and take break through medication as per oncologist office Raven and will follow up for further imaging.                                   Clinical Impression:       ICD-10-CM ICD-9-CM   1. Back pain, unspecified back location, unspecified back pain laterality, unspecified chronicity M54.9 724.5   2. Back pain M54.9 724.5   3. Pain R52 780.96   4. Chronic pain due to neoplasm G89.3 338.3         Disposition:   Disposition: Discharged  Condition: Stable     ED Disposition Condition    Discharge Stable        ED Prescriptions     None        Follow-up Information     Follow up With Specialties Details Why Contact Info    Demetrio Pleitez Jr., MD Family Medicine In 2 days  [address] [facility] [id number]  [phone]                                       Alba Jeronimo PA-C  03/30/20 [id number]

## 2020-04-01 ENCOUNTER — TELEPHONE (OUTPATIENT)
Dept: HEMATOLOGY/ONCOLOGY | Facility: CLINIC | Age: 65
End: 2020-04-01

## 2020-04-01 NOTE — TELEPHONE ENCOUNTER
After talking to Sheri again this afternoon. She said that yesterday she took 2 somas the whole day and only 1 Norco 10/325mg. So we are not going to increase her duragesic 50mcq at this time. She will let me know how she feels in the next few days and of course will call me back sooner if needed.

## 2020-04-01 NOTE — TELEPHONE ENCOUNTER
Called Sehri this morning to check on her. She had gone to the [facility] ER on Monday due to uncontrolable pain. She was given iv morphine 4mg and after her pain subsided she was discharged to home. She said this morning that her pain has returned to the baseline level. She is currently on 50mcq duragesic patch every 72 hours.  She does take Norco 10/325mg every 6 hours throughout the day as well. I will discuss with Dr. Zhang to see if he wants to increase the dose  of duragesic patch. Sheri will call me back tomorrow and let me know how she is doing.

## 2020-04-05 ENCOUNTER — PATIENT MESSAGE (OUTPATIENT)
Dept: HEMATOLOGY/ONCOLOGY | Facility: CLINIC | Age: 65
End: 2020-04-05

## 2020-04-07 ENCOUNTER — TELEPHONE (OUTPATIENT)
Dept: INFUSION THERAPY | Facility: HOSPITAL | Age: 65
End: 2020-04-07

## 2020-04-07 NOTE — TELEPHONE ENCOUNTER
Patient called and informed she needs labs weekly and asked to come in a little early to have her labs done and screening was also done

## 2020-04-08 ENCOUNTER — HOSPITAL ENCOUNTER (OUTPATIENT)
Dept: RADIOLOGY | Facility: HOSPITAL | Age: 65
Discharge: HOME | End: 2020-04-08
Attending: NURSE PRACTITIONER
Payer: MEDICARE

## 2020-04-08 ENCOUNTER — INFUSION (OUTPATIENT)
Dept: INFUSION THERAPY | Facility: HOSPITAL | Age: 65
End: 2020-04-08
Attending: INTERNAL MEDICINE
Payer: MEDICARE

## 2020-04-08 VITALS
HEIGHT: 65 IN | WEIGHT: 147 LBS | TEMPERATURE: 98 F | BODY MASS INDEX: 24.49 KG/M2 | HEART RATE: 85 BPM | RESPIRATION RATE: 18 BRPM | DIASTOLIC BLOOD PRESSURE: 71 MMHG | SYSTOLIC BLOOD PRESSURE: 116 MMHG

## 2020-04-08 DIAGNOSIS — C34.31 SQUAMOUS CELL CARCINOMA OF BRONCHUS IN RIGHT LOWER LOBE: ICD-10-CM

## 2020-04-08 DIAGNOSIS — C34.31 MALIGNANT NEOPLASM OF LOWER LOBE OF RIGHT LUNG: ICD-10-CM

## 2020-04-08 DIAGNOSIS — M25.552 LEFT HIP PAIN: ICD-10-CM

## 2020-04-08 DIAGNOSIS — Z51.89 ENCOUNTER FOR OTHER SPECIFIED AFTERCARE: Primary | ICD-10-CM

## 2020-04-08 DIAGNOSIS — T45.1X5A CHEMOTHERAPY-INDUCED NEUTROPENIA: ICD-10-CM

## 2020-04-08 DIAGNOSIS — M25.552 LEFT HIP PAIN: Primary | ICD-10-CM

## 2020-04-08 DIAGNOSIS — D70.1 CHEMOTHERAPY-INDUCED NEUTROPENIA: ICD-10-CM

## 2020-04-08 PROCEDURE — 73502 X-RAY EXAM HIP UNI 2-3 VIEWS: CPT | Mod: TC,PO,LT

## 2020-04-08 PROCEDURE — 63600175 PHARM REV CODE 636 W HCPCS: Performed by: INTERNAL MEDICINE

## 2020-04-08 PROCEDURE — 96413 CHEMO IV INFUSION 1 HR: CPT

## 2020-04-08 RX ORDER — HEPARIN 100 UNIT/ML
500 SYRINGE INTRAVENOUS
Status: CANCELLED | OUTPATIENT
Start: 2020-04-08

## 2020-04-08 RX ORDER — SODIUM CHLORIDE 0.9 % (FLUSH) 0.9 %
10 SYRINGE (ML) INJECTION
Status: DISCONTINUED | OUTPATIENT
Start: 2020-04-08 | End: 2020-04-08 | Stop reason: HOSPADM

## 2020-04-08 RX ORDER — HEPARIN 100 UNIT/ML
500 SYRINGE INTRAVENOUS
Status: DISCONTINUED | OUTPATIENT
Start: 2020-04-08 | End: 2020-04-08 | Stop reason: HOSPADM

## 2020-04-08 RX ORDER — SODIUM CHLORIDE 0.9 % (FLUSH) 0.9 %
10 SYRINGE (ML) INJECTION
Status: CANCELLED | OUTPATIENT
Start: 2020-04-08

## 2020-04-08 RX ADMIN — HEPARIN 500 UNITS: 100 SYRINGE at 03:04

## 2020-04-09 ENCOUNTER — TELEPHONE (OUTPATIENT)
Dept: HEMATOLOGY/ONCOLOGY | Facility: CLINIC | Age: 65
End: 2020-04-09

## 2020-04-09 NOTE — TELEPHONE ENCOUNTER
----- Message from MANDO Laws sent at 4/8/2020  4:37 PM CDT -----  Please let her know that her left hip Xray shows nothing abnormal. She can take motrin to help with the pain as well as use a heating pad. If this is not better to let us know. She sees Dr. Zhang on 4/13/2020.        Spoke to her with above info.

## 2020-04-10 ENCOUNTER — PATIENT MESSAGE (OUTPATIENT)
Dept: HEMATOLOGY/ONCOLOGY | Facility: CLINIC | Age: 65
End: 2020-04-10

## 2020-04-13 ENCOUNTER — OFFICE VISIT (OUTPATIENT)
Dept: HEMATOLOGY/ONCOLOGY | Facility: CLINIC | Age: 65
End: 2020-04-13
Payer: MEDICARE

## 2020-04-13 DIAGNOSIS — C34.31 MALIGNANT NEOPLASM OF LOWER LOBE OF RIGHT LUNG: Chronic | ICD-10-CM

## 2020-04-13 DIAGNOSIS — C79.51 BONE METASTASES: ICD-10-CM

## 2020-04-13 DIAGNOSIS — G89.3 CANCER ASSOCIATED PAIN: ICD-10-CM

## 2020-04-13 PROCEDURE — 99214 PR OFFICE/OUTPT VISIT, EST, LEVL IV, 30-39 MIN: ICD-10-PCS | Mod: 95,,, | Performed by: INTERNAL MEDICINE

## 2020-04-13 PROCEDURE — 1101F PT FALLS ASSESS-DOCD LE1/YR: CPT | Mod: 95,,, | Performed by: INTERNAL MEDICINE

## 2020-04-13 PROCEDURE — 99214 OFFICE O/P EST MOD 30 MIN: CPT | Mod: 95,,, | Performed by: INTERNAL MEDICINE

## 2020-04-13 PROCEDURE — 1101F PR PT FALLS ASSESS DOC 0-1 FALLS W/OUT INJ PAST YR: ICD-10-PCS | Mod: 95,,, | Performed by: INTERNAL MEDICINE

## 2020-04-13 NOTE — PROGRESS NOTES
Subjective:       Patient ID: Sheri Broderick is a 64 y.o. female.    Chief Complaint: lung cancer.     HPI:  Patient is doing well currently but has had 2 episodes of severe pain recently which prompted her to go to the ER.  Over the last 2 weeks however, she has only needed approximately on norco daily.     Fentanyl 50mcg  Norco 10mg    Opdivo Resumed:  Cycle 1: 3/25/2020  Cycle 2: 4/8/2020  Cycle 3: 4/22/2020-due    Pet impression 1/6/2019:  1.  Status post right lower lumbar ectomy with no focal increased FDG activity in the lungs.    2.  FDG avid mediastinal and right hilar lymph nodes, similar to the previous exam.    3.  Diffuse scattered increased FDG activity in the axial skeleton, with relative interval worsening from the previous exam.    4.  Interval development of a small FDG avid nodule in the left adrenal gland concerning for metastatic disease.    PET Impression 7/8/2019:  1. FDG avid enlarged lymph nodes in right hilum and lower paratracheal region  of mediastinal, and additional foci of FDG uptake involving L3 vertebral body  and anterior left acetabulum also suspicious for new metastatic disease.    2. Nonspecific FDG uptake associated with right T2 transverse process near  articulation with posterior right second rib as above. Attention to this on  follow-up PET/CT is suggested.    3. Recent compression fracture of L2 vertebral body superior endplate since  04/09/2019, with associated FDG uptake which is inflammatory in nature.    L3 lesion biopsy 8/7/2019:  Adenocarcinoma c/w lung primary  PD-L1: 9%  ALK neg  Ros-1 Neg  EGFR Neg    All medications and past medical and surgical history have been reviewed.     The patient location is: Home  Visit type: Virtual visit with synchronous audio and video due to covid 19 pandemic crisis.    Review of patient's allergies indicates:   Allergen Reactions    Penicillins Rash     Other reaction(s): Rash    Prolia [denosumab] Other (See Comments)      myalgias    Trazodone Anxiety     Severe anxiety        ROS  GEN:   normal without any fever, night sweats or weight loss  HEENT:  normal with no HA's,  changes in vision  CV:   normal with no CP, SOB  PULM:  normal with no SOB, cough  GI:   normal with no abdominal pain, nausea, vomiting  :   normal with no hematuria, dysuria  SKIN:   normal with no rash      Previous FAMHX and SOCHX information reviewed and remains unchanged         Objective:        Physical Exam  There were no vitals taken for this visit.  GEN:   no apparent distress, comfortable; AAOx3  HEAD:  atraumatic and normocephalic  EYES:   no pallor, no icterus,  PSYCH:  normal mood, affect and behavior  PULM:   no apparent distress, breathing unlabored.  NEURO:  Grossly intact.   MUSC:   Visibly normal ROM throughout.           All lab results and imaging results have been reviewed and discussed with the patient  Recent Results (from the past 336 hour(s))   CBC auto differential    Collection Time: 04/08/20  1:54 PM   Result Value Ref Range    WBC 4.44 3.90 - 12.70 K/uL    Hemoglobin 10.4 (L) 12.0 - 16.0 g/dL    Hematocrit 32.1 (L) 37.0 - 48.5 %    Platelets 197 150 - 350 K/uL     CMP  Sodium   Date Value Ref Range Status   04/08/2020 138 136 - 145 mmol/L Final   10/09/2018 140 134 - 144 mmol/L      Potassium   Date Value Ref Range Status   04/08/2020 4.6 3.5 - 5.1 mmol/L Final     Chloride   Date Value Ref Range Status   04/08/2020 102 95 - 110 mmol/L Final   10/09/2018 105 98 - 110 mmol/L      CO2   Date Value Ref Range Status   04/08/2020 26 23 - 29 mmol/L Final     Glucose   Date Value Ref Range Status   04/08/2020 115 (H) 70 - 110 mg/dL Final   10/09/2018 101 (H) 70 - 99 mg/dL      BUN, Bld   Date Value Ref Range Status   04/08/2020 14 8 - 23 mg/dL Final     Creatinine   Date Value Ref Range Status   04/08/2020 0.9 0.5 - 1.4 mg/dL Final   04/08/2020 0.9 0.5 - 1.4 mg/dL Final   10/09/2018 0.80 0.60 - 1.40 mg/dL    11/29/2012 0.7 0.5 - 1.4 mg/dL  Final     Calcium   Date Value Ref Range Status   04/08/2020 9.5 8.7 - 10.5 mg/dL Final   11/29/2012 9.0 8.7 - 10.5 mg/dL Final     Total Protein   Date Value Ref Range Status   04/08/2020 7.3 6.0 - 8.4 g/dL Final     Albumin   Date Value Ref Range Status   04/08/2020 3.9 3.5 - 5.2 g/dL Final   10/09/2018 4.1 3.1 - 4.7 g/dL      Total Bilirubin   Date Value Ref Range Status   04/08/2020 0.5 0.1 - 1.0 mg/dL Final     Comment:     For infants and newborns, interpretation of results should be based  on gestational age, weight and in agreement with clinical  observations.  Premature Infant recommended reference ranges:  Up to 24 hours.............<8.0 mg/dL  Up to 48 hours............<12.0 mg/dL  3-5 days..................<15.0 mg/dL  6-29 days.................<15.0 mg/dL       Alkaline Phosphatase   Date Value Ref Range Status   04/08/2020 133 55 - 135 U/L Final     AST   Date Value Ref Range Status   04/08/2020 14 10 - 40 U/L Final     ALT   Date Value Ref Range Status   04/08/2020 6 (L) 10 - 44 U/L Final     Anion Gap   Date Value Ref Range Status   04/08/2020 10 8 - 16 mmol/L Final   11/29/2012 10 5 - 15 meq/L Final     eGFR if    Date Value Ref Range Status   04/08/2020 >60.0 >60 mL/min/1.73 m^2 Final   04/08/2020 >60.0 >60 mL/min/1.73 m^2 Final     eGFR if non    Date Value Ref Range Status   04/08/2020 >60.0 >60 mL/min/1.73 m^2 Final     Comment:     Calculation used to obtain the estimated glomerular filtration  rate (eGFR) is the CKD-EPI equation.      04/08/2020 >60.0 >60 mL/min/1.73 m^2 Final     Comment:     Calculation used to obtain the estimated glomerular filtration  rate (eGFR) is the CKD-EPI equation.          Total time spent with patient: 15 minutes.   Assessment:      1. Bone metastases    2. Cancer associated pain    3. Malignant neoplasm of lower lobe of right lung-Adenocarcinoma      Problem List Items Addressed This Visit     Malignant neoplasm of lower lobe of  right lung-Adenocarcinoma (Chronic)     Patient is doing well on Opdivo and is tolerating this well.      Will add Xgeva for bone met and fracture protection as she now has a new T5 compression fracture.           Bone metastases    Cancer associated pain     Patient seems to be doing well and has been using approximately on pain pill(10mg Norco), daily over the last 10 days.  She remains on Fentanyl 50mcg patch as well as muscle relaxer.  Will continue this approach for now and discussed today.                  Plan:   As Above in Assessment      The plan was discussed with the patient and all questions/concerns have been answered to the patient's satisfaction.          Thank you for allowing me to participate in this pleasant patient's care. Please call with any questions or concerns.

## 2020-04-13 NOTE — ASSESSMENT & PLAN NOTE
Patient seems to be doing well and has been using approximately on pain pill(10mg Norco), daily over the last 10 days.  She remains on Fentanyl 50mcg patch as well as muscle relaxer.  Will continue this approach for now and discussed today.

## 2020-04-13 NOTE — ASSESSMENT & PLAN NOTE
Patient is doing well on Opdivo and is tolerating this well.      Will add Xgeva for bone met and fracture protection as she now has a new T5 compression fracture.

## 2020-04-13 NOTE — PROGRESS NOTES
PROGRESS NOTE    Subjective:       Patient ID: Sheri Broderick is a 64 y.o. female.    S/P RLL Lobectomy: 4/27/2018  2.4cm adenocarcinoma  1/4 LNs pos for cancer(station 10)  Completed Cis/Alimta x 4 8/10/2018    XRT to L2-L4 and left hip 9/18/2019    Recurrence biopsy proven L3 8/7/2019  L3 lesion biopsy 8/7/2019:  Adenocarcinoma c/w lung primary  PD-L1: 9%  ALK neg  Ros-1 Neg  EGFR Neg    Nivolumab therapy x 6  10/22/2019-1/9/2020--progression    Radiation x 10:  2/4/2020-2/18/2020(T5-T8--due to pain)      Chief Complaint:  No chief complaint on file.  lung cancer, chemo follow up.     History of Present Illness:   Sheri Broderick is a 64 y.o. female who presents for follow up.  Patient has continued complaints of fatigue and pain.        Fentanyl 50mcg  Norco 10mg    Opdivo Resumed:  Cycle 1: 3/25/2020  Cycle 2: 4/8/2020  Cycle 3: 4/22/2020-due    Pet impression 1/6/2019:  1.  Status post right lower lumbar ectomy with no focal increased FDG activity in the lungs.    2.  FDG avid mediastinal and right hilar lymph nodes, similar to the previous exam.    3.  Diffuse scattered increased FDG activity in the axial skeleton, with relative interval worsening from the previous exam.    4.  Interval development of a small FDG avid nodule in the left adrenal gland concerning for metastatic disease.    PET Impression 7/8/2019:  1. FDG avid enlarged lymph nodes in right hilum and lower paratracheal region  of mediastinal, and additional foci of FDG uptake involving L3 vertebral body  and anterior left acetabulum also suspicious for new metastatic disease.    2. Nonspecific FDG uptake associated with right T2 transverse process near  articulation with posterior right second rib as above. Attention to this on  follow-up PET/CT is suggested.    3. Recent compression fracture of L2 vertebral body superior endplate since  04/09/2019, with associated FDG uptake which  is inflammatory in nature.    L3 lesion biopsy 8/7/2019:  Adenocarcinoma c/w lung primary  PD-L1: 9%  ALK neg  Ros-1 Neg  EGFR Neg    Family and Social history reviewed and is unchanged from May 21, 2018      ROS:  Review of Systems   Constitutional: Negative for appetite change, fever and unexpected weight change.   Eyes: Negative for visual disturbance.   Respiratory: Negative for cough and shortness of breath.    Cardiovascular: Negative for chest pain and leg swelling.   Gastrointestinal: Negative for abdominal pain, blood in stool and diarrhea.   Genitourinary: Negative for frequency and hematuria.   Musculoskeletal: Negative for back pain.   Skin: Negative for rash.   Neurological: Positive for headaches.   Hematological: Negative for adenopathy.   Psychiatric/Behavioral: The patient is nervous/anxious.           Current Outpatient Medications:     benzonatate (TESSALON PERLES) 100 MG capsule, Take 1 capsule (100 mg total) by mouth every 6 (six) hours as needed for Cough., Disp: 30 capsule, Rfl: 1    CHEWABLE VITAMIN C 500 mg Chew, , Disp: , Rfl:     clonazePAM (KLONOPIN) 1 MG tablet, Take one-half to one tablet two to three times daily only as needed for anxiety, Disp: 90 tablet, Rfl: 0    dexAMETHasone (DECADRON) 4 MG Tab, Take 1 tablet (4 mg total) by mouth 2 (two) times daily. Take 2 tabs by mouth twice a day the day before chemo and the day after., Disp: 24 tablet, Rfl: 5    dextroamphetamine-amphetamine 10 mg Tab, Take one tablet PO in the morning, Disp: 30 tablet, Rfl: 0    esomeprazole magnesium (NEXIUM 24HR) 20 mg TbEC, Take 20 mg by mouth once daily. , Disp: , Rfl:     famotidine (PEPCID) 40 MG tablet, Take 1 tablet (40 mg total) by mouth every evening., Disp: 30 tablet, Rfl: 6    fentaNYL (DURAGESIC) 50 mcg/hr, Place 1 patch onto the skin every 72 hours., Disp: 10 patch, Rfl: 0    fluticasone (FLONASE) 50 mcg/actuation nasal spray, 1 spray by Each Nare route once daily., Disp: , Rfl:      HYDROcodone-acetaminophen (NORCO)  mg per tablet, Take 1 tablet by mouth every 4 to 6 hours as needed for Pain., Disp: 60 tablet, Rfl: 0    ibuprofen (ADVIL,MOTRIN) 800 MG tablet, Take 1 tablet (800 mg total) by mouth 2 (two) times daily as needed for Pain., Disp: 180 tablet, Rfl: 3    lamoTRIgine (LAMICTAL) 200 MG tablet, Take 1 tablet (200 mg total) by mouth 2 (two) times daily., Disp: 180 tablet, Rfl: 0    lidocaine-prilocaine (EMLA) cream, , Disp: , Rfl:     meclizine (ANTIVERT) 25 mg tablet, Take 1 tablet (25 mg total) by mouth every 8 (eight) hours as needed for Dizziness., Disp: 90 tablet, Rfl: 3    mirtazapine (REMERON) 30 MG tablet, Take one tablet PO nightly, Disp: 90 tablet, Rfl: 0    ondansetron (ZOFRAN-ODT) 8 MG TbDL, Take 8 mg by mouth every 8 (eight) hours as needed., Disp: , Rfl:     promethazine (PHENERGAN) 25 MG tablet, Take 1 tablet (25 mg total) by mouth every 6 (six) hours as needed for Nausea., Disp: 30 tablet, Rfl: 6    senna (SENOKOT) 8.6 mg tablet, Take 1 tablet by mouth once daily., Disp: , Rfl:     sertraline (ZOLOFT) 100 MG tablet, Take 2 tablets (200 mg total) by mouth once daily. for 14 days, Disp: 28 tablet, Rfl: 0    VITAMIN B-12 1000 MCG tablet, , Disp: , Rfl:     VITAMIN E ACETATE ORAL, Take by mouth., Disp: , Rfl:     ZYRTEC 10 mg Cap, continuous prn., Disp: , Rfl:         Objective:       Physical Examination:     There were no vitals taken for this visit.    Physical Exam   Constitutional: She appears well-developed and well-nourished.   HENT:   Head: Normocephalic and atraumatic.   Right Ear: External ear normal.   Left Ear: External ear normal.   Mouth/Throat: Oropharynx is clear and moist.   Eyes: Pupils are equal, round, and reactive to light. Conjunctivae are normal.   Neck: No tracheal deviation present. No thyromegaly present.   Cardiovascular: Normal rate, regular rhythm and normal heart sounds.   Pulmonary/Chest: Effort normal and breath sounds normal.    Abdominal: Soft. Bowel sounds are normal. She exhibits no distension and no mass. There is no tenderness.   Musculoskeletal: She exhibits no edema.   Neurological:   Neuro intact througout   Skin: No rash noted.   Psychiatric: She has a normal mood and affect. Her behavior is normal. Judgment and thought content normal.       Labs:   Recent Results (from the past 336 hour(s))   CBC auto differential    Collection Time: 04/08/20  1:54 PM   Result Value Ref Range    WBC 4.44 3.90 - 12.70 K/uL    Hemoglobin 10.4 (L) 12.0 - 16.0 g/dL    Hematocrit 32.1 (L) 37.0 - 48.5 %    Platelets 197 150 - 350 K/uL   CBC auto differential    Collection Time: 03/30/20 12:11 PM   Result Value Ref Range    WBC 4.88 3.90 - 12.70 K/uL    Hemoglobin 10.4 (L) 12.0 - 16.0 g/dL    Hematocrit 31.5 (L) 37.0 - 48.5 %    Platelets 177 150 - 350 K/uL     CMP  Sodium   Date Value Ref Range Status   04/08/2020 138 136 - 145 mmol/L Final   10/09/2018 140 134 - 144 mmol/L      Potassium   Date Value Ref Range Status   04/08/2020 4.6 3.5 - 5.1 mmol/L Final     Chloride   Date Value Ref Range Status   04/08/2020 102 95 - 110 mmol/L Final   10/09/2018 105 98 - 110 mmol/L      CO2   Date Value Ref Range Status   04/08/2020 26 23 - 29 mmol/L Final     Glucose   Date Value Ref Range Status   04/08/2020 115 (H) 70 - 110 mg/dL Final   10/09/2018 101 (H) 70 - 99 mg/dL      BUN, Bld   Date Value Ref Range Status   04/08/2020 14 8 - 23 mg/dL Final     Creatinine   Date Value Ref Range Status   04/08/2020 0.9 0.5 - 1.4 mg/dL Final   04/08/2020 0.9 0.5 - 1.4 mg/dL Final   10/09/2018 0.80 0.60 - 1.40 mg/dL    11/29/2012 0.7 0.5 - 1.4 mg/dL Final     Calcium   Date Value Ref Range Status   04/08/2020 9.5 8.7 - 10.5 mg/dL Final   11/29/2012 9.0 8.7 - 10.5 mg/dL Final     Total Protein   Date Value Ref Range Status   04/08/2020 7.3 6.0 - 8.4 g/dL Final     Albumin   Date Value Ref Range Status   04/08/2020 3.9 3.5 - 5.2 g/dL Final   10/09/2018 4.1 3.1 - 4.7 g/dL       Total Bilirubin   Date Value Ref Range Status   04/08/2020 0.5 0.1 - 1.0 mg/dL Final     Comment:     For infants and newborns, interpretation of results should be based  on gestational age, weight and in agreement with clinical  observations.  Premature Infant recommended reference ranges:  Up to 24 hours.............<8.0 mg/dL  Up to 48 hours............<12.0 mg/dL  3-5 days..................<15.0 mg/dL  6-29 days.................<15.0 mg/dL       Alkaline Phosphatase   Date Value Ref Range Status   04/08/2020 133 55 - 135 U/L Final     AST   Date Value Ref Range Status   04/08/2020 14 10 - 40 U/L Final     ALT   Date Value Ref Range Status   04/08/2020 6 (L) 10 - 44 U/L Final     Anion Gap   Date Value Ref Range Status   04/08/2020 10 8 - 16 mmol/L Final   11/29/2012 10 5 - 15 meq/L Final     eGFR if    Date Value Ref Range Status   04/08/2020 >60.0 >60 mL/min/1.73 m^2 Final   04/08/2020 >60.0 >60 mL/min/1.73 m^2 Final     eGFR if non    Date Value Ref Range Status   04/08/2020 >60.0 >60 mL/min/1.73 m^2 Final     Comment:     Calculation used to obtain the estimated glomerular filtration  rate (eGFR) is the CKD-EPI equation.      04/08/2020 >60.0 >60 mL/min/1.73 m^2 Final     Comment:     Calculation used to obtain the estimated glomerular filtration  rate (eGFR) is the CKD-EPI equation.        No results found for: CEA  No results found for: PSA        Assessment/Plan:     Problem List Items Addressed This Visit     None            Discussion:   High complexity due to intense lab monitoring, high risk medications.   No follow-ups on file.      Electronically signed by Segundo Lamb

## 2020-04-17 DIAGNOSIS — Z03.818 ENCNTR FOR OBS FOR SUSP EXPSR TO OTH BIOLG AGENTS RULED OUT: Primary | ICD-10-CM

## 2020-04-20 ENCOUNTER — LAB VISIT (OUTPATIENT)
Dept: INFUSION THERAPY | Facility: HOSPITAL | Age: 65
End: 2020-04-20
Attending: INTERNAL MEDICINE
Payer: MEDICARE

## 2020-04-20 DIAGNOSIS — Z03.818 ENCNTR FOR OBS FOR SUSP EXPSR TO OTH BIOLG AGENTS RULED OUT: Primary | ICD-10-CM

## 2020-04-20 PROCEDURE — U0002 COVID-19 LAB TEST NON-CDC: HCPCS

## 2020-04-21 ENCOUNTER — PATIENT MESSAGE (OUTPATIENT)
Dept: HEMATOLOGY/ONCOLOGY | Facility: CLINIC | Age: 65
End: 2020-04-21

## 2020-04-21 RX ORDER — HEPARIN 100 UNIT/ML
500 SYRINGE INTRAVENOUS
Status: CANCELLED | OUTPATIENT
Start: 2020-04-22

## 2020-04-21 RX ORDER — SODIUM CHLORIDE 0.9 % (FLUSH) 0.9 %
10 SYRINGE (ML) INJECTION
Status: CANCELLED | OUTPATIENT
Start: 2020-04-22

## 2020-04-22 ENCOUNTER — INFUSION (OUTPATIENT)
Dept: INFUSION THERAPY | Facility: HOSPITAL | Age: 65
End: 2020-04-22
Attending: INTERNAL MEDICINE
Payer: MEDICARE

## 2020-04-22 VITALS
RESPIRATION RATE: 18 BRPM | WEIGHT: 147.13 LBS | HEART RATE: 90 BPM | BODY MASS INDEX: 24.51 KG/M2 | DIASTOLIC BLOOD PRESSURE: 65 MMHG | TEMPERATURE: 99 F | HEIGHT: 65 IN | SYSTOLIC BLOOD PRESSURE: 116 MMHG

## 2020-04-22 DIAGNOSIS — T45.1X5A CHEMOTHERAPY-INDUCED NEUTROPENIA: ICD-10-CM

## 2020-04-22 DIAGNOSIS — K21.9 GASTROESOPHAGEAL REFLUX DISEASE, ESOPHAGITIS PRESENCE NOT SPECIFIED: ICD-10-CM

## 2020-04-22 DIAGNOSIS — D70.1 CHEMOTHERAPY-INDUCED NEUTROPENIA: ICD-10-CM

## 2020-04-22 DIAGNOSIS — E86.0 DEHYDRATION: ICD-10-CM

## 2020-04-22 DIAGNOSIS — Z51.89 ENCOUNTER FOR OTHER SPECIFIED AFTERCARE: Primary | ICD-10-CM

## 2020-04-22 DIAGNOSIS — C34.31 MALIGNANT NEOPLASM OF LOWER LOBE OF RIGHT LUNG: ICD-10-CM

## 2020-04-22 DIAGNOSIS — C79.51 BONE METASTASES: ICD-10-CM

## 2020-04-22 DIAGNOSIS — R11.0 NAUSEA: ICD-10-CM

## 2020-04-22 LAB — SARS-COV-2 RNA RESP QL NAA+PROBE: NOT DETECTED

## 2020-04-22 PROCEDURE — 63600175 PHARM REV CODE 636 W HCPCS: Mod: JG | Performed by: INTERNAL MEDICINE

## 2020-04-22 PROCEDURE — 96401 CHEMO ANTI-NEOPL SQ/IM: CPT

## 2020-04-22 PROCEDURE — 25000003 PHARM REV CODE 250: Performed by: INTERNAL MEDICINE

## 2020-04-22 PROCEDURE — 96372 THER/PROPH/DIAG INJ SC/IM: CPT | Mod: 59

## 2020-04-22 PROCEDURE — 96413 CHEMO IV INFUSION 1 HR: CPT

## 2020-04-22 RX ORDER — SODIUM CHLORIDE 0.9 % (FLUSH) 0.9 %
10 SYRINGE (ML) INJECTION
Status: CANCELLED | OUTPATIENT
Start: 2020-04-22

## 2020-04-22 RX ORDER — SODIUM CHLORIDE 0.9 % (FLUSH) 0.9 %
10 SYRINGE (ML) INJECTION
Status: DISCONTINUED | OUTPATIENT
Start: 2020-04-22 | End: 2020-04-22 | Stop reason: HOSPADM

## 2020-04-22 RX ORDER — HEPARIN 100 UNIT/ML
500 SYRINGE INTRAVENOUS
Status: CANCELLED | OUTPATIENT
Start: 2020-04-22

## 2020-04-22 RX ORDER — HEPARIN 100 UNIT/ML
500 SYRINGE INTRAVENOUS
Status: DISCONTINUED | OUTPATIENT
Start: 2020-04-22 | End: 2020-04-22 | Stop reason: HOSPADM

## 2020-04-22 RX ADMIN — HEPARIN 500 UNITS: 100 SYRINGE at 03:04

## 2020-04-22 RX ADMIN — SODIUM CHLORIDE 240 MG: 9 INJECTION, SOLUTION INTRAVENOUS at 02:04

## 2020-04-22 RX ADMIN — DENOSUMAB 120 MG: 120 INJECTION SUBCUTANEOUS at 03:04

## 2020-04-26 ENCOUNTER — PATIENT MESSAGE (OUTPATIENT)
Dept: HEMATOLOGY/ONCOLOGY | Facility: CLINIC | Age: 65
End: 2020-04-26

## 2020-04-27 DIAGNOSIS — C34.31 MALIGNANT NEOPLASM OF LOWER LOBE OF RIGHT LUNG: ICD-10-CM

## 2020-04-27 RX ORDER — FENTANYL 50 UG/1
1 PATCH TRANSDERMAL
Qty: 10 PATCH | Refills: 0 | Status: SHIPPED | OUTPATIENT
Start: 2020-04-27 | End: 2020-05-12

## 2020-05-03 DIAGNOSIS — Z03.818 ENCNTR FOR OBS FOR SUSP EXPSR TO OTH BIOLG AGENTS RULED OUT: Primary | ICD-10-CM

## 2020-05-04 ENCOUNTER — LAB VISIT (OUTPATIENT)
Dept: INFUSION THERAPY | Facility: HOSPITAL | Age: 65
End: 2020-05-04
Attending: INTERNAL MEDICINE
Payer: MEDICARE

## 2020-05-04 ENCOUNTER — PATIENT MESSAGE (OUTPATIENT)
Dept: FAMILY MEDICINE | Facility: CLINIC | Age: 65
End: 2020-05-04

## 2020-05-04 DIAGNOSIS — Z03.818 ENCNTR FOR OBS FOR SUSP EXPSR TO OTH BIOLG AGENTS RULED OUT: ICD-10-CM

## 2020-05-04 PROCEDURE — U0002 COVID-19 LAB TEST NON-CDC: HCPCS

## 2020-05-05 ENCOUNTER — PATIENT MESSAGE (OUTPATIENT)
Dept: ADMINISTRATIVE | Facility: HOSPITAL | Age: 65
End: 2020-05-05

## 2020-05-05 LAB — SARS-COV-2 RNA RESP QL NAA+PROBE: NOT DETECTED

## 2020-05-06 ENCOUNTER — INFUSION (OUTPATIENT)
Dept: INFUSION THERAPY | Facility: HOSPITAL | Age: 65
End: 2020-05-06
Attending: INTERNAL MEDICINE
Payer: MEDICARE

## 2020-05-06 VITALS
BODY MASS INDEX: 24.06 KG/M2 | DIASTOLIC BLOOD PRESSURE: 93 MMHG | HEART RATE: 93 BPM | SYSTOLIC BLOOD PRESSURE: 150 MMHG | WEIGHT: 144.38 LBS | TEMPERATURE: 98 F | RESPIRATION RATE: 18 BRPM | HEIGHT: 65 IN

## 2020-05-06 DIAGNOSIS — T45.1X5A CHEMOTHERAPY-INDUCED NEUTROPENIA: ICD-10-CM

## 2020-05-06 DIAGNOSIS — Z51.89 ENCOUNTER FOR OTHER SPECIFIED AFTERCARE: Primary | ICD-10-CM

## 2020-05-06 DIAGNOSIS — C34.31 MALIGNANT NEOPLASM OF LOWER LOBE OF RIGHT LUNG: ICD-10-CM

## 2020-05-06 DIAGNOSIS — D70.1 CHEMOTHERAPY-INDUCED NEUTROPENIA: ICD-10-CM

## 2020-05-06 PROCEDURE — 25000003 PHARM REV CODE 250: Performed by: INTERNAL MEDICINE

## 2020-05-06 PROCEDURE — 96413 CHEMO IV INFUSION 1 HR: CPT

## 2020-05-06 PROCEDURE — 63600175 PHARM REV CODE 636 W HCPCS: Performed by: INTERNAL MEDICINE

## 2020-05-06 PROCEDURE — A4216 STERILE WATER/SALINE, 10 ML: HCPCS | Performed by: INTERNAL MEDICINE

## 2020-05-06 RX ORDER — SODIUM CHLORIDE 0.9 % (FLUSH) 0.9 %
10 SYRINGE (ML) INJECTION
Status: DISCONTINUED | OUTPATIENT
Start: 2020-05-06 | End: 2020-05-06 | Stop reason: HOSPADM

## 2020-05-06 RX ORDER — SODIUM CHLORIDE 0.9 % (FLUSH) 0.9 %
10 SYRINGE (ML) INJECTION
Status: CANCELLED | OUTPATIENT
Start: 2020-05-06

## 2020-05-06 RX ORDER — HEPARIN 100 UNIT/ML
500 SYRINGE INTRAVENOUS
Status: CANCELLED | OUTPATIENT
Start: 2020-05-06

## 2020-05-06 RX ORDER — HEPARIN 100 UNIT/ML
500 SYRINGE INTRAVENOUS
Status: DISCONTINUED | OUTPATIENT
Start: 2020-05-06 | End: 2020-05-06 | Stop reason: HOSPADM

## 2020-05-06 RX ADMIN — HEPARIN 500 UNITS: 100 SYRINGE at 02:05

## 2020-05-06 RX ADMIN — SODIUM CHLORIDE, PRESERVATIVE FREE 10 ML: 5 INJECTION INTRAVENOUS at 02:05

## 2020-05-06 RX ADMIN — SODIUM CHLORIDE: 0.9 INJECTION, SOLUTION INTRAVENOUS at 01:05

## 2020-05-07 ENCOUNTER — TELEPHONE (OUTPATIENT)
Dept: HEMATOLOGY/ONCOLOGY | Facility: CLINIC | Age: 65
End: 2020-05-07

## 2020-05-07 ENCOUNTER — PATIENT MESSAGE (OUTPATIENT)
Dept: HEMATOLOGY/ONCOLOGY | Facility: CLINIC | Age: 65
End: 2020-05-07

## 2020-05-07 DIAGNOSIS — C79.51 BONE METASTASES: ICD-10-CM

## 2020-05-07 DIAGNOSIS — G89.3 CANCER ASSOCIATED PAIN: ICD-10-CM

## 2020-05-07 DIAGNOSIS — Z79.899 ENCOUNTER FOR LONG-TERM (CURRENT) USE OF OTHER MEDICATIONS: ICD-10-CM

## 2020-05-07 DIAGNOSIS — C34.31 MALIGNANT NEOPLASM OF LOWER LOBE OF RIGHT LUNG: ICD-10-CM

## 2020-05-07 DIAGNOSIS — Z03.818 ENCNTR FOR OBS FOR SUSP EXPSR TO OTH BIOLG AGENTS RULED OUT: Primary | ICD-10-CM

## 2020-05-07 NOTE — TELEPHONE ENCOUNTER
Spoke to Sheri today. She has already had 1 dose of Xgeva according to her and she tolerated it well. She was diagnosed with Fibromyalgia a few years back and it is believed the prolia discomfort she experienced at that time was not due to the prolia so it was not a true allergy. She has a video visit with Leatha ion Tuesday 5/12 and will go to [facility] on Monday for labs. She is also going to discuss her pain level with Leatha at her appt.

## 2020-05-10 ENCOUNTER — PATIENT MESSAGE (OUTPATIENT)
Dept: PSYCHIATRY | Facility: CLINIC | Age: 65
End: 2020-05-10

## 2020-05-11 ENCOUNTER — PATIENT MESSAGE (OUTPATIENT)
Dept: HEMATOLOGY/ONCOLOGY | Facility: CLINIC | Age: 65
End: 2020-05-11

## 2020-05-11 DIAGNOSIS — C34.31 SQUAMOUS CELL CARCINOMA OF BRONCHUS IN RIGHT LOWER LOBE: Primary | ICD-10-CM

## 2020-05-11 RX ORDER — HYDROCODONE BITARTRATE AND ACETAMINOPHEN 10; 325 MG/1; MG/1
1 TABLET ORAL
Qty: 60 TABLET | Refills: 0 | Status: SHIPPED | OUTPATIENT
Start: 2020-05-11 | End: 2021-04-06

## 2020-05-11 NOTE — TELEPHONE ENCOUNTER
----- Message from Raven Ann sent at 5/11/2020  4:27 PM CDT -----  Pt wants refill of zofran under the tongue-90 day supply sent to [facility] mail in pharmacy, soma & pain patch dose increase tomorrow. Also wants ensure/boost samples(zain/straw)

## 2020-05-12 ENCOUNTER — OFFICE VISIT (OUTPATIENT)
Dept: HEMATOLOGY/ONCOLOGY | Facility: CLINIC | Age: 65
End: 2020-05-12
Payer: MEDICARE

## 2020-05-12 ENCOUNTER — OFFICE VISIT (OUTPATIENT)
Dept: PSYCHIATRY | Facility: CLINIC | Age: 65
End: 2020-05-12
Payer: MEDICARE

## 2020-05-12 DIAGNOSIS — C34.31 MALIGNANT NEOPLASM OF LOWER LOBE OF RIGHT LUNG: ICD-10-CM

## 2020-05-12 DIAGNOSIS — F33.1 MDD (MAJOR DEPRESSIVE DISORDER), RECURRENT EPISODE, MODERATE: ICD-10-CM

## 2020-05-12 DIAGNOSIS — F41.1 GENERALIZED ANXIETY DISORDER: ICD-10-CM

## 2020-05-12 DIAGNOSIS — G89.3 CANCER ASSOCIATED PAIN: ICD-10-CM

## 2020-05-12 DIAGNOSIS — Z79.899 ENCOUNTER FOR LONG-TERM (CURRENT) USE OF OTHER MEDICATIONS: ICD-10-CM

## 2020-05-12 DIAGNOSIS — R52 SEVERE PAIN: ICD-10-CM

## 2020-05-12 DIAGNOSIS — Z03.818 ENCNTR FOR OBS FOR SUSP EXPSR TO OTH BIOLG AGENTS RULED OUT: ICD-10-CM

## 2020-05-12 DIAGNOSIS — C79.51 BONE METASTASES: ICD-10-CM

## 2020-05-12 DIAGNOSIS — C79.51 BONE METASTASES: Primary | ICD-10-CM

## 2020-05-12 DIAGNOSIS — F41.0 PANIC DISORDER WITHOUT AGORAPHOBIA: ICD-10-CM

## 2020-05-12 DIAGNOSIS — C34.31 SQUAMOUS CELL CARCINOMA OF BRONCHUS IN RIGHT LOWER LOBE: ICD-10-CM

## 2020-05-12 PROCEDURE — 90833 PSYTX W PT W E/M 30 MIN: CPT | Mod: HCNC,95,, | Performed by: PSYCHIATRY & NEUROLOGY

## 2020-05-12 PROCEDURE — 99213 PR OFFICE/OUTPT VISIT, EST, LEVL III, 20-29 MIN: ICD-10-PCS | Mod: HCNC,95,, | Performed by: PSYCHIATRY & NEUROLOGY

## 2020-05-12 PROCEDURE — 90833 PR PSYCHOTHERAPY W/PATIENT W/E&M, 30 MIN (ADD ON): ICD-10-PCS | Mod: HCNC,95,, | Performed by: PSYCHIATRY & NEUROLOGY

## 2020-05-12 PROCEDURE — 99214 PR OFFICE/OUTPT VISIT, EST, LEVL IV, 30-39 MIN: ICD-10-PCS | Mod: 95,,, | Performed by: INTERNAL MEDICINE

## 2020-05-12 PROCEDURE — 99213 OFFICE O/P EST LOW 20 MIN: CPT | Mod: HCNC,95,, | Performed by: PSYCHIATRY & NEUROLOGY

## 2020-05-12 PROCEDURE — 99499 UNLISTED E&M SERVICE: CPT | Mod: HCNC,95,, | Performed by: PSYCHIATRY & NEUROLOGY

## 2020-05-12 PROCEDURE — 1101F PT FALLS ASSESS-DOCD LE1/YR: CPT | Mod: 95,,, | Performed by: INTERNAL MEDICINE

## 2020-05-12 PROCEDURE — 99499 RISK ADDL DX/OHS AUDIT: ICD-10-PCS | Mod: HCNC,95,, | Performed by: PSYCHIATRY & NEUROLOGY

## 2020-05-12 PROCEDURE — 1101F PR PT FALLS ASSESS DOC 0-1 FALLS W/OUT INJ PAST YR: ICD-10-PCS | Mod: 95,,, | Performed by: INTERNAL MEDICINE

## 2020-05-12 PROCEDURE — 99214 OFFICE O/P EST MOD 30 MIN: CPT | Mod: 95,,, | Performed by: INTERNAL MEDICINE

## 2020-05-12 PROCEDURE — 1101F PT FALLS ASSESS-DOCD LE1/YR: CPT | Mod: HCNC,CPTII,95, | Performed by: PSYCHIATRY & NEUROLOGY

## 2020-05-12 PROCEDURE — 1101F PR PT FALLS ASSESS DOC 0-1 FALLS W/OUT INJ PAST YR: ICD-10-PCS | Mod: HCNC,CPTII,95, | Performed by: PSYCHIATRY & NEUROLOGY

## 2020-05-12 RX ORDER — ONDANSETRON 8 MG/1
8 TABLET, ORALLY DISINTEGRATING ORAL EVERY 8 HOURS PRN
Qty: 90 TABLET | Refills: 3 | Status: ON HOLD | OUTPATIENT
Start: 2020-05-12 | End: 2020-06-06 | Stop reason: SDUPTHER

## 2020-05-12 RX ORDER — MIRTAZAPINE 30 MG/1
TABLET, FILM COATED ORAL
Qty: 90 TABLET | Refills: 0 | Status: SHIPPED | OUTPATIENT
Start: 2020-05-12 | End: 2020-07-20

## 2020-05-12 RX ORDER — SERTRALINE HYDROCHLORIDE 100 MG/1
TABLET, FILM COATED ORAL
Qty: 180 TABLET | Refills: 0 | Status: SHIPPED | OUTPATIENT
Start: 2020-05-12 | End: 2021-04-06 | Stop reason: DRUGHIGH

## 2020-05-12 RX ORDER — FENTANYL 100 UG/H
1 PATCH TRANSDERMAL
Qty: 10 PATCH | Refills: 0 | OUTPATIENT
Start: 2020-05-12 | End: 2020-05-18

## 2020-05-12 RX ORDER — LAMOTRIGINE 200 MG/1
200 TABLET ORAL 2 TIMES DAILY
Qty: 180 TABLET | Refills: 0 | Status: SHIPPED | OUTPATIENT
Start: 2020-05-12 | End: 2021-04-06 | Stop reason: DRUGHIGH

## 2020-05-12 RX ORDER — ARIPIPRAZOLE 2 MG/1
2 TABLET ORAL DAILY
Qty: 30 TABLET | Refills: 1 | Status: SHIPPED | OUTPATIENT
Start: 2020-05-12 | End: 2021-04-12

## 2020-05-12 RX ORDER — HYDROXYZINE HYDROCHLORIDE 10 MG/1
10 TABLET, FILM COATED ORAL 3 TIMES DAILY PRN
Qty: 30 TABLET | Refills: 2 | Status: SHIPPED | OUTPATIENT
Start: 2020-05-12 | End: 2020-07-17 | Stop reason: SDUPTHER

## 2020-05-12 NOTE — ASSESSMENT & PLAN NOTE
Patient remains on Nivo therapy  And is tolerating this well.  She is due for PET scanning and will arrange this.  Concerned for worsening disease given her increasing pain level.      Patient has had significant emotional stress and depression associated with this.  She is seeing Dr. Hawley with psych and Abilify and Vistiril have been added.  She wants to spend some time with relatives in [place] for a few weeks to see if this will help and would like to see MD Moffett while there.  This is out of network on her insurance plan but will try to get a single case approval urgently given her aggressive disease, severe pain and poor emotional state.

## 2020-05-12 NOTE — PROGRESS NOTES
Outpatient Psychiatry Follow-Up Visit (MD/NP)  The patient location is: at her home in [place], address confirmed in chart.  The chief complaint leading to consultation is: med management, depression, anxiety, comorbid severe pain due to metastasis   Visit type: audiovisual  Total time spent with patient: 30 mins  Each patient to whom he or she provides medical services by telemedicine is:  (1) informed of the relationship between the physician and patient and the respective role of any other health care provider with respect to management of the patient; and (2) notified that he or she may decline to receive medical services by telemedicine and may withdraw from such care at any time.    5/12/2020    Clinical Status of Patient:  Outpatient (Ambulatory)    Chief Complaint:  Sheri Broderick is a 64 y.o. female who presents today for follow-up of anxiety, depression.   Met with patient.      Interval History and Content of Current Session:      Interim Events/Subjective Report/Content of Current Session:      65 yo  female presents for follow up appointment for treatment of major depression, generalized anxiety disorder, panic disorder.     Past Psychiatric Hx:   No prior psychiatric hospitalizations   Suicide risk assessment:   Risks: , age, chronic depression with passive SI  Protective: no prior suicide attempts, strong skip, female, no substance abuse or impulsivity, health issues present both not disabling at this time  She is at low to moderate risk of suicide acutely, and moderate risk over long term     Prior meds: Abilify (no benefit), Buspirone (intolerance) Trazodone (intolerance), Paroxetine (intolerance), Quetiapine, Bupropion (intolerance), Fluoxetine (no benefit), Duloxetine Foltx (no longer covered)     Past Medical Hx:  Adenocarcinoma of the lung, metastatic   Osteoporosis   Neuropraxia  HLD  DDD   Fibromyalgia  HTN  Anemia     Interim Hx:   Pt seen via telemedicine due to COVID-19  public health crisis.      Pt has been taking Sertraline previously lowered to 200 mg daily. She is no longer taking Adderall on PRN basis.   She has not been taking Clonazepam due to needs for pain med. She has been also been taking Mirtazapine increased to 30 mg nightly, Lamictal previously increased to 200 mg BID.   She does feel better with previous reduction in Effexor (less irritable).   Stopped taking Adderall    Chart review: Pt seen by Oncologist and Rad Onc in interim.  Extensive bone metastases from adenocarcinoma of lung. She has had additional XRT and now taking fentanyl patch to 50 mcg. Takes hydrocodone for breakthrough pain. PET scan shows mets to bone, possibly adrenal.  Per Oncology:   Opdivo Resumed:  Cycle 1:           3/25/2020  Cycle 2:           4/8/2020  Cycle 3:           4/22/2020  Cycle 4:           5/6/2020      She is really struggling.  She is crying throughout majority of visit.  Will see oncologist today virtually. Pain is unbearable.  Current pain regimen is not helping her.  There are many nights when she walks the floor to try to find relief.  Motivation/energy are low. Feels she is giving up the fight but does not want to.  She hopes to find strength to continue.  Pain is 10/10 in spine, hips, legs, shoulder. She has been petrified to go out due to COVID.  Her roommate has helped some.  She is contemplating move to [place] to be with her friends and to seek care at [facility].    Not taking Adderall.  She feels lonesome. Not christel if depressed, but very overwhelmed.  She wants to live. Went 2 nights without sleep due to pain. Most of the time she can sleep.   Has had some limited contact with Dr. Jennings. Wishes she could be hopeful, but she feels her hematologist's reminders of palliative nature of care have been difficult for her to find strength to fight.   Feels weak. Fearful.  Appetite is not good.   Denies symptoms of brad/psychosis.  Denies suicidal/homicidal ideations.  Using prayer to help her cope.     Denies alcohol/drug use. No tobacco. No recent caffeine use.     Psychotherapy:   · Target symptoms: depression, anxiety, coping with pain   · Why chosen therapy is appropriate versus another modality: relevant to diagnosis, patient responds to this modality  · Outcome monitoring methods: self-report, observation  · Therapeutic intervention type: supportive psychotherapy  · Topics discussed/themes: building skills sets for symptom management, symptom recognition  · The patient's response to the intervention is accepting. The patient's progress toward treatment goals is poor progress.  · Duration of intervention: 20 minutes    Review of Systems   · PSYCHIATRIC: Pertinant items are noted in the narrative.  · CONSTITUTIONAL:  No fever   · CARDIOVASCULAR: no chest pain   · NEURO: poor focus    · RESP: intermittent cough   · M/S: 10/10 back and hip pain     Past Medical, Family and Social History: The patient's past medical, family and social history have been reviewed and updated as appropriate within the electronic medical record - see encounter notes.    Psychiatric Medications:   Sertraline 200 mg in am   Klonopin 1 mg bid - tid prn anxiety- not taking   Remeron 30 mg QHS   Lamictal 200 mg BID.     Compliance: stopped Adderall and Clonazepam     Side effects: dry mouth, h/o wt gain    Risk Parameters:  Patient reports no suicidal ideations today   Patient reports no homicidal ideation  Patient reports no self-injurious behavior  Patient reports no violent behavior      Exam (detailed: at least 9 elements; comprehensive: all 15 elements)   Constitutional  Vitals:  Most recent vital signs, dated less than 90 days prior to this appointment, were reviewed.    Se Epic for today's vitals.       General:  age appropriate, casual attire, adequately groomed, good eye contact, appears anxious, uncomfortable, crying throughout         Musculoskeletal  Muscle Strength/Tone:  No tremor noted  "today virtually    Gait & Station:  Unable to assess virtually      Psychiatric  Speech:  no latency; no press , spontaneous, talkative, soft spoken    Mood & Affect:  "not good"   Anxious and dysphoric    Thought Process:  Linear   Associations:  intact   Thought Content:  no active or passive SI today, no homicidality, delusions, or paranoia, hallucinations: (auditory: no currently, visual: no)       Insight:  Reasonable    Judgement: Adequate to circumstances   Orientation:  grossly intact. Alert and oriented x 4    Memory: intact for content of interview   Language: grossly intact   Attention Span & Concentration:  able to focus   Fund of Knowledge:  intact and appropriate to age and level of education     Assessment and Diagnosis   Status/Progress: Based on the examination today, the patient's problem(s) is/are under inadequate control, has been treatment resistant in past.   New problems have been presented today (difficulty coping with severe pain).   Comorbidities are complicating management of the primary condition.    The working differential for this patient includes Cluster B and C traits, personality disorder, PTSD, MDD, OCD    General Impression :  Pt initially improved with addition of SNRI to SSRI; she has declined atypical antipsychotic augmentation. Adderall does help pt function better, she does not feel it worsens anxiety.  Pt with significant negative cognitive distortions evident throughout interview.  Pt dx with Stage II Adenocarcinoma of the lung and is now s/p treatment.  Support system is limited.  Pt concerned about her cognition, reports recent clumsiiness, word finding issues. Unrevealing Neuro work up.  Pt has metastatic lung cancer, reports she has limited social support.  Undergoing palliative XRT. Less irritable since Effexor was d/c,  Pain has been uncontrolled significantly impacting mood.     Major Depressive Disorder, Recurrent, moderate   Panic Disorder without Agoraphobia "   Generalized Anxiety Disorder    PTSD  Hx OCD  R/O Cognitive Disorder  Personality Disorder, unspecified, Cluster B and C traits     Hepaitis B, tremors,  osteoporosis, myalgias, arthralgia, poor balance, back pain, bulging disc, fibromyalgia, osteoarthritis, neuropathy, LE fracture, metastatic adenocarcinoma of Lung    GAF: 55   Intervention/Counseling/Treatment Plan   · Medication Management: The risks and benefits of medication were discussed with the patient.  · Counseling provided with patient as follows: importance of compliance with chosen treatment options was emphasized, risks and benefits of treatment options, including medications, were discussed with the patient     1. Continue Sertraline 200 mg daily   2. Continue Remeron 30 mg QHS to target mood, anxiety, sleep. Monitor for oversedation.   3. D/C Klonopin - not using due to risks with high opioid requirements.  Trial of Hydroxyzine 10 mg TID prn anxiety.  Discussed risks of sedation, dry mouth, anticholinergic side effects.   4. Continue Lamictal 200 mg BID.  Discussed risks of life threatening SJS, need for compliance.   5. Continue Psychotherapy with Kaye Jennings; advised I can refer to Colleen Bey LCSW if she is having problems scheduling an appts with her current provider.   6. Trial of Abilify 2 mg daily. Discussed risks of tardive dyskinesia, drug induced parkinsonism, metabolic side effects, including wt gain, neuroleptic malignant syndrome   7.  Pt instructed to go to ED/911 with thoughts of wanting harm self/others  8. Call to report any worsening of symptoms or problems with the medication. She will look into possibly going to stay with very supportive friends in [place], also for evaluation at [facility]. Will discuss with Oncologist.  Emotional support could be very helpful for Sheri during this very difficult time.   9. Appointment today with Oncologist to address pain control.       Return to Clinic 2-3 weeks  virtually

## 2020-05-12 NOTE — PROGRESS NOTES
Subjective:       Patient ID: Sheri Broderick is a 64 y.o. female.    Chief Complaint: lung cancer.     HPI:  Patient continues to have severe lower back and hip pain which has continued to worsen over the last 2 weeks.  She is crying on the phone currently in severe pain.      Current regimen as of 5/12/2020  Fentanyl 50mcg  Norco 10mg 1 tab every 4-6 hours.     Opdivo Resumed:  Cycle 1: 3/25/2020  Cycle 2: 4/8/2020  Cycle 3: 4/22/2020  Cycle 4: 5/6/2020    Pet impression 1/6/2019:  1.  Status post right lower lumbar ectomy with no focal increased FDG activity in the lungs.    2.  FDG avid mediastinal and right hilar lymph nodes, similar to the previous exam.    3.  Diffuse scattered increased FDG activity in the axial skeleton, with relative interval worsening from the previous exam.    4.  Interval development of a small FDG avid nodule in the left adrenal gland concerning for metastatic disease.    PET Impression 7/8/2019:  1. FDG avid enlarged lymph nodes in right hilum and lower paratracheal region  of mediastinal, and additional foci of FDG uptake involving L3 vertebral body  and anterior left acetabulum also suspicious for new metastatic disease.    2. Nonspecific FDG uptake associated with right T2 transverse process near  articulation with posterior right second rib as above. Attention to this on  follow-up PET/CT is suggested.    3. Recent compression fracture of L2 vertebral body superior endplate since  04/09/2019, with associated FDG uptake which is inflammatory in nature.    L3 lesion biopsy 8/7/2019:  Adenocarcinoma c/w lung primary  PD-L1: 9%  ALK neg  Ros-1 Neg  EGFR Neg    All medications and past medical and surgical history have been reviewed.     The patient location is: Home  Visit type: Virtual visit with synchronous audio and video due to covid 19 pandemic crisis.    Review of patient's allergies indicates:   Allergen Reactions    Penicillins Rash     Other reaction(s): Rash    Prolia  [denosumab] Other (See Comments)     myalgias    Trazodone Anxiety     Severe anxiety        ROS  GEN:   normal without any fever, night sweats or weight loss  HEENT:  normal with no HA's,  changes in vision  CV:   normal with no CP, SOB  PULM:  normal with no SOB, cough  GI:   normal with no abdominal pain, nausea, vomiting  :   normal with no hematuria, dysuria  SKIN:   normal with no rash      Previous FAMHX and SOCHX information reviewed and remains unchanged         Objective:        Physical Exam  There were no vitals taken for this visit.  GEN:   no apparent distress, comfortable; AAOx3  HEAD:  atraumatic and normocephalic  EYES:   no pallor, no icterus,  PSYCH:  normal mood, affect and behavior  PULM:   no apparent distress, breathing unlabored.  NEURO:  Grossly intact.   MUSC:   Visibly normal ROM throughout.           All lab results and imaging results have been reviewed and discussed with the patient  Recent Results (from the past 336 hour(s))   CBC auto differential    Collection Time: 05/11/20 11:00 AM   Result Value Ref Range    WBC 5.0 3.8 - 10.8 Thousand/uL    Hemoglobin 10.4 (L) 11.7 - 15.5 g/dL    Hematocrit 32.7 (L) 35.0 - 45.0 %    Platelets 220 140 - 400 Thousand/uL   CBC auto differential    Collection Time: 05/05/20 11:17 AM   Result Value Ref Range    WBC 4.0 3.8 - 10.8 Thousand/uL    Hemoglobin 10.1 (L) 11.7 - 15.5 g/dL    Hematocrit 32.3 (L) 35.0 - 45.0 %    Platelets 238 140 - 400 Thousand/uL   CBC auto differential    Collection Time: 04/29/20  1:09 PM   Result Value Ref Range    WBC 4.6 3.8 - 10.8 Thousand/uL    Hemoglobin 10.7 (L) 11.7 - 15.5 g/dL    Hematocrit 33.0 (L) 35.0 - 45.0 %    Platelets 239 140 - 400 Thousand/uL     CMP  Sodium   Date Value Ref Range Status   05/11/2020 140 135 - 146 mmol/L Final   10/09/2018 140 134 - 144 mmol/L      Potassium   Date Value Ref Range Status   05/11/2020 4.0 3.5 - 5.3 mmol/L Final     Chloride   Date Value Ref Range Status   05/11/2020 103  98 - 110 mmol/L Final   10/09/2018 105 98 - 110 mmol/L      CO2   Date Value Ref Range Status   05/11/2020 28 20 - 32 mmol/L Final     Glucose   Date Value Ref Range Status   05/11/2020 148 (H) 65 - 139 mg/dL Final     Comment:               Non-fasting reference interval        10/09/2018 101 (H) 70 - 99 mg/dL      BUN, Bld   Date Value Ref Range Status   05/11/2020 14 7 - 25 mg/dL Final     Creatinine   Date Value Ref Range Status   05/11/2020 0.74 0.50 - 0.99 mg/dL Final     Comment:     For patients >49 years of age, the reference limit  for Creatinine is approximately 13% higher for people  identified as -American.        10/09/2018 0.80 0.60 - 1.40 mg/dL    11/29/2012 0.7 0.5 - 1.4 mg/dL Final     Calcium   Date Value Ref Range Status   05/11/2020 8.4 (L) 8.6 - 10.4 mg/dL Final   11/29/2012 9.0 8.7 - 10.5 mg/dL Final     Total Protein   Date Value Ref Range Status   05/11/2020 6.7 6.1 - 8.1 g/dL Final     Albumin   Date Value Ref Range Status   05/11/2020 4.0 3.6 - 5.1 g/dL Final   10/09/2018 4.1 3.1 - 4.7 g/dL      Total Bilirubin   Date Value Ref Range Status   05/11/2020 0.4 0.2 - 1.2 mg/dL Final     Alkaline Phosphatase   Date Value Ref Range Status   05/11/2020 203 (H) 37 - 153 U/L Final     AST   Date Value Ref Range Status   05/11/2020 16 10 - 35 U/L Final     ALT   Date Value Ref Range Status   05/11/2020 9 6 - 29 U/L Final     Anion Gap   Date Value Ref Range Status   04/08/2020 10 8 - 16 mmol/L Final   11/29/2012 10 5 - 15 meq/L Final     eGFR if    Date Value Ref Range Status   05/11/2020 99 > OR = 60 mL/min/1.73m2 Final     eGFR if non    Date Value Ref Range Status   05/11/2020 85 > OR = 60 mL/min/1.73m2 Final       Total time spent with patient: 15 minutes.   Assessment:      1. Encntr for obs for susp expsr to oth biolg agents ruled out    2. Bone metastases    3. Malignant neoplasm of lower lobe of right lung-Adenocarcinoma    4. Cancer associated pain       Problem List Items Addressed This Visit     Malignant neoplasm of lower lobe of right lung-Adenocarcinoma (Chronic)     Patient remains on Nivo therapy  And is tolerating this well.  She is due for PET scanning and will arrange this.  Concerned for worsening disease given her increasing pain level.      Patient has had significant emotional stress and depression associated with this.  She is seeing Dr. Hawley with psych and Abilify and Vistiril have been added.  She wants to spend some time with relatives in [place] for a few weeks to see if this will help and would like to see [facility] while there.  This is out of network on her insurance plan but will try to get a single case approval urgently given her aggressive disease, severe pain and poor emotional state.           Relevant Medications    fentaNYL (DURAGESIC) 100 mcg/hr    Other Relevant Orders    NM PET CT Routine Skull to Mid Thigh    Cancer associated pain     This has become severe and I feel this is due to her cancer which has metastasized to the bones.  PET reviewed and she had clear mets in spine and hips and sacrum where the bulk of her pain lies.  She has also had radiation therapy to these areas.      Will double Duragesic to 100mcg and continue her on Norco 10mg every 4-6.  She will call me after 1 day of this change to let me know how she is doing and if needed, may increase to 125mcg.  Will see her again with me in 2 weeks.          Relevant Medications    fentaNYL (DURAGESIC) 100 mcg/hr    Other Relevant Orders    NM PET CT Routine Skull to Mid Thigh    Bone metastases    Relevant Medications    fentaNYL (DURAGESIC) 100 mcg/hr    Other Relevant Orders    NM PET CT Routine Skull to Mid Thigh      Other Visit Diagnoses     Encntr for obs for susp expsr to ot biolg agents ruled out               Plan:   As Above in Assessment      The plan was discussed with the patient and all questions/concerns have been answered to the patient's  satisfaction.          Thank you for allowing me to participate in this pleasant patient's care. Please call with any questions or concerns.

## 2020-05-12 NOTE — Clinical Note
1. Need pet scan done2. Ellen, see my note from today.  Need to try to get approval for [facility] out of network.

## 2020-05-12 NOTE — ASSESSMENT & PLAN NOTE
This has become severe and I feel this is due to her cancer which has metastasized to the bones.  PET reviewed and she had clear mets in spine and hips and sacrum where the bulk of her pain lies.  She has also had radiation therapy to these areas.      Will double Duragesic to 100mcg and continue her on Norco 10mg every 4-6.  She will call me after 1 day of this change to let me know how she is doing and if needed, may increase to 125mcg.  Will see her again with me in 2 weeks.

## 2020-05-13 ENCOUNTER — DOCUMENTATION ONLY (OUTPATIENT)
Dept: HEMATOLOGY/ONCOLOGY | Facility: CLINIC | Age: 65
End: 2020-05-13

## 2020-05-13 ENCOUNTER — PATIENT MESSAGE (OUTPATIENT)
Dept: PSYCHIATRY | Facility: CLINIC | Age: 65
End: 2020-05-13

## 2020-05-13 ENCOUNTER — PATIENT MESSAGE (OUTPATIENT)
Dept: HEMATOLOGY/ONCOLOGY | Facility: CLINIC | Age: 65
End: 2020-05-13

## 2020-05-13 NOTE — PROGRESS NOTES
"Called to check on patient's pain status.  Her pain is better but still present.  She states her head feels a little "fuzzy" which is likely from the increased dose.  I encouraged her to stick with the 100mcg patch and let her body adjust.  She will do so.   "

## 2020-05-14 ENCOUNTER — TELEPHONE (OUTPATIENT)
Dept: HEMATOLOGY/ONCOLOGY | Facility: CLINIC | Age: 65
End: 2020-05-14

## 2020-05-14 ENCOUNTER — PATIENT MESSAGE (OUTPATIENT)
Dept: HEMATOLOGY/ONCOLOGY | Facility: CLINIC | Age: 65
End: 2020-05-14

## 2020-05-14 NOTE — TELEPHONE ENCOUNTER
Spoke to Sheri. She called crying that she can't tolerate the higher dose of duragesic patch(100mcq) Leatha gave her on Tuesday. She said it makes her sleepy and nauseated. I discussed with Leatha. He said she needs to work through it and continue with the same dose of patch because if she goes back to the lower dose her pain will get worse again. He said for her to also take Zofran but not phenergan as it may make here sleepy. He also said that we may need to send her to a pain management doctor if this does not help her. She told me she will hang in there and try hard to make it work. She is suppose to change the first patch tomorrow around noon. She will call back late to give me an update.

## 2020-05-18 ENCOUNTER — HOSPITAL ENCOUNTER (EMERGENCY)
Facility: HOSPITAL | Age: 65
Discharge: HOME | End: 2020-05-18
Attending: EMERGENCY MEDICINE
Payer: MEDICARE

## 2020-05-18 ENCOUNTER — TELEPHONE (OUTPATIENT)
Dept: HEMATOLOGY/ONCOLOGY | Facility: CLINIC | Age: 65
End: 2020-05-18

## 2020-05-18 ENCOUNTER — PATIENT MESSAGE (OUTPATIENT)
Dept: HEMATOLOGY/ONCOLOGY | Facility: CLINIC | Age: 65
End: 2020-05-18

## 2020-05-18 VITALS
TEMPERATURE: 99 F | HEART RATE: 89 BPM | WEIGHT: 140 LBS | HEIGHT: 66 IN | OXYGEN SATURATION: 99 % | SYSTOLIC BLOOD PRESSURE: 135 MMHG | RESPIRATION RATE: 16 BRPM | BODY MASS INDEX: 22.5 KG/M2 | DIASTOLIC BLOOD PRESSURE: 72 MMHG

## 2020-05-18 DIAGNOSIS — C34.31 SQUAMOUS CELL CARCINOMA OF BRONCHUS IN RIGHT LOWER LOBE: ICD-10-CM

## 2020-05-18 DIAGNOSIS — C79.51 BONE METASTASES: ICD-10-CM

## 2020-05-18 DIAGNOSIS — T88.7XXA SIDE EFFECT OF MEDICATION: ICD-10-CM

## 2020-05-18 DIAGNOSIS — R11.2 NON-INTRACTABLE VOMITING WITH NAUSEA, UNSPECIFIED VOMITING TYPE: Primary | ICD-10-CM

## 2020-05-18 DIAGNOSIS — G89.3 CANCER ASSOCIATED PAIN: Primary | ICD-10-CM

## 2020-05-18 DIAGNOSIS — R11.10 VOMITING: ICD-10-CM

## 2020-05-18 LAB
ALBUMIN SERPL BCP-MCNC: 3.8 G/DL (ref 3.5–5.2)
ALP SERPL-CCNC: 173 U/L (ref 55–135)
ALT SERPL W/O P-5'-P-CCNC: 12 U/L (ref 10–44)
ANION GAP SERPL CALC-SCNC: 9 MMOL/L (ref 8–16)
AST SERPL-CCNC: 15 U/L (ref 10–40)
BACTERIA #/AREA URNS HPF: NEGATIVE /HPF
BASOPHILS # BLD AUTO: 0.02 K/UL (ref 0–0.2)
BASOPHILS NFR BLD: 0.3 % (ref 0–1.9)
BILIRUB SERPL-MCNC: 0.7 MG/DL (ref 0.1–1)
BILIRUB UR QL STRIP: NEGATIVE
BUN SERPL-MCNC: 11 MG/DL (ref 8–23)
CALCIUM SERPL-MCNC: 8.8 MG/DL (ref 8.7–10.5)
CHLORIDE SERPL-SCNC: 106 MMOL/L (ref 95–110)
CLARITY UR: CLEAR
CO2 SERPL-SCNC: 23 MMOL/L (ref 23–29)
COLOR UR: YELLOW
CREAT SERPL-MCNC: 0.6 MG/DL (ref 0.5–1.4)
DIFFERENTIAL METHOD: ABNORMAL
EOSINOPHIL # BLD AUTO: 0.2 K/UL (ref 0–0.5)
EOSINOPHIL NFR BLD: 2.3 % (ref 0–8)
ERYTHROCYTE [DISTWIDTH] IN BLOOD BY AUTOMATED COUNT: 14.6 % (ref 11.5–14.5)
EST. GFR  (AFRICAN AMERICAN): >60 ML/MIN/1.73 M^2
EST. GFR  (NON AFRICAN AMERICAN): >60 ML/MIN/1.73 M^2
GLUCOSE SERPL-MCNC: 109 MG/DL (ref 70–110)
GLUCOSE UR QL STRIP: NEGATIVE
HCT VFR BLD AUTO: 33.4 % (ref 37–48.5)
HGB BLD-MCNC: 10.6 G/DL (ref 12–16)
HGB UR QL STRIP: NEGATIVE
HYALINE CASTS #/AREA URNS LPF: 10 /LPF
IMM GRANULOCYTES # BLD AUTO: 0.04 K/UL (ref 0–0.04)
IMM GRANULOCYTES NFR BLD AUTO: 0.5 % (ref 0–0.5)
KETONES UR QL STRIP: ABNORMAL
LEUKOCYTE ESTERASE UR QL STRIP: NEGATIVE
LIPASE SERPL-CCNC: 22 U/L (ref 4–60)
LYMPHOCYTES # BLD AUTO: 0.3 K/UL (ref 1–4.8)
LYMPHOCYTES NFR BLD: 4.3 % (ref 18–48)
MCH RBC QN AUTO: 29.4 PG (ref 27–31)
MCHC RBC AUTO-ENTMCNC: 31.7 G/DL (ref 32–36)
MCV RBC AUTO: 93 FL (ref 82–98)
MICROSCOPIC COMMENT: ABNORMAL
MONOCYTES # BLD AUTO: 0.5 K/UL (ref 0.3–1)
MONOCYTES NFR BLD: 6.1 % (ref 4–15)
NEUTROPHILS # BLD AUTO: 6.4 K/UL (ref 1.8–7.7)
NEUTROPHILS NFR BLD: 86.5 % (ref 38–73)
NITRITE UR QL STRIP: NEGATIVE
NRBC BLD-RTO: 0 /100 WBC
PH UR STRIP: >8 [PH] (ref 5–8)
PLATELET # BLD AUTO: 232 K/UL (ref 150–350)
PMV BLD AUTO: 9.1 FL (ref 9.2–12.9)
POTASSIUM SERPL-SCNC: 3.9 MMOL/L (ref 3.5–5.1)
PROT SERPL-MCNC: 7.6 G/DL (ref 6–8.4)
PROT UR QL STRIP: ABNORMAL
RBC # BLD AUTO: 3.6 M/UL (ref 4–5.4)
RBC #/AREA URNS HPF: 1 /HPF (ref 0–4)
SODIUM SERPL-SCNC: 138 MMOL/L (ref 136–145)
SP GR UR STRIP: 1.02 (ref 1–1.03)
SQUAMOUS #/AREA URNS HPF: 2 /HPF
URN SPEC COLLECT METH UR: ABNORMAL
UROBILINOGEN UR STRIP-ACNC: NEGATIVE EU/DL
WBC # BLD AUTO: 7.39 K/UL (ref 3.9–12.7)
WBC #/AREA URNS HPF: 3 /HPF (ref 0–5)

## 2020-05-18 PROCEDURE — 25000003 PHARM REV CODE 250: Performed by: PHYSICIAN ASSISTANT

## 2020-05-18 PROCEDURE — 80053 COMPREHEN METABOLIC PANEL: CPT

## 2020-05-18 PROCEDURE — 99284 EMERGENCY DEPT VISIT MOD MDM: CPT | Mod: 25

## 2020-05-18 PROCEDURE — 96374 THER/PROPH/DIAG INJ IV PUSH: CPT

## 2020-05-18 PROCEDURE — 96361 HYDRATE IV INFUSION ADD-ON: CPT

## 2020-05-18 PROCEDURE — 96375 TX/PRO/DX INJ NEW DRUG ADDON: CPT

## 2020-05-18 PROCEDURE — 93005 ELECTROCARDIOGRAM TRACING: CPT | Performed by: INTERNAL MEDICINE

## 2020-05-18 PROCEDURE — 85025 COMPLETE CBC W/AUTO DIFF WBC: CPT

## 2020-05-18 PROCEDURE — 83690 ASSAY OF LIPASE: CPT

## 2020-05-18 PROCEDURE — 63600175 PHARM REV CODE 636 W HCPCS: Performed by: PHYSICIAN ASSISTANT

## 2020-05-18 PROCEDURE — 81001 URINALYSIS AUTO W/SCOPE: CPT

## 2020-05-18 RX ORDER — PROMETHAZINE HYDROCHLORIDE 25 MG/1
25 SUPPOSITORY RECTAL EVERY 6 HOURS PRN
Qty: 10 SUPPOSITORY | Refills: 0 | Status: SHIPPED | OUTPATIENT
Start: 2020-05-18 | End: 2022-06-06

## 2020-05-18 RX ORDER — ONDANSETRON 2 MG/ML
8 INJECTION INTRAMUSCULAR; INTRAVENOUS
Status: COMPLETED | OUTPATIENT
Start: 2020-05-18 | End: 2020-05-18

## 2020-05-18 RX ORDER — MORPHINE SULFATE 4 MG/ML
2 INJECTION, SOLUTION INTRAMUSCULAR; INTRAVENOUS
Status: COMPLETED | OUTPATIENT
Start: 2020-05-18 | End: 2020-05-18

## 2020-05-18 RX ORDER — SODIUM CHLORIDE 9 MG/ML
1000 INJECTION, SOLUTION INTRAVENOUS
Status: COMPLETED | OUTPATIENT
Start: 2020-05-18 | End: 2020-05-18

## 2020-05-18 RX ORDER — FENTANYL 75 UG/H
1 PATCH TRANSDERMAL
Qty: 1 PATCH | Refills: 0 | Status: SHIPPED | OUTPATIENT
Start: 2020-05-18 | End: 2020-05-21

## 2020-05-18 RX ORDER — FENTANYL 75 UG/H
1 PATCH TRANSDERMAL
Qty: 10 PATCH | Refills: 0 | Status: SHIPPED | OUTPATIENT
Start: 2020-05-18 | End: 2021-04-19 | Stop reason: SDUPTHER

## 2020-05-18 RX ADMIN — SODIUM CHLORIDE 1000 ML: 0.9 INJECTION, SOLUTION INTRAVENOUS at 01:05

## 2020-05-18 RX ADMIN — ONDANSETRON 8 MG: 2 INJECTION INTRAMUSCULAR; INTRAVENOUS at 01:05

## 2020-05-18 RX ADMIN — MORPHINE SULFATE 2 MG: 4 INJECTION INTRAVENOUS at 02:05

## 2020-05-18 NOTE — TELEPHONE ENCOUNTER
Spoke with Alba Aguilar NP in the ER. Patient has had non stop nausea and vomiting since starting Fentanyl 100mcg last Thursday. She was due to apply another patch today but has not put one on. Notified her we can decrease to 75 mcg and she can take the Norco for breakthrough pain. She has not been able to keep her Phenergan and Zofran down due to vomiting. Recommended they give her Phenergan suppositories.

## 2020-05-18 NOTE — ED PROVIDER NOTES
Encounter Date: 5/18/2020       History     Chief Complaint   Patient presents with    Nausea     patient has had fentynl patch dosage increased from 50mcg to 100mcg, patient has been sick for a week and due to change patch at noon today.     64-year-old female presenting with complaint of nausea and vomiting.  Patient states no medication was increased from 50 mcg to 100 mcg.  Patient states has  nausea vomiting  unable to tolerate anything by mouth complaint of lower belly pain as well.  Patient states called  and discuss intolerance or new medication.  Patient has been on Phenergan orally and Zofran still unable to tolerate oral intake.  Patient has a history lung CA with bone mass as per history,  patient denies any fevers chills states generalized pain back pain.          Review of patient's allergies indicates:   Allergen Reactions    Penicillins Rash     Other reaction(s): Rash    Prolia [denosumab] Other (See Comments)     myalgias    Trazodone Anxiety     Severe anxiety      Past Medical History:   Diagnosis Date    Allergy     seasonal    Anemia associated with chemotherapy 7/18/2018    Anxiety 2012    on meds    Arthritis     Asthma 1960    no inhalers    Back pain 2010    PT    Bone metastases 4/13/2020    Chemotherapy-induced neutropenia 5/21/2018    Dizziness     Essential hypertension 4/11/2019    no meds    Fall 05/2019    fx of lumbar spine, left foot and ankle    Fibromyalgia     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     Fracture of right foot     GERD (gastroesophageal reflux disease) 2015    on meds    Hep B w/o coma 1980    no treatment, hospitalized    Hiatal hernia 2015    on meds    Hyperlipidemia     Major depressive disorder, recurrent episode, in partial remission     Malignant neoplasm of lower lobe of right lung-Adenocarcinoma 5/21/2018    MVP (mitral valve prolapse) 2014    no meds    Myalgia and myositis, unspecified     OCD  (obsessive compulsive disorder)     Osteoporosis     Polyneuropathy     SOB (shortness of breath) on exertion 10/2018    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    Trouble in sleeping     Urinary incontinence      Past Surgical History:   Procedure Laterality Date    BONE BIOPSY N/A 2019    Procedure: BIOPSY, BONE;  Surgeon: Ashley Diagnostic Provider;  Location: [facility];  Service: General;  Laterality: N/A;    CARPAL TUNNEL RELEASE Bilateral 2014    EYE SURGERY      RK    FRACTURE SURGERY Right     wrist orif    INSERTION OF TUNNELED CENTRAL VENOUS CATHETER (CVC) WITH SUBCUTANEOUS PORT Left 2019    Procedure: INSERTION, PORT-A-CATH;  Surgeon: Brant Welch MD;  Location: [facility] OR;  Service: Cardiovascular;  Laterality: Left;    LUNG REMOVAL, PARTIAL  2018    Dr. Brant Welch    port a cath  2018    TUBAL LIGATION       Family History   Problem Relation Age of Onset    Heart disease Mother          to to coma (hypothermia)     Heart disease Father 57        heart attack    Heart disease Sister         stents    Diabetes Sister     Parkinsonism Sister     Heart disease Brother 45        death at 45 years old due to hearth disease     Diabetes Maternal Grandmother      Social History     Tobacco Use    Smoking status: Never Smoker    Smokeless tobacco: Never Used   Substance Use Topics    Alcohol use: No    Drug use: No     Review of Systems   Gastrointestinal: Positive for abdominal pain, nausea and vomiting.   Musculoskeletal: Positive for arthralgias, back pain and myalgias.   Skin: Negative.    All other systems reviewed and are negative.      Physical Exam     Initial Vitals [20 1213]   BP Pulse Resp Temp SpO2   136/78 101 18 97.8 °F (36.6 °C) 100 %      MAP       --         Physical Exam    Nursing note and vitals reviewed.  Constitutional: She appears well-developed and well-nourished.   nervous    HENT:   Head: Normocephalic  and atraumatic.   Right Ear: External ear normal.   Left Ear: External ear normal.   Eyes: Conjunctivae and EOM are normal. Pupils are equal, round, and reactive to light.   Neck: Normal range of motion. Neck supple.   Cardiovascular: Normal rate, regular rhythm and normal heart sounds.   Pulmonary/Chest: Breath sounds normal.   Port left chest    Abdominal: Soft. Bowel sounds are normal. There is tenderness.   epigastric   Musculoskeletal: Normal range of motion. She exhibits tenderness.   Generalized back    Neurological: She is alert and oriented to person, place, and time. She has normal strength. GCS score is 15. GCS eye subscore is 4. GCS verbal subscore is 5. GCS motor subscore is 6.   Skin: Skin is warm.         ED Course   Procedures  Labs Reviewed   CBC W/ AUTO DIFFERENTIAL - Abnormal; Notable for the following components:       Result Value    RBC 3.60 (*)     Hemoglobin 10.6 (*)     Hematocrit 33.4 (*)     Mean Corpuscular Hemoglobin Conc 31.7 (*)     RDW 14.6 (*)     MPV 9.1 (*)     Lymph # 0.3 (*)     Gran% 86.5 (*)     Lymph% 4.3 (*)     All other components within normal limits   COMPREHENSIVE METABOLIC PANEL   LIPASE   URINALYSIS, REFLEX TO URINE CULTURE   URINALYSIS MICROSCOPIC     EKG Readings: (Independently Interpreted)   Initial Reading: No STEMI. Rhythm: Normal Sinus Rhythm. Heart Rate: 92. Ectopy: No Ectopy. Conduction: Normal.   Normal QT        Imaging Results    None          Medical Decision Making:   Independently Interpreted Test(s):   I have ordered and independently interpreted EKG Reading(s) - see prior notes  Clinical Tests:   Lab Tests: Ordered and Reviewed  The following lab test(s) were unremarkable: Lipase       <> Summary of Lab: H&H 10.6 and 33.4  Alk-phos 173  UA with increased pH more than 8 1+ protein trace ketones  Medical Tests: Ordered and Reviewed  ED Management:  Attending Note:  I provided a face to face evaluation of this patient.  I discussed the patient's care with  Advanced Practice Clinician.  I reviewed their note and agree with the history, physical, assessment, diagnosis, treatment, and discharge plan provided by the Advanced Practice Clinician. My overall impression is nausea and vomiting side effect of fentanyl patch.  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  She change the patch once and therefore is had 6 total days she has due to change the patch again today but reports she cannot tolerate the nausea and vomiting despite her physician telling her to continue the medication as prescribed.  She presented here due to nausea vomiting and mild abdominal pain from the retching.  Labs are being drawn.  Patient ambulated to the bathroom without difficulty.  We discussed with the nurse practitioner with her oncologist office and have informed them that we can not write for  1 75 micro g fentanyl patch but any continuing pain management would have to come from them.  Her labs are still pending.  Her EKG is normal with a normal sinus rhythm and no prolonged QT.  We will send her home with Zofran ODT and Phenergan suppositories  Farnaz Hogue M.D. 5/18/2020 1:26 PM.  Patient will be discharged home.    Labs have returned patient has a mildly elevated alk-phos urine with trace protein and ketones no signs infection.  CBC with baseline anemia no elevation in white count                   ED Course as of May 18 1419   Mon May 18, 2020   1400 Spoke to nurse practitioner at Dr. Armstrong office states hand decrease her fentanyl to 75mcg óscar Hesterco for breakthrough pain also requested that I write Phenergan suppositories for nausea.  States follow-up with her in office by calling tomorrow for an appointment for refills of medication    [ES]      ED Course User Index  [ES] Alba Jeronimo PA-C                 Clinical Impression:       ICD-10-CM ICD-9-CM   1. Non-intractable vomiting with nausea, unspecified vomiting type R11.2 787.01   2. Vomiting R11.10 787.03   3. Side effect of medication T88.7XXA 995.20                                Faranz Hogue MD  05/18/20 [id number]

## 2020-05-18 NOTE — TELEPHONE ENCOUNTER
Spoke with the patients niece and answered all questions concerning the patients treatment and referral to MD [facility] as well as questions regarding her possibly moving to [place].

## 2020-05-18 NOTE — TELEPHONE ENCOUNTER
----- Message from Patricia Lu LPN sent at 5/18/2020  3:54 PM CDT -----  Ellen will you please call this niece. She is on the 2020 communications document in Sheri's chart.  ----- Message -----  From: Kelly Gandhi, Patient Care Assistant  Sent: 5/18/2020   3:37 PM CDT  To: Leatha Raymond Staff    Patient Niece (Meseret) called in stating she need to speak to someone regarding her aunt treatment . She can be reached at [phone]

## 2020-05-19 ENCOUNTER — HOSPITAL ENCOUNTER (OUTPATIENT)
Dept: RADIOLOGY | Facility: HOSPITAL | Age: 65
Discharge: HOME | End: 2020-05-19
Attending: INTERNAL MEDICINE
Payer: MEDICARE

## 2020-05-19 ENCOUNTER — TELEPHONE (OUTPATIENT)
Dept: FAMILY MEDICINE | Facility: CLINIC | Age: 65
End: 2020-05-19

## 2020-05-19 VITALS — WEIGHT: 142 LBS | BODY MASS INDEX: 23.66 KG/M2 | HEIGHT: 65 IN

## 2020-05-19 DIAGNOSIS — C34.31 MALIGNANT NEOPLASM OF LOWER LOBE OF RIGHT LUNG: ICD-10-CM

## 2020-05-19 DIAGNOSIS — C79.51 BONE METASTASES: ICD-10-CM

## 2020-05-19 DIAGNOSIS — G89.3 CANCER ASSOCIATED PAIN: ICD-10-CM

## 2020-05-19 LAB — GLUCOSE SERPL-MCNC: 99 MG/DL (ref 70–110)

## 2020-05-19 PROCEDURE — A9552 F18 FDG: HCPCS | Mod: PO

## 2020-05-19 PROCEDURE — 78815 PET IMAGE W/CT SKULL-THIGH: CPT | Mod: TC,PO,PS

## 2020-05-19 NOTE — TELEPHONE ENCOUNTER
----- Message from Francis Talavera sent at 5/19/2020 11:25 AM CDT -----  Contact: germán Parra  .Type: Needs Medical Advice    Who Called:  Germán Singh Call Back Number: [phone]  Additional Information: checking on status of out of network approval. Please call to discuss.

## 2020-05-19 NOTE — TELEPHONE ENCOUNTER
Attempted to contact the number left below, went to automated system that needs some key code. Unsuccessful call.

## 2020-05-19 NOTE — TELEPHONE ENCOUNTER
----- Message from Kleber Mann sent at 5/19/2020  1:36 PM CDT -----  Contact: Self  Pt calling in regards to a call  Back from Jessica in provider office, pt missed call      Please advise pt can be contact at [phone]

## 2020-05-20 ENCOUNTER — INFUSION (OUTPATIENT)
Dept: INFUSION THERAPY | Facility: HOSPITAL | Age: 65
End: 2020-05-20
Attending: INTERNAL MEDICINE
Payer: MEDICARE

## 2020-05-20 VITALS
DIASTOLIC BLOOD PRESSURE: 81 MMHG | HEIGHT: 65 IN | BODY MASS INDEX: 23.52 KG/M2 | HEART RATE: 87 BPM | WEIGHT: 141.13 LBS | SYSTOLIC BLOOD PRESSURE: 140 MMHG | RESPIRATION RATE: 18 BRPM | TEMPERATURE: 98 F

## 2020-05-20 DIAGNOSIS — E86.0 DEHYDRATION: ICD-10-CM

## 2020-05-20 DIAGNOSIS — C34.31 MALIGNANT NEOPLASM OF LOWER LOBE OF RIGHT LUNG: ICD-10-CM

## 2020-05-20 DIAGNOSIS — Z51.89 ENCOUNTER FOR OTHER SPECIFIED AFTERCARE: Primary | ICD-10-CM

## 2020-05-20 DIAGNOSIS — R11.0 NAUSEA: ICD-10-CM

## 2020-05-20 DIAGNOSIS — C79.51 BONE METASTASES: ICD-10-CM

## 2020-05-20 DIAGNOSIS — D70.1 CHEMOTHERAPY-INDUCED NEUTROPENIA: ICD-10-CM

## 2020-05-20 DIAGNOSIS — K21.9 GASTROESOPHAGEAL REFLUX DISEASE, ESOPHAGITIS PRESENCE NOT SPECIFIED: ICD-10-CM

## 2020-05-20 DIAGNOSIS — T45.1X5A CHEMOTHERAPY-INDUCED NEUTROPENIA: ICD-10-CM

## 2020-05-20 PROCEDURE — 96413 CHEMO IV INFUSION 1 HR: CPT

## 2020-05-20 PROCEDURE — A4216 STERILE WATER/SALINE, 10 ML: HCPCS | Performed by: INTERNAL MEDICINE

## 2020-05-20 PROCEDURE — 63600175 PHARM REV CODE 636 W HCPCS: Performed by: INTERNAL MEDICINE

## 2020-05-20 PROCEDURE — 63600175 PHARM REV CODE 636 W HCPCS: Mod: JG | Performed by: NURSE PRACTITIONER

## 2020-05-20 PROCEDURE — 96372 THER/PROPH/DIAG INJ SC/IM: CPT | Mod: 59

## 2020-05-20 PROCEDURE — 25000003 PHARM REV CODE 250: Performed by: INTERNAL MEDICINE

## 2020-05-20 RX ORDER — SODIUM CHLORIDE 0.9 % (FLUSH) 0.9 %
10 SYRINGE (ML) INJECTION
Status: CANCELLED | OUTPATIENT
Start: 2020-05-20

## 2020-05-20 RX ORDER — HEPARIN 100 UNIT/ML
500 SYRINGE INTRAVENOUS
Status: CANCELLED | OUTPATIENT
Start: 2020-05-20

## 2020-05-20 RX ORDER — HEPARIN 100 UNIT/ML
500 SYRINGE INTRAVENOUS
Status: DISCONTINUED | OUTPATIENT
Start: 2020-05-20 | End: 2020-05-20 | Stop reason: HOSPADM

## 2020-05-20 RX ORDER — SODIUM CHLORIDE 0.9 % (FLUSH) 0.9 %
10 SYRINGE (ML) INJECTION
Status: DISCONTINUED | OUTPATIENT
Start: 2020-05-20 | End: 2020-05-20 | Stop reason: HOSPADM

## 2020-05-20 RX ADMIN — HEPARIN 500 UNITS: 100 SYRINGE at 03:05

## 2020-05-20 RX ADMIN — SODIUM CHLORIDE, PRESERVATIVE FREE 10 ML: 5 INJECTION INTRAVENOUS at 03:05

## 2020-05-20 RX ADMIN — DENOSUMAB 120 MG: 120 INJECTION SUBCUTANEOUS at 03:05

## 2020-05-20 NOTE — PLAN OF CARE
Problem: Fatigue  Goal: Improved Activity Tolerance  Intervention: Promote Energy Conservation  Flowsheets (Taken 5/20/2020 1550)  Fatigue Management: fatigue-related activity identified; paced activity encouraged; frequent rest breaks encouraged  Sleep/Rest Enhancement: regular sleep/rest pattern promoted; family presence promoted  Activity Management: activity encouraged

## 2020-05-21 ENCOUNTER — PATIENT MESSAGE (OUTPATIENT)
Dept: HEMATOLOGY/ONCOLOGY | Facility: CLINIC | Age: 65
End: 2020-05-21

## 2020-05-22 ENCOUNTER — TELEPHONE (OUTPATIENT)
Dept: HEMATOLOGY/ONCOLOGY | Facility: CLINIC | Age: 65
End: 2020-05-22

## 2020-05-22 ENCOUNTER — PATIENT MESSAGE (OUTPATIENT)
Dept: HEMATOLOGY/ONCOLOGY | Facility: CLINIC | Age: 65
End: 2020-05-22

## 2020-05-22 ENCOUNTER — DOCUMENTATION ONLY (OUTPATIENT)
Dept: HEMATOLOGY/ONCOLOGY | Facility: HOSPITAL | Age: 65
End: 2020-05-22

## 2020-05-22 ENCOUNTER — TELEPHONE (OUTPATIENT)
Dept: PSYCHIATRY | Facility: CLINIC | Age: 65
End: 2020-05-22

## 2020-05-22 NOTE — TELEPHONE ENCOUNTER
----- Message from Ellen Jones sent at 5/22/2020 10:50 AM CDT -----  The patient called extremely upset and crying. She wanted to speak to Dr Zhang or you. She said someone called her yesterday but she did not get the call. Please call her back at [phone].

## 2020-05-22 NOTE — PROGRESS NOTES
I had a long discussion with Ms. Broderick about her PET scan and the rather significant progression of this disease.  I don't think the Opdivo is working and offered to switch to Taxotere or taxol weekly.  Prior to this, I feel radiation may be needed to alleviate some of the ongoing pain she is having and in particular, the C1 area which has progressed as well.  She has no cord compression signs at this time but I did discuss the danger of instability in this area and resulting quadriplegia that can happen if this continues to progress.  I have sent a message to Dr. Santos to evaluate her scans.  My hope would be that we could do short dose radiation followed by chemotherapy.      It should be stated that patient has a close friend who has also been acting as a caretaker who is a psychologist by training, Kaye Carvajal.  She was very helpful on the call today and understands this situation well.      Lastly, I did discuss comfort care but patient is in no way ready for this currently.

## 2020-05-22 NOTE — TELEPHONE ENCOUNTER
----- Message from Beulah Hawley MD sent at 5/16/2020  2:36 PM CDT -----  2-3 weeks virtual appointment please

## 2020-05-26 ENCOUNTER — PATIENT MESSAGE (OUTPATIENT)
Dept: HEMATOLOGY/ONCOLOGY | Facility: CLINIC | Age: 65
End: 2020-05-26

## 2020-05-26 ENCOUNTER — CLINICAL SUPPORT (OUTPATIENT)
Dept: RADIATION ONCOLOGY | Facility: CLINIC | Age: 65
End: 2020-05-26
Payer: MEDICARE

## 2020-05-26 ENCOUNTER — DOCUMENTATION ONLY (OUTPATIENT)
Dept: RADIATION ONCOLOGY | Facility: CLINIC | Age: 65
End: 2020-05-26

## 2020-05-26 DIAGNOSIS — C79.51 BONE METASTASES: Primary | ICD-10-CM

## 2020-05-26 PROCEDURE — 99213 PR OFFICE/OUTPT VISIT, EST, LEVL III, 20-29 MIN: ICD-10-PCS | Mod: 95,,, | Performed by: RADIOLOGY

## 2020-05-26 PROCEDURE — 99213 OFFICE O/P EST LOW 20 MIN: CPT | Mod: 95,,, | Performed by: RADIOLOGY

## 2020-05-26 NOTE — PROGRESS NOTES
Sheri Broderick  [id number]  1955 5/26/2020  No referring provider defined for this encounter.    REASON FOR CONSULTATION: IV, [id number] adenoca of RLL, PDL-1 9%    The patient location is:  home  Visit type: Virtual visit with synchronous audio and video    TREATMENT GOAL: palliative    HISTORY OF PRESENT ILLNESS:   64F never smoker with incidental chest x-ray with abnormal right lower lobe mass with CT and PET-CT demonstrating a 2.0 x 1.6 cm mass in the right lower lobe with an SUV of 5.3 with a right hilar lymph node, 1.3 cm in size with SUV of 3.1.  No distant disease was appreciated.  Bronchoscopy was nondiagnostic and she proceeded to right lower lobectomy with Dr. Welch on 04/27/2018 that revealed IIB, pT2aN1 adenoca.  She completed 4 cycles of cisplatin and Alimta under the care of Dr. hZang. Six month follow-up CT of the chest demonstrated mediastinal lymphadenopathy with short interval PET-CT demonstrating these to be FDG avid with uptake also appreciated at L3, confirmed by MRI to be a marrow replacing lesion at the vertebral body, biopsy returning metastatic adenocarcinoma.  Original specimen was retested and negative for ALK, ROS and EGFR; PDL-1 was 9%.     She completed palliative radiotherapy, 30 Gy in 10 fractions to L2-4, left hip and acetabulum 9/18/19.  She experience partial pain relief and was started on Opdivo x6 cycles but therapy was held after worsening pain.  Updated PET-CT scan demonstrates stable disease within the mediastinum with interval development of a left adrenal metastasis and worsening of diffuse axial disease including C1-2, T1-T2, T5-6, T8, T11-12, R iliac.    She completed palliative radiotherapy, 30 Gy in 10 fractions to T4-T7, L5-sacrum on 02/18/2020.  Treatment was well tolerated with nausea and fatigue and resulted in significant pain relief.  She continued on Opdivo with follow-up PET-CT scan demonstrating progression of osseous disease, lymphadenopathy, left  adrenal gland with compression fractures at T4, T12, L4, L5.  There is prominent FDG uptake at C1, T1 in T12 appreciated.  Dr. Zhang has referred for palliative treatment to C1 given risk of progression which would cause cord compromise.     Patient is complaining of pain in the right shoulder and reports electric-like sensation extending down into the right lower extremity and a sciatic-like distribution.  She continues on Duragesic for pain and is not yet transition to chemotherapy.  Immunotherapy is presently on hold.  She denies headache or seizure activity.    Review of Systems   Constitutional: Positive for appetite change and fatigue. Negative for chills, fever and unexpected weight change.   HENT:   Negative for lump/mass, mouth sores, sore throat, trouble swallowing and voice change.    Eyes: Negative for eye problems and icterus.   Respiratory: Negative for cough, hemoptysis and shortness of breath.    Cardiovascular: Negative for chest pain and leg swelling.   Gastrointestinal: Negative for abdominal pain, constipation, diarrhea, nausea and vomiting.   Genitourinary: Negative for dysuria, frequency, hematuria, nocturia and vaginal bleeding.    Musculoskeletal: Positive for back pain and neck pain. Negative for gait problem and neck stiffness.   Neurological: Negative for extremity weakness, gait problem, headaches, numbness and seizures.   Hematological: Positive for adenopathy.   Psychiatric/Behavioral: Positive for depression. The patient is nervous/anxious.      Past Medical History:   Diagnosis Date    Allergy     seasonal    Anemia associated with chemotherapy 7/18/2018    Anxiety 2012    on meds    Arthritis     Asthma 1960    no inhalers    Back pain 2010    PT    Bone metastases 4/13/2020    Chemotherapy-induced neutropenia 5/21/2018    Dizziness     Essential hypertension 4/11/2019    no meds    Fall 05/2019    fx of lumbar spine, left foot and ankle    Fibromyalgia     Fracture  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     Fracture of right foot     GERD (gastroesophageal reflux disease) 2015    on meds    Hep B w/o coma 1980    no treatment, hospitalized    Hiatal hernia 2015    on meds    Hyperlipidemia     Major depressive disorder, recurrent episode, in partial remission     Malignant neoplasm of lower lobe of right lung-Adenocarcinoma 5/21/2018    MVP (mitral valve prolapse) 2014    no meds    Myalgia and myositis, unspecified     OCD (obsessive compulsive disorder)     Osteoporosis     Polyneuropathy     SOB (shortness of breath) on exertion 10/2018    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 8/23/2019    Trouble in sleeping     Urinary incontinence      Past Surgical History:   Procedure Laterality Date    BONE BIOPSY N/A 8/5/2019    Procedure: BIOPSY, BONE;  Surgeon: [facility] Diagnostic Provider;  Location: [facility] OR;  Service: General;  Laterality: N/A;    CARPAL TUNNEL RELEASE Bilateral 07/18/2014    EYE SURGERY      RK    FRACTURE SURGERY Right     wrist orif    INSERTION OF TUNNELED CENTRAL VENOUS CATHETER (CVC) WITH SUBCUTANEOUS PORT Left 8/30/2019    Procedure: INSERTION, PORT-A-CATH;  Surgeon: Brant Welch MD;  Location: [facility];  Service: Cardiovascular;  Laterality: Left;    LUNG REMOVAL, PARTIAL  04/27/2018    Dr. Brant Welch    port a cath  06/2018    TUBAL LIGATION       Social History     Socioeconomic History    Marital status:      Spouse name: Not on file    Number of children: Not on file    Years of education: Not on file    Highest education level: Not on file   Occupational History    Not on file   Social Needs    Financial resource strain: Not on file    Food insecurity:     Worry: Not on file     Inability: Not on file    Transportation needs:     Medical: Not on file     Non-medical: Not on file   Tobacco Use    Smoking status: Never Smoker    Smokeless tobacco: Never Used   Substance and Sexual  Activity    Alcohol use: No    Drug use: No    Sexual activity: Yes     Birth control/protection: None   Lifestyle    Physical activity:     Days per week: Not on file     Minutes per session: Not on file    Stress: Not on file   Relationships    Social connections:     Talks on phone: Not on file     Gets together: Not on file     Attends Anglican service: Not on file     Active member of club or organization: Not on file     Attends meetings of clubs or organizations: Not on file     Relationship status: Not on file   Other Topics Concern    Caffeine Use Not Asked    Financial Status: Employed Not Asked    Home situation: homeless Not Asked    Caffeine Use: Frequent Not Asked    Financial Status: Unemployed Not Asked    Legal: Arrest history Not Asked    Caffeine Use: Moderate Not Asked    Financial Status: Disabled Not Asked    Legal: Involved in civil litigation Not Asked    Caffeine Use: Substantial Not Asked    Financial Status: Other Not Asked    Legal: Involved in criminal litigation Not Asked    Patient feels they ought to cut down on drinking/drug use Not Asked    Firearms: Does patient have access to a firearm? Not Asked    Legal: Other Not Asked    Patient annoyed by others criticizing their drinking/drug use Not Asked    Home situation: lives with family Not Asked    Leisure: Time with family Not Asked    Patient has felt bad or guilty about drinking/drug use Not Asked    Home situation: lives alone Not Asked    Leisure: Exercise Not Asked    Patient has had a drink/used drugs as an eye opener in the AM Not Asked    Home situation: lives with friends Not Asked    Leisure: Sports Not Asked    Childhood History: Raised by parents Not Asked    Home situation: lives with significant other Not Asked    Leisure: Fishing Not Asked    Childhood History: Uneventful Not Asked    Home situation: lives with spouse Not Asked    Leisure: Hunting Not Asked    Childhood History:  Adopted Not Asked    Home situation: lives in group home Not Asked    Leisure: Movie Watching Not Asked    Childhood History: Early trauma Not Asked    Home situation: lives in shelter Not Asked    Leisure: Shopping Not Asked    Childhood History: Other Not Asked    Home situation: lives in nursing home Not Asked     Service Not Asked    Legal consequences of chemical use Not Asked   Social History Narrative    Not on file     Family History   Problem Relation Age of Onset    Heart disease Mother          to to coma (hypothermia)     Heart disease Father 57        heart attack    Heart disease Sister         stents    Diabetes Sister     Parkinsonism Sister     Heart disease Brother 45        death at 45 years old due to hearth disease     Diabetes Maternal Grandmother        PRIOR HISTORY OF CHEMOTHERAPY OR RADIOTHERAPY: Please see HPI for patients prior oncologic history.    Medication List with Changes/Refills   Current Medications    ARIPIPRAZOLE (ABILIFY) 2 MG TAB    Take 1 tablet (2 mg total) by mouth once daily.    BENZONATATE (TESSALON PERLES) 100 MG CAPSULE    Take 1 capsule (100 mg total) by mouth every 6 (six) hours as needed for Cough.    CHEWABLE VITAMIN C 500 MG CHEW        DEXAMETHASONE (DECADRON) 4 MG TAB    Take 1 tablet (4 mg total) by mouth 2 (two) times daily. Take 2 tabs by mouth twice a day the day before chemo and the day after.    ESOMEPRAZOLE MAGNESIUM (NEXIUM 24HR) 20 MG TBEC    Take 20 mg by mouth once daily.     FAMOTIDINE (PEPCID) 40 MG TABLET    Take 1 tablet (40 mg total) by mouth every evening.    FENTANYL (DURAGESIC) 75 MCG/HR    Place 1 patch onto the skin every 72 hours.    HYDROCODONE-ACETAMINOPHEN (NORCO)  MG PER TABLET    Take 1 tablet by mouth every 4 to 6 hours as needed for Pain.    HYDROXYZINE HCL (ATARAX) 10 MG TAB    Take 1 tablet (10 mg total) by mouth 3 (three) times daily as needed (anxiety).    IBUPROFEN (ADVIL,MOTRIN) 800 MG TABLET     Take 1 tablet (800 mg total) by mouth 2 (two) times daily as needed for Pain.    LAMOTRIGINE (LAMICTAL) 200 MG TABLET    Take 1 tablet (200 mg total) by mouth 2 (two) times daily.    LIDOCAINE-PRILOCAINE (EMLA) CREAM        MECLIZINE (ANTIVERT) 25 MG TABLET    Take 1 tablet (25 mg total) by mouth every 8 (eight) hours as needed for Dizziness.    MIRTAZAPINE (REMERON) 30 MG TABLET    Take one tablet PO nightly    ONDANSETRON (ZOFRAN-ODT) 8 MG TBDL    Take 1 tablet (8 mg total) by mouth every 8 (eight) hours as needed.    PROMETHAZINE (PHENERGAN) 25 MG SUPPOSITORY    Place 1 suppository (25 mg total) rectally every 6 (six) hours as needed for Nausea.    PROMETHAZINE (PHENERGAN) 25 MG TABLET    Take 1 tablet (25 mg total) by mouth every 6 (six) hours as needed for Nausea.    SENNA (SENOKOT) 8.6 MG TABLET    Take 1 tablet by mouth once daily.    SERTRALINE (ZOLOFT) 100 MG TABLET    Take two tablets PO daily    VITAMIN B-12 1000 MCG TABLET        VITAMIN E ACETATE ORAL    Take by mouth.     Review of patient's allergies indicates:   Allergen Reactions    Penicillins Rash     Other reaction(s): Rash    Trazodone Anxiety     Severe anxiety        QUALITY OF LIFE: 70%- Cares for Self: Unable to Carry on Normal Activity or Active Work    There were no vitals filed for this visit.  There is no height or weight on file to calculate BMI.    PHYSICAL EXAM:   (telemed visit)    REVIEW OF IMAGING/PATHOLOGY/LABS: Please see HPI. All images reviewed personally by dictating physician.     ASSESSMENT: 64 y.o. female with stage IV, [id number] adenoca of RLL, PDL-1 9% status post multiple courses of palliative radiotherapy, on immunotherapy with progressive disease appreciated at C1, T1 likely causing pain at right shoulder and at low thoracic spine.  PLAN:  Sheri Broderick previously underwent palliative radiotherapy to L2-L4, left hip and acetabulum ending September 2019 followed by additional palliative radiotherapy to T4-T7,  L5-sacrum ending February 2020 in both instances reporting pain relief, suggesting radio responsiveness of her tumor.  She has been maintained on immunotherapy now with progressive disease and I reviewed her films which certainly demonstrate a worrisome metastasis at C1 though stable and I agree, necessitating treatment.  Her main complaint is right shoulder pain and suspect this is due to disease at T1-T2 which is prominent on PET-CT scan and I have offered treatment to this site.  Additionally there is prominent FDG avidity at T11-T12 and I am concerned for compromise at the sites.  With all the above sites outside of her previous treatment fields, we discussed the feasibility of treatment which I explained would require Aquaplast mask for immobilization for C1 with similar immobilization to prior treatment courses for the remaining sites.  I demonstrated the films for the patient as well as her psychologist that was in consult today and ultimately she would like to proceed with treatment.  I recommended she take her Klonopin 20 min prior to therapy to assist with claustrophobia.  Given her need to proceed to systemic therapy I am recommending a short course of 20 Gy in 5 fractions.  She will be cleared to resume systemic therapy at completion of treatment.    I carefully explained the process of simulation and treatment delivery with weekly physician visits.     We discussed the risks and benefits of the above treatment and have gone over in detail the acute and late toxicities of radiation therapy to the C1, T1-2, T11-12.     Consent deferred.    The patient has our contact information and understands that they are free to contact us at any time with questions or concerns regarding radiation therapy.    DISPOSITION: RTC FOR CT SIM    I have personally seen and evaluated this patient. Greater than 50% of this time was spent discussing coordination of care and/or counseling.    Total time spent with patient:  20min    Each patient to whom he or she provides medical services by telemedicine is:  (1) informed of the relationship between the physician and patient and the respective role of any other health care provider with respect to management of the patient; and (2) notified that he or she may decline to receive medical services by telemedicine and may withdraw from such care at any time.    COVID-19 precautions discussed. [facility] policy for COVID-19 testing described.  Patient will be required to wear a mask when in the Cancer Center.    PHYSICIAN: Blaze Santos Jr, MD    Thank you for the opportunity to meet and consult with Sheri Broderick.   Please feel free to contact me to discuss the above recommendation further.

## 2020-05-26 NOTE — PROGRESS NOTES
Sheri Broderick  [id number]  1955 5/26/2020  No referring provider defined for this encounter.    DIAGNOSIS: Cancer Staging  Malignant neoplasm of lower lobe of right lung-Adenocarcinoma  Staging form: Lung, AJCC 8th Edition  - Clinical: Stage IIB (cT2a, cN1, cM0) - Signed by JASPAL Cade on 2/13/2020  - Pathologic stage from 5/21/2018: Stage IIB (pT2a, pN1, cM0) - Signed by Segundo Zhang MD on 5/21/2018      TREATMENT SITE(S):     1. C1  2. T1-2  3. T11-12    INTENT: PALLIATIVE    TREATMENT SETTING: RT ALONE     MODALITY: PHOTON    TECHNIQUE:  3D CONFORMAL RADIOTHERAPY (3DCRT) with DIODES    IMRT MEDICAL NECESSITY: N/A    I have personally performed treatment planning for the patient, reviewing relevant history/physical and imaging. I have defined GTV, CTV, PTV and organs at risk.     In order to accomplish this plan, I am ordering:  SIMULATION: CT SIM FOR PLACEMENT OF TREATMENT FIELDS    CONTRAST: none    TO ACCOMPLISH REPRODUCIBLE POSITION: VACLOC and AQUAPLAST MASK    DEVICES FOR BEAM SHAPING: CUSTOMIZED MLC    CUSTOMIZED BOLUS: none    IMAGING: DAILY KV/KV OBI    I have ordered a weekly physics check.    SPECIAL PHYSICS CONSULT: YES  REASON: NEAR PRIOR FIELD    SPECIAL TREATMENT CIRCUMSTANCE: YES  Concurrent or recent administration of chemotherapeutic agents which are known potent radiosensitizers and thus will require vigilant monitoring for exaggerated radiation toxicities.    LABS: NONE    ANTICIPATED PRESCRIPTION TO ISOCENTER: 20Gy/5frx    ENERGY: 6/23X    TREATMENT: DAILY    PHYSICIAN: Blaze Santos Jr, MD

## 2020-05-27 DIAGNOSIS — C79.51 BONE METASTASES: ICD-10-CM

## 2020-05-27 DIAGNOSIS — G89.3 CANCER ASSOCIATED PAIN: Primary | ICD-10-CM

## 2020-05-27 DIAGNOSIS — C34.31 MALIGNANT NEOPLASM OF LOWER LOBE OF RIGHT LUNG: ICD-10-CM

## 2020-05-29 DIAGNOSIS — Z03.818 ENCOUNTER FOR OBSERVATION FOR SUSPECTED EXPOSURE TO OTHER BIOLOGICAL AGENTS RULED OUT: Primary | ICD-10-CM

## 2020-05-30 ENCOUNTER — PATIENT MESSAGE (OUTPATIENT)
Dept: FAMILY MEDICINE | Facility: CLINIC | Age: 65
End: 2020-05-30

## 2020-06-01 ENCOUNTER — OFFICE VISIT (OUTPATIENT)
Dept: HEMATOLOGY/ONCOLOGY | Facility: CLINIC | Age: 65
End: 2020-06-01
Payer: MEDICARE

## 2020-06-01 ENCOUNTER — TELEPHONE (OUTPATIENT)
Dept: HEMATOLOGY/ONCOLOGY | Facility: CLINIC | Age: 65
End: 2020-06-01

## 2020-06-01 DIAGNOSIS — F41.0 PANIC DISORDER WITHOUT AGORAPHOBIA: ICD-10-CM

## 2020-06-01 DIAGNOSIS — C34.31 MALIGNANT NEOPLASM OF LOWER LOBE OF RIGHT LUNG: Chronic | ICD-10-CM

## 2020-06-01 DIAGNOSIS — G89.3 CANCER ASSOCIATED PAIN: ICD-10-CM

## 2020-06-01 PROCEDURE — 99214 PR OFFICE/OUTPT VISIT, EST, LEVL IV, 30-39 MIN: ICD-10-PCS | Mod: 95,,, | Performed by: INTERNAL MEDICINE

## 2020-06-01 PROCEDURE — 1101F PR PT FALLS ASSESS DOC 0-1 FALLS W/OUT INJ PAST YR: ICD-10-PCS | Mod: 95,,, | Performed by: INTERNAL MEDICINE

## 2020-06-01 PROCEDURE — 1101F PT FALLS ASSESS-DOCD LE1/YR: CPT | Mod: 95,,, | Performed by: INTERNAL MEDICINE

## 2020-06-01 PROCEDURE — 99214 OFFICE O/P EST MOD 30 MIN: CPT | Mod: 95,,, | Performed by: INTERNAL MEDICINE

## 2020-06-01 NOTE — ASSESSMENT & PLAN NOTE
Patient is doing better from a pain control standpoint.  I will continue her regimen which she is on and adjust as needed.  She is currently on Duragesic 75mcg with Norco 10mg breakthrough.

## 2020-06-01 NOTE — Clinical Note
Want to begin taxotere next week.  Have we submitted the request to poly for [facility]? F/u three weeks.

## 2020-06-01 NOTE — ASSESSMENT & PLAN NOTE
She continues to follow with psychiatry and seems to be doing ok at this time.  Continue to monitor.

## 2020-06-01 NOTE — ASSESSMENT & PLAN NOTE
I had a long discussion with Ms. Broderick about the path moving forward.  She is still hoping to get approval from her insurance co to visit [facility] but I feel that waiting for treatment till this is done could be hazardous.  She will begin radiation therapy and was due to begin today.  I will begin chemotherapy next week when radiation is complete.      I would like to begin her on ramucirimab/Taxotere but I'm not sure she has the PS to tolerate this regimen.  I will begin her with weekly Taxotere 35mg/m2 and if she does ok with this then consider a switch to the above combination.  I explained this to her in detail and explained my reasoning behind this decision and she is ok with this.

## 2020-06-02 NOTE — TELEPHONE ENCOUNTER
I reviewed her chart and there is a note from yesterday that she spoke with Robert and there is a pending referral from her oncologist, Dr. Zhang.  It appears that they are just waiting on additional information for Robert to approve the referral to [facility].  There is no need for me to place another referral but I am happy to write a letter to Robert if they deny the referral to see if this would help.  It does carry more weight coming from her oncologist than from her PCP.

## 2020-06-03 ENCOUNTER — OFFICE VISIT (OUTPATIENT)
Dept: RADIATION ONCOLOGY | Facility: CLINIC | Age: 65
End: 2020-06-03
Payer: MEDICARE

## 2020-06-03 DIAGNOSIS — C79.51 BONE METASTASES: Primary | ICD-10-CM

## 2020-06-03 PROCEDURE — G6014 PR RADN TX DELIVERY,  >= 20 MEV, >= 3 TX AREAS: ICD-10-PCS | Mod: S$GLB,,, | Performed by: RADIOLOGY

## 2020-06-03 PROCEDURE — G6002 STEREOSCOPIC X-RAY GUIDANCE: HCPCS | Mod: S$GLB,,, | Performed by: RADIOLOGY

## 2020-06-03 PROCEDURE — 77331 SPECIAL RADIATION DOSIMETRY: CPT | Mod: S$GLB,,, | Performed by: RADIOLOGY

## 2020-06-03 PROCEDURE — G6014 RADIATION TREATMENT DELIVERY: HCPCS | Mod: S$GLB,,, | Performed by: RADIOLOGY

## 2020-06-03 PROCEDURE — G6002 PR STEREOSCOPIC XRAY GUIDE FOR RADIATION TX DELIV: ICD-10-PCS | Mod: S$GLB,,, | Performed by: RADIOLOGY

## 2020-06-03 PROCEDURE — 77331 PR  SPECIAL RADIATION DOSIMETRY: ICD-10-PCS | Mod: S$GLB,,, | Performed by: RADIOLOGY

## 2020-06-04 ENCOUNTER — TELEPHONE (OUTPATIENT)
Dept: HEMATOLOGY/ONCOLOGY | Facility: CLINIC | Age: 65
End: 2020-06-04

## 2020-06-04 ENCOUNTER — HOSPITAL ENCOUNTER (INPATIENT)
Facility: HOSPITAL | Age: 65
LOS: 1 days | Discharge: HOME | DRG: 392 | End: 2020-06-06
Attending: EMERGENCY MEDICINE | Admitting: INTERNAL MEDICINE
Payer: MEDICARE

## 2020-06-04 DIAGNOSIS — R94.31 QT PROLONGATION: ICD-10-CM

## 2020-06-04 DIAGNOSIS — C34.31 MALIGNANT NEOPLASM OF LOWER LOBE OF RIGHT LUNG: Chronic | ICD-10-CM

## 2020-06-04 DIAGNOSIS — C34.31 SQUAMOUS CELL CARCINOMA OF BRONCHUS IN RIGHT LOWER LOBE: ICD-10-CM

## 2020-06-04 DIAGNOSIS — F41.1 GENERALIZED ANXIETY DISORDER: ICD-10-CM

## 2020-06-04 DIAGNOSIS — R11.2 INTRACTABLE VOMITING WITH NAUSEA, UNSPECIFIED VOMITING TYPE: Primary | ICD-10-CM

## 2020-06-04 DIAGNOSIS — G89.3 CANCER ASSOCIATED PAIN: ICD-10-CM

## 2020-06-04 DIAGNOSIS — R52 PAIN: ICD-10-CM

## 2020-06-04 PROBLEM — R11.10 INTRACTABLE VOMITING: Status: ACTIVE | Noted: 2020-06-04

## 2020-06-04 LAB
ALBUMIN SERPL BCP-MCNC: 3.7 G/DL (ref 3.5–5.2)
ALP SERPL-CCNC: 157 U/L (ref 55–135)
ALT SERPL W/O P-5'-P-CCNC: 13 U/L (ref 10–44)
ANION GAP SERPL CALC-SCNC: 11 MMOL/L (ref 8–16)
AST SERPL-CCNC: 17 U/L (ref 10–40)
BACTERIA #/AREA URNS HPF: NEGATIVE /HPF
BASOPHILS # BLD AUTO: 0.01 K/UL (ref 0–0.2)
BASOPHILS NFR BLD: 0.2 % (ref 0–1.9)
BILIRUB SERPL-MCNC: 0.8 MG/DL (ref 0.1–1)
BILIRUB UR QL STRIP: NEGATIVE
BNP SERPL-MCNC: 17 PG/ML (ref 0–99)
BUN SERPL-MCNC: 11 MG/DL (ref 8–23)
CALCIUM SERPL-MCNC: 8.1 MG/DL (ref 8.7–10.5)
CHLORIDE SERPL-SCNC: 105 MMOL/L (ref 95–110)
CLARITY UR: CLEAR
CO2 SERPL-SCNC: 23 MMOL/L (ref 23–29)
COLOR UR: YELLOW
CREAT SERPL-MCNC: 0.6 MG/DL (ref 0.5–1.4)
DIFFERENTIAL METHOD: ABNORMAL
EOSINOPHIL # BLD AUTO: 0.1 K/UL (ref 0–0.5)
EOSINOPHIL NFR BLD: 2.4 % (ref 0–8)
ERYTHROCYTE [DISTWIDTH] IN BLOOD BY AUTOMATED COUNT: 14.9 % (ref 11.5–14.5)
EST. GFR  (AFRICAN AMERICAN): >60 ML/MIN/1.73 M^2
EST. GFR  (NON AFRICAN AMERICAN): >60 ML/MIN/1.73 M^2
GLUCOSE SERPL-MCNC: 108 MG/DL (ref 70–110)
GLUCOSE UR QL STRIP: NEGATIVE
HCT VFR BLD AUTO: 32.9 % (ref 37–48.5)
HGB BLD-MCNC: 10.2 G/DL (ref 12–16)
HGB UR QL STRIP: NEGATIVE
HYALINE CASTS #/AREA URNS LPF: 6 /LPF
IMM GRANULOCYTES # BLD AUTO: 0.04 K/UL (ref 0–0.04)
IMM GRANULOCYTES NFR BLD AUTO: 1 % (ref 0–0.5)
KETONES UR QL STRIP: 60
LACTATE SERPL-SCNC: 1.3 MMOL/L (ref 0.5–1.9)
LEUKOCYTE ESTERASE UR QL STRIP: NEGATIVE
LIPASE SERPL-CCNC: 23 U/L (ref 4–60)
LYMPHOCYTES # BLD AUTO: 0.3 K/UL (ref 1–4.8)
LYMPHOCYTES NFR BLD: 6.4 % (ref 18–48)
MAGNESIUM SERPL-MCNC: 2.2 MG/DL (ref 1.6–2.6)
MCH RBC QN AUTO: 29.8 PG (ref 27–31)
MCHC RBC AUTO-ENTMCNC: 31 G/DL (ref 32–36)
MCV RBC AUTO: 96 FL (ref 82–98)
MICROSCOPIC COMMENT: ABNORMAL
MONOCYTES # BLD AUTO: 0.3 K/UL (ref 0.3–1)
MONOCYTES NFR BLD: 7.8 % (ref 4–15)
NEUTROPHILS # BLD AUTO: 3.4 K/UL (ref 1.8–7.7)
NEUTROPHILS NFR BLD: 82.2 % (ref 38–73)
NITRITE UR QL STRIP: NEGATIVE
NRBC BLD-RTO: 0 /100 WBC
PH UR STRIP: >8 [PH] (ref 5–8)
PLATELET # BLD AUTO: 216 K/UL (ref 150–350)
PMV BLD AUTO: 9.1 FL (ref 9.2–12.9)
POTASSIUM SERPL-SCNC: 4.1 MMOL/L (ref 3.5–5.1)
PROT SERPL-MCNC: 7.3 G/DL (ref 6–8.4)
PROT UR QL STRIP: ABNORMAL
RBC # BLD AUTO: 3.42 M/UL (ref 4–5.4)
RBC #/AREA URNS HPF: 5 /HPF (ref 0–4)
SARS-COV-2 RDRP RESP QL NAA+PROBE: NEGATIVE
SODIUM SERPL-SCNC: 139 MMOL/L (ref 136–145)
SP GR UR STRIP: 1.02 (ref 1–1.03)
SQUAMOUS #/AREA URNS HPF: 2 /HPF
TROPONIN I SERPL DL<=0.01 NG/ML-MCNC: <0.03 NG/ML
URN SPEC COLLECT METH UR: ABNORMAL
UROBILINOGEN UR STRIP-ACNC: NEGATIVE EU/DL
WBC # BLD AUTO: 4.09 K/UL (ref 3.9–12.7)
WBC #/AREA URNS HPF: 4 /HPF (ref 0–5)

## 2020-06-04 PROCEDURE — 99285 EMERGENCY DEPT VISIT HI MDM: CPT | Mod: 25

## 2020-06-04 PROCEDURE — 25500020 PHARM REV CODE 255: Performed by: EMERGENCY MEDICINE

## 2020-06-04 PROCEDURE — 25000003 PHARM REV CODE 250: Performed by: NURSE PRACTITIONER

## 2020-06-04 PROCEDURE — 80053 COMPREHEN METABOLIC PANEL: CPT

## 2020-06-04 PROCEDURE — 84484 ASSAY OF TROPONIN QUANT: CPT

## 2020-06-04 PROCEDURE — 83735 ASSAY OF MAGNESIUM: CPT

## 2020-06-04 PROCEDURE — 83605 ASSAY OF LACTIC ACID: CPT

## 2020-06-04 PROCEDURE — 85025 COMPLETE CBC W/AUTO DIFF WBC: CPT

## 2020-06-04 PROCEDURE — 25000003 PHARM REV CODE 250: Performed by: EMERGENCY MEDICINE

## 2020-06-04 PROCEDURE — 96375 TX/PRO/DX INJ NEW DRUG ADDON: CPT

## 2020-06-04 PROCEDURE — 63600175 PHARM REV CODE 636 W HCPCS: Performed by: NURSE PRACTITIONER

## 2020-06-04 PROCEDURE — 83690 ASSAY OF LIPASE: CPT

## 2020-06-04 PROCEDURE — 96365 THER/PROPH/DIAG IV INF INIT: CPT

## 2020-06-04 PROCEDURE — 83880 ASSAY OF NATRIURETIC PEPTIDE: CPT

## 2020-06-04 PROCEDURE — 81001 URINALYSIS AUTO W/SCOPE: CPT

## 2020-06-04 PROCEDURE — U0002 COVID-19 LAB TEST NON-CDC: HCPCS

## 2020-06-04 PROCEDURE — 36415 COLL VENOUS BLD VENIPUNCTURE: CPT

## 2020-06-04 PROCEDURE — C9113 INJ PANTOPRAZOLE SODIUM, VIA: HCPCS | Performed by: NURSE PRACTITIONER

## 2020-06-04 PROCEDURE — 96361 HYDRATE IV INFUSION ADD-ON: CPT

## 2020-06-04 PROCEDURE — G0378 HOSPITAL OBSERVATION PER HR: HCPCS

## 2020-06-04 PROCEDURE — 93005 ELECTROCARDIOGRAM TRACING: CPT | Performed by: INTERNAL MEDICINE

## 2020-06-04 PROCEDURE — 96376 TX/PRO/DX INJ SAME DRUG ADON: CPT

## 2020-06-04 PROCEDURE — 63600175 PHARM REV CODE 636 W HCPCS: Performed by: EMERGENCY MEDICINE

## 2020-06-04 RX ORDER — CALCIUM CHLORIDE IN 0.9 % NACL 1 G/100 ML
1 INTRAVENOUS SOLUTION, PIGGYBACK (ML) INTRAVENOUS
Status: DISCONTINUED | OUTPATIENT
Start: 2020-06-04 | End: 2020-06-06 | Stop reason: HOSPADM

## 2020-06-04 RX ORDER — POTASSIUM CHLORIDE 7.45 MG/ML
20 INJECTION INTRAVENOUS
Status: DISCONTINUED | OUTPATIENT
Start: 2020-06-04 | End: 2020-06-06 | Stop reason: HOSPADM

## 2020-06-04 RX ORDER — CARISOPRODOL 350 MG/1
350 TABLET ORAL 4 TIMES DAILY PRN
Status: DISCONTINUED | OUTPATIENT
Start: 2020-06-04 | End: 2020-06-06 | Stop reason: HOSPADM

## 2020-06-04 RX ORDER — MAGNESIUM SULFATE HEPTAHYDRATE 40 MG/ML
4 INJECTION, SOLUTION INTRAVENOUS
Status: DISCONTINUED | OUTPATIENT
Start: 2020-06-04 | End: 2020-06-06 | Stop reason: HOSPADM

## 2020-06-04 RX ORDER — LAMOTRIGINE 100 MG/1
200 TABLET ORAL 2 TIMES DAILY
Status: DISCONTINUED | OUTPATIENT
Start: 2020-06-04 | End: 2020-06-06 | Stop reason: HOSPADM

## 2020-06-04 RX ORDER — POTASSIUM CHLORIDE 20 MEQ/1
40 TABLET, EXTENDED RELEASE ORAL
Status: DISCONTINUED | OUTPATIENT
Start: 2020-06-04 | End: 2020-06-06 | Stop reason: HOSPADM

## 2020-06-04 RX ORDER — LORAZEPAM 0.5 MG/1
0.5 TABLET ORAL
COMMUNITY
End: 2021-08-23 | Stop reason: ALTCHOICE

## 2020-06-04 RX ORDER — ONDANSETRON 2 MG/ML
4 INJECTION INTRAMUSCULAR; INTRAVENOUS
Status: COMPLETED | OUTPATIENT
Start: 2020-06-04 | End: 2020-06-04

## 2020-06-04 RX ORDER — SODIUM CHLORIDE, SODIUM LACTATE, POTASSIUM CHLORIDE, CALCIUM CHLORIDE 600; 310; 30; 20 MG/100ML; MG/100ML; MG/100ML; MG/100ML
INJECTION, SOLUTION INTRAVENOUS CONTINUOUS
Status: DISCONTINUED | OUTPATIENT
Start: 2020-06-04 | End: 2020-06-06 | Stop reason: HOSPADM

## 2020-06-04 RX ORDER — POTASSIUM CHLORIDE 7.45 MG/ML
40 INJECTION INTRAVENOUS
Status: DISCONTINUED | OUTPATIENT
Start: 2020-06-04 | End: 2020-06-06 | Stop reason: HOSPADM

## 2020-06-04 RX ORDER — SODIUM CHLORIDE 0.9 % (FLUSH) 0.9 %
10 SYRINGE (ML) INJECTION
Status: DISCONTINUED | OUTPATIENT
Start: 2020-06-04 | End: 2020-06-06 | Stop reason: HOSPADM

## 2020-06-04 RX ORDER — MIRTAZAPINE 15 MG/1
30 TABLET, FILM COATED ORAL NIGHTLY
Status: DISCONTINUED | OUTPATIENT
Start: 2020-06-04 | End: 2020-06-06 | Stop reason: HOSPADM

## 2020-06-04 RX ORDER — DIPHENHYDRAMINE HYDROCHLORIDE 50 MG/ML
12.5 INJECTION INTRAMUSCULAR; INTRAVENOUS ONCE
Status: COMPLETED | OUTPATIENT
Start: 2020-06-04 | End: 2020-06-04

## 2020-06-04 RX ORDER — TALC
6 POWDER (GRAM) TOPICAL NIGHTLY PRN
Status: DISCONTINUED | OUTPATIENT
Start: 2020-06-04 | End: 2020-06-06 | Stop reason: HOSPADM

## 2020-06-04 RX ORDER — SERTRALINE HYDROCHLORIDE 50 MG/1
100 TABLET, FILM COATED ORAL DAILY
Status: DISCONTINUED | OUTPATIENT
Start: 2020-06-04 | End: 2020-06-06 | Stop reason: HOSPADM

## 2020-06-04 RX ORDER — FENTANYL 75 UG/H
1 PATCH TRANSDERMAL
Status: DISCONTINUED | OUTPATIENT
Start: 2020-06-05 | End: 2020-06-06 | Stop reason: HOSPADM

## 2020-06-04 RX ORDER — MAGNESIUM SULFATE HEPTAHYDRATE 40 MG/ML
2 INJECTION, SOLUTION INTRAVENOUS
Status: DISCONTINUED | OUTPATIENT
Start: 2020-06-04 | End: 2020-06-06 | Stop reason: HOSPADM

## 2020-06-04 RX ORDER — FENTANYL 75 UG/H
1 PATCH TRANSDERMAL
Status: DISCONTINUED | OUTPATIENT
Start: 2020-06-04 | End: 2020-06-04

## 2020-06-04 RX ORDER — ACETAMINOPHEN 325 MG/1
650 TABLET ORAL EVERY 4 HOURS PRN
Status: DISCONTINUED | OUTPATIENT
Start: 2020-06-04 | End: 2020-06-06 | Stop reason: HOSPADM

## 2020-06-04 RX ORDER — LANOLIN ALCOHOL/MO/W.PET/CERES
800 CREAM (GRAM) TOPICAL
Status: DISCONTINUED | OUTPATIENT
Start: 2020-06-04 | End: 2020-06-06 | Stop reason: HOSPADM

## 2020-06-04 RX ORDER — LORAZEPAM 2 MG/ML
1 INJECTION INTRAMUSCULAR EVERY 6 HOURS PRN
Status: DISCONTINUED | OUTPATIENT
Start: 2020-06-04 | End: 2020-06-06 | Stop reason: HOSPADM

## 2020-06-04 RX ORDER — MORPHINE SULFATE 2 MG/ML
2 INJECTION, SOLUTION INTRAMUSCULAR; INTRAVENOUS EVERY 4 HOURS PRN
Status: DISCONTINUED | OUTPATIENT
Start: 2020-06-04 | End: 2020-06-06 | Stop reason: HOSPADM

## 2020-06-04 RX ORDER — MAGNESIUM SULFATE 1 G/100ML
1 INJECTION INTRAVENOUS
Status: DISCONTINUED | OUTPATIENT
Start: 2020-06-04 | End: 2020-06-06 | Stop reason: HOSPADM

## 2020-06-04 RX ORDER — POTASSIUM CHLORIDE 20 MEQ/1
20 TABLET, EXTENDED RELEASE ORAL
Status: DISCONTINUED | OUTPATIENT
Start: 2020-06-04 | End: 2020-06-06 | Stop reason: HOSPADM

## 2020-06-04 RX ORDER — DEXAMETHASONE SODIUM PHOSPHATE 4 MG/ML
4 INJECTION, SOLUTION INTRA-ARTICULAR; INTRALESIONAL; INTRAMUSCULAR; INTRAVENOUS; SOFT TISSUE ONCE
Status: COMPLETED | OUTPATIENT
Start: 2020-06-04 | End: 2020-06-04

## 2020-06-04 RX ORDER — CARISOPRODOL 350 MG/1
350 TABLET ORAL 4 TIMES DAILY PRN
COMMUNITY
End: 2021-04-06

## 2020-06-04 RX ORDER — MORPHINE SULFATE 4 MG/ML
4 INJECTION, SOLUTION INTRAMUSCULAR; INTRAVENOUS EVERY 4 HOURS PRN
Status: DISCONTINUED | OUTPATIENT
Start: 2020-06-04 | End: 2020-06-06 | Stop reason: HOSPADM

## 2020-06-04 RX ORDER — ONDANSETRON 2 MG/ML
4 INJECTION INTRAMUSCULAR; INTRAVENOUS EVERY 8 HOURS PRN
Status: DISCONTINUED | OUTPATIENT
Start: 2020-06-04 | End: 2020-06-06 | Stop reason: HOSPADM

## 2020-06-04 RX ORDER — PANTOPRAZOLE SODIUM 40 MG/10ML
40 INJECTION, POWDER, LYOPHILIZED, FOR SOLUTION INTRAVENOUS 2 TIMES DAILY
Status: DISCONTINUED | OUTPATIENT
Start: 2020-06-04 | End: 2020-06-06 | Stop reason: HOSPADM

## 2020-06-04 RX ADMIN — SERTRALINE HYDROCHLORIDE 100 MG: 50 TABLET ORAL at 04:06

## 2020-06-04 RX ADMIN — SODIUM CHLORIDE, SODIUM LACTATE, POTASSIUM CHLORIDE, AND CALCIUM CHLORIDE: .6; .31; .03; .02 INJECTION, SOLUTION INTRAVENOUS at 04:06

## 2020-06-04 RX ADMIN — PROMETHAZINE HYDROCHLORIDE 12.5 MG: 25 INJECTION INTRAMUSCULAR; INTRAVENOUS at 12:06

## 2020-06-04 RX ADMIN — ONDANSETRON 4 MG: 2 INJECTION INTRAMUSCULAR; INTRAVENOUS at 10:06

## 2020-06-04 RX ADMIN — PANTOPRAZOLE SODIUM 40 MG: 40 INJECTION, POWDER, FOR SOLUTION INTRAVENOUS at 09:06

## 2020-06-04 RX ADMIN — ONDANSETRON 4 MG: 2 INJECTION INTRAMUSCULAR; INTRAVENOUS at 01:06

## 2020-06-04 RX ADMIN — IOHEXOL 100 ML: 350 INJECTION, SOLUTION INTRAVENOUS at 11:06

## 2020-06-04 RX ADMIN — DIPHENHYDRAMINE HYDROCHLORIDE 12.5 MG: 50 INJECTION INTRAMUSCULAR; INTRAVENOUS at 02:06

## 2020-06-04 RX ADMIN — ONDANSETRON 4 MG: 2 INJECTION INTRAMUSCULAR; INTRAVENOUS at 07:06

## 2020-06-04 RX ADMIN — DEXAMETHASONE SODIUM PHOSPHATE 4 MG: 4 INJECTION, SOLUTION INTRA-ARTICULAR; INTRALESIONAL; INTRAMUSCULAR; INTRAVENOUS; SOFT TISSUE at 02:06

## 2020-06-04 RX ADMIN — LAMOTRIGINE 200 MG: 100 TABLET ORAL at 09:06

## 2020-06-04 RX ADMIN — SODIUM CHLORIDE 1000 ML: 0.9 INJECTION, SOLUTION INTRAVENOUS at 12:06

## 2020-06-04 RX ADMIN — MIRTAZAPINE 30 MG: 15 TABLET, FILM COATED ORAL at 09:06

## 2020-06-04 RX ADMIN — SODIUM CHLORIDE 1000 ML: 9 INJECTION, SOLUTION INTRAVENOUS at 10:06

## 2020-06-04 NOTE — SUBJECTIVE & OBJECTIVE
Past Medical History:   Diagnosis Date    Allergy     seasonal    Anemia associated with chemotherapy 7/18/2018    Anxiety 2012    on meds    Arthritis     Asthma 1960    no inhalers    Back pain 2010    PT    Bone metastases 4/13/2020    Chemotherapy-induced neutropenia 5/21/2018    Dizziness     Essential hypertension 4/11/2019    no meds    Fall 05/2019    fx of lumbar spine, left foot and ankle    Fibromyalgia     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     Fracture of right foot     GERD (gastroesophageal reflux disease) 2015    on meds    Hep B w/o coma 1980    no treatment, hospitalized    Hiatal hernia 2015    on meds    Hyperlipidemia     Major depressive disorder, recurrent episode, in partial remission     Malignant neoplasm of lower lobe of right lung-Adenocarcinoma 5/21/2018    MVP (mitral valve prolapse) 2014    no meds    Myalgia and myositis, unspecified     OCD (obsessive compulsive disorder)     Osteoporosis     Polyneuropathy     SOB (shortness of breath) on exertion 10/2018    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 8/23/2019    Trouble in sleeping     Urinary incontinence        Past Surgical History:   Procedure Laterality Date    BONE BIOPSY N/A 8/5/2019    Procedure: BIOPSY, BONE;  Surgeon: [facility] Provider;  Location: [facility] OR;  Service: General;  Laterality: N/A;    CARPAL TUNNEL RELEASE Bilateral 07/18/2014    EYE SURGERY      RK    FRACTURE SURGERY Right     wrist orif    INSERTION OF TUNNELED CENTRAL VENOUS CATHETER (CVC) WITH SUBCUTANEOUS PORT Left 8/30/2019    Procedure: INSERTION, PORT-A-CATH;  Surgeon: Brant Welch MD;  Location: [facility] OR;  Service: Cardiovascular;  Laterality: Left;    LUNG REMOVAL, PARTIAL  04/27/2018    Dr. Brant Welch    port a cath  06/2018    TUBAL LIGATION         Review of patient's allergies indicates:   Allergen Reactions    Penicillins Rash     Other reaction(s): Rash     Trazodone Anxiety     Severe anxiety        No current facility-administered medications on file prior to encounter.      Current Outpatient Medications on File Prior to Encounter   Medication Sig    carisoprodoL (SOMA) 350 MG tablet Take 350 mg by mouth 4 (four) times daily as needed for Muscle spasms.    esomeprazole magnesium (NEXIUM 24HR) 20 mg TbEC Take 20 mg by mouth once daily.     fentaNYL (DURAGESIC) 75 mcg/hr Place 1 patch onto the skin every 72 hours.    HYDROcodone-acetaminophen (NORCO)  mg per tablet Take 1 tablet by mouth every 4 to 6 hours as needed for Pain.    hydroxyzine HCL (ATARAX) 10 MG Tab Take 1 tablet (10 mg total) by mouth 3 (three) times daily as needed (anxiety).    lamoTRIgine (LAMICTAL) 200 MG tablet Take 1 tablet (200 mg total) by mouth 2 (two) times daily.    lidocaine-prilocaine (EMLA) cream Apply 1 g topically as needed.     LORazepam (ATIVAN) 0.5 MG tablet Take 0.5 mg by mouth every 6 (six) hours as needed for Anxiety.    mirtazapine (REMERON) 30 MG tablet Take one tablet PO nightly (Patient taking differently: Take 30 mg by mouth every evening. Take one tablet PO nightly)    ondansetron (ZOFRAN-ODT) 8 MG TbDL Take 1 tablet (8 mg total) by mouth every 8 (eight) hours as needed.    promethazine (PHENERGAN) 25 MG suppository Place 1 suppository (25 mg total) rectally every 6 (six) hours as needed for Nausea.    sertraline (ZOLOFT) 100 MG tablet Take two tablets PO daily (Patient taking differently: Take 100 mg by mouth once daily. Take two tablets PO daily)    VITAMIN B-12 1000 MCG tablet Take 500 mcg by mouth once daily.     ARIPiprazole (ABILIFY) 2 MG Tab Take 1 tablet (2 mg total) by mouth once daily.    dexAMETHasone (DECADRON) 4 MG Tab Take 1 tablet (4 mg total) by mouth 2 (two) times daily. Take 2 tabs by mouth twice a day the day before chemo and the day after.    famotidine (PEPCID) 40 MG tablet Take 1 tablet (40 mg total) by mouth every evening.     ibuprofen (ADVIL,MOTRIN) 800 MG tablet Take 1 tablet (800 mg total) by mouth 2 (two) times daily as needed for Pain.    meclizine (ANTIVERT) 25 mg tablet Take 1 tablet (25 mg total) by mouth every 8 (eight) hours as needed for Dizziness.    promethazine (PHENERGAN) 25 MG tablet Take 1 tablet (25 mg total) by mouth every 6 (six) hours as needed for Nausea.    senna (SENOKOT) 8.6 mg tablet Take 1 tablet by mouth once daily.    [DISCONTINUED] benzonatate (TESSALON PERLES) 100 MG capsule Take 1 capsule (100 mg total) by mouth every 6 (six) hours as needed for Cough.    [DISCONTINUED] CHEWABLE VITAMIN C 500 mg Chew     [DISCONTINUED] VITAMIN E ACETATE ORAL Take by mouth.     Family History     Problem Relation (Age of Onset)    Diabetes Sister, Maternal Grandmother    Heart disease Mother, Father (57), Sister, Brother (45)    Parkinsonism Sister        Tobacco Use    Smoking status: Never Smoker    Smokeless tobacco: Never Used   Substance and Sexual Activity    Alcohol use: No    Drug use: No    Sexual activity: Yes     Birth control/protection: None     Review of Systems   Constitutional: Positive for fatigue. Negative for activity change, appetite change, chills, diaphoresis, fever and unexpected weight change.   HENT: Negative for congestion, ear pain, facial swelling, hearing loss, sore throat and trouble swallowing.    Eyes: Negative for pain and discharge.   Respiratory: Negative for cough, chest tightness, shortness of breath and wheezing.    Cardiovascular: Negative for chest pain, palpitations and leg swelling.   Gastrointestinal: Positive for nausea and vomiting. Negative for abdominal pain, blood in stool and diarrhea.   Endocrine: Negative for polydipsia, polyphagia and polyuria.   Genitourinary: Negative for difficulty urinating, dysuria, flank pain, frequency and urgency.   Musculoskeletal: Negative for arthralgias, back pain, joint swelling, neck pain and neck stiffness.   Skin: Negative for  rash and wound.   Allergic/Immunologic: Negative for environmental allergies and immunocompromised state.   Neurological: Positive for weakness. Negative for dizziness, seizures, syncope, speech difficulty, light-headedness, numbness and headaches.   Hematological: Negative for adenopathy.   Psychiatric/Behavioral: Negative for sleep disturbance and suicidal ideas. The patient is not nervous/anxious.    All other systems reviewed and are negative.    Objective:     Vital Signs (Most Recent):  Temp: 98.1 °F (36.7 °C) (06/04/20 0956)  Pulse: 81 (06/04/20 1400)  Resp: 18 (06/04/20 1400)  BP: (!) 161/72 (06/04/20 1400)  SpO2: 98 % (06/04/20 1400) Vital Signs (24h Range):  Temp:  [98.1 °F (36.7 °C)] 98.1 °F (36.7 °C)  Pulse:  [81-99] 81  Resp:  [18-25] 18  SpO2:  [98 %-100 %] 98 %  BP: (134-172)/(65-81) 161/72     Weight: 63.5 kg (140 lb)  Body mass index is 23.3 kg/m².    Physical Exam   Constitutional: She is oriented to person, place, and time. She appears well-developed and well-nourished. She appears distressed.   Actively retching    HENT:   Head: Normocephalic and atraumatic.   Eyes: Pupils are equal, round, and reactive to light. EOM are normal.   Neck: Normal range of motion. Neck supple.   Cardiovascular: Normal rate, regular rhythm, normal heart sounds and intact distal pulses.   No murmur heard.  Pulmonary/Chest: Effort normal and breath sounds normal. No stridor. No respiratory distress. She has no wheezes.   Abdominal: Soft. Bowel sounds are normal. She exhibits no distension. There is no tenderness.   Musculoskeletal: Normal range of motion.   Neurological: She is alert and oriented to person, place, and time.   Skin: Skin is warm and dry. Capillary refill takes less than 2 seconds.   Psychiatric: She has a normal mood and affect.   Nursing note and vitals reviewed.        CRANIAL NERVES     CN III, IV, VI   Pupils are equal, round, and reactive to light.  Extraocular motions are normal.        Significant  Labs:   CBC:   Recent Labs   Lab 06/04/20  1049   WBC 4.09   HGB 10.2*   HCT 32.9*        CMP:   Recent Labs   Lab 06/04/20  1049      K 4.1      CO2 23      BUN 11   CREATININE 0.6   CALCIUM 8.1*   PROT 7.3   ALBUMIN 3.7   BILITOT 0.8   ALKPHOS 157*   AST 17   ALT 13   ANIONGAP 11   EGFRNONAA >60.0       Significant Imaging: EKG: I have reviewed all pertinent results/findings within the past 24 hours and my personal findings are: NSR no acute ischemic changes

## 2020-06-04 NOTE — H&P
[facility]  History & Physical    DOS: 06/04/2020  1:08 PM      Patient Name: Sheri Broderick  MRN: [id number]  Admission Date: 6/4/2020  Attending Physician: Dr. Dasilva  Primary Care Provider: Demetrio Pleitez Jr, MD         Patient information was obtained from patient and ER records.     Subjective:     Principal Problem:Intractable vomiting    Chief Complaint:   Chief Complaint   Patient presents with    Emesis        HPI: Ms. Broderick presents today with complaints of N/V since last night. It is severe. Vomit is bilious, nonbloody, and without coffee grounds. It is associated with weakness and recent radiation to C1, T1-2, and T11-12 beginning this week with last treatment yesterday. She denies fever, chills, cough, SOB, or diarrhea. She has a history of lung ca, was previously on opdivo, and per chart review it appears she was just started on radiation with plans to start chemo in the near future. Family is attempting to get her to [facility] and awaiting insurance approval for this. She is currently under the care of Dr. Zhang. She received 2 doses of zofran and a dose of phenergan without relief in the ED. Discussed code status and she wishes to be a full code.    Past Medical History:   Diagnosis Date    Allergy     seasonal    Anemia associated with chemotherapy 7/18/2018    Anxiety 2012    on meds    Arthritis     Asthma 1960    no inhalers    Back pain 2010    PT    Bone metastases 4/13/2020    Chemotherapy-induced neutropenia 5/21/2018    Dizziness     Essential hypertension 4/11/2019    no meds    Fall 05/2019    fx of lumbar spine, left foot and ankle    Fibromyalgia     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     Fracture of right foot     GERD (gastroesophageal reflux disease) 2015    on meds    Hep B w/o coma 1980    no treatment, hospitalized    Hiatal hernia 2015    on meds    Hyperlipidemia     Major depressive disorder,  recurrent episode, in partial remission     Malignant neoplasm of lower lobe of right lung-Adenocarcinoma 5/21/2018    MVP (mitral valve prolapse) 2014    no meds    Myalgia and myositis, unspecified     OCD (obsessive compulsive disorder)     Osteoporosis     Polyneuropathy     SOB (shortness of breath) on exertion 10/2018    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 8/23/2019    Trouble in sleeping     Urinary incontinence        Past Surgical History:   Procedure Laterality Date    BONE BIOPSY N/A 8/5/2019    Procedure: BIOPSY, BONE;  Surgeon: [facility];  Location: [facility];  Service: General;  Laterality: N/A;    CARPAL TUNNEL RELEASE Bilateral 07/18/2014    EYE SURGERY      RK    FRACTURE SURGERY Right     wrist orif    INSERTION OF TUNNELED CENTRAL VENOUS CATHETER (CVC) WITH SUBCUTANEOUS PORT Left 8/30/2019    Procedure: INSERTION, PORT-A-CATH;  Surgeon: Brant Welch MD;  Location: [facility];  Service: Cardiovascular;  Laterality: Left;    LUNG REMOVAL, PARTIAL  04/27/2018    Dr. Brant Welch    port a cath  06/2018    TUBAL LIGATION         Review of patient's allergies indicates:   Allergen Reactions    Penicillins Rash     Other reaction(s): Rash    Trazodone Anxiety     Severe anxiety        No current facility-administered medications on file prior to encounter.      Current Outpatient Medications on File Prior to Encounter   Medication Sig    carisoprodoL (SOMA) 350 MG tablet Take 350 mg by mouth 4 (four) times daily as needed for Muscle spasms.    esomeprazole magnesium (NEXIUM 24HR) 20 mg TbEC Take 20 mg by mouth once daily.     fentaNYL (DURAGESIC) 75 mcg/hr Place 1 patch onto the skin every 72 hours.    HYDROcodone-acetaminophen (NORCO)  mg per tablet Take 1 tablet by mouth every 4 to 6 hours as needed for Pain.    hydroxyzine HCL (ATARAX) 10 MG Tab Take 1 tablet (10 mg total) by mouth 3 (three) times daily as needed (anxiety).     lamoTRIgine (LAMICTAL) 200 MG tablet Take 1 tablet (200 mg total) by mouth 2 (two) times daily.    lidocaine-prilocaine (EMLA) cream Apply 1 g topically as needed.     LORazepam (ATIVAN) 0.5 MG tablet Take 0.5 mg by mouth every 6 (six) hours as needed for Anxiety.    mirtazapine (REMERON) 30 MG tablet Take one tablet PO nightly (Patient taking differently: Take 30 mg by mouth every evening. Take one tablet PO nightly)    ondansetron (ZOFRAN-ODT) 8 MG TbDL Take 1 tablet (8 mg total) by mouth every 8 (eight) hours as needed.    promethazine (PHENERGAN) 25 MG suppository Place 1 suppository (25 mg total) rectally every 6 (six) hours as needed for Nausea.    sertraline (ZOLOFT) 100 MG tablet Take two tablets PO daily (Patient taking differently: Take 100 mg by mouth once daily. Take two tablets PO daily)    VITAMIN B-12 1000 MCG tablet Take 500 mcg by mouth once daily.     ARIPiprazole (ABILIFY) 2 MG Tab Take 1 tablet (2 mg total) by mouth once daily.    dexAMETHasone (DECADRON) 4 MG Tab Take 1 tablet (4 mg total) by mouth 2 (two) times daily. Take 2 tabs by mouth twice a day the day before chemo and the day after.    famotidine (PEPCID) 40 MG tablet Take 1 tablet (40 mg total) by mouth every evening.    ibuprofen (ADVIL,MOTRIN) 800 MG tablet Take 1 tablet (800 mg total) by mouth 2 (two) times daily as needed for Pain.    meclizine (ANTIVERT) 25 mg tablet Take 1 tablet (25 mg total) by mouth every 8 (eight) hours as needed for Dizziness.    promethazine (PHENERGAN) 25 MG tablet Take 1 tablet (25 mg total) by mouth every 6 (six) hours as needed for Nausea.    senna (SENOKOT) 8.6 mg tablet Take 1 tablet by mouth once daily.    [DISCONTINUED] benzonatate (TESSALON PERLES) 100 MG capsule Take 1 capsule (100 mg total) by mouth every 6 (six) hours as needed for Cough.    [DISCONTINUED] CHEWABLE VITAMIN C 500 mg Chew     [DISCONTINUED] VITAMIN E ACETATE ORAL Take by mouth.     Family History     Problem  Relation (Age of Onset)    Diabetes Sister, Maternal Grandmother    Heart disease Mother, Father (57), Sister, Brother (45)    Parkinsonism Sister        Tobacco Use    Smoking status: Never Smoker    Smokeless tobacco: Never Used   Substance and Sexual Activity    Alcohol use: No    Drug use: No    Sexual activity: Yes     Birth control/protection: None     Review of Systems   Constitutional: Positive for fatigue. Negative for activity change, appetite change, chills, diaphoresis, fever and unexpected weight change.   HENT: Negative for congestion, ear pain, facial swelling, hearing loss, sore throat and trouble swallowing.    Eyes: Negative for pain and discharge.   Respiratory: Negative for cough, chest tightness, shortness of breath and wheezing.    Cardiovascular: Negative for chest pain, palpitations and leg swelling.   Gastrointestinal: Positive for nausea and vomiting. Negative for abdominal pain, blood in stool and diarrhea.   Endocrine: Negative for polydipsia, polyphagia and polyuria.   Genitourinary: Negative for difficulty urinating, dysuria, flank pain, frequency and urgency.   Musculoskeletal: Negative for arthralgias, back pain, joint swelling, neck pain and neck stiffness.   Skin: Negative for rash and wound.   Allergic/Immunologic: Negative for environmental allergies and immunocompromised state.   Neurological: Positive for weakness. Negative for dizziness, seizures, syncope, speech difficulty, light-headedness, numbness and headaches.   Hematological: Negative for adenopathy.   Psychiatric/Behavioral: Negative for sleep disturbance and suicidal ideas. The patient is not nervous/anxious.    All other systems reviewed and are negative.    Objective:     Vital Signs (Most Recent):  Temp: 98.1 °F (36.7 °C) (06/04/20 0956)  Pulse: 81 (06/04/20 1400)  Resp: 18 (06/04/20 1400)  BP: (!) 161/72 (06/04/20 1400)  SpO2: 98 % (06/04/20 1400) Vital Signs (24h Range):  Temp:  [98.1 °F (36.7 °C)] 98.1 °F  (36.7 °C)  Pulse:  [81-99] 81  Resp:  [18-25] 18  SpO2:  [98 %-100 %] 98 %  BP: (134-172)/(65-81) 161/72     Weight: 63.5 kg (140 lb)  Body mass index is 23.3 kg/m².    Physical Exam   Constitutional: She is oriented to person, place, and time. She appears well-developed and well-nourished. She appears distressed.   Actively retching    HENT:   Head: Normocephalic and atraumatic.   Eyes: Pupils are equal, round, and reactive to light. EOM are normal.   Neck: Normal range of motion. Neck supple.   Cardiovascular: Normal rate, regular rhythm, normal heart sounds and intact distal pulses.   No murmur heard.  Pulmonary/Chest: Effort normal and breath sounds normal. No stridor. No respiratory distress. She has no wheezes.   Abdominal: Soft. Bowel sounds are normal. She exhibits no distension. There is no tenderness.   Musculoskeletal: Normal range of motion.   Neurological: She is alert and oriented to person, place, and time.   Skin: Skin is warm and dry. Capillary refill takes less than 2 seconds.   Psychiatric: She has a normal mood and affect.   Nursing note and vitals reviewed.        CRANIAL NERVES     CN III, IV, VI   Pupils are equal, round, and reactive to light.  Extraocular motions are normal.        Significant Labs:   CBC:   Recent Labs   Lab 06/04/20  1049   WBC 4.09   HGB 10.2*   HCT 32.9*        CMP:   Recent Labs   Lab 06/04/20  1049      K 4.1      CO2 23      BUN 11   CREATININE 0.6   CALCIUM 8.1*   PROT 7.3   ALBUMIN 3.7   BILITOT 0.8   ALKPHOS 157*   AST 17   ALT 13   ANIONGAP 11   EGFRNONAA >60.0       Significant Imaging: EKG: I have reviewed all pertinent results/findings within the past 24 hours and my personal findings are: NSR no acute ischemic changes    Assessment/Plan:     * Intractable vomiting  Admit to med/surg tele - given multiple QT prolongation agents to control nausea current QTc 460  IV antiemetics  NPO except meds  Change meds to IV if possible   Already  given 2 doses of zofran and 1 dose of phenergan - will try decadron/benadryl combo once   Likely d/t radiation   Repeat bmp in AM   IV hydration    Cancer associated pain  Continue home fentanyl patch  IV pain meds PRN given intractable vomiting         Malignant neoplasm of lower lobe of right lung-Adenocarcinoma  Consult Dr. Zhang       Generalized anxiety disorder  Continue home meds as able   Switched home PO ativan to IV ativan PRN        VTE Risk Mitigation (From admission, onward)         Ordered     IP VTE HIGH RISK PATIENT  Once      06/04/20 1332     Place sequential compression device  Until discontinued      06/04/20 1332                   Andreina Ovalles, NP  Department of Hospital Medicine   [facility]

## 2020-06-04 NOTE — ASSESSMENT & PLAN NOTE
Admit to med/surg tele - given multiple QT prolongation agents to control nausea current QTc 460  IV antiemetics  NPO except meds  Change meds to IV if possible   Already given 2 doses of zofran and 1 dose of phenergan - will try decadron/benadryl combo once   Likely d/t radiation   Repeat bmp in AM   IV hydration

## 2020-06-04 NOTE — PLAN OF CARE
Problem: Fall Injury Risk  Goal: Absence of Fall and Fall-Related Injury  Outcome: Ongoing, Progressing     Problem: Adult Inpatient Plan of Care  Goal: Plan of Care Review  Outcome: Ongoing, Progressing  Goal: Patient-Specific Goal (Individualization)  Outcome: Ongoing, Progressing  Goal: Absence of Hospital-Acquired Illness or Injury  Outcome: Ongoing, Progressing  Goal: Optimal Comfort and Wellbeing  Outcome: Ongoing, Progressing  Goal: Readiness for Transition of Care  Outcome: Ongoing, Progressing  Goal: Rounds/Family Conference  Outcome: Ongoing, Progressing     Problem: Infection  Goal: Infection Symptom Resolution  Outcome: Ongoing, Progressing     Problem: Nausea and Vomiting  Goal: Fluid and Electrolyte Balance  Outcome: Ongoing, Progressing     Problem: Pain Chronic (Persistent)  Goal: Acceptable Pain Control and Functional Ability  Outcome: Ongoing, Progressing     Discussed with patient ways of controlling nausea, pt states having an empty stomach makes it worse.  Notified MD and order obtained for liquid diet.

## 2020-06-04 NOTE — TELEPHONE ENCOUNTER
Received a call from Dr. Bocanegra the patients psycologist/friend. They have called the Ambulance and the patient is in the ER for intractable nausea and vomiting since starting radiation this week. They are going to admit her. Dr. Bocanegra's number is [phone]. Ellyn at radiation notified and was aware the patient is being admitted. When she gets discharged we will make her a follow up appt with dr. Zhang.

## 2020-06-04 NOTE — HPI
Ms. Broderick presents today with complaints of N/V since last night. It is severe. Vomit is bilious, nonbloody, and without coffee grounds. It is associated with weakness and recent radiation to C1, T1-2, and T11-12 beginning this week with last treatment yesterday. She denies fever, chills, cough, SOB, or diarrhea. She has a history of lung ca, was previously on opdivo, and per chart review it appears she was just started on radiation with plans to start chemo in the near future. Family is attempting to get her to [facility] and awaiting insurance approval for this. She is currently under the care of Dr. Zhang. She received 2 doses of zofran and a dose of phenergan without relief in the ED. Discussed code status and she wishes to be a full code.

## 2020-06-04 NOTE — ED PROVIDER NOTES
Encounter Date: 6/4/2020       History     Chief Complaint   Patient presents with    Emesis     64-year-old female presents with nausea and vomiting, patient has a history of lung cancer with metastasis to bone, patient is on radiation therapy she reports radiation treatment yesterday, patient sees Dr Zhang, patient reports that she has had constant nausea and vomiting since yesterday and has been unable to tolerate p.o. since then.  Patient reports 5/10 epigastric pain patient also reports some pain in her chest but reports this may be secondary to throwing up        Review of patient's allergies indicates:   Allergen Reactions    Penicillins Rash     Other reaction(s): Rash    Trazodone Anxiety     Severe anxiety      Past Medical History:   Diagnosis Date    Allergy     seasonal    Anemia associated with chemotherapy 7/18/2018    Anxiety 2012    on meds    Arthritis     Asthma 1960    no inhalers    Back pain 2010    PT    Bone metastases 4/13/2020    Chemotherapy-induced neutropenia 5/21/2018    Dizziness     Essential hypertension 4/11/2019    no meds    Fall 05/2019    fx of lumbar spine, left foot and ankle    Fibromyalgia     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     Fracture of right foot     GERD (gastroesophageal reflux disease) 2015    on meds    Hep B w/o coma 1980    no treatment, hospitalized    Hiatal hernia 2015    on meds    Hyperlipidemia     Major depressive disorder, recurrent episode, in partial remission     Malignant neoplasm of lower lobe of right lung-Adenocarcinoma 5/21/2018    MVP (mitral valve prolapse) 2014    no meds    Myalgia and myositis, unspecified     OCD (obsessive compulsive disorder)     Osteoporosis     Polyneuropathy     SOB (shortness of breath) on exertion 10/2018    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 8/23/2019    Trouble in sleeping     Urinary incontinence      Past Surgical History:   Procedure  Laterality Date    BONE BIOPSY N/A 2019    Procedure: BIOPSY, BONE;  Surgeon: Ashley Diagnostic Provider;  Location: [facility] OR;  Service: General;  Laterality: N/A;    CARPAL TUNNEL RELEASE Bilateral 2014    EYE SURGERY      RK    FRACTURE SURGERY Right     wrist orif    INSERTION OF TUNNELED CENTRAL VENOUS CATHETER (CVC) WITH SUBCUTANEOUS PORT Left 2019    Procedure: INSERTION, PORT-A-CATH;  Surgeon: Brant Welch MD;  Location: [facility] OR;  Service: Cardiovascular;  Laterality: Left;    LUNG REMOVAL, PARTIAL  2018    Dr. Brant Welch    port a cath  2018    TUBAL LIGATION       Family History   Problem Relation Age of Onset    Heart disease Mother          to to coma (hypothermia)     Heart disease Father 57        heart attack    Heart disease Sister         stents    Diabetes Sister     Parkinsonism Sister     Heart disease Brother 45        death at 45 years old due to hearth disease     Diabetes Maternal Grandmother      Social History     Tobacco Use    Smoking status: Never Smoker    Smokeless tobacco: Never Used   Substance Use Topics    Alcohol use: No    Drug use: No     Review of Systems   Constitutional: Negative for fever.   HENT: Negative for congestion, rhinorrhea, sore throat and trouble swallowing.    Eyes: Negative for visual disturbance.   Respiratory: Negative for cough, chest tightness, shortness of breath and wheezing.    Cardiovascular: Positive for chest pain. Negative for palpitations and leg swelling.   Gastrointestinal: Positive for abdominal distention, constipation, nausea and vomiting. Negative for abdominal pain and diarrhea.   Genitourinary: Negative for difficulty urinating, dysuria, flank pain and frequency.   Musculoskeletal: Negative for arthralgias, back pain, joint swelling and neck pain.   Skin: Negative for color change and rash.   Neurological: Negative for dizziness, syncope, speech difficulty, weakness, numbness and headaches.    All other systems reviewed and are negative.      Physical Exam     Initial Vitals [06/04/20 0956]   BP Pulse Resp Temp SpO2   134/65 85 (!) 22 98.1 °F (36.7 °C) 99 %      MAP       --         Physical Exam    Nursing note and vitals reviewed.  Constitutional: She appears well-developed and well-nourished. She is not diaphoretic. No distress.   HENT:   Head: Normocephalic and atraumatic.   Right Ear: External ear normal.   Left Ear: External ear normal.   Nose: Nose normal.   Mouth/Throat: Oropharynx is clear and moist. No oropharyngeal exudate.   Eyes: Conjunctivae and EOM are normal. Pupils are equal, round, and reactive to light. Right eye exhibits no discharge. Left eye exhibits no discharge. No scleral icterus.   Neck: Normal range of motion. Neck supple. No thyromegaly present. No tracheal deviation present. No JVD present.   Cardiovascular: Normal rate, regular rhythm, normal heart sounds and intact distal pulses. Exam reveals no gallop and no friction rub.    No murmur heard.  Pulmonary/Chest: Breath sounds normal. No stridor. No respiratory distress. She has no wheezes. She has no rhonchi. She has no rales. She exhibits no tenderness.   Abdominal: Soft. Bowel sounds are normal. She exhibits no distension and no mass. There is tenderness. There is no rebound and no guarding.   Epigastric tenderness to palpation no rebound or guarding   Musculoskeletal: Normal range of motion. She exhibits no edema or tenderness.   Lymphadenopathy:     She has no cervical adenopathy.   Neurological: She is alert and oriented to person, place, and time. She has normal strength. She displays normal reflexes. No cranial nerve deficit or sensory deficit.   Skin: Skin is warm and dry. No rash and no abscess noted. No erythema. No pallor.         ED Course   Procedures  Labs Reviewed   CBC W/ AUTO DIFFERENTIAL - Abnormal; Notable for the following components:       Result Value    RBC 3.42 (*)     Hemoglobin 10.2 (*)      Hematocrit 32.9 (*)     Mean Corpuscular Hemoglobin Conc 31.0 (*)     RDW 14.9 (*)     MPV 9.1 (*)     Immature Granulocytes 1.0 (*)     Lymph # 0.3 (*)     Gran% 82.2 (*)     Lymph% 6.4 (*)     All other components within normal limits   COMPREHENSIVE METABOLIC PANEL - Abnormal; Notable for the following components:    Calcium 8.1 (*)     Alkaline Phosphatase 157 (*)     All other components within normal limits   URINALYSIS, REFLEX TO URINE CULTURE - Abnormal; Notable for the following components:    pH, UA >8.0 (*)     Protein, UA 1+ (*)     All other components within normal limits    Narrative:     Preferred Collection Type->Urine, Clean Catch  Specimen Source->Urine   URINALYSIS MICROSCOPIC - Abnormal; Notable for the following components:    RBC, UA 5 (*)     Hyaline Casts, UA 6 (*)     All other components within normal limits    Narrative:     Preferred Collection Type->Urine, Clean Catch  Specimen Source->Urine   LIPASE   LACTIC ACID, PLASMA   TROPONIN I   B-TYPE NATRIURETIC PEPTIDE   SARS-COV-2 RNA AMPLIFICATION, QUAL   MAGNESIUM   MAGNESIUM     EKG Readings: (Independently Interpreted)   Initial Reading: No STEMI. Rhythm: Normal Sinus Rhythm. Heart Rate: 96. Ectopy: No Ectopy. Conduction: Normal. Axis: Normal.     ECG Results          EKG 12-lead (In process)  Result time 06/04/20 11:03:08    In process by Interface, Lab In [facility] (06/04/20 11:03:08)                 Narrative:    Test Reason : R52,    Vent. Rate : 096 BPM     Atrial Rate : 096 BPM     P-R Int : 160 ms          QRS Dur : 090 ms      QT Int : 368 ms       P-R-T Axes : 066 049 058 degrees     QTc Int : 464 ms    Normal sinus rhythm  Normal ECG  When compared with ECG of 18-MAY-2020 13:50,  No significant change was found    Referred By: [email or web address]   SELF           Confirmed By:                             Imaging Results          CT Abdomen Pelvis With Contrast (Final result)  Result time 06/04/20 11:49:03    Final result by Ebony ARCEO  MD Yasmani (06/04/20 11:49:03)                 Narrative:    All CT scans at this facility used dose modulation, iterative  reconstruction and/or weight-based dosing when appropriate to reduce  radiation doses  as low as reasonably achievable.    HISTORY: Abdominal pain history of lung cancer    FINDINGS: Axial postcontrast imaging was performed with 100 cc  Omnipaque 350 IV contrast IV contrast .    COMPARISON: PET/CT dated 5/19/2020    CT ABDOMEN: There is a moderate size hiatal hernia. There is  atelectasis in the lung bases.    The liver, spleen, pancreas, gallbladder and right adrenal gland are  normal. There is a tiny 5 mm hypodense lesion in the tip at the right  lobe of the liver suggestive of small cyst.    There is a 15 mm left adrenal nodule which showed FDG activity on the  PET/CT suspicious for metastatic disease.    The kidneys enhance symmetrically without hydronephrosis or calculi.    There are no thick-walled or dilated bowel loops. There is no  adenopathy. The appendix is normal.    There is a moderate amount of fecal material throughout the colon  suggestive of constipation.    CT PELVIS: There is scattered colonic diverticulosis without  diverticulitis. The uterus, ovaries and bladder are normal. There is  no pelvic adenopathy.    The patient has known osseous metastasis throughout the lumbar spine  and pelvic bones. There is moderate compression of L4 with mild  compression of the superior endplate of L2 and L5    IMPRESSION: Moderate amount of fecal material throughout the colon  compatible with constipation without obstruction    Moderate size hiatal hernia    Stable diffuse osseous metastasis with multiple compression  deformities of the lumbar spine    Stable 15 mm left adrenal nodule shown on recent PET/CT to be  suspicious for metastatic disease    Electronically Signed by Ebony Gruber M.D. on 6/4/2020 11:57 AM                               Medical Decision Making:   History:   Old  Medical Records: I decided to obtain old medical records.  Initial Assessment:   Emergent evaluation of a 64-year-old female presenting with nausea and vomiting differential diagnosis includes dehydration, electrolyte abnormality, obstruction, perforation, infection              Attending Attestation:             Attending ED Notes:   Patient has continued nausea and vomiting which appears to be intractable patient has had more than 3 doses of antiemetics, vomiting continues.  Patient consulted for admission to Internal Medicine for further evaluation and management of intractable nausea and vomiting with dehydration.                        Clinical Impression:       ICD-10-CM ICD-9-CM   1. Intractable vomiting with nausea, unspecified vomiting type R11.2 536.2   2. Pain R52 780.96             ED Disposition Condition    Observation                           Xavier Mcconnell MD  06/04/20 0293       Xavier Mcconnell MD  06/04/20 9331

## 2020-06-05 ENCOUNTER — TELEPHONE (OUTPATIENT)
Dept: INFUSION THERAPY | Facility: HOSPITAL | Age: 65
End: 2020-06-05

## 2020-06-05 LAB
ANION GAP SERPL CALC-SCNC: 10 MMOL/L (ref 8–16)
BASOPHILS # BLD AUTO: 0.01 K/UL (ref 0–0.2)
BASOPHILS NFR BLD: 0.3 % (ref 0–1.9)
BUN SERPL-MCNC: 8 MG/DL (ref 8–23)
CALCIUM SERPL-MCNC: 7.9 MG/DL (ref 8.7–10.5)
CHLORIDE SERPL-SCNC: 106 MMOL/L (ref 95–110)
CO2 SERPL-SCNC: 24 MMOL/L (ref 23–29)
CREAT SERPL-MCNC: 0.6 MG/DL (ref 0.5–1.4)
DIFFERENTIAL METHOD: ABNORMAL
EOSINOPHIL # BLD AUTO: 0.1 K/UL (ref 0–0.5)
EOSINOPHIL NFR BLD: 1.3 % (ref 0–8)
ERYTHROCYTE [DISTWIDTH] IN BLOOD BY AUTOMATED COUNT: 15 % (ref 11.5–14.5)
EST. GFR  (AFRICAN AMERICAN): >60 ML/MIN/1.73 M^2
EST. GFR  (NON AFRICAN AMERICAN): >60 ML/MIN/1.73 M^2
GLUCOSE SERPL-MCNC: 92 MG/DL (ref 70–110)
HCT VFR BLD AUTO: 33.5 % (ref 37–48.5)
HGB BLD-MCNC: 10.5 G/DL (ref 12–16)
IMM GRANULOCYTES # BLD AUTO: 0.03 K/UL (ref 0–0.04)
IMM GRANULOCYTES NFR BLD AUTO: 0.8 % (ref 0–0.5)
LYMPHOCYTES # BLD AUTO: 0.4 K/UL (ref 1–4.8)
LYMPHOCYTES NFR BLD: 11.6 % (ref 18–48)
MAGNESIUM SERPL-MCNC: 2.2 MG/DL (ref 1.6–2.6)
MCH RBC QN AUTO: 29.6 PG (ref 27–31)
MCHC RBC AUTO-ENTMCNC: 31.3 G/DL (ref 32–36)
MCV RBC AUTO: 94 FL (ref 82–98)
MONOCYTES # BLD AUTO: 0.3 K/UL (ref 0.3–1)
MONOCYTES NFR BLD: 8.7 % (ref 4–15)
NEUTROPHILS # BLD AUTO: 2.9 K/UL (ref 1.8–7.7)
NEUTROPHILS NFR BLD: 77.3 % (ref 38–73)
NRBC BLD-RTO: 0 /100 WBC
PLATELET # BLD AUTO: 232 K/UL (ref 150–350)
PMV BLD AUTO: 8.6 FL (ref 9.2–12.9)
POTASSIUM SERPL-SCNC: 4.1 MMOL/L (ref 3.5–5.1)
RBC # BLD AUTO: 3.55 M/UL (ref 4–5.4)
SODIUM SERPL-SCNC: 140 MMOL/L (ref 136–145)
WBC # BLD AUTO: 3.79 K/UL (ref 3.9–12.7)

## 2020-06-05 PROCEDURE — 12000002 HC ACUTE/MED SURGE SEMI-PRIVATE ROOM

## 2020-06-05 PROCEDURE — 36415 COLL VENOUS BLD VENIPUNCTURE: CPT

## 2020-06-05 PROCEDURE — 80048 BASIC METABOLIC PNL TOTAL CA: CPT

## 2020-06-05 PROCEDURE — 99222 PR INITIAL HOSPITAL CARE,LEVL II: ICD-10-PCS | Mod: ,,, | Performed by: INTERNAL MEDICINE

## 2020-06-05 PROCEDURE — C9113 INJ PANTOPRAZOLE SODIUM, VIA: HCPCS | Performed by: NURSE PRACTITIONER

## 2020-06-05 PROCEDURE — 63600175 PHARM REV CODE 636 W HCPCS: Performed by: NURSE PRACTITIONER

## 2020-06-05 PROCEDURE — 85025 COMPLETE CBC W/AUTO DIFF WBC: CPT

## 2020-06-05 PROCEDURE — 83735 ASSAY OF MAGNESIUM: CPT

## 2020-06-05 PROCEDURE — 25000003 PHARM REV CODE 250: Performed by: FAMILY MEDICINE

## 2020-06-05 PROCEDURE — 25000003 PHARM REV CODE 250: Performed by: NURSE PRACTITIONER

## 2020-06-05 PROCEDURE — 99222 1ST HOSP IP/OBS MODERATE 55: CPT | Mod: ,,, | Performed by: INTERNAL MEDICINE

## 2020-06-05 PROCEDURE — 25000003 PHARM REV CODE 250: Performed by: INTERNAL MEDICINE

## 2020-06-05 RX ORDER — BISACODYL 10 MG
10 SUPPOSITORY, RECTAL RECTAL DAILY PRN
Status: DISCONTINUED | OUTPATIENT
Start: 2020-06-05 | End: 2020-06-06 | Stop reason: HOSPADM

## 2020-06-05 RX ORDER — ONDANSETRON 2 MG/ML
4 INJECTION INTRAMUSCULAR; INTRAVENOUS EVERY 6 HOURS PRN
Status: DISCONTINUED | OUTPATIENT
Start: 2020-06-05 | End: 2020-06-05

## 2020-06-05 RX ADMIN — PROMETHAZINE HYDROCHLORIDE 12.5 MG: 25 INJECTION INTRAMUSCULAR; INTRAVENOUS at 01:06

## 2020-06-05 RX ADMIN — FENTANYL TRANSDERMAL 1 PATCH: 75 PATCH, EXTENDED RELEASE TRANSDERMAL at 10:06

## 2020-06-05 RX ADMIN — PANTOPRAZOLE SODIUM 40 MG: 40 INJECTION, POWDER, FOR SOLUTION INTRAVENOUS at 10:06

## 2020-06-05 RX ADMIN — SERTRALINE HYDROCHLORIDE 100 MG: 50 TABLET ORAL at 10:06

## 2020-06-05 RX ADMIN — LAMOTRIGINE 200 MG: 100 TABLET ORAL at 10:06

## 2020-06-05 RX ADMIN — MIRTAZAPINE 30 MG: 15 TABLET, FILM COATED ORAL at 10:06

## 2020-06-05 RX ADMIN — LORAZEPAM 1 MG: 2 INJECTION INTRAMUSCULAR; INTRAVENOUS at 10:06

## 2020-06-05 RX ADMIN — MELATONIN 6 MG: at 11:06

## 2020-06-05 RX ADMIN — SODIUM PHOSPHATE 1 ENEMA: 7; 19 ENEMA RECTAL at 10:06

## 2020-06-05 RX ADMIN — SODIUM CHLORIDE, SODIUM LACTATE, POTASSIUM CHLORIDE, AND CALCIUM CHLORIDE: .6; .31; .03; .02 INJECTION, SOLUTION INTRAVENOUS at 03:06

## 2020-06-05 NOTE — PLAN OF CARE
06/05/20 0956   Discharge Assessment   Assessment Type Discharge Planning Assessment   Confirmed/corrected address and phone number on facesheet? Yes   Assessment information obtained from? Patient;Caregiver   Communicated expected length of stay with patient/caregiver no   Prior to hospitilization cognitive status: Alert/Oriented   Prior to hospitalization functional status: Needs Assistance   Current cognitive status: Alert/Oriented   Current Functional Status: Needs Assistance   Facility Arrived From: HOME   Lives With friend(s)   Able to Return to Prior Arrangements yes   Is patient able to care for self after discharge? Unable to determine at this time (comments)   Who are your caregiver(s) and their phone number(s)? APRIL BURNETT [phone]   Patient's perception of discharge disposition home health   Readmission Within the Last 30 Days no previous admission in last 30 days   Patient currently being followed by outpatient case management? No   Patient currently receives any other outside agency services? No   Equipment Currently Used at Home walker, rolling   Do you have any problems affording any of your prescribed medications? No   Is the patient taking medications as prescribed? yes   Does the patient have transportation home? Yes   Transportation Anticipated family or friend will provide   Dialysis Name and Scheduled days /A   Does the patient receive services at the [facility]? No   Discharge Plan A Home Health;Home with family   Discharge Plan B Skilled Nursing Facility   DME Needed Upon Discharge  bedside commode   Patient/Family in Agreement with Plan yes       Introduced self to patient asked if ok to take a few min of their time for short interview for D/C planning and ok with patient

## 2020-06-05 NOTE — PROGRESS NOTES
[facility] Medicine  Progress Note    Patient Name: Sheri Broderick  MRN: [id number]  Patient Class: IP- Inpatient   Admission Date: 6/4/2020  Length of Stay: 0 days  Attending Physician: Reinaldo Arreaga MD  Primary Care Provider: Demetrio Pleitez Jr, MD        Subjective:     Principal Problem:Intractable vomiting        HPI:  No notes on file    Overview/Hospital Course:  Patient was seen and examined  She is having central abdominal pain  Will review on examination patient had significant tenderness in the central umbilical area  CT scan with no acute finding but with constipation  She tolerated slight amount of breakfast this morning  No electrolyte abnormalities    Interval History:     Review of Systems  Objective:     Vital Signs (Most Recent):  Temp: 98.7 °F (37.1 °C) (06/05/20 1147)  Pulse: 104 (06/05/20 1147)  Resp: 18 (06/05/20 1147)  BP: 121/74 (06/05/20 1147)  SpO2: 99 % (06/05/20 1147) Vital Signs (24h Range):  Temp:  [98.2 °F (36.8 °C)-98.7 °F (37.1 °C)] 98.7 °F (37.1 °C)  Pulse:  [] 104  Resp:  [16-19] 18  SpO2:  [97 %-99 %] 99 %  BP: (121-148)/(72-80) 121/74     Weight: 66.9 kg (147 lb 7.8 oz)  Body mass index is 24.54 kg/m².    Intake/Output Summary (Last 24 hours) at 6/5/2020 1445  Last data filed at 6/5/2020 0119  Gross per 24 hour   Intake 30 ml   Output --   Net 30 ml      Physical Exam   Constitutional: She is oriented to person, place, and time.   HENT:   Head: Normocephalic and atraumatic.   Eyes: Conjunctivae are normal.   Neck: No JVD present.   Cardiovascular: Normal heart sounds.   Pulmonary/Chest: Effort normal and breath sounds normal.   Abdominal: There is tenderness.   Neurological: She is alert and oriented to person, place, and time.   Skin: Skin is warm.   Psychiatric: She has a normal mood and affect.   Nursing note and vitals reviewed.      Significant Labs:   CBC:   Recent Labs   Lab 06/04/20  1049 06/05/20  0447   WBC 4.09 3.79*   HGB 10.2* 10.5*    HCT 32.9* 33.5*    232     CMP:   Recent Labs   Lab 06/04/20  1049 06/05/20  0447    140   K 4.1 4.1    106   CO2 23 24    92   BUN 11 8   CREATININE 0.6 0.6   CALCIUM 8.1* 7.9*   PROT 7.3  --    ALBUMIN 3.7  --    BILITOT 0.8  --    ALKPHOS 157*  --    AST 17  --    ALT 13  --    ANIONGAP 11 10   EGFRNONAA >60.0 >60.0       Significant Imaging: I have reviewed all pertinent imaging results/findings within the past 24 hours.      Assessment/Plan:      * Intractable vomiting  Secondary to cancer and  radiation    Plan   P.r.n. Zofran and p.r.n. Phenergen  and only when  is needed, closely monitor QT interval   advance diet   IVF       Cancer associated pain  Continue home fentanyl patch          Malignant neoplasm of lower lobe of right lung-Adenocarcinoma  Consult Dr. Zhang  If  needed    Constipation  Most likely opiate induced constipation    Plan   Enema  rolestor if not improve      Generalized anxiety disorder  Continue home meds as able   PO ativan when tolerate the food       VTE Risk Mitigation (From admission, onward)         Ordered     IP VTE HIGH RISK PATIENT  Once      06/04/20 1332     Place sequential compression device  Until discontinued      06/04/20 1332                      Reinaldo Arreaga MD  Department of Hospital Medicine   [facility]

## 2020-06-05 NOTE — SUBJECTIVE & OBJECTIVE
Oncology Treatment Plan:   OP NSCLC DOCETAXEL(WEEKLY)    Medications:  Continuous Infusions:   lactated ringers 125 mL/hr at 06/05/20 0337     Scheduled Meds:   fentaNYL  1 patch Transdermal Q72H    lamoTRIgine  200 mg Oral BID    mirtazapine  30 mg Oral QHS    pantoprazole  40 mg Intravenous BID    sertraline  100 mg Oral Daily     PRN Meds:acetaminophen, bisacodyL, calcium chloride IVPB, calcium chloride IVPB, calcium chloride IVPB, carisoprodoL, lorazepam, magnesium oxide, magnesium sulfate IVPB, magnesium sulfate IVPB, magnesium sulfate IVPB, magnesium sulfate IVPB, melatonin, morphine, morphine, ondansetron, potassium chloride in water, potassium chloride in water, potassium chloride in water, potassium chloride in water, potassium chloride, potassium chloride, potassium chloride, potassium chloride, promethazine (PHENERGAN) IVPB, sodium chloride 0.9%, sodium phosphate IVPB, sodium phosphate IVPB, sodium phosphate IVPB, sodium phosphate IVPB, sodium phosphate IVPB     Review of patient's allergies indicates:   Allergen Reactions    Penicillins Rash     Other reaction(s): Rash    Trazodone Anxiety     Severe anxiety         Past Medical History:   Diagnosis Date    Allergy     seasonal    Anemia associated with chemotherapy 7/18/2018    Anxiety 2012    on meds    Arthritis     Asthma 1960    no inhalers    Back pain 2010    PT    Bone metastases 4/13/2020    Chemotherapy-induced neutropenia 5/21/2018    Dizziness     Essential hypertension 4/11/2019    no meds    Fall 05/2019    fx of lumbar spine, left foot and ankle    Fibromyalgia     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     Fracture of right foot     GERD (gastroesophageal reflux disease) 2015    on meds    Hep B w/o coma 1980    no treatment, hospitalized    Hiatal hernia 2015    on meds    Hyperlipidemia     Major depressive disorder, recurrent episode, in partial remission     Malignant neoplasm of lower lobe  of right lung-Adenocarcinoma 5/21/2018    MVP (mitral valve prolapse) 2014    no meds    Myalgia and myositis, unspecified     OCD (obsessive compulsive disorder)     Osteoporosis     Polyneuropathy     SOB (shortness of breath) on exertion 10/2018    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 8/23/2019    Trouble in sleeping     Urinary incontinence      Past Surgical History:   Procedure Laterality Date    BONE BIOPSY N/A 8/5/2019    Procedure: BIOPSY, BONE;  Surgeon: Dostae Diagnostic Provider;  Location: [facility] OR;  Service: General;  Laterality: N/A;    CARPAL TUNNEL RELEASE Bilateral 07/18/2014    EYE SURGERY      RK    FRACTURE SURGERY Right     wrist orif    INSERTION OF TUNNELED CENTRAL VENOUS CATHETER (CVC) WITH SUBCUTANEOUS PORT Left 8/30/2019    Procedure: INSERTION, PORT-A-CATH;  Surgeon: Brant Welch MD;  Location: [facility];  Service: Cardiovascular;  Laterality: Left;    LUNG REMOVAL, PARTIAL  04/27/2018    Dr. Brant Welch    port a cath  06/2018    TUBAL LIGATION       Family History     Problem Relation (Age of Onset)    Diabetes Sister, Maternal Grandmother    Heart disease Mother, Father (57), Sister, Brother (45)    Parkinsonism Sister        Tobacco Use    Smoking status: Never Smoker    Smokeless tobacco: Never Used   Substance and Sexual Activity    Alcohol use: No    Drug use: No    Sexual activity: Yes     Birth control/protection: None       Review of Systems   Constitutional: Negative for activity change, appetite change, diaphoresis, fatigue, fever and unexpected weight change.   HENT: Negative for congestion and hearing loss.    Eyes: Negative for visual disturbance.   Respiratory: Negative for cough, chest tightness and shortness of breath.    Cardiovascular: Negative for chest pain and leg swelling.   Gastrointestinal: Negative for abdominal pain, blood in stool, diarrhea, nausea and vomiting.   Endocrine: Negative for cold intolerance and heat  intolerance.   Genitourinary: Negative for difficulty urinating, dysuria and hematuria.   Neurological: Negative for dizziness and headaches.   Hematological: Negative for adenopathy. Does not bruise/bleed easily.   Psychiatric/Behavioral: Negative for behavioral problems.     Objective:     Vital Signs (Most Recent):  Temp: 98.2 °F (36.8 °C) (06/05/20 1554)  Pulse: 103 (06/05/20 1554)  Resp: 19 (06/05/20 1554)  BP: (!) 150/79 (06/05/20 1554)  SpO2: 99 % (06/05/20 1554) Vital Signs (24h Range):  Temp:  [98.2 °F (36.8 °C)-98.7 °F (37.1 °C)] 98.2 °F (36.8 °C)  Pulse:  [] 103  Resp:  [16-19] 19  SpO2:  [97 %-99 %] 99 %  BP: (121-150)/(72-79) 150/79     Weight: 66.9 kg (147 lb 7.8 oz)  Body mass index is 24.54 kg/m².  Body surface area is 1.75 meters squared.      Intake/Output Summary (Last 24 hours) at 6/5/2020 1603  Last data filed at 6/5/2020 0119  Gross per 24 hour   Intake 30 ml   Output --   Net 30 ml       Physical Exam    Significant Labs:   CBC:   Recent Labs   Lab 06/04/20  1049 06/05/20  0447   WBC 4.09 3.79*   HGB 10.2* 10.5*   HCT 32.9* 33.5*    232       Diagnostic Results:  None

## 2020-06-05 NOTE — ASSESSMENT & PLAN NOTE
Patient is doing better today and recent ativan seems to have helped her.  She does have very high anxiety which may be somewhat causative here.  Agree with ativan as needed.

## 2020-06-05 NOTE — ASSESSMENT & PLAN NOTE
Secondary to cancer and  radiation    Plan   P.r.n. Zofran and p.r.n. Phenergen  and only when  is needed, closely monitor QT interval   advance diet   IVF

## 2020-06-05 NOTE — HPI
S/P RLL Lobectomy: 4/27/2018  2.4cm adenocarcinoma  1/4 LNs pos for cancer(station 10)  Completed Cis/Alimta x 4 8/10/2018    XRT to L2-L4 and left hip 9/18/2019    Recurrence biopsy proven L3 8/7/2019  L3 lesion biopsy 8/7/2019:  Adenocarcinoma c/w lung primary  PD-L1: 9%  ALK neg  Ros-1 Neg  EGFR Neg    Nivolumab therapy x 6  10/22/2019-1/9/2020--progression    Radiation:  T5-T8--due to pain    Ms. Broderick is a 63yo female with the above cancer history who is admitted currently with intractable nausea and vomiting.  She has recently been found to be failing a retrial of Opdivo with increasing bone mets and started radiation therapy to new mets in the cervical, lumbar and hips.  She got 2 fractions of this and states this caused her intractable N/V which she could not control at home.  She was due to get a total of five treatments.  She denied any new abd pain or fever.  She had no hemoptysis and at this time is feeling better.      It should be stated that patient suffers with severe anxiety and depression and is under the care of Dr. Hawley with psychiatry at [facility].   She is often agitated and tearful about her situation and has a close friend, who is also a phycologist who is helping with caretaking and is present today.

## 2020-06-05 NOTE — NURSING
Pt has had intermittent vomiting throughout the night x 6-8 episodes producing< 30 mL total. She is currently resting quietly in bed, in NAD, will continue to monitor

## 2020-06-05 NOTE — PLAN OF CARE
Problem: Fall Injury Risk  Goal: Absence of Fall and Fall-Related Injury  Outcome: Ongoing, Progressing     Problem: Adult Inpatient Plan of Care  Goal: Plan of Care Review  Outcome: Ongoing, Progressing     Problem: Adult Inpatient Plan of Care  Goal: Patient-Specific Goal (Individualization)  Outcome: Ongoing, Progressing     Problem: Adult Inpatient Plan of Care  Goal: Optimal Comfort and Wellbeing  Outcome: Ongoing, Progressing     Problem: Adult Inpatient Plan of Care  Goal: Readiness for Transition of Care  Outcome: Ongoing, Progressing     Problem: Nausea and Vomiting  Goal: Fluid and Electrolyte Balance  Outcome: Ongoing, Progressing     Problem: Pain Chronic (Persistent)  Goal: Acceptable Pain Control and Functional Ability  Outcome: Ongoing, Progressing

## 2020-06-05 NOTE — ASSESSMENT & PLAN NOTE
Patient has been progressing and is currently on radiation therapy.  She is stating now that she does not want to complete her last three radiation treatments as she feels this caused her symptoms.  I discussed that failure to do so could put her spine at risk and she has an increased risk of fracture and destabilization of the spine possibly leading to paralysis.  She wants to go to [facility] next week and has an appt.  Will hold off on any chemotherapy until she is seen by them and await their input.

## 2020-06-05 NOTE — SUBJECTIVE & OBJECTIVE
Interval History:     Review of Systems  Objective:     Vital Signs (Most Recent):  Temp: 98.7 °F (37.1 °C) (06/05/20 1147)  Pulse: 104 (06/05/20 1147)  Resp: 18 (06/05/20 1147)  BP: 121/74 (06/05/20 1147)  SpO2: 99 % (06/05/20 1147) Vital Signs (24h Range):  Temp:  [98.2 °F (36.8 °C)-98.7 °F (37.1 °C)] 98.7 °F (37.1 °C)  Pulse:  [] 104  Resp:  [16-19] 18  SpO2:  [97 %-99 %] 99 %  BP: (121-148)/(72-80) 121/74     Weight: 66.9 kg (147 lb 7.8 oz)  Body mass index is 24.54 kg/m².    Intake/Output Summary (Last 24 hours) at 6/5/2020 1445  Last data filed at 6/5/2020 0119  Gross per 24 hour   Intake 30 ml   Output --   Net 30 ml      Physical Exam   Constitutional: She is oriented to person, place, and time.   HENT:   Head: Normocephalic and atraumatic.   Eyes: Conjunctivae are normal.   Neck: No JVD present.   Cardiovascular: Normal heart sounds.   Pulmonary/Chest: Effort normal and breath sounds normal.   Abdominal: There is tenderness.   Neurological: She is alert and oriented to person, place, and time.   Skin: Skin is warm.   Psychiatric: She has a normal mood and affect.   Nursing note and vitals reviewed.      Significant Labs:   CBC:   Recent Labs   Lab 06/04/20  1049 06/05/20  0447   WBC 4.09 3.79*   HGB 10.2* 10.5*   HCT 32.9* 33.5*    232     CMP:   Recent Labs   Lab 06/04/20  1049 06/05/20  0447    140   K 4.1 4.1    106   CO2 23 24    92   BUN 11 8   CREATININE 0.6 0.6   CALCIUM 8.1* 7.9*   PROT 7.3  --    ALBUMIN 3.7  --    BILITOT 0.8  --    ALKPHOS 157*  --    AST 17  --    ALT 13  --    ANIONGAP 11 10   EGFRNONAA >60.0 >60.0       Significant Imaging: I have reviewed all pertinent imaging results/findings within the past 24 hours.

## 2020-06-05 NOTE — ASSESSMENT & PLAN NOTE
Would continue patient on Fenatnyl 75mcg and norco for breakthrough.  I will give her prescriptions today as she is close to running out at home.

## 2020-06-05 NOTE — CONSULTS
Counts include 234 beds at the [facility]  Hematology/Oncology  Consult Note    Patient Name: Sheri Broderick  MRN: [id number]  Admission Date: 6/4/2020  Hospital Length of Stay: 0 days  Code Status: Full Code   Attending Provider: Reinaldo Arreaga MD  Consulting Provider: Segundo Lamb MD  Primary Care Physician: Demetrio Pleitez Jr, MD  Principal Problem:Intractable vomiting    Consults  Subjective:     HPI:  S/P RLL Lobectomy: 4/27/2018  2.4cm adenocarcinoma  1/4 LNs pos for cancer(station 10)  Completed Cis/Alimta x 4 8/10/2018    XRT to L2-L4 and left hip 9/18/2019    Recurrence biopsy proven L3 8/7/2019  L3 lesion biopsy 8/7/2019:  Adenocarcinoma c/w lung primary  PD-L1: 9%  ALK neg  Ros-1 Neg  EGFR Neg    Nivolumab therapy x 6  10/22/2019-1/9/2020--progression    Radiation:  T5-T8--due to pain    Ms. Broderick is a 63yo female with the above cancer history who is admitted currently with intractable nausea and vomiting.  She has recently been found to be failing a retrial of Opdivo with increasing bone mets and started radiation therapy to new mets in the cervical, lumbar and hips.  She got 2 fractions of this and states this caused her intractable N/V which she could not control at home.  She was due to get a total of five treatments.  She denied any new abd pain or fever.  She had no hemoptysis and at this time is feeling better.      It should be stated that patient suffers with severe anxiety and depression and is under the care of Dr. Hawley with psychiatry at [facility].   She is often agitated and tearful about her situation and has a close friend, who is also a phycologist who is helping with caretaking and is present today.     Oncology Treatment Plan:   OP NSCLC DOCETAXEL(WEEKLY)    Medications:  Continuous Infusions:   lactated ringers 125 mL/hr at 06/05/20 0337     Scheduled Meds:   fentaNYL  1 patch Transdermal Q72H    lamoTRIgine  200 mg Oral BID    mirtazapine  30 mg Oral QHS    pantoprazole  40 mg  Intravenous BID    sertraline  100 mg Oral Daily     PRN Meds:acetaminophen, bisacodyL, calcium chloride IVPB, calcium chloride IVPB, calcium chloride IVPB, carisoprodoL, lorazepam, magnesium oxide, magnesium sulfate IVPB, magnesium sulfate IVPB, magnesium sulfate IVPB, magnesium sulfate IVPB, melatonin, morphine, morphine, ondansetron, potassium chloride in water, potassium chloride in water, potassium chloride in water, potassium chloride in water, potassium chloride, potassium chloride, potassium chloride, potassium chloride, promethazine (PHENERGAN) IVPB, sodium chloride 0.9%, sodium phosphate IVPB, sodium phosphate IVPB, sodium phosphate IVPB, sodium phosphate IVPB, sodium phosphate IVPB     Review of patient's allergies indicates:   Allergen Reactions    Penicillins Rash     Other reaction(s): Rash    Trazodone Anxiety     Severe anxiety         Past Medical History:   Diagnosis Date    Allergy     seasonal    Anemia associated with chemotherapy 7/18/2018    Anxiety 2012    on meds    Arthritis     Asthma 1960    no inhalers    Back pain 2010    PT    Bone metastases 4/13/2020    Chemotherapy-induced neutropenia 5/21/2018    Dizziness     Essential hypertension 4/11/2019    no meds    Fall 05/2019    fx of lumbar spine, left foot and ankle    Fibromyalgia     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     Fracture of right foot     GERD (gastroesophageal reflux disease) 2015    on meds    Hep B w/o coma 1980    no treatment, hospitalized    Hiatal hernia 2015    on meds    Hyperlipidemia     Major depressive disorder, recurrent episode, in partial remission     Malignant neoplasm of lower lobe of right lung-Adenocarcinoma 5/21/2018    MVP (mitral valve prolapse) 2014    no meds    Myalgia and myositis, unspecified     OCD (obsessive compulsive disorder)     Osteoporosis     Polyneuropathy     SOB (shortness of breath) on exertion 10/2018    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    Trouble in sleeping     Urinary incontinence      Past Surgical History:   Procedure Laterality Date    BONE BIOPSY N/A 8/5/2019    Procedure: BIOPSY, BONE;  Surgeon: Ashley Diagnostic Provider;  Location: [facility] OR;  Service: General;  Laterality: N/A;    CARPAL TUNNEL RELEASE Bilateral 07/18/2014    EYE SURGERY      RK    FRACTURE SURGERY Right     wrist orif    INSERTION OF TUNNELED CENTRAL VENOUS CATHETER (CVC) WITH SUBCUTANEOUS PORT Left 8/30/2019    Procedure: INSERTION, PORT-A-CATH;  Surgeon: Brant Welch MD;  Location: [facility] OR;  Service: Cardiovascular;  Laterality: Left;    LUNG REMOVAL, PARTIAL  04/27/2018    Dr. Brant Welch    port a cath  06/2018    TUBAL LIGATION       Family History     Problem Relation (Age of Onset)    Diabetes Sister, Maternal Grandmother    Heart disease Mother, Father (57), Sister, Brother (45)    Parkinsonism Sister        Tobacco Use    Smoking status: Never Smoker    Smokeless tobacco: Never Used   Substance and Sexual Activity    Alcohol use: No    Drug use: No    Sexual activity: Yes     Birth control/protection: None       Review of Systems   Constitutional: Negative for activity change, appetite change, diaphoresis, fatigue, fever and unexpected weight change.   HENT: Negative for congestion and hearing loss.    Eyes: Negative for visual disturbance.   Respiratory: Negative for cough, chest tightness and shortness of breath.    Cardiovascular: Negative for chest pain and leg swelling.   Gastrointestinal: Negative for abdominal pain, blood in stool, diarrhea, nausea and vomiting.   Endocrine: Negative for cold intolerance and heat intolerance.   Genitourinary: Negative for difficulty urinating, dysuria and hematuria.   Neurological: Negative for dizziness and headaches.   Hematological: Negative for adenopathy. Does not bruise/bleed easily.   Psychiatric/Behavioral: Negative for behavioral problems.     Objective:      Vital Signs (Most Recent):  Temp: 98.2 °F (36.8 °C) (06/05/20 1554)  Pulse: 103 (06/05/20 1554)  Resp: 19 (06/05/20 1554)  BP: (!) 150/79 (06/05/20 1554)  SpO2: 99 % (06/05/20 1554) Vital Signs (24h Range):  Temp:  [98.2 °F (36.8 °C)-98.7 °F (37.1 °C)] 98.2 °F (36.8 °C)  Pulse:  [] 103  Resp:  [16-19] 19  SpO2:  [97 %-99 %] 99 %  BP: (121-150)/(72-79) 150/79     Weight: 66.9 kg (147 lb 7.8 oz)  Body mass index is 24.54 kg/m².  Body surface area is 1.75 meters squared.      Intake/Output Summary (Last 24 hours) at 6/5/2020 1603  Last data filed at 6/5/2020 0119  Gross per 24 hour   Intake 30 ml   Output --   Net 30 ml       Physical Exam    Significant Labs:   CBC:   Recent Labs   Lab 06/04/20  1049 06/05/20  0447   WBC 4.09 3.79*   HGB 10.2* 10.5*   HCT 32.9* 33.5*    232       Diagnostic Results:  None    Assessment/Plan:     * Intractable vomiting  Patient is doing better today and recent ativan seems to have helped her.  She does have very high anxiety which may be somewhat causative here.  Agree with ativan as needed.     Malignant neoplasm of lower lobe of right lung-Adenocarcinoma  Patient has been progressing and is currently on radiation therapy.  She is stating now that she does not want to complete her last three radiation treatments as she feels this caused her symptoms.  I discussed that failure to do so could put her spine at risk and she has an increased risk of fracture and destabilization of the spine possibly leading to paralysis.  She wants to go to [facility] next week and has an appt.  Will hold off on any chemotherapy until she is seen by them and await their input.      Cancer associated pain  Would continue patient on Fenatnyl 75mcg and norco for breakthrough.  I will give her prescriptions today as she is close to running out at home.         Thank you for your consult. I will follow-up with patient. Please contact us if you have any additional questions.    Segundo ORELLANA  [facility]  Hematology/Oncology  [facility]

## 2020-06-06 VITALS
HEIGHT: 65 IN | BODY MASS INDEX: 24.57 KG/M2 | RESPIRATION RATE: 18 BRPM | OXYGEN SATURATION: 100 % | SYSTOLIC BLOOD PRESSURE: 150 MMHG | HEART RATE: 87 BPM | TEMPERATURE: 99 F | DIASTOLIC BLOOD PRESSURE: 72 MMHG | WEIGHT: 147.5 LBS

## 2020-06-06 LAB
ANION GAP SERPL CALC-SCNC: 6 MMOL/L (ref 8–16)
BASOPHILS # BLD AUTO: 0.01 K/UL (ref 0–0.2)
BASOPHILS NFR BLD: 0.3 % (ref 0–1.9)
BUN SERPL-MCNC: 7 MG/DL (ref 8–23)
CALCIUM SERPL-MCNC: 7.7 MG/DL (ref 8.7–10.5)
CHLORIDE SERPL-SCNC: 108 MMOL/L (ref 95–110)
CO2 SERPL-SCNC: 23 MMOL/L (ref 23–29)
CREAT SERPL-MCNC: 0.4 MG/DL (ref 0.5–1.4)
DIFFERENTIAL METHOD: ABNORMAL
EOSINOPHIL # BLD AUTO: 0.1 K/UL (ref 0–0.5)
EOSINOPHIL NFR BLD: 3.5 % (ref 0–8)
ERYTHROCYTE [DISTWIDTH] IN BLOOD BY AUTOMATED COUNT: 15.2 % (ref 11.5–14.5)
EST. GFR  (AFRICAN AMERICAN): >60 ML/MIN/1.73 M^2
EST. GFR  (NON AFRICAN AMERICAN): >60 ML/MIN/1.73 M^2
GLUCOSE SERPL-MCNC: 89 MG/DL (ref 70–110)
HCT VFR BLD AUTO: 33.3 % (ref 37–48.5)
HGB BLD-MCNC: 10.3 G/DL (ref 12–16)
IMM GRANULOCYTES # BLD AUTO: 0.03 K/UL (ref 0–0.04)
IMM GRANULOCYTES NFR BLD AUTO: 0.8 % (ref 0–0.5)
LYMPHOCYTES # BLD AUTO: 0.4 K/UL (ref 1–4.8)
LYMPHOCYTES NFR BLD: 11.6 % (ref 18–48)
MAGNESIUM SERPL-MCNC: 2.2 MG/DL (ref 1.6–2.6)
MCH RBC QN AUTO: 29.4 PG (ref 27–31)
MCHC RBC AUTO-ENTMCNC: 30.9 G/DL (ref 32–36)
MCV RBC AUTO: 95 FL (ref 82–98)
MONOCYTES # BLD AUTO: 0.3 K/UL (ref 0.3–1)
MONOCYTES NFR BLD: 9.2 % (ref 4–15)
NEUTROPHILS # BLD AUTO: 2.8 K/UL (ref 1.8–7.7)
NEUTROPHILS NFR BLD: 74.6 % (ref 38–73)
NRBC BLD-RTO: 0 /100 WBC
PLATELET # BLD AUTO: 216 K/UL (ref 150–350)
PMV BLD AUTO: 9 FL (ref 9.2–12.9)
POTASSIUM SERPL-SCNC: 3.6 MMOL/L (ref 3.5–5.1)
RBC # BLD AUTO: 3.5 M/UL (ref 4–5.4)
SODIUM SERPL-SCNC: 137 MMOL/L (ref 136–145)
WBC # BLD AUTO: 3.71 K/UL (ref 3.9–12.7)

## 2020-06-06 PROCEDURE — 85025 COMPLETE CBC W/AUTO DIFF WBC: CPT

## 2020-06-06 PROCEDURE — 80048 BASIC METABOLIC PNL TOTAL CA: CPT

## 2020-06-06 PROCEDURE — 93005 ELECTROCARDIOGRAM TRACING: CPT | Performed by: INTERNAL MEDICINE

## 2020-06-06 PROCEDURE — 83735 ASSAY OF MAGNESIUM: CPT

## 2020-06-06 PROCEDURE — 63600175 PHARM REV CODE 636 W HCPCS: Performed by: NURSE PRACTITIONER

## 2020-06-06 PROCEDURE — C9113 INJ PANTOPRAZOLE SODIUM, VIA: HCPCS | Performed by: NURSE PRACTITIONER

## 2020-06-06 PROCEDURE — 25000003 PHARM REV CODE 250: Performed by: NURSE PRACTITIONER

## 2020-06-06 PROCEDURE — 36415 COLL VENOUS BLD VENIPUNCTURE: CPT

## 2020-06-06 RX ORDER — CALCIUM CHLORIDE IN 0.9 % NACL 1 G/100 ML
1 INTRAVENOUS SOLUTION, PIGGYBACK (ML) INTRAVENOUS
Status: DISCONTINUED | OUTPATIENT
Start: 2020-06-06 | End: 2020-06-06 | Stop reason: HOSPADM

## 2020-06-06 RX ORDER — POTASSIUM CHLORIDE 7.45 MG/ML
40 INJECTION INTRAVENOUS
Status: DISCONTINUED | OUTPATIENT
Start: 2020-06-06 | End: 2020-06-06 | Stop reason: HOSPADM

## 2020-06-06 RX ORDER — MAGNESIUM SULFATE HEPTAHYDRATE 40 MG/ML
4 INJECTION, SOLUTION INTRAVENOUS
Status: DISCONTINUED | OUTPATIENT
Start: 2020-06-06 | End: 2020-06-06 | Stop reason: HOSPADM

## 2020-06-06 RX ORDER — POTASSIUM CHLORIDE 20 MEQ/1
20 TABLET, EXTENDED RELEASE ORAL
Status: DISCONTINUED | OUTPATIENT
Start: 2020-06-06 | End: 2020-06-06 | Stop reason: HOSPADM

## 2020-06-06 RX ORDER — POTASSIUM CHLORIDE 20 MEQ/1
40 TABLET, EXTENDED RELEASE ORAL
Status: DISCONTINUED | OUTPATIENT
Start: 2020-06-06 | End: 2020-06-06 | Stop reason: HOSPADM

## 2020-06-06 RX ORDER — ONDANSETRON 8 MG/1
8 TABLET, ORALLY DISINTEGRATING ORAL EVERY 8 HOURS PRN
Qty: 30 TABLET | Refills: 0 | Status: SHIPPED | OUTPATIENT
Start: 2020-06-06 | End: 2020-06-06 | Stop reason: SDUPTHER

## 2020-06-06 RX ORDER — MAGNESIUM SULFATE HEPTAHYDRATE 40 MG/ML
2 INJECTION, SOLUTION INTRAVENOUS
Status: DISCONTINUED | OUTPATIENT
Start: 2020-06-06 | End: 2020-06-06 | Stop reason: HOSPADM

## 2020-06-06 RX ORDER — POTASSIUM CHLORIDE 7.45 MG/ML
20 INJECTION INTRAVENOUS
Status: DISCONTINUED | OUTPATIENT
Start: 2020-06-06 | End: 2020-06-06 | Stop reason: HOSPADM

## 2020-06-06 RX ORDER — ONDANSETRON 8 MG/1
8 TABLET, ORALLY DISINTEGRATING ORAL EVERY 8 HOURS PRN
Qty: 30 TABLET | Refills: 0 | Status: SHIPPED | OUTPATIENT
Start: 2020-06-06 | End: 2021-08-17 | Stop reason: SDUPTHER

## 2020-06-06 RX ORDER — BISACODYL 10 MG
10 SUPPOSITORY, RECTAL RECTAL DAILY PRN
Refills: 0 | COMMUNITY
Start: 2020-06-06 | End: 2021-08-23 | Stop reason: ALTCHOICE

## 2020-06-06 RX ORDER — LANOLIN ALCOHOL/MO/W.PET/CERES
800 CREAM (GRAM) TOPICAL
Status: DISCONTINUED | OUTPATIENT
Start: 2020-06-06 | End: 2020-06-06 | Stop reason: HOSPADM

## 2020-06-06 RX ORDER — MAGNESIUM SULFATE 1 G/100ML
1 INJECTION INTRAVENOUS
Status: DISCONTINUED | OUTPATIENT
Start: 2020-06-06 | End: 2020-06-06 | Stop reason: HOSPADM

## 2020-06-06 RX ADMIN — PANTOPRAZOLE SODIUM 40 MG: 40 INJECTION, POWDER, FOR SOLUTION INTRAVENOUS at 08:06

## 2020-06-06 RX ADMIN — ONDANSETRON 4 MG: 2 INJECTION INTRAMUSCULAR; INTRAVENOUS at 08:06

## 2020-06-06 RX ADMIN — SERTRALINE HYDROCHLORIDE 100 MG: 50 TABLET ORAL at 08:06

## 2020-06-06 RX ADMIN — SODIUM CHLORIDE, SODIUM LACTATE, POTASSIUM CHLORIDE, AND CALCIUM CHLORIDE: .6; .31; .03; .02 INJECTION, SOLUTION INTRAVENOUS at 03:06

## 2020-06-06 RX ADMIN — LAMOTRIGINE 200 MG: 100 TABLET ORAL at 08:06

## 2020-06-06 NOTE — PLAN OF CARE
Problem: Fall Injury Risk  Goal: Absence of Fall and Fall-Related Injury  Outcome: Ongoing, Progressing     Problem: Adult Inpatient Plan of Care  Goal: Plan of Care Review  Outcome: Ongoing, Progressing     Problem: Adult Inpatient Plan of Care  Goal: Patient-Specific Goal (Individualization)  Outcome: Ongoing, Progressing     Problem: Adult Inpatient Plan of Care  Goal: Absence of Hospital-Acquired Illness or Injury  Outcome: Ongoing, Progressing     Problem: Adult Inpatient Plan of Care  Goal: Optimal Comfort and Wellbeing  Outcome: Ongoing, Progressing     Problem: Adult Inpatient Plan of Care  Goal: Readiness for Transition of Care  Outcome: Ongoing, Progressing     Problem: Pain Chronic (Persistent)  Goal: Acceptable Pain Control and Functional Ability  Outcome: Ongoing, Progressing     Problem: Skin Injury Risk Increased  Goal: Skin Health and Integrity  Outcome: Ongoing, Progressing

## 2020-06-06 NOTE — DISCHARGE SUMMARY
[facility]  Discharge Summary      Patient Name: Sheri Broderick  MRN: [id number]  Admission Date: 6/4/2020  Hospital Length of Stay: 1 days  Discharge Date and Time:  06/06/2020 4:09 PM  Attending Physician: Reinaldo Arreaga MD   Discharging Provider: Reinaldo Arreaga MD  Primary Care Provider: Demetrio Pleitez Jr, MD      HPI:   Ms. Broderick presents today with complaints of N/V since last night. It is severe. Vomit is bilious, nonbloody, and without coffee grounds. It is associated with weakness and recent radiation to C1, T1-2, and T11-12 beginning this week with last treatment yesterday. She denies fever, chills, cough, SOB, or diarrhea. She has a history of lung ca, was previously on opdivo, and per chart review it appears she was just started on radiation with plans to start chemo in the near future. Family is attempting to get her to [facility] and awaiting insurance approval for this. She is currently under the care of Dr. Zhang. She received 2 doses of zofran and a dose of phenergan without relief in the ED. Discussed code status and she wishes to be a full code.    * No surgery found *      Hospital Course:   Patient was admitted with incredible nausea vomiting and abdominal pain.  CT scan was done with constipation possible secondary to opiates secondary to bone metastatic of the lung cancer no other acute problem  She was given IV emetics and also laxative and patient symptoms resolved and discharged in stable condition to follow-up with oncology as outpatient.     Consults:   Consults (From admission, onward)        Status Ordering Provider     Inpatient consult to Hematology Oncology  Once     Provider:  MD Dori Arthur LAURA J.          No new Assessment & Plan notes have been filed under this hospital service since the last note was generated.  Service: Hospital Medicine    Final Active Diagnoses:    Diagnosis Date Noted POA    PRINCIPAL  PROBLEM:  Intractable vomiting [R11.10] 06/04/2020 Yes    Cancer associated pain [G89.3] 10/16/2019 Yes    Malignant neoplasm of lower lobe of right lung-Adenocarcinoma [C34.31] 05/21/2018 Yes     Chronic    Constipation [K59.00] 03/06/2016 Unknown    Generalized anxiety disorder [F41.1] 08/13/2012 Yes      Problems Resolved During this Admission:       Discharged Condition: good    Disposition: Home or Self Care    Follow Up:  Follow-up Information     Segundo Lamb MD In 2 weeks.    Specialties:  Hematology, Oncology, Hematology and Oncology  Contact information:  [address]  [address]  [place] [address]  [phone]                 Patient Instructions:      Diet Adult Regular     Diet Dysphagia Mechanical Soft     Notify your health care provider if you experience any of the following:  severe uncontrolled pain     Activity as tolerated       Significant Diagnostic Studies: Labs:   CMP   Recent Labs   Lab 06/05/20  0447 06/06/20  0551    137   K 4.1 3.6    108   CO2 24 23   GLU 92 89   BUN 8 7*   CREATININE 0.6 0.4*   CALCIUM 7.9* 7.7*   ANIONGAP 10 6*   ESTGFRAFRICA >60.0 >60.0   EGFRNONAA >60.0 >60.0    and CBC   Recent Labs   Lab 06/05/20  0447 06/06/20  0551   WBC 3.79* 3.71*   HGB 10.5* 10.3*   HCT 33.5* 33.3*    216       Pending Diagnostic Studies:     None         Medications:  Reconciled Home Medications:      Medication List      START taking these medications    bisacodyL 10 mg Supp  Commonly known as:  DULCOLAX  Place 1 suppository (10 mg total) rectally daily as needed.     ondansetron 8 MG Tbdl  Commonly known as:  ZOFRAN-ODT  Take 1 tablet (8 mg total) by mouth every 8 (eight) hours as needed.        CHANGE how you take these medications    mirtazapine 30 MG tablet  Commonly known as:  REMERON  Take one tablet PO nightly  What changed:    · how much to take  · how to take this  · when to take this     sertraline 100 MG tablet  Commonly known as:  ZOLOFT  Take two  tablets PO daily  What changed:    · how much to take  · how to take this  · when to take this        CONTINUE taking these medications    ARIPiprazole 2 MG Tab  Commonly known as:  ABILIFY  Take 1 tablet (2 mg total) by mouth once daily.     carisoprodoL 350 MG tablet  Commonly known as:  SOMA  Take 350 mg by mouth 4 (four) times daily as needed for Muscle spasms.     dexAMETHasone 4 MG Tab  Commonly known as:  DECADRON  Take 1 tablet (4 mg total) by mouth 2 (two) times daily. Take 2 tabs by mouth twice a day the day before chemo and the day after.     famotidine 40 MG tablet  Commonly known as:  PEPCID  Take 1 tablet (40 mg total) by mouth every evening.     fentaNYL 75 mcg/hr  Commonly known as:  DURAGESIC  Place 1 patch onto the skin every 72 hours.     HYDROcodone-acetaminophen  mg per tablet  Commonly known as:  NORCO  Take 1 tablet by mouth every 4 to 6 hours as needed for Pain.     hydrOXYzine HCL 10 MG Tab  Commonly known as:  ATARAX  Take 1 tablet (10 mg total) by mouth 3 (three) times daily as needed (anxiety).     ibuprofen 800 MG tablet  Commonly known as:  ADVIL,MOTRIN  Take 1 tablet (800 mg total) by mouth 2 (two) times daily as needed for Pain.     lamoTRIgine 200 MG tablet  Commonly known as:  LAMICTAL  Take 1 tablet (200 mg total) by mouth 2 (two) times daily.     lidocaine-prilocaine cream  Commonly known as:  EMLA  Apply 1 g topically as needed.     LORazepam 0.5 MG tablet  Commonly known as:  ATIVAN  Take 0.5 mg by mouth every 6 (six) hours as needed for Anxiety.     meclizine 25 mg tablet  Commonly known as:  ANTIVERT  Take 1 tablet (25 mg total) by mouth every 8 (eight) hours as needed for Dizziness.     NexIUM 24HR 20 mg Tbec  Generic drug:  esomeprazole magnesium  Take 20 mg by mouth once daily.     * promethazine 25 MG tablet  Commonly known as:  PHENERGAN  Take 1 tablet (25 mg total) by mouth every 6 (six) hours as needed for Nausea.     * promethazine 25 MG suppository  Commonly  known as:  PHENERGAN  Place 1 suppository (25 mg total) rectally every 6 (six) hours as needed for Nausea.     senna 8.6 mg tablet  Commonly known as:  SENOKOT  Take 1 tablet by mouth once daily.     VITAMIN B-12 1000 MCG tablet  Generic drug:  cyanocobalamin  Take 500 mcg by mouth once daily.         * This list has 2 medication(s) that are the same as other medications prescribed for you. Read the directions carefully, and ask your doctor or other care provider to review them with you.                Indwelling Lines/Drains at time of discharge:   Lines/Drains/Airways     Central Venous Catheter Line                 Port A Cath Single Lumen left atrial -- days              Physical Exam:  General- Patient alert and oriented x3 in NAD  HEENT- PERRLA, EOMI, OP clear, MMM  Neck- No JVD, Lymphadenopathy, Thyromegaly  CV- Regular rate and rhythm, No Murmur/catracho/rubs  Resp- Lungs CTA Bilaterally, No increased WOB  GI- Non tender/non-distended, BS normoactive x4 quads, no HSM  Extrem- No cyanosis, clubbing, edema. Pulses 2+ and symmetric  Neuro- Strength 5/5 flexors/extensors, DTRs 2+ and symmetric, Intact sensation to light touch grossly  Skin-  No masses, rashes or lesions noted on cursory skin exam.  Time spent on the discharge of patient: 34 minutes  Patient was seen and examined on the date of discharge and determined to be suitable for discharge.         Reinaldo Arreaga MD  Department of Hospital Medicine  [facility]

## 2020-06-06 NOTE — PLAN OF CARE
06/06/20 1325   Final Note   Assessment Type Final Discharge Note   Anticipated Discharge Disposition Home   D/C orders reviewed - no CM needs noted.

## 2020-06-21 NOTE — ASSESSMENT & PLAN NOTE
Patient is doing well and has had 2 treatments with nivo.  She is tolerating well with no sign of autoimmune issues.  Will continue treatment and have her back with me in 2 to 3 weeks.  Labs look good as well.     chest pain

## 2020-06-24 ENCOUNTER — PATIENT MESSAGE (OUTPATIENT)
Dept: PSYCHIATRY | Facility: CLINIC | Age: 65
End: 2020-06-24

## 2020-06-24 ENCOUNTER — TELEPHONE (OUTPATIENT)
Dept: PSYCHIATRY | Facility: CLINIC | Age: 65
End: 2020-06-24

## 2020-06-24 NOTE — TELEPHONE ENCOUNTER
I spoke to the patient after receiving her [employer] message.  The patient metastatic lung cancer has continued to spread and is now involving her brain.  She has had a 2nd opinion today at [facility].  The patient may see of Radiation Oncology on Friday to discuss palliative radiation.  She is tearful on the phone after learning this news today.  She is staying with close friends in the [place], [place].  She is asking for the name of provider closer to their home and I will send this information to her via my chart.  The patient will keep me posted on how she is doing.  I advised her I can refill her medication until she has established with another provider.

## 2020-06-25 ENCOUNTER — TELEPHONE (OUTPATIENT)
Dept: PSYCHIATRY | Facility: CLINIC | Age: 65
End: 2020-06-25

## 2020-06-25 NOTE — TELEPHONE ENCOUNTER
I spoke to the patient's outpatient psychologist to coordinate care.  The patient is currently living in [place] with close friends and has transferred cancer care to [facility].  Discussed concerns with psychologist that patient is not taking medications which may provide symptomatic relief for anxiety and pain.  She is meeting tomorrow with the radiation oncologist.  I have reviewed the records from [facility] which are available in Epic, and it appears that she has been referred to pain management.  A psychiatric referral locally was placed, patient asked me for contact information of local providers which I sent via my chart.  I spoke to the patient by phone yesterday as well.  I will contact the patient tomorrow afternoon for follow-up.  Discussed with the psychologist importance of palliative care referral.  I will emphasize this with patient again tomorrow, psychologist will speak to patient and her caregiver today.

## 2020-07-07 ENCOUNTER — TELEPHONE (OUTPATIENT)
Dept: INFUSION THERAPY | Facility: HOSPITAL | Age: 65
End: 2020-07-07

## 2020-07-07 NOTE — TELEPHONE ENCOUNTER
----- Message from Patricia Lu LPN sent at 7/7/2020 10:48 AM CDT -----  Please take her off all schedules for now. She is out in [place] at [facility] as far as we know. She would not be getting any more treatment here until she would come back to see [facility] so for now just cancel all of her treatments here until further notice. Thanks, Patricia

## 2020-07-17 RX ORDER — HYDROXYZINE HYDROCHLORIDE 10 MG/1
10 TABLET, FILM COATED ORAL 3 TIMES DAILY PRN
Qty: 100 TABLET | Refills: 0 | Status: SHIPPED | OUTPATIENT
Start: 2020-07-17 | End: 2021-06-23

## 2020-07-17 RX ORDER — HYDROXYZINE HYDROCHLORIDE 10 MG/1
TABLET, FILM COATED ORAL
Qty: 30 TABLET | Refills: 2 | OUTPATIENT
Start: 2020-07-17

## 2020-07-17 NOTE — TELEPHONE ENCOUNTER
I tried to reach the patient  - she is currently living in [place], [place]. Relocated to seek treatment for Stage IV lung cancer.  I am not sure if she is seeking care of psychiatrist there, I did provide contact information.  Message left for call back.

## 2020-07-17 NOTE — TELEPHONE ENCOUNTER
I spoke to the patient.  She reports feeling very positive about being at [facility] for treatment.  She is taking it one step at a time, and it is difficult, as she is fearful of the metastasis to her brain.  She feels supported by her friends with whom she is living.  She asks that I sent her refill of hydroxyzine to [facility].  She plans to establish care with a behavioral health provider locally.

## 2020-07-17 NOTE — TELEPHONE ENCOUNTER
I contacted Dr Jennings - pt's psychotherapist; pt is undergoing radiation treatment at [facility], pain is improved.  Will have repeat testing later this month. May start chemotherapy in a few weeks.     She does not think that she has established any mental health care in [place].

## 2020-07-20 RX ORDER — MIRTAZAPINE 30 MG/1
30 TABLET, FILM COATED ORAL NIGHTLY
Qty: 90 TABLET | Refills: 0 | Status: SHIPPED | OUTPATIENT
Start: 2020-07-20 | End: 2021-05-27 | Stop reason: SDUPTHER

## 2020-09-29 ENCOUNTER — PATIENT OUTREACH (OUTPATIENT)
Dept: ADMINISTRATIVE | Facility: HOSPITAL | Age: 65
End: 2020-09-29

## 2020-09-29 NOTE — LETTER
AUTHORIZATION FOR RELEASE OF   CONFIDENTIAL INFORMATION    [facility] IMAGING    We are seeing Sheri Broderick, date of birth 1955, in the clinic at [facility]. Demetrio Pleitez Jr, MD is the patient's PCP. Sheri Broderick has an outstanding lab/procedure at the time we reviewed her chart. In order to help keep her health information updated, she has authorized us to request the following medical record(s):        ( X )  MAMMOGRAM                                      (  )  COLONOSCOPY      (  )  PAP SMEAR                                          (  )  OUTSIDE LAB RESULTS     (  )  DEXA SCAN                                          (  )  EYE EXAM            (  )  FOOT EXAM                                          (  )  ENTIRE RECORD     (  )  OUTSIDE IMMUNIZATIONS                 (  )  _______________         Please fax records to [facility], James H Newcomb Jr, MD, [phone]    Thank you in advance,      Celeste BUSTILLOS  Panel Care Coordinator  [facility] [facility] [address] [place] [address]  Phone [phone]  Fax [phone]          Patient Name: Sheir Broderick  : 1955  Patient Phone #: [phone]

## 2020-09-30 ENCOUNTER — PATIENT OUTREACH (OUTPATIENT)
Dept: ADMINISTRATIVE | Facility: HOSPITAL | Age: 65
End: 2020-09-30

## 2020-10-05 ENCOUNTER — PATIENT MESSAGE (OUTPATIENT)
Dept: ADMINISTRATIVE | Facility: HOSPITAL | Age: 65
End: 2020-10-05

## 2021-01-04 ENCOUNTER — PATIENT MESSAGE (OUTPATIENT)
Dept: ADMINISTRATIVE | Facility: HOSPITAL | Age: 66
End: 2021-01-04

## 2021-02-17 ENCOUNTER — TELEPHONE (OUTPATIENT)
Dept: HEMATOLOGY/ONCOLOGY | Facility: CLINIC | Age: 66
End: 2021-02-17

## 2021-02-22 ENCOUNTER — TELEPHONE (OUTPATIENT)
Dept: HEMATOLOGY/ONCOLOGY | Facility: CLINIC | Age: 66
End: 2021-02-22

## 2021-03-04 ENCOUNTER — PATIENT OUTREACH (OUTPATIENT)
Dept: ADMINISTRATIVE | Facility: HOSPITAL | Age: 66
End: 2021-03-04

## 2021-03-04 DIAGNOSIS — Z12.31 ENCOUNTER FOR SCREENING MAMMOGRAM FOR MALIGNANT NEOPLASM OF BREAST: Primary | ICD-10-CM

## 2021-03-05 ENCOUNTER — PATIENT MESSAGE (OUTPATIENT)
Dept: HEMATOLOGY/ONCOLOGY | Facility: CLINIC | Age: 66
End: 2021-03-05

## 2021-03-12 ENCOUNTER — PATIENT MESSAGE (OUTPATIENT)
Dept: HEMATOLOGY/ONCOLOGY | Facility: CLINIC | Age: 66
End: 2021-03-12

## 2021-03-17 ENCOUNTER — PATIENT MESSAGE (OUTPATIENT)
Dept: HEMATOLOGY/ONCOLOGY | Facility: CLINIC | Age: 66
End: 2021-03-17

## 2021-03-27 ENCOUNTER — PATIENT MESSAGE (OUTPATIENT)
Dept: HEMATOLOGY/ONCOLOGY | Facility: CLINIC | Age: 66
End: 2021-03-27

## 2021-03-30 ENCOUNTER — TELEPHONE (OUTPATIENT)
Dept: FAMILY MEDICINE | Facility: CLINIC | Age: 66
End: 2021-03-30

## 2021-03-30 ENCOUNTER — PATIENT MESSAGE (OUTPATIENT)
Dept: FAMILY MEDICINE | Facility: CLINIC | Age: 66
End: 2021-03-30

## 2021-03-31 ENCOUNTER — TELEPHONE (OUTPATIENT)
Dept: HEMATOLOGY/ONCOLOGY | Facility: CLINIC | Age: 66
End: 2021-03-31

## 2021-04-01 ENCOUNTER — TELEPHONE (OUTPATIENT)
Dept: HEMATOLOGY/ONCOLOGY | Facility: CLINIC | Age: 66
End: 2021-04-01

## 2021-04-01 ENCOUNTER — IMMUNIZATION (OUTPATIENT)
Dept: PRIMARY CARE CLINIC | Facility: CLINIC | Age: 66
End: 2021-04-01
Payer: MEDICARE

## 2021-04-01 DIAGNOSIS — Z23 NEED FOR VACCINATION: Primary | ICD-10-CM

## 2021-04-01 PROCEDURE — 91300 COVID-19, MRNA, LNP-S, PF, 30 MCG/0.3 ML DOSE VACCINE: ICD-10-PCS | Mod: S$GLB,,, | Performed by: FAMILY MEDICINE

## 2021-04-01 PROCEDURE — 91300 COVID-19, MRNA, LNP-S, PF, 30 MCG/0.3 ML DOSE VACCINE: CPT | Mod: S$GLB,,, | Performed by: FAMILY MEDICINE

## 2021-04-01 PROCEDURE — 0001A COVID-19, MRNA, LNP-S, PF, 30 MCG/0.3 ML DOSE VACCINE: CPT | Mod: CV19,S$GLB,, | Performed by: FAMILY MEDICINE

## 2021-04-01 PROCEDURE — 0001A COVID-19, MRNA, LNP-S, PF, 30 MCG/0.3 ML DOSE VACCINE: ICD-10-PCS | Mod: CV19,S$GLB,, | Performed by: FAMILY MEDICINE

## 2021-04-01 RX ORDER — SODIUM CHLORIDE 0.9 % (FLUSH) 0.9 %
10 SYRINGE (ML) INJECTION
Status: CANCELLED | OUTPATIENT
Start: 2021-04-07

## 2021-04-01 RX ORDER — HEPARIN 100 UNIT/ML
500 SYRINGE INTRAVENOUS
Status: CANCELLED | OUTPATIENT
Start: 2021-04-07

## 2021-04-05 ENCOUNTER — PATIENT MESSAGE (OUTPATIENT)
Dept: HEMATOLOGY/ONCOLOGY | Facility: CLINIC | Age: 66
End: 2021-04-05

## 2021-04-05 ENCOUNTER — TELEPHONE (OUTPATIENT)
Dept: FAMILY MEDICINE | Facility: CLINIC | Age: 66
End: 2021-04-05

## 2021-04-05 ENCOUNTER — PATIENT MESSAGE (OUTPATIENT)
Dept: ADMINISTRATIVE | Facility: HOSPITAL | Age: 66
End: 2021-04-05

## 2021-04-05 ENCOUNTER — TELEPHONE (OUTPATIENT)
Dept: PSYCHIATRY | Facility: CLINIC | Age: 66
End: 2021-04-05

## 2021-04-06 ENCOUNTER — OFFICE VISIT (OUTPATIENT)
Dept: HEMATOLOGY/ONCOLOGY | Facility: CLINIC | Age: 66
End: 2021-04-06
Payer: MEDICARE

## 2021-04-06 ENCOUNTER — OFFICE VISIT (OUTPATIENT)
Dept: FAMILY MEDICINE | Facility: CLINIC | Age: 66
End: 2021-04-06
Payer: MEDICARE

## 2021-04-06 VITALS
SYSTOLIC BLOOD PRESSURE: 124 MMHG | DIASTOLIC BLOOD PRESSURE: 80 MMHG | HEART RATE: 86 BPM | OXYGEN SATURATION: 94 % | HEIGHT: 65 IN | WEIGHT: 130.75 LBS | TEMPERATURE: 98 F | BODY MASS INDEX: 21.79 KG/M2

## 2021-04-06 VITALS
DIASTOLIC BLOOD PRESSURE: 61 MMHG | RESPIRATION RATE: 16 BRPM | BODY MASS INDEX: 22.2 KG/M2 | WEIGHT: 130 LBS | SYSTOLIC BLOOD PRESSURE: 147 MMHG | HEART RATE: 87 BPM | HEIGHT: 64 IN

## 2021-04-06 DIAGNOSIS — F33.2 MAJOR DEPRESSIVE DISORDER, RECURRENT SEVERE WITHOUT PSYCHOTIC FEATURES: ICD-10-CM

## 2021-04-06 DIAGNOSIS — D70.1 CHEMOTHERAPY-INDUCED NEUTROPENIA: ICD-10-CM

## 2021-04-06 DIAGNOSIS — T45.1X5A CHEMOTHERAPY-INDUCED NEUTROPENIA: ICD-10-CM

## 2021-04-06 DIAGNOSIS — E86.0 DEHYDRATION: ICD-10-CM

## 2021-04-06 DIAGNOSIS — C34.31 MALIGNANT NEOPLASM OF LOWER LOBE OF RIGHT LUNG: Primary | ICD-10-CM

## 2021-04-06 DIAGNOSIS — C79.31 SECONDARY MALIGNANT NEOPLASM OF BRAIN: ICD-10-CM

## 2021-04-06 DIAGNOSIS — C79.51 BONE METASTASES: ICD-10-CM

## 2021-04-06 DIAGNOSIS — C34.31 MALIGNANT NEOPLASM OF LOWER LOBE OF RIGHT LUNG: ICD-10-CM

## 2021-04-06 PROBLEM — R11.10 INTRACTABLE VOMITING: Status: RESOLVED | Noted: 2020-06-04 | Resolved: 2021-04-06

## 2021-04-06 PROBLEM — R05.9 COUGH: Status: RESOLVED | Noted: 2020-01-08 | Resolved: 2021-04-06

## 2021-04-06 PROBLEM — M79.10 MUSCLE PAIN: Status: RESOLVED | Noted: 2020-01-08 | Resolved: 2021-04-06

## 2021-04-06 PROCEDURE — 99215 OFFICE O/P EST HI 40 MIN: CPT | Mod: PBBFAC,PN | Performed by: PHYSICIAN ASSISTANT

## 2021-04-06 PROCEDURE — 99214 PR OFFICE/OUTPT VISIT, EST, LEVL IV, 30-39 MIN: ICD-10-PCS | Mod: S$PBB,,, | Performed by: PHYSICIAN ASSISTANT

## 2021-04-06 PROCEDURE — 99215 OFFICE O/P EST HI 40 MIN: CPT | Mod: S$GLB,,, | Performed by: INTERNAL MEDICINE

## 2021-04-06 PROCEDURE — 99214 OFFICE O/P EST MOD 30 MIN: CPT | Mod: S$PBB,,, | Performed by: PHYSICIAN ASSISTANT

## 2021-04-06 PROCEDURE — 99215 PR OFFICE/OUTPT VISIT, EST, LEVL V, 40-54 MIN: ICD-10-PCS | Mod: S$GLB,,, | Performed by: INTERNAL MEDICINE

## 2021-04-06 PROCEDURE — 99999 PR PBB SHADOW E&M-EST. PATIENT-LVL V: CPT | Mod: PBBFAC,,, | Performed by: PHYSICIAN ASSISTANT

## 2021-04-06 PROCEDURE — 99999 PR PBB SHADOW E&M-EST. PATIENT-LVL V: ICD-10-PCS | Mod: PBBFAC,,, | Performed by: PHYSICIAN ASSISTANT

## 2021-04-06 RX ORDER — LAMOTRIGINE 100 MG/1
100 TABLET ORAL SEE ADMIN INSTRUCTIONS
COMMUNITY
End: 2021-06-23 | Stop reason: SDUPTHER

## 2021-04-06 RX ORDER — PANTOPRAZOLE SODIUM 40 MG/1
40 TABLET, DELAYED RELEASE ORAL 2 TIMES DAILY
COMMUNITY
End: 2021-05-07 | Stop reason: SDUPTHER

## 2021-04-06 RX ORDER — VALACYCLOVIR HYDROCHLORIDE 1 G/1
1000 TABLET, FILM COATED ORAL 3 TIMES DAILY
COMMUNITY
End: 2021-08-23 | Stop reason: ALTCHOICE

## 2021-04-06 RX ORDER — MULTIVITAMIN
1 TABLET ORAL DAILY
COMMUNITY

## 2021-04-06 RX ORDER — SERTRALINE HYDROCHLORIDE 100 MG/1
100 TABLET, FILM COATED ORAL 2 TIMES DAILY
COMMUNITY
End: 2021-04-27 | Stop reason: SDUPTHER

## 2021-04-06 RX ORDER — MORPHINE SULFATE 15 MG/1
0.5 TABLET ORAL EVERY 4 HOURS PRN
COMMUNITY
End: 2021-04-19 | Stop reason: SDUPTHER

## 2021-04-06 RX ORDER — POLYETHYLENE GLYCOL 3350 17 G/17G
17 POWDER, FOR SOLUTION ORAL DAILY
COMMUNITY
End: 2021-08-23

## 2021-04-06 RX ORDER — PROCHLORPERAZINE MALEATE 10 MG
10 TABLET ORAL EVERY 6 HOURS PRN
COMMUNITY
End: 2021-06-29 | Stop reason: SDUPTHER

## 2021-04-06 RX ORDER — OLANZAPINE 5 MG/1
5 TABLET ORAL 3 TIMES DAILY
COMMUNITY
End: 2021-04-15 | Stop reason: SDUPTHER

## 2021-04-07 ENCOUNTER — PATIENT MESSAGE (OUTPATIENT)
Dept: HEMATOLOGY/ONCOLOGY | Facility: CLINIC | Age: 66
End: 2021-04-07

## 2021-04-09 ENCOUNTER — INFUSION (OUTPATIENT)
Dept: INFUSION THERAPY | Facility: HOSPITAL | Age: 66
End: 2021-04-09
Attending: INTERNAL MEDICINE
Payer: MEDICARE

## 2021-04-09 VITALS
TEMPERATURE: 98 F | DIASTOLIC BLOOD PRESSURE: 68 MMHG | WEIGHT: 131.69 LBS | RESPIRATION RATE: 19 BRPM | HEIGHT: 64 IN | HEART RATE: 103 BPM | SYSTOLIC BLOOD PRESSURE: 150 MMHG | BODY MASS INDEX: 22.48 KG/M2

## 2021-04-09 DIAGNOSIS — E86.0 DEHYDRATION: ICD-10-CM

## 2021-04-09 DIAGNOSIS — C79.51 BONE METASTASES: Primary | ICD-10-CM

## 2021-04-09 DIAGNOSIS — C34.31 SQUAMOUS CELL CARCINOMA OF BRONCHUS IN RIGHT LOWER LOBE: ICD-10-CM

## 2021-04-09 PROCEDURE — 63600175 PHARM REV CODE 636 W HCPCS: Performed by: NURSE PRACTITIONER

## 2021-04-09 PROCEDURE — 96360 HYDRATION IV INFUSION INIT: CPT

## 2021-04-09 PROCEDURE — 96361 HYDRATE IV INFUSION ADD-ON: CPT

## 2021-04-09 PROCEDURE — 25000003 PHARM REV CODE 250: Performed by: NURSE PRACTITIONER

## 2021-04-09 RX ORDER — SODIUM CHLORIDE 0.9 % (FLUSH) 0.9 %
10 SYRINGE (ML) INJECTION
Status: CANCELLED | OUTPATIENT
Start: 2021-04-09

## 2021-04-09 RX ORDER — HEPARIN 100 UNIT/ML
500 SYRINGE INTRAVENOUS
Status: CANCELLED | OUTPATIENT
Start: 2021-04-09

## 2021-04-09 RX ORDER — HEPARIN 100 UNIT/ML
500 SYRINGE INTRAVENOUS
Status: DISCONTINUED | OUTPATIENT
Start: 2021-04-09 | End: 2021-04-09 | Stop reason: HOSPADM

## 2021-04-09 RX ADMIN — SODIUM CHLORIDE 1000 ML: 0.9 INJECTION, SOLUTION INTRAVENOUS at 11:04

## 2021-04-09 RX ADMIN — HEPARIN 500 UNITS: 100 SYRINGE at 01:04

## 2021-04-12 ENCOUNTER — TELEPHONE (OUTPATIENT)
Dept: FAMILY MEDICINE | Facility: CLINIC | Age: 66
End: 2021-04-12

## 2021-04-12 ENCOUNTER — OFFICE VISIT (OUTPATIENT)
Dept: PSYCHIATRY | Facility: CLINIC | Age: 66
End: 2021-04-12
Payer: MEDICARE

## 2021-04-12 VITALS
BODY MASS INDEX: 22.82 KG/M2 | SYSTOLIC BLOOD PRESSURE: 134 MMHG | DIASTOLIC BLOOD PRESSURE: 79 MMHG | HEIGHT: 64 IN | HEART RATE: 87 BPM | WEIGHT: 133.69 LBS

## 2021-04-12 DIAGNOSIS — F41.0 PANIC DISORDER WITHOUT AGORAPHOBIA: ICD-10-CM

## 2021-04-12 DIAGNOSIS — C34.90 ADENOCARCINOMA OF LUNG, UNSPECIFIED LATERALITY: ICD-10-CM

## 2021-04-12 DIAGNOSIS — F41.1 GENERALIZED ANXIETY DISORDER: ICD-10-CM

## 2021-04-12 DIAGNOSIS — F33.1 MDD (MAJOR DEPRESSIVE DISORDER), RECURRENT EPISODE, MODERATE: ICD-10-CM

## 2021-04-12 DIAGNOSIS — R11.0 NAUSEA: Primary | ICD-10-CM

## 2021-04-12 PROCEDURE — 90792 PR PSYCHIATRIC DIAGNOSTIC EVALUATION W/MEDICAL SERVICES: ICD-10-PCS | Mod: ,,, | Performed by: PSYCHIATRY & NEUROLOGY

## 2021-04-12 PROCEDURE — 99999 PR PBB SHADOW E&M-EST. PATIENT-LVL V: ICD-10-PCS | Mod: PBBFAC,,, | Performed by: PSYCHIATRY & NEUROLOGY

## 2021-04-12 PROCEDURE — 90792 PSYCH DIAG EVAL W/MED SRVCS: CPT | Mod: ,,, | Performed by: PSYCHIATRY & NEUROLOGY

## 2021-04-12 PROCEDURE — 99215 OFFICE O/P EST HI 40 MIN: CPT | Mod: PBBFAC,PN | Performed by: PSYCHIATRY & NEUROLOGY

## 2021-04-12 PROCEDURE — 99999 PR PBB SHADOW E&M-EST. PATIENT-LVL V: CPT | Mod: PBBFAC,,, | Performed by: PSYCHIATRY & NEUROLOGY

## 2021-04-12 RX ORDER — SUCRALFATE 1 G/1
1 TABLET ORAL 4 TIMES DAILY
COMMUNITY
End: 2021-04-12 | Stop reason: SDUPTHER

## 2021-04-12 RX ORDER — SUCRALFATE 1 G/1
1 TABLET ORAL 4 TIMES DAILY
Qty: 120 TABLET | Refills: 2 | Status: SHIPPED | OUTPATIENT
Start: 2021-04-12 | End: 2021-07-10

## 2021-04-13 ENCOUNTER — INFUSION (OUTPATIENT)
Dept: INFUSION THERAPY | Facility: HOSPITAL | Age: 66
End: 2021-04-13
Attending: INTERNAL MEDICINE
Payer: MEDICARE

## 2021-04-13 ENCOUNTER — TELEPHONE (OUTPATIENT)
Dept: HEMATOLOGY/ONCOLOGY | Facility: CLINIC | Age: 66
End: 2021-04-13

## 2021-04-13 VITALS
HEART RATE: 79 BPM | DIASTOLIC BLOOD PRESSURE: 77 MMHG | SYSTOLIC BLOOD PRESSURE: 130 MMHG | HEIGHT: 64 IN | BODY MASS INDEX: 22.89 KG/M2 | TEMPERATURE: 98 F | WEIGHT: 134.06 LBS | RESPIRATION RATE: 18 BRPM

## 2021-04-13 DIAGNOSIS — E86.0 DEHYDRATION: ICD-10-CM

## 2021-04-13 DIAGNOSIS — C34.31 SQUAMOUS CELL CARCINOMA OF BRONCHUS IN RIGHT LOWER LOBE: ICD-10-CM

## 2021-04-13 DIAGNOSIS — C79.51 BONE METASTASES: Primary | ICD-10-CM

## 2021-04-13 PROCEDURE — 25000003 PHARM REV CODE 250: Performed by: NURSE PRACTITIONER

## 2021-04-13 PROCEDURE — 63600175 PHARM REV CODE 636 W HCPCS: Performed by: NURSE PRACTITIONER

## 2021-04-13 PROCEDURE — 96360 HYDRATION IV INFUSION INIT: CPT

## 2021-04-13 PROCEDURE — A4216 STERILE WATER/SALINE, 10 ML: HCPCS | Performed by: NURSE PRACTITIONER

## 2021-04-13 PROCEDURE — 96361 HYDRATE IV INFUSION ADD-ON: CPT

## 2021-04-13 RX ORDER — HEPARIN 100 UNIT/ML
500 SYRINGE INTRAVENOUS
Status: CANCELLED | OUTPATIENT
Start: 2021-04-13

## 2021-04-13 RX ORDER — SODIUM CHLORIDE 0.9 % (FLUSH) 0.9 %
10 SYRINGE (ML) INJECTION
Status: DISCONTINUED | OUTPATIENT
Start: 2021-04-13 | End: 2021-04-13 | Stop reason: HOSPADM

## 2021-04-13 RX ORDER — HEPARIN 100 UNIT/ML
500 SYRINGE INTRAVENOUS
Status: DISCONTINUED | OUTPATIENT
Start: 2021-04-13 | End: 2021-04-13 | Stop reason: HOSPADM

## 2021-04-13 RX ORDER — SODIUM CHLORIDE 0.9 % (FLUSH) 0.9 %
10 SYRINGE (ML) INJECTION
Status: CANCELLED | OUTPATIENT
Start: 2021-04-13

## 2021-04-13 RX ADMIN — HEPARIN 500 UNITS: 100 SYRINGE at 01:04

## 2021-04-13 RX ADMIN — SODIUM CHLORIDE 1000 ML: 0.9 INJECTION, SOLUTION INTRAVENOUS at 11:04

## 2021-04-13 RX ADMIN — SODIUM CHLORIDE 10 ML: 9 INJECTION INTRAMUSCULAR; INTRAVENOUS; SUBCUTANEOUS at 01:04

## 2021-04-15 ENCOUNTER — PATIENT MESSAGE (OUTPATIENT)
Dept: FAMILY MEDICINE | Facility: CLINIC | Age: 66
End: 2021-04-15

## 2021-04-15 RX ORDER — OLANZAPINE 5 MG/1
5 TABLET ORAL 3 TIMES DAILY
Qty: 90 TABLET | Refills: 2 | Status: SHIPPED | OUTPATIENT
Start: 2021-04-15 | End: 2021-06-23 | Stop reason: SDUPTHER

## 2021-04-16 ENCOUNTER — INFUSION (OUTPATIENT)
Dept: INFUSION THERAPY | Facility: HOSPITAL | Age: 66
End: 2021-04-16
Attending: INTERNAL MEDICINE
Payer: MEDICARE

## 2021-04-16 VITALS
DIASTOLIC BLOOD PRESSURE: 83 MMHG | BODY MASS INDEX: 22.99 KG/M2 | SYSTOLIC BLOOD PRESSURE: 127 MMHG | WEIGHT: 134.63 LBS | RESPIRATION RATE: 18 BRPM | HEIGHT: 64 IN | HEART RATE: 77 BPM | TEMPERATURE: 98 F | OXYGEN SATURATION: 99 %

## 2021-04-16 DIAGNOSIS — C79.51 BONE METASTASES: Primary | ICD-10-CM

## 2021-04-16 DIAGNOSIS — C34.31 SQUAMOUS CELL CARCINOMA OF BRONCHUS IN RIGHT LOWER LOBE: ICD-10-CM

## 2021-04-16 DIAGNOSIS — E86.0 DEHYDRATION: ICD-10-CM

## 2021-04-16 PROCEDURE — 96360 HYDRATION IV INFUSION INIT: CPT

## 2021-04-16 PROCEDURE — 63600175 PHARM REV CODE 636 W HCPCS: Performed by: NURSE PRACTITIONER

## 2021-04-16 PROCEDURE — 96361 HYDRATE IV INFUSION ADD-ON: CPT

## 2021-04-16 PROCEDURE — A4216 STERILE WATER/SALINE, 10 ML: HCPCS | Performed by: NURSE PRACTITIONER

## 2021-04-16 PROCEDURE — 25000003 PHARM REV CODE 250: Performed by: NURSE PRACTITIONER

## 2021-04-16 RX ORDER — HEPARIN 100 UNIT/ML
500 SYRINGE INTRAVENOUS
Status: DISCONTINUED | OUTPATIENT
Start: 2021-04-16 | End: 2021-04-16 | Stop reason: HOSPADM

## 2021-04-16 RX ORDER — SODIUM CHLORIDE 0.9 % (FLUSH) 0.9 %
10 SYRINGE (ML) INJECTION
Status: CANCELLED | OUTPATIENT
Start: 2021-04-16

## 2021-04-16 RX ORDER — SODIUM CHLORIDE 0.9 % (FLUSH) 0.9 %
10 SYRINGE (ML) INJECTION
Status: DISCONTINUED | OUTPATIENT
Start: 2021-04-16 | End: 2021-04-16 | Stop reason: HOSPADM

## 2021-04-16 RX ORDER — HEPARIN 100 UNIT/ML
500 SYRINGE INTRAVENOUS
Status: CANCELLED | OUTPATIENT
Start: 2021-04-16

## 2021-04-16 RX ADMIN — SODIUM CHLORIDE 10 ML: 9 INJECTION INTRAMUSCULAR; INTRAVENOUS; SUBCUTANEOUS at 01:04

## 2021-04-16 RX ADMIN — SODIUM CHLORIDE 1000 ML: 0.9 INJECTION, SOLUTION INTRAVENOUS at 11:04

## 2021-04-16 RX ADMIN — HEPARIN 500 UNITS: 100 SYRINGE at 01:04

## 2021-04-18 ENCOUNTER — PATIENT MESSAGE (OUTPATIENT)
Dept: FAMILY MEDICINE | Facility: CLINIC | Age: 66
End: 2021-04-18

## 2021-04-18 ENCOUNTER — PATIENT MESSAGE (OUTPATIENT)
Dept: HEMATOLOGY/ONCOLOGY | Facility: CLINIC | Age: 66
End: 2021-04-18

## 2021-04-19 ENCOUNTER — TELEPHONE (OUTPATIENT)
Dept: HEMATOLOGY/ONCOLOGY | Facility: CLINIC | Age: 66
End: 2021-04-19

## 2021-04-19 ENCOUNTER — HOSPITAL ENCOUNTER (OUTPATIENT)
Dept: RADIOLOGY | Facility: HOSPITAL | Age: 66
Discharge: HOME | End: 2021-04-19
Attending: INTERNAL MEDICINE
Payer: MEDICARE

## 2021-04-19 ENCOUNTER — TELEPHONE (OUTPATIENT)
Dept: FAMILY MEDICINE | Facility: CLINIC | Age: 66
End: 2021-04-19

## 2021-04-19 DIAGNOSIS — C34.31 MALIGNANT NEOPLASM OF LOWER LOBE OF RIGHT LUNG: ICD-10-CM

## 2021-04-19 DIAGNOSIS — C34.31 SQUAMOUS CELL CARCINOMA OF BRONCHUS IN RIGHT LOWER LOBE: ICD-10-CM

## 2021-04-19 DIAGNOSIS — C79.51 BONE METASTASES: ICD-10-CM

## 2021-04-19 DIAGNOSIS — G89.3 CANCER ASSOCIATED PAIN: ICD-10-CM

## 2021-04-19 DIAGNOSIS — C79.51 BONE METASTASES: Primary | ICD-10-CM

## 2021-04-19 LAB
CREAT SERPL-MCNC: 0.8 MG/DL (ref 0.5–1.4)
SAMPLE: NORMAL

## 2021-04-19 PROCEDURE — 25500020 PHARM REV CODE 255: Mod: PO | Performed by: INTERNAL MEDICINE

## 2021-04-19 PROCEDURE — 72158 MRI LUMBAR SPINE W/O & W/DYE: CPT | Mod: TC,PO

## 2021-04-19 PROCEDURE — 72157 MRI CHEST SPINE W/O & W/DYE: CPT | Mod: TC,PO

## 2021-04-19 PROCEDURE — 82565 ASSAY OF CREATININE: CPT | Mod: PO

## 2021-04-19 PROCEDURE — A9585 GADOBUTROL INJECTION: HCPCS | Mod: PO | Performed by: INTERNAL MEDICINE

## 2021-04-19 RX ORDER — GADOBUTROL 604.72 MG/ML
6 INJECTION INTRAVENOUS
Status: COMPLETED | OUTPATIENT
Start: 2021-04-19 | End: 2021-04-19

## 2021-04-19 RX ADMIN — GADOBUTROL 6 ML: 604.72 INJECTION INTRAVENOUS at 02:04

## 2021-04-20 ENCOUNTER — INFUSION (OUTPATIENT)
Dept: INFUSION THERAPY | Facility: HOSPITAL | Age: 66
End: 2021-04-20
Attending: INTERNAL MEDICINE
Payer: MEDICARE

## 2021-04-20 VITALS
SYSTOLIC BLOOD PRESSURE: 144 MMHG | OXYGEN SATURATION: 99 % | WEIGHT: 134.13 LBS | TEMPERATURE: 98 F | RESPIRATION RATE: 18 BRPM | BODY MASS INDEX: 22.9 KG/M2 | HEIGHT: 64 IN | HEART RATE: 78 BPM | DIASTOLIC BLOOD PRESSURE: 81 MMHG

## 2021-04-20 DIAGNOSIS — C34.31 SQUAMOUS CELL CARCINOMA OF BRONCHUS IN RIGHT LOWER LOBE: ICD-10-CM

## 2021-04-20 DIAGNOSIS — C79.51 BONE METASTASES: Primary | ICD-10-CM

## 2021-04-20 DIAGNOSIS — E86.0 DEHYDRATION: ICD-10-CM

## 2021-04-20 PROCEDURE — 96361 HYDRATE IV INFUSION ADD-ON: CPT

## 2021-04-20 PROCEDURE — A4216 STERILE WATER/SALINE, 10 ML: HCPCS | Performed by: NURSE PRACTITIONER

## 2021-04-20 PROCEDURE — 25000003 PHARM REV CODE 250: Performed by: NURSE PRACTITIONER

## 2021-04-20 PROCEDURE — 63600175 PHARM REV CODE 636 W HCPCS: Performed by: NURSE PRACTITIONER

## 2021-04-20 PROCEDURE — 96360 HYDRATION IV INFUSION INIT: CPT

## 2021-04-20 RX ORDER — SODIUM CHLORIDE 0.9 % (FLUSH) 0.9 %
10 SYRINGE (ML) INJECTION
Status: CANCELLED | OUTPATIENT
Start: 2021-04-20

## 2021-04-20 RX ORDER — SODIUM CHLORIDE 0.9 % (FLUSH) 0.9 %
10 SYRINGE (ML) INJECTION
Status: DISCONTINUED | OUTPATIENT
Start: 2021-04-20 | End: 2021-04-20 | Stop reason: HOSPADM

## 2021-04-20 RX ORDER — MORPHINE SULFATE 15 MG/1
7.5 TABLET ORAL EVERY 4 HOURS PRN
Qty: 60 TABLET | Refills: 0 | Status: SHIPPED | OUTPATIENT
Start: 2021-04-20 | End: 2021-06-29 | Stop reason: SDUPTHER

## 2021-04-20 RX ORDER — HEPARIN 100 UNIT/ML
500 SYRINGE INTRAVENOUS
Status: DISCONTINUED | OUTPATIENT
Start: 2021-04-20 | End: 2021-04-20 | Stop reason: HOSPADM

## 2021-04-20 RX ORDER — HEPARIN 100 UNIT/ML
500 SYRINGE INTRAVENOUS
Status: CANCELLED | OUTPATIENT
Start: 2021-04-20

## 2021-04-20 RX ORDER — FENTANYL 75 UG/H
1 PATCH TRANSDERMAL
Qty: 10 PATCH | Refills: 0 | Status: SHIPPED | OUTPATIENT
Start: 2021-04-26 | End: 2021-06-02 | Stop reason: SDUPTHER

## 2021-04-20 RX ADMIN — SODIUM CHLORIDE 1000 ML: 0.9 INJECTION, SOLUTION INTRAVENOUS at 09:04

## 2021-04-20 RX ADMIN — SODIUM CHLORIDE 10 ML: 9 INJECTION INTRAMUSCULAR; INTRAVENOUS; SUBCUTANEOUS at 11:04

## 2021-04-20 RX ADMIN — HEPARIN 500 UNITS: 100 SYRINGE at 11:04

## 2021-04-22 ENCOUNTER — IMMUNIZATION (OUTPATIENT)
Dept: PRIMARY CARE CLINIC | Facility: CLINIC | Age: 66
End: 2021-04-22
Payer: MEDICARE

## 2021-04-22 DIAGNOSIS — Z23 NEED FOR VACCINATION: Primary | ICD-10-CM

## 2021-04-22 PROCEDURE — 0002A COVID-19, MRNA, LNP-S, PF, 30 MCG/0.3 ML DOSE VACCINE: ICD-10-PCS | Mod: CV19,S$GLB,, | Performed by: FAMILY MEDICINE

## 2021-04-22 PROCEDURE — 91300 COVID-19, MRNA, LNP-S, PF, 30 MCG/0.3 ML DOSE VACCINE: ICD-10-PCS | Mod: S$GLB,,, | Performed by: FAMILY MEDICINE

## 2021-04-22 PROCEDURE — 91300 COVID-19, MRNA, LNP-S, PF, 30 MCG/0.3 ML DOSE VACCINE: CPT | Mod: S$GLB,,, | Performed by: FAMILY MEDICINE

## 2021-04-22 PROCEDURE — 0002A COVID-19, MRNA, LNP-S, PF, 30 MCG/0.3 ML DOSE VACCINE: CPT | Mod: CV19,S$GLB,, | Performed by: FAMILY MEDICINE

## 2021-04-23 ENCOUNTER — INFUSION (OUTPATIENT)
Dept: INFUSION THERAPY | Facility: HOSPITAL | Age: 66
End: 2021-04-23
Attending: INTERNAL MEDICINE
Payer: MEDICARE

## 2021-04-23 VITALS
HEIGHT: 64 IN | DIASTOLIC BLOOD PRESSURE: 76 MMHG | TEMPERATURE: 98 F | SYSTOLIC BLOOD PRESSURE: 133 MMHG | HEART RATE: 89 BPM | RESPIRATION RATE: 18 BRPM | WEIGHT: 134.94 LBS | BODY MASS INDEX: 23.04 KG/M2

## 2021-04-23 DIAGNOSIS — C34.31 SQUAMOUS CELL CARCINOMA OF BRONCHUS IN RIGHT LOWER LOBE: ICD-10-CM

## 2021-04-23 DIAGNOSIS — C79.51 BONE METASTASES: Primary | ICD-10-CM

## 2021-04-23 DIAGNOSIS — E86.0 DEHYDRATION: ICD-10-CM

## 2021-04-23 PROCEDURE — 96361 HYDRATE IV INFUSION ADD-ON: CPT

## 2021-04-23 PROCEDURE — 25000003 PHARM REV CODE 250: Performed by: NURSE PRACTITIONER

## 2021-04-23 PROCEDURE — A4216 STERILE WATER/SALINE, 10 ML: HCPCS | Performed by: NURSE PRACTITIONER

## 2021-04-23 PROCEDURE — 96360 HYDRATION IV INFUSION INIT: CPT

## 2021-04-23 PROCEDURE — 63600175 PHARM REV CODE 636 W HCPCS: Performed by: NURSE PRACTITIONER

## 2021-04-23 RX ORDER — SODIUM CHLORIDE 0.9 % (FLUSH) 0.9 %
10 SYRINGE (ML) INJECTION
Status: DISCONTINUED | OUTPATIENT
Start: 2021-04-23 | End: 2021-04-23 | Stop reason: HOSPADM

## 2021-04-23 RX ORDER — HEPARIN 100 UNIT/ML
500 SYRINGE INTRAVENOUS
Status: DISCONTINUED | OUTPATIENT
Start: 2021-04-23 | End: 2021-04-23 | Stop reason: HOSPADM

## 2021-04-23 RX ORDER — SODIUM CHLORIDE 0.9 % (FLUSH) 0.9 %
10 SYRINGE (ML) INJECTION
Status: CANCELLED | OUTPATIENT
Start: 2021-04-23

## 2021-04-23 RX ORDER — HEPARIN 100 UNIT/ML
500 SYRINGE INTRAVENOUS
Status: CANCELLED | OUTPATIENT
Start: 2021-04-23

## 2021-04-23 RX ADMIN — HEPARIN 500 UNITS: 100 SYRINGE at 11:04

## 2021-04-23 RX ADMIN — SODIUM CHLORIDE 10 ML: 9 INJECTION INTRAMUSCULAR; INTRAVENOUS; SUBCUTANEOUS at 11:04

## 2021-04-23 RX ADMIN — SODIUM CHLORIDE 1000 ML: 0.9 INJECTION, SOLUTION INTRAVENOUS at 09:04

## 2021-04-25 ENCOUNTER — PATIENT MESSAGE (OUTPATIENT)
Dept: FAMILY MEDICINE | Facility: CLINIC | Age: 66
End: 2021-04-25

## 2021-04-25 DIAGNOSIS — F41.1 GENERALIZED ANXIETY DISORDER: Primary | ICD-10-CM

## 2021-04-25 DIAGNOSIS — C34.31 MALIGNANT NEOPLASM OF LOWER LOBE OF RIGHT LUNG: ICD-10-CM

## 2021-04-26 RX ORDER — SERTRALINE HYDROCHLORIDE 100 MG/1
100 TABLET, FILM COATED ORAL 2 TIMES DAILY
Qty: 60 TABLET | Refills: 3 | Status: CANCELLED | OUTPATIENT
Start: 2021-04-26

## 2021-04-26 RX ORDER — LIDOCAINE AND PRILOCAINE 25; 25 MG/G; MG/G
1 CREAM TOPICAL
Qty: 5 G | Refills: 3 | Status: CANCELLED | OUTPATIENT
Start: 2021-04-26

## 2021-04-27 RX ORDER — LIDOCAINE AND PRILOCAINE 25; 25 MG/G; MG/G
1 CREAM TOPICAL
Qty: 90 G | Refills: 1 | Status: SHIPPED | OUTPATIENT
Start: 2021-04-27 | End: 2021-09-29

## 2021-04-27 RX ORDER — SERTRALINE HYDROCHLORIDE 100 MG/1
100 TABLET, FILM COATED ORAL 2 TIMES DAILY
Qty: 180 TABLET | Refills: 3 | Status: SHIPPED | OUTPATIENT
Start: 2021-04-27 | End: 2022-02-14

## 2021-04-30 ENCOUNTER — INFUSION (OUTPATIENT)
Dept: INFUSION THERAPY | Facility: HOSPITAL | Age: 66
End: 2021-04-30
Attending: INTERNAL MEDICINE
Payer: MEDICARE

## 2021-04-30 VITALS
HEART RATE: 88 BPM | WEIGHT: 138 LBS | SYSTOLIC BLOOD PRESSURE: 113 MMHG | HEIGHT: 64 IN | BODY MASS INDEX: 23.56 KG/M2 | RESPIRATION RATE: 17 BRPM | TEMPERATURE: 98 F | DIASTOLIC BLOOD PRESSURE: 69 MMHG

## 2021-04-30 DIAGNOSIS — C34.31 SQUAMOUS CELL CARCINOMA OF BRONCHUS IN RIGHT LOWER LOBE: ICD-10-CM

## 2021-04-30 DIAGNOSIS — C79.51 BONE METASTASES: Primary | ICD-10-CM

## 2021-04-30 DIAGNOSIS — E86.0 DEHYDRATION: ICD-10-CM

## 2021-04-30 PROCEDURE — 63600175 PHARM REV CODE 636 W HCPCS: Performed by: NURSE PRACTITIONER

## 2021-04-30 PROCEDURE — 96365 THER/PROPH/DIAG IV INF INIT: CPT

## 2021-04-30 PROCEDURE — A4216 STERILE WATER/SALINE, 10 ML: HCPCS | Performed by: NURSE PRACTITIONER

## 2021-04-30 PROCEDURE — S5010 5% DEXTROSE AND 0.45% SALINE: HCPCS | Performed by: NURSE PRACTITIONER

## 2021-04-30 PROCEDURE — 96360 HYDRATION IV INFUSION INIT: CPT

## 2021-04-30 PROCEDURE — 25000003 PHARM REV CODE 250: Performed by: NURSE PRACTITIONER

## 2021-04-30 PROCEDURE — 96366 THER/PROPH/DIAG IV INF ADDON: CPT

## 2021-04-30 PROCEDURE — 96361 HYDRATE IV INFUSION ADD-ON: CPT

## 2021-04-30 RX ORDER — HEPARIN 100 UNIT/ML
500 SYRINGE INTRAVENOUS
Status: CANCELLED | OUTPATIENT
Start: 2021-04-30

## 2021-04-30 RX ORDER — HEPARIN 100 UNIT/ML
500 SYRINGE INTRAVENOUS
Status: DISCONTINUED | OUTPATIENT
Start: 2021-04-30 | End: 2021-04-30 | Stop reason: HOSPADM

## 2021-04-30 RX ORDER — SODIUM CHLORIDE 0.9 % (FLUSH) 0.9 %
10 SYRINGE (ML) INJECTION
Status: DISCONTINUED | OUTPATIENT
Start: 2021-04-30 | End: 2021-04-30 | Stop reason: HOSPADM

## 2021-04-30 RX ORDER — SODIUM CHLORIDE 0.9 % (FLUSH) 0.9 %
10 SYRINGE (ML) INJECTION
Status: CANCELLED | OUTPATIENT
Start: 2021-04-30

## 2021-04-30 RX ADMIN — SODIUM CHLORIDE, PRESERVATIVE FREE 10 ML: 5 INJECTION INTRAVENOUS at 11:04

## 2021-04-30 RX ADMIN — HEPARIN 500 UNITS: 100 SYRINGE at 11:04

## 2021-04-30 RX ADMIN — DEXTROSE AND SODIUM CHLORIDE 1000 ML: 5; .45 INJECTION, SOLUTION INTRAVENOUS at 09:04

## 2021-05-04 ENCOUNTER — OFFICE VISIT (OUTPATIENT)
Dept: HEMATOLOGY/ONCOLOGY | Facility: CLINIC | Age: 66
End: 2021-05-04
Payer: MEDICARE

## 2021-05-04 ENCOUNTER — LAB VISIT (OUTPATIENT)
Dept: LAB | Facility: HOSPITAL | Age: 66
End: 2021-05-04
Attending: INTERNAL MEDICINE
Payer: MEDICARE

## 2021-05-04 VITALS
HEIGHT: 64 IN | HEART RATE: 69 BPM | BODY MASS INDEX: 22.88 KG/M2 | DIASTOLIC BLOOD PRESSURE: 75 MMHG | RESPIRATION RATE: 18 BRPM | SYSTOLIC BLOOD PRESSURE: 133 MMHG | WEIGHT: 134 LBS

## 2021-05-04 DIAGNOSIS — C34.31 MALIGNANT NEOPLASM OF LOWER LOBE OF RIGHT LUNG: ICD-10-CM

## 2021-05-04 DIAGNOSIS — G89.3 CANCER ASSOCIATED PAIN: ICD-10-CM

## 2021-05-04 DIAGNOSIS — C34.31 MALIGNANT NEOPLASM OF LOWER LOBE OF RIGHT LUNG: Chronic | ICD-10-CM

## 2021-05-04 DIAGNOSIS — C79.51 BONE METASTASES: ICD-10-CM

## 2021-05-04 LAB
ALBUMIN SERPL BCP-MCNC: 3.9 G/DL (ref 3.5–5.2)
ALP SERPL-CCNC: 68 U/L (ref 55–135)
ALT SERPL W/O P-5'-P-CCNC: 15 U/L (ref 10–44)
ANION GAP SERPL CALC-SCNC: 8 MMOL/L (ref 8–16)
AST SERPL-CCNC: 19 U/L (ref 10–40)
BASOPHILS # BLD AUTO: 0 K/UL (ref 0–0.2)
BASOPHILS NFR BLD: 0 % (ref 0–1.9)
BILIRUB SERPL-MCNC: 0.7 MG/DL (ref 0.1–1)
BUN SERPL-MCNC: 16 MG/DL (ref 8–23)
CALCIUM SERPL-MCNC: 9.6 MG/DL (ref 8.7–10.5)
CHLORIDE SERPL-SCNC: 102 MMOL/L (ref 95–110)
CO2 SERPL-SCNC: 31 MMOL/L (ref 23–29)
CREAT SERPL-MCNC: 0.9 MG/DL (ref 0.5–1.4)
DIFFERENTIAL METHOD: ABNORMAL
EOSINOPHIL # BLD AUTO: 0 K/UL (ref 0–0.5)
EOSINOPHIL NFR BLD: 0.8 % (ref 0–8)
ERYTHROCYTE [DISTWIDTH] IN BLOOD BY AUTOMATED COUNT: 13.2 % (ref 11.5–14.5)
EST. GFR  (AFRICAN AMERICAN): >60 ML/MIN/1.73 M^2
EST. GFR  (NON AFRICAN AMERICAN): >60 ML/MIN/1.73 M^2
GLUCOSE SERPL-MCNC: 104 MG/DL (ref 70–110)
HCT VFR BLD AUTO: 30.5 % (ref 37–48.5)
HGB BLD-MCNC: 9.8 G/DL (ref 12–16)
IMM GRANULOCYTES # BLD AUTO: 0.02 K/UL (ref 0–0.04)
IMM GRANULOCYTES NFR BLD AUTO: 0.6 % (ref 0–0.5)
LYMPHOCYTES # BLD AUTO: 0.3 K/UL (ref 1–4.8)
LYMPHOCYTES NFR BLD: 7.3 % (ref 18–48)
MCH RBC QN AUTO: 33.3 PG (ref 27–31)
MCHC RBC AUTO-ENTMCNC: 32.1 G/DL (ref 32–36)
MCV RBC AUTO: 104 FL (ref 82–98)
MONOCYTES # BLD AUTO: 0.2 K/UL (ref 0.3–1)
MONOCYTES NFR BLD: 5.9 % (ref 4–15)
NEUTROPHILS # BLD AUTO: 3.1 K/UL (ref 1.8–7.7)
NEUTROPHILS NFR BLD: 85.4 % (ref 38–73)
NRBC BLD-RTO: 0 /100 WBC
PLATELET # BLD AUTO: 122 K/UL (ref 150–450)
PMV BLD AUTO: 8.9 FL (ref 9.2–12.9)
POTASSIUM SERPL-SCNC: 4.1 MMOL/L (ref 3.5–5.1)
PROT SERPL-MCNC: 6.9 G/DL (ref 6–8.4)
RBC # BLD AUTO: 2.94 M/UL (ref 4–5.4)
SODIUM SERPL-SCNC: 141 MMOL/L (ref 136–145)
WBC # BLD AUTO: 3.57 K/UL (ref 3.9–12.7)

## 2021-05-04 PROCEDURE — 85025 COMPLETE CBC W/AUTO DIFF WBC: CPT | Performed by: INTERNAL MEDICINE

## 2021-05-04 PROCEDURE — 80053 COMPREHEN METABOLIC PANEL: CPT | Performed by: INTERNAL MEDICINE

## 2021-05-04 PROCEDURE — 36415 COLL VENOUS BLD VENIPUNCTURE: CPT | Performed by: INTERNAL MEDICINE

## 2021-05-04 PROCEDURE — 99214 PR OFFICE/OUTPT VISIT, EST, LEVL IV, 30-39 MIN: ICD-10-PCS | Mod: S$GLB,,, | Performed by: INTERNAL MEDICINE

## 2021-05-04 PROCEDURE — 99214 OFFICE O/P EST MOD 30 MIN: CPT | Mod: S$GLB,,, | Performed by: INTERNAL MEDICINE

## 2021-05-05 ENCOUNTER — OFFICE VISIT (OUTPATIENT)
Dept: PSYCHIATRY | Facility: CLINIC | Age: 66
End: 2021-05-05
Payer: MEDICARE

## 2021-05-05 DIAGNOSIS — F33.1 MDD (MAJOR DEPRESSIVE DISORDER), RECURRENT EPISODE, MODERATE: Primary | ICD-10-CM

## 2021-05-05 PROCEDURE — 90791 PSYCH DIAGNOSTIC EVALUATION: CPT | Mod: ,,, | Performed by: PSYCHOLOGIST

## 2021-05-05 PROCEDURE — 90791 PR PSYCHIATRIC DIAGNOSTIC EVALUATION: ICD-10-PCS | Mod: ,,, | Performed by: PSYCHOLOGIST

## 2021-05-07 ENCOUNTER — PATIENT MESSAGE (OUTPATIENT)
Dept: FAMILY MEDICINE | Facility: CLINIC | Age: 66
End: 2021-05-07

## 2021-05-07 ENCOUNTER — TELEPHONE (OUTPATIENT)
Dept: HEMATOLOGY/ONCOLOGY | Facility: CLINIC | Age: 66
End: 2021-05-07

## 2021-05-07 ENCOUNTER — INFUSION (OUTPATIENT)
Dept: INFUSION THERAPY | Facility: HOSPITAL | Age: 66
End: 2021-05-07
Attending: INTERNAL MEDICINE
Payer: MEDICARE

## 2021-05-07 VITALS
HEIGHT: 64 IN | WEIGHT: 134.88 LBS | SYSTOLIC BLOOD PRESSURE: 141 MMHG | OXYGEN SATURATION: 100 % | BODY MASS INDEX: 23.03 KG/M2 | TEMPERATURE: 98 F | RESPIRATION RATE: 17 BRPM | HEART RATE: 83 BPM | DIASTOLIC BLOOD PRESSURE: 83 MMHG

## 2021-05-07 DIAGNOSIS — C79.51 BONE METASTASES: Primary | ICD-10-CM

## 2021-05-07 DIAGNOSIS — E86.0 DEHYDRATION: ICD-10-CM

## 2021-05-07 DIAGNOSIS — C34.31 SQUAMOUS CELL CARCINOMA OF BRONCHUS IN RIGHT LOWER LOBE: ICD-10-CM

## 2021-05-07 DIAGNOSIS — K21.9 GASTROESOPHAGEAL REFLUX DISEASE, UNSPECIFIED WHETHER ESOPHAGITIS PRESENT: Primary | ICD-10-CM

## 2021-05-07 DIAGNOSIS — R30.0 DYSURIA: Primary | ICD-10-CM

## 2021-05-07 PROCEDURE — 63600175 PHARM REV CODE 636 W HCPCS: Performed by: NURSE PRACTITIONER

## 2021-05-07 PROCEDURE — S5010 5% DEXTROSE AND 0.45% SALINE: HCPCS | Performed by: NURSE PRACTITIONER

## 2021-05-07 PROCEDURE — 25000003 PHARM REV CODE 250: Performed by: NURSE PRACTITIONER

## 2021-05-07 PROCEDURE — 96360 HYDRATION IV INFUSION INIT: CPT

## 2021-05-07 PROCEDURE — 96361 HYDRATE IV INFUSION ADD-ON: CPT

## 2021-05-07 RX ORDER — NITROFURANTOIN 25; 75 MG/1; MG/1
100 CAPSULE ORAL 2 TIMES DAILY
Qty: 14 CAPSULE | Refills: 0 | Status: SHIPPED | OUTPATIENT
Start: 2021-05-07 | End: 2021-05-14

## 2021-05-07 RX ORDER — HEPARIN 100 UNIT/ML
500 SYRINGE INTRAVENOUS
Status: DISCONTINUED | OUTPATIENT
Start: 2021-05-07 | End: 2021-05-07 | Stop reason: HOSPADM

## 2021-05-07 RX ORDER — HEPARIN 100 UNIT/ML
500 SYRINGE INTRAVENOUS
Status: CANCELLED | OUTPATIENT
Start: 2021-05-07

## 2021-05-07 RX ORDER — SODIUM CHLORIDE 0.9 % (FLUSH) 0.9 %
10 SYRINGE (ML) INJECTION
Status: CANCELLED | OUTPATIENT
Start: 2021-05-07

## 2021-05-07 RX ORDER — PANTOPRAZOLE SODIUM 40 MG/1
40 TABLET, DELAYED RELEASE ORAL 2 TIMES DAILY
Qty: 180 TABLET | Refills: 3 | Status: SHIPPED | OUTPATIENT
Start: 2021-05-07 | End: 2022-05-10 | Stop reason: SDUPTHER

## 2021-05-07 RX ADMIN — DEXTROSE AND SODIUM CHLORIDE 1000 ML: 5; .45 INJECTION, SOLUTION INTRAVENOUS at 10:05

## 2021-05-07 RX ADMIN — HEPARIN 500 UNITS: 100 SYRINGE at 12:05

## 2021-05-10 ENCOUNTER — PATIENT MESSAGE (OUTPATIENT)
Dept: HEMATOLOGY/ONCOLOGY | Facility: CLINIC | Age: 66
End: 2021-05-10

## 2021-05-10 DIAGNOSIS — C79.51 BONE METASTASES: ICD-10-CM

## 2021-05-10 DIAGNOSIS — Z03.818 ENCNTR FOR OBS FOR SUSP EXPSR TO OTH BIOLG AGENTS RULED OUT: ICD-10-CM

## 2021-05-10 DIAGNOSIS — C34.31 MALIGNANT NEOPLASM OF LOWER LOBE OF RIGHT LUNG: Primary | ICD-10-CM

## 2021-05-11 ENCOUNTER — PATIENT MESSAGE (OUTPATIENT)
Dept: HEMATOLOGY/ONCOLOGY | Facility: CLINIC | Age: 66
End: 2021-05-11

## 2021-05-13 RX ORDER — HEPARIN 100 UNIT/ML
500 SYRINGE INTRAVENOUS
Status: CANCELLED | OUTPATIENT
Start: 2021-05-14

## 2021-05-13 RX ORDER — SODIUM CHLORIDE 0.9 % (FLUSH) 0.9 %
10 SYRINGE (ML) INJECTION
Status: CANCELLED | OUTPATIENT
Start: 2021-05-14

## 2021-05-14 ENCOUNTER — INFUSION (OUTPATIENT)
Dept: INFUSION THERAPY | Facility: HOSPITAL | Age: 66
End: 2021-05-14
Attending: INTERNAL MEDICINE
Payer: MEDICARE

## 2021-05-14 VITALS
BODY MASS INDEX: 22.99 KG/M2 | RESPIRATION RATE: 18 BRPM | HEIGHT: 64 IN | HEART RATE: 84 BPM | WEIGHT: 134.69 LBS | SYSTOLIC BLOOD PRESSURE: 127 MMHG | TEMPERATURE: 98 F | DIASTOLIC BLOOD PRESSURE: 73 MMHG

## 2021-05-14 DIAGNOSIS — C34.31 MALIGNANT NEOPLASM OF LOWER LOBE OF RIGHT LUNG: ICD-10-CM

## 2021-05-14 DIAGNOSIS — C79.51 BONE METASTASES: Primary | ICD-10-CM

## 2021-05-14 DIAGNOSIS — E86.0 DEHYDRATION: ICD-10-CM

## 2021-05-14 DIAGNOSIS — R11.0 NAUSEA: ICD-10-CM

## 2021-05-14 DIAGNOSIS — K21.9 GERD (GASTROESOPHAGEAL REFLUX DISEASE): ICD-10-CM

## 2021-05-14 PROCEDURE — A4216 STERILE WATER/SALINE, 10 ML: HCPCS | Performed by: INTERNAL MEDICINE

## 2021-05-14 PROCEDURE — 25000003 PHARM REV CODE 250: Performed by: INTERNAL MEDICINE

## 2021-05-14 PROCEDURE — 63600175 PHARM REV CODE 636 W HCPCS: Performed by: INTERNAL MEDICINE

## 2021-05-14 PROCEDURE — 96360 HYDRATION IV INFUSION INIT: CPT

## 2021-05-14 PROCEDURE — S5010 5% DEXTROSE AND 0.45% SALINE: HCPCS | Performed by: INTERNAL MEDICINE

## 2021-05-14 PROCEDURE — 96361 HYDRATE IV INFUSION ADD-ON: CPT

## 2021-05-14 RX ORDER — SODIUM CHLORIDE 0.9 % (FLUSH) 0.9 %
10 SYRINGE (ML) INJECTION
Status: DISCONTINUED | OUTPATIENT
Start: 2021-05-14 | End: 2021-05-14 | Stop reason: HOSPADM

## 2021-05-14 RX ORDER — HEPARIN 100 UNIT/ML
500 SYRINGE INTRAVENOUS
Status: DISCONTINUED | OUTPATIENT
Start: 2021-05-14 | End: 2021-05-14 | Stop reason: HOSPADM

## 2021-05-14 RX ADMIN — HEPARIN 500 UNITS: 100 SYRINGE at 01:05

## 2021-05-14 RX ADMIN — DEXTROSE AND SODIUM CHLORIDE 1000 ML: 5; .45 INJECTION, SOLUTION INTRAVENOUS at 11:05

## 2021-05-14 RX ADMIN — SODIUM CHLORIDE 10 ML: 9 INJECTION INTRAMUSCULAR; INTRAVENOUS; SUBCUTANEOUS at 01:05

## 2021-05-17 ENCOUNTER — HOSPITAL ENCOUNTER (OUTPATIENT)
Dept: RADIOLOGY | Facility: HOSPITAL | Age: 66
Discharge: HOME | End: 2021-05-17
Attending: NURSE PRACTITIONER
Payer: MEDICARE

## 2021-05-17 VITALS — HEIGHT: 64 IN | WEIGHT: 134 LBS | BODY MASS INDEX: 22.88 KG/M2

## 2021-05-17 DIAGNOSIS — C34.31 MALIGNANT NEOPLASM OF LOWER LOBE OF RIGHT LUNG: ICD-10-CM

## 2021-05-17 DIAGNOSIS — C79.51 BONE METASTASES: ICD-10-CM

## 2021-05-17 LAB — GLUCOSE SERPL-MCNC: 99 MG/DL (ref 70–110)

## 2021-05-17 PROCEDURE — 78815 PET IMAGE W/CT SKULL-THIGH: CPT | Mod: TC,PO

## 2021-05-17 RX ORDER — HEPARIN 100 UNIT/ML
500 SYRINGE INTRAVENOUS
Status: CANCELLED | OUTPATIENT
Start: 2021-05-18

## 2021-05-17 RX ORDER — SODIUM CHLORIDE 0.9 % (FLUSH) 0.9 %
10 SYRINGE (ML) INJECTION
Status: CANCELLED | OUTPATIENT
Start: 2021-05-18

## 2021-05-18 ENCOUNTER — INFUSION (OUTPATIENT)
Dept: INFUSION THERAPY | Facility: HOSPITAL | Age: 66
End: 2021-05-18
Attending: INTERNAL MEDICINE
Payer: MEDICARE

## 2021-05-18 ENCOUNTER — TELEPHONE (OUTPATIENT)
Dept: PSYCHIATRY | Facility: CLINIC | Age: 66
End: 2021-05-18

## 2021-05-18 VITALS
SYSTOLIC BLOOD PRESSURE: 130 MMHG | BODY MASS INDEX: 23.24 KG/M2 | TEMPERATURE: 97 F | OXYGEN SATURATION: 97 % | HEART RATE: 90 BPM | WEIGHT: 136.13 LBS | DIASTOLIC BLOOD PRESSURE: 82 MMHG | HEIGHT: 64 IN | RESPIRATION RATE: 18 BRPM

## 2021-05-18 DIAGNOSIS — R11.0 NAUSEA: ICD-10-CM

## 2021-05-18 DIAGNOSIS — K21.9 GERD (GASTROESOPHAGEAL REFLUX DISEASE): ICD-10-CM

## 2021-05-18 DIAGNOSIS — E86.0 DEHYDRATION: ICD-10-CM

## 2021-05-18 DIAGNOSIS — C79.51 BONE METASTASES: Primary | ICD-10-CM

## 2021-05-18 DIAGNOSIS — C34.31 MALIGNANT NEOPLASM OF LOWER LOBE OF RIGHT LUNG: ICD-10-CM

## 2021-05-18 PROCEDURE — S5010 5% DEXTROSE AND 0.45% SALINE: HCPCS | Performed by: INTERNAL MEDICINE

## 2021-05-18 PROCEDURE — 25000003 PHARM REV CODE 250: Performed by: INTERNAL MEDICINE

## 2021-05-18 PROCEDURE — 96361 HYDRATE IV INFUSION ADD-ON: CPT

## 2021-05-18 PROCEDURE — 63600175 PHARM REV CODE 636 W HCPCS: Performed by: INTERNAL MEDICINE

## 2021-05-18 PROCEDURE — A4216 STERILE WATER/SALINE, 10 ML: HCPCS | Performed by: INTERNAL MEDICINE

## 2021-05-18 PROCEDURE — 96360 HYDRATION IV INFUSION INIT: CPT

## 2021-05-18 RX ORDER — SODIUM CHLORIDE 0.9 % (FLUSH) 0.9 %
10 SYRINGE (ML) INJECTION
Status: DISCONTINUED | OUTPATIENT
Start: 2021-05-18 | End: 2021-05-18 | Stop reason: HOSPADM

## 2021-05-18 RX ORDER — HEPARIN 100 UNIT/ML
500 SYRINGE INTRAVENOUS
Status: DISCONTINUED | OUTPATIENT
Start: 2021-05-18 | End: 2021-05-18 | Stop reason: HOSPADM

## 2021-05-18 RX ADMIN — HEPARIN 500 UNITS: 100 SYRINGE at 08:05

## 2021-05-18 RX ADMIN — DEXTROSE AND SODIUM CHLORIDE 1000 ML: 5; .45 INJECTION, SOLUTION INTRAVENOUS at 07:05

## 2021-05-18 RX ADMIN — SODIUM CHLORIDE 10 ML: 9 INJECTION INTRAMUSCULAR; INTRAVENOUS; SUBCUTANEOUS at 08:05

## 2021-05-20 RX ORDER — SODIUM CHLORIDE 0.9 % (FLUSH) 0.9 %
10 SYRINGE (ML) INJECTION
Status: CANCELLED | OUTPATIENT
Start: 2021-05-25

## 2021-05-20 RX ORDER — SODIUM CHLORIDE 0.9 % (FLUSH) 0.9 %
10 SYRINGE (ML) INJECTION
Status: CANCELLED | OUTPATIENT
Start: 2021-05-21

## 2021-05-20 RX ORDER — HEPARIN 100 UNIT/ML
500 SYRINGE INTRAVENOUS
Status: CANCELLED | OUTPATIENT
Start: 2021-05-21

## 2021-05-20 RX ORDER — HEPARIN 100 UNIT/ML
500 SYRINGE INTRAVENOUS
Status: CANCELLED | OUTPATIENT
Start: 2021-05-25

## 2021-05-21 ENCOUNTER — INFUSION (OUTPATIENT)
Dept: INFUSION THERAPY | Facility: HOSPITAL | Age: 66
End: 2021-05-21
Attending: INTERNAL MEDICINE
Payer: MEDICARE

## 2021-05-21 VITALS
TEMPERATURE: 98 F | BODY MASS INDEX: 23.22 KG/M2 | SYSTOLIC BLOOD PRESSURE: 120 MMHG | RESPIRATION RATE: 18 BRPM | HEART RATE: 83 BPM | HEIGHT: 64 IN | OXYGEN SATURATION: 98 % | DIASTOLIC BLOOD PRESSURE: 65 MMHG | WEIGHT: 136 LBS

## 2021-05-21 DIAGNOSIS — C79.51 BONE METASTASES: Primary | ICD-10-CM

## 2021-05-21 DIAGNOSIS — E86.0 DEHYDRATION: ICD-10-CM

## 2021-05-21 DIAGNOSIS — R11.0 NAUSEA: ICD-10-CM

## 2021-05-21 DIAGNOSIS — K21.9 GASTROESOPHAGEAL REFLUX DISEASE, UNSPECIFIED WHETHER ESOPHAGITIS PRESENT: ICD-10-CM

## 2021-05-21 DIAGNOSIS — C34.31 MALIGNANT NEOPLASM OF LOWER LOBE OF RIGHT LUNG: ICD-10-CM

## 2021-05-21 PROCEDURE — S5010 5% DEXTROSE AND 0.45% SALINE: HCPCS | Performed by: NURSE PRACTITIONER

## 2021-05-21 PROCEDURE — 63600175 PHARM REV CODE 636 W HCPCS: Performed by: NURSE PRACTITIONER

## 2021-05-21 PROCEDURE — 96361 HYDRATE IV INFUSION ADD-ON: CPT

## 2021-05-21 PROCEDURE — 96360 HYDRATION IV INFUSION INIT: CPT

## 2021-05-21 PROCEDURE — A4216 STERILE WATER/SALINE, 10 ML: HCPCS | Performed by: NURSE PRACTITIONER

## 2021-05-21 PROCEDURE — 25000003 PHARM REV CODE 250: Performed by: NURSE PRACTITIONER

## 2021-05-21 RX ORDER — HEPARIN 100 UNIT/ML
500 SYRINGE INTRAVENOUS
Status: DISCONTINUED | OUTPATIENT
Start: 2021-05-21 | End: 2021-05-21 | Stop reason: HOSPADM

## 2021-05-21 RX ORDER — SODIUM CHLORIDE 0.9 % (FLUSH) 0.9 %
10 SYRINGE (ML) INJECTION
Status: DISCONTINUED | OUTPATIENT
Start: 2021-05-21 | End: 2021-05-21 | Stop reason: HOSPADM

## 2021-05-21 RX ADMIN — DEXTROSE AND SODIUM CHLORIDE 1000 ML: 5; .45 INJECTION, SOLUTION INTRAVENOUS at 11:05

## 2021-05-21 RX ADMIN — HEPARIN 500 UNITS: 100 SYRINGE at 01:05

## 2021-05-21 RX ADMIN — SODIUM CHLORIDE, PRESERVATIVE FREE 10 ML: 5 INJECTION INTRAVENOUS at 01:05

## 2021-05-25 ENCOUNTER — INFUSION (OUTPATIENT)
Dept: INFUSION THERAPY | Facility: HOSPITAL | Age: 66
End: 2021-05-25
Attending: INTERNAL MEDICINE
Payer: MEDICARE

## 2021-05-25 VITALS
BODY MASS INDEX: 22.9 KG/M2 | HEIGHT: 64 IN | SYSTOLIC BLOOD PRESSURE: 134 MMHG | WEIGHT: 134.13 LBS | DIASTOLIC BLOOD PRESSURE: 80 MMHG | TEMPERATURE: 97 F | HEART RATE: 86 BPM | RESPIRATION RATE: 18 BRPM

## 2021-05-25 DIAGNOSIS — E86.0 DEHYDRATION: ICD-10-CM

## 2021-05-25 DIAGNOSIS — R11.0 NAUSEA: ICD-10-CM

## 2021-05-25 DIAGNOSIS — C79.51 BONE METASTASES: Primary | ICD-10-CM

## 2021-05-25 DIAGNOSIS — C34.31 MALIGNANT NEOPLASM OF LOWER LOBE OF RIGHT LUNG: ICD-10-CM

## 2021-05-25 DIAGNOSIS — K21.9 GASTROESOPHAGEAL REFLUX DISEASE, UNSPECIFIED WHETHER ESOPHAGITIS PRESENT: ICD-10-CM

## 2021-05-25 PROCEDURE — 96360 HYDRATION IV INFUSION INIT: CPT

## 2021-05-25 PROCEDURE — A4216 STERILE WATER/SALINE, 10 ML: HCPCS | Performed by: INTERNAL MEDICINE

## 2021-05-25 PROCEDURE — 63600175 PHARM REV CODE 636 W HCPCS: Performed by: INTERNAL MEDICINE

## 2021-05-25 PROCEDURE — S5010 5% DEXTROSE AND 0.45% SALINE: HCPCS | Performed by: INTERNAL MEDICINE

## 2021-05-25 PROCEDURE — 25000003 PHARM REV CODE 250: Performed by: INTERNAL MEDICINE

## 2021-05-25 PROCEDURE — 96361 HYDRATE IV INFUSION ADD-ON: CPT

## 2021-05-25 RX ORDER — SODIUM CHLORIDE 0.9 % (FLUSH) 0.9 %
10 SYRINGE (ML) INJECTION
Status: DISCONTINUED | OUTPATIENT
Start: 2021-05-25 | End: 2021-05-25 | Stop reason: HOSPADM

## 2021-05-25 RX ORDER — HEPARIN 100 UNIT/ML
500 SYRINGE INTRAVENOUS
Status: DISCONTINUED | OUTPATIENT
Start: 2021-05-25 | End: 2021-05-25 | Stop reason: HOSPADM

## 2021-05-25 RX ADMIN — HEPARIN 500 UNITS: 100 SYRINGE at 12:05

## 2021-05-25 RX ADMIN — DEXTROSE AND SODIUM CHLORIDE 1000 ML: 5; .45 INJECTION, SOLUTION INTRAVENOUS at 10:05

## 2021-05-25 RX ADMIN — SODIUM CHLORIDE 10 ML: 9 INJECTION INTRAMUSCULAR; INTRAVENOUS; SUBCUTANEOUS at 12:05

## 2021-05-27 ENCOUNTER — PATIENT MESSAGE (OUTPATIENT)
Dept: FAMILY MEDICINE | Facility: CLINIC | Age: 66
End: 2021-05-27

## 2021-05-27 ENCOUNTER — PATIENT MESSAGE (OUTPATIENT)
Dept: PSYCHIATRY | Facility: CLINIC | Age: 66
End: 2021-05-27

## 2021-05-27 RX ORDER — SODIUM CHLORIDE 0.9 % (FLUSH) 0.9 %
10 SYRINGE (ML) INJECTION
Status: CANCELLED | OUTPATIENT
Start: 2021-05-28

## 2021-05-27 RX ORDER — MIRTAZAPINE 30 MG/1
30 TABLET, FILM COATED ORAL NIGHTLY
Qty: 90 TABLET | Refills: 0 | Status: SHIPPED | OUTPATIENT
Start: 2021-05-27 | End: 2021-06-23 | Stop reason: SDUPTHER

## 2021-05-27 RX ORDER — HEPARIN 100 UNIT/ML
500 SYRINGE INTRAVENOUS
Status: CANCELLED | OUTPATIENT
Start: 2021-05-28

## 2021-05-28 ENCOUNTER — TELEPHONE (OUTPATIENT)
Dept: HEMATOLOGY/ONCOLOGY | Facility: CLINIC | Age: 66
End: 2021-05-28

## 2021-05-28 ENCOUNTER — INFUSION (OUTPATIENT)
Dept: INFUSION THERAPY | Facility: HOSPITAL | Age: 66
End: 2021-05-28
Attending: INTERNAL MEDICINE
Payer: MEDICARE

## 2021-05-28 VITALS
BODY MASS INDEX: 23.24 KG/M2 | RESPIRATION RATE: 20 BRPM | HEART RATE: 82 BPM | HEIGHT: 64 IN | SYSTOLIC BLOOD PRESSURE: 148 MMHG | DIASTOLIC BLOOD PRESSURE: 80 MMHG | OXYGEN SATURATION: 98 % | WEIGHT: 136.13 LBS | TEMPERATURE: 98 F

## 2021-05-28 DIAGNOSIS — K21.9 GASTROESOPHAGEAL REFLUX DISEASE, UNSPECIFIED WHETHER ESOPHAGITIS PRESENT: Primary | ICD-10-CM

## 2021-05-28 DIAGNOSIS — R11.0 NAUSEA: ICD-10-CM

## 2021-05-28 DIAGNOSIS — C34.31 MALIGNANT NEOPLASM OF LOWER LOBE OF RIGHT LUNG: ICD-10-CM

## 2021-05-28 DIAGNOSIS — K21.9 GASTROESOPHAGEAL REFLUX DISEASE, UNSPECIFIED WHETHER ESOPHAGITIS PRESENT: ICD-10-CM

## 2021-05-28 DIAGNOSIS — E86.0 DEHYDRATION: ICD-10-CM

## 2021-05-28 DIAGNOSIS — C79.51 BONE METASTASES: Primary | ICD-10-CM

## 2021-05-28 LAB
ALBUMIN SERPL BCP-MCNC: 3.9 G/DL (ref 3.5–5.2)
ALP SERPL-CCNC: 70 U/L (ref 55–135)
ALT SERPL W/O P-5'-P-CCNC: 13 U/L (ref 10–44)
ANION GAP SERPL CALC-SCNC: 10 MMOL/L (ref 8–16)
AST SERPL-CCNC: 23 U/L (ref 10–40)
BASOPHILS # BLD AUTO: 0 K/UL (ref 0–0.2)
BASOPHILS NFR BLD: 0 % (ref 0–1.9)
BILIRUB SERPL-MCNC: 0.5 MG/DL (ref 0.1–1)
BUN SERPL-MCNC: 14 MG/DL (ref 8–23)
CALCIUM SERPL-MCNC: 9 MG/DL (ref 8.7–10.5)
CHLORIDE SERPL-SCNC: 103 MMOL/L (ref 95–110)
CO2 SERPL-SCNC: 27 MMOL/L (ref 23–29)
CREAT SERPL-MCNC: 0.8 MG/DL (ref 0.5–1.4)
DIFFERENTIAL METHOD: ABNORMAL
EOSINOPHIL # BLD AUTO: 0.1 K/UL (ref 0–0.5)
EOSINOPHIL NFR BLD: 1.8 % (ref 0–8)
ERYTHROCYTE [DISTWIDTH] IN BLOOD BY AUTOMATED COUNT: 13.3 % (ref 11.5–14.5)
EST. GFR  (AFRICAN AMERICAN): >60 ML/MIN/1.73 M^2
EST. GFR  (NON AFRICAN AMERICAN): >60 ML/MIN/1.73 M^2
GLUCOSE SERPL-MCNC: 111 MG/DL (ref 70–110)
HCT VFR BLD AUTO: 28.6 % (ref 37–48.5)
HGB BLD-MCNC: 9.4 G/DL (ref 12–16)
IMM GRANULOCYTES # BLD AUTO: 0.02 K/UL (ref 0–0.04)
IMM GRANULOCYTES NFR BLD AUTO: 0.7 % (ref 0–0.5)
LYMPHOCYTES # BLD AUTO: 0.3 K/UL (ref 1–4.8)
LYMPHOCYTES NFR BLD: 9.7 % (ref 18–48)
MCH RBC QN AUTO: 33.8 PG (ref 27–31)
MCHC RBC AUTO-ENTMCNC: 32.9 G/DL (ref 32–36)
MCV RBC AUTO: 103 FL (ref 82–98)
MONOCYTES # BLD AUTO: 0.2 K/UL (ref 0.3–1)
MONOCYTES NFR BLD: 7.2 % (ref 4–15)
NEUTROPHILS # BLD AUTO: 2.3 K/UL (ref 1.8–7.7)
NEUTROPHILS NFR BLD: 80.6 % (ref 38–73)
NRBC BLD-RTO: 0 /100 WBC
PLATELET # BLD AUTO: 101 K/UL (ref 150–450)
PMV BLD AUTO: 9.8 FL (ref 9.2–12.9)
POTASSIUM SERPL-SCNC: 3.5 MMOL/L (ref 3.5–5.1)
PROT SERPL-MCNC: 6.7 G/DL (ref 6–8.4)
RBC # BLD AUTO: 2.78 M/UL (ref 4–5.4)
SODIUM SERPL-SCNC: 140 MMOL/L (ref 136–145)
WBC # BLD AUTO: 2.79 K/UL (ref 3.9–12.7)

## 2021-05-28 PROCEDURE — 96361 HYDRATE IV INFUSION ADD-ON: CPT

## 2021-05-28 PROCEDURE — 96360 HYDRATION IV INFUSION INIT: CPT

## 2021-05-28 PROCEDURE — 85025 COMPLETE CBC W/AUTO DIFF WBC: CPT | Performed by: INTERNAL MEDICINE

## 2021-05-28 PROCEDURE — S5010 5% DEXTROSE AND 0.45% SALINE: HCPCS | Performed by: INTERNAL MEDICINE

## 2021-05-28 PROCEDURE — 63600175 PHARM REV CODE 636 W HCPCS: Performed by: INTERNAL MEDICINE

## 2021-05-28 PROCEDURE — 25000003 PHARM REV CODE 250: Performed by: INTERNAL MEDICINE

## 2021-05-28 PROCEDURE — 80053 COMPREHEN METABOLIC PANEL: CPT | Performed by: INTERNAL MEDICINE

## 2021-05-28 PROCEDURE — A4216 STERILE WATER/SALINE, 10 ML: HCPCS | Performed by: INTERNAL MEDICINE

## 2021-05-28 RX ORDER — FAMOTIDINE 40 MG/1
40 TABLET, FILM COATED ORAL NIGHTLY
Qty: 30 TABLET | Refills: 2 | Status: SHIPPED | OUTPATIENT
Start: 2021-05-28 | End: 2021-09-13 | Stop reason: SDUPTHER

## 2021-05-28 RX ORDER — SODIUM CHLORIDE 0.9 % (FLUSH) 0.9 %
10 SYRINGE (ML) INJECTION
Status: DISCONTINUED | OUTPATIENT
Start: 2021-05-28 | End: 2021-05-28 | Stop reason: HOSPADM

## 2021-05-28 RX ORDER — HEPARIN 100 UNIT/ML
500 SYRINGE INTRAVENOUS
Status: DISCONTINUED | OUTPATIENT
Start: 2021-05-28 | End: 2021-05-28 | Stop reason: HOSPADM

## 2021-05-28 RX ADMIN — DEXTROSE AND SODIUM CHLORIDE 1000 ML: 5; .45 INJECTION, SOLUTION INTRAVENOUS at 10:05

## 2021-05-28 RX ADMIN — SODIUM CHLORIDE, PRESERVATIVE FREE 10 ML: 5 INJECTION INTRAVENOUS at 12:05

## 2021-05-28 RX ADMIN — HEPARIN 500 UNITS: 100 SYRINGE at 12:05

## 2021-06-01 ENCOUNTER — INFUSION (OUTPATIENT)
Dept: INFUSION THERAPY | Facility: HOSPITAL | Age: 66
End: 2021-06-01
Attending: INTERNAL MEDICINE
Payer: MEDICARE

## 2021-06-01 VITALS
WEIGHT: 138.88 LBS | SYSTOLIC BLOOD PRESSURE: 126 MMHG | RESPIRATION RATE: 18 BRPM | DIASTOLIC BLOOD PRESSURE: 78 MMHG | OXYGEN SATURATION: 98 % | TEMPERATURE: 98 F | HEART RATE: 77 BPM | BODY MASS INDEX: 23.84 KG/M2

## 2021-06-01 DIAGNOSIS — C34.31 SQUAMOUS CELL CARCINOMA OF BRONCHUS IN RIGHT LOWER LOBE: ICD-10-CM

## 2021-06-01 DIAGNOSIS — E86.0 DEHYDRATION: ICD-10-CM

## 2021-06-01 DIAGNOSIS — C79.51 BONE METASTASES: Primary | ICD-10-CM

## 2021-06-01 PROCEDURE — S5010 5% DEXTROSE AND 0.45% SALINE: HCPCS | Performed by: NURSE PRACTITIONER

## 2021-06-01 PROCEDURE — 96360 HYDRATION IV INFUSION INIT: CPT

## 2021-06-01 PROCEDURE — 63600175 PHARM REV CODE 636 W HCPCS: Performed by: NURSE PRACTITIONER

## 2021-06-01 PROCEDURE — 25000003 PHARM REV CODE 250: Performed by: NURSE PRACTITIONER

## 2021-06-01 PROCEDURE — 96361 HYDRATE IV INFUSION ADD-ON: CPT

## 2021-06-01 RX ORDER — HEPARIN 100 UNIT/ML
500 SYRINGE INTRAVENOUS
Status: DISCONTINUED | OUTPATIENT
Start: 2021-06-01 | End: 2021-06-01 | Stop reason: HOSPADM

## 2021-06-01 RX ORDER — HEPARIN 100 UNIT/ML
500 SYRINGE INTRAVENOUS
Status: CANCELLED | OUTPATIENT
Start: 2021-06-01

## 2021-06-01 RX ORDER — SODIUM CHLORIDE 0.9 % (FLUSH) 0.9 %
10 SYRINGE (ML) INJECTION
Status: CANCELLED | OUTPATIENT
Start: 2021-06-01

## 2021-06-01 RX ADMIN — HEPARIN 500 UNITS: 100 SYRINGE at 01:06

## 2021-06-01 RX ADMIN — DEXTROSE AND SODIUM CHLORIDE 1000 ML: 5; .45 INJECTION, SOLUTION INTRAVENOUS at 11:06

## 2021-06-02 ENCOUNTER — OFFICE VISIT (OUTPATIENT)
Dept: HEMATOLOGY/ONCOLOGY | Facility: CLINIC | Age: 66
End: 2021-06-02
Payer: MEDICARE

## 2021-06-02 VITALS
BODY MASS INDEX: 23.56 KG/M2 | SYSTOLIC BLOOD PRESSURE: 128 MMHG | WEIGHT: 138 LBS | DIASTOLIC BLOOD PRESSURE: 70 MMHG | HEIGHT: 64 IN | HEART RATE: 89 BPM | RESPIRATION RATE: 16 BRPM

## 2021-06-02 DIAGNOSIS — C79.51 BONE METASTASES: ICD-10-CM

## 2021-06-02 DIAGNOSIS — C34.31 SQUAMOUS CELL CARCINOMA OF BRONCHUS IN RIGHT LOWER LOBE: ICD-10-CM

## 2021-06-02 DIAGNOSIS — C34.31 MALIGNANT NEOPLASM OF LOWER LOBE OF RIGHT LUNG: Chronic | ICD-10-CM

## 2021-06-02 DIAGNOSIS — G89.3 CANCER ASSOCIATED PAIN: ICD-10-CM

## 2021-06-02 PROCEDURE — 99214 PR OFFICE/OUTPT VISIT, EST, LEVL IV, 30-39 MIN: ICD-10-PCS | Mod: S$GLB,,, | Performed by: INTERNAL MEDICINE

## 2021-06-02 PROCEDURE — 99214 OFFICE O/P EST MOD 30 MIN: CPT | Mod: S$GLB,,, | Performed by: INTERNAL MEDICINE

## 2021-06-02 RX ORDER — FENTANYL 75 UG/H
1 PATCH TRANSDERMAL
Qty: 10 PATCH | Refills: 0 | Status: SHIPPED | OUTPATIENT
Start: 2021-06-02 | End: 2021-07-08 | Stop reason: SDUPTHER

## 2021-06-04 ENCOUNTER — INFUSION (OUTPATIENT)
Dept: INFUSION THERAPY | Facility: HOSPITAL | Age: 66
End: 2021-06-04
Attending: INTERNAL MEDICINE
Payer: MEDICARE

## 2021-06-04 VITALS
BODY MASS INDEX: 23.18 KG/M2 | DIASTOLIC BLOOD PRESSURE: 82 MMHG | HEIGHT: 64 IN | RESPIRATION RATE: 18 BRPM | HEART RATE: 89 BPM | WEIGHT: 135.81 LBS | SYSTOLIC BLOOD PRESSURE: 145 MMHG | TEMPERATURE: 97 F

## 2021-06-04 DIAGNOSIS — E86.0 DEHYDRATION: ICD-10-CM

## 2021-06-04 DIAGNOSIS — C79.51 BONE METASTASES: Primary | ICD-10-CM

## 2021-06-04 DIAGNOSIS — C34.31 SQUAMOUS CELL CARCINOMA OF BRONCHUS IN RIGHT LOWER LOBE: ICD-10-CM

## 2021-06-04 PROCEDURE — S5010 5% DEXTROSE AND 0.45% SALINE: HCPCS | Performed by: NURSE PRACTITIONER

## 2021-06-04 PROCEDURE — A4216 STERILE WATER/SALINE, 10 ML: HCPCS | Performed by: NURSE PRACTITIONER

## 2021-06-04 PROCEDURE — 96360 HYDRATION IV INFUSION INIT: CPT

## 2021-06-04 PROCEDURE — 25000003 PHARM REV CODE 250: Performed by: NURSE PRACTITIONER

## 2021-06-04 PROCEDURE — 63600175 PHARM REV CODE 636 W HCPCS: Performed by: NURSE PRACTITIONER

## 2021-06-04 PROCEDURE — 96361 HYDRATE IV INFUSION ADD-ON: CPT

## 2021-06-04 RX ORDER — HEPARIN 100 UNIT/ML
500 SYRINGE INTRAVENOUS
Status: CANCELLED | OUTPATIENT
Start: 2021-06-04

## 2021-06-04 RX ORDER — HEPARIN 100 UNIT/ML
500 SYRINGE INTRAVENOUS
Status: DISCONTINUED | OUTPATIENT
Start: 2021-06-04 | End: 2021-06-04 | Stop reason: HOSPADM

## 2021-06-04 RX ORDER — SODIUM CHLORIDE 0.9 % (FLUSH) 0.9 %
10 SYRINGE (ML) INJECTION
Status: DISCONTINUED | OUTPATIENT
Start: 2021-06-04 | End: 2021-06-04 | Stop reason: HOSPADM

## 2021-06-04 RX ORDER — SODIUM CHLORIDE 0.9 % (FLUSH) 0.9 %
10 SYRINGE (ML) INJECTION
Status: CANCELLED | OUTPATIENT
Start: 2021-06-04

## 2021-06-04 RX ADMIN — DEXTROSE AND SODIUM CHLORIDE 1000 ML: 5; .45 INJECTION, SOLUTION INTRAVENOUS at 10:06

## 2021-06-04 RX ADMIN — SODIUM CHLORIDE 10 ML: 9 INJECTION INTRAMUSCULAR; INTRAVENOUS; SUBCUTANEOUS at 12:06

## 2021-06-04 RX ADMIN — HEPARIN 500 UNITS: 100 SYRINGE at 12:06

## 2021-06-08 ENCOUNTER — INFUSION (OUTPATIENT)
Dept: INFUSION THERAPY | Facility: HOSPITAL | Age: 66
End: 2021-06-08
Attending: INTERNAL MEDICINE
Payer: MEDICARE

## 2021-06-08 VITALS
BODY MASS INDEX: 23.03 KG/M2 | HEIGHT: 64 IN | RESPIRATION RATE: 16 BRPM | SYSTOLIC BLOOD PRESSURE: 134 MMHG | DIASTOLIC BLOOD PRESSURE: 65 MMHG | HEART RATE: 92 BPM | TEMPERATURE: 98 F | WEIGHT: 134.88 LBS

## 2021-06-08 DIAGNOSIS — C79.51 BONE METASTASES: Primary | ICD-10-CM

## 2021-06-08 DIAGNOSIS — C34.31 MALIGNANT NEOPLASM OF LOWER LOBE OF RIGHT LUNG: ICD-10-CM

## 2021-06-08 DIAGNOSIS — C34.31 SQUAMOUS CELL CARCINOMA OF BRONCHUS IN RIGHT LOWER LOBE: ICD-10-CM

## 2021-06-08 DIAGNOSIS — E86.0 DEHYDRATION: ICD-10-CM

## 2021-06-08 LAB
ALBUMIN SERPL BCP-MCNC: 3.7 G/DL (ref 3.5–5.2)
ALP SERPL-CCNC: 65 U/L (ref 55–135)
ALT SERPL W/O P-5'-P-CCNC: 16 U/L (ref 10–44)
ANION GAP SERPL CALC-SCNC: 9 MMOL/L (ref 8–16)
AST SERPL-CCNC: 18 U/L (ref 10–40)
BASOPHILS # BLD AUTO: 0 K/UL (ref 0–0.2)
BASOPHILS NFR BLD: 0 % (ref 0–1.9)
BILIRUB SERPL-MCNC: 0.6 MG/DL (ref 0.1–1)
BUN SERPL-MCNC: 12 MG/DL (ref 8–23)
CALCIUM SERPL-MCNC: 8.8 MG/DL (ref 8.7–10.5)
CHLORIDE SERPL-SCNC: 108 MMOL/L (ref 95–110)
CO2 SERPL-SCNC: 25 MMOL/L (ref 23–29)
CREAT SERPL-MCNC: 0.7 MG/DL (ref 0.5–1.4)
DIFFERENTIAL METHOD: ABNORMAL
EOSINOPHIL # BLD AUTO: 0 K/UL (ref 0–0.5)
EOSINOPHIL NFR BLD: 0.8 % (ref 0–8)
ERYTHROCYTE [DISTWIDTH] IN BLOOD BY AUTOMATED COUNT: 14.8 % (ref 11.5–14.5)
EST. GFR  (AFRICAN AMERICAN): >60 ML/MIN/1.73 M^2
EST. GFR  (NON AFRICAN AMERICAN): >60 ML/MIN/1.73 M^2
GLUCOSE SERPL-MCNC: 88 MG/DL (ref 70–110)
HCT VFR BLD AUTO: 29.1 % (ref 37–48.5)
HGB BLD-MCNC: 9.4 G/DL (ref 12–16)
IMM GRANULOCYTES # BLD AUTO: 0.01 K/UL (ref 0–0.04)
IMM GRANULOCYTES NFR BLD AUTO: 0.4 % (ref 0–0.5)
LYMPHOCYTES # BLD AUTO: 0.5 K/UL (ref 1–4.8)
LYMPHOCYTES NFR BLD: 17.7 % (ref 18–48)
MCH RBC QN AUTO: 33.7 PG (ref 27–31)
MCHC RBC AUTO-ENTMCNC: 32.3 G/DL (ref 32–36)
MCV RBC AUTO: 104 FL (ref 82–98)
MONOCYTES # BLD AUTO: 0.5 K/UL (ref 0.3–1)
MONOCYTES NFR BLD: 19.3 % (ref 4–15)
NEUTROPHILS # BLD AUTO: 1.6 K/UL (ref 1.8–7.7)
NEUTROPHILS NFR BLD: 61.8 % (ref 38–73)
NRBC BLD-RTO: 0 /100 WBC
PLATELET # BLD AUTO: 110 K/UL (ref 150–450)
PMV BLD AUTO: 8.7 FL (ref 9.2–12.9)
POTASSIUM SERPL-SCNC: 4 MMOL/L (ref 3.5–5.1)
PROT SERPL-MCNC: 6.2 G/DL (ref 6–8.4)
RBC # BLD AUTO: 2.79 M/UL (ref 4–5.4)
SODIUM SERPL-SCNC: 142 MMOL/L (ref 136–145)
WBC # BLD AUTO: 2.54 K/UL (ref 3.9–12.7)

## 2021-06-08 PROCEDURE — 63600175 PHARM REV CODE 636 W HCPCS: Performed by: INTERNAL MEDICINE

## 2021-06-08 PROCEDURE — S5010 5% DEXTROSE AND 0.45% SALINE: HCPCS | Performed by: NURSE PRACTITIONER

## 2021-06-08 PROCEDURE — 80053 COMPREHEN METABOLIC PANEL: CPT | Performed by: INTERNAL MEDICINE

## 2021-06-08 PROCEDURE — 96361 HYDRATE IV INFUSION ADD-ON: CPT

## 2021-06-08 PROCEDURE — 85025 COMPLETE CBC W/AUTO DIFF WBC: CPT | Performed by: INTERNAL MEDICINE

## 2021-06-08 PROCEDURE — 96360 HYDRATION IV INFUSION INIT: CPT

## 2021-06-08 PROCEDURE — 25000003 PHARM REV CODE 250: Performed by: NURSE PRACTITIONER

## 2021-06-08 RX ORDER — SODIUM CHLORIDE 0.9 % (FLUSH) 0.9 %
10 SYRINGE (ML) INJECTION
Status: CANCELLED | OUTPATIENT
Start: 2021-06-08

## 2021-06-08 RX ORDER — HEPARIN 100 UNIT/ML
500 SYRINGE INTRAVENOUS
Status: CANCELLED | OUTPATIENT
Start: 2021-06-08

## 2021-06-08 RX ORDER — HEPARIN 100 UNIT/ML
500 SYRINGE INTRAVENOUS
Status: COMPLETED | OUTPATIENT
Start: 2021-06-08 | End: 2021-06-08

## 2021-06-08 RX ADMIN — HEPARIN 500 UNITS: 100 SYRINGE at 03:06

## 2021-06-08 RX ADMIN — DEXTROSE AND SODIUM CHLORIDE 1000 ML: 5; .45 INJECTION, SOLUTION INTRAVENOUS at 01:06

## 2021-06-11 ENCOUNTER — INFUSION (OUTPATIENT)
Dept: INFUSION THERAPY | Facility: HOSPITAL | Age: 66
End: 2021-06-11
Attending: INTERNAL MEDICINE
Payer: MEDICARE

## 2021-06-11 VITALS
SYSTOLIC BLOOD PRESSURE: 112 MMHG | WEIGHT: 132.63 LBS | TEMPERATURE: 98 F | RESPIRATION RATE: 18 BRPM | HEART RATE: 86 BPM | HEIGHT: 64 IN | BODY MASS INDEX: 22.64 KG/M2 | DIASTOLIC BLOOD PRESSURE: 71 MMHG

## 2021-06-11 DIAGNOSIS — C34.31 SQUAMOUS CELL CARCINOMA OF BRONCHUS IN RIGHT LOWER LOBE: ICD-10-CM

## 2021-06-11 DIAGNOSIS — C79.51 BONE METASTASES: Primary | ICD-10-CM

## 2021-06-11 DIAGNOSIS — E86.0 DEHYDRATION: ICD-10-CM

## 2021-06-11 PROCEDURE — S5010 5% DEXTROSE AND 0.45% SALINE: HCPCS | Performed by: NURSE PRACTITIONER

## 2021-06-11 PROCEDURE — 96361 HYDRATE IV INFUSION ADD-ON: CPT

## 2021-06-11 PROCEDURE — 96360 HYDRATION IV INFUSION INIT: CPT

## 2021-06-11 PROCEDURE — 63600175 PHARM REV CODE 636 W HCPCS: Performed by: NURSE PRACTITIONER

## 2021-06-11 PROCEDURE — 25000003 PHARM REV CODE 250: Performed by: NURSE PRACTITIONER

## 2021-06-11 RX ORDER — SODIUM CHLORIDE 0.9 % (FLUSH) 0.9 %
10 SYRINGE (ML) INJECTION
Status: CANCELLED | OUTPATIENT
Start: 2021-06-11

## 2021-06-11 RX ORDER — HEPARIN 100 UNIT/ML
500 SYRINGE INTRAVENOUS
Status: CANCELLED | OUTPATIENT
Start: 2021-06-11

## 2021-06-11 RX ORDER — HEPARIN 100 UNIT/ML
500 SYRINGE INTRAVENOUS
Status: DISCONTINUED | OUTPATIENT
Start: 2021-06-11 | End: 2021-06-11 | Stop reason: HOSPADM

## 2021-06-11 RX ADMIN — HEPARIN 500 UNITS: 100 SYRINGE at 01:06

## 2021-06-11 RX ADMIN — DEXTROSE AND SODIUM CHLORIDE 1000 ML: 5; .45 INJECTION, SOLUTION INTRAVENOUS at 11:06

## 2021-06-18 ENCOUNTER — INFUSION (OUTPATIENT)
Dept: INFUSION THERAPY | Facility: HOSPITAL | Age: 66
End: 2021-06-18
Attending: INTERNAL MEDICINE
Payer: MEDICARE

## 2021-06-18 VITALS
HEART RATE: 80 BPM | OXYGEN SATURATION: 100 % | BODY MASS INDEX: 22.96 KG/M2 | DIASTOLIC BLOOD PRESSURE: 76 MMHG | WEIGHT: 134.5 LBS | RESPIRATION RATE: 20 BRPM | TEMPERATURE: 98 F | HEIGHT: 64 IN | SYSTOLIC BLOOD PRESSURE: 128 MMHG

## 2021-06-18 DIAGNOSIS — E86.0 DEHYDRATION: ICD-10-CM

## 2021-06-18 DIAGNOSIS — C34.31 SQUAMOUS CELL CARCINOMA OF BRONCHUS IN RIGHT LOWER LOBE: ICD-10-CM

## 2021-06-18 DIAGNOSIS — C79.51 BONE METASTASES: Primary | ICD-10-CM

## 2021-06-18 PROCEDURE — S5010 5% DEXTROSE AND 0.45% SALINE: HCPCS | Performed by: NURSE PRACTITIONER

## 2021-06-18 PROCEDURE — 96360 HYDRATION IV INFUSION INIT: CPT

## 2021-06-18 PROCEDURE — 63600175 PHARM REV CODE 636 W HCPCS: Performed by: NURSE PRACTITIONER

## 2021-06-18 PROCEDURE — 25000003 PHARM REV CODE 250: Performed by: NURSE PRACTITIONER

## 2021-06-18 PROCEDURE — 96361 HYDRATE IV INFUSION ADD-ON: CPT

## 2021-06-18 RX ORDER — HEPARIN 100 UNIT/ML
500 SYRINGE INTRAVENOUS
Status: CANCELLED | OUTPATIENT
Start: 2021-06-18

## 2021-06-18 RX ORDER — SODIUM CHLORIDE 0.9 % (FLUSH) 0.9 %
10 SYRINGE (ML) INJECTION
Status: CANCELLED | OUTPATIENT
Start: 2021-06-18

## 2021-06-18 RX ORDER — HEPARIN 100 UNIT/ML
500 SYRINGE INTRAVENOUS
Status: DISCONTINUED | OUTPATIENT
Start: 2021-06-18 | End: 2021-06-18 | Stop reason: HOSPADM

## 2021-06-18 RX ORDER — SODIUM CHLORIDE 0.9 % (FLUSH) 0.9 %
10 SYRINGE (ML) INJECTION
Status: DISCONTINUED | OUTPATIENT
Start: 2021-06-18 | End: 2021-06-18 | Stop reason: HOSPADM

## 2021-06-18 RX ADMIN — DEXTROSE AND SODIUM CHLORIDE 1000 ML: 5; .45 INJECTION, SOLUTION INTRAVENOUS at 11:06

## 2021-06-18 RX ADMIN — HEPARIN 500 UNITS: 100 SYRINGE at 01:06

## 2021-06-22 ENCOUNTER — INFUSION (OUTPATIENT)
Dept: INFUSION THERAPY | Facility: HOSPITAL | Age: 66
End: 2021-06-22
Attending: INTERNAL MEDICINE
Payer: MEDICARE

## 2021-06-22 VITALS
RESPIRATION RATE: 18 BRPM | SYSTOLIC BLOOD PRESSURE: 127 MMHG | OXYGEN SATURATION: 100 % | DIASTOLIC BLOOD PRESSURE: 72 MMHG | BODY MASS INDEX: 22.83 KG/M2 | TEMPERATURE: 98 F | HEART RATE: 86 BPM | WEIGHT: 133 LBS

## 2021-06-22 DIAGNOSIS — E86.0 DEHYDRATION: ICD-10-CM

## 2021-06-22 DIAGNOSIS — C79.51 BONE METASTASES: Primary | ICD-10-CM

## 2021-06-22 DIAGNOSIS — C34.31 SQUAMOUS CELL CARCINOMA OF BRONCHUS IN RIGHT LOWER LOBE: ICD-10-CM

## 2021-06-22 DIAGNOSIS — C34.31 MALIGNANT NEOPLASM OF LOWER LOBE OF RIGHT LUNG: ICD-10-CM

## 2021-06-22 PROCEDURE — 96361 HYDRATE IV INFUSION ADD-ON: CPT

## 2021-06-22 PROCEDURE — A4216 STERILE WATER/SALINE, 10 ML: HCPCS | Performed by: INTERNAL MEDICINE

## 2021-06-22 PROCEDURE — S5010 5% DEXTROSE AND 0.45% SALINE: HCPCS | Performed by: NURSE PRACTITIONER

## 2021-06-22 PROCEDURE — 96360 HYDRATION IV INFUSION INIT: CPT

## 2021-06-22 PROCEDURE — 63600175 PHARM REV CODE 636 W HCPCS: Performed by: INTERNAL MEDICINE

## 2021-06-22 PROCEDURE — 25000003 PHARM REV CODE 250: Performed by: INTERNAL MEDICINE

## 2021-06-22 PROCEDURE — 25000003 PHARM REV CODE 250: Performed by: NURSE PRACTITIONER

## 2021-06-22 RX ORDER — SODIUM CHLORIDE 0.9 % (FLUSH) 0.9 %
10 SYRINGE (ML) INJECTION
Status: COMPLETED | OUTPATIENT
Start: 2021-06-22 | End: 2021-06-22

## 2021-06-22 RX ORDER — HEPARIN 100 UNIT/ML
500 SYRINGE INTRAVENOUS
Status: CANCELLED | OUTPATIENT
Start: 2021-06-22

## 2021-06-22 RX ORDER — SODIUM CHLORIDE 0.9 % (FLUSH) 0.9 %
10 SYRINGE (ML) INJECTION
Status: CANCELLED | OUTPATIENT
Start: 2021-06-22

## 2021-06-22 RX ORDER — HEPARIN 100 UNIT/ML
500 SYRINGE INTRAVENOUS
Status: COMPLETED | OUTPATIENT
Start: 2021-06-22 | End: 2021-06-22

## 2021-06-22 RX ADMIN — HEPARIN 500 UNITS: 100 SYRINGE at 03:06

## 2021-06-22 RX ADMIN — DEXTROSE AND SODIUM CHLORIDE 1000 ML: 5; .45 INJECTION, SOLUTION INTRAVENOUS at 01:06

## 2021-06-22 RX ADMIN — SODIUM CHLORIDE, PRESERVATIVE FREE 10 ML: 5 INJECTION INTRAVENOUS at 03:06

## 2021-06-23 ENCOUNTER — PATIENT MESSAGE (OUTPATIENT)
Dept: HEMATOLOGY/ONCOLOGY | Facility: CLINIC | Age: 66
End: 2021-06-23

## 2021-06-23 ENCOUNTER — OFFICE VISIT (OUTPATIENT)
Dept: PSYCHIATRY | Facility: CLINIC | Age: 66
End: 2021-06-23
Payer: MEDICARE

## 2021-06-23 VITALS
WEIGHT: 135.25 LBS | SYSTOLIC BLOOD PRESSURE: 123 MMHG | HEIGHT: 64 IN | HEART RATE: 87 BPM | BODY MASS INDEX: 23.09 KG/M2 | DIASTOLIC BLOOD PRESSURE: 72 MMHG

## 2021-06-23 DIAGNOSIS — F33.1 MDD (MAJOR DEPRESSIVE DISORDER), RECURRENT EPISODE, MODERATE: ICD-10-CM

## 2021-06-23 DIAGNOSIS — C79.51 BONE METASTASES: ICD-10-CM

## 2021-06-23 DIAGNOSIS — C34.90 ADENOCARCINOMA OF LUNG, UNSPECIFIED LATERALITY: ICD-10-CM

## 2021-06-23 DIAGNOSIS — C34.31 MALIGNANT NEOPLASM OF LOWER LOBE OF RIGHT LUNG: Primary | ICD-10-CM

## 2021-06-23 PROCEDURE — 90833 PSYTX W PT W E/M 30 MIN: CPT | Mod: ,,, | Performed by: PSYCHIATRY & NEUROLOGY

## 2021-06-23 PROCEDURE — 99999 PR PBB SHADOW E&M-EST. PATIENT-LVL V: ICD-10-PCS | Mod: PBBFAC,,, | Performed by: PSYCHIATRY & NEUROLOGY

## 2021-06-23 PROCEDURE — 99214 OFFICE O/P EST MOD 30 MIN: CPT | Mod: S$PBB,,, | Performed by: PSYCHIATRY & NEUROLOGY

## 2021-06-23 PROCEDURE — 90833 PR PSYCHOTHERAPY W/PATIENT W/E&M, 30 MIN (ADD ON): ICD-10-PCS | Mod: ,,, | Performed by: PSYCHIATRY & NEUROLOGY

## 2021-06-23 PROCEDURE — 99215 OFFICE O/P EST HI 40 MIN: CPT | Mod: PBBFAC,PN | Performed by: PSYCHIATRY & NEUROLOGY

## 2021-06-23 PROCEDURE — 99214 PR OFFICE/OUTPT VISIT, EST, LEVL IV, 30-39 MIN: ICD-10-PCS | Mod: S$PBB,,, | Performed by: PSYCHIATRY & NEUROLOGY

## 2021-06-23 PROCEDURE — 99999 PR PBB SHADOW E&M-EST. PATIENT-LVL V: CPT | Mod: PBBFAC,,, | Performed by: PSYCHIATRY & NEUROLOGY

## 2021-06-23 RX ORDER — MIRTAZAPINE 30 MG/1
30 TABLET, FILM COATED ORAL NIGHTLY
Qty: 90 TABLET | Refills: 0 | Status: SHIPPED | OUTPATIENT
Start: 2021-06-23 | End: 2021-11-01 | Stop reason: SDUPTHER

## 2021-06-23 RX ORDER — MEMANTINE HYDROCHLORIDE 10 MG/1
10 TABLET ORAL SEE ADMIN INSTRUCTIONS
COMMUNITY
Start: 2021-06-16 | End: 2021-08-23 | Stop reason: SDUPTHER

## 2021-06-23 RX ORDER — LAMOTRIGINE 100 MG/1
TABLET ORAL
Qty: 270 TABLET | Refills: 0 | Status: SHIPPED | OUTPATIENT
Start: 2021-06-23 | End: 2021-09-30

## 2021-06-23 RX ORDER — OLANZAPINE 5 MG/1
TABLET ORAL
Qty: 75 TABLET | Refills: 2 | Status: SHIPPED | OUTPATIENT
Start: 2021-06-23 | End: 2021-09-08

## 2021-06-25 ENCOUNTER — INFUSION (OUTPATIENT)
Dept: INFUSION THERAPY | Facility: HOSPITAL | Age: 66
End: 2021-06-25
Attending: INTERNAL MEDICINE
Payer: MEDICARE

## 2021-06-25 VITALS
RESPIRATION RATE: 18 BRPM | WEIGHT: 131.13 LBS | DIASTOLIC BLOOD PRESSURE: 74 MMHG | OXYGEN SATURATION: 100 % | HEART RATE: 89 BPM | SYSTOLIC BLOOD PRESSURE: 129 MMHG | BODY MASS INDEX: 22.5 KG/M2 | TEMPERATURE: 98 F

## 2021-06-25 DIAGNOSIS — C34.31 SQUAMOUS CELL CARCINOMA OF BRONCHUS IN RIGHT LOWER LOBE: ICD-10-CM

## 2021-06-25 DIAGNOSIS — C79.51 BONE METASTASES: Primary | ICD-10-CM

## 2021-06-25 DIAGNOSIS — E86.0 DEHYDRATION: ICD-10-CM

## 2021-06-25 PROCEDURE — S5010 5% DEXTROSE AND 0.45% SALINE: HCPCS | Performed by: NURSE PRACTITIONER

## 2021-06-25 PROCEDURE — A4216 STERILE WATER/SALINE, 10 ML: HCPCS | Performed by: NURSE PRACTITIONER

## 2021-06-25 PROCEDURE — 96360 HYDRATION IV INFUSION INIT: CPT

## 2021-06-25 PROCEDURE — 25000003 PHARM REV CODE 250: Performed by: NURSE PRACTITIONER

## 2021-06-25 PROCEDURE — 96361 HYDRATE IV INFUSION ADD-ON: CPT

## 2021-06-25 PROCEDURE — 63600175 PHARM REV CODE 636 W HCPCS: Performed by: NURSE PRACTITIONER

## 2021-06-25 RX ORDER — SODIUM CHLORIDE 0.9 % (FLUSH) 0.9 %
10 SYRINGE (ML) INJECTION
Status: DISCONTINUED | OUTPATIENT
Start: 2021-06-25 | End: 2021-06-25 | Stop reason: HOSPADM

## 2021-06-25 RX ORDER — SODIUM CHLORIDE 0.9 % (FLUSH) 0.9 %
10 SYRINGE (ML) INJECTION
Status: CANCELLED | OUTPATIENT
Start: 2021-06-25

## 2021-06-25 RX ORDER — HEPARIN 100 UNIT/ML
500 SYRINGE INTRAVENOUS
Status: DISCONTINUED | OUTPATIENT
Start: 2021-06-25 | End: 2021-06-25 | Stop reason: HOSPADM

## 2021-06-25 RX ORDER — HEPARIN 100 UNIT/ML
500 SYRINGE INTRAVENOUS
Status: CANCELLED | OUTPATIENT
Start: 2021-06-25

## 2021-06-25 RX ADMIN — DEXTROSE AND SODIUM CHLORIDE 1000 ML: 5; .45 INJECTION, SOLUTION INTRAVENOUS at 11:06

## 2021-06-25 RX ADMIN — HEPARIN 500 UNITS: 100 SYRINGE at 01:06

## 2021-06-25 RX ADMIN — SODIUM CHLORIDE, PRESERVATIVE FREE 10 ML: 5 INJECTION INTRAVENOUS at 01:06

## 2021-06-28 ENCOUNTER — TELEPHONE (OUTPATIENT)
Dept: PSYCHIATRY | Facility: CLINIC | Age: 66
End: 2021-06-28

## 2021-06-28 PROCEDURE — G0180 MD CERTIFICATION HHA PATIENT: HCPCS | Mod: ,,, | Performed by: PSYCHIATRY & NEUROLOGY

## 2021-06-28 PROCEDURE — G0180 PR HOME HEALTH MD CERTIFICATION: ICD-10-PCS | Mod: ,,, | Performed by: PSYCHIATRY & NEUROLOGY

## 2021-06-29 ENCOUNTER — PATIENT MESSAGE (OUTPATIENT)
Dept: HEMATOLOGY/ONCOLOGY | Facility: CLINIC | Age: 66
End: 2021-06-29

## 2021-06-29 ENCOUNTER — TELEPHONE (OUTPATIENT)
Dept: PSYCHIATRY | Facility: CLINIC | Age: 66
End: 2021-06-29

## 2021-06-29 ENCOUNTER — INFUSION (OUTPATIENT)
Dept: INFUSION THERAPY | Facility: HOSPITAL | Age: 66
End: 2021-06-29
Attending: INTERNAL MEDICINE
Payer: MEDICARE

## 2021-06-29 VITALS — WEIGHT: 132.31 LBS | BODY MASS INDEX: 22.71 KG/M2

## 2021-06-29 DIAGNOSIS — K21.9 GASTROESOPHAGEAL REFLUX DISEASE, UNSPECIFIED WHETHER ESOPHAGITIS PRESENT: ICD-10-CM

## 2021-06-29 DIAGNOSIS — C34.31 MALIGNANT NEOPLASM OF LOWER LOBE OF RIGHT LUNG: ICD-10-CM

## 2021-06-29 DIAGNOSIS — C34.91 ADENOCARCINOMA, LUNG, RIGHT: Primary | ICD-10-CM

## 2021-06-29 DIAGNOSIS — C34.31 SQUAMOUS CELL CARCINOMA OF BRONCHUS IN RIGHT LOWER LOBE: ICD-10-CM

## 2021-06-29 DIAGNOSIS — C79.51 BONE METASTASES: ICD-10-CM

## 2021-06-29 DIAGNOSIS — G89.3 CANCER ASSOCIATED PAIN: ICD-10-CM

## 2021-06-29 DIAGNOSIS — E86.0 DEHYDRATION: ICD-10-CM

## 2021-06-29 DIAGNOSIS — C79.51 BONE METASTASES: Primary | ICD-10-CM

## 2021-06-29 PROCEDURE — 96360 HYDRATION IV INFUSION INIT: CPT

## 2021-06-29 PROCEDURE — 25000003 PHARM REV CODE 250: Performed by: NURSE PRACTITIONER

## 2021-06-29 PROCEDURE — 96361 HYDRATE IV INFUSION ADD-ON: CPT

## 2021-06-29 PROCEDURE — S5010 5% DEXTROSE AND 0.45% SALINE: HCPCS | Performed by: NURSE PRACTITIONER

## 2021-06-29 PROCEDURE — 63600175 PHARM REV CODE 636 W HCPCS: Performed by: NURSE PRACTITIONER

## 2021-06-29 PROCEDURE — A4216 STERILE WATER/SALINE, 10 ML: HCPCS | Performed by: NURSE PRACTITIONER

## 2021-06-29 RX ORDER — HEPARIN 100 UNIT/ML
500 SYRINGE INTRAVENOUS
Status: CANCELLED | OUTPATIENT
Start: 2021-06-29

## 2021-06-29 RX ORDER — HEPARIN 100 UNIT/ML
500 SYRINGE INTRAVENOUS
Status: DISCONTINUED | OUTPATIENT
Start: 2021-06-29 | End: 2021-06-29 | Stop reason: HOSPADM

## 2021-06-29 RX ORDER — MORPHINE SULFATE 15 MG/1
7.5 TABLET ORAL EVERY 4 HOURS PRN
Qty: 60 TABLET | Refills: 0 | Status: SHIPPED | OUTPATIENT
Start: 2021-06-29 | End: 2021-09-07 | Stop reason: SDUPTHER

## 2021-06-29 RX ORDER — SODIUM CHLORIDE 0.9 % (FLUSH) 0.9 %
10 SYRINGE (ML) INJECTION
Status: DISCONTINUED | OUTPATIENT
Start: 2021-06-29 | End: 2021-06-29 | Stop reason: HOSPADM

## 2021-06-29 RX ORDER — PROCHLORPERAZINE MALEATE 10 MG
10 TABLET ORAL EVERY 6 HOURS PRN
Qty: 30 TABLET | Refills: 3 | Status: SHIPPED | OUTPATIENT
Start: 2021-06-29 | End: 2021-10-25 | Stop reason: SDUPTHER

## 2021-06-29 RX ORDER — SODIUM CHLORIDE 0.9 % (FLUSH) 0.9 %
10 SYRINGE (ML) INJECTION
Status: CANCELLED | OUTPATIENT
Start: 2021-06-29

## 2021-06-29 RX ADMIN — DEXTROSE AND SODIUM CHLORIDE 1000 ML: 5; .45 INJECTION, SOLUTION INTRAVENOUS at 11:06

## 2021-06-29 RX ADMIN — HEPARIN 500 UNITS: 100 SYRINGE at 01:06

## 2021-06-29 RX ADMIN — SODIUM CHLORIDE, PRESERVATIVE FREE 10 ML: 5 INJECTION INTRAVENOUS at 01:06

## 2021-06-30 RX ORDER — HEPARIN 100 UNIT/ML
500 SYRINGE INTRAVENOUS
Status: CANCELLED | OUTPATIENT
Start: 2021-06-30

## 2021-06-30 RX ORDER — SODIUM CHLORIDE 0.9 % (FLUSH) 0.9 %
10 SYRINGE (ML) INJECTION
Status: CANCELLED | OUTPATIENT
Start: 2021-06-30

## 2021-07-01 ENCOUNTER — TELEPHONE (OUTPATIENT)
Dept: HEMATOLOGY/ONCOLOGY | Facility: CLINIC | Age: 66
End: 2021-07-01

## 2021-07-02 ENCOUNTER — INFUSION (OUTPATIENT)
Dept: INFUSION THERAPY | Facility: HOSPITAL | Age: 66
End: 2021-07-02
Attending: INTERNAL MEDICINE
Payer: MEDICARE

## 2021-07-02 VITALS
RESPIRATION RATE: 18 BRPM | SYSTOLIC BLOOD PRESSURE: 122 MMHG | TEMPERATURE: 98 F | DIASTOLIC BLOOD PRESSURE: 73 MMHG | WEIGHT: 132.38 LBS | OXYGEN SATURATION: 99 % | BODY MASS INDEX: 22.73 KG/M2 | HEART RATE: 93 BPM

## 2021-07-02 DIAGNOSIS — C34.31 SQUAMOUS CELL CARCINOMA OF BRONCHUS IN RIGHT LOWER LOBE: ICD-10-CM

## 2021-07-02 DIAGNOSIS — E86.0 DEHYDRATION: ICD-10-CM

## 2021-07-02 DIAGNOSIS — C79.51 BONE METASTASES: Primary | ICD-10-CM

## 2021-07-02 PROCEDURE — 25000003 PHARM REV CODE 250: Performed by: NURSE PRACTITIONER

## 2021-07-02 PROCEDURE — 96361 HYDRATE IV INFUSION ADD-ON: CPT

## 2021-07-02 PROCEDURE — S5010 5% DEXTROSE AND 0.45% SALINE: HCPCS | Performed by: NURSE PRACTITIONER

## 2021-07-02 PROCEDURE — 63600175 PHARM REV CODE 636 W HCPCS: Performed by: NURSE PRACTITIONER

## 2021-07-02 PROCEDURE — A4216 STERILE WATER/SALINE, 10 ML: HCPCS | Performed by: NURSE PRACTITIONER

## 2021-07-02 PROCEDURE — 96360 HYDRATION IV INFUSION INIT: CPT

## 2021-07-02 RX ORDER — SODIUM CHLORIDE 0.9 % (FLUSH) 0.9 %
10 SYRINGE (ML) INJECTION
Status: CANCELLED | OUTPATIENT
Start: 2021-07-02

## 2021-07-02 RX ORDER — HEPARIN 100 UNIT/ML
500 SYRINGE INTRAVENOUS
Status: CANCELLED | OUTPATIENT
Start: 2021-07-02

## 2021-07-02 RX ORDER — SODIUM CHLORIDE 0.9 % (FLUSH) 0.9 %
10 SYRINGE (ML) INJECTION
Status: DISCONTINUED | OUTPATIENT
Start: 2021-07-02 | End: 2021-07-02 | Stop reason: HOSPADM

## 2021-07-02 RX ORDER — HEPARIN 100 UNIT/ML
500 SYRINGE INTRAVENOUS
Status: DISCONTINUED | OUTPATIENT
Start: 2021-07-02 | End: 2021-07-02 | Stop reason: HOSPADM

## 2021-07-02 RX ADMIN — DEXTROSE AND SODIUM CHLORIDE 1000 ML: 5; .45 INJECTION, SOLUTION INTRAVENOUS at 11:07

## 2021-07-02 RX ADMIN — HEPARIN 500 UNITS: 100 SYRINGE at 01:07

## 2021-07-02 RX ADMIN — SODIUM CHLORIDE, PRESERVATIVE FREE 10 ML: 5 INJECTION INTRAVENOUS at 01:07

## 2021-07-06 ENCOUNTER — HOSPITAL ENCOUNTER (OUTPATIENT)
Dept: RADIOLOGY | Facility: HOSPITAL | Age: 66
Discharge: HOME | End: 2021-07-06
Attending: NURSE PRACTITIONER
Payer: MEDICARE

## 2021-07-06 VITALS — HEIGHT: 65 IN | WEIGHT: 132 LBS | BODY MASS INDEX: 21.99 KG/M2

## 2021-07-06 DIAGNOSIS — C79.51 BONE METASTASES: ICD-10-CM

## 2021-07-06 DIAGNOSIS — C34.31 MALIGNANT NEOPLASM OF LOWER LOBE OF RIGHT LUNG: ICD-10-CM

## 2021-07-06 LAB — GLUCOSE SERPL-MCNC: 100 MG/DL (ref 70–110)

## 2021-07-06 PROCEDURE — 78815 PET IMAGE W/CT SKULL-THIGH: CPT | Mod: PS,TC,PO

## 2021-07-07 ENCOUNTER — PATIENT MESSAGE (OUTPATIENT)
Dept: ADMINISTRATIVE | Facility: HOSPITAL | Age: 66
End: 2021-07-07

## 2021-07-07 ENCOUNTER — TELEPHONE (OUTPATIENT)
Dept: HEMATOLOGY/ONCOLOGY | Facility: CLINIC | Age: 66
End: 2021-07-07

## 2021-07-08 ENCOUNTER — PATIENT MESSAGE (OUTPATIENT)
Dept: HEMATOLOGY/ONCOLOGY | Facility: CLINIC | Age: 66
End: 2021-07-08

## 2021-07-08 ENCOUNTER — DOCUMENTATION ONLY (OUTPATIENT)
Dept: HEMATOLOGY/ONCOLOGY | Facility: CLINIC | Age: 66
End: 2021-07-08

## 2021-07-08 ENCOUNTER — HOSPITAL ENCOUNTER (OUTPATIENT)
Dept: RADIOLOGY | Facility: HOSPITAL | Age: 66
Discharge: HOME | End: 2021-07-08
Attending: NURSE PRACTITIONER
Payer: MEDICARE

## 2021-07-08 ENCOUNTER — OFFICE VISIT (OUTPATIENT)
Dept: HEMATOLOGY/ONCOLOGY | Facility: CLINIC | Age: 66
End: 2021-07-08
Payer: MEDICARE

## 2021-07-08 VITALS
SYSTOLIC BLOOD PRESSURE: 138 MMHG | HEART RATE: 108 BPM | WEIGHT: 129.81 LBS | TEMPERATURE: 98 F | DIASTOLIC BLOOD PRESSURE: 72 MMHG | RESPIRATION RATE: 17 BRPM | BODY MASS INDEX: 21.6 KG/M2

## 2021-07-08 DIAGNOSIS — C34.31 MALIGNANT NEOPLASM OF LOWER LOBE OF RIGHT LUNG: ICD-10-CM

## 2021-07-08 DIAGNOSIS — C79.51 BONE METASTASES: ICD-10-CM

## 2021-07-08 DIAGNOSIS — G89.3 CANCER ASSOCIATED PAIN: ICD-10-CM

## 2021-07-08 DIAGNOSIS — C34.31 SQUAMOUS CELL CARCINOMA OF BRONCHUS IN RIGHT LOWER LOBE: ICD-10-CM

## 2021-07-08 DIAGNOSIS — C34.31 MALIGNANT NEOPLASM OF LOWER LOBE OF RIGHT LUNG: Chronic | ICD-10-CM

## 2021-07-08 PROCEDURE — 99214 OFFICE O/P EST MOD 30 MIN: CPT | Mod: S$GLB,,, | Performed by: INTERNAL MEDICINE

## 2021-07-08 PROCEDURE — 99214 PR OFFICE/OUTPT VISIT, EST, LEVL IV, 30-39 MIN: ICD-10-PCS | Mod: S$GLB,,, | Performed by: INTERNAL MEDICINE

## 2021-07-08 PROCEDURE — 70553 MRI BRAIN STEM W/O & W/DYE: CPT | Mod: TC,PO

## 2021-07-08 PROCEDURE — 25500020 PHARM REV CODE 255: Mod: PO | Performed by: NURSE PRACTITIONER

## 2021-07-08 PROCEDURE — A9585 GADOBUTROL INJECTION: HCPCS | Mod: PO | Performed by: NURSE PRACTITIONER

## 2021-07-08 RX ORDER — GADOBUTROL 604.72 MG/ML
6 INJECTION INTRAVENOUS
Status: COMPLETED | OUTPATIENT
Start: 2021-07-08 | End: 2021-07-08

## 2021-07-08 RX ADMIN — GADOBUTROL 6 ML: 604.72 INJECTION INTRAVENOUS at 09:07

## 2021-07-09 ENCOUNTER — TELEPHONE (OUTPATIENT)
Dept: HEMATOLOGY/ONCOLOGY | Facility: CLINIC | Age: 66
End: 2021-07-09

## 2021-07-09 DIAGNOSIS — R11.0 NAUSEA: ICD-10-CM

## 2021-07-09 RX ORDER — FENTANYL 75 UG/H
1 PATCH TRANSDERMAL
Qty: 10 PATCH | Refills: 0 | Status: SHIPPED | OUTPATIENT
Start: 2021-07-09 | End: 2021-08-17 | Stop reason: SDUPTHER

## 2021-07-10 RX ORDER — SUCRALFATE 1 G/1
TABLET ORAL
Qty: 120 TABLET | Refills: 5 | Status: ON HOLD | OUTPATIENT
Start: 2021-07-10 | End: 2022-03-25 | Stop reason: HOSPADM

## 2021-07-12 ENCOUNTER — INFUSION (OUTPATIENT)
Dept: INFUSION THERAPY | Facility: HOSPITAL | Age: 66
End: 2021-07-12
Attending: INTERNAL MEDICINE
Payer: MEDICARE

## 2021-07-12 VITALS
BODY MASS INDEX: 21.65 KG/M2 | SYSTOLIC BLOOD PRESSURE: 134 MMHG | RESPIRATION RATE: 18 BRPM | HEART RATE: 76 BPM | OXYGEN SATURATION: 95 % | WEIGHT: 130.13 LBS | DIASTOLIC BLOOD PRESSURE: 73 MMHG | TEMPERATURE: 98 F

## 2021-07-12 DIAGNOSIS — C79.51 BONE METASTASES: Primary | ICD-10-CM

## 2021-07-12 DIAGNOSIS — E86.0 DEHYDRATION: ICD-10-CM

## 2021-07-12 DIAGNOSIS — C34.31 SQUAMOUS CELL CARCINOMA OF BRONCHUS IN RIGHT LOWER LOBE: ICD-10-CM

## 2021-07-12 PROCEDURE — 96361 HYDRATE IV INFUSION ADD-ON: CPT

## 2021-07-12 PROCEDURE — 96360 HYDRATION IV INFUSION INIT: CPT

## 2021-07-12 PROCEDURE — A4216 STERILE WATER/SALINE, 10 ML: HCPCS | Performed by: INTERNAL MEDICINE

## 2021-07-12 PROCEDURE — 63600175 PHARM REV CODE 636 W HCPCS: Performed by: INTERNAL MEDICINE

## 2021-07-12 PROCEDURE — 25000003 PHARM REV CODE 250: Performed by: INTERNAL MEDICINE

## 2021-07-12 PROCEDURE — S5010 5% DEXTROSE AND 0.45% SALINE: HCPCS | Performed by: INTERNAL MEDICINE

## 2021-07-12 RX ORDER — SODIUM CHLORIDE 0.9 % (FLUSH) 0.9 %
10 SYRINGE (ML) INJECTION
Status: CANCELLED | OUTPATIENT
Start: 2021-07-12

## 2021-07-12 RX ORDER — SODIUM CHLORIDE 0.9 % (FLUSH) 0.9 %
10 SYRINGE (ML) INJECTION
Status: DISCONTINUED | OUTPATIENT
Start: 2021-07-12 | End: 2021-07-12 | Stop reason: HOSPADM

## 2021-07-12 RX ORDER — HEPARIN 100 UNIT/ML
500 SYRINGE INTRAVENOUS
Status: CANCELLED | OUTPATIENT
Start: 2021-07-12

## 2021-07-12 RX ORDER — HEPARIN 100 UNIT/ML
500 SYRINGE INTRAVENOUS
Status: DISCONTINUED | OUTPATIENT
Start: 2021-07-12 | End: 2021-07-12 | Stop reason: HOSPADM

## 2021-07-12 RX ADMIN — DEXTROSE AND SODIUM CHLORIDE 1000 ML: 5; .45 INJECTION, SOLUTION INTRAVENOUS at 01:07

## 2021-07-12 RX ADMIN — SODIUM CHLORIDE, PRESERVATIVE FREE 10 ML: 5 INJECTION INTRAVENOUS at 03:07

## 2021-07-12 RX ADMIN — HEPARIN 500 UNITS: 100 SYRINGE at 03:07

## 2021-07-14 ENCOUNTER — TELEPHONE (OUTPATIENT)
Dept: HEMATOLOGY/ONCOLOGY | Facility: CLINIC | Age: 66
End: 2021-07-14

## 2021-07-16 ENCOUNTER — SOCIAL WORK (OUTPATIENT)
Dept: HEMATOLOGY/ONCOLOGY | Facility: CLINIC | Age: 66
End: 2021-07-16

## 2021-07-16 ENCOUNTER — INFUSION (OUTPATIENT)
Dept: INFUSION THERAPY | Facility: HOSPITAL | Age: 66
End: 2021-07-16
Attending: INTERNAL MEDICINE
Payer: MEDICARE

## 2021-07-16 ENCOUNTER — DOCUMENT SCAN (OUTPATIENT)
Dept: HOME HEALTH SERVICES | Facility: HOSPITAL | Age: 66
End: 2021-07-16
Payer: MEDICARE

## 2021-07-16 VITALS
OXYGEN SATURATION: 96 % | RESPIRATION RATE: 18 BRPM | HEART RATE: 81 BPM | DIASTOLIC BLOOD PRESSURE: 64 MMHG | SYSTOLIC BLOOD PRESSURE: 134 MMHG | TEMPERATURE: 98 F | BODY MASS INDEX: 22.05 KG/M2 | WEIGHT: 132.5 LBS

## 2021-07-16 DIAGNOSIS — C34.31 SQUAMOUS CELL CARCINOMA OF BRONCHUS IN RIGHT LOWER LOBE: ICD-10-CM

## 2021-07-16 DIAGNOSIS — C79.51 BONE METASTASES: Primary | ICD-10-CM

## 2021-07-16 DIAGNOSIS — E86.0 DEHYDRATION: ICD-10-CM

## 2021-07-16 PROCEDURE — 63600175 PHARM REV CODE 636 W HCPCS: Performed by: INTERNAL MEDICINE

## 2021-07-16 PROCEDURE — S5010 5% DEXTROSE AND 0.45% SALINE: HCPCS | Performed by: INTERNAL MEDICINE

## 2021-07-16 PROCEDURE — 96360 HYDRATION IV INFUSION INIT: CPT

## 2021-07-16 PROCEDURE — 96361 HYDRATE IV INFUSION ADD-ON: CPT

## 2021-07-16 PROCEDURE — 25000003 PHARM REV CODE 250: Performed by: INTERNAL MEDICINE

## 2021-07-16 PROCEDURE — A4216 STERILE WATER/SALINE, 10 ML: HCPCS | Performed by: INTERNAL MEDICINE

## 2021-07-16 RX ORDER — SODIUM CHLORIDE 0.9 % (FLUSH) 0.9 %
10 SYRINGE (ML) INJECTION
Status: DISCONTINUED | OUTPATIENT
Start: 2021-07-16 | End: 2021-07-16 | Stop reason: HOSPADM

## 2021-07-16 RX ORDER — HEPARIN 100 UNIT/ML
500 SYRINGE INTRAVENOUS
Status: CANCELLED | OUTPATIENT
Start: 2021-07-16

## 2021-07-16 RX ORDER — HEPARIN 100 UNIT/ML
500 SYRINGE INTRAVENOUS
Status: DISCONTINUED | OUTPATIENT
Start: 2021-07-16 | End: 2021-07-16 | Stop reason: HOSPADM

## 2021-07-16 RX ORDER — SODIUM CHLORIDE 0.9 % (FLUSH) 0.9 %
10 SYRINGE (ML) INJECTION
Status: CANCELLED | OUTPATIENT
Start: 2021-07-16

## 2021-07-16 RX ADMIN — DEXTROSE AND SODIUM CHLORIDE 1000 ML: 5; .45 INJECTION, SOLUTION INTRAVENOUS at 11:07

## 2021-07-16 RX ADMIN — HEPARIN 500 UNITS: 100 SYRINGE at 01:07

## 2021-07-16 RX ADMIN — SODIUM CHLORIDE, PRESERVATIVE FREE 10 ML: 5 INJECTION INTRAVENOUS at 01:07

## 2021-07-20 ENCOUNTER — TELEPHONE (OUTPATIENT)
Dept: HEMATOLOGY/ONCOLOGY | Facility: CLINIC | Age: 66
End: 2021-07-20

## 2021-07-20 ENCOUNTER — INFUSION (OUTPATIENT)
Dept: INFUSION THERAPY | Facility: HOSPITAL | Age: 66
End: 2021-07-20
Attending: INTERNAL MEDICINE
Payer: MEDICARE

## 2021-07-20 VITALS
RESPIRATION RATE: 18 BRPM | BODY MASS INDEX: 22.01 KG/M2 | SYSTOLIC BLOOD PRESSURE: 139 MMHG | HEART RATE: 86 BPM | TEMPERATURE: 98 F | WEIGHT: 132.13 LBS | HEIGHT: 65 IN | DIASTOLIC BLOOD PRESSURE: 77 MMHG

## 2021-07-20 DIAGNOSIS — C79.51 BONE METASTASES: Primary | ICD-10-CM

## 2021-07-20 DIAGNOSIS — R63.0 DECREASED APPETITE: Primary | ICD-10-CM

## 2021-07-20 DIAGNOSIS — E86.0 DEHYDRATION: ICD-10-CM

## 2021-07-20 DIAGNOSIS — C34.31 SQUAMOUS CELL CARCINOMA OF BRONCHUS IN RIGHT LOWER LOBE: ICD-10-CM

## 2021-07-20 PROCEDURE — 25000003 PHARM REV CODE 250: Performed by: INTERNAL MEDICINE

## 2021-07-20 PROCEDURE — 96360 HYDRATION IV INFUSION INIT: CPT

## 2021-07-20 PROCEDURE — S5010 5% DEXTROSE AND 0.45% SALINE: HCPCS | Performed by: INTERNAL MEDICINE

## 2021-07-20 PROCEDURE — 96361 HYDRATE IV INFUSION ADD-ON: CPT

## 2021-07-20 PROCEDURE — 63600175 PHARM REV CODE 636 W HCPCS: Performed by: INTERNAL MEDICINE

## 2021-07-20 RX ORDER — HEPARIN 100 UNIT/ML
500 SYRINGE INTRAVENOUS
Status: CANCELLED | OUTPATIENT
Start: 2021-07-20

## 2021-07-20 RX ORDER — MEGESTROL ACETATE 125 MG/ML
625 SUSPENSION ORAL DAILY
Qty: 150 ML | Refills: 11 | Status: SHIPPED | OUTPATIENT
Start: 2021-07-20 | End: 2021-08-23

## 2021-07-20 RX ORDER — HEPARIN 100 UNIT/ML
500 SYRINGE INTRAVENOUS
Status: DISCONTINUED | OUTPATIENT
Start: 2021-07-20 | End: 2021-07-20 | Stop reason: HOSPADM

## 2021-07-20 RX ORDER — SODIUM CHLORIDE 0.9 % (FLUSH) 0.9 %
10 SYRINGE (ML) INJECTION
Status: CANCELLED | OUTPATIENT
Start: 2021-07-20

## 2021-07-20 RX ORDER — SODIUM CHLORIDE 0.9 % (FLUSH) 0.9 %
10 SYRINGE (ML) INJECTION
Status: DISCONTINUED | OUTPATIENT
Start: 2021-07-20 | End: 2021-07-20 | Stop reason: HOSPADM

## 2021-07-20 RX ADMIN — DEXTROSE AND SODIUM CHLORIDE 1000 ML: 5; .45 INJECTION, SOLUTION INTRAVENOUS at 02:07

## 2021-07-20 RX ADMIN — HEPARIN 500 UNITS: 100 SYRINGE at 04:07

## 2021-07-22 ENCOUNTER — TELEPHONE (OUTPATIENT)
Dept: HEMATOLOGY/ONCOLOGY | Facility: CLINIC | Age: 66
End: 2021-07-22

## 2021-07-22 ENCOUNTER — PATIENT MESSAGE (OUTPATIENT)
Dept: HEMATOLOGY/ONCOLOGY | Facility: CLINIC | Age: 66
End: 2021-07-22

## 2021-07-22 DIAGNOSIS — C34.31 SQUAMOUS CELL CARCINOMA OF BRONCHUS IN RIGHT LOWER LOBE: ICD-10-CM

## 2021-07-22 DIAGNOSIS — C79.51 BONE METASTASES: ICD-10-CM

## 2021-07-22 DIAGNOSIS — C34.31 MALIGNANT NEOPLASM OF LOWER LOBE OF RIGHT LUNG: Primary | ICD-10-CM

## 2021-07-23 ENCOUNTER — INFUSION (OUTPATIENT)
Dept: INFUSION THERAPY | Facility: HOSPITAL | Age: 66
End: 2021-07-23
Attending: INTERNAL MEDICINE
Payer: MEDICARE

## 2021-07-23 ENCOUNTER — DOCUMENT SCAN (OUTPATIENT)
Dept: HOME HEALTH SERVICES | Facility: HOSPITAL | Age: 66
End: 2021-07-23
Payer: MEDICARE

## 2021-07-23 VITALS
HEART RATE: 87 BPM | RESPIRATION RATE: 18 BRPM | HEIGHT: 65 IN | BODY MASS INDEX: 21.83 KG/M2 | WEIGHT: 131 LBS | SYSTOLIC BLOOD PRESSURE: 144 MMHG | DIASTOLIC BLOOD PRESSURE: 76 MMHG | TEMPERATURE: 98 F

## 2021-07-23 DIAGNOSIS — E86.0 DEHYDRATION: ICD-10-CM

## 2021-07-23 DIAGNOSIS — C34.31 SQUAMOUS CELL CARCINOMA OF BRONCHUS IN RIGHT LOWER LOBE: ICD-10-CM

## 2021-07-23 DIAGNOSIS — C79.51 BONE METASTASES: Primary | ICD-10-CM

## 2021-07-23 DIAGNOSIS — C34.31 MALIGNANT NEOPLASM OF LOWER LOBE OF RIGHT LUNG: ICD-10-CM

## 2021-07-23 LAB
ALBUMIN SERPL BCP-MCNC: 3.8 G/DL (ref 3.5–5.2)
ALP SERPL-CCNC: 68 U/L (ref 55–135)
ALT SERPL W/O P-5'-P-CCNC: 11 U/L (ref 10–44)
ANION GAP SERPL CALC-SCNC: 7 MMOL/L (ref 8–16)
AST SERPL-CCNC: 18 U/L (ref 10–40)
BASOPHILS # BLD AUTO: 0.02 K/UL (ref 0–0.2)
BASOPHILS NFR BLD: 0.6 % (ref 0–1.9)
BILIRUB SERPL-MCNC: 0.4 MG/DL (ref 0.1–1)
BUN SERPL-MCNC: 10 MG/DL (ref 8–23)
CALCIUM SERPL-MCNC: 9 MG/DL (ref 8.7–10.5)
CHLORIDE SERPL-SCNC: 104 MMOL/L (ref 95–110)
CO2 SERPL-SCNC: 28 MMOL/L (ref 23–29)
CREAT SERPL-MCNC: 0.8 MG/DL (ref 0.5–1.4)
DIFFERENTIAL METHOD: ABNORMAL
EOSINOPHIL # BLD AUTO: 0.1 K/UL (ref 0–0.5)
EOSINOPHIL NFR BLD: 1.7 % (ref 0–8)
ERYTHROCYTE [DISTWIDTH] IN BLOOD BY AUTOMATED COUNT: 13.8 % (ref 11.5–14.5)
EST. GFR  (AFRICAN AMERICAN): >60 ML/MIN/1.73 M^2
EST. GFR  (NON AFRICAN AMERICAN): >60 ML/MIN/1.73 M^2
GLUCOSE SERPL-MCNC: 131 MG/DL (ref 70–110)
HCT VFR BLD AUTO: 28.9 % (ref 37–48.5)
HGB BLD-MCNC: 9.7 G/DL (ref 12–16)
IMM GRANULOCYTES # BLD AUTO: 0.02 K/UL (ref 0–0.04)
IMM GRANULOCYTES NFR BLD AUTO: 0.6 % (ref 0–0.5)
LYMPHOCYTES # BLD AUTO: 0.4 K/UL (ref 1–4.8)
LYMPHOCYTES NFR BLD: 12.3 % (ref 18–48)
MCH RBC QN AUTO: 34.3 PG (ref 27–31)
MCHC RBC AUTO-ENTMCNC: 33.6 G/DL (ref 32–36)
MCV RBC AUTO: 102 FL (ref 82–98)
MONOCYTES # BLD AUTO: 0.2 K/UL (ref 0.3–1)
MONOCYTES NFR BLD: 6.4 % (ref 4–15)
NEUTROPHILS # BLD AUTO: 2.8 K/UL (ref 1.8–7.7)
NEUTROPHILS NFR BLD: 78.4 % (ref 38–73)
NRBC BLD-RTO: 0 /100 WBC
PLATELET # BLD AUTO: 104 K/UL (ref 150–450)
PMV BLD AUTO: 9.6 FL (ref 9.2–12.9)
POTASSIUM SERPL-SCNC: 3.8 MMOL/L (ref 3.5–5.1)
PROT SERPL-MCNC: 6.7 G/DL (ref 6–8.4)
RBC # BLD AUTO: 2.83 M/UL (ref 4–5.4)
SODIUM SERPL-SCNC: 139 MMOL/L (ref 136–145)
WBC # BLD AUTO: 3.59 K/UL (ref 3.9–12.7)

## 2021-07-23 PROCEDURE — 63600175 PHARM REV CODE 636 W HCPCS: Performed by: INTERNAL MEDICINE

## 2021-07-23 PROCEDURE — 80053 COMPREHEN METABOLIC PANEL: CPT | Performed by: INTERNAL MEDICINE

## 2021-07-23 PROCEDURE — 85025 COMPLETE CBC W/AUTO DIFF WBC: CPT | Performed by: INTERNAL MEDICINE

## 2021-07-23 PROCEDURE — 96361 HYDRATE IV INFUSION ADD-ON: CPT

## 2021-07-23 PROCEDURE — 96360 HYDRATION IV INFUSION INIT: CPT

## 2021-07-23 PROCEDURE — 25000003 PHARM REV CODE 250: Performed by: INTERNAL MEDICINE

## 2021-07-23 PROCEDURE — S5010 5% DEXTROSE AND 0.45% SALINE: HCPCS | Performed by: INTERNAL MEDICINE

## 2021-07-23 RX ORDER — HEPARIN 100 UNIT/ML
500 SYRINGE INTRAVENOUS
Status: CANCELLED | OUTPATIENT
Start: 2021-07-23

## 2021-07-23 RX ORDER — HEPARIN 100 UNIT/ML
500 SYRINGE INTRAVENOUS
Status: DISCONTINUED | OUTPATIENT
Start: 2021-07-23 | End: 2021-07-23 | Stop reason: HOSPADM

## 2021-07-23 RX ORDER — SODIUM CHLORIDE 0.9 % (FLUSH) 0.9 %
10 SYRINGE (ML) INJECTION
Status: CANCELLED | OUTPATIENT
Start: 2021-07-23

## 2021-07-23 RX ADMIN — DEXTROSE AND SODIUM CHLORIDE 1000 ML: 5; .45 INJECTION, SOLUTION INTRAVENOUS at 11:07

## 2021-07-23 RX ADMIN — HEPARIN 500 UNITS: 100 SYRINGE at 01:07

## 2021-07-25 ENCOUNTER — EXTERNAL HOME HEALTH (OUTPATIENT)
Dept: HOME HEALTH SERVICES | Facility: HOSPITAL | Age: 66
End: 2021-07-25
Payer: MEDICARE

## 2021-07-27 ENCOUNTER — INFUSION (OUTPATIENT)
Dept: INFUSION THERAPY | Facility: HOSPITAL | Age: 66
End: 2021-07-27
Attending: INTERNAL MEDICINE
Payer: MEDICARE

## 2021-07-27 ENCOUNTER — TELEPHONE (OUTPATIENT)
Dept: CARDIOLOGY | Facility: HOSPITAL | Age: 66
End: 2021-07-27

## 2021-07-27 ENCOUNTER — SOCIAL WORK (OUTPATIENT)
Dept: HEMATOLOGY/ONCOLOGY | Facility: CLINIC | Age: 66
End: 2021-07-27

## 2021-07-27 VITALS
OXYGEN SATURATION: 99 % | HEIGHT: 65 IN | TEMPERATURE: 97 F | SYSTOLIC BLOOD PRESSURE: 154 MMHG | DIASTOLIC BLOOD PRESSURE: 83 MMHG | WEIGHT: 131.5 LBS | RESPIRATION RATE: 18 BRPM | BODY MASS INDEX: 21.91 KG/M2 | HEART RATE: 78 BPM

## 2021-07-27 DIAGNOSIS — C34.31 SQUAMOUS CELL CARCINOMA OF BRONCHUS IN RIGHT LOWER LOBE: ICD-10-CM

## 2021-07-27 DIAGNOSIS — E86.0 DEHYDRATION: ICD-10-CM

## 2021-07-27 DIAGNOSIS — C79.51 BONE METASTASES: Primary | ICD-10-CM

## 2021-07-27 PROCEDURE — 96366 THER/PROPH/DIAG IV INF ADDON: CPT

## 2021-07-27 PROCEDURE — S5010 5% DEXTROSE AND 0.45% SALINE: HCPCS | Performed by: INTERNAL MEDICINE

## 2021-07-27 PROCEDURE — 25000003 PHARM REV CODE 250: Performed by: INTERNAL MEDICINE

## 2021-07-27 PROCEDURE — 96365 THER/PROPH/DIAG IV INF INIT: CPT

## 2021-07-27 PROCEDURE — 63600175 PHARM REV CODE 636 W HCPCS: Performed by: INTERNAL MEDICINE

## 2021-07-27 RX ORDER — SODIUM CHLORIDE 0.9 % (FLUSH) 0.9 %
10 SYRINGE (ML) INJECTION
Status: CANCELLED | OUTPATIENT
Start: 2021-07-27

## 2021-07-27 RX ORDER — HEPARIN 100 UNIT/ML
500 SYRINGE INTRAVENOUS
Status: DISCONTINUED | OUTPATIENT
Start: 2021-07-27 | End: 2021-07-27 | Stop reason: HOSPADM

## 2021-07-27 RX ORDER — HEPARIN 100 UNIT/ML
500 SYRINGE INTRAVENOUS
Status: CANCELLED | OUTPATIENT
Start: 2021-07-27

## 2021-07-27 RX ADMIN — DEXTROSE AND SODIUM CHLORIDE 1000 ML: 5; .45 INJECTION, SOLUTION INTRAVENOUS at 01:07

## 2021-07-27 RX ADMIN — HEPARIN 500 UNITS: 100 SYRINGE at 03:07

## 2021-07-28 ENCOUNTER — CLINICAL SUPPORT (OUTPATIENT)
Dept: CARDIOLOGY | Facility: HOSPITAL | Age: 66
End: 2021-07-28
Attending: INTERNAL MEDICINE
Payer: MEDICARE

## 2021-07-28 ENCOUNTER — PATIENT MESSAGE (OUTPATIENT)
Dept: HEMATOLOGY/ONCOLOGY | Facility: CLINIC | Age: 66
End: 2021-07-28

## 2021-07-28 VITALS — HEIGHT: 65 IN | WEIGHT: 131 LBS | BODY MASS INDEX: 21.83 KG/M2

## 2021-07-28 DIAGNOSIS — C34.31 MALIGNANT NEOPLASM OF LOWER LOBE OF RIGHT LUNG: ICD-10-CM

## 2021-07-28 DIAGNOSIS — C79.51 BONE METASTASES: ICD-10-CM

## 2021-07-28 DIAGNOSIS — C34.31 SQUAMOUS CELL CARCINOMA OF BRONCHUS IN RIGHT LOWER LOBE: ICD-10-CM

## 2021-07-28 PROCEDURE — 93306 TTE W/DOPPLER COMPLETE: CPT | Mod: 26,,, | Performed by: INTERNAL MEDICINE

## 2021-07-28 PROCEDURE — 93306 ECHO (CUPID ONLY): ICD-10-PCS | Mod: 26,,, | Performed by: INTERNAL MEDICINE

## 2021-07-28 PROCEDURE — 93306 TTE W/DOPPLER COMPLETE: CPT

## 2021-07-30 ENCOUNTER — INFUSION (OUTPATIENT)
Dept: INFUSION THERAPY | Facility: HOSPITAL | Age: 66
End: 2021-07-30
Attending: INTERNAL MEDICINE
Payer: MEDICARE

## 2021-07-30 ENCOUNTER — OFFICE VISIT (OUTPATIENT)
Dept: HEMATOLOGY/ONCOLOGY | Facility: CLINIC | Age: 66
End: 2021-07-30
Payer: MEDICARE

## 2021-07-30 VITALS
TEMPERATURE: 98 F | HEART RATE: 89 BPM | HEIGHT: 65 IN | RESPIRATION RATE: 18 BRPM | SYSTOLIC BLOOD PRESSURE: 123 MMHG | DIASTOLIC BLOOD PRESSURE: 75 MMHG | BODY MASS INDEX: 22.16 KG/M2 | WEIGHT: 133 LBS | OXYGEN SATURATION: 97 %

## 2021-07-30 VITALS
SYSTOLIC BLOOD PRESSURE: 148 MMHG | OXYGEN SATURATION: 100 % | WEIGHT: 133.88 LBS | BODY MASS INDEX: 22.28 KG/M2 | DIASTOLIC BLOOD PRESSURE: 71 MMHG | RESPIRATION RATE: 17 BRPM | TEMPERATURE: 97 F | HEART RATE: 87 BPM

## 2021-07-30 DIAGNOSIS — C79.51 BONE METASTASES: ICD-10-CM

## 2021-07-30 DIAGNOSIS — R63.0 DECREASED APPETITE: ICD-10-CM

## 2021-07-30 DIAGNOSIS — C34.31 SQUAMOUS CELL CARCINOMA OF BRONCHUS IN RIGHT LOWER LOBE: ICD-10-CM

## 2021-07-30 DIAGNOSIS — E86.0 DEHYDRATION: ICD-10-CM

## 2021-07-30 DIAGNOSIS — C79.51 BONE METASTASES: Primary | ICD-10-CM

## 2021-07-30 DIAGNOSIS — C34.31 MALIGNANT NEOPLASM OF LOWER LOBE OF RIGHT LUNG: Primary | ICD-10-CM

## 2021-07-30 LAB
AORTIC ROOT ANNULUS: 2.89 CM
AV INDEX (PROSTH): 0.89
AV MEAN GRADIENT: 4 MMHG
AV PEAK GRADIENT: 7 MMHG
AV VALVE AREA: 2.84 CM2
AV VELOCITY RATIO: 79.86
BSA FOR ECHO PROCEDURE: 1.65 M2
DOP CALC AO PEAK VEL: 1.31 M/S
DOP CALC AO VTI: 21.8 CM
DOP CALC LVOT AREA: 3.2 CM2
DOP CALC LVOT DIAMETER: 2.02 CM
DOP CALC LVOT PEAK VEL: 104.62 M/S
DOP CALC LVOT STROKE VOLUME: 61.85 CM3
DOP CALCLVOT PEAK VEL VTI: 19.31 CM
E WAVE DECELERATION TIME: 210.08 MSEC
E/A RATIO: 0.63
E/E' RATIO: 7 M/S
EJECTION FRACTION: 50 %
LEFT ATRIUM SIZE: 3.87 CM
LV LATERAL E/E' RATIO: 5.73 M/S
LV SEPTAL E/E' RATIO: 9 M/S
MV PEAK A VEL: 1 M/S
MV PEAK E VEL: 0.63 M/S
PV PEAK VELOCITY: 92.89 CM/S
RA PRESSURE: 3 MMHG
TDI LATERAL: 0.11 M/S
TDI SEPTAL: 0.07 M/S
TDI: 0.09 M/S

## 2021-07-30 PROCEDURE — S5010 5% DEXTROSE AND 0.45% SALINE: HCPCS | Performed by: INTERNAL MEDICINE

## 2021-07-30 PROCEDURE — 96360 HYDRATION IV INFUSION INIT: CPT

## 2021-07-30 PROCEDURE — 25000003 PHARM REV CODE 250: Performed by: INTERNAL MEDICINE

## 2021-07-30 PROCEDURE — 96361 HYDRATE IV INFUSION ADD-ON: CPT

## 2021-07-30 PROCEDURE — A4216 STERILE WATER/SALINE, 10 ML: HCPCS | Performed by: INTERNAL MEDICINE

## 2021-07-30 PROCEDURE — 99214 OFFICE O/P EST MOD 30 MIN: CPT | Mod: S$GLB,,, | Performed by: NURSE PRACTITIONER

## 2021-07-30 PROCEDURE — 99214 PR OFFICE/OUTPT VISIT, EST, LEVL IV, 30-39 MIN: ICD-10-PCS | Mod: S$GLB,,, | Performed by: NURSE PRACTITIONER

## 2021-07-30 PROCEDURE — 63600175 PHARM REV CODE 636 W HCPCS: Performed by: INTERNAL MEDICINE

## 2021-07-30 RX ORDER — HEPARIN 100 UNIT/ML
500 SYRINGE INTRAVENOUS
Status: DISCONTINUED | OUTPATIENT
Start: 2021-07-30 | End: 2021-07-30 | Stop reason: HOSPADM

## 2021-07-30 RX ORDER — HEPARIN 100 UNIT/ML
500 SYRINGE INTRAVENOUS
Status: CANCELLED | OUTPATIENT
Start: 2021-07-30

## 2021-07-30 RX ORDER — SODIUM CHLORIDE 0.9 % (FLUSH) 0.9 %
10 SYRINGE (ML) INJECTION
Status: DISCONTINUED | OUTPATIENT
Start: 2021-07-30 | End: 2021-07-30 | Stop reason: HOSPADM

## 2021-07-30 RX ORDER — SODIUM CHLORIDE 0.9 % (FLUSH) 0.9 %
10 SYRINGE (ML) INJECTION
Status: CANCELLED | OUTPATIENT
Start: 2021-07-30

## 2021-07-30 RX ADMIN — DEXTROSE AND SODIUM CHLORIDE 1000 ML: 5; .45 INJECTION, SOLUTION INTRAVENOUS at 11:07

## 2021-07-30 RX ADMIN — HEPARIN 500 UNITS: 100 SYRINGE at 01:07

## 2021-07-30 RX ADMIN — SODIUM CHLORIDE, PRESERVATIVE FREE 10 ML: 5 INJECTION INTRAVENOUS at 01:07

## 2021-08-02 ENCOUNTER — TELEPHONE (OUTPATIENT)
Dept: HEMATOLOGY/ONCOLOGY | Facility: CLINIC | Age: 66
End: 2021-08-02

## 2021-08-03 ENCOUNTER — INFUSION (OUTPATIENT)
Dept: INFUSION THERAPY | Facility: HOSPITAL | Age: 66
End: 2021-08-03
Attending: INTERNAL MEDICINE
Payer: MEDICARE

## 2021-08-03 VITALS
DIASTOLIC BLOOD PRESSURE: 82 MMHG | HEART RATE: 86 BPM | RESPIRATION RATE: 16 BRPM | TEMPERATURE: 98 F | SYSTOLIC BLOOD PRESSURE: 142 MMHG

## 2021-08-03 DIAGNOSIS — E86.0 DEHYDRATION: ICD-10-CM

## 2021-08-03 DIAGNOSIS — C34.31 SQUAMOUS CELL CARCINOMA OF BRONCHUS IN RIGHT LOWER LOBE: ICD-10-CM

## 2021-08-03 DIAGNOSIS — C79.51 BONE METASTASES: Primary | ICD-10-CM

## 2021-08-03 PROCEDURE — 96361 HYDRATE IV INFUSION ADD-ON: CPT

## 2021-08-03 PROCEDURE — 25000003 PHARM REV CODE 250: Performed by: INTERNAL MEDICINE

## 2021-08-03 PROCEDURE — 96360 HYDRATION IV INFUSION INIT: CPT

## 2021-08-03 PROCEDURE — S5010 5% DEXTROSE AND 0.45% SALINE: HCPCS | Performed by: INTERNAL MEDICINE

## 2021-08-03 PROCEDURE — 63600175 PHARM REV CODE 636 W HCPCS: Performed by: INTERNAL MEDICINE

## 2021-08-03 RX ORDER — HEPARIN 100 UNIT/ML
500 SYRINGE INTRAVENOUS
Status: DISCONTINUED | OUTPATIENT
Start: 2021-08-03 | End: 2021-08-03 | Stop reason: HOSPADM

## 2021-08-03 RX ORDER — SODIUM CHLORIDE 0.9 % (FLUSH) 0.9 %
10 SYRINGE (ML) INJECTION
Status: CANCELLED | OUTPATIENT
Start: 2021-08-03

## 2021-08-03 RX ORDER — HEPARIN 100 UNIT/ML
500 SYRINGE INTRAVENOUS
Status: CANCELLED | OUTPATIENT
Start: 2021-08-03

## 2021-08-03 RX ADMIN — HEPARIN 500 UNITS: 100 SYRINGE at 03:08

## 2021-08-03 RX ADMIN — DEXTROSE AND SODIUM CHLORIDE 1000 ML: 5; .45 INJECTION, SOLUTION INTRAVENOUS at 01:08

## 2021-08-06 ENCOUNTER — INFUSION (OUTPATIENT)
Dept: INFUSION THERAPY | Facility: HOSPITAL | Age: 66
End: 2021-08-06
Attending: INTERNAL MEDICINE
Payer: MEDICARE

## 2021-08-06 VITALS
RESPIRATION RATE: 17 BRPM | TEMPERATURE: 98 F | WEIGHT: 128.13 LBS | BODY MASS INDEX: 21.35 KG/M2 | HEART RATE: 85 BPM | SYSTOLIC BLOOD PRESSURE: 140 MMHG | OXYGEN SATURATION: 100 % | HEIGHT: 65 IN | DIASTOLIC BLOOD PRESSURE: 74 MMHG

## 2021-08-06 DIAGNOSIS — C34.31 MALIGNANT NEOPLASM OF LOWER LOBE OF RIGHT LUNG: Primary | ICD-10-CM

## 2021-08-06 DIAGNOSIS — C34.31 SQUAMOUS CELL CARCINOMA OF BRONCHUS IN RIGHT LOWER LOBE: ICD-10-CM

## 2021-08-06 DIAGNOSIS — C79.51 BONE METASTASES: ICD-10-CM

## 2021-08-06 DIAGNOSIS — E86.0 DEHYDRATION: ICD-10-CM

## 2021-08-06 PROCEDURE — S5010 5% DEXTROSE AND 0.45% SALINE: HCPCS | Performed by: INTERNAL MEDICINE

## 2021-08-06 PROCEDURE — 96360 HYDRATION IV INFUSION INIT: CPT

## 2021-08-06 PROCEDURE — 63600175 PHARM REV CODE 636 W HCPCS: Performed by: INTERNAL MEDICINE

## 2021-08-06 PROCEDURE — A4216 STERILE WATER/SALINE, 10 ML: HCPCS | Performed by: INTERNAL MEDICINE

## 2021-08-06 PROCEDURE — 25000003 PHARM REV CODE 250: Performed by: INTERNAL MEDICINE

## 2021-08-06 PROCEDURE — 96361 HYDRATE IV INFUSION ADD-ON: CPT

## 2021-08-06 RX ORDER — HEPARIN 100 UNIT/ML
500 SYRINGE INTRAVENOUS
Status: DISCONTINUED | OUTPATIENT
Start: 2021-08-06 | End: 2021-08-06 | Stop reason: HOSPADM

## 2021-08-06 RX ORDER — SODIUM CHLORIDE 0.9 % (FLUSH) 0.9 %
10 SYRINGE (ML) INJECTION
Status: CANCELLED | OUTPATIENT
Start: 2021-08-06

## 2021-08-06 RX ORDER — SODIUM CHLORIDE 0.9 % (FLUSH) 0.9 %
10 SYRINGE (ML) INJECTION
Status: DISCONTINUED | OUTPATIENT
Start: 2021-08-06 | End: 2021-08-06 | Stop reason: HOSPADM

## 2021-08-06 RX ORDER — HEPARIN 100 UNIT/ML
500 SYRINGE INTRAVENOUS
Status: CANCELLED | OUTPATIENT
Start: 2021-08-06

## 2021-08-06 RX ADMIN — HEPARIN 500 UNITS: 100 SYRINGE at 01:08

## 2021-08-06 RX ADMIN — DEXTROSE AND SODIUM CHLORIDE 1000 ML: 5; .45 INJECTION, SOLUTION INTRAVENOUS at 11:08

## 2021-08-06 RX ADMIN — SODIUM CHLORIDE, PRESERVATIVE FREE 10 ML: 5 INJECTION INTRAVENOUS at 01:08

## 2021-08-16 ENCOUNTER — TELEPHONE (OUTPATIENT)
Dept: PSYCHIATRY | Facility: CLINIC | Age: 66
End: 2021-08-16

## 2021-08-17 ENCOUNTER — OFFICE VISIT (OUTPATIENT)
Dept: HEMATOLOGY/ONCOLOGY | Facility: CLINIC | Age: 66
End: 2021-08-17
Payer: MEDICARE

## 2021-08-17 VITALS
SYSTOLIC BLOOD PRESSURE: 137 MMHG | HEART RATE: 91 BPM | RESPIRATION RATE: 18 BRPM | BODY MASS INDEX: 21.8 KG/M2 | DIASTOLIC BLOOD PRESSURE: 84 MMHG | WEIGHT: 131 LBS | TEMPERATURE: 98 F

## 2021-08-17 DIAGNOSIS — C34.31 MALIGNANT NEOPLASM OF LOWER LOBE OF RIGHT LUNG: Chronic | ICD-10-CM

## 2021-08-17 DIAGNOSIS — G89.3 CANCER ASSOCIATED PAIN: ICD-10-CM

## 2021-08-17 DIAGNOSIS — C34.31 SQUAMOUS CELL CARCINOMA OF BRONCHUS IN RIGHT LOWER LOBE: ICD-10-CM

## 2021-08-17 DIAGNOSIS — C79.51 BONE METASTASES: ICD-10-CM

## 2021-08-17 DIAGNOSIS — R11.0 NAUSEA: ICD-10-CM

## 2021-08-17 PROCEDURE — 99214 OFFICE O/P EST MOD 30 MIN: CPT | Mod: S$GLB,,, | Performed by: INTERNAL MEDICINE

## 2021-08-17 PROCEDURE — 99214 PR OFFICE/OUTPT VISIT, EST, LEVL IV, 30-39 MIN: ICD-10-PCS | Mod: S$GLB,,, | Performed by: INTERNAL MEDICINE

## 2021-08-17 RX ORDER — FENTANYL 75 UG/H
1 PATCH TRANSDERMAL
Qty: 10 PATCH | Refills: 0 | Status: SHIPPED | OUTPATIENT
Start: 2021-08-26 | End: 2021-10-11 | Stop reason: SDUPTHER

## 2021-08-17 RX ORDER — ONDANSETRON 8 MG/1
8 TABLET, ORALLY DISINTEGRATING ORAL EVERY 8 HOURS PRN
Qty: 30 TABLET | Refills: 0 | Status: SHIPPED | OUTPATIENT
Start: 2021-08-17 | End: 2021-12-17

## 2021-08-19 ENCOUNTER — PATIENT MESSAGE (OUTPATIENT)
Dept: HEMATOLOGY/ONCOLOGY | Facility: CLINIC | Age: 66
End: 2021-08-19

## 2021-08-23 ENCOUNTER — OFFICE VISIT (OUTPATIENT)
Dept: PSYCHIATRY | Facility: CLINIC | Age: 66
End: 2021-08-23
Payer: MEDICARE

## 2021-08-23 VITALS
DIASTOLIC BLOOD PRESSURE: 76 MMHG | HEIGHT: 65 IN | WEIGHT: 132.81 LBS | HEART RATE: 86 BPM | SYSTOLIC BLOOD PRESSURE: 119 MMHG | BODY MASS INDEX: 22.13 KG/M2

## 2021-08-23 DIAGNOSIS — F33.1 MDD (MAJOR DEPRESSIVE DISORDER), RECURRENT EPISODE, MODERATE: ICD-10-CM

## 2021-08-23 DIAGNOSIS — F41.1 GENERALIZED ANXIETY DISORDER: ICD-10-CM

## 2021-08-23 PROCEDURE — 99214 OFFICE O/P EST MOD 30 MIN: CPT | Mod: S$PBB,,, | Performed by: PSYCHIATRY & NEUROLOGY

## 2021-08-23 PROCEDURE — 99999 PR PBB SHADOW E&M-EST. PATIENT-LVL IV: CPT | Mod: PBBFAC,,, | Performed by: PSYCHIATRY & NEUROLOGY

## 2021-08-23 PROCEDURE — 99214 PR OFFICE/OUTPT VISIT, EST, LEVL IV, 30-39 MIN: ICD-10-PCS | Mod: S$PBB,,, | Performed by: PSYCHIATRY & NEUROLOGY

## 2021-08-23 PROCEDURE — 99999 PR PBB SHADOW E&M-EST. PATIENT-LVL IV: ICD-10-PCS | Mod: PBBFAC,,, | Performed by: PSYCHIATRY & NEUROLOGY

## 2021-08-23 PROCEDURE — 99214 OFFICE O/P EST MOD 30 MIN: CPT | Mod: PBBFAC,PN | Performed by: PSYCHIATRY & NEUROLOGY

## 2021-08-23 RX ORDER — MEMANTINE HYDROCHLORIDE 10 MG/1
10 TABLET ORAL 2 TIMES DAILY
Start: 2021-08-23 | End: 2021-11-24 | Stop reason: SDUPTHER

## 2021-08-23 RX ORDER — HYDROXYZINE HYDROCHLORIDE 10 MG/1
10 TABLET, FILM COATED ORAL 3 TIMES DAILY PRN
Qty: 60 TABLET | Refills: 1 | Status: SHIPPED | OUTPATIENT
Start: 2021-08-23 | End: 2021-11-17

## 2021-08-24 ENCOUNTER — INFUSION (OUTPATIENT)
Dept: INFUSION THERAPY | Facility: HOSPITAL | Age: 66
End: 2021-08-24
Attending: INTERNAL MEDICINE
Payer: MEDICARE

## 2021-08-24 VITALS
HEIGHT: 65 IN | TEMPERATURE: 98 F | DIASTOLIC BLOOD PRESSURE: 75 MMHG | OXYGEN SATURATION: 98 % | BODY MASS INDEX: 22.08 KG/M2 | HEART RATE: 82 BPM | WEIGHT: 132.5 LBS | RESPIRATION RATE: 17 BRPM | SYSTOLIC BLOOD PRESSURE: 125 MMHG

## 2021-08-24 DIAGNOSIS — C34.31 SQUAMOUS CELL CARCINOMA OF BRONCHUS IN RIGHT LOWER LOBE: ICD-10-CM

## 2021-08-24 DIAGNOSIS — E86.0 DEHYDRATION: ICD-10-CM

## 2021-08-24 DIAGNOSIS — C79.51 BONE METASTASES: Primary | ICD-10-CM

## 2021-08-24 PROCEDURE — 25000003 PHARM REV CODE 250: Performed by: INTERNAL MEDICINE

## 2021-08-24 PROCEDURE — 96360 HYDRATION IV INFUSION INIT: CPT

## 2021-08-24 PROCEDURE — S5010 5% DEXTROSE AND 0.45% SALINE: HCPCS | Performed by: INTERNAL MEDICINE

## 2021-08-24 PROCEDURE — 63600175 PHARM REV CODE 636 W HCPCS: Performed by: INTERNAL MEDICINE

## 2021-08-24 PROCEDURE — A4216 STERILE WATER/SALINE, 10 ML: HCPCS | Performed by: INTERNAL MEDICINE

## 2021-08-24 PROCEDURE — 96361 HYDRATE IV INFUSION ADD-ON: CPT

## 2021-08-24 RX ORDER — SODIUM CHLORIDE 0.9 % (FLUSH) 0.9 %
10 SYRINGE (ML) INJECTION
Status: DISCONTINUED | OUTPATIENT
Start: 2021-08-24 | End: 2021-08-24 | Stop reason: HOSPADM

## 2021-08-24 RX ORDER — HEPARIN 100 UNIT/ML
500 SYRINGE INTRAVENOUS
Status: CANCELLED | OUTPATIENT
Start: 2021-08-24

## 2021-08-24 RX ORDER — SODIUM CHLORIDE 0.9 % (FLUSH) 0.9 %
10 SYRINGE (ML) INJECTION
Status: CANCELLED | OUTPATIENT
Start: 2021-08-24

## 2021-08-24 RX ORDER — HEPARIN 100 UNIT/ML
500 SYRINGE INTRAVENOUS
Status: DISCONTINUED | OUTPATIENT
Start: 2021-08-24 | End: 2021-08-24 | Stop reason: HOSPADM

## 2021-08-24 RX ADMIN — SODIUM CHLORIDE, PRESERVATIVE FREE 10 ML: 5 INJECTION INTRAVENOUS at 01:08

## 2021-08-24 RX ADMIN — HEPARIN 500 UNITS: 100 SYRINGE at 01:08

## 2021-08-24 RX ADMIN — DEXTROSE AND SODIUM CHLORIDE 1000 ML: 5; .45 INJECTION, SOLUTION INTRAVENOUS at 11:08

## 2021-08-27 ENCOUNTER — INFUSION (OUTPATIENT)
Dept: INFUSION THERAPY | Facility: HOSPITAL | Age: 66
End: 2021-08-27
Attending: INTERNAL MEDICINE
Payer: MEDICARE

## 2021-08-27 VITALS
TEMPERATURE: 98 F | BODY MASS INDEX: 22.33 KG/M2 | OXYGEN SATURATION: 97 % | RESPIRATION RATE: 18 BRPM | HEIGHT: 65 IN | WEIGHT: 134 LBS | DIASTOLIC BLOOD PRESSURE: 70 MMHG | SYSTOLIC BLOOD PRESSURE: 132 MMHG | HEART RATE: 77 BPM

## 2021-08-27 DIAGNOSIS — E86.0 DEHYDRATION: ICD-10-CM

## 2021-08-27 DIAGNOSIS — C34.31 SQUAMOUS CELL CARCINOMA OF BRONCHUS IN RIGHT LOWER LOBE: ICD-10-CM

## 2021-08-27 DIAGNOSIS — C79.51 BONE METASTASES: Primary | ICD-10-CM

## 2021-08-27 PROCEDURE — 25000003 PHARM REV CODE 250: Performed by: INTERNAL MEDICINE

## 2021-08-27 PROCEDURE — G0179 MD RECERTIFICATION HHA PT: HCPCS | Mod: ,,, | Performed by: PSYCHIATRY & NEUROLOGY

## 2021-08-27 PROCEDURE — G0179 PR HOME HEALTH MD RECERTIFICATION: ICD-10-PCS | Mod: ,,, | Performed by: PSYCHIATRY & NEUROLOGY

## 2021-08-27 PROCEDURE — S5010 5% DEXTROSE AND 0.45% SALINE: HCPCS | Performed by: INTERNAL MEDICINE

## 2021-08-27 PROCEDURE — A4216 STERILE WATER/SALINE, 10 ML: HCPCS | Performed by: INTERNAL MEDICINE

## 2021-08-27 PROCEDURE — 96360 HYDRATION IV INFUSION INIT: CPT

## 2021-08-27 PROCEDURE — 63600175 PHARM REV CODE 636 W HCPCS: Performed by: INTERNAL MEDICINE

## 2021-08-27 PROCEDURE — 96361 HYDRATE IV INFUSION ADD-ON: CPT

## 2021-08-27 RX ORDER — SODIUM CHLORIDE 0.9 % (FLUSH) 0.9 %
10 SYRINGE (ML) INJECTION
Status: CANCELLED | OUTPATIENT
Start: 2021-08-27

## 2021-08-27 RX ORDER — SODIUM CHLORIDE 0.9 % (FLUSH) 0.9 %
10 SYRINGE (ML) INJECTION
Status: DISCONTINUED | OUTPATIENT
Start: 2021-08-27 | End: 2021-08-27 | Stop reason: HOSPADM

## 2021-08-27 RX ORDER — HEPARIN 100 UNIT/ML
500 SYRINGE INTRAVENOUS
Status: CANCELLED | OUTPATIENT
Start: 2021-08-27

## 2021-08-27 RX ORDER — HEPARIN 100 UNIT/ML
500 SYRINGE INTRAVENOUS
Status: DISCONTINUED | OUTPATIENT
Start: 2021-08-27 | End: 2021-08-27 | Stop reason: HOSPADM

## 2021-08-27 RX ADMIN — SODIUM CHLORIDE, PRESERVATIVE FREE 10 ML: 5 INJECTION INTRAVENOUS at 01:08

## 2021-08-27 RX ADMIN — DEXTROSE AND SODIUM CHLORIDE 1000 ML: 5; .45 INJECTION, SOLUTION INTRAVENOUS at 11:08

## 2021-08-27 RX ADMIN — HEPARIN 500 UNITS: 100 SYRINGE at 01:08

## 2021-08-31 ENCOUNTER — INFUSION (OUTPATIENT)
Dept: INFUSION THERAPY | Facility: HOSPITAL | Age: 66
End: 2021-08-31
Attending: INTERNAL MEDICINE
Payer: MEDICARE

## 2021-08-31 VITALS
WEIGHT: 131.06 LBS | DIASTOLIC BLOOD PRESSURE: 78 MMHG | HEIGHT: 65 IN | RESPIRATION RATE: 18 BRPM | TEMPERATURE: 98 F | BODY MASS INDEX: 21.84 KG/M2 | HEART RATE: 86 BPM | SYSTOLIC BLOOD PRESSURE: 156 MMHG

## 2021-08-31 DIAGNOSIS — C34.31 SQUAMOUS CELL CARCINOMA OF BRONCHUS IN RIGHT LOWER LOBE: ICD-10-CM

## 2021-08-31 DIAGNOSIS — C34.31 MALIGNANT NEOPLASM OF LOWER LOBE OF RIGHT LUNG: ICD-10-CM

## 2021-08-31 DIAGNOSIS — E86.0 DEHYDRATION: ICD-10-CM

## 2021-08-31 DIAGNOSIS — C79.51 BONE METASTASES: Primary | ICD-10-CM

## 2021-08-31 LAB
ALBUMIN SERPL BCP-MCNC: 4.1 G/DL (ref 3.5–5.2)
ALP SERPL-CCNC: 73 U/L (ref 55–135)
ALT SERPL W/O P-5'-P-CCNC: 11 U/L (ref 10–44)
ANION GAP SERPL CALC-SCNC: 10 MMOL/L (ref 8–16)
AST SERPL-CCNC: 21 U/L (ref 10–40)
BASOPHILS # BLD AUTO: 0.01 K/UL (ref 0–0.2)
BASOPHILS NFR BLD: 0.4 % (ref 0–1.9)
BILIRUB SERPL-MCNC: 0.7 MG/DL (ref 0.1–1)
BUN SERPL-MCNC: 12 MG/DL (ref 8–23)
CALCIUM SERPL-MCNC: 9.5 MG/DL (ref 8.7–10.5)
CHLORIDE SERPL-SCNC: 101 MMOL/L (ref 95–110)
CO2 SERPL-SCNC: 26 MMOL/L (ref 23–29)
CREAT SERPL-MCNC: 0.9 MG/DL (ref 0.5–1.4)
DIFFERENTIAL METHOD: ABNORMAL
EOSINOPHIL # BLD AUTO: 0 K/UL (ref 0–0.5)
EOSINOPHIL NFR BLD: 0.9 % (ref 0–8)
ERYTHROCYTE [DISTWIDTH] IN BLOOD BY AUTOMATED COUNT: 14.5 % (ref 11.5–14.5)
EST. GFR  (AFRICAN AMERICAN): >60 ML/MIN/1.73 M^2
EST. GFR  (NON AFRICAN AMERICAN): >60 ML/MIN/1.73 M^2
GLUCOSE SERPL-MCNC: 141 MG/DL (ref 70–110)
HCT VFR BLD AUTO: 30.3 % (ref 37–48.5)
HGB BLD-MCNC: 9.9 G/DL (ref 12–16)
IMM GRANULOCYTES # BLD AUTO: 0.02 K/UL (ref 0–0.04)
IMM GRANULOCYTES NFR BLD AUTO: 0.9 % (ref 0–0.5)
LYMPHOCYTES # BLD AUTO: 0.4 K/UL (ref 1–4.8)
LYMPHOCYTES NFR BLD: 16.7 % (ref 18–48)
MCH RBC QN AUTO: 34.1 PG (ref 27–31)
MCHC RBC AUTO-ENTMCNC: 32.7 G/DL (ref 32–36)
MCV RBC AUTO: 105 FL (ref 82–98)
MONOCYTES # BLD AUTO: 0.3 K/UL (ref 0.3–1)
MONOCYTES NFR BLD: 12.7 % (ref 4–15)
NEUTROPHILS # BLD AUTO: 1.6 K/UL (ref 1.8–7.7)
NEUTROPHILS NFR BLD: 68.4 % (ref 38–73)
NRBC BLD-RTO: 0 /100 WBC
PLATELET # BLD AUTO: 155 K/UL (ref 150–450)
PMV BLD AUTO: 9.9 FL (ref 9.2–12.9)
POTASSIUM SERPL-SCNC: 3.9 MMOL/L (ref 3.5–5.1)
PROT SERPL-MCNC: 7 G/DL (ref 6–8.4)
RBC # BLD AUTO: 2.9 M/UL (ref 4–5.4)
SODIUM SERPL-SCNC: 137 MMOL/L (ref 136–145)
WBC # BLD AUTO: 2.28 K/UL (ref 3.9–12.7)

## 2021-08-31 PROCEDURE — S5010 5% DEXTROSE AND 0.45% SALINE: HCPCS | Performed by: INTERNAL MEDICINE

## 2021-08-31 PROCEDURE — 96366 THER/PROPH/DIAG IV INF ADDON: CPT

## 2021-08-31 PROCEDURE — 25000003 PHARM REV CODE 250: Performed by: INTERNAL MEDICINE

## 2021-08-31 PROCEDURE — 96365 THER/PROPH/DIAG IV INF INIT: CPT

## 2021-08-31 PROCEDURE — 80053 COMPREHEN METABOLIC PANEL: CPT | Performed by: NURSE PRACTITIONER

## 2021-08-31 PROCEDURE — 85025 COMPLETE CBC W/AUTO DIFF WBC: CPT | Performed by: INTERNAL MEDICINE

## 2021-08-31 PROCEDURE — 36591 DRAW BLOOD OFF VENOUS DEVICE: CPT

## 2021-08-31 PROCEDURE — 63600175 PHARM REV CODE 636 W HCPCS: Performed by: INTERNAL MEDICINE

## 2021-08-31 RX ORDER — HEPARIN 100 UNIT/ML
500 SYRINGE INTRAVENOUS
Status: DISCONTINUED | OUTPATIENT
Start: 2021-08-31 | End: 2021-08-31 | Stop reason: HOSPADM

## 2021-08-31 RX ORDER — HEPARIN 100 UNIT/ML
500 SYRINGE INTRAVENOUS
Status: CANCELLED | OUTPATIENT
Start: 2021-08-31

## 2021-08-31 RX ORDER — SODIUM CHLORIDE 0.9 % (FLUSH) 0.9 %
10 SYRINGE (ML) INJECTION
Status: CANCELLED | OUTPATIENT
Start: 2021-08-31

## 2021-08-31 RX ORDER — SODIUM CHLORIDE 0.9 % (FLUSH) 0.9 %
10 SYRINGE (ML) INJECTION
Status: DISCONTINUED | OUTPATIENT
Start: 2021-08-31 | End: 2021-08-31 | Stop reason: HOSPADM

## 2021-08-31 RX ADMIN — DEXTROSE AND SODIUM CHLORIDE 1000 ML: 5; .45 INJECTION, SOLUTION INTRAVENOUS at 11:08

## 2021-08-31 RX ADMIN — HEPARIN 500 UNITS: 100 SYRINGE at 01:08

## 2021-09-02 ENCOUNTER — HOSPITAL ENCOUNTER (OUTPATIENT)
Dept: RADIOLOGY | Facility: HOSPITAL | Age: 66
Discharge: HOME | End: 2021-09-02
Attending: INTERNAL MEDICINE
Payer: MEDICARE

## 2021-09-02 VITALS — HEIGHT: 65 IN | WEIGHT: 131 LBS | BODY MASS INDEX: 21.83 KG/M2

## 2021-09-02 DIAGNOSIS — G89.3 CANCER ASSOCIATED PAIN: ICD-10-CM

## 2021-09-02 DIAGNOSIS — C34.31 MALIGNANT NEOPLASM OF LOWER LOBE OF RIGHT LUNG: Chronic | ICD-10-CM

## 2021-09-02 LAB — GLUCOSE SERPL-MCNC: 97 MG/DL (ref 70–110)

## 2021-09-02 PROCEDURE — 78815 PET IMAGE W/CT SKULL-THIGH: CPT | Mod: TC,PO,PI

## 2021-09-02 PROCEDURE — 82962 GLUCOSE BLOOD TEST: CPT | Mod: PO

## 2021-09-06 ENCOUNTER — PATIENT MESSAGE (OUTPATIENT)
Dept: HEMATOLOGY/ONCOLOGY | Facility: CLINIC | Age: 66
End: 2021-09-06

## 2021-09-07 ENCOUNTER — EXTERNAL HOME HEALTH (OUTPATIENT)
Dept: HOME HEALTH SERVICES | Facility: HOSPITAL | Age: 66
End: 2021-09-07
Payer: MEDICARE

## 2021-09-07 DIAGNOSIS — C34.31 MALIGNANT NEOPLASM OF LOWER LOBE OF RIGHT LUNG: ICD-10-CM

## 2021-09-07 DIAGNOSIS — C79.51 BONE METASTASES: ICD-10-CM

## 2021-09-07 RX ORDER — MORPHINE SULFATE 15 MG/1
7.5 TABLET ORAL EVERY 4 HOURS PRN
Qty: 60 TABLET | Refills: 0 | Status: SHIPPED | OUTPATIENT
Start: 2021-09-07 | End: 2022-01-24 | Stop reason: SDUPTHER

## 2021-09-08 RX ORDER — OLANZAPINE 5 MG/1
TABLET ORAL
Qty: 45 TABLET | Refills: 1 | Status: SHIPPED | OUTPATIENT
Start: 2021-09-08 | End: 2022-02-14

## 2021-09-10 ENCOUNTER — INFUSION (OUTPATIENT)
Dept: INFUSION THERAPY | Facility: HOSPITAL | Age: 66
End: 2021-09-10
Attending: INTERNAL MEDICINE
Payer: MEDICARE

## 2021-09-10 VITALS
BODY MASS INDEX: 22.78 KG/M2 | SYSTOLIC BLOOD PRESSURE: 126 MMHG | RESPIRATION RATE: 18 BRPM | WEIGHT: 136.88 LBS | TEMPERATURE: 98 F | OXYGEN SATURATION: 98 % | HEART RATE: 71 BPM | DIASTOLIC BLOOD PRESSURE: 77 MMHG

## 2021-09-10 DIAGNOSIS — C79.51 BONE METASTASES: Primary | ICD-10-CM

## 2021-09-10 DIAGNOSIS — E86.0 DEHYDRATION: ICD-10-CM

## 2021-09-10 DIAGNOSIS — C34.31 SQUAMOUS CELL CARCINOMA OF BRONCHUS IN RIGHT LOWER LOBE: ICD-10-CM

## 2021-09-10 PROCEDURE — 96360 HYDRATION IV INFUSION INIT: CPT

## 2021-09-10 PROCEDURE — 96361 HYDRATE IV INFUSION ADD-ON: CPT

## 2021-09-10 PROCEDURE — 63600175 PHARM REV CODE 636 W HCPCS: Performed by: INTERNAL MEDICINE

## 2021-09-10 PROCEDURE — 25000003 PHARM REV CODE 250: Performed by: INTERNAL MEDICINE

## 2021-09-10 PROCEDURE — S5010 5% DEXTROSE AND 0.45% SALINE: HCPCS | Performed by: INTERNAL MEDICINE

## 2021-09-10 PROCEDURE — A4216 STERILE WATER/SALINE, 10 ML: HCPCS | Performed by: INTERNAL MEDICINE

## 2021-09-10 RX ORDER — HEPARIN 100 UNIT/ML
500 SYRINGE INTRAVENOUS
Status: CANCELLED | OUTPATIENT
Start: 2021-09-10

## 2021-09-10 RX ORDER — SODIUM CHLORIDE 0.9 % (FLUSH) 0.9 %
10 SYRINGE (ML) INJECTION
Status: CANCELLED | OUTPATIENT
Start: 2021-09-10

## 2021-09-10 RX ORDER — HEPARIN 100 UNIT/ML
500 SYRINGE INTRAVENOUS
Status: DISCONTINUED | OUTPATIENT
Start: 2021-09-10 | End: 2021-09-10 | Stop reason: HOSPADM

## 2021-09-10 RX ORDER — SODIUM CHLORIDE 0.9 % (FLUSH) 0.9 %
10 SYRINGE (ML) INJECTION
Status: DISCONTINUED | OUTPATIENT
Start: 2021-09-10 | End: 2021-09-10 | Stop reason: HOSPADM

## 2021-09-10 RX ADMIN — DEXTROSE AND SODIUM CHLORIDE 1000 ML: 5; .45 INJECTION, SOLUTION INTRAVENOUS at 11:09

## 2021-09-10 RX ADMIN — HEPARIN 500 UNITS: 100 SYRINGE at 01:09

## 2021-09-10 RX ADMIN — SODIUM CHLORIDE, PRESERVATIVE FREE 10 ML: 5 INJECTION INTRAVENOUS at 01:09

## 2021-09-13 DIAGNOSIS — K21.9 GASTROESOPHAGEAL REFLUX DISEASE, UNSPECIFIED WHETHER ESOPHAGITIS PRESENT: ICD-10-CM

## 2021-09-13 RX ORDER — FAMOTIDINE 40 MG/1
40 TABLET, FILM COATED ORAL NIGHTLY
Qty: 30 TABLET | Refills: 11 | Status: SHIPPED | OUTPATIENT
Start: 2021-09-13 | End: 2022-09-13

## 2021-09-14 ENCOUNTER — INFUSION (OUTPATIENT)
Dept: INFUSION THERAPY | Facility: HOSPITAL | Age: 66
End: 2021-09-14
Attending: INTERNAL MEDICINE
Payer: MEDICARE

## 2021-09-14 VITALS
WEIGHT: 132.38 LBS | SYSTOLIC BLOOD PRESSURE: 129 MMHG | TEMPERATURE: 98 F | BODY MASS INDEX: 22.03 KG/M2 | OXYGEN SATURATION: 100 % | DIASTOLIC BLOOD PRESSURE: 85 MMHG | RESPIRATION RATE: 18 BRPM | HEART RATE: 97 BPM

## 2021-09-14 DIAGNOSIS — E86.0 DEHYDRATION: ICD-10-CM

## 2021-09-14 DIAGNOSIS — C79.51 BONE METASTASES: Primary | ICD-10-CM

## 2021-09-14 DIAGNOSIS — C34.31 SQUAMOUS CELL CARCINOMA OF BRONCHUS IN RIGHT LOWER LOBE: ICD-10-CM

## 2021-09-14 PROCEDURE — 96366 THER/PROPH/DIAG IV INF ADDON: CPT

## 2021-09-14 PROCEDURE — 63600175 PHARM REV CODE 636 W HCPCS: Performed by: INTERNAL MEDICINE

## 2021-09-14 PROCEDURE — S5010 5% DEXTROSE AND 0.45% SALINE: HCPCS | Performed by: INTERNAL MEDICINE

## 2021-09-14 PROCEDURE — A4216 STERILE WATER/SALINE, 10 ML: HCPCS | Performed by: INTERNAL MEDICINE

## 2021-09-14 PROCEDURE — 96365 THER/PROPH/DIAG IV INF INIT: CPT

## 2021-09-14 PROCEDURE — 25000003 PHARM REV CODE 250: Performed by: INTERNAL MEDICINE

## 2021-09-14 RX ORDER — SODIUM CHLORIDE 0.9 % (FLUSH) 0.9 %
10 SYRINGE (ML) INJECTION
Status: CANCELLED | OUTPATIENT
Start: 2021-09-14

## 2021-09-14 RX ORDER — HEPARIN 100 UNIT/ML
500 SYRINGE INTRAVENOUS
Status: CANCELLED | OUTPATIENT
Start: 2021-09-14

## 2021-09-14 RX ORDER — SODIUM CHLORIDE 0.9 % (FLUSH) 0.9 %
10 SYRINGE (ML) INJECTION
Status: DISCONTINUED | OUTPATIENT
Start: 2021-09-14 | End: 2021-09-14 | Stop reason: HOSPADM

## 2021-09-14 RX ORDER — HEPARIN 100 UNIT/ML
500 SYRINGE INTRAVENOUS
Status: DISCONTINUED | OUTPATIENT
Start: 2021-09-14 | End: 2021-09-14 | Stop reason: HOSPADM

## 2021-09-14 RX ADMIN — SODIUM CHLORIDE, PRESERVATIVE FREE 10 ML: 5 INJECTION INTRAVENOUS at 01:09

## 2021-09-14 RX ADMIN — HEPARIN 500 UNITS: 100 SYRINGE at 01:09

## 2021-09-14 RX ADMIN — DEXTROSE AND SODIUM CHLORIDE 1000 ML: 5; .45 INJECTION, SOLUTION INTRAVENOUS at 11:09

## 2021-09-17 ENCOUNTER — DOCUMENT SCAN (OUTPATIENT)
Dept: HOME HEALTH SERVICES | Facility: HOSPITAL | Age: 66
End: 2021-09-17

## 2021-09-17 ENCOUNTER — INFUSION (OUTPATIENT)
Dept: INFUSION THERAPY | Facility: HOSPITAL | Age: 66
End: 2021-09-17
Attending: INTERNAL MEDICINE
Payer: MEDICARE

## 2021-09-17 VITALS
OXYGEN SATURATION: 100 % | TEMPERATURE: 97 F | RESPIRATION RATE: 18 BRPM | HEART RATE: 98 BPM | SYSTOLIC BLOOD PRESSURE: 165 MMHG | HEIGHT: 65 IN | WEIGHT: 132.19 LBS | BODY MASS INDEX: 22.02 KG/M2 | DIASTOLIC BLOOD PRESSURE: 78 MMHG

## 2021-09-17 DIAGNOSIS — E86.0 DEHYDRATION: ICD-10-CM

## 2021-09-17 DIAGNOSIS — C79.51 BONE METASTASES: Primary | ICD-10-CM

## 2021-09-17 DIAGNOSIS — C34.31 SQUAMOUS CELL CARCINOMA OF BRONCHUS IN RIGHT LOWER LOBE: ICD-10-CM

## 2021-09-17 PROCEDURE — 96360 HYDRATION IV INFUSION INIT: CPT

## 2021-09-17 PROCEDURE — S5010 5% DEXTROSE AND 0.45% SALINE: HCPCS | Performed by: INTERNAL MEDICINE

## 2021-09-17 PROCEDURE — 63600175 PHARM REV CODE 636 W HCPCS: Performed by: INTERNAL MEDICINE

## 2021-09-17 PROCEDURE — 25000003 PHARM REV CODE 250: Performed by: INTERNAL MEDICINE

## 2021-09-17 PROCEDURE — 96361 HYDRATE IV INFUSION ADD-ON: CPT

## 2021-09-17 RX ORDER — SODIUM CHLORIDE 0.9 % (FLUSH) 0.9 %
10 SYRINGE (ML) INJECTION
Status: DISCONTINUED | OUTPATIENT
Start: 2021-09-17 | End: 2021-09-17 | Stop reason: HOSPADM

## 2021-09-17 RX ORDER — HEPARIN 100 UNIT/ML
500 SYRINGE INTRAVENOUS
Status: CANCELLED | OUTPATIENT
Start: 2021-09-17

## 2021-09-17 RX ORDER — SODIUM CHLORIDE 0.9 % (FLUSH) 0.9 %
10 SYRINGE (ML) INJECTION
Status: CANCELLED | OUTPATIENT
Start: 2021-09-17

## 2021-09-17 RX ORDER — HEPARIN 100 UNIT/ML
500 SYRINGE INTRAVENOUS
Status: DISCONTINUED | OUTPATIENT
Start: 2021-09-17 | End: 2021-09-17 | Stop reason: HOSPADM

## 2021-09-17 RX ADMIN — DEXTROSE AND SODIUM CHLORIDE 1000 ML: 5; .45 INJECTION, SOLUTION INTRAVENOUS at 11:09

## 2021-09-17 RX ADMIN — HEPARIN 500 UNITS: 100 SYRINGE at 01:09

## 2021-09-21 ENCOUNTER — INFUSION (OUTPATIENT)
Dept: INFUSION THERAPY | Facility: HOSPITAL | Age: 66
End: 2021-09-21
Attending: INTERNAL MEDICINE
Payer: MEDICARE

## 2021-09-21 VITALS
HEART RATE: 86 BPM | SYSTOLIC BLOOD PRESSURE: 145 MMHG | OXYGEN SATURATION: 99 % | RESPIRATION RATE: 18 BRPM | HEIGHT: 65 IN | BODY MASS INDEX: 22.47 KG/M2 | TEMPERATURE: 98 F | DIASTOLIC BLOOD PRESSURE: 77 MMHG | WEIGHT: 134.88 LBS

## 2021-09-21 DIAGNOSIS — C34.31 SQUAMOUS CELL CARCINOMA OF BRONCHUS IN RIGHT LOWER LOBE: ICD-10-CM

## 2021-09-21 DIAGNOSIS — E86.0 DEHYDRATION: ICD-10-CM

## 2021-09-21 DIAGNOSIS — C79.51 BONE METASTASES: Primary | ICD-10-CM

## 2021-09-21 PROCEDURE — 63600175 PHARM REV CODE 636 W HCPCS: Performed by: INTERNAL MEDICINE

## 2021-09-21 PROCEDURE — 96361 HYDRATE IV INFUSION ADD-ON: CPT

## 2021-09-21 PROCEDURE — 96360 HYDRATION IV INFUSION INIT: CPT

## 2021-09-21 PROCEDURE — 25000003 PHARM REV CODE 250: Performed by: INTERNAL MEDICINE

## 2021-09-21 PROCEDURE — S5010 5% DEXTROSE AND 0.45% SALINE: HCPCS | Performed by: INTERNAL MEDICINE

## 2021-09-21 RX ORDER — HEPARIN 100 UNIT/ML
500 SYRINGE INTRAVENOUS
Status: DISCONTINUED | OUTPATIENT
Start: 2021-09-21 | End: 2021-09-21 | Stop reason: HOSPADM

## 2021-09-21 RX ORDER — SODIUM CHLORIDE 0.9 % (FLUSH) 0.9 %
10 SYRINGE (ML) INJECTION
Status: DISCONTINUED | OUTPATIENT
Start: 2021-09-21 | End: 2021-09-21 | Stop reason: HOSPADM

## 2021-09-21 RX ORDER — SODIUM CHLORIDE 0.9 % (FLUSH) 0.9 %
10 SYRINGE (ML) INJECTION
Status: CANCELLED | OUTPATIENT
Start: 2021-09-21

## 2021-09-21 RX ORDER — HEPARIN 100 UNIT/ML
500 SYRINGE INTRAVENOUS
Status: CANCELLED | OUTPATIENT
Start: 2021-09-21

## 2021-09-21 RX ADMIN — DEXTROSE AND SODIUM CHLORIDE 1000 ML: 5; .45 INJECTION, SOLUTION INTRAVENOUS at 11:09

## 2021-09-21 RX ADMIN — HEPARIN 500 UNITS: 100 SYRINGE at 01:09

## 2021-09-24 ENCOUNTER — TELEPHONE (OUTPATIENT)
Dept: HEMATOLOGY/ONCOLOGY | Facility: CLINIC | Age: 66
End: 2021-09-24

## 2021-09-24 ENCOUNTER — INFUSION (OUTPATIENT)
Dept: INFUSION THERAPY | Facility: HOSPITAL | Age: 66
End: 2021-09-24
Attending: INTERNAL MEDICINE
Payer: MEDICARE

## 2021-09-24 VITALS
BODY MASS INDEX: 22.73 KG/M2 | RESPIRATION RATE: 17 BRPM | TEMPERATURE: 98 F | OXYGEN SATURATION: 99 % | WEIGHT: 136.56 LBS | DIASTOLIC BLOOD PRESSURE: 77 MMHG | HEART RATE: 95 BPM | SYSTOLIC BLOOD PRESSURE: 150 MMHG

## 2021-09-24 DIAGNOSIS — E86.0 DEHYDRATION: ICD-10-CM

## 2021-09-24 DIAGNOSIS — C79.51 BONE METASTASES: Primary | ICD-10-CM

## 2021-09-24 DIAGNOSIS — C34.31 MALIGNANT NEOPLASM OF LOWER LOBE OF RIGHT LUNG: Primary | ICD-10-CM

## 2021-09-24 DIAGNOSIS — C34.31 SQUAMOUS CELL CARCINOMA OF BRONCHUS IN RIGHT LOWER LOBE: ICD-10-CM

## 2021-09-24 DIAGNOSIS — C34.31 MALIGNANT NEOPLASM OF LOWER LOBE OF RIGHT LUNG: ICD-10-CM

## 2021-09-24 LAB
ALBUMIN SERPL BCP-MCNC: 3.7 G/DL (ref 3.5–5.2)
ALP SERPL-CCNC: 76 U/L (ref 55–135)
ALT SERPL W/O P-5'-P-CCNC: 11 U/L (ref 10–44)
ANION GAP SERPL CALC-SCNC: 10 MMOL/L (ref 8–16)
AST SERPL-CCNC: 17 U/L (ref 10–40)
BASOPHILS # BLD AUTO: 0.01 K/UL (ref 0–0.2)
BASOPHILS NFR BLD: 0.3 % (ref 0–1.9)
BILIRUB SERPL-MCNC: 0.5 MG/DL (ref 0.1–1)
BUN SERPL-MCNC: 13 MG/DL (ref 8–23)
CALCIUM SERPL-MCNC: 9.3 MG/DL (ref 8.7–10.5)
CHLORIDE SERPL-SCNC: 107 MMOL/L (ref 95–110)
CO2 SERPL-SCNC: 27 MMOL/L (ref 23–29)
CREAT SERPL-MCNC: 0.7 MG/DL (ref 0.5–1.4)
DIFFERENTIAL METHOD: ABNORMAL
EOSINOPHIL # BLD AUTO: 0 K/UL (ref 0–0.5)
EOSINOPHIL NFR BLD: 1.4 % (ref 0–8)
ERYTHROCYTE [DISTWIDTH] IN BLOOD BY AUTOMATED COUNT: 14.6 % (ref 11.5–14.5)
EST. GFR  (AFRICAN AMERICAN): >60 ML/MIN/1.73 M^2
EST. GFR  (NON AFRICAN AMERICAN): >60 ML/MIN/1.73 M^2
GLUCOSE SERPL-MCNC: 162 MG/DL (ref 70–110)
HCT VFR BLD AUTO: 28.4 % (ref 37–48.5)
HGB BLD-MCNC: 9.4 G/DL (ref 12–16)
IMM GRANULOCYTES # BLD AUTO: 0 K/UL (ref 0–0.04)
IMM GRANULOCYTES NFR BLD AUTO: 0 % (ref 0–0.5)
LYMPHOCYTES # BLD AUTO: 0.3 K/UL (ref 1–4.8)
LYMPHOCYTES NFR BLD: 10.9 % (ref 18–48)
MCH RBC QN AUTO: 34.6 PG (ref 27–31)
MCHC RBC AUTO-ENTMCNC: 33.1 G/DL (ref 32–36)
MCV RBC AUTO: 104 FL (ref 82–98)
MONOCYTES # BLD AUTO: 0.3 K/UL (ref 0.3–1)
MONOCYTES NFR BLD: 8.8 % (ref 4–15)
NEUTROPHILS # BLD AUTO: 2.3 K/UL (ref 1.8–7.7)
NEUTROPHILS NFR BLD: 78.6 % (ref 38–73)
NRBC BLD-RTO: 0 /100 WBC
PLATELET # BLD AUTO: 129 K/UL (ref 150–450)
PMV BLD AUTO: 10.1 FL (ref 9.2–12.9)
POTASSIUM SERPL-SCNC: 3.6 MMOL/L (ref 3.5–5.1)
PROT SERPL-MCNC: 6.5 G/DL (ref 6–8.4)
RBC # BLD AUTO: 2.72 M/UL (ref 4–5.4)
SODIUM SERPL-SCNC: 144 MMOL/L (ref 136–145)
WBC # BLD AUTO: 2.94 K/UL (ref 3.9–12.7)

## 2021-09-24 PROCEDURE — 63600175 PHARM REV CODE 636 W HCPCS: Performed by: INTERNAL MEDICINE

## 2021-09-24 PROCEDURE — S5010 5% DEXTROSE AND 0.45% SALINE: HCPCS | Performed by: INTERNAL MEDICINE

## 2021-09-24 PROCEDURE — 85025 COMPLETE CBC W/AUTO DIFF WBC: CPT | Performed by: INTERNAL MEDICINE

## 2021-09-24 PROCEDURE — 96361 HYDRATE IV INFUSION ADD-ON: CPT

## 2021-09-24 PROCEDURE — 80053 COMPREHEN METABOLIC PANEL: CPT | Performed by: INTERNAL MEDICINE

## 2021-09-24 PROCEDURE — 96360 HYDRATION IV INFUSION INIT: CPT

## 2021-09-24 PROCEDURE — A4216 STERILE WATER/SALINE, 10 ML: HCPCS | Performed by: INTERNAL MEDICINE

## 2021-09-24 PROCEDURE — 25000003 PHARM REV CODE 250: Performed by: INTERNAL MEDICINE

## 2021-09-24 RX ORDER — HEPARIN 100 UNIT/ML
500 SYRINGE INTRAVENOUS
Status: CANCELLED | OUTPATIENT
Start: 2021-09-24

## 2021-09-24 RX ORDER — SODIUM CHLORIDE 0.9 % (FLUSH) 0.9 %
10 SYRINGE (ML) INJECTION
Status: DISCONTINUED | OUTPATIENT
Start: 2021-09-24 | End: 2021-09-24 | Stop reason: HOSPADM

## 2021-09-24 RX ORDER — HEPARIN 100 UNIT/ML
500 SYRINGE INTRAVENOUS
Status: DISCONTINUED | OUTPATIENT
Start: 2021-09-24 | End: 2021-09-24 | Stop reason: HOSPADM

## 2021-09-24 RX ORDER — SODIUM CHLORIDE 0.9 % (FLUSH) 0.9 %
10 SYRINGE (ML) INJECTION
Status: CANCELLED | OUTPATIENT
Start: 2021-09-24

## 2021-09-24 RX ADMIN — SODIUM CHLORIDE, PRESERVATIVE FREE 10 ML: 5 INJECTION INTRAVENOUS at 01:09

## 2021-09-24 RX ADMIN — DEXTROSE AND SODIUM CHLORIDE 1000 ML: 5; .45 INJECTION, SOLUTION INTRAVENOUS at 11:09

## 2021-09-24 RX ADMIN — HEPARIN 500 UNITS: 100 SYRINGE at 01:09

## 2021-09-28 ENCOUNTER — INFUSION (OUTPATIENT)
Dept: INFUSION THERAPY | Facility: HOSPITAL | Age: 66
End: 2021-09-28
Attending: INTERNAL MEDICINE
Payer: MEDICARE

## 2021-09-28 VITALS
HEIGHT: 65 IN | WEIGHT: 139.63 LBS | SYSTOLIC BLOOD PRESSURE: 128 MMHG | DIASTOLIC BLOOD PRESSURE: 71 MMHG | HEART RATE: 86 BPM | TEMPERATURE: 98 F | RESPIRATION RATE: 16 BRPM | BODY MASS INDEX: 23.27 KG/M2

## 2021-09-28 DIAGNOSIS — E86.0 DEHYDRATION: ICD-10-CM

## 2021-09-28 DIAGNOSIS — C79.51 BONE METASTASES: Primary | ICD-10-CM

## 2021-09-28 DIAGNOSIS — C34.31 SQUAMOUS CELL CARCINOMA OF BRONCHUS IN RIGHT LOWER LOBE: ICD-10-CM

## 2021-09-28 PROCEDURE — S5010 5% DEXTROSE AND 0.45% SALINE: HCPCS | Performed by: INTERNAL MEDICINE

## 2021-09-28 PROCEDURE — 96361 HYDRATE IV INFUSION ADD-ON: CPT

## 2021-09-28 PROCEDURE — 63600175 PHARM REV CODE 636 W HCPCS: Performed by: INTERNAL MEDICINE

## 2021-09-28 PROCEDURE — 96360 HYDRATION IV INFUSION INIT: CPT

## 2021-09-28 PROCEDURE — 25000003 PHARM REV CODE 250: Performed by: INTERNAL MEDICINE

## 2021-09-28 RX ORDER — HEPARIN 100 UNIT/ML
500 SYRINGE INTRAVENOUS
Status: DISCONTINUED | OUTPATIENT
Start: 2021-09-28 | End: 2021-09-28 | Stop reason: HOSPADM

## 2021-09-28 RX ORDER — SODIUM CHLORIDE 0.9 % (FLUSH) 0.9 %
10 SYRINGE (ML) INJECTION
Status: CANCELLED | OUTPATIENT
Start: 2021-09-28

## 2021-09-28 RX ORDER — HEPARIN 100 UNIT/ML
500 SYRINGE INTRAVENOUS
Status: CANCELLED | OUTPATIENT
Start: 2021-09-28

## 2021-09-28 RX ADMIN — DEXTROSE AND SODIUM CHLORIDE 1000 ML: 5; .45 INJECTION, SOLUTION INTRAVENOUS at 11:09

## 2021-09-28 RX ADMIN — HEPARIN 500 UNITS: 100 SYRINGE at 01:09

## 2021-09-29 ENCOUNTER — OFFICE VISIT (OUTPATIENT)
Dept: HEMATOLOGY/ONCOLOGY | Facility: CLINIC | Age: 66
End: 2021-09-29
Payer: MEDICARE

## 2021-09-29 VITALS
RESPIRATION RATE: 16 BRPM | BODY MASS INDEX: 23.16 KG/M2 | HEIGHT: 65 IN | WEIGHT: 139 LBS | DIASTOLIC BLOOD PRESSURE: 82 MMHG | SYSTOLIC BLOOD PRESSURE: 159 MMHG | HEART RATE: 101 BPM

## 2021-09-29 DIAGNOSIS — F33.1 MDD (MAJOR DEPRESSIVE DISORDER), RECURRENT EPISODE, MODERATE: ICD-10-CM

## 2021-09-29 DIAGNOSIS — G89.3 CANCER ASSOCIATED PAIN: ICD-10-CM

## 2021-09-29 DIAGNOSIS — C34.31 MALIGNANT NEOPLASM OF LOWER LOBE OF RIGHT LUNG: Chronic | ICD-10-CM

## 2021-09-29 PROCEDURE — 99214 PR OFFICE/OUTPT VISIT, EST, LEVL IV, 30-39 MIN: ICD-10-PCS | Mod: S$GLB,,, | Performed by: INTERNAL MEDICINE

## 2021-09-29 PROCEDURE — 99214 OFFICE O/P EST MOD 30 MIN: CPT | Mod: S$GLB,,, | Performed by: INTERNAL MEDICINE

## 2021-10-01 ENCOUNTER — INFUSION (OUTPATIENT)
Dept: INFUSION THERAPY | Facility: HOSPITAL | Age: 66
End: 2021-10-01
Attending: INTERNAL MEDICINE
Payer: MEDICARE

## 2021-10-01 VITALS
WEIGHT: 134.69 LBS | TEMPERATURE: 98 F | OXYGEN SATURATION: 98 % | RESPIRATION RATE: 18 BRPM | DIASTOLIC BLOOD PRESSURE: 76 MMHG | HEIGHT: 65 IN | SYSTOLIC BLOOD PRESSURE: 147 MMHG | HEART RATE: 80 BPM | BODY MASS INDEX: 22.44 KG/M2

## 2021-10-01 DIAGNOSIS — C34.31 SQUAMOUS CELL CARCINOMA OF BRONCHUS IN RIGHT LOWER LOBE: ICD-10-CM

## 2021-10-01 DIAGNOSIS — C79.51 BONE METASTASES: Primary | ICD-10-CM

## 2021-10-01 DIAGNOSIS — K04.7 DENTAL ABSCESS: Primary | ICD-10-CM

## 2021-10-01 DIAGNOSIS — E86.0 DEHYDRATION: ICD-10-CM

## 2021-10-01 PROCEDURE — 25000003 PHARM REV CODE 250: Performed by: INTERNAL MEDICINE

## 2021-10-01 PROCEDURE — 96360 HYDRATION IV INFUSION INIT: CPT

## 2021-10-01 PROCEDURE — 96361 HYDRATE IV INFUSION ADD-ON: CPT

## 2021-10-01 PROCEDURE — 63600175 PHARM REV CODE 636 W HCPCS: Performed by: INTERNAL MEDICINE

## 2021-10-01 PROCEDURE — S5010 5% DEXTROSE AND 0.45% SALINE: HCPCS | Performed by: INTERNAL MEDICINE

## 2021-10-01 PROCEDURE — A4216 STERILE WATER/SALINE, 10 ML: HCPCS | Performed by: INTERNAL MEDICINE

## 2021-10-01 RX ORDER — SODIUM CHLORIDE 0.9 % (FLUSH) 0.9 %
10 SYRINGE (ML) INJECTION
Status: CANCELLED | OUTPATIENT
Start: 2021-10-01

## 2021-10-01 RX ORDER — HEPARIN 100 UNIT/ML
500 SYRINGE INTRAVENOUS
Status: DISCONTINUED | OUTPATIENT
Start: 2021-10-01 | End: 2021-10-01 | Stop reason: HOSPADM

## 2021-10-01 RX ORDER — HEPARIN 100 UNIT/ML
500 SYRINGE INTRAVENOUS
Status: CANCELLED | OUTPATIENT
Start: 2021-10-01

## 2021-10-01 RX ORDER — CLINDAMYCIN HYDROCHLORIDE 300 MG/1
300 CAPSULE ORAL 3 TIMES DAILY
Qty: 21 CAPSULE | Refills: 0 | Status: SHIPPED | OUTPATIENT
Start: 2021-10-01 | End: 2021-11-11

## 2021-10-01 RX ORDER — SODIUM CHLORIDE 0.9 % (FLUSH) 0.9 %
10 SYRINGE (ML) INJECTION
Status: DISCONTINUED | OUTPATIENT
Start: 2021-10-01 | End: 2021-10-01 | Stop reason: HOSPADM

## 2021-10-01 RX ADMIN — DEXTROSE AND SODIUM CHLORIDE 1000 ML: 5; .45 INJECTION, SOLUTION INTRAVENOUS at 11:10

## 2021-10-01 RX ADMIN — SODIUM CHLORIDE, PRESERVATIVE FREE 10 ML: 5 INJECTION INTRAVENOUS at 01:10

## 2021-10-01 RX ADMIN — HEPARIN 500 UNITS: 100 SYRINGE at 01:10

## 2021-10-08 ENCOUNTER — INFUSION (OUTPATIENT)
Dept: INFUSION THERAPY | Facility: HOSPITAL | Age: 66
End: 2021-10-08
Attending: INTERNAL MEDICINE
Payer: MEDICARE

## 2021-10-08 VITALS
OXYGEN SATURATION: 99 % | HEIGHT: 65 IN | SYSTOLIC BLOOD PRESSURE: 136 MMHG | DIASTOLIC BLOOD PRESSURE: 79 MMHG | HEART RATE: 98 BPM | BODY MASS INDEX: 23.11 KG/M2 | TEMPERATURE: 97 F | RESPIRATION RATE: 18 BRPM | WEIGHT: 138.69 LBS

## 2021-10-08 DIAGNOSIS — C34.31 SQUAMOUS CELL CARCINOMA OF BRONCHUS IN RIGHT LOWER LOBE: ICD-10-CM

## 2021-10-08 DIAGNOSIS — C79.51 BONE METASTASES: Primary | ICD-10-CM

## 2021-10-08 DIAGNOSIS — E86.0 DEHYDRATION: ICD-10-CM

## 2021-10-08 PROCEDURE — S5010 5% DEXTROSE AND 0.45% SALINE: HCPCS | Performed by: INTERNAL MEDICINE

## 2021-10-08 PROCEDURE — 96360 HYDRATION IV INFUSION INIT: CPT

## 2021-10-08 PROCEDURE — 96361 HYDRATE IV INFUSION ADD-ON: CPT

## 2021-10-08 PROCEDURE — 63600175 PHARM REV CODE 636 W HCPCS: Performed by: INTERNAL MEDICINE

## 2021-10-08 PROCEDURE — 25000003 PHARM REV CODE 250: Performed by: INTERNAL MEDICINE

## 2021-10-08 RX ORDER — HEPARIN 100 UNIT/ML
500 SYRINGE INTRAVENOUS
Status: CANCELLED | OUTPATIENT
Start: 2021-10-08

## 2021-10-08 RX ORDER — SODIUM CHLORIDE 0.9 % (FLUSH) 0.9 %
10 SYRINGE (ML) INJECTION
Status: CANCELLED | OUTPATIENT
Start: 2021-10-08

## 2021-10-08 RX ORDER — HEPARIN 100 UNIT/ML
500 SYRINGE INTRAVENOUS
Status: DISCONTINUED | OUTPATIENT
Start: 2021-10-08 | End: 2021-10-08 | Stop reason: HOSPADM

## 2021-10-08 RX ADMIN — HEPARIN 500 UNITS: 100 SYRINGE at 01:10

## 2021-10-08 RX ADMIN — DEXTROSE AND SODIUM CHLORIDE 1000 ML: 5; .45 INJECTION, SOLUTION INTRAVENOUS at 11:10

## 2021-10-11 DIAGNOSIS — C34.31 SQUAMOUS CELL CARCINOMA OF BRONCHUS IN RIGHT LOWER LOBE: ICD-10-CM

## 2021-10-11 DIAGNOSIS — G89.3 CANCER ASSOCIATED PAIN: ICD-10-CM

## 2021-10-11 DIAGNOSIS — C79.51 BONE METASTASES: ICD-10-CM

## 2021-10-11 RX ORDER — FENTANYL 75 UG/H
1 PATCH TRANSDERMAL
Qty: 10 PATCH | Refills: 0 | Status: SHIPPED | OUTPATIENT
Start: 2021-10-11 | End: 2021-11-18 | Stop reason: SDUPTHER

## 2021-10-12 ENCOUNTER — INFUSION (OUTPATIENT)
Dept: INFUSION THERAPY | Facility: HOSPITAL | Age: 66
End: 2021-10-12
Attending: INTERNAL MEDICINE
Payer: MEDICARE

## 2021-10-12 VITALS
SYSTOLIC BLOOD PRESSURE: 161 MMHG | HEART RATE: 88 BPM | HEIGHT: 65 IN | DIASTOLIC BLOOD PRESSURE: 83 MMHG | RESPIRATION RATE: 16 BRPM | OXYGEN SATURATION: 98 % | WEIGHT: 135.5 LBS | TEMPERATURE: 98 F | BODY MASS INDEX: 22.57 KG/M2

## 2021-10-12 DIAGNOSIS — E86.0 DEHYDRATION: ICD-10-CM

## 2021-10-12 DIAGNOSIS — C79.51 BONE METASTASES: Primary | ICD-10-CM

## 2021-10-12 DIAGNOSIS — C34.31 SQUAMOUS CELL CARCINOMA OF BRONCHUS IN RIGHT LOWER LOBE: ICD-10-CM

## 2021-10-12 PROCEDURE — A4216 STERILE WATER/SALINE, 10 ML: HCPCS | Performed by: INTERNAL MEDICINE

## 2021-10-12 PROCEDURE — 25000003 PHARM REV CODE 250: Performed by: INTERNAL MEDICINE

## 2021-10-12 PROCEDURE — S5010 5% DEXTROSE AND 0.45% SALINE: HCPCS | Performed by: INTERNAL MEDICINE

## 2021-10-12 PROCEDURE — 63600175 PHARM REV CODE 636 W HCPCS: Performed by: INTERNAL MEDICINE

## 2021-10-12 PROCEDURE — 96361 HYDRATE IV INFUSION ADD-ON: CPT

## 2021-10-12 PROCEDURE — 96360 HYDRATION IV INFUSION INIT: CPT

## 2021-10-12 RX ORDER — SODIUM CHLORIDE 0.9 % (FLUSH) 0.9 %
10 SYRINGE (ML) INJECTION
Status: DISCONTINUED | OUTPATIENT
Start: 2021-10-12 | End: 2021-10-12 | Stop reason: HOSPADM

## 2021-10-12 RX ORDER — HEPARIN 100 UNIT/ML
500 SYRINGE INTRAVENOUS
Status: CANCELLED | OUTPATIENT
Start: 2021-10-12

## 2021-10-12 RX ORDER — SODIUM CHLORIDE 0.9 % (FLUSH) 0.9 %
10 SYRINGE (ML) INJECTION
Status: CANCELLED | OUTPATIENT
Start: 2021-10-12

## 2021-10-12 RX ORDER — HEPARIN 100 UNIT/ML
500 SYRINGE INTRAVENOUS
Status: DISCONTINUED | OUTPATIENT
Start: 2021-10-12 | End: 2021-10-12 | Stop reason: HOSPADM

## 2021-10-12 RX ADMIN — HEPARIN 500 UNITS: 100 SYRINGE at 01:10

## 2021-10-12 RX ADMIN — SODIUM CHLORIDE, PRESERVATIVE FREE 10 ML: 5 INJECTION INTRAVENOUS at 01:10

## 2021-10-12 RX ADMIN — DEXTROSE AND SODIUM CHLORIDE 1000 ML: 5; .45 INJECTION, SOLUTION INTRAVENOUS at 11:10

## 2021-10-15 ENCOUNTER — OFFICE VISIT (OUTPATIENT)
Dept: HEMATOLOGY/ONCOLOGY | Facility: CLINIC | Age: 66
End: 2021-10-15
Payer: MEDICARE

## 2021-10-15 ENCOUNTER — INFUSION (OUTPATIENT)
Dept: INFUSION THERAPY | Facility: HOSPITAL | Age: 66
End: 2021-10-15
Attending: INTERNAL MEDICINE
Payer: MEDICARE

## 2021-10-15 VITALS
RESPIRATION RATE: 18 BRPM | WEIGHT: 138 LBS | OXYGEN SATURATION: 100 % | BODY MASS INDEX: 22.99 KG/M2 | TEMPERATURE: 97 F | DIASTOLIC BLOOD PRESSURE: 82 MMHG | HEART RATE: 93 BPM | SYSTOLIC BLOOD PRESSURE: 133 MMHG | HEIGHT: 65 IN

## 2021-10-15 VITALS
SYSTOLIC BLOOD PRESSURE: 128 MMHG | RESPIRATION RATE: 16 BRPM | HEIGHT: 65 IN | BODY MASS INDEX: 22.99 KG/M2 | DIASTOLIC BLOOD PRESSURE: 73 MMHG | WEIGHT: 138 LBS | HEART RATE: 98 BPM

## 2021-10-15 DIAGNOSIS — C79.51 BONE METASTASES: ICD-10-CM

## 2021-10-15 DIAGNOSIS — C79.51 BONE METASTASES: Primary | ICD-10-CM

## 2021-10-15 DIAGNOSIS — E86.0 DEHYDRATION: ICD-10-CM

## 2021-10-15 DIAGNOSIS — C34.31 MALIGNANT NEOPLASM OF LOWER LOBE OF RIGHT LUNG: Primary | ICD-10-CM

## 2021-10-15 DIAGNOSIS — R53.83 FATIGUE, UNSPECIFIED TYPE: ICD-10-CM

## 2021-10-15 DIAGNOSIS — G89.3 CANCER RELATED PAIN: ICD-10-CM

## 2021-10-15 DIAGNOSIS — C34.31 SQUAMOUS CELL CARCINOMA OF BRONCHUS IN RIGHT LOWER LOBE: ICD-10-CM

## 2021-10-15 DIAGNOSIS — R51.9 NONINTRACTABLE HEADACHE, UNSPECIFIED CHRONICITY PATTERN, UNSPECIFIED HEADACHE TYPE: ICD-10-CM

## 2021-10-15 PROCEDURE — 99214 OFFICE O/P EST MOD 30 MIN: CPT | Mod: S$GLB,,, | Performed by: NURSE PRACTITIONER

## 2021-10-15 PROCEDURE — S5010 5% DEXTROSE AND 0.45% SALINE: HCPCS | Performed by: INTERNAL MEDICINE

## 2021-10-15 PROCEDURE — 99214 PR OFFICE/OUTPT VISIT, EST, LEVL IV, 30-39 MIN: ICD-10-PCS | Mod: S$GLB,,, | Performed by: NURSE PRACTITIONER

## 2021-10-15 PROCEDURE — A4216 STERILE WATER/SALINE, 10 ML: HCPCS | Performed by: INTERNAL MEDICINE

## 2021-10-15 PROCEDURE — 96361 HYDRATE IV INFUSION ADD-ON: CPT

## 2021-10-15 PROCEDURE — 96360 HYDRATION IV INFUSION INIT: CPT

## 2021-10-15 PROCEDURE — 63600175 PHARM REV CODE 636 W HCPCS: Performed by: INTERNAL MEDICINE

## 2021-10-15 PROCEDURE — 25000003 PHARM REV CODE 250: Performed by: INTERNAL MEDICINE

## 2021-10-15 RX ORDER — HEPARIN 100 UNIT/ML
500 SYRINGE INTRAVENOUS
Status: CANCELLED | OUTPATIENT
Start: 2021-10-15

## 2021-10-15 RX ORDER — HEPARIN 100 UNIT/ML
500 SYRINGE INTRAVENOUS
Status: DISCONTINUED | OUTPATIENT
Start: 2021-10-15 | End: 2021-10-15 | Stop reason: HOSPADM

## 2021-10-15 RX ORDER — SODIUM CHLORIDE 0.9 % (FLUSH) 0.9 %
10 SYRINGE (ML) INJECTION
Status: DISCONTINUED | OUTPATIENT
Start: 2021-10-15 | End: 2021-10-15 | Stop reason: HOSPADM

## 2021-10-15 RX ORDER — SODIUM CHLORIDE 0.9 % (FLUSH) 0.9 %
10 SYRINGE (ML) INJECTION
Status: CANCELLED | OUTPATIENT
Start: 2021-10-15

## 2021-10-15 RX ADMIN — SODIUM CHLORIDE 10 ML: 9 INJECTION INTRAMUSCULAR; INTRAVENOUS; SUBCUTANEOUS at 12:10

## 2021-10-15 RX ADMIN — DEXTROSE AND SODIUM CHLORIDE 1000 ML: 5; .45 INJECTION, SOLUTION INTRAVENOUS at 11:10

## 2021-10-15 RX ADMIN — HEPARIN 500 UNITS: 100 SYRINGE at 12:10

## 2021-10-16 ENCOUNTER — PATIENT MESSAGE (OUTPATIENT)
Dept: HEMATOLOGY/ONCOLOGY | Facility: CLINIC | Age: 66
End: 2021-10-16
Payer: MEDICARE

## 2021-10-20 ENCOUNTER — TELEPHONE (OUTPATIENT)
Dept: HEMATOLOGY/ONCOLOGY | Facility: CLINIC | Age: 66
End: 2021-10-20

## 2021-10-20 ENCOUNTER — HOSPITAL ENCOUNTER (OUTPATIENT)
Dept: RADIOLOGY | Facility: HOSPITAL | Age: 66
Discharge: HOME | End: 2021-10-20
Attending: NURSE PRACTITIONER
Payer: MEDICARE

## 2021-10-20 VITALS — HEIGHT: 65 IN | BODY MASS INDEX: 22.66 KG/M2 | WEIGHT: 136 LBS

## 2021-10-20 DIAGNOSIS — C34.31 MALIGNANT NEOPLASM OF LOWER LOBE OF RIGHT LUNG: ICD-10-CM

## 2021-10-20 LAB — GLUCOSE SERPL-MCNC: 95 MG/DL (ref 70–110)

## 2021-10-20 PROCEDURE — 82962 GLUCOSE BLOOD TEST: CPT | Mod: PO

## 2021-10-20 PROCEDURE — 78815 PET IMAGE W/CT SKULL-THIGH: CPT | Mod: TC,PO,PS

## 2021-10-22 ENCOUNTER — INFUSION (OUTPATIENT)
Dept: INFUSION THERAPY | Facility: HOSPITAL | Age: 66
End: 2021-10-22
Attending: INTERNAL MEDICINE
Payer: MEDICARE

## 2021-10-22 VITALS
SYSTOLIC BLOOD PRESSURE: 142 MMHG | TEMPERATURE: 97 F | RESPIRATION RATE: 18 BRPM | WEIGHT: 136.38 LBS | DIASTOLIC BLOOD PRESSURE: 78 MMHG | OXYGEN SATURATION: 95 % | HEART RATE: 80 BPM | HEIGHT: 65 IN | BODY MASS INDEX: 22.72 KG/M2

## 2021-10-22 DIAGNOSIS — C34.31 SQUAMOUS CELL CARCINOMA OF BRONCHUS IN RIGHT LOWER LOBE: ICD-10-CM

## 2021-10-22 DIAGNOSIS — E86.0 DEHYDRATION: ICD-10-CM

## 2021-10-22 DIAGNOSIS — C79.51 BONE METASTASES: Primary | ICD-10-CM

## 2021-10-22 PROCEDURE — 96360 HYDRATION IV INFUSION INIT: CPT

## 2021-10-22 PROCEDURE — A4216 STERILE WATER/SALINE, 10 ML: HCPCS | Performed by: INTERNAL MEDICINE

## 2021-10-22 PROCEDURE — S5010 5% DEXTROSE AND 0.45% SALINE: HCPCS | Performed by: INTERNAL MEDICINE

## 2021-10-22 PROCEDURE — 25000003 PHARM REV CODE 250: Performed by: INTERNAL MEDICINE

## 2021-10-22 PROCEDURE — 96361 HYDRATE IV INFUSION ADD-ON: CPT

## 2021-10-22 PROCEDURE — 63600175 PHARM REV CODE 636 W HCPCS: Performed by: INTERNAL MEDICINE

## 2021-10-22 RX ORDER — HEPARIN 100 UNIT/ML
500 SYRINGE INTRAVENOUS
Status: DISCONTINUED | OUTPATIENT
Start: 2021-10-22 | End: 2021-10-22 | Stop reason: HOSPADM

## 2021-10-22 RX ORDER — HEPARIN 100 UNIT/ML
500 SYRINGE INTRAVENOUS
Status: CANCELLED | OUTPATIENT
Start: 2021-10-22

## 2021-10-22 RX ORDER — SODIUM CHLORIDE 0.9 % (FLUSH) 0.9 %
10 SYRINGE (ML) INJECTION
Status: DISCONTINUED | OUTPATIENT
Start: 2021-10-22 | End: 2021-10-22 | Stop reason: HOSPADM

## 2021-10-22 RX ORDER — SODIUM CHLORIDE 0.9 % (FLUSH) 0.9 %
10 SYRINGE (ML) INJECTION
Status: CANCELLED | OUTPATIENT
Start: 2021-10-22

## 2021-10-22 RX ADMIN — HEPARIN 500 UNITS: 100 SYRINGE at 01:10

## 2021-10-22 RX ADMIN — DEXTROSE AND SODIUM CHLORIDE 1000 ML: 5; .45 INJECTION, SOLUTION INTRAVENOUS at 11:10

## 2021-10-22 RX ADMIN — SODIUM CHLORIDE, PRESERVATIVE FREE 10 ML: 5 INJECTION INTRAVENOUS at 01:10

## 2021-10-25 DIAGNOSIS — K21.9 GASTROESOPHAGEAL REFLUX DISEASE, UNSPECIFIED WHETHER ESOPHAGITIS PRESENT: ICD-10-CM

## 2021-10-25 RX ORDER — PROCHLORPERAZINE MALEATE 10 MG
10 TABLET ORAL EVERY 6 HOURS PRN
Qty: 30 TABLET | Refills: 3 | Status: SHIPPED | OUTPATIENT
Start: 2021-10-25 | End: 2022-01-22 | Stop reason: SDUPTHER

## 2021-10-26 ENCOUNTER — TELEPHONE (OUTPATIENT)
Dept: HEMATOLOGY/ONCOLOGY | Facility: CLINIC | Age: 66
End: 2021-10-26
Payer: MEDICARE

## 2021-10-26 ENCOUNTER — PATIENT MESSAGE (OUTPATIENT)
Dept: HEMATOLOGY/ONCOLOGY | Facility: CLINIC | Age: 66
End: 2021-10-26
Payer: MEDICARE

## 2021-10-26 ENCOUNTER — INFUSION (OUTPATIENT)
Dept: INFUSION THERAPY | Facility: HOSPITAL | Age: 66
End: 2021-10-26
Attending: INTERNAL MEDICINE
Payer: MEDICARE

## 2021-10-26 VITALS
TEMPERATURE: 98 F | HEART RATE: 75 BPM | OXYGEN SATURATION: 99 % | DIASTOLIC BLOOD PRESSURE: 82 MMHG | HEIGHT: 65 IN | RESPIRATION RATE: 18 BRPM | WEIGHT: 137.5 LBS | BODY MASS INDEX: 22.91 KG/M2 | SYSTOLIC BLOOD PRESSURE: 134 MMHG

## 2021-10-26 DIAGNOSIS — C34.31 MALIGNANT NEOPLASM OF LOWER LOBE OF RIGHT LUNG: ICD-10-CM

## 2021-10-26 DIAGNOSIS — E86.0 DEHYDRATION: ICD-10-CM

## 2021-10-26 DIAGNOSIS — C34.31 MALIGNANT NEOPLASM OF LOWER LOBE OF RIGHT LUNG: Primary | ICD-10-CM

## 2021-10-26 DIAGNOSIS — C79.51 BONE METASTASES: ICD-10-CM

## 2021-10-26 DIAGNOSIS — C34.31 SQUAMOUS CELL CARCINOMA OF BRONCHUS IN RIGHT LOWER LOBE: ICD-10-CM

## 2021-10-26 LAB
ALBUMIN SERPL BCP-MCNC: 3.8 G/DL (ref 3.5–5.2)
ALP SERPL-CCNC: 83 U/L (ref 55–135)
ALT SERPL W/O P-5'-P-CCNC: 10 U/L (ref 10–44)
ANION GAP SERPL CALC-SCNC: 6 MMOL/L (ref 8–16)
AST SERPL-CCNC: 17 U/L (ref 10–40)
BASOPHILS # BLD AUTO: 0.01 K/UL (ref 0–0.2)
BASOPHILS NFR BLD: 0.4 % (ref 0–1.9)
BILIRUB SERPL-MCNC: 0.5 MG/DL (ref 0.1–1)
BUN SERPL-MCNC: 12 MG/DL (ref 8–23)
CALCIUM SERPL-MCNC: 9.6 MG/DL (ref 8.7–10.5)
CHLORIDE SERPL-SCNC: 106 MMOL/L (ref 95–110)
CO2 SERPL-SCNC: 30 MMOL/L (ref 23–29)
CREAT SERPL-MCNC: 0.6 MG/DL (ref 0.5–1.4)
DIFFERENTIAL METHOD: ABNORMAL
EOSINOPHIL # BLD AUTO: 0 K/UL (ref 0–0.5)
EOSINOPHIL NFR BLD: 0.7 % (ref 0–8)
ERYTHROCYTE [DISTWIDTH] IN BLOOD BY AUTOMATED COUNT: 14.6 % (ref 11.5–14.5)
EST. GFR  (AFRICAN AMERICAN): >60 ML/MIN/1.73 M^2
EST. GFR  (NON AFRICAN AMERICAN): >60 ML/MIN/1.73 M^2
GLUCOSE SERPL-MCNC: 96 MG/DL (ref 70–110)
HCT VFR BLD AUTO: 30.1 % (ref 37–48.5)
HGB BLD-MCNC: 9.9 G/DL (ref 12–16)
IMM GRANULOCYTES # BLD AUTO: 0.02 K/UL (ref 0–0.04)
IMM GRANULOCYTES NFR BLD AUTO: 0.7 % (ref 0–0.5)
LYMPHOCYTES # BLD AUTO: 0.3 K/UL (ref 1–4.8)
LYMPHOCYTES NFR BLD: 9.5 % (ref 18–48)
MCH RBC QN AUTO: 34.3 PG (ref 27–31)
MCHC RBC AUTO-ENTMCNC: 32.9 G/DL (ref 32–36)
MCV RBC AUTO: 104 FL (ref 82–98)
MONOCYTES # BLD AUTO: 0.3 K/UL (ref 0.3–1)
MONOCYTES NFR BLD: 11.9 % (ref 4–15)
NEUTROPHILS # BLD AUTO: 2.2 K/UL (ref 1.8–7.7)
NEUTROPHILS NFR BLD: 76.8 % (ref 38–73)
NRBC BLD-RTO: 0 /100 WBC
PLATELET # BLD AUTO: 141 K/UL (ref 150–450)
PMV BLD AUTO: 9.5 FL (ref 9.2–12.9)
POTASSIUM SERPL-SCNC: 3.9 MMOL/L (ref 3.5–5.1)
PROT SERPL-MCNC: 7 G/DL (ref 6–8.4)
RBC # BLD AUTO: 2.89 M/UL (ref 4–5.4)
SODIUM SERPL-SCNC: 142 MMOL/L (ref 136–145)
WBC # BLD AUTO: 2.85 K/UL (ref 3.9–12.7)

## 2021-10-26 PROCEDURE — G0179 MD RECERTIFICATION HHA PT: HCPCS | Mod: ,,, | Performed by: PSYCHIATRY & NEUROLOGY

## 2021-10-26 PROCEDURE — G0179 PR HOME HEALTH MD RECERTIFICATION: ICD-10-PCS | Mod: ,,, | Performed by: PSYCHIATRY & NEUROLOGY

## 2021-10-26 PROCEDURE — 85025 COMPLETE CBC W/AUTO DIFF WBC: CPT | Performed by: INTERNAL MEDICINE

## 2021-10-26 PROCEDURE — 96361 HYDRATE IV INFUSION ADD-ON: CPT

## 2021-10-26 PROCEDURE — 25000003 PHARM REV CODE 250: Performed by: INTERNAL MEDICINE

## 2021-10-26 PROCEDURE — S5010 5% DEXTROSE AND 0.45% SALINE: HCPCS | Performed by: INTERNAL MEDICINE

## 2021-10-26 PROCEDURE — 96360 HYDRATION IV INFUSION INIT: CPT

## 2021-10-26 PROCEDURE — 80053 COMPREHEN METABOLIC PANEL: CPT | Performed by: INTERNAL MEDICINE

## 2021-10-26 PROCEDURE — 63600175 PHARM REV CODE 636 W HCPCS: Performed by: INTERNAL MEDICINE

## 2021-10-26 RX ORDER — SODIUM CHLORIDE 0.9 % (FLUSH) 0.9 %
10 SYRINGE (ML) INJECTION
Status: CANCELLED | OUTPATIENT
Start: 2021-10-26

## 2021-10-26 RX ORDER — SODIUM CHLORIDE 0.9 % (FLUSH) 0.9 %
10 SYRINGE (ML) INJECTION
Status: DISCONTINUED | OUTPATIENT
Start: 2021-10-26 | End: 2021-10-26 | Stop reason: HOSPADM

## 2021-10-26 RX ORDER — HEPARIN 100 UNIT/ML
500 SYRINGE INTRAVENOUS
Status: CANCELLED | OUTPATIENT
Start: 2021-10-26

## 2021-10-26 RX ORDER — HEPARIN 100 UNIT/ML
500 SYRINGE INTRAVENOUS
Status: DISCONTINUED | OUTPATIENT
Start: 2021-10-26 | End: 2021-10-26 | Stop reason: HOSPADM

## 2021-10-26 RX ADMIN — DEXTROSE AND SODIUM CHLORIDE 1000 ML: 5; .45 INJECTION, SOLUTION INTRAVENOUS at 11:10

## 2021-10-26 RX ADMIN — HEPARIN 500 UNITS: 100 SYRINGE at 01:10

## 2021-10-27 ENCOUNTER — HOSPITAL ENCOUNTER (OUTPATIENT)
Dept: RADIOLOGY | Facility: HOSPITAL | Age: 66
Discharge: HOME | End: 2021-10-27
Attending: NURSE PRACTITIONER
Payer: MEDICARE

## 2021-10-27 DIAGNOSIS — R51.9 NONINTRACTABLE HEADACHE, UNSPECIFIED CHRONICITY PATTERN, UNSPECIFIED HEADACHE TYPE: ICD-10-CM

## 2021-10-27 DIAGNOSIS — C79.51 BONE METASTASES: ICD-10-CM

## 2021-10-27 DIAGNOSIS — C34.31 MALIGNANT NEOPLASM OF LOWER LOBE OF RIGHT LUNG: ICD-10-CM

## 2021-10-27 PROCEDURE — 25500020 PHARM REV CODE 255: Mod: PO | Performed by: NURSE PRACTITIONER

## 2021-10-27 PROCEDURE — A9585 GADOBUTROL INJECTION: HCPCS | Mod: PO | Performed by: NURSE PRACTITIONER

## 2021-10-27 PROCEDURE — 70553 MRI BRAIN STEM W/O & W/DYE: CPT | Mod: TC,PO

## 2021-10-27 RX ORDER — GADOBUTROL 604.72 MG/ML
7 INJECTION INTRAVENOUS
Status: COMPLETED | OUTPATIENT
Start: 2021-10-27 | End: 2021-10-27

## 2021-10-27 RX ADMIN — GADOBUTROL 7 ML: 604.72 INJECTION INTRAVENOUS at 01:10

## 2021-10-28 ENCOUNTER — TELEPHONE (OUTPATIENT)
Dept: FAMILY MEDICINE | Facility: CLINIC | Age: 66
End: 2021-10-28
Payer: MEDICARE

## 2021-10-29 ENCOUNTER — TELEPHONE (OUTPATIENT)
Dept: FAMILY MEDICINE | Facility: CLINIC | Age: 66
End: 2021-10-29
Payer: MEDICARE

## 2021-10-29 ENCOUNTER — INFUSION (OUTPATIENT)
Dept: INFUSION THERAPY | Facility: HOSPITAL | Age: 66
End: 2021-10-29
Attending: INTERNAL MEDICINE
Payer: MEDICARE

## 2021-10-29 VITALS
HEIGHT: 65 IN | WEIGHT: 136.13 LBS | TEMPERATURE: 98 F | BODY MASS INDEX: 22.68 KG/M2 | DIASTOLIC BLOOD PRESSURE: 95 MMHG | HEART RATE: 96 BPM | RESPIRATION RATE: 18 BRPM | OXYGEN SATURATION: 98 % | SYSTOLIC BLOOD PRESSURE: 175 MMHG

## 2021-10-29 DIAGNOSIS — E86.0 DEHYDRATION: ICD-10-CM

## 2021-10-29 DIAGNOSIS — C34.31 SQUAMOUS CELL CARCINOMA OF BRONCHUS IN RIGHT LOWER LOBE: ICD-10-CM

## 2021-10-29 DIAGNOSIS — C79.51 BONE METASTASES: Primary | ICD-10-CM

## 2021-10-29 PROCEDURE — S5010 5% DEXTROSE AND 0.45% SALINE: HCPCS | Performed by: INTERNAL MEDICINE

## 2021-10-29 PROCEDURE — 63600175 PHARM REV CODE 636 W HCPCS: Performed by: INTERNAL MEDICINE

## 2021-10-29 PROCEDURE — 96361 HYDRATE IV INFUSION ADD-ON: CPT

## 2021-10-29 PROCEDURE — 96360 HYDRATION IV INFUSION INIT: CPT

## 2021-10-29 PROCEDURE — 25000003 PHARM REV CODE 250: Performed by: INTERNAL MEDICINE

## 2021-10-29 PROCEDURE — A4216 STERILE WATER/SALINE, 10 ML: HCPCS | Performed by: INTERNAL MEDICINE

## 2021-10-29 RX ORDER — HEPARIN 100 UNIT/ML
500 SYRINGE INTRAVENOUS
Status: DISCONTINUED | OUTPATIENT
Start: 2021-10-29 | End: 2021-10-29 | Stop reason: HOSPADM

## 2021-10-29 RX ORDER — SODIUM CHLORIDE 0.9 % (FLUSH) 0.9 %
10 SYRINGE (ML) INJECTION
Status: DISCONTINUED | OUTPATIENT
Start: 2021-10-29 | End: 2021-10-29 | Stop reason: HOSPADM

## 2021-10-29 RX ORDER — HEPARIN 100 UNIT/ML
500 SYRINGE INTRAVENOUS
Status: CANCELLED | OUTPATIENT
Start: 2021-10-29

## 2021-10-29 RX ORDER — SODIUM CHLORIDE 0.9 % (FLUSH) 0.9 %
10 SYRINGE (ML) INJECTION
Status: CANCELLED | OUTPATIENT
Start: 2021-10-29

## 2021-10-29 RX ADMIN — SODIUM CHLORIDE 10 ML: 9 INJECTION INTRAMUSCULAR; INTRAVENOUS; SUBCUTANEOUS at 01:10

## 2021-10-29 RX ADMIN — HEPARIN 500 UNITS: 100 SYRINGE at 01:10

## 2021-10-29 RX ADMIN — DEXTROSE AND SODIUM CHLORIDE 1000 ML: 5; .45 INJECTION, SOLUTION INTRAVENOUS at 11:10

## 2021-10-31 ENCOUNTER — PATIENT MESSAGE (OUTPATIENT)
Dept: PSYCHIATRY | Facility: CLINIC | Age: 66
End: 2021-10-31
Payer: MEDICARE

## 2021-11-01 RX ORDER — MIRTAZAPINE 30 MG/1
30 TABLET, FILM COATED ORAL NIGHTLY
Qty: 90 TABLET | Refills: 0 | Status: SHIPPED | OUTPATIENT
Start: 2021-11-01 | End: 2021-11-17

## 2021-11-02 ENCOUNTER — INFUSION (OUTPATIENT)
Dept: INFUSION THERAPY | Facility: HOSPITAL | Age: 66
End: 2021-11-02
Attending: INTERNAL MEDICINE
Payer: MEDICARE

## 2021-11-02 VITALS
HEIGHT: 65 IN | HEART RATE: 91 BPM | WEIGHT: 136.69 LBS | OXYGEN SATURATION: 98 % | DIASTOLIC BLOOD PRESSURE: 78 MMHG | RESPIRATION RATE: 18 BRPM | SYSTOLIC BLOOD PRESSURE: 153 MMHG | BODY MASS INDEX: 22.77 KG/M2 | TEMPERATURE: 98 F

## 2021-11-02 DIAGNOSIS — C34.31 SQUAMOUS CELL CARCINOMA OF BRONCHUS IN RIGHT LOWER LOBE: ICD-10-CM

## 2021-11-02 DIAGNOSIS — E86.0 DEHYDRATION: ICD-10-CM

## 2021-11-02 DIAGNOSIS — C79.51 BONE METASTASES: Primary | ICD-10-CM

## 2021-11-02 PROCEDURE — 25000003 PHARM REV CODE 250: Performed by: INTERNAL MEDICINE

## 2021-11-02 PROCEDURE — 96361 HYDRATE IV INFUSION ADD-ON: CPT

## 2021-11-02 PROCEDURE — 96360 HYDRATION IV INFUSION INIT: CPT

## 2021-11-02 PROCEDURE — 63600175 PHARM REV CODE 636 W HCPCS: Performed by: INTERNAL MEDICINE

## 2021-11-02 PROCEDURE — A4216 STERILE WATER/SALINE, 10 ML: HCPCS | Performed by: INTERNAL MEDICINE

## 2021-11-02 PROCEDURE — S5010 5% DEXTROSE AND 0.45% SALINE: HCPCS | Performed by: INTERNAL MEDICINE

## 2021-11-02 RX ORDER — HEPARIN 100 UNIT/ML
500 SYRINGE INTRAVENOUS
Status: CANCELLED | OUTPATIENT
Start: 2021-11-02

## 2021-11-02 RX ORDER — SODIUM CHLORIDE 0.9 % (FLUSH) 0.9 %
10 SYRINGE (ML) INJECTION
Status: DISCONTINUED | OUTPATIENT
Start: 2021-11-02 | End: 2021-11-02 | Stop reason: HOSPADM

## 2021-11-02 RX ORDER — HEPARIN 100 UNIT/ML
500 SYRINGE INTRAVENOUS
Status: DISCONTINUED | OUTPATIENT
Start: 2021-11-02 | End: 2021-11-02 | Stop reason: HOSPADM

## 2021-11-02 RX ORDER — SODIUM CHLORIDE 0.9 % (FLUSH) 0.9 %
10 SYRINGE (ML) INJECTION
Status: CANCELLED | OUTPATIENT
Start: 2021-11-02

## 2021-11-02 RX ADMIN — DEXTROSE AND SODIUM CHLORIDE 1000 ML: 5; .45 INJECTION, SOLUTION INTRAVENOUS at 11:11

## 2021-11-02 RX ADMIN — SODIUM CHLORIDE, PRESERVATIVE FREE 10 ML: 5 INJECTION INTRAVENOUS at 01:11

## 2021-11-02 RX ADMIN — HEPARIN 500 UNITS: 100 SYRINGE at 01:11

## 2021-11-03 ENCOUNTER — EXTERNAL HOME HEALTH (OUTPATIENT)
Dept: HOME HEALTH SERVICES | Facility: HOSPITAL | Age: 66
End: 2021-11-03
Payer: MEDICARE

## 2021-11-04 ENCOUNTER — HOSPITAL ENCOUNTER (EMERGENCY)
Facility: HOSPITAL | Age: 66
Discharge: ANOTHER HOSPITAL | End: 2021-11-04
Attending: EMERGENCY MEDICINE
Payer: MEDICARE

## 2021-11-04 VITALS
RESPIRATION RATE: 14 BRPM | TEMPERATURE: 98 F | OXYGEN SATURATION: 95 % | WEIGHT: 136 LBS | SYSTOLIC BLOOD PRESSURE: 162 MMHG | BODY MASS INDEX: 22.63 KG/M2 | DIASTOLIC BLOOD PRESSURE: 75 MMHG | HEART RATE: 95 BPM

## 2021-11-04 DIAGNOSIS — R58 ECCHYMOSIS: ICD-10-CM

## 2021-11-04 DIAGNOSIS — R51.9 NONINTRACTABLE HEADACHE, UNSPECIFIED CHRONICITY PATTERN, UNSPECIFIED HEADACHE TYPE: ICD-10-CM

## 2021-11-04 DIAGNOSIS — S02.40FA CLOSED FRACTURE OF LEFT ZYGOMATIC ARCH, INITIAL ENCOUNTER: ICD-10-CM

## 2021-11-04 DIAGNOSIS — S02.85XA CLOSED FRACTURE OF ORBIT, INITIAL ENCOUNTER: ICD-10-CM

## 2021-11-04 DIAGNOSIS — R55 SYNCOPE: ICD-10-CM

## 2021-11-04 DIAGNOSIS — J34.9 SINUS TROUBLE: ICD-10-CM

## 2021-11-04 DIAGNOSIS — S09.90XA INJURY OF HEAD, INITIAL ENCOUNTER: Primary | ICD-10-CM

## 2021-11-04 DIAGNOSIS — S02.40DA CLOSED FRACTURE OF LEFT SIDE OF MAXILLA, INITIAL ENCOUNTER: ICD-10-CM
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
## 2021-11-04 DIAGNOSIS — I60.9 SUBARACHNOID HEMORRHAGE: ICD-10-CM

## 2021-11-04 LAB
ALBUMIN SERPL BCP-MCNC: 4 G/DL (ref 3.5–5.2)
ALP SERPL-CCNC: 86 U/L (ref 55–135)
ALT SERPL W/O P-5'-P-CCNC: 11 U/L (ref 10–44)
ANION GAP SERPL CALC-SCNC: 9 MMOL/L (ref 8–16)
APTT PPP: 21.4 SEC (ref 23.3–35.1)
AST SERPL-CCNC: 19 U/L (ref 10–40)
BASOPHILS # BLD AUTO: 0 K/UL (ref 0–0.2)
BASOPHILS NFR BLD: 0 % (ref 0–1.9)
BILIRUB SERPL-MCNC: 0.5 MG/DL (ref 0.1–1)
BUN SERPL-MCNC: 11 MG/DL (ref 8–23)
CALCIUM SERPL-MCNC: 9.5 MG/DL (ref 8.7–10.5)
CHLORIDE SERPL-SCNC: 104 MMOL/L (ref 95–110)
CO2 SERPL-SCNC: 28 MMOL/L (ref 23–29)
CREAT SERPL-MCNC: 0.7 MG/DL (ref 0.5–1.4)
DIFFERENTIAL METHOD: ABNORMAL
EOSINOPHIL # BLD AUTO: 0 K/UL (ref 0–0.5)
EOSINOPHIL NFR BLD: 0.4 % (ref 0–8)
ERYTHROCYTE [DISTWIDTH] IN BLOOD BY AUTOMATED COUNT: 14 % (ref 11.5–14.5)
EST. GFR  (AFRICAN AMERICAN): >60 ML/MIN/1.73 M^2
EST. GFR  (NON AFRICAN AMERICAN): >60 ML/MIN/1.73 M^2
GLUCOSE SERPL-MCNC: 109 MG/DL (ref 70–110)
GLUCOSE SERPL-MCNC: 111 MG/DL (ref 70–110)
HCT VFR BLD AUTO: 31.5 % (ref 37–48.5)
HGB BLD-MCNC: 10.4 G/DL (ref 12–16)
IMM GRANULOCYTES # BLD AUTO: 0.02 K/UL (ref 0–0.04)
IMM GRANULOCYTES NFR BLD AUTO: 0.4 % (ref 0–0.5)
INR PPP: 1
LYMPHOCYTES # BLD AUTO: 0.4 K/UL (ref 1–4.8)
LYMPHOCYTES NFR BLD: 7.2 % (ref 18–48)
MCH RBC QN AUTO: 34.3 PG (ref 27–31)
MCHC RBC AUTO-ENTMCNC: 33 G/DL (ref 32–36)
MCV RBC AUTO: 104 FL (ref 82–98)
MONOCYTES # BLD AUTO: 0.4 K/UL (ref 0.3–1)
MONOCYTES NFR BLD: 8.7 % (ref 4–15)
NEUTROPHILS # BLD AUTO: 4 K/UL (ref 1.8–7.7)
NEUTROPHILS NFR BLD: 83.3 % (ref 38–73)
NRBC BLD-RTO: 0 /100 WBC
PLATELET # BLD AUTO: 140 K/UL (ref 150–450)
PMV BLD AUTO: 10.2 FL (ref 9.2–12.9)
POTASSIUM SERPL-SCNC: 3.9 MMOL/L (ref 3.5–5.1)
PROT SERPL-MCNC: 7.1 G/DL (ref 6–8.4)
PROTHROMBIN TIME: 12.9 SEC (ref 11.4–13.7)
RBC # BLD AUTO: 3.03 M/UL (ref 4–5.4)
SARS-COV-2 RDRP RESP QL NAA+PROBE: NEGATIVE
SODIUM SERPL-SCNC: 141 MMOL/L (ref 136–145)
WBC # BLD AUTO: 4.84 K/UL (ref 3.9–12.7)

## 2021-11-04 PROCEDURE — 90471 IMMUNIZATION ADMIN: CPT | Performed by: NURSE PRACTITIONER

## 2021-11-04 PROCEDURE — 63600175 PHARM REV CODE 636 W HCPCS: Performed by: NURSE PRACTITIONER

## 2021-11-04 PROCEDURE — 85730 THROMBOPLASTIN TIME PARTIAL: CPT | Performed by: NURSE PRACTITIONER

## 2021-11-04 PROCEDURE — 96376 TX/PRO/DX INJ SAME DRUG ADON: CPT

## 2021-11-04 PROCEDURE — 93010 ELECTROCARDIOGRAM REPORT: CPT | Mod: ,,, | Performed by: INTERNAL MEDICINE

## 2021-11-04 PROCEDURE — 90715 TDAP VACCINE 7 YRS/> IM: CPT | Performed by: NURSE PRACTITIONER

## 2021-11-04 PROCEDURE — 85025 COMPLETE CBC W/AUTO DIFF WBC: CPT | Performed by: NURSE PRACTITIONER

## 2021-11-04 PROCEDURE — 93005 ELECTROCARDIOGRAM TRACING: CPT | Performed by: INTERNAL MEDICINE

## 2021-11-04 PROCEDURE — 99285 EMERGENCY DEPT VISIT HI MDM: CPT | Mod: 25

## 2021-11-04 PROCEDURE — 96375 TX/PRO/DX INJ NEW DRUG ADDON: CPT

## 2021-11-04 PROCEDURE — 96374 THER/PROPH/DIAG INJ IV PUSH: CPT

## 2021-11-04 PROCEDURE — 93010 EKG 12-LEAD: ICD-10-PCS | Mod: ,,, | Performed by: INTERNAL MEDICINE

## 2021-11-04 PROCEDURE — 80053 COMPREHEN METABOLIC PANEL: CPT | Performed by: NURSE PRACTITIONER

## 2021-11-04 PROCEDURE — 82962 GLUCOSE BLOOD TEST: CPT

## 2021-11-04 PROCEDURE — U0002 COVID-19 LAB TEST NON-CDC: HCPCS | Performed by: NURSE PRACTITIONER

## 2021-11-04 PROCEDURE — 85610 PROTHROMBIN TIME: CPT | Performed by: NURSE PRACTITIONER

## 2021-11-04 RX ORDER — ONDANSETRON 2 MG/ML
4 INJECTION INTRAMUSCULAR; INTRAVENOUS ONCE
Status: COMPLETED | OUTPATIENT
Start: 2021-11-04 | End: 2021-11-04

## 2021-11-04 RX ORDER — LIDOCAINE HYDROCHLORIDE AND EPINEPHRINE 10; 10 MG/ML; UG/ML
10 INJECTION, SOLUTION INFILTRATION; PERINEURAL ONCE
Status: DISCONTINUED | OUTPATIENT
Start: 2021-11-04 | End: 2021-11-04 | Stop reason: HOSPADM

## 2021-11-04 RX ORDER — ONDANSETRON 2 MG/ML
4 INJECTION INTRAMUSCULAR; INTRAVENOUS
Status: COMPLETED | OUTPATIENT
Start: 2021-11-04 | End: 2021-11-04

## 2021-11-04 RX ORDER — MORPHINE SULFATE 2 MG/ML
2 INJECTION, SOLUTION INTRAMUSCULAR; INTRAVENOUS
Status: COMPLETED | OUTPATIENT
Start: 2021-11-04 | End: 2021-11-04

## 2021-11-04 RX ADMIN — ONDANSETRON 4 MG: 2 INJECTION INTRAMUSCULAR; INTRAVENOUS at 07:11

## 2021-11-04 RX ADMIN — ONDANSETRON 4 MG: 2 INJECTION INTRAMUSCULAR; INTRAVENOUS at 08:11

## 2021-11-04 RX ADMIN — CLOSTRIDIUM TETANI TOXOID ANTIGEN (FORMALDEHYDE INACTIVATED), CORYNEBACTERIUM DIPHTHERIAE TOXOID ANTIGEN (FORMALDEHYDE INACTIVATED), BORDETELLA PERTUSSIS TOXOID ANTIGEN (GLUTARALDEHYDE INACTIVATED), BORDETELLA PERTUSSIS FILAMENTOUS HEMAGGLUTININ ANTIGEN (FORMALDEHYDE INACTIVATED), BORDETELLA PERTUSSIS PERTACTIN ANTIGEN, AND BORDETELLA PERTUSSIS FIMBRIAE 2/3 ANTIGEN 0.5 ML: 5; 2; 2.5; 5; 3; 5 INJECTION, SUSPENSION INTRAMUSCULAR at 07:11

## 2021-11-04 RX ADMIN — MORPHINE SULFATE 2 MG: 2 INJECTION, SOLUTION INTRAMUSCULAR; INTRAVENOUS at 07:11

## 2021-11-05 ENCOUNTER — TELEPHONE (OUTPATIENT)
Dept: HEMATOLOGY/ONCOLOGY | Facility: CLINIC | Age: 66
End: 2021-11-05
Payer: MEDICARE

## 2021-11-08 ENCOUNTER — INFUSION (OUTPATIENT)
Dept: INFUSION THERAPY | Facility: HOSPITAL | Age: 66
End: 2021-11-08
Attending: INTERNAL MEDICINE
Payer: MEDICARE

## 2021-11-08 VITALS
SYSTOLIC BLOOD PRESSURE: 137 MMHG | HEART RATE: 102 BPM | WEIGHT: 136.38 LBS | DIASTOLIC BLOOD PRESSURE: 82 MMHG | HEIGHT: 65 IN | BODY MASS INDEX: 22.72 KG/M2 | RESPIRATION RATE: 16 BRPM

## 2021-11-08 DIAGNOSIS — E86.0 DEHYDRATION: ICD-10-CM

## 2021-11-08 DIAGNOSIS — C34.31 SQUAMOUS CELL CARCINOMA OF BRONCHUS IN RIGHT LOWER LOBE: ICD-10-CM

## 2021-11-08 DIAGNOSIS — C79.51 BONE METASTASES: Primary | ICD-10-CM

## 2021-11-08 PROCEDURE — S5010 5% DEXTROSE AND 0.45% SALINE: HCPCS | Performed by: INTERNAL MEDICINE

## 2021-11-08 PROCEDURE — 63600175 PHARM REV CODE 636 W HCPCS: Performed by: INTERNAL MEDICINE

## 2021-11-08 PROCEDURE — 25000003 PHARM REV CODE 250: Performed by: INTERNAL MEDICINE

## 2021-11-08 PROCEDURE — 96366 THER/PROPH/DIAG IV INF ADDON: CPT

## 2021-11-08 PROCEDURE — 96365 THER/PROPH/DIAG IV INF INIT: CPT

## 2021-11-08 RX ORDER — SODIUM CHLORIDE 0.9 % (FLUSH) 0.9 %
10 SYRINGE (ML) INJECTION
Status: CANCELLED | OUTPATIENT
Start: 2021-11-08

## 2021-11-08 RX ORDER — HEPARIN 100 UNIT/ML
500 SYRINGE INTRAVENOUS
Status: CANCELLED | OUTPATIENT
Start: 2021-11-08

## 2021-11-08 RX ORDER — HEPARIN 100 UNIT/ML
500 SYRINGE INTRAVENOUS
Status: DISCONTINUED | OUTPATIENT
Start: 2021-11-08 | End: 2021-11-08 | Stop reason: HOSPADM

## 2021-11-08 RX ADMIN — DEXTROSE AND SODIUM CHLORIDE 1000 ML: 5; .45 INJECTION, SOLUTION INTRAVENOUS at 02:11

## 2021-11-08 RX ADMIN — HEPARIN 500 UNITS: 100 SYRINGE at 04:11

## 2021-11-11 ENCOUNTER — TELEPHONE (OUTPATIENT)
Dept: FAMILY MEDICINE | Facility: CLINIC | Age: 66
End: 2021-11-11
Payer: MEDICARE

## 2021-11-11 ENCOUNTER — TELEPHONE (OUTPATIENT)
Dept: HEMATOLOGY/ONCOLOGY | Facility: CLINIC | Age: 66
End: 2021-11-11
Payer: MEDICARE

## 2021-11-11 ENCOUNTER — PATIENT MESSAGE (OUTPATIENT)
Dept: HEMATOLOGY/ONCOLOGY | Facility: CLINIC | Age: 66
End: 2021-11-11
Payer: MEDICARE

## 2021-11-11 DIAGNOSIS — Z98.818 OTHER DENTAL PROCEDURE STATUS: Primary | ICD-10-CM

## 2021-11-11 RX ORDER — CLINDAMYCIN HYDROCHLORIDE 300 MG/1
300 CAPSULE ORAL 3 TIMES DAILY
Qty: 63 CAPSULE | Refills: 0 | Status: SHIPPED | OUTPATIENT
Start: 2021-11-11 | End: 2021-11-18

## 2021-11-12 ENCOUNTER — INFUSION (OUTPATIENT)
Dept: INFUSION THERAPY | Facility: HOSPITAL | Age: 66
End: 2021-11-12
Attending: INTERNAL MEDICINE
Payer: MEDICARE

## 2021-11-12 VITALS
DIASTOLIC BLOOD PRESSURE: 82 MMHG | OXYGEN SATURATION: 96 % | TEMPERATURE: 97 F | HEIGHT: 65 IN | SYSTOLIC BLOOD PRESSURE: 154 MMHG | WEIGHT: 138.19 LBS | BODY MASS INDEX: 23.02 KG/M2 | HEART RATE: 96 BPM | RESPIRATION RATE: 16 BRPM

## 2021-11-12 DIAGNOSIS — C79.51 BONE METASTASES: Primary | ICD-10-CM

## 2021-11-12 DIAGNOSIS — C34.31 SQUAMOUS CELL CARCINOMA OF BRONCHUS IN RIGHT LOWER LOBE: ICD-10-CM

## 2021-11-12 DIAGNOSIS — E86.0 DEHYDRATION: ICD-10-CM

## 2021-11-12 PROCEDURE — S5010 5% DEXTROSE AND 0.45% SALINE: HCPCS | Performed by: INTERNAL MEDICINE

## 2021-11-12 PROCEDURE — 96361 HYDRATE IV INFUSION ADD-ON: CPT

## 2021-11-12 PROCEDURE — 63600175 PHARM REV CODE 636 W HCPCS: Performed by: INTERNAL MEDICINE

## 2021-11-12 PROCEDURE — 96360 HYDRATION IV INFUSION INIT: CPT

## 2021-11-12 PROCEDURE — 25000003 PHARM REV CODE 250: Performed by: INTERNAL MEDICINE

## 2021-11-12 PROCEDURE — A4216 STERILE WATER/SALINE, 10 ML: HCPCS | Performed by: INTERNAL MEDICINE

## 2021-11-12 RX ORDER — HEPARIN 100 UNIT/ML
500 SYRINGE INTRAVENOUS
Status: DISCONTINUED | OUTPATIENT
Start: 2021-11-12 | End: 2021-11-12 | Stop reason: HOSPADM

## 2021-11-12 RX ORDER — SODIUM CHLORIDE 0.9 % (FLUSH) 0.9 %
10 SYRINGE (ML) INJECTION
Status: DISCONTINUED | OUTPATIENT
Start: 2021-11-12 | End: 2021-11-12 | Stop reason: HOSPADM

## 2021-11-12 RX ORDER — HEPARIN 100 UNIT/ML
500 SYRINGE INTRAVENOUS
Status: CANCELLED | OUTPATIENT
Start: 2021-11-12

## 2021-11-12 RX ORDER — SODIUM CHLORIDE 0.9 % (FLUSH) 0.9 %
10 SYRINGE (ML) INJECTION
Status: CANCELLED | OUTPATIENT
Start: 2021-11-12

## 2021-11-12 RX ADMIN — SODIUM CHLORIDE 10 ML: 9 INJECTION INTRAMUSCULAR; INTRAVENOUS; SUBCUTANEOUS at 03:11

## 2021-11-12 RX ADMIN — DEXTROSE AND SODIUM CHLORIDE 1000 ML: 5; .45 INJECTION, SOLUTION INTRAVENOUS at 01:11

## 2021-11-12 RX ADMIN — HEPARIN 500 UNITS: 100 SYRINGE at 03:11

## 2021-11-16 ENCOUNTER — TELEPHONE (OUTPATIENT)
Dept: INFUSION THERAPY | Facility: HOSPITAL | Age: 66
End: 2021-11-16
Payer: MEDICARE

## 2021-11-17 ENCOUNTER — PATIENT MESSAGE (OUTPATIENT)
Dept: ADMINISTRATIVE | Facility: OTHER | Age: 66
End: 2021-11-17
Payer: MEDICARE

## 2021-11-17 ENCOUNTER — OUTPATIENT CASE MANAGEMENT (OUTPATIENT)
Dept: ADMINISTRATIVE | Facility: OTHER | Age: 66
End: 2021-11-17
Payer: MEDICARE

## 2021-11-17 ENCOUNTER — TELEPHONE (OUTPATIENT)
Dept: FAMILY MEDICINE | Facility: CLINIC | Age: 66
End: 2021-11-17
Payer: MEDICARE

## 2021-11-17 ENCOUNTER — HOSPITAL ENCOUNTER (OUTPATIENT)
Dept: RADIOLOGY | Facility: HOSPITAL | Age: 66
Discharge: HOME | End: 2021-11-17
Attending: STUDENT IN AN ORGANIZED HEALTH CARE EDUCATION/TRAINING PROGRAM
Payer: MEDICARE

## 2021-11-17 ENCOUNTER — OFFICE VISIT (OUTPATIENT)
Dept: FAMILY MEDICINE | Facility: CLINIC | Age: 66
End: 2021-11-17
Payer: MEDICARE

## 2021-11-17 VITALS
HEART RATE: 107 BPM | OXYGEN SATURATION: 95 % | HEIGHT: 65 IN | DIASTOLIC BLOOD PRESSURE: 62 MMHG | WEIGHT: 134.5 LBS | BODY MASS INDEX: 22.41 KG/M2 | SYSTOLIC BLOOD PRESSURE: 110 MMHG | TEMPERATURE: 99 F | RESPIRATION RATE: 16 BRPM

## 2021-11-17 DIAGNOSIS — R51.9 CHRONIC NONINTRACTABLE HEADACHE, UNSPECIFIED HEADACHE TYPE: ICD-10-CM

## 2021-11-17 DIAGNOSIS — R35.0 FREQUENCY OF URINATION: ICD-10-CM

## 2021-11-17 DIAGNOSIS — S02.40FD CLOSED FRACTURE OF LEFT ZYGOMATIC ARCH WITH ROUTINE HEALING, SUBSEQUENT ENCOUNTER: ICD-10-CM

## 2021-11-17 DIAGNOSIS — D69.6 THROMBOCYTOPENIA, UNSPECIFIED: ICD-10-CM

## 2021-11-17 DIAGNOSIS — C79.51 BONE METASTASES: ICD-10-CM

## 2021-11-17 DIAGNOSIS — S02.40DD CLOSED FRACTURE OF LEFT SIDE OF MAXILLA WITH ROUTINE HEALING, SUBSEQUENT ENCOUNTER: ICD-10-CM

## 2021-11-17 DIAGNOSIS — S02.85XD CLOSED FRACTURE OF LEFT ORBIT WITH ROUTINE HEALING, SUBSEQUENT ENCOUNTER: ICD-10-CM

## 2021-11-17 DIAGNOSIS — G89.29 CHRONIC NONINTRACTABLE HEADACHE, UNSPECIFIED HEADACHE TYPE: ICD-10-CM

## 2021-11-17 DIAGNOSIS — R53.81 PHYSICAL DECONDITIONING: ICD-10-CM

## 2021-11-17 DIAGNOSIS — R53.83 FATIGUE, UNSPECIFIED TYPE: ICD-10-CM

## 2021-11-17 DIAGNOSIS — Z12.31 ENCOUNTER FOR SCREENING MAMMOGRAM FOR MALIGNANT NEOPLASM OF BREAST: ICD-10-CM

## 2021-11-17 DIAGNOSIS — Z09 HOSPITAL DISCHARGE FOLLOW-UP: Primary | ICD-10-CM

## 2021-11-17 DIAGNOSIS — R53.1 WEAKNESS: ICD-10-CM

## 2021-11-17 DIAGNOSIS — C34.31 SQUAMOUS CELL CARCINOMA OF BRONCHUS IN RIGHT LOWER LOBE: ICD-10-CM

## 2021-11-17 DIAGNOSIS — M81.0 OSTEOPOROSIS, SENILE: ICD-10-CM

## 2021-11-17 PROCEDURE — 99999 PR PBB SHADOW E&M-EST. PATIENT-LVL V: ICD-10-PCS | Mod: PBBFAC,,, | Performed by: STUDENT IN AN ORGANIZED HEALTH CARE EDUCATION/TRAINING PROGRAM

## 2021-11-17 PROCEDURE — 99215 OFFICE O/P EST HI 40 MIN: CPT | Mod: S$PBB,,, | Performed by: STUDENT IN AN ORGANIZED HEALTH CARE EDUCATION/TRAINING PROGRAM

## 2021-11-17 PROCEDURE — 99215 PR OFFICE/OUTPT VISIT, EST, LEVL V, 40-54 MIN: ICD-10-PCS | Mod: S$PBB,,, | Performed by: STUDENT IN AN ORGANIZED HEALTH CARE EDUCATION/TRAINING PROGRAM

## 2021-11-17 PROCEDURE — 99999 PR PBB SHADOW E&M-EST. PATIENT-LVL V: CPT | Mod: PBBFAC,,, | Performed by: STUDENT IN AN ORGANIZED HEALTH CARE EDUCATION/TRAINING PROGRAM

## 2021-11-17 PROCEDURE — 70450 CT HEAD/BRAIN W/O DYE: CPT | Mod: TC

## 2021-11-17 PROCEDURE — 70450 CT HEAD WITHOUT CONTRAST: ICD-10-PCS | Mod: 26,,, | Performed by: RADIOLOGY

## 2021-11-17 PROCEDURE — 99215 OFFICE O/P EST HI 40 MIN: CPT | Mod: PBBFAC,25,PO | Performed by: STUDENT IN AN ORGANIZED HEALTH CARE EDUCATION/TRAINING PROGRAM

## 2021-11-17 PROCEDURE — 70450 CT HEAD/BRAIN W/O DYE: CPT | Mod: 26,,, | Performed by: RADIOLOGY

## 2021-11-18 ENCOUNTER — TELEPHONE (OUTPATIENT)
Dept: PRIMARY CARE CLINIC | Facility: CLINIC | Age: 66
End: 2021-11-18
Payer: MEDICARE

## 2021-11-18 ENCOUNTER — OFFICE VISIT (OUTPATIENT)
Dept: HEMATOLOGY/ONCOLOGY | Facility: CLINIC | Age: 66
End: 2021-11-18
Payer: MEDICARE

## 2021-11-18 VITALS
RESPIRATION RATE: 18 BRPM | BODY MASS INDEX: 22.3 KG/M2 | HEART RATE: 103 BPM | DIASTOLIC BLOOD PRESSURE: 86 MMHG | WEIGHT: 134 LBS | SYSTOLIC BLOOD PRESSURE: 143 MMHG

## 2021-11-18 DIAGNOSIS — C79.51 BONE METASTASES: ICD-10-CM

## 2021-11-18 DIAGNOSIS — C34.31 SQUAMOUS CELL CARCINOMA OF BRONCHUS IN RIGHT LOWER LOBE: ICD-10-CM

## 2021-11-18 DIAGNOSIS — S02.40FD: ICD-10-CM

## 2021-11-18 DIAGNOSIS — G89.3 CANCER ASSOCIATED PAIN: ICD-10-CM

## 2021-11-18 DIAGNOSIS — C34.31 MALIGNANT NEOPLASM OF LOWER LOBE OF RIGHT LUNG: Chronic | ICD-10-CM

## 2021-11-18 PROCEDURE — 99214 OFFICE O/P EST MOD 30 MIN: CPT | Mod: S$GLB,,, | Performed by: INTERNAL MEDICINE

## 2021-11-18 PROCEDURE — 99214 PR OFFICE/OUTPT VISIT, EST, LEVL IV, 30-39 MIN: ICD-10-PCS | Mod: S$GLB,,, | Performed by: INTERNAL MEDICINE

## 2021-11-19 ENCOUNTER — INFUSION (OUTPATIENT)
Dept: INFUSION THERAPY | Facility: HOSPITAL | Age: 66
End: 2021-11-19
Attending: INTERNAL MEDICINE
Payer: MEDICARE

## 2021-11-19 VITALS
HEART RATE: 88 BPM | DIASTOLIC BLOOD PRESSURE: 77 MMHG | BODY MASS INDEX: 22.48 KG/M2 | TEMPERATURE: 97 F | WEIGHT: 135.13 LBS | RESPIRATION RATE: 18 BRPM | OXYGEN SATURATION: 98 % | SYSTOLIC BLOOD PRESSURE: 132 MMHG

## 2021-11-19 DIAGNOSIS — E86.0 DEHYDRATION: ICD-10-CM

## 2021-11-19 DIAGNOSIS — C34.31 SQUAMOUS CELL CARCINOMA OF BRONCHUS IN RIGHT LOWER LOBE: ICD-10-CM

## 2021-11-19 DIAGNOSIS — C79.51 BONE METASTASES: Primary | ICD-10-CM

## 2021-11-19 PROCEDURE — 96361 HYDRATE IV INFUSION ADD-ON: CPT

## 2021-11-19 PROCEDURE — A4216 STERILE WATER/SALINE, 10 ML: HCPCS | Performed by: INTERNAL MEDICINE

## 2021-11-19 PROCEDURE — 96360 HYDRATION IV INFUSION INIT: CPT

## 2021-11-19 PROCEDURE — S5010 5% DEXTROSE AND 0.45% SALINE: HCPCS | Performed by: INTERNAL MEDICINE

## 2021-11-19 PROCEDURE — 63600175 PHARM REV CODE 636 W HCPCS: Performed by: INTERNAL MEDICINE

## 2021-11-19 PROCEDURE — 25000003 PHARM REV CODE 250: Performed by: INTERNAL MEDICINE

## 2021-11-19 RX ORDER — SODIUM CHLORIDE 0.9 % (FLUSH) 0.9 %
10 SYRINGE (ML) INJECTION
Status: CANCELLED | OUTPATIENT
Start: 2021-11-19

## 2021-11-19 RX ORDER — SODIUM CHLORIDE 0.9 % (FLUSH) 0.9 %
10 SYRINGE (ML) INJECTION
Status: DISCONTINUED | OUTPATIENT
Start: 2021-11-19 | End: 2021-11-19 | Stop reason: HOSPADM

## 2021-11-19 RX ORDER — HEPARIN 100 UNIT/ML
500 SYRINGE INTRAVENOUS
Status: CANCELLED | OUTPATIENT
Start: 2021-11-19

## 2021-11-19 RX ORDER — HEPARIN 100 UNIT/ML
500 SYRINGE INTRAVENOUS
Status: DISCONTINUED | OUTPATIENT
Start: 2021-11-19 | End: 2021-11-19 | Stop reason: HOSPADM

## 2021-11-19 RX ORDER — FENTANYL 75 UG/H
1 PATCH TRANSDERMAL
Qty: 10 PATCH | Refills: 0 | Status: ON HOLD | OUTPATIENT
Start: 2021-11-19 | End: 2022-03-25 | Stop reason: HOSPADM

## 2021-11-19 RX ADMIN — SODIUM CHLORIDE, PRESERVATIVE FREE 10 ML: 5 INJECTION INTRAVENOUS at 01:11

## 2021-11-19 RX ADMIN — DEXTROSE AND SODIUM CHLORIDE 1000 ML: 5; .45 INJECTION, SOLUTION INTRAVENOUS at 11:11

## 2021-11-19 RX ADMIN — HEPARIN 500 UNITS: 100 SYRINGE at 01:11

## 2021-11-22 ENCOUNTER — TELEPHONE (OUTPATIENT)
Dept: FAMILY MEDICINE | Facility: CLINIC | Age: 66
End: 2021-11-22
Payer: MEDICARE

## 2021-11-22 ENCOUNTER — TELEPHONE (OUTPATIENT)
Dept: PSYCHIATRY | Facility: CLINIC | Age: 66
End: 2021-11-22
Payer: MEDICARE

## 2021-11-22 RX ORDER — MIRTAZAPINE 30 MG/1
15 TABLET, FILM COATED ORAL NIGHTLY
COMMUNITY
End: 2022-02-14 | Stop reason: DRUGHIGH

## 2021-11-23 ENCOUNTER — INFUSION (OUTPATIENT)
Dept: INFUSION THERAPY | Facility: HOSPITAL | Age: 66
End: 2021-11-23
Attending: INTERNAL MEDICINE
Payer: MEDICARE

## 2021-11-23 VITALS
SYSTOLIC BLOOD PRESSURE: 149 MMHG | DIASTOLIC BLOOD PRESSURE: 82 MMHG | WEIGHT: 135.63 LBS | TEMPERATURE: 98 F | BODY MASS INDEX: 22.6 KG/M2 | HEART RATE: 86 BPM | OXYGEN SATURATION: 99 % | HEIGHT: 65 IN | RESPIRATION RATE: 18 BRPM

## 2021-11-23 DIAGNOSIS — E86.0 DEHYDRATION: ICD-10-CM

## 2021-11-23 DIAGNOSIS — C34.31 SQUAMOUS CELL CARCINOMA OF BRONCHUS IN RIGHT LOWER LOBE: ICD-10-CM

## 2021-11-23 DIAGNOSIS — C79.51 BONE METASTASES: Primary | ICD-10-CM

## 2021-11-23 PROCEDURE — 96360 HYDRATION IV INFUSION INIT: CPT

## 2021-11-23 PROCEDURE — S5010 5% DEXTROSE AND 0.45% SALINE: HCPCS | Performed by: INTERNAL MEDICINE

## 2021-11-23 PROCEDURE — 96361 HYDRATE IV INFUSION ADD-ON: CPT

## 2021-11-23 PROCEDURE — 25000003 PHARM REV CODE 250: Performed by: INTERNAL MEDICINE

## 2021-11-23 PROCEDURE — 63600175 PHARM REV CODE 636 W HCPCS: Performed by: INTERNAL MEDICINE

## 2021-11-23 PROCEDURE — A4216 STERILE WATER/SALINE, 10 ML: HCPCS | Performed by: INTERNAL MEDICINE

## 2021-11-23 RX ORDER — HEPARIN 100 UNIT/ML
500 SYRINGE INTRAVENOUS
Status: DISCONTINUED | OUTPATIENT
Start: 2021-11-23 | End: 2021-11-23 | Stop reason: HOSPADM

## 2021-11-23 RX ORDER — HEPARIN 100 UNIT/ML
500 SYRINGE INTRAVENOUS
Status: CANCELLED | OUTPATIENT
Start: 2021-11-23

## 2021-11-23 RX ORDER — SODIUM CHLORIDE 0.9 % (FLUSH) 0.9 %
10 SYRINGE (ML) INJECTION
Status: CANCELLED | OUTPATIENT
Start: 2021-11-23

## 2021-11-23 RX ORDER — SODIUM CHLORIDE 0.9 % (FLUSH) 0.9 %
10 SYRINGE (ML) INJECTION
Status: DISCONTINUED | OUTPATIENT
Start: 2021-11-23 | End: 2021-11-23 | Stop reason: HOSPADM

## 2021-11-23 RX ADMIN — HEPARIN 500 UNITS: 100 SYRINGE at 01:11

## 2021-11-23 RX ADMIN — DEXTROSE AND SODIUM CHLORIDE 1000 ML: 5; .45 INJECTION, SOLUTION INTRAVENOUS at 11:11

## 2021-11-23 RX ADMIN — SODIUM CHLORIDE 10 ML: 9 INJECTION INTRAMUSCULAR; INTRAVENOUS; SUBCUTANEOUS at 01:11

## 2021-11-24 ENCOUNTER — DOCUMENTATION ONLY (OUTPATIENT)
Dept: HEMATOLOGY/ONCOLOGY | Facility: CLINIC | Age: 66
End: 2021-11-24
Payer: MEDICARE

## 2021-11-24 RX ORDER — LORAZEPAM 0.5 MG/1
0.5 TABLET ORAL DAILY
Qty: 20 TABLET | Refills: 0 | Status: ON HOLD | OUTPATIENT
Start: 2021-11-24 | End: 2022-03-25 | Stop reason: HOSPADM

## 2021-11-24 RX ORDER — MEMANTINE HYDROCHLORIDE 10 MG/1
10 TABLET ORAL 2 TIMES DAILY
Qty: 60 TABLET | Refills: 2 | Status: SHIPPED | OUTPATIENT
Start: 2021-11-24 | End: 2022-02-14 | Stop reason: SDUPTHER

## 2021-11-30 ENCOUNTER — INFUSION (OUTPATIENT)
Dept: INFUSION THERAPY | Facility: HOSPITAL | Age: 66
End: 2021-11-30
Attending: INTERNAL MEDICINE
Payer: MEDICARE

## 2021-11-30 ENCOUNTER — OUTPATIENT CASE MANAGEMENT (OUTPATIENT)
Dept: ADMINISTRATIVE | Facility: OTHER | Age: 66
End: 2021-11-30
Payer: MEDICARE

## 2021-11-30 VITALS
OXYGEN SATURATION: 97 % | SYSTOLIC BLOOD PRESSURE: 142 MMHG | HEIGHT: 65 IN | DIASTOLIC BLOOD PRESSURE: 70 MMHG | HEART RATE: 90 BPM | RESPIRATION RATE: 18 BRPM | TEMPERATURE: 98 F | WEIGHT: 133 LBS | BODY MASS INDEX: 22.16 KG/M2

## 2021-11-30 DIAGNOSIS — C79.51 BONE METASTASES: Primary | ICD-10-CM

## 2021-11-30 DIAGNOSIS — E86.0 DEHYDRATION: ICD-10-CM

## 2021-11-30 DIAGNOSIS — C34.31 SQUAMOUS CELL CARCINOMA OF BRONCHUS IN RIGHT LOWER LOBE: ICD-10-CM

## 2021-11-30 PROCEDURE — 63600175 PHARM REV CODE 636 W HCPCS: Performed by: INTERNAL MEDICINE

## 2021-11-30 PROCEDURE — 25000003 PHARM REV CODE 250: Performed by: INTERNAL MEDICINE

## 2021-11-30 PROCEDURE — 96361 HYDRATE IV INFUSION ADD-ON: CPT

## 2021-11-30 PROCEDURE — S5010 5% DEXTROSE AND 0.45% SALINE: HCPCS | Performed by: INTERNAL MEDICINE

## 2021-11-30 PROCEDURE — 96360 HYDRATION IV INFUSION INIT: CPT

## 2021-11-30 PROCEDURE — A4216 STERILE WATER/SALINE, 10 ML: HCPCS | Performed by: INTERNAL MEDICINE

## 2021-11-30 RX ORDER — HEPARIN 100 UNIT/ML
500 SYRINGE INTRAVENOUS
Status: DISCONTINUED | OUTPATIENT
Start: 2021-11-30 | End: 2021-11-30 | Stop reason: HOSPADM

## 2021-11-30 RX ORDER — SODIUM CHLORIDE 0.9 % (FLUSH) 0.9 %
10 SYRINGE (ML) INJECTION
Status: CANCELLED | OUTPATIENT
Start: 2021-11-30

## 2021-11-30 RX ORDER — SODIUM CHLORIDE 0.9 % (FLUSH) 0.9 %
10 SYRINGE (ML) INJECTION
Status: DISCONTINUED | OUTPATIENT
Start: 2021-11-30 | End: 2021-11-30 | Stop reason: HOSPADM

## 2021-11-30 RX ORDER — HEPARIN 100 UNIT/ML
500 SYRINGE INTRAVENOUS
Status: CANCELLED | OUTPATIENT
Start: 2021-11-30

## 2021-11-30 RX ADMIN — DEXTROSE AND SODIUM CHLORIDE 1000 ML: 5; .45 INJECTION, SOLUTION INTRAVENOUS at 11:11

## 2021-11-30 RX ADMIN — HEPARIN 500 UNITS: 100 SYRINGE at 01:11

## 2021-11-30 RX ADMIN — SODIUM CHLORIDE, PRESERVATIVE FREE 10 ML: 5 INJECTION INTRAVENOUS at 01:11

## 2021-12-03 ENCOUNTER — OUTPATIENT CASE MANAGEMENT (OUTPATIENT)
Dept: ADMINISTRATIVE | Facility: OTHER | Age: 66
End: 2021-12-03
Payer: MEDICARE

## 2021-12-03 ENCOUNTER — INFUSION (OUTPATIENT)
Dept: INFUSION THERAPY | Facility: HOSPITAL | Age: 66
End: 2021-12-03
Attending: INTERNAL MEDICINE
Payer: MEDICARE

## 2021-12-03 VITALS
SYSTOLIC BLOOD PRESSURE: 138 MMHG | HEART RATE: 90 BPM | TEMPERATURE: 97 F | OXYGEN SATURATION: 99 % | HEIGHT: 65 IN | DIASTOLIC BLOOD PRESSURE: 80 MMHG | RESPIRATION RATE: 18 BRPM | BODY MASS INDEX: 22.27 KG/M2 | WEIGHT: 133.69 LBS

## 2021-12-03 DIAGNOSIS — E86.0 DEHYDRATION: ICD-10-CM

## 2021-12-03 DIAGNOSIS — C34.31 SQUAMOUS CELL CARCINOMA OF BRONCHUS IN RIGHT LOWER LOBE: ICD-10-CM

## 2021-12-03 DIAGNOSIS — C34.31 MALIGNANT NEOPLASM OF LOWER LOBE OF RIGHT LUNG: ICD-10-CM

## 2021-12-03 DIAGNOSIS — C79.51 BONE METASTASES: Primary | ICD-10-CM

## 2021-12-03 LAB
ALBUMIN SERPL BCP-MCNC: 4.1 G/DL (ref 3.5–5.2)
ALP SERPL-CCNC: 85 U/L (ref 55–135)
ALT SERPL W/O P-5'-P-CCNC: 11 U/L (ref 10–44)
ANION GAP SERPL CALC-SCNC: 7 MMOL/L (ref 8–16)
AST SERPL-CCNC: 17 U/L (ref 10–40)
BASOPHILS # BLD AUTO: 0 K/UL (ref 0–0.2)
BASOPHILS NFR BLD: 0 % (ref 0–1.9)
BILIRUB SERPL-MCNC: 0.6 MG/DL (ref 0.1–1)
BUN SERPL-MCNC: 12 MG/DL (ref 8–23)
CALCIUM SERPL-MCNC: 9.3 MG/DL (ref 8.7–10.5)
CHLORIDE SERPL-SCNC: 105 MMOL/L (ref 95–110)
CO2 SERPL-SCNC: 29 MMOL/L (ref 23–29)
CREAT SERPL-MCNC: 0.7 MG/DL (ref 0.5–1.4)
DIFFERENTIAL METHOD: ABNORMAL
EOSINOPHIL # BLD AUTO: 0 K/UL (ref 0–0.5)
EOSINOPHIL NFR BLD: 0.7 % (ref 0–8)
ERYTHROCYTE [DISTWIDTH] IN BLOOD BY AUTOMATED COUNT: 14.4 % (ref 11.5–14.5)
EST. GFR  (AFRICAN AMERICAN): >60 ML/MIN/1.73 M^2
EST. GFR  (NON AFRICAN AMERICAN): >60 ML/MIN/1.73 M^2
GLUCOSE SERPL-MCNC: 101 MG/DL (ref 70–110)
HCT VFR BLD AUTO: 29.5 % (ref 37–48.5)
HGB BLD-MCNC: 9.8 G/DL (ref 12–16)
IMM GRANULOCYTES # BLD AUTO: 0.02 K/UL (ref 0–0.04)
IMM GRANULOCYTES NFR BLD AUTO: 0.7 % (ref 0–0.5)
LYMPHOCYTES # BLD AUTO: 0.3 K/UL (ref 1–4.8)
LYMPHOCYTES NFR BLD: 10.9 % (ref 18–48)
MCH RBC QN AUTO: 33.8 PG (ref 27–31)
MCHC RBC AUTO-ENTMCNC: 33.2 G/DL (ref 32–36)
MCV RBC AUTO: 102 FL (ref 82–98)
MONOCYTES # BLD AUTO: 0.3 K/UL (ref 0.3–1)
MONOCYTES NFR BLD: 10.9 % (ref 4–15)
NEUTROPHILS # BLD AUTO: 2.3 K/UL (ref 1.8–7.7)
NEUTROPHILS NFR BLD: 76.8 % (ref 38–73)
NRBC BLD-RTO: 0 /100 WBC
PLATELET # BLD AUTO: 132 K/UL (ref 150–450)
PMV BLD AUTO: 9.4 FL (ref 9.2–12.9)
POTASSIUM SERPL-SCNC: 3.7 MMOL/L (ref 3.5–5.1)
PROT SERPL-MCNC: 7 G/DL (ref 6–8.4)
RBC # BLD AUTO: 2.9 M/UL (ref 4–5.4)
SODIUM SERPL-SCNC: 141 MMOL/L (ref 136–145)
WBC # BLD AUTO: 3.02 K/UL (ref 3.9–12.7)

## 2021-12-03 PROCEDURE — 85025 COMPLETE CBC W/AUTO DIFF WBC: CPT | Performed by: INTERNAL MEDICINE

## 2021-12-03 PROCEDURE — 96360 HYDRATION IV INFUSION INIT: CPT

## 2021-12-03 PROCEDURE — 96361 HYDRATE IV INFUSION ADD-ON: CPT

## 2021-12-03 PROCEDURE — 25000003 PHARM REV CODE 250: Performed by: INTERNAL MEDICINE

## 2021-12-03 PROCEDURE — 80053 COMPREHEN METABOLIC PANEL: CPT | Performed by: INTERNAL MEDICINE

## 2021-12-03 PROCEDURE — 63600175 PHARM REV CODE 636 W HCPCS: Performed by: INTERNAL MEDICINE

## 2021-12-03 PROCEDURE — S5010 5% DEXTROSE AND 0.45% SALINE: HCPCS | Performed by: INTERNAL MEDICINE

## 2021-12-03 RX ORDER — HEPARIN 100 UNIT/ML
500 SYRINGE INTRAVENOUS
Status: DISCONTINUED | OUTPATIENT
Start: 2021-12-03 | End: 2021-12-03 | Stop reason: HOSPADM

## 2021-12-03 RX ORDER — HEPARIN 100 UNIT/ML
500 SYRINGE INTRAVENOUS
Status: CANCELLED | OUTPATIENT
Start: 2021-12-03

## 2021-12-03 RX ORDER — SODIUM CHLORIDE 0.9 % (FLUSH) 0.9 %
10 SYRINGE (ML) INJECTION
Status: DISCONTINUED | OUTPATIENT
Start: 2021-12-03 | End: 2021-12-03 | Stop reason: HOSPADM

## 2021-12-03 RX ORDER — SODIUM CHLORIDE 0.9 % (FLUSH) 0.9 %
10 SYRINGE (ML) INJECTION
Status: CANCELLED | OUTPATIENT
Start: 2021-12-03

## 2021-12-03 RX ADMIN — DEXTROSE AND SODIUM CHLORIDE 1000 ML: 5; .45 INJECTION, SOLUTION INTRAVENOUS at 11:12

## 2021-12-03 RX ADMIN — HEPARIN 500 UNITS: 100 SYRINGE at 01:12

## 2021-12-07 ENCOUNTER — PATIENT OUTREACH (OUTPATIENT)
Dept: ADMINISTRATIVE | Facility: HOSPITAL | Age: 66
End: 2021-12-07
Payer: MEDICARE

## 2021-12-07 ENCOUNTER — PATIENT MESSAGE (OUTPATIENT)
Dept: ADMINISTRATIVE | Facility: HOSPITAL | Age: 66
End: 2021-12-07
Payer: MEDICARE

## 2021-12-08 ENCOUNTER — TELEPHONE (OUTPATIENT)
Dept: FAMILY MEDICINE | Facility: CLINIC | Age: 66
End: 2021-12-08
Payer: MEDICARE

## 2021-12-08 DIAGNOSIS — R54 FRAILTY: Primary | ICD-10-CM

## 2021-12-09 ENCOUNTER — OUTPATIENT CASE MANAGEMENT (OUTPATIENT)
Dept: ADMINISTRATIVE | Facility: OTHER | Age: 66
End: 2021-12-09
Payer: MEDICARE

## 2021-12-14 ENCOUNTER — INFUSION (OUTPATIENT)
Dept: INFUSION THERAPY | Facility: HOSPITAL | Age: 66
End: 2021-12-14
Attending: INTERNAL MEDICINE
Payer: MEDICARE

## 2021-12-14 VITALS
TEMPERATURE: 97 F | HEART RATE: 80 BPM | SYSTOLIC BLOOD PRESSURE: 132 MMHG | WEIGHT: 133.31 LBS | HEIGHT: 65 IN | RESPIRATION RATE: 16 BRPM | BODY MASS INDEX: 22.21 KG/M2 | DIASTOLIC BLOOD PRESSURE: 81 MMHG | OXYGEN SATURATION: 100 %

## 2021-12-14 DIAGNOSIS — E86.0 DEHYDRATION: ICD-10-CM

## 2021-12-14 DIAGNOSIS — C79.51 BONE METASTASES: Primary | ICD-10-CM

## 2021-12-14 DIAGNOSIS — C34.31 SQUAMOUS CELL CARCINOMA OF BRONCHUS IN RIGHT LOWER LOBE: ICD-10-CM

## 2021-12-14 PROCEDURE — 96360 HYDRATION IV INFUSION INIT: CPT

## 2021-12-14 PROCEDURE — A4216 STERILE WATER/SALINE, 10 ML: HCPCS | Performed by: INTERNAL MEDICINE

## 2021-12-14 PROCEDURE — 63600175 PHARM REV CODE 636 W HCPCS: Performed by: INTERNAL MEDICINE

## 2021-12-14 PROCEDURE — 25000003 PHARM REV CODE 250: Performed by: INTERNAL MEDICINE

## 2021-12-14 PROCEDURE — 96361 HYDRATE IV INFUSION ADD-ON: CPT

## 2021-12-14 PROCEDURE — S5010 5% DEXTROSE AND 0.45% SALINE: HCPCS | Performed by: INTERNAL MEDICINE

## 2021-12-14 RX ORDER — HEPARIN 100 UNIT/ML
500 SYRINGE INTRAVENOUS
Status: DISCONTINUED | OUTPATIENT
Start: 2021-12-14 | End: 2021-12-14 | Stop reason: HOSPADM

## 2021-12-14 RX ORDER — SODIUM CHLORIDE 0.9 % (FLUSH) 0.9 %
10 SYRINGE (ML) INJECTION
Status: DISCONTINUED | OUTPATIENT
Start: 2021-12-14 | End: 2021-12-14 | Stop reason: HOSPADM

## 2021-12-14 RX ORDER — HEPARIN 100 UNIT/ML
500 SYRINGE INTRAVENOUS
Status: CANCELLED | OUTPATIENT
Start: 2021-12-14

## 2021-12-14 RX ORDER — SODIUM CHLORIDE 0.9 % (FLUSH) 0.9 %
10 SYRINGE (ML) INJECTION
Status: CANCELLED | OUTPATIENT
Start: 2021-12-14

## 2021-12-14 RX ADMIN — DEXTROSE AND SODIUM CHLORIDE 1000 ML: 5; .45 INJECTION, SOLUTION INTRAVENOUS at 11:12

## 2021-12-14 RX ADMIN — SODIUM CHLORIDE, PRESERVATIVE FREE 10 ML: 5 INJECTION INTRAVENOUS at 01:12

## 2021-12-14 RX ADMIN — HEPARIN 500 UNITS: 100 SYRINGE at 01:12

## 2021-12-17 ENCOUNTER — OFFICE VISIT (OUTPATIENT)
Dept: HEMATOLOGY/ONCOLOGY | Facility: CLINIC | Age: 66
End: 2021-12-17
Payer: MEDICARE

## 2021-12-17 ENCOUNTER — INFUSION (OUTPATIENT)
Dept: INFUSION THERAPY | Facility: HOSPITAL | Age: 66
End: 2021-12-17
Attending: INTERNAL MEDICINE
Payer: MEDICARE

## 2021-12-17 VITALS
SYSTOLIC BLOOD PRESSURE: 154 MMHG | HEIGHT: 65 IN | RESPIRATION RATE: 16 BRPM | DIASTOLIC BLOOD PRESSURE: 79 MMHG | OXYGEN SATURATION: 98 % | TEMPERATURE: 98 F | DIASTOLIC BLOOD PRESSURE: 84 MMHG | WEIGHT: 134 LBS | BODY MASS INDEX: 22.04 KG/M2 | HEART RATE: 94 BPM | HEART RATE: 100 BPM | WEIGHT: 132.31 LBS | BODY MASS INDEX: 22.33 KG/M2 | HEIGHT: 65 IN | RESPIRATION RATE: 20 BRPM | SYSTOLIC BLOOD PRESSURE: 154 MMHG

## 2021-12-17 DIAGNOSIS — G89.3 CANCER ASSOCIATED PAIN: ICD-10-CM

## 2021-12-17 DIAGNOSIS — R51.9 PERSISTENT HEADACHES: ICD-10-CM

## 2021-12-17 DIAGNOSIS — C34.31 SQUAMOUS CELL CARCINOMA OF BRONCHUS IN RIGHT LOWER LOBE: ICD-10-CM

## 2021-12-17 DIAGNOSIS — C79.51 BONE METASTASES: Primary | ICD-10-CM

## 2021-12-17 DIAGNOSIS — E86.0 DEHYDRATION: ICD-10-CM

## 2021-12-17 DIAGNOSIS — C79.51 BONE METASTASES: ICD-10-CM

## 2021-12-17 DIAGNOSIS — C34.31 MALIGNANT NEOPLASM OF LOWER LOBE OF RIGHT LUNG: Primary | ICD-10-CM

## 2021-12-17 DIAGNOSIS — M54.50 ACUTE BILATERAL LOW BACK PAIN, UNSPECIFIED WHETHER SCIATICA PRESENT: ICD-10-CM

## 2021-12-17 PROCEDURE — 25000003 PHARM REV CODE 250: Performed by: INTERNAL MEDICINE

## 2021-12-17 PROCEDURE — 96360 HYDRATION IV INFUSION INIT: CPT

## 2021-12-17 PROCEDURE — 63600175 PHARM REV CODE 636 W HCPCS: Performed by: INTERNAL MEDICINE

## 2021-12-17 PROCEDURE — 99213 PR OFFICE/OUTPT VISIT, EST, LEVL III, 20-29 MIN: ICD-10-PCS | Mod: S$GLB,,, | Performed by: NURSE PRACTITIONER

## 2021-12-17 PROCEDURE — 99213 OFFICE O/P EST LOW 20 MIN: CPT | Mod: S$GLB,,, | Performed by: NURSE PRACTITIONER

## 2021-12-17 PROCEDURE — S5010 5% DEXTROSE AND 0.45% SALINE: HCPCS | Performed by: INTERNAL MEDICINE

## 2021-12-17 PROCEDURE — 96361 HYDRATE IV INFUSION ADD-ON: CPT

## 2021-12-17 PROCEDURE — A4216 STERILE WATER/SALINE, 10 ML: HCPCS | Performed by: INTERNAL MEDICINE

## 2021-12-17 RX ORDER — BUTALBITAL, ACETAMINOPHEN AND CAFFEINE 50; 325; 40 MG/1; MG/1; MG/1
1 TABLET ORAL EVERY 4 HOURS PRN
Qty: 20 TABLET | Refills: 0 | Status: SHIPPED | OUTPATIENT
Start: 2021-12-17 | End: 2022-01-16

## 2021-12-17 RX ORDER — ONDANSETRON 8 MG/1
TABLET, ORALLY DISINTEGRATING ORAL
Qty: 30 TABLET | Refills: 5 | Status: SHIPPED | OUTPATIENT
Start: 2021-12-17 | End: 2022-02-18 | Stop reason: SDUPTHER

## 2021-12-17 RX ORDER — HEPARIN 100 UNIT/ML
500 SYRINGE INTRAVENOUS
Status: CANCELLED | OUTPATIENT
Start: 2021-12-17

## 2021-12-17 RX ORDER — SODIUM CHLORIDE 0.9 % (FLUSH) 0.9 %
10 SYRINGE (ML) INJECTION
Status: DISCONTINUED | OUTPATIENT
Start: 2021-12-17 | End: 2021-12-17 | Stop reason: HOSPADM

## 2021-12-17 RX ORDER — HEPARIN 100 UNIT/ML
500 SYRINGE INTRAVENOUS
Status: DISCONTINUED | OUTPATIENT
Start: 2021-12-17 | End: 2021-12-17 | Stop reason: HOSPADM

## 2021-12-17 RX ORDER — SODIUM CHLORIDE 0.9 % (FLUSH) 0.9 %
10 SYRINGE (ML) INJECTION
Status: CANCELLED | OUTPATIENT
Start: 2021-12-17

## 2021-12-17 RX ADMIN — HEPARIN 500 UNITS: 100 SYRINGE at 01:12

## 2021-12-17 RX ADMIN — DEXTROSE AND SODIUM CHLORIDE 1000 ML: 5; .45 INJECTION, SOLUTION INTRAVENOUS at 11:12

## 2021-12-17 RX ADMIN — SODIUM CHLORIDE, PRESERVATIVE FREE 10 ML: 5 INJECTION INTRAVENOUS at 01:12

## 2021-12-17 NOTE — PROGRESS NOTES
PROGRESS NOTE    Subjective:       Patient ID: Sheri Broderick is a 66 y.o. female.    S/P RLL Lobectomy: 4/27/2018  2.4cm adenocarcinoma  1/4 LNs pos for cancer(station 10)  Completed Cis/Alimta x 4 8/10/2018    XRT to L2-L4 and left hip 9/18/2019    Recurrence biopsy proven L3 8/7/2019  L3 lesion biopsy 8/7/2019:  Adenocarcinoma c/w lung primary  PD-L1: 9%  ALK neg  Ros-1 Neg  EGFR Neg    Nivolumab therapy x 6  10/22/2019-1/9/2020--progression    Radiation:  T5-T8--due to pain  Also XRT to C1, T1/2 and T11/12 at [facility]    Liquid Bx done at [facility]  Post: ERBB2 mutation    7/15/2020  Completed whole brain XRT for Brain mets    8/6/2020:  Began Enhertu(Tras-deruxtecan)  Monthly x as of 3/2021  Cycle 7: 4/28/2021(in [place])  Cycle 8: 5/19/2021  Cycle 9: 6/16/2021-due    9/2/2021:  PET: no pulmonary nodules  Increase size and uptake of T3, T4 and L2 vertebral bodies.   Partial note from [facility] 9/8/2021:  Today's restaging scans show continued excellent response to Enhertu. We will therefore proceed with her next cycle of Enhertu today as scheduled pending today's labs. She will return for follow up in 4 weeks.        Chief Complaint:  Lung cancer follow up.     History of Present Illness:   Sheri Broderick is a 66 y.o. female who presents for follow up of above.     Patient's continues on tras/derux with IV hydration for support. She does complain of lower back pain.  11/4/2021  PET at [facility]:  No change in diffuse sclerotic bone metastases on CT. The majority of the bone lesions are not FDG avid. Of the FDG avid bone lesions, a few lesions in the thoracic and lumbar spine show increase in FDG uptake. FDG avid left adrenal metastasis is unchanged. No new disease.      Patient had a fall on 11/4/2021 and fractured her zygomatic arch and a subarachnoid bleed.  New CT scan done 11/17 showed improvement.  She is on clinidamycin at this  time.      PMH.    Patient has been started on tras/derux and has been doing well on this.  She has had a very good clinical response and is tolerating this fairly well.  She does get significant dehydration and is getting fluids twice weekly to assist.      She states she is feeling pretty good at this time.      She moved to [place] with a life-long friend and stayed out there until now.  She came back home to [place] to take care of her home and she felt it was just time to be back home.     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  She is off anticoagulation as of todays visit, 4/6/2021.       Enhertu Monitoring Parameters   Human epidermal growth factor receptor 2 status. CBC (prior to treatment initiation, prior to each dose, and as clinically indicated). Assess left ventricular ejection fraction prior to fam-trastuzumab deruxtecan initiation and at regular intervals during treatment as clinically indicated. Evaluate pregnancy status prior to therapy (in females of reproductive potential). Monitor for signs/symptoms of interstitial lung disease/pneumonitis (cough, dyspnea, fever, and/or any new or worsening respiratory symptoms; radiographic imaging for suspected ILD) and for infusion reactions.  The American Society of Clinical Oncology hepatitis B virus (HBV) screening and management provisional clinical opinion (ASCO [[place] 2020]) recommends HBV screening with hepatitis B surface antigen, hepatitis B core antibody, total Ig or IgG, and antibody to hepatitis B surface antigen prior to beginning (or at the beginning of) systemic anticancer therapy; do not delay treatment for screening/results. Detection of chronic or past HBV infection requires a risk assessment to determine antiviral prophylaxis requirements, monitoring, and follow up.    Family and Social history reviewed and is unchanged from last visit.       ROS:  Review of Systems   Constitutional: Positive for fatigue. Negative for  fever.   Eyes: Positive for pain.   Respiratory: Negative for shortness of breath.    Cardiovascular: Negative for chest pain and leg swelling.   Gastrointestinal: Negative for abdominal pain and blood in stool.   Genitourinary: Negative for hematuria.   Musculoskeletal: Positive for back pain.   Skin: Negative for rash.   Neurological: Positive for headaches. Negative for seizures.   Hematological: Negative for adenopathy.          Current Outpatient Medications:     famotidine (PEPCID) 40 MG tablet, Take 1 tablet (40 mg total) by mouth every evening., Disp: 30 tablet, Rfl: 11    fentaNYL (DURAGESIC) 75 mcg/hr, Place 1 patch onto the skin every 72 hours., Disp: 10 patch, Rfl: 0    lamoTRIgine (LAMICTAL) 100 MG tablet, TAKE 1 TABLET BY MOUTH EVERY MORNING AND 2 TABLETS AT BEDTIME, Disp: 270 tablet, Rfl: 0    LORazepam (ATIVAN) 0.5 MG tablet, Take 1 tablet (0.5 mg total) by mouth once daily., Disp: 20 tablet, Rfl: 0    memantine (NAMENDA) 10 MG Tab, Take 1 tablet (10 mg total) by mouth 2 (two) times daily., Disp: 60 tablet, Rfl: 2    mirtazapine (REMERON) 30 MG tablet, Take 15 mg by mouth every evening., Disp: , Rfl:     morphine (MSIR) 15 MG tablet, Take one-half tablet (7.5 mg total) by mouth every 4 (four) hours as needed for Pain., Disp: 60 tablet, Rfl: 0    multivitamin (THERAGRAN) per tablet, Take 1 tablet by mouth once daily., Disp: , Rfl:     OLANZapine (ZYPREXA) 5 MG tablet, TAKE ONE-HALF TABLET BY MOUTH DAILY and ONE TABLET PO NIGHTLY, Disp: 45 tablet, Rfl: 1    ondansetron (ZOFRAN-ODT) 8 MG TbDL, DISSOLVE 1 TABLET(8 MG) ON THE TONGUE EVERY 8 HOURS AS NEEDED, Disp: 30 tablet, Rfl: 5    pantoprazole (PROTONIX) 40 MG tablet, Take 1 tablet (40 mg total) by mouth 2 (two) times daily., Disp: 180 tablet, Rfl: 3    prochlorperazine (COMPAZINE) 10 MG tablet, Take 1 tablet (10 mg total) by mouth every 6 (six) hours as needed (as needed for nausea)., Disp: 30 tablet, Rfl: 3    promethazine (PHENERGAN) 25  "MG suppository, Place 1 suppository (25 mg total) rectally every 6 (six) hours as needed for Nausea., Disp: 10 suppository, Rfl: 0    senna (SENOKOT) 8.6 mg tablet, Take 2-4 tablets by mouth 2 (two) times daily. , Disp: , Rfl:     sertraline (ZOLOFT) 100 MG tablet, Take 1 tablet (100 mg total) by mouth 2 (two) times a day., Disp: 180 tablet, Rfl: 3    sucralfate (CARAFATE) 1 gram tablet, TAKE 1 TABLET(1 GRAM) BY MOUTH FOUR TIMES DAILY, Disp: 120 tablet, Rfl: 5    butalbital-acetaminophen-caffeine -40 mg (FIORICET, ESGIC) -40 mg per tablet, Take 1 tablet by mouth every 4 (four) hours as needed for Pain., Disp: 20 tablet, Rfl: 0    fentaNYL (DURAGESIC) 75 mcg/hr, Place 1 patch onto the skin every 72 hours. Remove and discard old patch before applying new patch on., Disp: 10 patch, Rfl: 0  No current facility-administered medications for this visit.        Objective:       Physical Examination:     BP (!) 154/79   Pulse 94   Resp 20   Ht 5' 5" (1.651 m)   Wt 60.8 kg (134 lb)   BMI 22.30 kg/m²     Physical Exam  Constitutional:       Appearance: Normal appearance. She is well-developed.   HENT:      Head: Normocephalic and atraumatic.      Right Ear: External ear normal.      Left Ear: External ear normal.   Eyes:      Conjunctiva/sclera: Conjunctivae normal.      Pupils: Pupils are equal, round, and reactive to light.   Neck:      Thyroid: No thyromegaly.      Trachea: No tracheal deviation.   Cardiovascular:      Rate and Rhythm: Normal rate and regular rhythm.      Heart sounds: Normal heart sounds.   Pulmonary:      Effort: Pulmonary effort is normal.      Breath sounds: Normal breath sounds.   Abdominal:      General: Bowel sounds are normal. There is no distension.      Palpations: Abdomen is soft. There is no mass.      Tenderness: There is no abdominal tenderness.   Skin:     General: Skin is warm and dry.      Findings: No rash.   Neurological:      Mental Status: She is alert.      " Comments: Neuro intact througout   Psychiatric:         Mood and Affect: Mood normal.         Behavior: Behavior normal.         Thought Content: Thought content normal.         Judgment: Judgment normal.         Labs:   No results found for this or any previous visit (from the past 336 hour(s)).  CMP  Sodium   Date Value Ref Range Status   12/28/2021 139 136 - 145 mmol/L Final   10/09/2018 140 134 - 144 mmol/L      Potassium   Date Value Ref Range Status   12/28/2021 3.5 3.5 - 5.1 mmol/L Final     Chloride   Date Value Ref Range Status   12/28/2021 104 95 - 110 mmol/L Final   10/09/2018 105 98 - 110 mmol/L      CO2   Date Value Ref Range Status   12/28/2021 28 23 - 29 mmol/L Final     Glucose   Date Value Ref Range Status   12/28/2021 185 (H) 70 - 110 mg/dL Final   10/09/2018 101 (H) 70 - 99 mg/dL      BUN   Date Value Ref Range Status   12/28/2021 9 8 - 23 mg/dL Final     Creatinine   Date Value Ref Range Status   12/28/2021 0.7 0.5 - 1.4 mg/dL Final   10/09/2018 0.80 0.60 - 1.40 mg/dL    11/29/2012 0.7 0.5 - 1.4 mg/dL Final     Calcium   Date Value Ref Range Status   12/28/2021 8.9 8.7 - 10.5 mg/dL Final   11/29/2012 9.0 8.7 - 10.5 mg/dL Final     Total Protein   Date Value Ref Range Status   12/28/2021 6.4 6.0 - 8.4 g/dL Final     Albumin   Date Value Ref Range Status   12/28/2021 3.4 (L) 3.5 - 5.2 g/dL Final   10/09/2018 4.1 3.1 - 4.7 g/dL      Total Bilirubin   Date Value Ref Range Status   12/28/2021 0.3 0.1 - 1.0 mg/dL Final     Comment:     For infants and newborns, interpretation of results should be based  on gestational age, weight and in agreement with clinical  observations.    Premature Infant recommended reference ranges:  Up to 24 hours.............<8.0 mg/dL  Up to 48 hours............<12.0 mg/dL  3-5 days..................<15.0 mg/dL  6-29 days.................<15.0 mg/dL       Alkaline Phosphatase   Date Value Ref Range Status   12/28/2021 76 55 - 135 U/L Final     AST   Date Value Ref Range  Status   12/28/2021 17 10 - 40 U/L Final     ALT   Date Value Ref Range Status   12/28/2021 9 (L) 10 - 44 U/L Final     Anion Gap   Date Value Ref Range Status   12/28/2021 7 (L) 8 - 16 mmol/L Final   11/29/2012 10 5 - 15 meq/L Final     eGFR if    Date Value Ref Range Status   12/28/2021 >60.0 >60 mL/min/1.73 m^2 Final     eGFR if non    Date Value Ref Range Status   12/28/2021 >60.0 >60 mL/min/1.73 m^2 Final     Comment:     Calculation used to obtain the estimated glomerular filtration  rate (eGFR) is the CKD-EPI equation.        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 Met Lung Cancer- PET; MRI lumbar spine    2. Low Back Pain- MRI lumbar Spine    3. Headache- Fioricet PRN    4. Cancer associated pain- Continue Fentanyl and Morphine    Discussion:     Follow up in about 4 weeks (around 1/14/2022) for with Dr. Zhang.      Electronically signed by Ellen Dubois, MSN, APRN, AGNP-C, OCN

## 2021-12-20 ENCOUNTER — OFFICE VISIT (OUTPATIENT)
Dept: PSYCHIATRY | Facility: CLINIC | Age: 66
End: 2021-12-20
Payer: MEDICARE

## 2021-12-20 VITALS
DIASTOLIC BLOOD PRESSURE: 77 MMHG | WEIGHT: 135.13 LBS | SYSTOLIC BLOOD PRESSURE: 132 MMHG | HEIGHT: 65 IN | BODY MASS INDEX: 22.51 KG/M2 | HEART RATE: 112 BPM

## 2021-12-20 DIAGNOSIS — G89.3 CANCER ASSOCIATED PAIN: ICD-10-CM

## 2021-12-20 DIAGNOSIS — C34.31 SQUAMOUS CELL CARCINOMA OF BRONCHUS IN RIGHT LOWER LOBE: ICD-10-CM

## 2021-12-20 DIAGNOSIS — F41.0 PANIC DISORDER WITHOUT AGORAPHOBIA: ICD-10-CM

## 2021-12-20 DIAGNOSIS — F33.1 MDD (MAJOR DEPRESSIVE DISORDER), RECURRENT EPISODE, MODERATE: ICD-10-CM

## 2021-12-20 DIAGNOSIS — C79.51 BONE METASTASES: ICD-10-CM

## 2021-12-20 DIAGNOSIS — F41.1 GENERALIZED ANXIETY DISORDER: ICD-10-CM

## 2021-12-20 PROCEDURE — 99213 OFFICE O/P EST LOW 20 MIN: CPT | Mod: PBBFAC,PN | Performed by: PSYCHIATRY & NEUROLOGY

## 2021-12-20 PROCEDURE — 99214 PR OFFICE/OUTPT VISIT, EST, LEVL IV, 30-39 MIN: ICD-10-PCS | Mod: S$PBB,,, | Performed by: PSYCHIATRY & NEUROLOGY

## 2021-12-20 PROCEDURE — 99214 OFFICE O/P EST MOD 30 MIN: CPT | Mod: S$PBB,,, | Performed by: PSYCHIATRY & NEUROLOGY

## 2021-12-20 PROCEDURE — 99999 PR PBB SHADOW E&M-EST. PATIENT-LVL III: CPT | Mod: PBBFAC,,, | Performed by: PSYCHIATRY & NEUROLOGY

## 2021-12-20 PROCEDURE — 99999 PR PBB SHADOW E&M-EST. PATIENT-LVL III: ICD-10-PCS | Mod: PBBFAC,,, | Performed by: PSYCHIATRY & NEUROLOGY

## 2021-12-21 ENCOUNTER — OUTPATIENT CASE MANAGEMENT (OUTPATIENT)
Dept: ADMINISTRATIVE | Facility: OTHER | Age: 66
End: 2021-12-21
Payer: MEDICARE

## 2021-12-21 ENCOUNTER — INFUSION (OUTPATIENT)
Dept: INFUSION THERAPY | Facility: HOSPITAL | Age: 66
End: 2021-12-21
Attending: INTERNAL MEDICINE
Payer: MEDICARE

## 2021-12-21 VITALS
RESPIRATION RATE: 18 BRPM | DIASTOLIC BLOOD PRESSURE: 81 MMHG | BODY MASS INDEX: 22.48 KG/M2 | WEIGHT: 134.94 LBS | SYSTOLIC BLOOD PRESSURE: 138 MMHG | HEIGHT: 65 IN | TEMPERATURE: 98 F | HEART RATE: 97 BPM

## 2021-12-21 DIAGNOSIS — C34.31 SQUAMOUS CELL CARCINOMA OF BRONCHUS IN RIGHT LOWER LOBE: ICD-10-CM

## 2021-12-21 DIAGNOSIS — C79.51 BONE METASTASES: Primary | ICD-10-CM

## 2021-12-21 DIAGNOSIS — E86.0 DEHYDRATION: ICD-10-CM

## 2021-12-21 PROCEDURE — S5010 5% DEXTROSE AND 0.45% SALINE: HCPCS | Performed by: INTERNAL MEDICINE

## 2021-12-21 PROCEDURE — 25000003 PHARM REV CODE 250: Performed by: INTERNAL MEDICINE

## 2021-12-21 PROCEDURE — 63600175 PHARM REV CODE 636 W HCPCS: Performed by: INTERNAL MEDICINE

## 2021-12-21 PROCEDURE — A4216 STERILE WATER/SALINE, 10 ML: HCPCS | Performed by: INTERNAL MEDICINE

## 2021-12-21 PROCEDURE — 96360 HYDRATION IV INFUSION INIT: CPT

## 2021-12-21 PROCEDURE — 96361 HYDRATE IV INFUSION ADD-ON: CPT

## 2021-12-21 RX ORDER — SODIUM CHLORIDE 0.9 % (FLUSH) 0.9 %
10 SYRINGE (ML) INJECTION
Status: DISCONTINUED | OUTPATIENT
Start: 2021-12-21 | End: 2021-12-21 | Stop reason: HOSPADM

## 2021-12-21 RX ORDER — HEPARIN 100 UNIT/ML
500 SYRINGE INTRAVENOUS
Status: CANCELLED | OUTPATIENT
Start: 2021-12-21

## 2021-12-21 RX ORDER — SODIUM CHLORIDE 0.9 % (FLUSH) 0.9 %
10 SYRINGE (ML) INJECTION
Status: CANCELLED | OUTPATIENT
Start: 2021-12-21

## 2021-12-21 RX ORDER — HEPARIN 100 UNIT/ML
500 SYRINGE INTRAVENOUS
Status: DISCONTINUED | OUTPATIENT
Start: 2021-12-21 | End: 2021-12-21 | Stop reason: HOSPADM

## 2021-12-21 RX ADMIN — SODIUM CHLORIDE 10 ML: 9 INJECTION INTRAMUSCULAR; INTRAVENOUS; SUBCUTANEOUS at 01:12

## 2021-12-21 RX ADMIN — DEXTROSE AND SODIUM CHLORIDE 1000 ML: 5; .45 INJECTION, SOLUTION INTRAVENOUS at 11:12

## 2021-12-21 RX ADMIN — HEPARIN 500 UNITS: 100 SYRINGE at 01:12

## 2021-12-22 ENCOUNTER — TELEPHONE (OUTPATIENT)
Dept: PSYCHIATRY | Facility: CLINIC | Age: 66
End: 2021-12-22
Payer: MEDICARE

## 2021-12-23 ENCOUNTER — SOCIAL WORK (OUTPATIENT)
Dept: HEMATOLOGY/ONCOLOGY | Facility: CLINIC | Age: 66
End: 2021-12-23
Payer: MEDICARE

## 2021-12-23 ENCOUNTER — INFUSION (OUTPATIENT)
Dept: INFUSION THERAPY | Facility: HOSPITAL | Age: 66
End: 2021-12-23
Attending: INTERNAL MEDICINE
Payer: MEDICARE

## 2021-12-23 VITALS
DIASTOLIC BLOOD PRESSURE: 82 MMHG | WEIGHT: 133.19 LBS | OXYGEN SATURATION: 99 % | TEMPERATURE: 98 F | HEIGHT: 65 IN | RESPIRATION RATE: 18 BRPM | BODY MASS INDEX: 22.19 KG/M2 | HEART RATE: 90 BPM | SYSTOLIC BLOOD PRESSURE: 138 MMHG

## 2021-12-23 DIAGNOSIS — E86.0 DEHYDRATION: ICD-10-CM

## 2021-12-23 DIAGNOSIS — C34.31 SQUAMOUS CELL CARCINOMA OF BRONCHUS IN RIGHT LOWER LOBE: ICD-10-CM

## 2021-12-23 DIAGNOSIS — C79.51 BONE METASTASES: Primary | ICD-10-CM

## 2021-12-23 PROCEDURE — 96365 THER/PROPH/DIAG IV INF INIT: CPT

## 2021-12-23 PROCEDURE — 25000003 PHARM REV CODE 250: Performed by: INTERNAL MEDICINE

## 2021-12-23 PROCEDURE — 96366 THER/PROPH/DIAG IV INF ADDON: CPT

## 2021-12-23 PROCEDURE — 63600175 PHARM REV CODE 636 W HCPCS: Performed by: INTERNAL MEDICINE

## 2021-12-23 PROCEDURE — S5010 5% DEXTROSE AND 0.45% SALINE: HCPCS | Performed by: INTERNAL MEDICINE

## 2021-12-23 RX ORDER — HEPARIN 100 UNIT/ML
500 SYRINGE INTRAVENOUS
Status: CANCELLED | OUTPATIENT
Start: 2021-12-23

## 2021-12-23 RX ORDER — HEPARIN 100 UNIT/ML
500 SYRINGE INTRAVENOUS
Status: DISCONTINUED | OUTPATIENT
Start: 2021-12-23 | End: 2021-12-23 | Stop reason: HOSPADM

## 2021-12-23 RX ORDER — SODIUM CHLORIDE 0.9 % (FLUSH) 0.9 %
10 SYRINGE (ML) INJECTION
Status: CANCELLED | OUTPATIENT
Start: 2021-12-23

## 2021-12-23 RX ORDER — SODIUM CHLORIDE 0.9 % (FLUSH) 0.9 %
10 SYRINGE (ML) INJECTION
Status: DISCONTINUED | OUTPATIENT
Start: 2021-12-23 | End: 2021-12-23 | Stop reason: HOSPADM

## 2021-12-23 RX ADMIN — HEPARIN 500 UNITS: 100 SYRINGE at 01:12

## 2021-12-23 RX ADMIN — DEXTROSE AND SODIUM CHLORIDE 1000 ML: 5; .45 INJECTION, SOLUTION INTRAVENOUS at 11:12

## 2021-12-27 ENCOUNTER — OUTPATIENT CASE MANAGEMENT (OUTPATIENT)
Dept: ADMINISTRATIVE | Facility: OTHER | Age: 66
End: 2021-12-27
Payer: MEDICARE

## 2021-12-28 ENCOUNTER — INFUSION (OUTPATIENT)
Dept: INFUSION THERAPY | Facility: HOSPITAL | Age: 66
End: 2021-12-28
Attending: INTERNAL MEDICINE
Payer: MEDICARE

## 2021-12-28 VITALS
BODY MASS INDEX: 22.41 KG/M2 | DIASTOLIC BLOOD PRESSURE: 78 MMHG | OXYGEN SATURATION: 100 % | TEMPERATURE: 98 F | HEART RATE: 95 BPM | SYSTOLIC BLOOD PRESSURE: 131 MMHG | WEIGHT: 134.5 LBS | RESPIRATION RATE: 17 BRPM | HEIGHT: 65 IN

## 2021-12-28 DIAGNOSIS — C79.51 BONE METASTASES: Primary | ICD-10-CM

## 2021-12-28 DIAGNOSIS — E86.0 DEHYDRATION: ICD-10-CM

## 2021-12-28 DIAGNOSIS — C34.31 MALIGNANT NEOPLASM OF LOWER LOBE OF RIGHT LUNG: ICD-10-CM

## 2021-12-28 DIAGNOSIS — C34.31 SQUAMOUS CELL CARCINOMA OF BRONCHUS IN RIGHT LOWER LOBE: ICD-10-CM

## 2021-12-28 LAB
ALBUMIN SERPL BCP-MCNC: 3.4 G/DL (ref 3.5–5.2)
ALP SERPL-CCNC: 76 U/L (ref 55–135)
ALT SERPL W/O P-5'-P-CCNC: 9 U/L (ref 10–44)
ANION GAP SERPL CALC-SCNC: 7 MMOL/L (ref 8–16)
AST SERPL-CCNC: 17 U/L (ref 10–40)
BASOPHILS # BLD AUTO: 0 K/UL (ref 0–0.2)
BASOPHILS NFR BLD: 0 % (ref 0–1.9)
BILIRUB SERPL-MCNC: 0.3 MG/DL (ref 0.1–1)
BUN SERPL-MCNC: 9 MG/DL (ref 8–23)
CALCIUM SERPL-MCNC: 8.9 MG/DL (ref 8.7–10.5)
CHLORIDE SERPL-SCNC: 104 MMOL/L (ref 95–110)
CO2 SERPL-SCNC: 28 MMOL/L (ref 23–29)
CREAT SERPL-MCNC: 0.7 MG/DL (ref 0.5–1.4)
DIFFERENTIAL METHOD: ABNORMAL
EOSINOPHIL # BLD AUTO: 0 K/UL (ref 0–0.5)
EOSINOPHIL NFR BLD: 0.7 % (ref 0–8)
ERYTHROCYTE [DISTWIDTH] IN BLOOD BY AUTOMATED COUNT: 14.2 % (ref 11.5–14.5)
EST. GFR  (AFRICAN AMERICAN): >60 ML/MIN/1.73 M^2
EST. GFR  (NON AFRICAN AMERICAN): >60 ML/MIN/1.73 M^2
GLUCOSE SERPL-MCNC: 185 MG/DL (ref 70–110)
HCT VFR BLD AUTO: 27.7 % (ref 37–48.5)
HGB BLD-MCNC: 9.1 G/DL (ref 12–16)
IMM GRANULOCYTES # BLD AUTO: 0.01 K/UL (ref 0–0.04)
IMM GRANULOCYTES NFR BLD AUTO: 0.4 % (ref 0–0.5)
LYMPHOCYTES # BLD AUTO: 0.3 K/UL (ref 1–4.8)
LYMPHOCYTES NFR BLD: 12 % (ref 18–48)
MCH RBC QN AUTO: 33.6 PG (ref 27–31)
MCHC RBC AUTO-ENTMCNC: 32.9 G/DL (ref 32–36)
MCV RBC AUTO: 102 FL (ref 82–98)
MONOCYTES # BLD AUTO: 0.3 K/UL (ref 0.3–1)
MONOCYTES NFR BLD: 9.5 % (ref 4–15)
NEUTROPHILS # BLD AUTO: 2.1 K/UL (ref 1.8–7.7)
NEUTROPHILS NFR BLD: 77.4 % (ref 38–73)
NRBC BLD-RTO: 0 /100 WBC
PLATELET # BLD AUTO: 129 K/UL (ref 150–450)
PMV BLD AUTO: 10.1 FL (ref 9.2–12.9)
POTASSIUM SERPL-SCNC: 3.5 MMOL/L (ref 3.5–5.1)
PROT SERPL-MCNC: 6.4 G/DL (ref 6–8.4)
RBC # BLD AUTO: 2.71 M/UL (ref 4–5.4)
SODIUM SERPL-SCNC: 139 MMOL/L (ref 136–145)
WBC # BLD AUTO: 2.74 K/UL (ref 3.9–12.7)

## 2021-12-28 PROCEDURE — S5010 5% DEXTROSE AND 0.45% SALINE: HCPCS | Performed by: INTERNAL MEDICINE

## 2021-12-28 PROCEDURE — 80053 COMPREHEN METABOLIC PANEL: CPT | Performed by: INTERNAL MEDICINE

## 2021-12-28 PROCEDURE — 63600175 PHARM REV CODE 636 W HCPCS: Performed by: INTERNAL MEDICINE

## 2021-12-28 PROCEDURE — 96360 HYDRATION IV INFUSION INIT: CPT

## 2021-12-28 PROCEDURE — 25000003 PHARM REV CODE 250: Performed by: INTERNAL MEDICINE

## 2021-12-28 PROCEDURE — 85025 COMPLETE CBC W/AUTO DIFF WBC: CPT | Performed by: INTERNAL MEDICINE

## 2021-12-28 PROCEDURE — A4216 STERILE WATER/SALINE, 10 ML: HCPCS | Performed by: INTERNAL MEDICINE

## 2021-12-28 PROCEDURE — 96361 HYDRATE IV INFUSION ADD-ON: CPT

## 2021-12-28 RX ORDER — SODIUM CHLORIDE 0.9 % (FLUSH) 0.9 %
10 SYRINGE (ML) INJECTION
Status: DISCONTINUED | OUTPATIENT
Start: 2021-12-28 | End: 2021-12-28 | Stop reason: HOSPADM

## 2021-12-28 RX ORDER — HEPARIN 100 UNIT/ML
500 SYRINGE INTRAVENOUS
Status: DISCONTINUED | OUTPATIENT
Start: 2021-12-28 | End: 2021-12-28 | Stop reason: HOSPADM

## 2021-12-28 RX ORDER — SODIUM CHLORIDE 0.9 % (FLUSH) 0.9 %
10 SYRINGE (ML) INJECTION
Status: CANCELLED | OUTPATIENT
Start: 2021-12-28

## 2021-12-28 RX ORDER — HEPARIN 100 UNIT/ML
500 SYRINGE INTRAVENOUS
Status: CANCELLED | OUTPATIENT
Start: 2021-12-28

## 2021-12-28 RX ADMIN — DEXTROSE AND SODIUM CHLORIDE 1000 ML: 5; .45 INJECTION, SOLUTION INTRAVENOUS at 01:12

## 2021-12-28 RX ADMIN — SODIUM CHLORIDE, PRESERVATIVE FREE 10 ML: 5 INJECTION INTRAVENOUS at 03:12

## 2021-12-28 RX ADMIN — HEPARIN 500 UNITS: 100 SYRINGE at 03:12

## 2021-12-30 ENCOUNTER — INFUSION (OUTPATIENT)
Dept: INFUSION THERAPY | Facility: HOSPITAL | Age: 66
End: 2021-12-30
Attending: INTERNAL MEDICINE
Payer: MEDICARE

## 2021-12-30 VITALS
DIASTOLIC BLOOD PRESSURE: 77 MMHG | HEART RATE: 83 BPM | SYSTOLIC BLOOD PRESSURE: 141 MMHG | OXYGEN SATURATION: 100 % | HEIGHT: 65 IN | RESPIRATION RATE: 17 BRPM | BODY MASS INDEX: 22.26 KG/M2 | TEMPERATURE: 98 F | WEIGHT: 133.63 LBS

## 2021-12-30 DIAGNOSIS — C34.31 SQUAMOUS CELL CARCINOMA OF BRONCHUS IN RIGHT LOWER LOBE: ICD-10-CM

## 2021-12-30 DIAGNOSIS — E86.0 DEHYDRATION: ICD-10-CM

## 2021-12-30 DIAGNOSIS — C79.51 BONE METASTASES: Primary | ICD-10-CM

## 2021-12-30 PROCEDURE — 25000003 PHARM REV CODE 250: Performed by: INTERNAL MEDICINE

## 2021-12-30 PROCEDURE — 63600175 PHARM REV CODE 636 W HCPCS: Performed by: INTERNAL MEDICINE

## 2021-12-30 PROCEDURE — S5010 5% DEXTROSE AND 0.45% SALINE: HCPCS | Performed by: INTERNAL MEDICINE

## 2021-12-30 PROCEDURE — 96360 HYDRATION IV INFUSION INIT: CPT

## 2021-12-30 PROCEDURE — 96361 HYDRATE IV INFUSION ADD-ON: CPT

## 2021-12-30 RX ORDER — SODIUM CHLORIDE 0.9 % (FLUSH) 0.9 %
10 SYRINGE (ML) INJECTION
Status: CANCELLED | OUTPATIENT
Start: 2021-12-30

## 2021-12-30 RX ORDER — SODIUM CHLORIDE 0.9 % (FLUSH) 0.9 %
10 SYRINGE (ML) INJECTION
Status: DISCONTINUED | OUTPATIENT
Start: 2021-12-30 | End: 2021-12-30 | Stop reason: HOSPADM

## 2021-12-30 RX ORDER — HEPARIN 100 UNIT/ML
500 SYRINGE INTRAVENOUS
Status: DISCONTINUED | OUTPATIENT
Start: 2021-12-30 | End: 2021-12-30 | Stop reason: HOSPADM

## 2021-12-30 RX ORDER — HEPARIN 100 UNIT/ML
500 SYRINGE INTRAVENOUS
Status: CANCELLED | OUTPATIENT
Start: 2021-12-30

## 2021-12-30 RX ADMIN — HEPARIN 500 UNITS: 100 SYRINGE at 12:12

## 2021-12-30 RX ADMIN — DEXTROSE AND SODIUM CHLORIDE 1000 ML: 5; .45 INJECTION, SOLUTION INTRAVENOUS at 10:12

## 2022-01-03 ENCOUNTER — HOSPITAL ENCOUNTER (OUTPATIENT)
Dept: RADIOLOGY | Facility: HOSPITAL | Age: 67
Discharge: HOME | End: 2022-01-03
Attending: NURSE PRACTITIONER
Payer: MEDICARE

## 2022-01-03 DIAGNOSIS — C79.51 BONE METASTASIS: ICD-10-CM

## 2022-01-03 DIAGNOSIS — M54.50 ACUTE BILATERAL LOW BACK PAIN, UNSPECIFIED WHETHER SCIATICA PRESENT: ICD-10-CM

## 2022-01-03 DIAGNOSIS — C34.31 MALIGNANT NEOPLASM OF LOWER LOBE OF RIGHT LUNG: ICD-10-CM

## 2022-01-03 PROCEDURE — 25500020 PHARM REV CODE 255: Performed by: NURSE PRACTITIONER

## 2022-01-03 PROCEDURE — 72158 MRI LUMBAR SPINE W/O & W/DYE: CPT | Mod: TC

## 2022-01-03 PROCEDURE — A9585 GADOBUTROL INJECTION: HCPCS | Performed by: NURSE PRACTITIONER

## 2022-01-03 RX ORDER — GADOBUTROL 604.72 MG/ML
7.5 INJECTION INTRAVENOUS
Status: COMPLETED | OUTPATIENT
Start: 2022-01-03 | End: 2022-01-03

## 2022-01-03 RX ADMIN — GADOBUTROL 6 ML: 604.72 INJECTION INTRAVENOUS at 05:01

## 2022-01-04 ENCOUNTER — EXTERNAL HOME HEALTH (OUTPATIENT)
Dept: HOME HEALTH SERVICES | Facility: HOSPITAL | Age: 67
End: 2022-01-04
Payer: MEDICARE

## 2022-01-04 ENCOUNTER — TELEPHONE (OUTPATIENT)
Dept: HEMATOLOGY/ONCOLOGY | Facility: CLINIC | Age: 67
End: 2022-01-04
Payer: MEDICARE

## 2022-01-04 ENCOUNTER — INFUSION (OUTPATIENT)
Dept: INFUSION THERAPY | Facility: HOSPITAL | Age: 67
End: 2022-01-04
Attending: INTERNAL MEDICINE
Payer: MEDICARE

## 2022-01-04 VITALS
WEIGHT: 131.13 LBS | HEART RATE: 103 BPM | TEMPERATURE: 98 F | BODY MASS INDEX: 21.82 KG/M2 | OXYGEN SATURATION: 97 % | SYSTOLIC BLOOD PRESSURE: 169 MMHG | RESPIRATION RATE: 18 BRPM | DIASTOLIC BLOOD PRESSURE: 76 MMHG

## 2022-01-04 DIAGNOSIS — C34.31 SQUAMOUS CELL CARCINOMA OF BRONCHUS IN RIGHT LOWER LOBE: ICD-10-CM

## 2022-01-04 DIAGNOSIS — C34.31 MALIGNANT NEOPLASM OF LOWER LOBE OF RIGHT LUNG: Primary | ICD-10-CM

## 2022-01-04 DIAGNOSIS — C79.51 BONE METASTASIS: ICD-10-CM

## 2022-01-04 DIAGNOSIS — E86.0 DEHYDRATION: ICD-10-CM

## 2022-01-04 DIAGNOSIS — C79.51 BONE METASTASES: Primary | ICD-10-CM

## 2022-01-04 PROCEDURE — A4216 STERILE WATER/SALINE, 10 ML: HCPCS | Performed by: INTERNAL MEDICINE

## 2022-01-04 PROCEDURE — 96361 HYDRATE IV INFUSION ADD-ON: CPT

## 2022-01-04 PROCEDURE — 25000003 PHARM REV CODE 250: Performed by: INTERNAL MEDICINE

## 2022-01-04 PROCEDURE — S5010 5% DEXTROSE AND 0.45% SALINE: HCPCS | Performed by: INTERNAL MEDICINE

## 2022-01-04 PROCEDURE — 96360 HYDRATION IV INFUSION INIT: CPT

## 2022-01-04 PROCEDURE — 63600175 PHARM REV CODE 636 W HCPCS: Performed by: INTERNAL MEDICINE

## 2022-01-04 RX ORDER — SODIUM CHLORIDE 0.9 % (FLUSH) 0.9 %
10 SYRINGE (ML) INJECTION
Status: CANCELLED | OUTPATIENT
Start: 2022-01-04

## 2022-01-04 RX ORDER — HEPARIN 100 UNIT/ML
500 SYRINGE INTRAVENOUS
Status: DISCONTINUED | OUTPATIENT
Start: 2022-01-04 | End: 2022-01-04 | Stop reason: HOSPADM

## 2022-01-04 RX ORDER — HEPARIN 100 UNIT/ML
500 SYRINGE INTRAVENOUS
Status: CANCELLED | OUTPATIENT
Start: 2022-01-04

## 2022-01-04 RX ORDER — SODIUM CHLORIDE 0.9 % (FLUSH) 0.9 %
10 SYRINGE (ML) INJECTION
Status: DISCONTINUED | OUTPATIENT
Start: 2022-01-04 | End: 2022-01-04 | Stop reason: HOSPADM

## 2022-01-04 RX ADMIN — SODIUM CHLORIDE, PRESERVATIVE FREE 10 ML: 5 INJECTION INTRAVENOUS at 01:01

## 2022-01-04 RX ADMIN — HEPARIN 500 UNITS: 100 SYRINGE at 01:01

## 2022-01-04 RX ADMIN — DEXTROSE AND SODIUM CHLORIDE 1000 ML: 5; .45 INJECTION, SOLUTION INTRAVENOUS at 11:01

## 2022-01-04 NOTE — PLAN OF CARE
Problem: Anemia (Chemotherapy Effects)  Goal: Anemia Symptom Improvement  Outcome: Ongoing, Progressing  Intervention: Monitor and Manage Anemia  Flowsheets (Taken 1/4/2022 1128)  Oral Nutrition Promotion: rest periods promoted  Safety Promotion/Fall Prevention: medications reviewed  Fatigue Management: frequent rest breaks encouraged

## 2022-01-04 NOTE — TELEPHONE ENCOUNTER
Patient requesting refill on fentanyl patch.  RX routed to Dr. Zhang     ----- Message from Shanda Ceron sent at 1/4/2022  2:24 PM CST -----  PLEASE CALL PT ABOUT MORE MEDICATION.     CB# [phone]

## 2022-01-05 ENCOUNTER — PATIENT MESSAGE (OUTPATIENT)
Dept: HEMATOLOGY/ONCOLOGY | Facility: CLINIC | Age: 67
End: 2022-01-05
Payer: MEDICARE

## 2022-01-05 RX ORDER — FENTANYL 75 UG/H
1 PATCH TRANSDERMAL
Qty: 10 PATCH | Refills: 0 | Status: SHIPPED | OUTPATIENT
Start: 2022-01-05 | End: 2022-02-18 | Stop reason: SDUPTHER

## 2022-01-07 ENCOUNTER — INFUSION (OUTPATIENT)
Dept: INFUSION THERAPY | Facility: HOSPITAL | Age: 67
End: 2022-01-07
Attending: INTERNAL MEDICINE
Payer: MEDICARE

## 2022-01-07 VITALS
HEART RATE: 83 BPM | WEIGHT: 135.13 LBS | BODY MASS INDEX: 22.51 KG/M2 | SYSTOLIC BLOOD PRESSURE: 146 MMHG | HEIGHT: 65 IN | TEMPERATURE: 98 F | OXYGEN SATURATION: 97 % | RESPIRATION RATE: 18 BRPM | DIASTOLIC BLOOD PRESSURE: 83 MMHG

## 2022-01-07 DIAGNOSIS — C79.51 BONE METASTASES: Primary | ICD-10-CM

## 2022-01-07 DIAGNOSIS — E86.0 DEHYDRATION: ICD-10-CM

## 2022-01-07 DIAGNOSIS — C34.31 SQUAMOUS CELL CARCINOMA OF BRONCHUS IN RIGHT LOWER LOBE: ICD-10-CM

## 2022-01-07 PROCEDURE — A4216 STERILE WATER/SALINE, 10 ML: HCPCS | Performed by: INTERNAL MEDICINE

## 2022-01-07 PROCEDURE — S5010 5% DEXTROSE AND 0.45% SALINE: HCPCS | Performed by: INTERNAL MEDICINE

## 2022-01-07 PROCEDURE — 63600175 PHARM REV CODE 636 W HCPCS: Performed by: INTERNAL MEDICINE

## 2022-01-07 PROCEDURE — 25000003 PHARM REV CODE 250: Performed by: INTERNAL MEDICINE

## 2022-01-07 PROCEDURE — 96361 HYDRATE IV INFUSION ADD-ON: CPT

## 2022-01-07 PROCEDURE — 96360 HYDRATION IV INFUSION INIT: CPT

## 2022-01-07 RX ORDER — HEPARIN 100 UNIT/ML
500 SYRINGE INTRAVENOUS
Status: CANCELLED | OUTPATIENT
Start: 2022-01-07

## 2022-01-07 RX ORDER — SODIUM CHLORIDE 0.9 % (FLUSH) 0.9 %
10 SYRINGE (ML) INJECTION
Status: CANCELLED | OUTPATIENT
Start: 2022-01-07

## 2022-01-07 RX ORDER — HEPARIN 100 UNIT/ML
500 SYRINGE INTRAVENOUS
Status: DISCONTINUED | OUTPATIENT
Start: 2022-01-07 | End: 2022-01-07 | Stop reason: HOSPADM

## 2022-01-07 RX ORDER — SODIUM CHLORIDE 0.9 % (FLUSH) 0.9 %
10 SYRINGE (ML) INJECTION
Status: DISCONTINUED | OUTPATIENT
Start: 2022-01-07 | End: 2022-01-07 | Stop reason: HOSPADM

## 2022-01-07 RX ADMIN — SODIUM CHLORIDE 10 ML: 9 INJECTION, SOLUTION INTRAMUSCULAR; INTRAVENOUS; SUBCUTANEOUS at 01:01

## 2022-01-07 RX ADMIN — HEPARIN 500 UNITS: 100 SYRINGE at 01:01

## 2022-01-07 RX ADMIN — DEXTROSE AND SODIUM CHLORIDE 1000 ML: 5; .45 INJECTION, SOLUTION INTRAVENOUS at 11:01

## 2022-01-07 NOTE — PLAN OF CARE
Problem: Fatigue  Goal: Improved Activity Tolerance  Outcome: Ongoing, Progressing  Intervention: Promote Improved Energy  Flowsheets (Taken 1/7/2022 1317)  Fatigue Management: frequent rest breaks encouraged  Sleep/Rest Enhancement:   regular sleep/rest pattern promoted   relaxation techniques promoted  Activity Management: Ambulated -L4

## 2022-01-11 ENCOUNTER — DOCUMENT SCAN (OUTPATIENT)
Dept: HOME HEALTH SERVICES | Facility: HOSPITAL | Age: 67
End: 2022-01-11
Payer: MEDICARE

## 2022-01-12 ENCOUNTER — OUTPATIENT CASE MANAGEMENT (OUTPATIENT)
Dept: ADMINISTRATIVE | Facility: OTHER | Age: 67
End: 2022-01-12
Payer: MEDICARE

## 2022-01-13 ENCOUNTER — OUTPATIENT CASE MANAGEMENT (OUTPATIENT)
Dept: ADMINISTRATIVE | Facility: OTHER | Age: 67
End: 2022-01-13
Payer: MEDICARE

## 2022-01-13 NOTE — PROGRESS NOTES
Outpatient Care Management   - Care Plan Follow Up    Patient: Sheri Broderick  MRN:  [id number]  Date of Service:  1/13/2022  Completed by:  Rebeca Lam LCSW  Referral Date: 11/17/2021  Program: Case Management (High Risk)    Reason for Visit   Patient presents with    Update Plan Of Care    OPC Chart Review       Brief Summary: OPCM  completed follow up call with pt who reports she just returned from her monthly cancer tx at [facility] last night.  While pt was on the phone with this  phone calls were made to the [employer] and pt left a message re: [employer] and the [employer] was contacted and a message was also left for [employer].  This  also sent a message to the rep with [employer] to follow up on the referral that was made on this pt a few weeks ago.  Waiting on response.      Complex Care Plan     Care plan was discussed and completed today with input from patient and/or caregiver.    Patient Instructions     Follow up in about 2 weeks (around 1/27/2022).    Today[facility] Self-Management Care Plan was developed with the patients/caregivers input and was based on identified barriers from todays assessment.  Goals were written today with the patient/caregiver and the patient has agreed to work towards these goals to improve his/her overall well-being. Patient verbalized understanding of the care plan, goals, and all of today's instructions. Encouraged patient/caregiver to communicate with his/her physician and health care team about health conditions and the treatment plan.  Provided my contact information today and encouraged patient/caregiver to call me with any questions as needed.

## 2022-01-14 ENCOUNTER — INFUSION (OUTPATIENT)
Dept: INFUSION THERAPY | Facility: HOSPITAL | Age: 67
End: 2022-01-14
Attending: INTERNAL MEDICINE
Payer: MEDICARE

## 2022-01-14 VITALS
RESPIRATION RATE: 18 BRPM | HEART RATE: 93 BPM | SYSTOLIC BLOOD PRESSURE: 146 MMHG | TEMPERATURE: 98 F | BODY MASS INDEX: 22.48 KG/M2 | DIASTOLIC BLOOD PRESSURE: 73 MMHG | OXYGEN SATURATION: 100 % | HEIGHT: 65 IN

## 2022-01-14 DIAGNOSIS — C34.31 SQUAMOUS CELL CARCINOMA OF BRONCHUS IN RIGHT LOWER LOBE: ICD-10-CM

## 2022-01-14 DIAGNOSIS — C79.51 BONE METASTASES: Primary | ICD-10-CM

## 2022-01-14 DIAGNOSIS — E86.0 DEHYDRATION: ICD-10-CM

## 2022-01-14 PROCEDURE — 63600175 PHARM REV CODE 636 W HCPCS: Performed by: INTERNAL MEDICINE

## 2022-01-14 PROCEDURE — 96361 HYDRATE IV INFUSION ADD-ON: CPT

## 2022-01-14 PROCEDURE — S5010 5% DEXTROSE AND 0.45% SALINE: HCPCS | Performed by: INTERNAL MEDICINE

## 2022-01-14 PROCEDURE — 96360 HYDRATION IV INFUSION INIT: CPT

## 2022-01-14 PROCEDURE — 25000003 PHARM REV CODE 250: Performed by: INTERNAL MEDICINE

## 2022-01-14 PROCEDURE — A4216 STERILE WATER/SALINE, 10 ML: HCPCS | Performed by: INTERNAL MEDICINE

## 2022-01-14 RX ORDER — SODIUM CHLORIDE 0.9 % (FLUSH) 0.9 %
10 SYRINGE (ML) INJECTION
Status: DISCONTINUED | OUTPATIENT
Start: 2022-01-14 | End: 2022-01-14 | Stop reason: HOSPADM

## 2022-01-14 RX ORDER — HEPARIN 100 UNIT/ML
500 SYRINGE INTRAVENOUS
Status: DISCONTINUED | OUTPATIENT
Start: 2022-01-14 | End: 2022-01-14 | Stop reason: HOSPADM

## 2022-01-14 RX ORDER — HEPARIN 100 UNIT/ML
500 SYRINGE INTRAVENOUS
Status: CANCELLED | OUTPATIENT
Start: 2022-01-14

## 2022-01-14 RX ORDER — SODIUM CHLORIDE 0.9 % (FLUSH) 0.9 %
10 SYRINGE (ML) INJECTION
Status: CANCELLED | OUTPATIENT
Start: 2022-01-14

## 2022-01-14 RX ADMIN — DEXTROSE AND SODIUM CHLORIDE 1000 ML: 5; .45 INJECTION, SOLUTION INTRAVENOUS at 11:01

## 2022-01-14 RX ADMIN — SODIUM CHLORIDE 10 ML: 9 INJECTION INTRAMUSCULAR; INTRAVENOUS; SUBCUTANEOUS at 01:01

## 2022-01-14 RX ADMIN — HEPARIN 500 UNITS: 100 SYRINGE at 01:01

## 2022-01-18 ENCOUNTER — INFUSION (OUTPATIENT)
Dept: INFUSION THERAPY | Facility: HOSPITAL | Age: 67
End: 2022-01-18
Attending: INTERNAL MEDICINE
Payer: MEDICARE

## 2022-01-18 VITALS
SYSTOLIC BLOOD PRESSURE: 150 MMHG | OXYGEN SATURATION: 99 % | DIASTOLIC BLOOD PRESSURE: 82 MMHG | TEMPERATURE: 98 F | WEIGHT: 131.19 LBS | BODY MASS INDEX: 21.83 KG/M2 | HEART RATE: 94 BPM | RESPIRATION RATE: 19 BRPM

## 2022-01-18 DIAGNOSIS — E86.0 DEHYDRATION: ICD-10-CM

## 2022-01-18 DIAGNOSIS — C34.31 SQUAMOUS CELL CARCINOMA OF BRONCHUS IN RIGHT LOWER LOBE: ICD-10-CM

## 2022-01-18 DIAGNOSIS — C79.51 BONE METASTASES: Primary | ICD-10-CM

## 2022-01-18 PROCEDURE — A4216 STERILE WATER/SALINE, 10 ML: HCPCS | Performed by: INTERNAL MEDICINE

## 2022-01-18 PROCEDURE — 63600175 PHARM REV CODE 636 W HCPCS: Performed by: INTERNAL MEDICINE

## 2022-01-18 PROCEDURE — S5010 5% DEXTROSE AND 0.45% SALINE: HCPCS | Performed by: INTERNAL MEDICINE

## 2022-01-18 PROCEDURE — 25000003 PHARM REV CODE 250: Performed by: INTERNAL MEDICINE

## 2022-01-18 PROCEDURE — 96361 HYDRATE IV INFUSION ADD-ON: CPT

## 2022-01-18 PROCEDURE — 96360 HYDRATION IV INFUSION INIT: CPT

## 2022-01-18 RX ORDER — HEPARIN 100 UNIT/ML
SYRINGE INTRAVENOUS
Status: DISPENSED
Start: 2022-01-18 | End: 2022-01-19

## 2022-01-18 RX ORDER — HEPARIN 100 UNIT/ML
500 SYRINGE INTRAVENOUS
Status: DISCONTINUED | OUTPATIENT
Start: 2022-01-18 | End: 2022-01-18 | Stop reason: HOSPADM

## 2022-01-18 RX ORDER — HEPARIN 100 UNIT/ML
500 SYRINGE INTRAVENOUS
Status: CANCELLED | OUTPATIENT
Start: 2022-01-18

## 2022-01-18 RX ORDER — SODIUM CHLORIDE 0.9 % (FLUSH) 0.9 %
10 SYRINGE (ML) INJECTION
Status: CANCELLED | OUTPATIENT
Start: 2022-01-18

## 2022-01-18 RX ORDER — SODIUM CHLORIDE 0.9 % (FLUSH) 0.9 %
10 SYRINGE (ML) INJECTION
Status: DISCONTINUED | OUTPATIENT
Start: 2022-01-18 | End: 2022-01-18 | Stop reason: HOSPADM

## 2022-01-18 RX ADMIN — DEXTROSE AND SODIUM CHLORIDE 1000 ML: 5; .45 INJECTION, SOLUTION INTRAVENOUS at 11:01

## 2022-01-18 RX ADMIN — SODIUM CHLORIDE, PRESERVATIVE FREE 10 ML: 5 INJECTION INTRAVENOUS at 01:01

## 2022-01-18 RX ADMIN — Medication 500 UNITS: at 01:01

## 2022-01-18 NOTE — PLAN OF CARE
Problem: Fall Injury Risk  Goal: Absence of Fall and Fall-Related Injury  1/18/2022 1150 by Ellen Armas RN  Outcome: Ongoing, Progressing  1/18/2022 1117 by Ellen Armas RN  Outcome: Ongoing, Progressing  Intervention: Promote Injury-Free Environment  1/18/2022 1150 by Ellen Armas RN  Flowsheets (Taken 1/18/2022 1150)  Safety Promotion/Fall Prevention: Fall Risk reviewed with patient/family  1/18/2022 1117 by Ellen Armas RN  Flowsheets (Taken 1/18/2022 1117)  Safety Promotion/Fall Prevention: Fall Risk reviewed with patient/family

## 2022-01-18 NOTE — PLAN OF CARE
Problem: Fall Injury Risk  Goal: Absence of Fall and Fall-Related Injury  Outcome: Ongoing, Progressing  Intervention: Promote Injury-Free Environment  Flowsheets (Taken 1/18/2022 1117)  Safety Promotion/Fall Prevention: Fall Risk reviewed with patient/family

## 2022-01-19 ENCOUNTER — DOCUMENT SCAN (OUTPATIENT)
Dept: HOME HEALTH SERVICES | Facility: HOSPITAL | Age: 67
End: 2022-01-19
Payer: MEDICARE

## 2022-01-19 ENCOUNTER — OFFICE VISIT (OUTPATIENT)
Dept: HEMATOLOGY/ONCOLOGY | Facility: CLINIC | Age: 67
End: 2022-01-19
Payer: MEDICARE

## 2022-01-19 VITALS
HEART RATE: 108 BPM | TEMPERATURE: 98 F | DIASTOLIC BLOOD PRESSURE: 83 MMHG | BODY MASS INDEX: 22.49 KG/M2 | RESPIRATION RATE: 18 BRPM | SYSTOLIC BLOOD PRESSURE: 148 MMHG | HEIGHT: 65 IN | WEIGHT: 135 LBS

## 2022-01-19 DIAGNOSIS — C34.31 MALIGNANT NEOPLASM OF LOWER LOBE OF RIGHT LUNG: Chronic | ICD-10-CM

## 2022-01-19 DIAGNOSIS — G89.3 CANCER ASSOCIATED PAIN: ICD-10-CM

## 2022-01-19 PROCEDURE — 99214 PR OFFICE/OUTPT VISIT, EST, LEVL IV, 30-39 MIN: ICD-10-PCS | Mod: S$GLB,,, | Performed by: INTERNAL MEDICINE

## 2022-01-19 PROCEDURE — 99214 OFFICE O/P EST MOD 30 MIN: CPT | Mod: S$GLB,,, | Performed by: INTERNAL MEDICINE

## 2022-01-19 NOTE — PROGRESS NOTES
Outpatient Care Management  Plan of Care Follow Up Visit    Patient: Sheri Broderick  MRN: [id number]  Date of Service: 01/12/2022  Completed by: Eliza Rosario RN  Referral Date: 11/17/2021  Program: Case Management (High Risk)    Reason for Visit   Patient presents with    Update Plan Of Care       Brief Summary: 01/19/22-Called and spoke to patient. She has an appt with hematologist today. She is feeling weak, tired, headache, runny nose and cough. She denies fever or rash. Appt with MD Moffett last week and she states that their is activity in lower spine. She update hematologist and give them the papers from MD Moffett so they can make the appropriate appts for her with radiology.     PLAN:  Follow up in two weeks   Collaborate with JONNY     Patient Summary     Involvement of Care:  Do I have permission to speak with other family members about your care?       Patient Reported Labs & Vitals:  1.  Any Patient Reported Labs & Vitals?     2.  Patient Reported Blood Pressure:     3.  Patient Reported Pulse:     4.  Patient Reported Weight (Kg):     5.  Patient Reported Blood Glucose (mg/dl):       Medical and social history was reviewed with patient and/or caregiver.     Clinical Assessment     Reviewed and provided basic information on available community resources for mental health, transportation, wellness resources, and palliative care programs with patient and/or caregiver.     Complex Care Plan     Care plan was discussed and completed today with input from patient and/or caregiver.    Patient Instructions     Instructions were provided via the [facility] patient resources and are available for the patient to view on the patient portal.    Follow up in about 14 days (around 2/3/2022) for RN Follow up call.    Todays OPCM Self-Management Care Plan was developed with the patients/caregivers input and was based on identified barriers from todays assessment.  Goals were written today with the  patient/caregiver and the patient has agreed to work towards these goals to improve his/her overall well-being. Patient verbalized understanding of the care plan, goals, and all of today's instructions. Encouraged patient/caregiver to communicate with his/her physician and health care team about health conditions and the treatment plan.  Provided my contact information today and encouraged patient/caregiver to call me with any questions as needed.

## 2022-01-19 NOTE — PROGRESS NOTES
PROGRESS NOTE    Subjective:       Patient ID: Sheri Broderick is a 66 y.o. female.    S/P RLL Lobectomy: 4/27/2018  2.4cm adenocarcinoma  1/4 LNs pos for cancer(station 10)  Completed Cis/Alimta x 4 8/10/2018    XRT to L2-L4 and left hip 9/18/2019    Recurrence biopsy proven L3 8/7/2019  L3 lesion biopsy 8/7/2019:  Adenocarcinoma c/w lung primary  PD-L1: 9%  ALK neg  Ros-1 Neg  EGFR Neg    Nivolumab therapy x 6  10/22/2019-1/9/2020--progression    Radiation:  T5-T8--due to pain  Also XRT to C1, T1/2 and T11/12 at [facility]    Liquid Bx done at [facility]  Post: ERBB2 mutation    7/15/2020  Completed whole brain XRT for Brain mets    8/6/2020:  Began Enhertu(Tras-deruxtecan)  Monthly x as of 3/2021  Cycle 7: 4/28/2021(in [place])  Cycle 8: 5/19/2021  Cycle 9: 6/16/2021-due    9/2/2021:  PET: no pulmonary nodules  Increase size and uptake of T3, T4 and L2 vertebral bodies.   Partial note from [facility] 9/8/2021:  Today's restaging scans show continued excellent response to Enhertu. We will therefore proceed with her next cycle of Enhertu today as scheduled pending today's labs. She will return for follow up in 4 weeks.        Chief Complaint:  No chief complaint on file.  Lung cancer follow up.     History of Present Illness:   Sheri Broderick is a 66 y.o. female who presents for follow up of above.     Patient had MRI of the L-spine done which showed ? Progression.  Continuing TDXD until confirmation scans done.      Patient's last tras/derux was 1/12/2022.     1/12/2022  MRI L-Spine:   Interval growth of L1 metastases with development of a new L2 metastases indicative of progression      11/4/2021  PET at [facility]:  No change in diffuse sclerotic bone metastases on CT. The majority of the bone lesions are not FDG avid. Of the FDG avid bone lesions, a few lesions in the thoracic and lumbar spine show increase in FDG uptake. FDG avid left  adrenal metastasis is unchanged. No new disease.      Patient had a fall on 11/4/2021 and fractured her zygomatic arch and a subarachnoid bleed.  New CT scan done 11/17 showed improvement.  She is on clinidamycin at this time.      PMH.    Patient has been started on tras/derux and has been doing well on this.  She has had a very good clinical response and is tolerating this fairly well.  She does get significant dehydration and is getting fluids twice weekly to assist.      She states she is feeling pretty good at this time.      She moved to [place] with a life-long friend and stayed out there until now.  She came back home to [place] to take care of her home and she felt it was just time to be back home.     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  She is off anticoagulation as of todays visit, 4/6/2021.       Enhertu Monitoring Parameters   Human epidermal growth factor receptor 2 status. CBC (prior to treatment initiation, prior to each dose, and as clinically indicated). Assess left ventricular ejection fraction prior to fam-trastuzumab deruxtecan initiation and at regular intervals during treatment as clinically indicated. Evaluate pregnancy status prior to therapy (in females of reproductive potential). Monitor for signs/symptoms of interstitial lung disease/pneumonitis (cough, dyspnea, fever, and/or any new or worsening respiratory symptoms; radiographic imaging for suspected ILD) and for infusion reactions.  The American Society of Clinical Oncology hepatitis B virus (HBV) screening and management provisional clinical opinion (ASCO [[place] 2020]) recommends HBV screening with hepatitis B surface antigen, hepatitis B core antibody, total Ig or IgG, and antibody to hepatitis B surface antigen prior to beginning (or at the beginning of) systemic anticancer therapy; do not delay treatment for screening/results. Detection of chronic or past HBV infection requires a risk assessment to  determine antiviral prophylaxis requirements, monitoring, and follow up.    Family and Social history reviewed and is unchanged from last visit.       ROS:  Review of Systems   Constitutional: Negative for fever.   Respiratory: Negative for shortness of breath.    Cardiovascular: Negative for chest pain and leg swelling.   Gastrointestinal: Negative for abdominal pain and blood in stool.   Genitourinary: Negative for hematuria.   Skin: Negative for rash.          Current Outpatient Medications:     famotidine (PEPCID) 40 MG tablet, Take 1 tablet (40 mg total) by mouth every evening., Disp: 30 tablet, Rfl: 11    fentaNYL (DURAGESIC) 75 mcg/hr, Place 1 patch onto the skin every 72 hours., Disp: 10 patch, Rfl: 0    fentaNYL (DURAGESIC) 75 mcg/hr, Place 1 patch onto the skin every 72 hours. Remove and discard old patch before applying new patch on., Disp: 10 patch, Rfl: 0    lamoTRIgine (LAMICTAL) 100 MG tablet, TAKE 1 TABLET BY MOUTH EVERY MORNING AND 2 TABLETS AT BEDTIME, Disp: 270 tablet, Rfl: 0    LORazepam (ATIVAN) 0.5 MG tablet, Take 1 tablet (0.5 mg total) by mouth once daily., Disp: 20 tablet, Rfl: 0    memantine (NAMENDA) 10 MG Tab, Take 1 tablet (10 mg total) by mouth 2 (two) times daily., Disp: 60 tablet, Rfl: 2    mirtazapine (REMERON) 30 MG tablet, Take 15 mg by mouth every evening., Disp: , Rfl:     morphine (MSIR) 15 MG tablet, Take one-half tablet (7.5 mg total) by mouth every 4 (four) hours as needed for Pain., Disp: 60 tablet, Rfl: 0    multivitamin (THERAGRAN) per tablet, Take 1 tablet by mouth once daily., Disp: , Rfl:     OLANZapine (ZYPREXA) 5 MG tablet, TAKE ONE-HALF TABLET BY MOUTH DAILY and ONE TABLET PO NIGHTLY, Disp: 45 tablet, Rfl: 1    ondansetron (ZOFRAN-ODT) 8 MG TbDL, DISSOLVE 1 TABLET(8 MG) ON THE TONGUE EVERY 8 HOURS AS NEEDED, Disp: 30 tablet, Rfl: 5    pantoprazole (PROTONIX) 40 MG tablet, Take 1 tablet (40 mg total) by mouth 2 (two) times daily., Disp: 180 tablet, Rfl:  "3    prochlorperazine (COMPAZINE) 10 MG tablet, Take 1 tablet (10 mg total) by mouth every 6 (six) hours as needed (as needed for nausea)., Disp: 30 tablet, Rfl: 3    promethazine (PHENERGAN) 25 MG suppository, Place 1 suppository (25 mg total) rectally every 6 (six) hours as needed for Nausea., Disp: 10 suppository, Rfl: 0    senna (SENOKOT) 8.6 mg tablet, Take 2-4 tablets by mouth 2 (two) times daily. , Disp: , Rfl:     sertraline (ZOLOFT) 100 MG tablet, Take 1 tablet (100 mg total) by mouth 2 (two) times a day., Disp: 180 tablet, Rfl: 3    sucralfate (CARAFATE) 1 gram tablet, TAKE 1 TABLET(1 GRAM) BY MOUTH FOUR TIMES DAILY, Disp: 120 tablet, Rfl: 5  No current facility-administered medications for this visit.        Objective:       Physical Examination:     BP (!) 148/83   Pulse 108   Temp 98.3 °F (36.8 °C)   Resp 18   Ht 5' 5" (1.651 m)   Wt 61.2 kg (135 lb)   BMI 22.47 kg/m²     Physical Exam  Constitutional:       Appearance: She is well-developed.   HENT:      Head: Normocephalic and atraumatic.      Right Ear: External ear normal.      Left Ear: External ear normal.   Eyes:      Conjunctiva/sclera: Conjunctivae normal.      Pupils: Pupils are equal, round, and reactive to light.   Neck:      Thyroid: No thyromegaly.      Trachea: No tracheal deviation.   Cardiovascular:      Rate and Rhythm: Normal rate and regular rhythm.      Heart sounds: Normal heart sounds.   Pulmonary:      Effort: Pulmonary effort is normal.      Breath sounds: Normal breath sounds.   Abdominal:      General: Bowel sounds are normal. There is no distension.      Palpations: Abdomen is soft. There is no mass.      Tenderness: There is no abdominal tenderness.   Skin:     Findings: No rash.   Neurological:      Comments: Neuro intact througout   Psychiatric:         Behavior: Behavior normal.         Thought Content: Thought content normal.         Judgment: Judgment normal.         Labs:   No results found for this or any " previous visit (from the past 336 hour(s)).  CMP  Sodium   Date Value Ref Range Status   12/28/2021 139 136 - 145 mmol/L Final   10/09/2018 140 134 - 144 mmol/L      Potassium   Date Value Ref Range Status   12/28/2021 3.5 3.5 - 5.1 mmol/L Final     Chloride   Date Value Ref Range Status   12/28/2021 104 95 - 110 mmol/L Final   10/09/2018 105 98 - 110 mmol/L      CO2   Date Value Ref Range Status   12/28/2021 28 23 - 29 mmol/L Final     Glucose   Date Value Ref Range Status   12/28/2021 185 (H) 70 - 110 mg/dL Final   10/09/2018 101 (H) 70 - 99 mg/dL      BUN   Date Value Ref Range Status   12/28/2021 9 8 - 23 mg/dL Final     Creatinine   Date Value Ref Range Status   12/28/2021 0.7 0.5 - 1.4 mg/dL Final   10/09/2018 0.80 0.60 - 1.40 mg/dL    11/29/2012 0.7 0.5 - 1.4 mg/dL Final     Calcium   Date Value Ref Range Status   12/28/2021 8.9 8.7 - 10.5 mg/dL Final   11/29/2012 9.0 8.7 - 10.5 mg/dL Final     Total Protein   Date Value Ref Range Status   12/28/2021 6.4 6.0 - 8.4 g/dL Final     Albumin   Date Value Ref Range Status   12/28/2021 3.4 (L) 3.5 - 5.2 g/dL Final   10/09/2018 4.1 3.1 - 4.7 g/dL      Total Bilirubin   Date Value Ref Range Status   12/28/2021 0.3 0.1 - 1.0 mg/dL Final     Comment:     For infants and newborns, interpretation of results should be based  on gestational age, weight and in agreement with clinical  observations.    Premature Infant recommended reference ranges:  Up to 24 hours.............<8.0 mg/dL  Up to 48 hours............<12.0 mg/dL  3-5 days..................<15.0 mg/dL  6-29 days.................<15.0 mg/dL       Alkaline Phosphatase   Date Value Ref Range Status   12/28/2021 76 55 - 135 U/L Final     AST   Date Value Ref Range Status   12/28/2021 17 10 - 40 U/L Final     ALT   Date Value Ref Range Status   12/28/2021 9 (L) 10 - 44 U/L Final     Anion Gap   Date Value Ref Range Status   12/28/2021 7 (L) 8 - 16 mmol/L Final   11/29/2012 10 5 - 15 meq/L Final     eGFR if   American   Date Value Ref Range Status   12/28/2021 >60.0 >60 mL/min/1.73 m^2 Final     eGFR if non    Date Value Ref Range Status   12/28/2021 >60.0 >60 mL/min/1.73 m^2 Final     Comment:     Calculation used to obtain the estimated glomerular filtration  rate (eGFR) is the CKD-EPI equation.        No results found for: CEA  No results found for: PSA        Assessment/Plan:     Problem List Items Addressed This Visit     Malignant neoplasm of lower lobe of right lung-Adenocarcinoma (Chronic)     Patient was found to have a ? New lesion in the lumbar spine at [facility].  Recommending MRI of C/T spine and PET scan and will arrange for these to be done.  She continues on the TDXD for now and continues to tolerate well.  She has no new clear lung symptoms currently.           Relevant Orders    MRI Cervical Spine W WO Cont    MRI Thoracic Spine W WO Contrast    Cancer associated pain     Patient continues to have pain.  Will continue current care approach.          Relevant Orders    MRI Cervical Spine W WO Cont    MRI Thoracic Spine W WO Contrast          Discussion:     Follow up in about 6 weeks (around 3/2/2022).      Electronically signed by Segundo Lamb

## 2022-01-19 NOTE — ASSESSMENT & PLAN NOTE
Patient was found to have a ? New lesion in the lumbar spine at [facility].  Recommending MRI of C/T spine and PET scan and will arrange for these to be done.  She continues on the TDXD for now and continues to tolerate well.  She has no new clear lung symptoms currently.

## 2022-01-22 DIAGNOSIS — K21.9 GASTROESOPHAGEAL REFLUX DISEASE, UNSPECIFIED WHETHER ESOPHAGITIS PRESENT: ICD-10-CM

## 2022-01-23 ENCOUNTER — PATIENT MESSAGE (OUTPATIENT)
Dept: PSYCHIATRY | Facility: CLINIC | Age: 67
End: 2022-01-23
Payer: MEDICARE

## 2022-01-24 DIAGNOSIS — C79.51 BONE METASTASES: ICD-10-CM

## 2022-01-24 DIAGNOSIS — C34.31 MALIGNANT NEOPLASM OF LOWER LOBE OF RIGHT LUNG: ICD-10-CM

## 2022-01-24 RX ORDER — PROCHLORPERAZINE MALEATE 10 MG
10 TABLET ORAL EVERY 6 HOURS PRN
Qty: 30 TABLET | Refills: 3 | Status: SHIPPED | OUTPATIENT
Start: 2022-01-24 | End: 2022-06-06

## 2022-01-24 NOTE — TELEPHONE ENCOUNTER
Spoke with Sheri on the phone. Discussed that Dr. Hawley is out the clinic and will advise upon her return. She stated no needs at the current time.

## 2022-01-25 ENCOUNTER — INFUSION (OUTPATIENT)
Dept: INFUSION THERAPY | Facility: HOSPITAL | Age: 67
End: 2022-01-25
Attending: INTERNAL MEDICINE
Payer: MEDICARE

## 2022-01-25 VITALS
BODY MASS INDEX: 22.2 KG/M2 | TEMPERATURE: 98 F | WEIGHT: 133.38 LBS | RESPIRATION RATE: 17 BRPM | SYSTOLIC BLOOD PRESSURE: 140 MMHG | DIASTOLIC BLOOD PRESSURE: 78 MMHG | HEART RATE: 102 BPM | OXYGEN SATURATION: 98 %

## 2022-01-25 DIAGNOSIS — C34.31 SQUAMOUS CELL CARCINOMA OF BRONCHUS IN RIGHT LOWER LOBE: ICD-10-CM

## 2022-01-25 DIAGNOSIS — E86.0 DEHYDRATION: ICD-10-CM

## 2022-01-25 DIAGNOSIS — C79.51 BONE METASTASES: Primary | ICD-10-CM

## 2022-01-25 PROCEDURE — A4216 STERILE WATER/SALINE, 10 ML: HCPCS | Performed by: INTERNAL MEDICINE

## 2022-01-25 PROCEDURE — 96361 HYDRATE IV INFUSION ADD-ON: CPT

## 2022-01-25 PROCEDURE — 96360 HYDRATION IV INFUSION INIT: CPT

## 2022-01-25 PROCEDURE — 25000003 PHARM REV CODE 250: Performed by: INTERNAL MEDICINE

## 2022-01-25 PROCEDURE — 63600175 PHARM REV CODE 636 W HCPCS: Performed by: INTERNAL MEDICINE

## 2022-01-25 PROCEDURE — S5010 5% DEXTROSE AND 0.45% SALINE: HCPCS | Performed by: INTERNAL MEDICINE

## 2022-01-25 RX ORDER — SODIUM CHLORIDE 0.9 % (FLUSH) 0.9 %
10 SYRINGE (ML) INJECTION
Status: DISCONTINUED | OUTPATIENT
Start: 2022-01-25 | End: 2022-01-25 | Stop reason: HOSPADM

## 2022-01-25 RX ORDER — HEPARIN 100 UNIT/ML
500 SYRINGE INTRAVENOUS
Status: DISCONTINUED | OUTPATIENT
Start: 2022-01-25 | End: 2022-01-25 | Stop reason: HOSPADM

## 2022-01-25 RX ORDER — MORPHINE SULFATE 15 MG/1
7.5 TABLET ORAL EVERY 4 HOURS PRN
Qty: 60 TABLET | Refills: 0 | Status: SHIPPED | OUTPATIENT
Start: 2022-01-25 | End: 2022-02-18 | Stop reason: SDUPTHER

## 2022-01-25 RX ORDER — HEPARIN 100 UNIT/ML
500 SYRINGE INTRAVENOUS
Status: CANCELLED | OUTPATIENT
Start: 2022-01-25

## 2022-01-25 RX ORDER — SODIUM CHLORIDE 0.9 % (FLUSH) 0.9 %
10 SYRINGE (ML) INJECTION
Status: CANCELLED | OUTPATIENT
Start: 2022-01-25

## 2022-01-25 RX ADMIN — DEXTROSE AND SODIUM CHLORIDE 1000 ML: 5; .45 INJECTION, SOLUTION INTRAVENOUS at 10:01

## 2022-01-25 RX ADMIN — SODIUM CHLORIDE, PRESERVATIVE FREE 10 ML: 5 INJECTION INTRAVENOUS at 12:01

## 2022-01-25 RX ADMIN — Medication 500 UNITS: at 12:01

## 2022-01-26 ENCOUNTER — HOSPITAL ENCOUNTER (OUTPATIENT)
Dept: RADIOLOGY | Facility: HOSPITAL | Age: 67
Discharge: HOME | End: 2022-01-26
Attending: INTERNAL MEDICINE
Payer: MEDICARE

## 2022-01-26 VITALS — WEIGHT: 133 LBS | HEIGHT: 64 IN | BODY MASS INDEX: 22.71 KG/M2

## 2022-01-26 LAB — GLUCOSE SERPL-MCNC: 88 MG/DL (ref 70–110)

## 2022-01-26 PROCEDURE — 78815 PET IMAGE W/CT SKULL-THIGH: CPT | Mod: TC,PO,PS

## 2022-01-27 ENCOUNTER — OUTPATIENT CASE MANAGEMENT (OUTPATIENT)
Dept: ADMINISTRATIVE | Facility: OTHER | Age: 67
End: 2022-01-27
Payer: MEDICARE

## 2022-01-27 NOTE — PROGRESS NOTES
Outpatient Care Management   - Care Plan Follow Up    Patient: Sheri Broderick  MRN:  [id number]  Date of Service:  1/27/2022  Completed by:  Rebeca Lam LCSW  Referral Date: 11/17/2021  Program: Case Management (High Risk)    Reason for Visit   Patient presents with    OPCM Chart Review    Update Plan Of Care       Brief Summary: OPCM  completed follow up call with pt.  Pt .did hear back from the [place] [facility] who told pt she was not eligible for Meals on Wheels because pt had a way of getting to the grocery.  This  discussed with pt about ordering her groceries and having them delivered to her home.  Pt. Expressed some hesitancy about trying this but said she may get her niece to show her how to do this.  Pt has not heard from [facility].  This  has faxed pt's medical records to [facility] on 2 occasions with no response. Another email was sent to Kecia with [facility] following up on this referral.  Discussed IOP at [facility] or [facility] which pt is not interested in.  This  did message Dr. Hawley about a psychologist at the [facility] that provides therapy to those with a cancer dx. Dr. Hawley is going to try and connect pt to Dr. Kam Holbrook at the [facility].   updated pt.      Complex Care Plan     Care plan was discussed and completed today with input from patient and/or caregiver.    Patient Instructions     Follow up in about 2 weeks (around 2/10/2022).    Todays OPCM Self-Management Care Plan was developed with the patients/caregivers input and was based on identified barriers from todays assessment.  Goals were written today with the patient/caregiver and the patient has agreed to work towards these goals to improve his/her overall well-being. Patient verbalized understanding of the care plan, goals, and all of today's instructions. Encouraged patient/caregiver to communicate  with his/her physician and health care team about health conditions and the treatment plan.  Provided my contact information today and encouraged patient/caregiver to call me with any questions as needed.    Complex Care Plan     Care plan was discussed and completed today with input from patient and/or caregiver.    Patient Instructions     Instructions were provided via the [employer] patient resources and are available for the patient to view on the patient portal.    Follow up in about 2 weeks (around 2/10/2022).    Todays OPCM Self-Management Care Plan was developed with the patients/caregivers input and was based on identified barriers from todays assessment.  Goals were written today with the patient/caregiver and the patient has agreed to work towards these goals to improve his/her overall well-being. Patient verbalized understanding of the care plan, goals, and all of today's instructions. Encouraged patient/caregiver to communicate with his/her physician and health care team about health conditions and the treatment plan.  Provided my contact information today and encouraged patient/caregiver to call me with any questions as needed.

## 2022-01-27 NOTE — TELEPHONE ENCOUNTER
Called [facility] regarding below. Spoke with Kat and she confirmed they still offer IOP at [place] office.

## 2022-01-28 ENCOUNTER — INFUSION (OUTPATIENT)
Dept: INFUSION THERAPY | Facility: HOSPITAL | Age: 67
End: 2022-01-28
Attending: INTERNAL MEDICINE
Payer: MEDICARE

## 2022-01-28 VITALS
BODY MASS INDEX: 23.54 KG/M2 | HEART RATE: 95 BPM | SYSTOLIC BLOOD PRESSURE: 151 MMHG | DIASTOLIC BLOOD PRESSURE: 79 MMHG | RESPIRATION RATE: 18 BRPM | TEMPERATURE: 98 F | WEIGHT: 137.88 LBS | HEIGHT: 64 IN

## 2022-01-28 DIAGNOSIS — E86.0 DEHYDRATION: ICD-10-CM

## 2022-01-28 DIAGNOSIS — C34.31 SQUAMOUS CELL CARCINOMA OF BRONCHUS IN RIGHT LOWER LOBE: ICD-10-CM

## 2022-01-28 DIAGNOSIS — C79.51 BONE METASTASES: Primary | ICD-10-CM

## 2022-01-28 PROCEDURE — S5010 5% DEXTROSE AND 0.45% SALINE: HCPCS | Performed by: INTERNAL MEDICINE

## 2022-01-28 PROCEDURE — 63600175 PHARM REV CODE 636 W HCPCS: Performed by: INTERNAL MEDICINE

## 2022-01-28 PROCEDURE — A4216 STERILE WATER/SALINE, 10 ML: HCPCS | Performed by: INTERNAL MEDICINE

## 2022-01-28 PROCEDURE — 96361 HYDRATE IV INFUSION ADD-ON: CPT

## 2022-01-28 PROCEDURE — 96360 HYDRATION IV INFUSION INIT: CPT

## 2022-01-28 PROCEDURE — 25000003 PHARM REV CODE 250: Performed by: INTERNAL MEDICINE

## 2022-01-28 RX ORDER — HEPARIN 100 UNIT/ML
500 SYRINGE INTRAVENOUS
Status: DISCONTINUED | OUTPATIENT
Start: 2022-01-28 | End: 2022-01-28 | Stop reason: HOSPADM

## 2022-01-28 RX ORDER — SODIUM CHLORIDE 0.9 % (FLUSH) 0.9 %
10 SYRINGE (ML) INJECTION
Status: DISCONTINUED | OUTPATIENT
Start: 2022-01-28 | End: 2022-01-28 | Stop reason: HOSPADM

## 2022-01-28 RX ORDER — SODIUM CHLORIDE 0.9 % (FLUSH) 0.9 %
10 SYRINGE (ML) INJECTION
Status: CANCELLED | OUTPATIENT
Start: 2022-01-28

## 2022-01-28 RX ORDER — HEPARIN 100 UNIT/ML
500 SYRINGE INTRAVENOUS
Status: CANCELLED | OUTPATIENT
Start: 2022-01-28

## 2022-01-28 RX ADMIN — HEPARIN 500 UNITS: 100 SYRINGE at 01:01

## 2022-01-28 RX ADMIN — DEXTROSE AND SODIUM CHLORIDE 1000 ML: 5; .45 INJECTION, SOLUTION INTRAVENOUS at 11:01

## 2022-01-28 RX ADMIN — SODIUM CHLORIDE 10 ML: 9 INJECTION INTRAMUSCULAR; INTRAVENOUS; SUBCUTANEOUS at 01:01

## 2022-01-28 NOTE — PLAN OF CARE
Problem: Fatigue  Goal: Improved Activity Tolerance  1/28/2022 1111 by Geeta Nguyen RN  Outcome: Met  1/28/2022 1111 by Geeta Nguyen RN  Outcome: Ongoing, Progressing

## 2022-02-01 ENCOUNTER — INFUSION (OUTPATIENT)
Dept: INFUSION THERAPY | Facility: HOSPITAL | Age: 67
End: 2022-02-01
Attending: INTERNAL MEDICINE
Payer: MEDICARE

## 2022-02-01 VITALS
SYSTOLIC BLOOD PRESSURE: 130 MMHG | BODY MASS INDEX: 23.6 KG/M2 | DIASTOLIC BLOOD PRESSURE: 77 MMHG | TEMPERATURE: 98 F | HEIGHT: 64 IN | RESPIRATION RATE: 18 BRPM | WEIGHT: 138.25 LBS | HEART RATE: 89 BPM

## 2022-02-01 DIAGNOSIS — E86.0 DEHYDRATION: ICD-10-CM

## 2022-02-01 DIAGNOSIS — C79.51 BONE METASTASES: Primary | ICD-10-CM

## 2022-02-01 DIAGNOSIS — C34.31 SQUAMOUS CELL CARCINOMA OF BRONCHUS IN RIGHT LOWER LOBE: ICD-10-CM

## 2022-02-01 DIAGNOSIS — C79.51 BONE METASTASIS: Primary | ICD-10-CM

## 2022-02-01 DIAGNOSIS — G89.3 CANCER ASSOCIATED PAIN: ICD-10-CM

## 2022-02-01 PROCEDURE — 96360 HYDRATION IV INFUSION INIT: CPT

## 2022-02-01 PROCEDURE — S5010 5% DEXTROSE AND 0.45% SALINE: HCPCS | Performed by: INTERNAL MEDICINE

## 2022-02-01 PROCEDURE — 63600175 PHARM REV CODE 636 W HCPCS: Performed by: INTERNAL MEDICINE

## 2022-02-01 PROCEDURE — A4216 STERILE WATER/SALINE, 10 ML: HCPCS | Performed by: INTERNAL MEDICINE

## 2022-02-01 PROCEDURE — 96361 HYDRATE IV INFUSION ADD-ON: CPT

## 2022-02-01 PROCEDURE — 25000003 PHARM REV CODE 250: Performed by: INTERNAL MEDICINE

## 2022-02-01 RX ORDER — HEPARIN 100 UNIT/ML
500 SYRINGE INTRAVENOUS
Status: CANCELLED | OUTPATIENT
Start: 2022-02-01

## 2022-02-01 RX ORDER — SODIUM CHLORIDE 0.9 % (FLUSH) 0.9 %
10 SYRINGE (ML) INJECTION
Status: DISCONTINUED | OUTPATIENT
Start: 2022-02-01 | End: 2022-02-01 | Stop reason: HOSPADM

## 2022-02-01 RX ORDER — SODIUM CHLORIDE 0.9 % (FLUSH) 0.9 %
10 SYRINGE (ML) INJECTION
Status: CANCELLED | OUTPATIENT
Start: 2022-02-01

## 2022-02-01 RX ORDER — HEPARIN 100 UNIT/ML
500 SYRINGE INTRAVENOUS
Status: DISCONTINUED | OUTPATIENT
Start: 2022-02-01 | End: 2022-02-01 | Stop reason: HOSPADM

## 2022-02-01 RX ADMIN — Medication 500 UNITS: at 12:02

## 2022-02-01 RX ADMIN — SODIUM CHLORIDE, PRESERVATIVE FREE 10 ML: 5 INJECTION INTRAVENOUS at 12:02

## 2022-02-01 RX ADMIN — DEXTROSE AND SODIUM CHLORIDE 1000 ML: 5; .45 INJECTION, SOLUTION INTRAVENOUS at 10:02

## 2022-02-01 NOTE — PLAN OF CARE
Problem: Fatigue  Goal: Improved Activity Tolerance  2/1/2022 1044 by Geeta Nguyen RN  Outcome: Met  2/1/2022 1044 by Geeta Nguyen RN  Outcome: Ongoing, Progressing

## 2022-02-02 ENCOUNTER — DOCUMENT SCAN (OUTPATIENT)
Dept: HOME HEALTH SERVICES | Facility: HOSPITAL | Age: 67
End: 2022-02-02
Payer: MEDICARE

## 2022-02-04 ENCOUNTER — INFUSION (OUTPATIENT)
Dept: INFUSION THERAPY | Facility: HOSPITAL | Age: 67
End: 2022-02-04
Attending: INTERNAL MEDICINE
Payer: MEDICARE

## 2022-02-04 VITALS
SYSTOLIC BLOOD PRESSURE: 128 MMHG | WEIGHT: 137 LBS | RESPIRATION RATE: 18 BRPM | OXYGEN SATURATION: 98 % | HEART RATE: 88 BPM | DIASTOLIC BLOOD PRESSURE: 70 MMHG | BODY MASS INDEX: 23.39 KG/M2 | TEMPERATURE: 98 F | HEIGHT: 64 IN

## 2022-02-04 DIAGNOSIS — C34.31 SQUAMOUS CELL CARCINOMA OF BRONCHUS IN RIGHT LOWER LOBE: ICD-10-CM

## 2022-02-04 DIAGNOSIS — C79.51 BONE METASTASES: Primary | ICD-10-CM

## 2022-02-04 DIAGNOSIS — E86.0 DEHYDRATION: ICD-10-CM

## 2022-02-04 PROCEDURE — A4216 STERILE WATER/SALINE, 10 ML: HCPCS | Performed by: INTERNAL MEDICINE

## 2022-02-04 PROCEDURE — 96360 HYDRATION IV INFUSION INIT: CPT

## 2022-02-04 PROCEDURE — 63600175 PHARM REV CODE 636 W HCPCS: Performed by: INTERNAL MEDICINE

## 2022-02-04 PROCEDURE — 25000003 PHARM REV CODE 250: Performed by: INTERNAL MEDICINE

## 2022-02-04 PROCEDURE — S5010 5% DEXTROSE AND 0.45% SALINE: HCPCS | Performed by: INTERNAL MEDICINE

## 2022-02-04 PROCEDURE — 96361 HYDRATE IV INFUSION ADD-ON: CPT

## 2022-02-04 RX ORDER — SODIUM CHLORIDE 0.9 % (FLUSH) 0.9 %
10 SYRINGE (ML) INJECTION
Status: DISCONTINUED | OUTPATIENT
Start: 2022-02-04 | End: 2022-02-04 | Stop reason: HOSPADM

## 2022-02-04 RX ORDER — HEPARIN 100 UNIT/ML
500 SYRINGE INTRAVENOUS
Status: DISCONTINUED | OUTPATIENT
Start: 2022-02-04 | End: 2022-02-04 | Stop reason: HOSPADM

## 2022-02-04 RX ORDER — SODIUM CHLORIDE 0.9 % (FLUSH) 0.9 %
10 SYRINGE (ML) INJECTION
Status: CANCELLED | OUTPATIENT
Start: 2022-02-04

## 2022-02-04 RX ORDER — HEPARIN 100 UNIT/ML
500 SYRINGE INTRAVENOUS
Status: CANCELLED | OUTPATIENT
Start: 2022-02-04

## 2022-02-04 RX ADMIN — DEXTROSE AND SODIUM CHLORIDE 1000 ML: 5; .45 INJECTION, SOLUTION INTRAVENOUS at 10:02

## 2022-02-04 RX ADMIN — Medication 500 UNITS: at 12:02

## 2022-02-04 RX ADMIN — SODIUM CHLORIDE, PRESERVATIVE FREE 10 ML: 5 INJECTION INTRAVENOUS at 12:02

## 2022-02-04 NOTE — PLAN OF CARE
Problem: Activity Intolerance  Goal: Enhanced Capacity and Energy  Outcome: Ongoing, Progressing  Intervention: Optimize Activity Tolerance  Flowsheets (Taken 2/4/2022 1020)  Self-Care Promotion:   safe use of adaptive equipment encouraged   independence encouraged  Environmental Support:   rest periods encouraged   personal routine supported   environmental consistency promoted   calm environment promoted   distractions minimized     Problem: Activity Intolerance  Goal: Enhanced Capacity and Energy  Outcome: Ongoing, Progressing  Intervention: Optimize Activity Tolerance  Flowsheets (Taken 2/4/2022 1020)  Self-Care Promotion:   safe use of adaptive equipment encouraged   independence encouraged  Environmental Support:   rest periods encouraged   personal routine supported   environmental consistency promoted   calm environment promoted   distractions minimized

## 2022-02-07 ENCOUNTER — HOSPITAL ENCOUNTER (OUTPATIENT)
Dept: RADIOLOGY | Facility: HOSPITAL | Age: 67
Discharge: HOME | End: 2022-02-07
Attending: INTERNAL MEDICINE
Payer: MEDICARE

## 2022-02-07 ENCOUNTER — OUTPATIENT CASE MANAGEMENT (OUTPATIENT)
Dept: ADMINISTRATIVE | Facility: OTHER | Age: 67
End: 2022-02-07
Payer: MEDICARE

## 2022-02-07 DIAGNOSIS — C34.31 MALIGNANT NEOPLASM OF LOWER LOBE OF RIGHT LUNG: Chronic | ICD-10-CM

## 2022-02-07 DIAGNOSIS — G89.3 CANCER ASSOCIATED PAIN: ICD-10-CM

## 2022-02-07 PROCEDURE — 72157 MRI CHEST SPINE W/O & W/DYE: CPT | Mod: TC,PO

## 2022-02-07 PROCEDURE — 72156 MRI NECK SPINE W/O & W/DYE: CPT | Mod: TC,PO

## 2022-02-07 PROCEDURE — 25500020 PHARM REV CODE 255: Mod: PO | Performed by: INTERNAL MEDICINE

## 2022-02-07 PROCEDURE — A9585 GADOBUTROL INJECTION: HCPCS | Mod: PO | Performed by: INTERNAL MEDICINE

## 2022-02-07 RX ORDER — GADOBUTROL 604.72 MG/ML
6.5 INJECTION INTRAVENOUS
Status: COMPLETED | OUTPATIENT
Start: 2022-02-07 | End: 2022-02-07

## 2022-02-07 RX ADMIN — GADOBUTROL 6.5 ML: 604.72 INJECTION INTRAVENOUS at 10:02

## 2022-02-07 NOTE — PROGRESS NOTES
Outpatient Care Management  Plan of Care Follow Up Visit    Patient: Sheri Broderick  MRN: [id number]  Date of Service: 02/07/2022  Completed by: Eliza Rosario RN  Referral Date: 11/17/2021  Program: Case Management (High Risk)    Reason for Visit   Patient presents with    Update Plan Of Care       Brief Summary:She is having low back pain that radiates to her hip, fatigue, nausea, and a headache. Feeling a little down because she has to reschedule her cancer treatments. Rescheduling her appointments. She knows when to call her doctors if symptoms worsen.   CM ACTION PLAN: F/u in 2 weeks.             Patient Summary     Involvement of Care:  Do I have permission to speak with other family members about your care?       Patient Reported Labs & Vitals:  1.  Any Patient Reported Labs & Vitals?     2.  Patient Reported Blood Pressure:     3.  Patient Reported Pulse:     4.  Patient Reported Weight (Kg):     5.  Patient Reported Blood Glucose (mg/dl):       Medical and social history was reviewed with patient and/or caregiver.     Clinical Assessment     Reviewed and provided basic information on available community resources for mental health, transportation, wellness resources, and palliative care programs with patient and/or caregiver.     Complex Care Plan     Care plan was discussed and completed today with input from patient and/or caregiver.    Patient Instructions     Instructions were provided via the [employer] patient resources and are available for the patient to view on the patient portal.    F/u on or around in 2 weeks -  2/22/22    Todays OPCM Self-Management Care Plan was developed with the patients/caregivers input and was based on identified barriers from todays assessment.  Goals were written today with the patient/caregiver and the patient has agreed to work towards these goals to improve his/her overall well-being. Patient verbalized understanding of the care plan, goals, and all of today's  instructions. Encouraged patient/caregiver to communicate with his/her physician and health care team about health conditions and the treatment plan.  Provided my contact information today and encouraged patient/caregiver to call me with any questions as needed.

## 2022-02-10 NOTE — PROGRESS NOTES
Outpatient Care Management   - Care Plan Follow Up    Patient: Sheri Broderick  MRN:  [id number]  Date of Service:  2/10/2022  Completed by:  Rebeca Lam LCSW  Referral Date: 11/17/2021  Program: Case Management (High Risk)    Reason for Visit   Patient presents with    OPCM Chart Review    Update Plan Of Care       Brief Summary: OPCM  completed follow up call with pt who reports she is going for her infusion at [facility] next week.  She is also scheduled to see Dr. Hawley with psychiatry on 2/14/22 and then has a virtual appt with Francis Holbrook, PHD, who is with the cancer center in [place] and specializes in providing therapy to those going through cancer care tx.  Pt is happy to start receiving therapy.  Pt also states that she was able to download the [employer] grocery valentine on her phone and is making progress with being able to do her online grocery shopping ordering.  Pt is needing some help from her niece with the valentine.  If pt is able to do online grocery shopping, she would not have to rely on her family for rides to the grocery store.  Will follow up in 2 weeks.     Complex Care Plan     Care plan was discussed and completed today with input from patient and/or caregiver.    Patient Instructions     Follow up in about 4 weeks (around 2/24/2022).    Todays OPCM Self-Management Care Plan was developed with the patients/caregivers input and was based on identified barriers from todays assessment.  Goals were written today with the patient/caregiver and the patient has agreed to work towards these goals to improve his/her overall well-being. Patient verbalized understanding of the care plan, goals, and all of today's instructions. Encouraged patient/caregiver to communicate with his/her physician and health care team about health conditions and the treatment plan.  Provided my contact information today and encouraged patient/caregiver to call me with any questions as  needed.

## 2022-02-11 ENCOUNTER — INFUSION (OUTPATIENT)
Dept: INFUSION THERAPY | Facility: HOSPITAL | Age: 67
End: 2022-02-11
Attending: INTERNAL MEDICINE
Payer: MEDICARE

## 2022-02-11 VITALS
OXYGEN SATURATION: 99 % | RESPIRATION RATE: 17 BRPM | TEMPERATURE: 98 F | SYSTOLIC BLOOD PRESSURE: 146 MMHG | BODY MASS INDEX: 23.2 KG/M2 | WEIGHT: 135.88 LBS | DIASTOLIC BLOOD PRESSURE: 82 MMHG | HEART RATE: 89 BPM | HEIGHT: 64 IN

## 2022-02-11 DIAGNOSIS — C79.51 BONE METASTASES: Primary | ICD-10-CM

## 2022-02-11 DIAGNOSIS — C34.31 SQUAMOUS CELL CARCINOMA OF BRONCHUS IN RIGHT LOWER LOBE: ICD-10-CM

## 2022-02-11 DIAGNOSIS — E86.0 DEHYDRATION: ICD-10-CM

## 2022-02-11 PROCEDURE — 96361 HYDRATE IV INFUSION ADD-ON: CPT

## 2022-02-11 PROCEDURE — 63600175 PHARM REV CODE 636 W HCPCS: Performed by: INTERNAL MEDICINE

## 2022-02-11 PROCEDURE — S5010 5% DEXTROSE AND 0.45% SALINE: HCPCS | Performed by: INTERNAL MEDICINE

## 2022-02-11 PROCEDURE — A4216 STERILE WATER/SALINE, 10 ML: HCPCS | Performed by: INTERNAL MEDICINE

## 2022-02-11 PROCEDURE — 25000003 PHARM REV CODE 250: Performed by: INTERNAL MEDICINE

## 2022-02-11 PROCEDURE — 96360 HYDRATION IV INFUSION INIT: CPT

## 2022-02-11 RX ORDER — SODIUM CHLORIDE 0.9 % (FLUSH) 0.9 %
10 SYRINGE (ML) INJECTION
Status: CANCELLED | OUTPATIENT
Start: 2022-02-11

## 2022-02-11 RX ORDER — SODIUM CHLORIDE 0.9 % (FLUSH) 0.9 %
10 SYRINGE (ML) INJECTION
Status: DISCONTINUED | OUTPATIENT
Start: 2022-02-11 | End: 2022-02-11 | Stop reason: HOSPADM

## 2022-02-11 RX ORDER — HEPARIN 100 UNIT/ML
500 SYRINGE INTRAVENOUS
Status: CANCELLED | OUTPATIENT
Start: 2022-02-11

## 2022-02-11 RX ORDER — HEPARIN 100 UNIT/ML
500 SYRINGE INTRAVENOUS
Status: DISCONTINUED | OUTPATIENT
Start: 2022-02-11 | End: 2022-02-11 | Stop reason: HOSPADM

## 2022-02-11 RX ADMIN — DEXTROSE AND SODIUM CHLORIDE 1000 ML: 5; .45 INJECTION, SOLUTION INTRAVENOUS at 10:02

## 2022-02-11 RX ADMIN — Medication 500 UNITS: at 12:02

## 2022-02-11 RX ADMIN — SODIUM CHLORIDE, PRESERVATIVE FREE 10 ML: 5 INJECTION INTRAVENOUS at 12:02

## 2022-02-13 ENCOUNTER — PATIENT MESSAGE (OUTPATIENT)
Dept: HEMATOLOGY/ONCOLOGY | Facility: CLINIC | Age: 67
End: 2022-02-13
Payer: MEDICARE

## 2022-02-14 ENCOUNTER — DOCUMENT SCAN (OUTPATIENT)
Dept: HOME HEALTH SERVICES | Facility: HOSPITAL | Age: 67
End: 2022-02-14
Payer: MEDICARE

## 2022-02-14 ENCOUNTER — OFFICE VISIT (OUTPATIENT)
Dept: PSYCHIATRY | Facility: CLINIC | Age: 67
End: 2022-02-14
Payer: MEDICARE

## 2022-02-14 VITALS
WEIGHT: 136.56 LBS | DIASTOLIC BLOOD PRESSURE: 86 MMHG | HEART RATE: 111 BPM | BODY MASS INDEX: 23.31 KG/M2 | HEIGHT: 64 IN | SYSTOLIC BLOOD PRESSURE: 134 MMHG

## 2022-02-14 DIAGNOSIS — F41.0 PANIC DISORDER WITHOUT AGORAPHOBIA: ICD-10-CM

## 2022-02-14 DIAGNOSIS — F41.1 GENERALIZED ANXIETY DISORDER: ICD-10-CM

## 2022-02-14 DIAGNOSIS — C34.91 ADENOCARCINOMA, LUNG, RIGHT: ICD-10-CM

## 2022-02-14 DIAGNOSIS — F33.1 MDD (MAJOR DEPRESSIVE DISORDER), RECURRENT EPISODE, MODERATE: ICD-10-CM

## 2022-02-14 PROCEDURE — 90833 PR PSYCHOTHERAPY W/PATIENT W/E&M, 30 MIN (ADD ON): ICD-10-PCS | Mod: S$PBB,,, | Performed by: PSYCHIATRY & NEUROLOGY

## 2022-02-14 PROCEDURE — 99214 OFFICE O/P EST MOD 30 MIN: CPT | Mod: S$PBB,,, | Performed by: PSYCHIATRY & NEUROLOGY

## 2022-02-14 PROCEDURE — 99999 PR PBB SHADOW E&M-EST. PATIENT-LVL III: ICD-10-PCS | Mod: PBBFAC,,, | Performed by: PSYCHIATRY & NEUROLOGY

## 2022-02-14 PROCEDURE — 90833 PSYTX W PT W E/M 30 MIN: CPT | Mod: S$PBB,,, | Performed by: PSYCHIATRY & NEUROLOGY

## 2022-02-14 PROCEDURE — 99214 PR OFFICE/OUTPT VISIT, EST, LEVL IV, 30-39 MIN: ICD-10-PCS | Mod: S$PBB,,, | Performed by: PSYCHIATRY & NEUROLOGY

## 2022-02-14 PROCEDURE — 99213 OFFICE O/P EST LOW 20 MIN: CPT | Mod: PBBFAC,PN | Performed by: PSYCHIATRY & NEUROLOGY

## 2022-02-14 PROCEDURE — 99999 PR PBB SHADOW E&M-EST. PATIENT-LVL III: CPT | Mod: PBBFAC,,, | Performed by: PSYCHIATRY & NEUROLOGY

## 2022-02-14 RX ORDER — LAMOTRIGINE 100 MG/1
TABLET ORAL
Qty: 270 TABLET | Refills: 0 | Status: ON HOLD | OUTPATIENT
Start: 2022-02-14 | End: 2022-03-25 | Stop reason: SDUPTHER

## 2022-02-14 RX ORDER — SERTRALINE HYDROCHLORIDE 100 MG/1
100 TABLET, FILM COATED ORAL NIGHTLY
Start: 2022-02-14

## 2022-02-14 RX ORDER — MIRTAZAPINE 15 MG/1
15 TABLET, FILM COATED ORAL NIGHTLY
Qty: 90 TABLET | Refills: 0 | Status: SHIPPED | OUTPATIENT
Start: 2022-02-14 | End: 2022-03-25

## 2022-02-14 RX ORDER — MEMANTINE HYDROCHLORIDE 10 MG/1
10 TABLET ORAL 2 TIMES DAILY
Qty: 180 TABLET | Refills: 0 | Status: SHIPPED | OUTPATIENT
Start: 2022-02-14 | End: 2022-05-13

## 2022-02-14 RX ORDER — OLANZAPINE 5 MG/1
TABLET ORAL
Qty: 90 TABLET | Refills: 0 | Status: SHIPPED | OUTPATIENT
Start: 2022-02-14 | End: 2022-03-15

## 2022-02-14 NOTE — PROGRESS NOTES
Outpatient Psychiatry Follow-Up Visit (MD/NP)  2/14/2022    Clinical Status of Patient:  Outpatient (Ambulatory)    Chief Complaint:  Sheri Broderick is a 66 y.o. female who presents today for follow-up of anxiety, depression.   Met with patient.      Interval History and Content of Current Session:      Interim Events/Subjective Report/Content of Current Session:      67 yo  female presents for follow up appointment for treatment of major depression, generalized anxiety disorder, panic disorder.     Past Psychiatric Hx:   No prior psychiatric hospitalizations   Suicide risk assessment:   Risks: , age, chronic depression with passive SI  Protective: no prior suicide attempts, strong skip, female, no substance abuse or impulsivity, health issues present both not disabling at this time  She is at low to moderate risk of suicide acutely, and moderate risk over long term     Prior meds: Abilify (no benefit), Buspirone (intolerance) Trazodone (intolerance), Paroxetine (intolerance), Quetiapine, Bupropion (intolerance), Fluoxetine (no benefit), Duloxetine Foltx (no longer covered)     Past Medical Hx:  Adenocarcinoma of the lung, metastatic   Osteoporosis   Neuropraxia  HLD  DDD   Fibromyalgia  HTN  Anemia     Interim Hx:   Pt presents for follow up.  Was driven to clinic today via [facility] on [address].  Pt is travelling every 3 weeks to [facility] for cancer treatments, her nephew brings her (she is concerned about his work schedule).. Notes from [facility] were reviewed.     Physical pain is controlled on current regimen. (Fentanyl, Morphine).         Recall that the patient had a fall on 11/4/2021 and fractured her zygomatic arch and a subarachnoid bleed. CT head improved - Nov 2021.  Recall the patient is enrolled in a clinical trial, under care of Dr. Carrillo for metastatic adenocarcinoma of the right lower lung. Pt is receiving Fam-trastuzumab deruxtecan-nxki (ENHERTU).  The patient has had  radiation to: whole brain, WB C spine block, C6-T1, and right hip. The patient finished radiation on 7/15/2020.  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  Pt has not been able to see Dr. Jennings.  She will need to r/s with Dr. Holbrook due to a conflicting appt.       Meds reviewed.  Pt has not taken Sertraline in one week.  She has remained compliant on Lamictal 100 mg in morning, 200 mg nightly, Mirtazapine 15 mg nightly, Olanzapine 2.5 mg in am and 5 mg nightly (reduced), and Memantine 10 mg BID. She has not taken Lorazepam.   She has received support through home health services, including weekly med organization  - she is in process of being re-certified again, she reports. .    Appetite is low - forces self to eat, drink.   Wt Readings from Last 3 Encounters:   02/11/22 0953 61.6 kg (135 lb 14.4 oz)   02/04/22 0958 62.1 kg (137 lb)   02/01/22 0950 62.7 kg (138 lb 3.7 oz)   + Loneliness, emptiness on top of pain, coordinating transportation is very stressful - has to make a reservation a week ahead. She was able to get a friend to take her to [place] tomorrow due to conflict with nephew's schedule.  She is considering taking a break from tx.  Schedule is wearing her out. 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 Friend Shu has been taking her to hospitals.  She is in [place]    Psych ROS:  + Depressed mood, Energy and motivation are low, sofa wins. Appetite is very low.   Wt Readings from Last 3 Encounters:   02/14/22 1120 62 kg (136 lb 9.2 oz)   02/11/22 0953 61.6 kg (135 lb 14.4 oz)   02/04/22 0958 62.1 kg (137 lb)     Sleeping most nights ok.   Few rough nights/mornings.  Left shoulder 8-9/10  - on medications.   Taking it one day a time.  Crying a lot or sleeping way through day    Stays in PJs - TV not working in bedroom  Last fell in November was her last fall - she got tripped up on bed sheets.     Denies suicidal/homicidal ideations. Denies symptoms of brad/psychosis.  No self  harm or violence.   Memory - confused a lot, could not find car hart  Niece handling finances   Has a medic alert button.  Remembers she needs to take meds - does occasional miss days. Keeping written log.     GAD7 2/14/2022 12/20/2021 8/23/2021   1. Feeling nervous, anxious, or on edge? 3 2 2   2. Not being able to stop or control worrying? 3 1 2   3. Worrying too much about different things? 3 1 2   4. Trouble relaxing? 3 1 2   5. Being so restless that it is hard to sit still? 3 1 1   6. Becoming easily annoyed or irritable? 3 1 1   7. Feeling afraid as if something awful might happen? 3 1 1   8. If you checked off any problems, how difficult have these problems made it for you to do your work, take care of things at home, or get along with other people? 3 1 2   ANISH-7 Score 21 8 11     PHQ9 2/14/2022   Little interest or pleasure in doing things: Nearly every day   Feeling down, depressed or hopeless: Nearly every day   Trouble falling asleep, staying asleep, or sleeping too much: Several days   Feeling tired or having little energy: Several days   Poor appetite or overeating: Nearly every day   Feeling bad about yourself- or that you are a failure or have let yourself or family down Nearly every day   Trouble concentrating on things, such as reading the newspaper or watching television: Nearly every day   Moving or speaking so slowly that other people could have noticed. Or the opposite- being so fidgety or restless that you have been moving around a lot more than usual: Not at all   Thoughts that you would be better off dead or hurting yourself in some way: Not at all   If you indicated you have experienced any of the aforementioned problems, how difficult have these problems made it for you to do your work, take care of things at home or get along with other people? Extremely difficult   Total Score 17           Pt denies suicidal/homicidal ideations. Fearful on the unknown, does not want to die.   No reported  "symptoms of brad. Occasionally hears a cat's meow.       Denies alcohol/drug use. No tobacco. No recent caffeine use.      Review of Systems   · PSYCHIATRIC: Pertinant items are noted in the narrative.  · CONSTITUTIONAL:  No fever, wt loss    · NEURO: memory impairment, last fall in November.   · M/S: 10/10 shoulder pain     Past Medical, Family and Social History: The patient's past medical, family and social history have been reviewed and updated as appropriate within the electronic medical record - see encounter notes.    Psychiatric Medications:   Sertraline 100 mg BID   Lorazepam 0.5 mg daily prn anxiety - not taking unless needed for a scan.   Remeron 15 mg QHS   Olanzapine 2.5 mg in am and 5 mg nightly   Lamictal 100 mg in am and 200 mg in pm    Memantine 10 mg BID   Fentanyl  Morphine IR BID     Compliance: has not been taking Sertraline     Side effects: Fatigue, pt is a fall risk     Risk Parameters:  Patient reports no suicidal ideations   Patient reports no homicidal ideation  Patient reports no self-injurious behavior  Patient reports no violent behavior      Exam (detailed: at least 9 elements; comprehensive: all 15 elements)   Constitutional  Vitals:  Most recent vital signs, dated more than 90 days prior to this appointment, were reviewed.    Se Epic for today's vitals.       General:  age appropriate, casual attire, adequately groomed, good eye contact, thin         Musculoskeletal  Muscle Strength/Tone:  No tremor noted today    Gait & Station:  Slow, steady     Psychiatric  Speech:  no latency; no press , spontaneous, talkative, soft spoken    Mood & Affect:  "depressed"   Blunted     Thought Process:  Linear with questions, + delay noted    Associations:  intact   Thought Content:  no active or passive SI today, no homicidality, delusions, or paranoia, hallucinations: (auditory: no currently, visual: no)       Insight:  Intact    Judgement: Adequate to circumstances   Orientation:  grossly " intact. Alert and oriented x 4    Memory: Some ST memory issues noted, able to provide recent and remote hx.    Language: grossly intact   Attention Span & Concentration:  able to focus   Fund of Knowledge:  intact and appropriate to age and level of education     Assessment and Diagnosis   Status/Progress: Based on the examination today, the patient's problem(s) is/are under inadequate control, has been treatment resistant in past. She is enrolled in clinical trial for treatment of metastatic adenocarcinoma of lung. Comorbidities are complicating management of the primary condition.      General Impression :   Patient had moved to [place] with friends to undergo treatment at [facility]; she recently returned to [facility] it was time for her to move out of friends' home.  She travels to [place] to continue participation in clinical trial.  Limited social support.  Notes increase in depression with holidays. Pt had significant fall last fall.  She is considering pausing treatment.     Major Depressive Disorder, Recurrent, moderate  Panic Disorder without Agoraphobia   Generalized Anxiety Disorder    PTSD  Hx OCD  Personality Disorder, unspecified, Cluster B and C traits     Hepaitis B, tremors,  osteoporosis, myalgias, arthralgia, poor balance, back pain, bulging disc, fibromyalgia, osteoarthritis, neuropathy, LE fracture, metastatic adenocarcinoma of Lung    GAF: 55   Intervention/Counseling/Treatment Plan   · Medication Management: The risks and benefits of medication were discussed with the patient.  · Counseling provided with patient as follows: importance of compliance with chosen treatment options was emphasized, risks and benefits of treatment options, including medications, were discussed with the patient     1. Resume Sertraline 100 mg but only once daily (trial to reduce meds which may be contributing to her fatigue).   2. Continue Remeron 15 mg QHS   3. Continue Memantine 10 mg BID   4. Continue Lamictal 100  mg in morning and 200 mg nightly.  Discussed risks of life threatening SJS, need for compliance. Consider lowering this dose next.   5. She is under care of Dr Kaye Jennings (psychology), but provider has not been available.  She has been referred to Dr. Holbrook and has an appt 03/04/22.   6. REDUCE Olanzapine to 5 mg nightly in case med is contributing to sedation.  Discussed risks of tardive dyskinesia, drug induced parkinsonism, metabolic side effects, including wt gain, neuroleptic malignant syndrome   7.  Pt instructed to go to ED/911 with thoughts of wanting harm self/others  8. Call to report any worsening of symptoms or problems with the medication.   9. Continue Home Health - will look into need for re-certification   10.  Discussed possible transition to assisted living facility. She is encouraged to consider - discussed in depth with her today the benefits of living in an assisted living rather than alone.  She is considering.   11. Pt has been referred to Case Mgmt and is working with her on resources   12. Avoid lorazepam due to risks of med interaction, resp depression with opiates and other sedating medications    MDD (major depressive disorder), recurrent episode, moderate    Generalized anxiety disorder  -     sertraline (ZOLOFT) 100 MG tablet; Take 1 tablet (100 mg total) by mouth every evening.    Panic disorder without agoraphobia    Adenocarcinoma, lung, right    Other orders  -     OLANZapine (ZYPREXA) 5 MG tablet; TAKE ONE TABLET PO NIGHTLY  Dispense: 90 tablet; Refill: 0  -     lamoTRIgine (LAMICTAL) 100 MG tablet; TAKE 1 TABLET BY MOUTH EVERY MORNING AND 2 TABLETS AT BEDTIME  Dispense: 270 tablet; Refill: 0  -     memantine (NAMENDA) 10 MG Tab; Take 1 tablet (10 mg total) by mouth 2 (two) times daily.  Dispense: 180 tablet; Refill: 0  -     mirtazapine (REMERON) 15 MG tablet; Take 1 tablet (15 mg total) by mouth every evening.  Dispense: 90 tablet; Refill: 0            Return to  Clinic: apprx 2 months

## 2022-02-16 ENCOUNTER — DOCUMENT SCAN (OUTPATIENT)
Dept: HOME HEALTH SERVICES | Facility: HOSPITAL | Age: 67
End: 2022-02-16
Payer: MEDICARE

## 2022-02-18 ENCOUNTER — INFUSION (OUTPATIENT)
Dept: INFUSION THERAPY | Facility: HOSPITAL | Age: 67
End: 2022-02-18
Attending: INTERNAL MEDICINE
Payer: MEDICARE

## 2022-02-18 VITALS
RESPIRATION RATE: 17 BRPM | BODY MASS INDEX: 23.5 KG/M2 | DIASTOLIC BLOOD PRESSURE: 86 MMHG | HEART RATE: 85 BPM | HEIGHT: 64 IN | SYSTOLIC BLOOD PRESSURE: 143 MMHG | TEMPERATURE: 98 F | OXYGEN SATURATION: 98 % | WEIGHT: 137.63 LBS

## 2022-02-18 DIAGNOSIS — E86.0 DEHYDRATION: ICD-10-CM

## 2022-02-18 DIAGNOSIS — C79.51 BONE METASTASES: ICD-10-CM

## 2022-02-18 DIAGNOSIS — C34.31 SQUAMOUS CELL CARCINOMA OF BRONCHUS IN RIGHT LOWER LOBE: ICD-10-CM

## 2022-02-18 DIAGNOSIS — C34.31 MALIGNANT NEOPLASM OF LOWER LOBE OF RIGHT LUNG: ICD-10-CM

## 2022-02-18 DIAGNOSIS — C79.51 BONE METASTASES: Primary | ICD-10-CM

## 2022-02-18 PROCEDURE — 96360 HYDRATION IV INFUSION INIT: CPT

## 2022-02-18 PROCEDURE — S5010 5% DEXTROSE AND 0.45% SALINE: HCPCS | Performed by: INTERNAL MEDICINE

## 2022-02-18 PROCEDURE — 96361 HYDRATE IV INFUSION ADD-ON: CPT

## 2022-02-18 PROCEDURE — 25000003 PHARM REV CODE 250: Performed by: INTERNAL MEDICINE

## 2022-02-18 PROCEDURE — A4216 STERILE WATER/SALINE, 10 ML: HCPCS | Performed by: INTERNAL MEDICINE

## 2022-02-18 PROCEDURE — 63600175 PHARM REV CODE 636 W HCPCS: Performed by: INTERNAL MEDICINE

## 2022-02-18 RX ORDER — SODIUM CHLORIDE 0.9 % (FLUSH) 0.9 %
10 SYRINGE (ML) INJECTION
Status: CANCELLED | OUTPATIENT
Start: 2022-02-18

## 2022-02-18 RX ORDER — HEPARIN 100 UNIT/ML
500 SYRINGE INTRAVENOUS
Status: CANCELLED | OUTPATIENT
Start: 2022-02-18

## 2022-02-18 RX ORDER — FENTANYL 75 UG/H
1 PATCH TRANSDERMAL
Qty: 10 PATCH | Refills: 0 | Status: CANCELLED | OUTPATIENT
Start: 2022-02-18

## 2022-02-18 RX ORDER — HEPARIN 100 UNIT/ML
500 SYRINGE INTRAVENOUS
Status: DISCONTINUED | OUTPATIENT
Start: 2022-02-18 | End: 2022-02-18 | Stop reason: HOSPADM

## 2022-02-18 RX ORDER — SODIUM CHLORIDE 0.9 % (FLUSH) 0.9 %
10 SYRINGE (ML) INJECTION
Status: DISCONTINUED | OUTPATIENT
Start: 2022-02-18 | End: 2022-02-18 | Stop reason: HOSPADM

## 2022-02-18 RX ORDER — MORPHINE SULFATE 15 MG/1
7.5 TABLET ORAL EVERY 4 HOURS PRN
Qty: 60 TABLET | Refills: 0 | Status: CANCELLED | OUTPATIENT
Start: 2022-02-18

## 2022-02-18 RX ADMIN — SODIUM CHLORIDE, PRESERVATIVE FREE 10 ML: 5 INJECTION INTRAVENOUS at 01:02

## 2022-02-18 RX ADMIN — Medication 500 UNITS: at 01:02

## 2022-02-18 RX ADMIN — DEXTROSE AND SODIUM CHLORIDE 1000 ML: 5; .45 INJECTION, SOLUTION INTRAVENOUS at 11:02

## 2022-02-18 NOTE — PLAN OF CARE
Problem: Fatigue  Goal: Improved Activity Tolerance  Outcome: Met     Problem: Breathing Pattern Ineffective  Goal: Effective Breathing Pattern  Outcome: Met

## 2022-02-22 ENCOUNTER — OUTPATIENT CASE MANAGEMENT (OUTPATIENT)
Dept: ADMINISTRATIVE | Facility: OTHER | Age: 67
End: 2022-02-22
Payer: MEDICARE

## 2022-02-22 ENCOUNTER — INFUSION (OUTPATIENT)
Dept: INFUSION THERAPY | Facility: HOSPITAL | Age: 67
End: 2022-02-22
Attending: INTERNAL MEDICINE
Payer: MEDICARE

## 2022-02-22 VITALS
DIASTOLIC BLOOD PRESSURE: 87 MMHG | WEIGHT: 135.56 LBS | HEART RATE: 89 BPM | RESPIRATION RATE: 18 BRPM | BODY MASS INDEX: 23.14 KG/M2 | HEIGHT: 64 IN | SYSTOLIC BLOOD PRESSURE: 138 MMHG | TEMPERATURE: 98 F

## 2022-02-22 DIAGNOSIS — E86.0 DEHYDRATION: ICD-10-CM

## 2022-02-22 DIAGNOSIS — C79.51 BONE METASTASES: Primary | ICD-10-CM

## 2022-02-22 DIAGNOSIS — C34.31 SQUAMOUS CELL CARCINOMA OF BRONCHUS IN RIGHT LOWER LOBE: ICD-10-CM

## 2022-02-22 PROCEDURE — 25000003 PHARM REV CODE 250: Performed by: INTERNAL MEDICINE

## 2022-02-22 PROCEDURE — 96360 HYDRATION IV INFUSION INIT: CPT

## 2022-02-22 PROCEDURE — A4216 STERILE WATER/SALINE, 10 ML: HCPCS | Performed by: INTERNAL MEDICINE

## 2022-02-22 PROCEDURE — 63600175 PHARM REV CODE 636 W HCPCS: Performed by: INTERNAL MEDICINE

## 2022-02-22 PROCEDURE — S5010 5% DEXTROSE AND 0.45% SALINE: HCPCS | Performed by: INTERNAL MEDICINE

## 2022-02-22 PROCEDURE — 96361 HYDRATE IV INFUSION ADD-ON: CPT

## 2022-02-22 RX ORDER — SODIUM CHLORIDE 0.9 % (FLUSH) 0.9 %
10 SYRINGE (ML) INJECTION
Status: DISCONTINUED | OUTPATIENT
Start: 2022-02-22 | End: 2022-02-22 | Stop reason: HOSPADM

## 2022-02-22 RX ORDER — HEPARIN 100 UNIT/ML
500 SYRINGE INTRAVENOUS
Status: DISCONTINUED | OUTPATIENT
Start: 2022-02-22 | End: 2022-02-22 | Stop reason: HOSPADM

## 2022-02-22 RX ORDER — SODIUM CHLORIDE 0.9 % (FLUSH) 0.9 %
10 SYRINGE (ML) INJECTION
Status: CANCELLED | OUTPATIENT
Start: 2022-02-22

## 2022-02-22 RX ORDER — HEPARIN 100 UNIT/ML
500 SYRINGE INTRAVENOUS
Status: CANCELLED | OUTPATIENT
Start: 2022-02-22

## 2022-02-22 RX ADMIN — DEXTROSE AND SODIUM CHLORIDE 1000 ML: 5; .45 INJECTION, SOLUTION INTRAVENOUS at 11:02

## 2022-02-22 RX ADMIN — HEPARIN 500 UNITS: 100 SYRINGE at 12:02

## 2022-02-22 RX ADMIN — SODIUM CHLORIDE 10 ML: 9 INJECTION INTRAMUSCULAR; INTRAVENOUS; SUBCUTANEOUS at 12:02

## 2022-02-22 NOTE — PROGRESS NOTES
Outpatient Care Management  Plan of Care Follow Up Visit    Patient: Sheri Broderick  MRN: [id number]  Date of Service: 02/22/2022  Completed by: Eliza Rosario RN  Referral Date: 11/17/2021  Program: Case Management (High Risk)    Reason for Visit   Patient presents with    Update Plan Of Care       Brief Summary:  She had a friend bring her to [facility] on 02/10/22 and did not receive any chemo. They have her scheduled for a nuclear MRI and then will proceed after the findings. She denies fever. She has some nausea and fatigue. She has her appt today.  CM ACTION PLAN:  Follow up in two weeks     Patient Summary     Involvement of Care:  Do I have permission to speak with other family members about your care?       Patient Reported Labs & Vitals:  1.  Any Patient Reported Labs & Vitals?     2.  Patient Reported Blood Pressure:     3.  Patient Reported Pulse:     4.  Patient Reported Weight (Kg):     5.  Patient Reported Blood Glucose (mg/dl):       Medical and social history was reviewed with patient and/or caregiver.     Clinical Assessment     Reviewed and provided basic information on available community resources for mental health, transportation, wellness resources, and palliative care programs with patient and/or caregiver.     Complex Care Plan     Care plan was discussed and completed today with input from patient and/or caregiver.    Patient Instructions     Instructions were provided via the [facility] patient resources and are available for the patient to view on the patient portal.        Follow up in about 13 days (around 3/7/2022) for RN Follow up call.    [facility] OPCM Self-Management Care Plan was developed with the patients/caregivers input and was based on identified barriers from [facility] assessment.  Goals were written today with the patient/caregiver and the patient has agreed to work towards these goals to improve his/her overall well-being. Patient verbalized understanding of the care plan,  goals, and all of today's instructions. Encouraged patient/caregiver to communicate with his/her physician and health care team about health conditions and the treatment plan.  Provided my contact information today and encouraged patient/caregiver to call me with any questions as needed.

## 2022-02-22 NOTE — PLAN OF CARE
Problem: Fatigue  Goal: Improved Activity Tolerance  2/22/2022 1126 by Geeta Nguyen RN  Outcome: Met  2/22/2022 1126 by Geeta Nguyen RN  Outcome: Ongoing, Progressing

## 2022-02-23 ENCOUNTER — DOCUMENT SCAN (OUTPATIENT)
Dept: HOME HEALTH SERVICES | Facility: HOSPITAL | Age: 67
End: 2022-02-23
Payer: MEDICARE

## 2022-02-24 ENCOUNTER — OUTPATIENT CASE MANAGEMENT (OUTPATIENT)
Dept: ADMINISTRATIVE | Facility: OTHER | Age: 67
End: 2022-02-24
Payer: MEDICARE

## 2022-02-24 NOTE — PROGRESS NOTES
"Outpatient Care Management   - Care Plan Follow Up    Patient: Sheri Broderick  MRN:  [id number]  Date of Service:  2/24/2022  Completed by:  Rebeca Lam LCSW  Referral Date: 11/17/2021  Program: Case Management (High Risk)    Reason for Visit   Patient presents with    Update Plan Of Care       Brief Summary: OPCM  completed follow up call with pt.  Pt states that she has not been feeling very well in that she feels "disoriented and tired all the time."  Going back to [facility] next week because pt reports that they found another spot in her upper right shoulder and she will be undergoing a nuclear MRI next week to determine what the spot is.  Pt had to reschedule her appt with Dr. Holbrook for counseling and it is scheduled for 3/4/22.  Pt reports that she was able to place her online grocery order and they were delivered so pt was happy that this will be one less stressor in her life, worrying about having food and getting to the grocery store, since pt has to rely on family for transportation to the grocery store. This  also discussed pt going into assisted living at some point. Pt states she is not ready to move at this time.  This  did look up the [facility]ery of [facility] [employer] in [place], which is around $2700/month on the low end, and pt admits that she would not be able to afford assisted living at that price.  No further social work needs identified.  This  will follow up with pt in 2 weeks after her appt at [facility] and appt with Dr Holbrook.    Complex Care Plan     Care plan was discussed and completed today with input from patient and/or caregiver.    Patient Instructions     Follow up in about 2 weeks (around 3/10/2022).    Todays OPCM Self-Management Care Plan was developed with the patients/caregivers input and was based on identified barriers from todays assessment.  Goals were written today with the patient/caregiver and the " patient has agreed to work towards these goals to improve his/her overall well-being. Patient verbalized understanding of the care plan, goals, and all of today's instructions. Encouraged patient/caregiver to communicate with his/her physician and health care team about health conditions and the treatment plan.  Provided my contact information today and encouraged patient/caregiver to call me with any questions as needed.

## 2022-02-25 ENCOUNTER — INFUSION (OUTPATIENT)
Dept: INFUSION THERAPY | Facility: HOSPITAL | Age: 67
End: 2022-02-25
Attending: INTERNAL MEDICINE
Payer: MEDICARE

## 2022-02-25 ENCOUNTER — TELEPHONE (OUTPATIENT)
Dept: INFUSION THERAPY | Facility: HOSPITAL | Age: 67
End: 2022-02-25

## 2022-02-25 VITALS
HEART RATE: 108 BPM | RESPIRATION RATE: 18 BRPM | WEIGHT: 136.31 LBS | OXYGEN SATURATION: 99 % | TEMPERATURE: 98 F | BODY MASS INDEX: 23.4 KG/M2 | SYSTOLIC BLOOD PRESSURE: 157 MMHG | DIASTOLIC BLOOD PRESSURE: 87 MMHG

## 2022-02-25 DIAGNOSIS — C34.31 SQUAMOUS CELL CARCINOMA OF BRONCHUS IN RIGHT LOWER LOBE: ICD-10-CM

## 2022-02-25 DIAGNOSIS — C79.51 BONE METASTASES: Primary | ICD-10-CM

## 2022-02-25 DIAGNOSIS — E86.0 DEHYDRATION: ICD-10-CM

## 2022-02-25 PROCEDURE — S5010 5% DEXTROSE AND 0.45% SALINE: HCPCS | Performed by: INTERNAL MEDICINE

## 2022-02-25 PROCEDURE — 25000003 PHARM REV CODE 250: Performed by: INTERNAL MEDICINE

## 2022-02-25 PROCEDURE — 96361 HYDRATE IV INFUSION ADD-ON: CPT

## 2022-02-25 PROCEDURE — 96360 HYDRATION IV INFUSION INIT: CPT

## 2022-02-25 PROCEDURE — 63600175 PHARM REV CODE 636 W HCPCS: Performed by: INTERNAL MEDICINE

## 2022-02-25 RX ORDER — HEPARIN 100 UNIT/ML
500 SYRINGE INTRAVENOUS
Status: CANCELLED | OUTPATIENT
Start: 2022-02-25

## 2022-02-25 RX ORDER — HEPARIN 100 UNIT/ML
500 SYRINGE INTRAVENOUS
Status: DISCONTINUED | OUTPATIENT
Start: 2022-02-25 | End: 2022-02-25 | Stop reason: HOSPADM

## 2022-02-25 RX ORDER — SODIUM CHLORIDE 0.9 % (FLUSH) 0.9 %
10 SYRINGE (ML) INJECTION
Status: CANCELLED | OUTPATIENT
Start: 2022-02-25

## 2022-02-25 RX ADMIN — DEXTROSE AND SODIUM CHLORIDE 1000 ML: 5; .45 INJECTION, SOLUTION INTRAVENOUS at 11:02

## 2022-03-02 ENCOUNTER — EXTERNAL HOME HEALTH (OUTPATIENT)
Dept: HOME HEALTH SERVICES | Facility: HOSPITAL | Age: 67
End: 2022-03-02
Payer: MEDICARE

## 2022-03-04 ENCOUNTER — OFFICE VISIT (OUTPATIENT)
Dept: PSYCHIATRY | Facility: CLINIC | Age: 67
End: 2022-03-04
Payer: MEDICARE

## 2022-03-04 ENCOUNTER — INFUSION (OUTPATIENT)
Dept: INFUSION THERAPY | Facility: HOSPITAL | Age: 67
End: 2022-03-04
Attending: INTERNAL MEDICINE
Payer: MEDICARE

## 2022-03-04 VITALS
TEMPERATURE: 98 F | DIASTOLIC BLOOD PRESSURE: 87 MMHG | RESPIRATION RATE: 18 BRPM | SYSTOLIC BLOOD PRESSURE: 132 MMHG | HEART RATE: 96 BPM | BODY MASS INDEX: 23.36 KG/M2 | OXYGEN SATURATION: 98 % | WEIGHT: 136.13 LBS

## 2022-03-04 DIAGNOSIS — C34.90 ADENOCARCINOMA OF LUNG, UNSPECIFIED LATERALITY: ICD-10-CM

## 2022-03-04 DIAGNOSIS — E86.0 DEHYDRATION: ICD-10-CM

## 2022-03-04 DIAGNOSIS — F33.1 MDD (MAJOR DEPRESSIVE DISORDER), RECURRENT EPISODE, MODERATE: Primary | ICD-10-CM

## 2022-03-04 DIAGNOSIS — F41.1 GENERALIZED ANXIETY DISORDER: ICD-10-CM

## 2022-03-04 DIAGNOSIS — C34.31 SQUAMOUS CELL CARCINOMA OF BRONCHUS IN RIGHT LOWER LOBE: ICD-10-CM

## 2022-03-04 DIAGNOSIS — C79.51 BONE METASTASES: Primary | ICD-10-CM

## 2022-03-04 PROCEDURE — 96361 HYDRATE IV INFUSION ADD-ON: CPT

## 2022-03-04 PROCEDURE — S5010 5% DEXTROSE AND 0.45% SALINE: HCPCS | Performed by: INTERNAL MEDICINE

## 2022-03-04 PROCEDURE — 90791 PSYCH DIAGNOSTIC EVALUATION: CPT | Mod: FQ,95,, | Performed by: PSYCHOLOGIST

## 2022-03-04 PROCEDURE — 90791 PR PSYCHIATRIC DIAGNOSTIC EVALUATION: ICD-10-PCS | Mod: FQ,95,, | Performed by: PSYCHOLOGIST

## 2022-03-04 PROCEDURE — 96360 HYDRATION IV INFUSION INIT: CPT

## 2022-03-04 PROCEDURE — 63600175 PHARM REV CODE 636 W HCPCS: Performed by: INTERNAL MEDICINE

## 2022-03-04 PROCEDURE — A4216 STERILE WATER/SALINE, 10 ML: HCPCS | Performed by: INTERNAL MEDICINE

## 2022-03-04 PROCEDURE — 25000003 PHARM REV CODE 250: Performed by: INTERNAL MEDICINE

## 2022-03-04 RX ORDER — SODIUM CHLORIDE 0.9 % (FLUSH) 0.9 %
10 SYRINGE (ML) INJECTION
Status: DISCONTINUED | OUTPATIENT
Start: 2022-03-04 | End: 2022-03-04 | Stop reason: HOSPADM

## 2022-03-04 RX ORDER — HEPARIN 100 UNIT/ML
500 SYRINGE INTRAVENOUS
Status: CANCELLED | OUTPATIENT
Start: 2022-03-04

## 2022-03-04 RX ORDER — SODIUM CHLORIDE 0.9 % (FLUSH) 0.9 %
10 SYRINGE (ML) INJECTION
Status: CANCELLED | OUTPATIENT
Start: 2022-03-04

## 2022-03-04 RX ORDER — HEPARIN 100 UNIT/ML
500 SYRINGE INTRAVENOUS
Status: DISCONTINUED | OUTPATIENT
Start: 2022-03-04 | End: 2022-03-04 | Stop reason: HOSPADM

## 2022-03-04 RX ADMIN — DEXTROSE AND SODIUM CHLORIDE 1000 ML: 5; .45 INJECTION, SOLUTION INTRAVENOUS at 10:03

## 2022-03-04 RX ADMIN — SODIUM CHLORIDE, PRESERVATIVE FREE 10 ML: 5 INJECTION INTRAVENOUS at 12:03

## 2022-03-04 RX ADMIN — Medication 500 UNITS: at 12:03

## 2022-03-04 NOTE — Clinical Note
Demi Riojas,  Got Ms. Wade in today and we are looking to establish a long term psychotherapeutic relationship (2-3 week FU). I did want to give you a heads up that she is looking to move her recent scan (claustrophobia-was rescheduled earlier this week to next Friday at [facility]) to [place]. I'm not sure if this will be possible but should it occur she may reach out regarding medication recommendations-we already added some cognitive restructuring and diaphragmatic breathing to aid as well.  NTT

## 2022-03-04 NOTE — PROGRESS NOTES
Established Patient - Audio Only Telehealth Visit     The patient location is: home ([place], [place])  Visit type: Virtual visit with audio only (telephone)       The reason for the audio only service rather than synchronous audio and video virtual visit was related to technical difficulties or patient preference/necessity.     Each patient to whom I provide medical services by telemedicine is:  (1) informed of the relationship between the physician and patient and the respective role of any other health care provider with respect to management of the patient; and (2) notified that they may decline to receive medical services by telemedicine and may withdraw from such care at any time. Patient verbally consented to receive this service via voice-only telephone call.       PSYCHO-ONCOLOGY INTAKE    Diagnostic Interview - CPT 60636    Date: 3/4/2022  Site: [facility]    Evaluation Length (direct face-to-face time):  45 minutes     Referral Source: Beulah Hawley MD   PCP: Demetrio Pleitez Jr, MD    Clinical status of patient: Outpatient    Sheri ZOYA Broderick, a 66 y.o. female, seen for initial evaluation visit.    Sheri Broderick reviewed and agreed to informed consent and the limits of confidentiality.    Chief complaint/reason for encounter: adjustment to illness, depression and anxiety    Medical/Surgical History:    Patient Active Problem List   Diagnosis    MDD (major depressive disorder), recurrent episode, moderate    Generalized anxiety disorder    Tremor    Osteoporosis, senile    Neuropraxia of median nerve    Hyperlipidemia    Degenerative disc disease, cervical    DDD (degenerative disc disease), lumbar    Fibromyalgia    Constipation    Benzodiazepine dependence    OCD (obsessive compulsive disorder)    Malignant neoplasm of lower lobe of right lung-Adenocarcinoma    Chemotherapy-induced neutropenia    Malignant neoplasm of lower lobe of right lung    Encounter for other specified  aftercare    Anemia associated with chemotherapy    Muscle spasm    Cough    Panic disorder without agoraphobia    Essential hypertension    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   Firearms:  No   Advanced directives:None currently on file     Family History:   Psychiatric illness: No     Alcohol/Drug Abuse: No     Suicide: No      Past Psychiatric History:   Inpatient treatment: No     Outpatient treatment: Yes: Beulah Hawley 2012-present, Dr. Jennings previously but not currently     Prior substance abuse treatment: No     Suicide Attempts: No     Psychotropic Medications:  Current: Lamictal, Ativan, Remeron, Zoloft       Current medications as per below, allergies reviewed in chart.    Current Outpatient Medications   Medication    famotidine (PEPCID) 40 MG tablet    fentaNYL (DURAGESIC) 75 mcg/hr    fentaNYL (DURAGESIC) 75 mcg/hr    lamoTRIgine (LAMICTAL) 100 MG tablet    LORazepam (ATIVAN) 0.5 MG tablet    memantine (NAMENDA) 10 MG Tab    mirtazapine (REMERON) 15 MG tablet    morphine (MSIR) 15 MG tablet    multivitamin (THERAGRAN) per tablet    OLANZapine (ZYPREXA) 5 MG tablet    ondansetron (ZOFRAN-ODT) 8 MG TbDL    pantoprazole (PROTONIX) 40 MG tablet    prochlorperazine (COMPAZINE) 10 MG tablet    promethazine (PHENERGAN) 25 MG suppository    senna (SENOKOT) 8.6 mg tablet    sertraline (ZOLOFT) 100 MG tablet    sucralfate (CARAFATE) 1 gram tablet     No current  "facility-administered medications for this visit.              Social situation/Stressors: Sheri Broderick lives alone in [place], [place].  She is retired but previously worked as a  for an insurance brokerage (retired in 2012).   Sheri Broderick is a  (lost her  to a heart attack in 2004) and has no children.   The patient reports good social support-noting that her niece (Bonnie) and nephew (Godfrey) have taken and active role in her care when in [place].  Sheri Broderick is member of the [employer].  Sheri Broderick's hobbies include crafting, gardening, spending time/speaking with friends, and antiquing.  Additional stressors: COVID-19, travel to [place] to participate in trials every 3 weeks, advancement of illness    Strengths:Able to vocalize needs, Motivation, readiness for change and Vocational interests, hobbies and/or talents  Liabilities: Complicated medical illness    Current Evaluation:     Mental Status Exam: Sheri Broderick arrived promptly for the assessment session.  The patient was fully cooperative throughout the interview and was an adequate historian   Behavior/Cooperation: friendly and cooperative  Speech: normal volume and tone and appropriate quality, quantity and organization of sentences, hyperverbal at times  Mood: anxious, "pretty good"  Affect: mood congruent and appropriate  Thought Process: goal-directed, logical  Thought Content: normal, no suicidality, no homicidality, delusions, or paranoia;did not appear to be responding to internal stimuli during the interview.   Orientation: grossly intact  Memory: grossly intact  Attention Span/Concentration: Attends to interview without distraction; reports no difficulty  Fund of Knowledge: average  Estimate of Intelligence: above average from verbal skills and history  Cognition: grossly intact  Insight: patient has awareness of illness; good insight into own behavior and behavior of others  Judgment: " the patient's behavior is adequate to circumstances      History of present illness:    Oncology History   Malignant neoplasm of lower lobe of right lung-Adenocarcinoma   4/27/2018 Initial Diagnosis    [facility] Lobectomy at [facility]: Adenocarcinoma     5/21/2018 Cancer Staged    Staging form: Lung, AJCC 8th Edition  - Clinical: Stage IIB (cT2a, cN1, cM0)     10/9/2019 - 1/22/2020 Chemotherapy    Treatment Summary   Plan Name: OP NIVOLUMAB Q2W  Treatment Goal: Control  Status: Inactive  Start Date: 10/22/2019  End Date: 1/9/2020  Provider: Segundo Zhang MD  Chemotherapy: nivolumab 240 mg in sodium chloride 0.9% 124 mL infusion, 240 mg, Intravenous, Clinic/HOD 1 time, 6 of 10 cycles  Administration: 240 mg (10/22/2019), 240 mg (11/5/2019), 240 mg (11/19/2019), 240 mg (12/3/2019), 240 mg (12/17/2019), 240 mg (1/9/2020)     2/10/2020 - 2/10/2020 Chemotherapy    Treatment Summary   Plan Name: OP NSCLC ATEZOLIZUMAB PACLITAXEL CARBOPLATIN BEVACIZUMAB Q3W  Treatment Goal: Control  Status: Inactive  Start Date: [No treatment day found]  End Date: [No treatment day found]  Provider: Segundo Zhang MD  Chemotherapy: bevacizumab (AVASTIN) in sodium chloride 0.9% 100 mL chemo infusion, 15 mg/kg, Intravenous, Clinic/HOD 1 time, 0 of 12 cycles  CARBOplatin (PARAPLATIN) in sodium chloride 0.9% 250 mL chemo infusion, , Intravenous, Clinic/HOD 1 time, 0 of 6 cycles  PACLitaxel (TAXOL) in sodium chloride 0.9% 500 mL chemo infusion, 200 mg/m2, Intravenous, Clinic/HOD 1 time, 0 of 6 cycles  atezolizumab (TECENTRIQ) 1,200 mg in sodium chloride 0.9% 270 mL infusion, 1,200 mg, Intravenous, Clinic/HOD 1 time, 0 of 12 cycles     3/2/2020 - 3/2/2020 Chemotherapy    Treatment Summary   Plan Name: OP DOCETAXEL (75 MG/M2) Q3W  Treatment Goal: Control  Status: Inactive  Start Date: [No treatment day found]  End Date: [No treatment day found]  Provider: Segundo Zhang MD  Chemotherapy: DOCEtaxel (TAXOTERE) in sodium chloride 0.9% 250 mL  chemo infusion, 75 mg/m2, Intravenous, Clinic/HOD 1 time, 0 of 4 cycles     3/25/2020 - 6/2/2020 Chemotherapy    Treatment Summary   Plan Name: OP NIVOLUMAB Q2W  Treatment Goal: Control  Status: Inactive  Start Date: 3/25/2020  End Date: 5/20/2020  Provider: Segundo Zhang MD  Chemotherapy: nivolumab 240 mg in sodium chloride 0.9% 124 mL infusion, 240 mg, Intravenous, Clinic/HOD 1 time, 5 of 6 cycles  Administration: 240 mg (3/25/2020), 240 mg (4/8/2020), 240 mg (4/22/2020), 240 mg (5/6/2020), 240 mg (5/20/2020)     6/8/2020 - 6/8/2020 Chemotherapy    Treatment Summary   Plan Name: OP NSCLC DOCETAXEL(WEEKLY)  Treatment Goal: Control  Status: Inactive  Start Date:   End Date:   Provider: Segundo Zhang MD  Chemotherapy: DOCEtaxeL (TAXOTERE) 35 mg/m2 = 60 mg in sodium chloride 0.9% 250 mL chemo infusion, 35 mg/m2, Intravenous, Clinic/HOD 1 time, 0 of 6 cycles     4/28/2021 - 4/28/2021 Chemotherapy    Treatment Summary   Plan Name: OP BREAST FAM-TRASTUZUMAB DERUXTECAN-NXKI Q3W  Treatment Goal: Control  Status: Inactive  Start Date:   End Date:   Provider: Segundo Zhang MD  Chemotherapy: [No matching medication found in this treatment plan]           Sheri Broderick has adjusted to illness with significant difficulty that has progressed as her illness has advanced. She has coped primarily through focus on alternative activities and prayer. She has engaged in appropriate information gathering and is interested in continued psychotherapeutic support at this time.  The patient has good family/friend support.  Her support system is coping well with the diagnosis/treatment/prognosis. Illness-related psychosocial stressors include difficulty meeting family responsibilities, changes in ability to engage in leisure activities and absence from home.  The patient has a good partnership with her [facility] treatment team. The patient reports the following barriers to cancer care:distance from  the hospital and multiple care teams.   Symptoms:   · Mood: depressed mood, worthlessness/guilt, thoughts of death and tearfulness;  prior depression:throughout adulthood; no SI/HI  · Anxiety: Uncontrollable worry (about prognosis), Restlessness, Irritability and Fear of unknown; anxiety throughout adulthood  · Substance abuse: denied  · Cognitive functioning: denied  · Health behaviors: reduced ability to actively cope secondary to tx SE  · Sleep: interrupted sleep , no sleep onset difficulty , early AM awakening with return to sleep  and early AM awakening without return to sleep, (+) EDS , (+) sleep hygiene considerations       Assessment - Diagnosis - Goals:       ICD-10-CM ICD-9-CM   1. MDD (major depressive disorder), recurrent episode, moderate  F33.1 296.32   2. Generalized anxiety disorder  F41.1 300.02   3. Adenocarcinoma of lung, unspecified laterality  C34.90 162.9         Plan:individual psychotherapy and medication management by physician    Summary and Recommendations  Sheri Broderick is a 66 y.o. female referred by Beulah Hawley MD for psychological evaluation and treatment.  Ms. Broderick appears to be having significant difficulty coping with her diagnosis and proposed treatment course.  Patient was encouraged to continue to work with her psychiatrist regarding psychotropic medication options. She is interested in CBT to address depression/anxiety and will follow up with me for that purpose. Mood protective strategies during cancer treatment were discussed and she was introduced to diaphragmatic breathing and cognitive restructuring techniques to manage distress associated with treatment, scans, and ongoing follow up.     Return to clinic: 2 weeks    GOALS:   Diaphragmatic breathing  Cognitive restructuring               Kam Holbrook Psy.D.  Clinical Psychologist  [place] License #[id number]  [place] License #[id number]                              This service was not originating from a related E/M service  provided within the previous 7 days nor will  to an E/M service or procedure within the next 24 hours or my soonest available appointment.  Prevailing standard of care was able to be met in this audio-only visit.

## 2022-03-07 ENCOUNTER — OUTPATIENT CASE MANAGEMENT (OUTPATIENT)
Dept: ADMINISTRATIVE | Facility: OTHER | Age: 67
End: 2022-03-07
Payer: MEDICARE

## 2022-03-07 NOTE — PROGRESS NOTES
CM ACTION PLAN:  Follow up in two weeks   Outpatient Care Management  Plan of Care Follow Up Visit    Patient: Sheri Broderick  MRN: [id number]  Date of Service: 03/07/2022  Completed by: Eliza Rosario RN  Referral Date: 11/17/2021  Program: Case Management (High Risk)    Reason for Visit   Patient presents with    OPCM RN First Follow-Up Attempt     03/07/22    Update Plan Of Care     03/08/22       Brief Summary: 03/07/22-Attempt follow up with  patient for outpatient case management. No answer. Left message requesting call back. RN OPCM 1'st follow up attempt.   03/08/22-Called Bhavana quigley after receiving a voice mail. Patient had the MRI scheduled and could not due the test because of anxiety last week. She is scheduled with general anesthesia for the MRI on 03/11/22.Soraida has a phone call in to [facility] about the Covid test she must have before the MRI. Reviewed with soraida if she can have the coivd test here to check with the cancer center since she is having an infusion today and let them set it up or call [phone] for covid test.   Reviewed with soraida effects of general anesthesia and for her to ask the doctors after the MRI, is it safe for her to ride back home which is a 6 hour drive, verbalized understanding. Updated SW on plan.     CM ACTION PLAN:  Follow up in two weeks     Patient Summary     Involvement of Care:  Do I have permission to speak with other family members about your care?       Patient Reported Labs & Vitals:  1.  Any Patient Reported Labs & Vitals?     2.  Patient Reported Blood Pressure:     3.  Patient Reported Pulse:     4.  Patient Reported Weight (Kg):     5.  Patient Reported Blood Glucose (mg/dl):       Medical and social history was reviewed with patient and/or caregiver.     Clinical Assessment     Reviewed and provided basic information on available community resources for mental health, transportation, wellness resources, and palliative care programs with  patient and/or caregiver.     Complex Care Plan     Care plan was discussed and completed today with input from patient and/or caregiver.    Patient Instructions     Instructions were provided via the [facility] patient resources and are available for the patient to view on the patient portal.        Follow up in about 15 days (around 3/22/2022) for RN Follow up call.    Todays OPCM Self-Management Care Plan was developed with the patients/caregivers input and was based on identified barriers from todays assessment.  Goals were written today with the patient/caregiver and the patient has agreed to work towards these goals to improve his/her overall well-being. Patient verbalized understanding of the care plan, goals, and all of today's instructions. Encouraged patient/caregiver to communicate with his/her physician and health care team about health conditions and the treatment plan.  Provided my contact information today and encouraged patient/caregiver to call me with any questions as needed.

## 2022-03-08 ENCOUNTER — TELEPHONE (OUTPATIENT)
Dept: FAMILY MEDICINE | Facility: CLINIC | Age: 67
End: 2022-03-08
Payer: MEDICARE

## 2022-03-08 ENCOUNTER — INFUSION (OUTPATIENT)
Dept: INFUSION THERAPY | Facility: HOSPITAL | Age: 67
End: 2022-03-08
Attending: INTERNAL MEDICINE
Payer: MEDICARE

## 2022-03-08 VITALS
WEIGHT: 136.69 LBS | RESPIRATION RATE: 18 BRPM | HEIGHT: 64 IN | DIASTOLIC BLOOD PRESSURE: 81 MMHG | BODY MASS INDEX: 23.34 KG/M2 | HEART RATE: 95 BPM | SYSTOLIC BLOOD PRESSURE: 140 MMHG | TEMPERATURE: 98 F

## 2022-03-08 DIAGNOSIS — C79.51 BONE METASTASES: Primary | ICD-10-CM

## 2022-03-08 DIAGNOSIS — C34.31 SQUAMOUS CELL CARCINOMA OF BRONCHUS IN RIGHT LOWER LOBE: ICD-10-CM

## 2022-03-08 DIAGNOSIS — E86.0 DEHYDRATION: ICD-10-CM

## 2022-03-08 PROCEDURE — 96360 HYDRATION IV INFUSION INIT: CPT

## 2022-03-08 PROCEDURE — A4216 STERILE WATER/SALINE, 10 ML: HCPCS | Performed by: INTERNAL MEDICINE

## 2022-03-08 PROCEDURE — 63600175 PHARM REV CODE 636 W HCPCS

## 2022-03-08 PROCEDURE — S5010 5% DEXTROSE AND 0.45% SALINE: HCPCS | Performed by: INTERNAL MEDICINE

## 2022-03-08 PROCEDURE — 96361 HYDRATE IV INFUSION ADD-ON: CPT

## 2022-03-08 PROCEDURE — 25000003 PHARM REV CODE 250: Performed by: INTERNAL MEDICINE

## 2022-03-08 RX ORDER — HEPARIN 100 UNIT/ML
500 SYRINGE INTRAVENOUS
Status: CANCELLED | OUTPATIENT
Start: 2022-03-08

## 2022-03-08 RX ORDER — HEPARIN 100 UNIT/ML
SYRINGE INTRAVENOUS
Status: COMPLETED
Start: 2022-03-08 | End: 2022-03-08

## 2022-03-08 RX ORDER — SODIUM CHLORIDE 0.9 % (FLUSH) 0.9 %
10 SYRINGE (ML) INJECTION
Status: CANCELLED | OUTPATIENT
Start: 2022-03-08

## 2022-03-08 RX ORDER — HEPARIN 100 UNIT/ML
500 SYRINGE INTRAVENOUS
Status: DISCONTINUED | OUTPATIENT
Start: 2022-03-08 | End: 2022-03-08 | Stop reason: HOSPADM

## 2022-03-08 RX ORDER — SODIUM CHLORIDE 0.9 % (FLUSH) 0.9 %
10 SYRINGE (ML) INJECTION
Status: DISCONTINUED | OUTPATIENT
Start: 2022-03-08 | End: 2022-03-08 | Stop reason: HOSPADM

## 2022-03-08 RX ADMIN — DEXTROSE AND SODIUM CHLORIDE 1000 ML: 5; .45 INJECTION, SOLUTION INTRAVENOUS at 11:03

## 2022-03-08 RX ADMIN — HEPARIN 500 UNITS: 100 SYRINGE at 01:03

## 2022-03-08 RX ADMIN — SODIUM CHLORIDE 10 ML: 9 INJECTION INTRAMUSCULAR; INTRAVENOUS; SUBCUTANEOUS at 01:03

## 2022-03-08 NOTE — TELEPHONE ENCOUNTER
Asked pt wether rapid or PCR covid test was needed pt stated she would call back with this information

## 2022-03-08 NOTE — PLAN OF CARE
Problem: Fatigue  Goal: Improved Activity Tolerance  3/8/2022 1123 by Geeta Nguyen RN  Outcome: Met  3/8/2022 1123 by Geeta Nguyen RN  Outcome: Ongoing, Progressing

## 2022-03-08 NOTE — TELEPHONE ENCOUNTER
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

## 2022-03-09 ENCOUNTER — OUTPATIENT CASE MANAGEMENT (OUTPATIENT)
Dept: ADMINISTRATIVE | Facility: OTHER | Age: 67
End: 2022-03-09
Payer: MEDICARE

## 2022-03-15 DIAGNOSIS — C34.31 MALIGNANT NEOPLASM OF LOWER LOBE OF RIGHT LUNG: ICD-10-CM

## 2022-03-15 RX ORDER — FENTANYL 75 UG/H
1 PATCH TRANSDERMAL
Qty: 10 PATCH | Refills: 0 | Status: SHIPPED | OUTPATIENT
Start: 2022-03-18 | End: 2022-03-25

## 2022-03-15 NOTE — TELEPHONE ENCOUNTER
----- Message from Kelly Gandhi, Patient Care Assistant sent at 3/14/2022  4:28 PM CDT -----  Regarding: Rx Request  Patient called in for a prescription refill= Fentanyl patches .  # [phone]

## 2022-03-18 ENCOUNTER — INFUSION (OUTPATIENT)
Dept: INFUSION THERAPY | Facility: HOSPITAL | Age: 67
End: 2022-03-18
Attending: INTERNAL MEDICINE
Payer: MEDICARE

## 2022-03-18 VITALS
TEMPERATURE: 98 F | RESPIRATION RATE: 18 BRPM | HEART RATE: 100 BPM | DIASTOLIC BLOOD PRESSURE: 75 MMHG | BODY MASS INDEX: 22.71 KG/M2 | WEIGHT: 132.31 LBS | OXYGEN SATURATION: 98 % | SYSTOLIC BLOOD PRESSURE: 127 MMHG

## 2022-03-18 DIAGNOSIS — C34.31 SQUAMOUS CELL CARCINOMA OF BRONCHUS IN RIGHT LOWER LOBE: ICD-10-CM

## 2022-03-18 DIAGNOSIS — C79.51 BONE METASTASES: Primary | ICD-10-CM

## 2022-03-18 DIAGNOSIS — E86.0 DEHYDRATION: ICD-10-CM

## 2022-03-18 PROCEDURE — 96360 HYDRATION IV INFUSION INIT: CPT

## 2022-03-18 PROCEDURE — 63600175 PHARM REV CODE 636 W HCPCS: Performed by: INTERNAL MEDICINE

## 2022-03-18 PROCEDURE — A4216 STERILE WATER/SALINE, 10 ML: HCPCS | Performed by: INTERNAL MEDICINE

## 2022-03-18 PROCEDURE — 96361 HYDRATE IV INFUSION ADD-ON: CPT

## 2022-03-18 PROCEDURE — 25000003 PHARM REV CODE 250: Performed by: INTERNAL MEDICINE

## 2022-03-18 PROCEDURE — S5010 5% DEXTROSE AND 0.45% SALINE: HCPCS | Performed by: INTERNAL MEDICINE

## 2022-03-18 RX ORDER — HEPARIN 100 UNIT/ML
500 SYRINGE INTRAVENOUS
Status: CANCELLED | OUTPATIENT
Start: 2022-03-18

## 2022-03-18 RX ORDER — SODIUM CHLORIDE 0.9 % (FLUSH) 0.9 %
10 SYRINGE (ML) INJECTION
Status: DISCONTINUED | OUTPATIENT
Start: 2022-03-18 | End: 2022-03-18 | Stop reason: HOSPADM

## 2022-03-18 RX ORDER — SODIUM CHLORIDE 0.9 % (FLUSH) 0.9 %
10 SYRINGE (ML) INJECTION
Status: CANCELLED | OUTPATIENT
Start: 2022-03-18

## 2022-03-18 RX ORDER — HEPARIN 100 UNIT/ML
500 SYRINGE INTRAVENOUS
Status: DISCONTINUED | OUTPATIENT
Start: 2022-03-18 | End: 2022-03-18 | Stop reason: HOSPADM

## 2022-03-18 RX ADMIN — DEXTROSE AND SODIUM CHLORIDE 1000 ML: 5; .45 INJECTION, SOLUTION INTRAVENOUS at 10:03

## 2022-03-18 RX ADMIN — Medication 500 UNITS: at 12:03

## 2022-03-18 RX ADMIN — SODIUM CHLORIDE, PRESERVATIVE FREE 10 ML: 5 INJECTION INTRAVENOUS at 12:03

## 2022-03-18 NOTE — PLAN OF CARE
Problem: Fall Injury Risk  Goal: Absence of Fall and Fall-Related Injury  Outcome: Ongoing, Progressing  Intervention: Promote Injury-Free Environment  Flowsheets (Taken 3/18/2022 1036)  Safety Promotion/Fall Prevention: assistive device/personal item within reach

## 2022-03-22 ENCOUNTER — OUTPATIENT CASE MANAGEMENT (OUTPATIENT)
Dept: ADMINISTRATIVE | Facility: OTHER | Age: 67
End: 2022-03-22
Payer: MEDICARE

## 2022-03-22 ENCOUNTER — TELEPHONE (OUTPATIENT)
Dept: HEMATOLOGY/ONCOLOGY | Facility: CLINIC | Age: 67
End: 2022-03-22
Payer: MEDICARE

## 2022-03-22 DIAGNOSIS — R63.4 WEIGHT LOSS: ICD-10-CM

## 2022-03-22 DIAGNOSIS — R63.0 ANOREXIA: Primary | ICD-10-CM

## 2022-03-22 RX ORDER — MEGESTROL ACETATE 40 MG/ML
400 SUSPENSION ORAL 2 TIMES DAILY
Qty: 600 ML | Refills: 11 | Status: SHIPPED | OUTPATIENT
Start: 2022-03-22 | End: 2023-03-22

## 2022-03-22 NOTE — PROGRESS NOTES
03/22/22-  Outpatient Care Management  Plan of Care Follow Up Visit    Patient: Sheri Broderick  MRN: [id number]  Date of Service: 03/22/2022  Completed by: Eliza Rosario RN  Referral Date: 11/17/2021  Program: Case Management (High Risk)    Reason for Visit   Patient presents with    Update Plan Of Care       Brief Summary: Follow up call with SW to patient. Pt complaining of severe shoulder pain which the pain is radiating down her back. And it hurts for her to roll over in the bed.   Pt states the pain meds help but she is having confusion.  Instructed pt to discuss this medication side effect with her physician.  Pt is waiting to hear back from her doctors at [facility] about the results of her MRI.   Pt is scheduled for her 2nd virtual visit with Dr. Holbrook for counseling on 3/23/22.  Pt reports her niece assisted pt in obtaining a life alert system.   CM ACTION PLAN:  Follow up in two weeks     Patient Summary     Involvement of Care:  Do I have permission to speak with other family members about your care?       Patient Reported Labs & Vitals:  1.  Any Patient Reported Labs & Vitals?     2.  Patient Reported Blood Pressure:     3.  Patient Reported Pulse:     4.  Patient Reported Weight (Kg):     5.  Patient Reported Blood Glucose (mg/dl):       Medical and social history was reviewed with patient and/or caregiver.     Clinical Assessment     Reviewed and provided basic information on available community resources for mental health, transportation, wellness resources, and palliative care programs with patient and/or caregiver.     Complex Care Plan     Care plan was discussed and completed today with input from patient and/or caregiver.    Patient Instructions     Instructions were provided via the [employer] patient resources and are available for the patient to view on the patient portal.        Follow up in about 2 weeks (around 4/5/2022) for RN Follow up call.    Todays OPCM Self-Management Care Plan  was developed with the patients/caregivers input and was based on identified barriers from todays assessment.  Goals were written today with the patient/caregiver and the patient has agreed to work towards these goals to improve his/her overall well-being. Patient verbalized understanding of the care plan, goals, and all of today's instructions. Encouraged patient/caregiver to communicate with his/her physician and health care team about health conditions and the treatment plan.  Provided my contact information today and encouraged patient/caregiver to call me with any questions as needed.

## 2022-03-22 NOTE — TELEPHONE ENCOUNTER
Patient continue to have weight loss and decreased appetite. Will order Megace to help with appetite.

## 2022-03-22 NOTE — PROGRESS NOTES
"Outpatient Care Management   - Care Plan Follow Up    Patient: Sheri Broderick  MRN:  [id number]  Date of Service:  3/22/2022  Completed by:  Rebeca Lam LCSW  Referral Date: 11/17/2021  Program: Case Management (High Risk)    Reason for Visit   Patient presents with    OPCM SW First Follow-up Attempt    OPCM Chart Review     3/10/22    Update Plan Of Care       Brief Summary: OPCM  and RN completed follow up call together with pt.  Pt complaining of severe shoulder pain which the pain is radiating down her back.  Pt states the pain meds help but leaves her feeling "hazy."  OPCM RN instructed pt to discuss this medication side effect with her physician.  Pt is waiting to hear back from her drs at [facility] re: the result of her scans from last visit at [facility].  Pt is scheduled for her 2nd virtual visit with Dr. Holbrook for counseling on 3/23/22.  Pt reports her niece assisted pt in obtaining a life alert system.  Provided supportive counseling to pt.  Will follow up in 2 weeks.     Complex Care Plan     Care plan was discussed and completed today with input from patient and/or caregiver.    Patient Instructions     Instructions were provided via the [employer] patient resources and are available for the patient to view on the patient portal.    Follow up in about 27 days (around 4/5/2022).    Todays OPCM Self-Management Care Plan was developed with the patients/caregivers input and was based on identified barriers from todays assessment.  Goals were written today with the patient/caregiver and the patient has agreed to work towards these goals to improve his/her overall well-being. Patient verbalized understanding of the care plan, goals, and all of today's instructions. Encouraged patient/caregiver to communicate with his/her physician and health care team about health conditions and the treatment plan.  Provided my contact information today and encouraged patient/caregiver to " call me with any questions as needed.

## 2022-03-23 ENCOUNTER — TELEPHONE (OUTPATIENT)
Dept: PSYCHIATRY | Facility: CLINIC | Age: 67
End: 2022-03-23
Payer: MEDICARE

## 2022-03-23 ENCOUNTER — HOSPITAL ENCOUNTER (OUTPATIENT)
Facility: HOSPITAL | Age: 67
Discharge: HOME HEALTH | End: 2022-03-25
Attending: EMERGENCY MEDICINE | Admitting: FAMILY MEDICINE
Payer: MEDICARE

## 2022-03-23 ENCOUNTER — OFFICE VISIT (OUTPATIENT)
Dept: PSYCHIATRY | Facility: CLINIC | Age: 67
End: 2022-03-23
Payer: MEDICARE

## 2022-03-23 DIAGNOSIS — F33.1 MDD (MAJOR DEPRESSIVE DISORDER), RECURRENT EPISODE, MODERATE: Primary | ICD-10-CM

## 2022-03-23 DIAGNOSIS — C34.31 MALIGNANT NEOPLASM OF LOWER LOBE OF RIGHT LUNG: ICD-10-CM

## 2022-03-23 DIAGNOSIS — R41.82 ALTERED MENTAL STATUS, UNSPECIFIED ALTERED MENTAL STATUS TYPE: Primary | ICD-10-CM

## 2022-03-23 DIAGNOSIS — R44.1 VISUAL HALLUCINATIONS: ICD-10-CM

## 2022-03-23 PROBLEM — D75.89 MACROCYTOSIS: Status: ACTIVE | Noted: 2022-03-23

## 2022-03-23 LAB
ALBUMIN SERPL BCP-MCNC: 3.8 G/DL (ref 3.5–5.2)
ALP SERPL-CCNC: 113 U/L (ref 55–135)
ALT SERPL W/O P-5'-P-CCNC: 9 U/L (ref 10–44)
AMMONIA PLAS-SCNC: 18 UMOL/L (ref 10–50)
AMPHET+METHAMPHET UR QL: NEGATIVE
ANION GAP SERPL CALC-SCNC: 12 MMOL/L (ref 8–16)
APAP SERPL-MCNC: <10 UG/ML (ref 10–20)
AST SERPL-CCNC: 15 U/L (ref 10–40)
BACTERIA #/AREA URNS HPF: NEGATIVE /HPF
BARBITURATES UR QL SCN>200 NG/ML: NEGATIVE
BASOPHILS # BLD AUTO: 0.01 K/UL (ref 0–0.2)
BASOPHILS NFR BLD: 0.2 % (ref 0–1.9)
BENZODIAZ UR QL SCN>200 NG/ML: NEGATIVE
BILIRUB SERPL-MCNC: 0.6 MG/DL (ref 0.1–1)
BILIRUB UR QL STRIP: NEGATIVE
BUN SERPL-MCNC: 10 MG/DL (ref 8–23)
BZE UR QL SCN: NEGATIVE
CALCIUM SERPL-MCNC: 9.5 MG/DL (ref 8.7–10.5)
CANNABINOIDS UR QL SCN: NEGATIVE
CHLORIDE SERPL-SCNC: 96 MMOL/L (ref 95–110)
CLARITY UR: ABNORMAL
CO2 SERPL-SCNC: 31 MMOL/L (ref 23–29)
COLOR UR: YELLOW
CREAT SERPL-MCNC: 0.7 MG/DL (ref 0.5–1.4)
CREAT UR-MCNC: 255 MG/DL (ref 15–325)
DIFFERENTIAL METHOD: ABNORMAL
EOSINOPHIL # BLD AUTO: 0 K/UL (ref 0–0.5)
EOSINOPHIL NFR BLD: 0.4 % (ref 0–8)
ERYTHROCYTE [DISTWIDTH] IN BLOOD BY AUTOMATED COUNT: 13 % (ref 11.5–14.5)
EST. GFR  (AFRICAN AMERICAN): >60 ML/MIN/1.73 M^2
EST. GFR  (NON AFRICAN AMERICAN): >60 ML/MIN/1.73 M^2
ETHANOL SERPL-MCNC: <5 MG/DL
FOLATE SERPL-MCNC: 11.6 NG/ML (ref 4–24)
GLUCOSE SERPL-MCNC: 135 MG/DL (ref 70–110)
GLUCOSE UR QL STRIP: NEGATIVE
HCT VFR BLD AUTO: 31.9 % (ref 37–48.5)
HGB BLD-MCNC: 10.3 G/DL (ref 12–16)
HGB UR QL STRIP: NEGATIVE
HYALINE CASTS #/AREA URNS LPF: 1 /LPF
IMM GRANULOCYTES # BLD AUTO: 0.02 K/UL (ref 0–0.04)
IMM GRANULOCYTES NFR BLD AUTO: 0.4 % (ref 0–0.5)
KETONES UR QL STRIP: NEGATIVE
LACTATE SERPL-SCNC: 1.2 MMOL/L (ref 0.5–1.9)
LEUKOCYTE ESTERASE UR QL STRIP: ABNORMAL
LYMPHOCYTES # BLD AUTO: 0.5 K/UL (ref 1–4.8)
LYMPHOCYTES NFR BLD: 9.6 % (ref 18–48)
MAGNESIUM SERPL-MCNC: 2.1 MG/DL (ref 1.6–2.6)
MCH RBC QN AUTO: 31.6 PG (ref 27–31)
MCHC RBC AUTO-ENTMCNC: 32.3 G/DL (ref 32–36)
MCV RBC AUTO: 98 FL (ref 82–98)
MICROSCOPIC COMMENT: NORMAL
MONOCYTES # BLD AUTO: 0.5 K/UL (ref 0.3–1)
MONOCYTES NFR BLD: 9.6 % (ref 4–15)
NEUTROPHILS # BLD AUTO: 3.8 K/UL (ref 1.8–7.7)
NEUTROPHILS NFR BLD: 79.8 % (ref 38–73)
NITRITE UR QL STRIP: NEGATIVE
NRBC BLD-RTO: 0 /100 WBC
OPIATES UR QL SCN: ABNORMAL
PCP UR QL SCN>25 NG/ML: NEGATIVE
PH UR STRIP: 7 [PH] (ref 5–8)
PLATELET # BLD AUTO: 162 K/UL (ref 150–450)
PMV BLD AUTO: 9.1 FL (ref 9.2–12.9)
POTASSIUM SERPL-SCNC: 3.9 MMOL/L (ref 3.5–5.1)
PROT SERPL-MCNC: 7.3 G/DL (ref 6–8.4)
PROT UR QL STRIP: ABNORMAL
RBC # BLD AUTO: 3.26 M/UL (ref 4–5.4)
RBC #/AREA URNS HPF: 2 /HPF (ref 0–4)
SALICYLATES SERPL-MCNC: <4 MG/DL (ref 15–30)
SARS-COV-2 RDRP RESP QL NAA+PROBE: NEGATIVE
SODIUM SERPL-SCNC: 139 MMOL/L (ref 136–145)
SP GR UR STRIP: 1.02 (ref 1–1.03)
SQUAMOUS #/AREA URNS HPF: 1 /HPF
TOXICOLOGY INFORMATION: ABNORMAL
TSH SERPL DL<=0.005 MIU/L-ACNC: 0.93 UIU/ML (ref 0.34–5.6)
URN SPEC COLLECT METH UR: ABNORMAL
UROBILINOGEN UR STRIP-ACNC: NEGATIVE EU/DL
VIT B12 SERPL-MCNC: >1500 PG/ML (ref 210–950)
WBC # BLD AUTO: 4.79 K/UL (ref 3.9–12.7)
WBC #/AREA URNS HPF: 5 /HPF (ref 0–5)

## 2022-03-23 PROCEDURE — 36415 COLL VENOUS BLD VENIPUNCTURE: CPT | Performed by: EMERGENCY MEDICINE

## 2022-03-23 PROCEDURE — 82077 ASSAY SPEC XCP UR&BREATH IA: CPT | Performed by: EMERGENCY MEDICINE

## 2022-03-23 PROCEDURE — 36415 COLL VENOUS BLD VENIPUNCTURE: CPT | Performed by: NURSE PRACTITIONER

## 2022-03-23 PROCEDURE — G0427 PR INPT TELEHEALTH CON 70/>M: ICD-10-PCS | Mod: 95,,, | Performed by: PSYCHIATRY & NEUROLOGY

## 2022-03-23 PROCEDURE — 80175 DRUG SCREEN QUAN LAMOTRIGINE: CPT | Performed by: EMERGENCY MEDICINE

## 2022-03-23 PROCEDURE — G0427 INPT/ED TELECONSULT70: HCPCS | Mod: 95,,, | Performed by: PSYCHIATRY & NEUROLOGY

## 2022-03-23 PROCEDURE — 80143 DRUG ASSAY ACETAMINOPHEN: CPT | Performed by: EMERGENCY MEDICINE

## 2022-03-23 PROCEDURE — 80053 COMPREHEN METABOLIC PANEL: CPT | Performed by: EMERGENCY MEDICINE

## 2022-03-23 PROCEDURE — 80307 DRUG TEST PRSMV CHEM ANLYZR: CPT | Performed by: EMERGENCY MEDICINE

## 2022-03-23 PROCEDURE — 90832 PSYTX W PT 30 MINUTES: CPT | Mod: FQ,95,, | Performed by: PSYCHOLOGIST

## 2022-03-23 PROCEDURE — 83605 ASSAY OF LACTIC ACID: CPT | Performed by: EMERGENCY MEDICINE

## 2022-03-23 PROCEDURE — 87040 BLOOD CULTURE FOR BACTERIA: CPT | Mod: 59 | Performed by: EMERGENCY MEDICINE

## 2022-03-23 PROCEDURE — 25500020 PHARM REV CODE 255: Performed by: EMERGENCY MEDICINE

## 2022-03-23 PROCEDURE — G0378 HOSPITAL OBSERVATION PER HR: HCPCS

## 2022-03-23 PROCEDURE — 84425 ASSAY OF VITAMIN B-1: CPT | Performed by: NURSE PRACTITIONER

## 2022-03-23 PROCEDURE — 96366 THER/PROPH/DIAG IV INF ADDON: CPT

## 2022-03-23 PROCEDURE — 82607 VITAMIN B-12: CPT | Performed by: EMERGENCY MEDICINE

## 2022-03-23 PROCEDURE — 85025 COMPLETE CBC W/AUTO DIFF WBC: CPT | Performed by: EMERGENCY MEDICINE

## 2022-03-23 PROCEDURE — 81003 URINALYSIS AUTO W/O SCOPE: CPT | Mod: 59 | Performed by: EMERGENCY MEDICINE

## 2022-03-23 PROCEDURE — 63600175 PHARM REV CODE 636 W HCPCS: Performed by: NURSE PRACTITIONER

## 2022-03-23 PROCEDURE — 96365 THER/PROPH/DIAG IV INF INIT: CPT

## 2022-03-23 PROCEDURE — 96375 TX/PRO/DX INJ NEW DRUG ADDON: CPT

## 2022-03-23 PROCEDURE — 82746 ASSAY OF FOLIC ACID SERUM: CPT | Performed by: EMERGENCY MEDICINE

## 2022-03-23 PROCEDURE — 90832 PR PSYCHOTHERAPY W/PATIENT, 30 MIN: ICD-10-PCS | Mod: FQ,95,, | Performed by: PSYCHOLOGIST

## 2022-03-23 PROCEDURE — 63600175 PHARM REV CODE 636 W HCPCS: Performed by: EMERGENCY MEDICINE

## 2022-03-23 PROCEDURE — 83735 ASSAY OF MAGNESIUM: CPT | Performed by: EMERGENCY MEDICINE

## 2022-03-23 PROCEDURE — 99285 EMERGENCY DEPT VISIT HI MDM: CPT | Mod: 25

## 2022-03-23 PROCEDURE — 82140 ASSAY OF AMMONIA: CPT | Performed by: EMERGENCY MEDICINE

## 2022-03-23 PROCEDURE — 81001 URINALYSIS AUTO W/SCOPE: CPT | Performed by: EMERGENCY MEDICINE

## 2022-03-23 PROCEDURE — 80179 DRUG ASSAY SALICYLATE: CPT | Performed by: EMERGENCY MEDICINE

## 2022-03-23 PROCEDURE — 25000003 PHARM REV CODE 250: Performed by: NURSE PRACTITIONER

## 2022-03-23 PROCEDURE — U0002 COVID-19 LAB TEST NON-CDC: HCPCS | Performed by: EMERGENCY MEDICINE

## 2022-03-23 PROCEDURE — 96367 TX/PROPH/DG ADDL SEQ IV INF: CPT

## 2022-03-23 PROCEDURE — 84443 ASSAY THYROID STIM HORMONE: CPT | Performed by: EMERGENCY MEDICINE

## 2022-03-23 RX ORDER — POTASSIUM CHLORIDE 20 MEQ/1
20 TABLET, EXTENDED RELEASE ORAL
Status: DISCONTINUED | OUTPATIENT
Start: 2022-03-23 | End: 2022-03-25 | Stop reason: HOSPADM

## 2022-03-23 RX ORDER — OXYCODONE HYDROCHLORIDE 5 MG/1
5 TABLET ORAL EVERY 8 HOURS PRN
Status: DISCONTINUED | OUTPATIENT
Start: 2022-03-23 | End: 2022-03-25 | Stop reason: HOSPADM

## 2022-03-23 RX ORDER — ACETAMINOPHEN 325 MG/1
650 TABLET ORAL EVERY 4 HOURS PRN
Status: DISCONTINUED | OUTPATIENT
Start: 2022-03-23 | End: 2022-03-25 | Stop reason: HOSPADM

## 2022-03-23 RX ORDER — ONDANSETRON 2 MG/ML
4 INJECTION INTRAMUSCULAR; INTRAVENOUS EVERY 8 HOURS PRN
Status: DISCONTINUED | OUTPATIENT
Start: 2022-03-23 | End: 2022-03-25 | Stop reason: HOSPADM

## 2022-03-23 RX ORDER — MAGNESIUM SULFATE 1 G/100ML
1 INJECTION INTRAVENOUS
Status: DISCONTINUED | OUTPATIENT
Start: 2022-03-23 | End: 2022-03-25 | Stop reason: HOSPADM

## 2022-03-23 RX ORDER — CLONIDINE HYDROCHLORIDE 0.1 MG/1
0.1 TABLET ORAL ONCE
Status: COMPLETED | OUTPATIENT
Start: 2022-03-24 | End: 2022-03-24

## 2022-03-23 RX ORDER — DIPHENHYDRAMINE HYDROCHLORIDE 50 MG/ML
12.5 INJECTION INTRAMUSCULAR; INTRAVENOUS ONCE
Status: COMPLETED | OUTPATIENT
Start: 2022-03-24 | End: 2022-03-24

## 2022-03-23 RX ORDER — LANOLIN ALCOHOL/MO/W.PET/CERES
800 CREAM (GRAM) TOPICAL
Status: DISCONTINUED | OUTPATIENT
Start: 2022-03-23 | End: 2022-03-25 | Stop reason: HOSPADM

## 2022-03-23 RX ORDER — MAGNESIUM SULFATE HEPTAHYDRATE 40 MG/ML
4 INJECTION, SOLUTION INTRAVENOUS
Status: DISCONTINUED | OUTPATIENT
Start: 2022-03-23 | End: 2022-03-25 | Stop reason: HOSPADM

## 2022-03-23 RX ORDER — MAGNESIUM SULFATE HEPTAHYDRATE 40 MG/ML
2 INJECTION, SOLUTION INTRAVENOUS
Status: DISCONTINUED | OUTPATIENT
Start: 2022-03-23 | End: 2022-03-25 | Stop reason: HOSPADM

## 2022-03-23 RX ORDER — LEVOFLOXACIN 5 MG/ML
750 INJECTION, SOLUTION INTRAVENOUS
Status: COMPLETED | OUTPATIENT
Start: 2022-03-23 | End: 2022-03-23

## 2022-03-23 RX ORDER — NALOXONE HCL 0.4 MG/ML
0.02 VIAL (ML) INJECTION
Status: DISCONTINUED | OUTPATIENT
Start: 2022-03-23 | End: 2022-03-25 | Stop reason: HOSPADM

## 2022-03-23 RX ORDER — POLYETHYLENE GLYCOL 3350 17 G/17G
17 POWDER, FOR SOLUTION ORAL 2 TIMES DAILY PRN
Status: DISCONTINUED | OUTPATIENT
Start: 2022-03-23 | End: 2022-03-25 | Stop reason: HOSPADM

## 2022-03-23 RX ORDER — MEMANTINE HYDROCHLORIDE 5 MG/1
10 TABLET ORAL 2 TIMES DAILY
Status: DISCONTINUED | OUTPATIENT
Start: 2022-03-23 | End: 2022-03-25 | Stop reason: HOSPADM

## 2022-03-23 RX ORDER — POTASSIUM CHLORIDE 20 MEQ/1
40 TABLET, EXTENDED RELEASE ORAL
Status: DISCONTINUED | OUTPATIENT
Start: 2022-03-23 | End: 2022-03-25 | Stop reason: HOSPADM

## 2022-03-23 RX ORDER — AMOXICILLIN 250 MG
1 CAPSULE ORAL 2 TIMES DAILY
Status: DISCONTINUED | OUTPATIENT
Start: 2022-03-23 | End: 2022-03-25 | Stop reason: HOSPADM

## 2022-03-23 RX ORDER — PANTOPRAZOLE SODIUM 40 MG/1
40 TABLET, DELAYED RELEASE ORAL 2 TIMES DAILY
Status: DISCONTINUED | OUTPATIENT
Start: 2022-03-23 | End: 2022-03-25 | Stop reason: HOSPADM

## 2022-03-23 RX ORDER — OLANZAPINE 5 MG/1
5 TABLET ORAL NIGHTLY
Status: DISCONTINUED | OUTPATIENT
Start: 2022-03-23 | End: 2022-03-25 | Stop reason: HOSPADM

## 2022-03-23 RX ORDER — LAMOTRIGINE 100 MG/1
200 TABLET ORAL NIGHTLY
Status: DISCONTINUED | OUTPATIENT
Start: 2022-03-23 | End: 2022-03-25 | Stop reason: HOSPADM

## 2022-03-23 RX ORDER — FAMOTIDINE 20 MG/1
40 TABLET, FILM COATED ORAL NIGHTLY
Status: DISCONTINUED | OUTPATIENT
Start: 2022-03-23 | End: 2022-03-25 | Stop reason: HOSPADM

## 2022-03-23 RX ORDER — LORAZEPAM 2 MG/ML
0.5 INJECTION INTRAMUSCULAR ONCE
Status: COMPLETED | OUTPATIENT
Start: 2022-03-23 | End: 2022-03-23

## 2022-03-23 RX ORDER — SODIUM CHLORIDE 0.9 % (FLUSH) 0.9 %
10 SYRINGE (ML) INJECTION EVERY 12 HOURS PRN
Status: DISCONTINUED | OUTPATIENT
Start: 2022-03-23 | End: 2022-03-25 | Stop reason: HOSPADM

## 2022-03-23 RX ORDER — SERTRALINE HYDROCHLORIDE 50 MG/1
100 TABLET, FILM COATED ORAL NIGHTLY
Status: DISCONTINUED | OUTPATIENT
Start: 2022-03-23 | End: 2022-03-25 | Stop reason: HOSPADM

## 2022-03-23 RX ADMIN — LAMOTRIGINE 200 MG: 100 TABLET ORAL at 11:03

## 2022-03-23 RX ADMIN — LEVOFLOXACIN 750 MG: 5 INJECTION, SOLUTION INTRAVENOUS at 05:03

## 2022-03-23 RX ADMIN — SERTRALINE HYDROCHLORIDE 100 MG: 50 TABLET ORAL at 11:03

## 2022-03-23 RX ADMIN — LORAZEPAM 0.5 MG: 2 INJECTION INTRAMUSCULAR; INTRAVENOUS at 11:03

## 2022-03-23 RX ADMIN — IOHEXOL 50 ML: 350 INJECTION, SOLUTION INTRAVENOUS at 05:03

## 2022-03-23 RX ADMIN — OLANZAPINE 5 MG: 5 TABLET, FILM COATED ORAL at 11:03

## 2022-03-23 RX ADMIN — MEMANTINE HYDROCHLORIDE 10 MG: 5 TABLET ORAL at 11:03

## 2022-03-23 RX ADMIN — PANTOPRAZOLE SODIUM 40 MG: 40 TABLET, DELAYED RELEASE ORAL at 11:03

## 2022-03-23 RX ADMIN — FAMOTIDINE 40 MG: 20 TABLET, FILM COATED ORAL at 11:03

## 2022-03-23 RX ADMIN — OXYCODONE HYDROCHLORIDE 5 MG: 5 TABLET ORAL at 11:03

## 2022-03-23 RX ADMIN — CEFTRIAXONE SODIUM 1 G: 1 INJECTION, POWDER, FOR SOLUTION INTRAMUSCULAR; INTRAVENOUS at 11:03
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
## 2022-03-23 NOTE — ED PROVIDER NOTES
Encounter Date: 3/23/2022       History     Chief Complaint   Patient presents with    Delusional     Pt believed her psych doctors were at her house this morning. Psych MD office denied. Also noted to not allow fire EMS into her home r/t it was dirty.      66-year-old female brought in by EMS, patient was seen by her psychiatric doctors this morning and has been seeing people in her house for the past 2 weeks, patient lives alone and there has been no one in her house patient reports that she has needed help in determining who is real and who is not.  Psychiatric doctor recommended patient go to the emergency department.  Patient has a past medical history of hypertension, lung cancer, obsessive-compulsive disorder, major depression patient has known bony metastases from her lung cancer.  Patient denies suicidal or homicidal ideation patient does agree that she is having hallucinations.        Review of patient's allergies indicates:   Allergen Reactions    Penicillins Rash     Other reaction(s): Rash    Trazodone Anxiety     Severe anxiety      Past Medical History:   Diagnosis Date    Allergy     seasonal    Anemia associated with chemotherapy 7/18/2018    Anxiety 2012    on meds    Arthritis     Asthma 1960    no inhalers    Back pain 2010    PT    Bone metastases 4/13/2020    Chemotherapy-induced neutropenia 5/21/2018    Dizziness     Essential hypertension 4/11/2019    no meds    Fall 05/2019    fx of lumbar spine, left foot and ankle    Fibromyalgia     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     Fracture of right foot     GERD (gastroesophageal reflux disease) 2015    on meds    Hep B w/o coma 1980    no treatment, hospitalized    Hiatal hernia 2015    on meds    Hyperlipidemia     Major depressive disorder, recurrent episode, in partial remission     Malignant neoplasm of lower lobe of right lung-Adenocarcinoma 5/21/2018    MVP (mitral valve prolapse) 2014    no meds     Myalgia and myositis, unspecified     OCD (obsessive compulsive disorder)     Osteoporosis     Polyneuropathy     SOB (shortness of breath) on exertion 10/2018    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    Trouble in sleeping     Urinary incontinence      Past Surgical History:   Procedure Laterality Date    BONE BIOPSY N/A 2019    Procedure: BIOPSY, BONE;  Surgeon: Ashley Diagnostic Provider;  Location: [facility] OR;  Service: General;  Laterality: N/A;    CARPAL TUNNEL RELEASE Bilateral 2014    EYE SURGERY      RK    FRACTURE SURGERY Right     wrist orif    INSERTION OF TUNNELED CENTRAL VENOUS CATHETER (CVC) WITH SUBCUTANEOUS PORT Left 2019    Procedure: INSERTION, PORT-A-CATH;  Surgeon: Brant Welch MD;  Location: [facility] OR;  Service: Cardiovascular;  Laterality: Left;    LUNG REMOVAL, PARTIAL  2018    Dr. Brant Welch    port a cath  2018    TUBAL LIGATION       Family History   Problem Relation Age of Onset    Heart disease Mother          to to coma (hypothermia)     Heart disease Father 57        heart attack    Heart disease Sister         stents    Diabetes Sister     Parkinsonism Sister     Heart disease Brother 45        death at 45 years old due to hearth disease     Diabetes Maternal Grandmother      Social History     Tobacco Use    Smoking status: Never Smoker    Smokeless tobacco: Never Used   Substance Use Topics    Alcohol use: No    Drug use: No     Review of Systems   Constitutional: Negative for fever.   HENT: Negative for congestion, rhinorrhea, sore throat and trouble swallowing.    Eyes: Negative for visual disturbance.   Respiratory: Negative for cough, chest tightness, shortness of breath and wheezing.    Cardiovascular: Negative for chest pain, palpitations and leg swelling.   Gastrointestinal: Negative for abdominal distention, abdominal pain, constipation, diarrhea, nausea and vomiting.   Genitourinary:  Negative for difficulty urinating, dysuria, flank pain and frequency.   Musculoskeletal: Negative for arthralgias, back pain, joint swelling and neck pain.   Skin: Negative for color change and rash.   Neurological: Negative for dizziness, syncope, speech difficulty, weakness, numbness and headaches.   Psychiatric/Behavioral: Positive for hallucinations. Negative for self-injury, sleep disturbance and suicidal ideas.   All other systems reviewed and are negative.      Physical Exam     Initial Vitals [03/23/22 1423]   BP Pulse Resp Temp SpO2   (!) 159/76 105 16 98.6 °F (37 °C) 98 %      MAP       --         Physical Exam    Nursing note and vitals reviewed.  Constitutional: She appears well-developed and well-nourished. She is not diaphoretic. No distress.   HENT:   Head: Normocephalic and atraumatic.   Right Ear: External ear normal.   Left Ear: External ear normal.   Nose: Nose normal.   Mouth/Throat: Oropharynx is clear and moist. No oropharyngeal exudate.   Eyes: Conjunctivae and EOM are normal. Pupils are equal, round, and reactive to light. Right eye exhibits no discharge. Left eye exhibits no discharge. No scleral icterus.   Neck: Neck supple. No thyromegaly present. No tracheal deviation present. No JVD present.   Normal range of motion.  Cardiovascular: Normal rate, regular rhythm, normal heart sounds and intact distal pulses. Exam reveals no gallop and no friction rub.    No murmur heard.  Pulmonary/Chest: Breath sounds normal. No stridor. No respiratory distress. She has no wheezes. She has no rhonchi. She has no rales. She exhibits no tenderness.   Abdominal: Abdomen is soft. Bowel sounds are normal. She exhibits no distension and no mass. There is no abdominal tenderness. There is no rebound and no guarding.   Musculoskeletal:         General: No tenderness or edema. Normal range of motion.      Cervical back: Normal range of motion and neck supple.     Lymphadenopathy:     She has no cervical  adenopathy.   Neurological: She is alert and oriented to person, place, and time. She has normal strength. No cranial nerve deficit or sensory deficit.   Skin: Skin is warm and dry. No rash and no abscess noted. No erythema. No pallor.   Psychiatric:   Patient believes she is seeing people in her house, patient denies suicidal or homicidal ideation         ED Course   Procedures  Labs Reviewed   CBC W/ AUTO DIFFERENTIAL - Abnormal; Notable for the following components:       Result Value    RBC 3.26 (*)     Hemoglobin 10.3 (*)     Hematocrit 31.9 (*)     MCH 31.6 (*)     MPV 9.1 (*)     Lymph # 0.5 (*)     Gran % 79.8 (*)     Lymph % 9.6 (*)     All other components within normal limits   COMPREHENSIVE METABOLIC PANEL - Abnormal; Notable for the following components:    CO2 31 (*)     Glucose 135 (*)     ALT 9 (*)     All other components within normal limits   URINALYSIS, REFLEX TO URINE CULTURE - Abnormal; Notable for the following components:    Appearance, UA Hazy (*)     Protein, UA Trace (*)     Leukocytes, UA 1+ (*)     All other components within normal limits    Narrative:     Specimen Source->Urine   DRUG SCREEN PANEL, URINE EMERGENCY - Abnormal; Notable for the following components:    Opiate Scrn, Ur Presumptive Positive (*)     All other components within normal limits    Narrative:     Specimen Source->Urine   SALICYLATE LEVEL - Abnormal; Notable for the following components:    Salicylate Lvl <4.0 (*)     All other components within normal limits   CULTURE, BLOOD   CULTURE, BLOOD   TSH   ALCOHOL,MEDICAL (ETHANOL)   ACETAMINOPHEN LEVEL   SARS-COV-2 RNA AMPLIFICATION, QUAL   LACTIC ACID, PLASMA   URINALYSIS MICROSCOPIC    Narrative:     Specimen Source->Urine   AMMONIA   FOLATE   VITAMIN B12   VITAMIN B6   MAGNESIUM   LAMOTRIGINE LEVEL   VITAMIN B1   MAGNESIUM          Imaging Results          CT Head W Wo Contrast (Final result)  Result time 03/23/22 17:37:03    Final result by Raz Rebollar MD  (03/23/22 17:37:03)                 Narrative:    CMS MANDATED QUALITY DATA - CT RADIATION 436    All CT scans at this facility utilize dose modulation, iterative reconstruction, and/or weight based dosing when appropriate to reduce radiation dose to as low as reasonably achievable.    CLINICAL HISTORY:  66 years (1955) Female Brain metastases suspected    TECHNIQUE:  CT HEAD WITHOUT THEN WITH IV CONTRAST. 327 images obtained. Axial CT of the brain were obtained with and without contrast contrast using soft tissue and bone algorithm. 50 mL of Omni 350 was administered uneventfully by IV.    COMPARISON:  CT most recently from November 17, 2021 and MRI from October 27, 2021.    FINDINGS:  No acute intracranial hemorrhage, hydrocephalus, herniation or midline shift and the basal and suprasellar cisterns are patent. No acute skull fracture is identified.    Mild periventricular deep cerebral white matter low attenuation, a nonspecific finding in this age group which can be seen in any diffuse white matter process but which is most commonly associated with chronic microvascular ischemic disease. There are scattered atheromatous calcifications in the intracranial internal carotid arteries.    Small rounded extra-axial partially calcified structure measuring 6-7 mm along the calvarium of the left frontal lobe (image 34), unchanged from the previous exam, otherwise no focal abnormal contrast enhancement to specifically suggest intracranial metastatic disease.    Orbital contents appear within normal limits. External auditory canals are unremarkable. The visualized paranasal sinuses and mastoid air cells are essentially clear. There is rightward nasal septal deviation.    IMPRESSION:  1. No acute intracranial process  2. Unchanged 7 mm structure along the left frontal lobe favored to represent a partially calcified meningioma.  3. No focal abnormal contrast enhancement in the intracranial structures to specifically  suggest metastatic disease.  4. Findings compatible with mild unchanged chronic small vessel ischemic disease.            .    Electronically signed by:  Raz Rebollar MD  3/23/2022 5:37 PM CDT Workstation: [id number]                             X-Ray Chest AP Portable (Final result)  Result time 03/23/22 16:20:19    Final result by Alejandro Rm MD (03/23/22 16:20:19)                 Narrative:    Reason: ams AMS    FINDINGS:  Portable chest at 1600 compared with 11/4/2021 shows unchanged left subclavian port catheter tip in SVC. Cardiomediastinal silhouette unchanged.    Lung volumes have decreased. Blunting of the lateral right costophrenic angle unchanged. Lungs remain clear. Pulmonary vasculature is normal. Sclerotic foci in thoracic vertebral bodies unchanged since 1/26/2022 PET/CT representing known osseous metastasis.    IMPRESSION:  Decreased lung volumes, without acute cardiopulmonary abnormality.    Electronically signed by:  Alejandro Rm MD  3/23/2022 4:20 PM CDT Workstation: [id number]                               Medications   sodium chloride 0.9% 1,000 mL with mvi, adult no.4 with vit K 3,300 unit- 150 mcg/10 mL 10 mL, thiamine 100 mg, folic acid 1 mg infusion (has no administration in time range)   iohexoL (OMNIPAQUE 350) injection 50 mL (50 mLs Intravenous Given 3/23/22 1721)   levoFLOXacin 750 mg/150 mL IVPB 750 mg (0 mg Intravenous Stopped 3/23/22 1939)     Medical Decision Making:   History:   Old Medical Records: I decided to obtain old medical records.  Initial Assessment:   Emergent evaluation of a 66-year-old female presenting with hallucinations patient will be placed under pec for grave disability will also investigate for possible signs of metastatic disease or infection            Attending Attestation:             Attending ED Notes:   Patient seen and evaluated by psychiatric telemedicine, they do not believe that patient has psychosis they believe that patient has intermittent  delirium, patient has been noted to have mild UTI patient will started on antibiotics patient also had her pain medications discontinued in case they were contributing to her altered mental status patient consulted Internal Medicine for admission.               Clinical Impression:   Final diagnoses:  [R41.82] Altered mental status, unspecified altered mental status type (Primary)          ED Disposition Condition    Observation               Xavier Mcconnell MD  03/23/22 7250

## 2022-03-23 NOTE — TELEPHONE ENCOUNTER
"Patient called back again and states that she is not sure if she actually called the office or if she was dreaming it. She presented very tearful and upset. Patient state she thinks her medication dosage maybe too high as she has been feeling "like this" for several days. When asked how she is feeling she states she is anxious and tearful constantly and walking into walls. She states she is home alone today and denies SI/HI at this time. She asked if we know what time Dr. Hawley would be calling. I advised her that Dr. Hawley is with patient but that the message would be answered today. I reminded the patient that she does have a therapy appointment (virtual audio) with her counselor as well. Patient was thankful for the reminder.   "

## 2022-03-23 NOTE — TELEPHONE ENCOUNTER
Pt called saying she has increased anxiety. Started 2-3 days ago. Her stomach is burning and in knots. Says she has no one to talk to. Currently home alone. Denies SI. She is requesting to speak with you. [phone]

## 2022-03-23 NOTE — TELEPHONE ENCOUNTER
Pt very confused in phone call with LPN.  I have contacted EMS personally, [facility] now in route to pt's home.  Pt believed I was in her home per nursing report.  Pt needs med work up in ED for current symptoms of VH, acute confusion, dizziness, SOB, recent blisters.  I do have concerns she has not been taking medications as prescribed.  Recommend lamotrigine level as well.   Nurse on phone with pt until EMS arrives.   I have updated home health nurse Sakshi.  Secure chat also sent to Dr. Zhang and I coordinated with Dr. Holbrook as well (psychologist who met with her virtually).

## 2022-03-23 NOTE — PROGRESS NOTES
Established Patient - Audio Only Telehealth Visit       The patient location is: home ([facility])  Visit type: Virtual visit with audio only (telephone)       The reason for the audio only service rather than synchronous audio and video virtual visit was related to technical difficulties or patient preference/necessity.     Each patient to whom I provide medical services by telemedicine is:  (1) informed of the relationship between the physician and patient and the respective role of any other health care provider with respect to management of the patient; and (2) notified that they may decline to receive medical services by telemedicine and may withdraw from such care at any time. Patient verbally consented to receive this service via voice-only telephone call.    PSYCHO-ONCOLOGY NOTE/ Individual Psychotherapy     Date: 3/23/2022   Site:  [facility]      Therapeutic Intervention: Met with patient.  Outpatient - Supportive psychotherapy 30 min - CPT Code 40127      Patient was last seen by me on 3/4/2022    Problem list  Patient Active Problem List   Diagnosis    MDD (major depressive disorder), recurrent episode, moderate    Generalized anxiety disorder    Tremor    Osteoporosis, senile    Neuropraxia of median nerve    Hyperlipidemia    Degenerative disc disease, cervical    DDD (degenerative disc disease), lumbar    Fibromyalgia    Constipation    Benzodiazepine dependence    OCD (obsessive compulsive disorder)    Malignant neoplasm of lower lobe of right lung-Adenocarcinoma    Chemotherapy-induced neutropenia    Malignant neoplasm of lower lobe of right lung    Encounter for other specified aftercare    Anemia associated with chemotherapy    Muscle spasm    Cough    Panic disorder without agoraphobia    Essential hypertension    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    Cancer associated pain    PTSD (post-traumatic stress disorder)    Personality disorder     Dehydration    Nausea    GERD (gastroesophageal reflux disease)    Bone metastases    Thrombocytopenia, unspecified    Closed fracture of left zygomatic arch with routine healing    Frailty       Chief complaint/reason for encounter: depression, active hallucinations   Met with patient to evaluate psychosocial adaptation to diagnosis/treatment of malignant neoplasm of lower lobe of right lung    Current Medications  Current Outpatient Medications   Medication    famotidine (PEPCID) 40 MG tablet    fentaNYL (DURAGESIC) 75 mcg/hr    fentaNYL (DURAGESIC) 75 mcg/hr    lamoTRIgine (LAMICTAL) 100 MG tablet    LORazepam (ATIVAN) 0.5 MG tablet    megestroL (MEGACE) 400 mg/10 mL (40 mg/mL) Susp    memantine (NAMENDA) 10 MG Tab    mirtazapine (REMERON) 15 MG tablet    morphine (MSIR) 15 MG tablet    multivitamin (THERAGRAN) per tablet    OLANZapine (ZYPREXA) 5 MG tablet    ondansetron (ZOFRAN-ODT) 8 MG TbDL    pantoprazole (PROTONIX) 40 MG tablet    prochlorperazine (COMPAZINE) 10 MG tablet    promethazine (PHENERGAN) 25 MG suppository    senna (SENOKOT) 8.6 mg tablet    sertraline (ZOLOFT) 100 MG tablet    sucralfate (CARAFATE) 1 gram tablet     No current facility-administered medications for this visit.       Objective:  Sheri Broderick arrived promptly for the session but was actively hallucinating throughout (stated that Dr. Hawley had just left her home and waved to her as she picked up my call). Pt had to be reoriented to our appt and became distressed in realization that she had been hallucinating. Coordinated w/ Dr. Hawley and agreed to facilitate pt presentation to [facility] ED (follow up provided by Dr. Hawley's nurse, Cassi, to ensure pt adherence/safety).   Behavior/Cooperation: friendly, confused  Speech: quiet  Mood: confused, distressed  Affect: mood congruent  Thought Content: delusional regarding hallucination content but receptive to reorientation; did appear to be responding to internal  "stimuli during the session, denied SI/HI  Orientation: oriented to time/place/person, not situation  Attention Span/Concentration: Attends to session with distraction  Fund of Knowledge: average  Estimate of Intelligence: average from verbal skills and history  Cognition: grossly intact  Insight: impaired    Interval history and content of current session: Pt presented with active visual hallucinations and had to be reoriented to appt. Reported that hallucinations have been present for the past 1-2 weeks and believes to be associated w/ addition of morphine to medication regimen. Stated that she initially saw "3-4 people in my room every night, but Bonnie (niece) helps me figure out what is real and what isn't." Severity/duration of hallucinations has increased w/ time. Pt is reportedly home alone and Bonnie has returned to her home in [place], [place]. Pt receptive to coordinated attempt to present to [facility] ED for further evaluation.     Risk parameters:   Patient reports no suicidal ideation  Patient reports no homicidal ideation  Patient reports no self-injurious behavior  Patient reports no violent behavior   Safety needs:  None at this time      Verbal deficits: None     Patient's response to intervention:The patient's response to intervention is accepting.     Progress toward goals and other mental status changes:  The patient's progress toward goals is not progressing.      Progress to date:actively hallucinating-report to [facility] ED      Goals from last visit: N/A        Patient Strengths: verbal, intelligent, successful, good insight, commitment to wellness, strong skip, strong cultural traditions      Treatment Plan:consult psychiatrist for medication evaluation, report to ED  · Target symptoms: active visual hallucinations  · Why chosen therapy is appropriate versus another modality: N/A  · Outcome monitoring methods: N/A  · Therapeutic intervention type: N/A  · Prognosis: N/A      Behavioral goals:    Therapy: " diaphragmatic breathing (future), cognitive restructuring (future)     *LVM w/ caregiver/niece Bonnie encouraging her to reach out to me regarding aunt's [facility] ED presentation     *Caregiver returned call at 2:20 PM. Updated her on presentation to [facility] ED. She expressed appreciation and voiced her intent to visit her aunt/reach out to [facility] ED to see how she could provide further support.    Return to clinic: as scheduled     Length of Service (minutes direct face-to-face contact): 30    Diagnosis:     ICD-10-CM ICD-9-CM   1. MDD (major depressive disorder), recurrent episode, moderate  F33.1 296.32   2. Visual hallucinations  R44.1 368.16                [place] Xiong License #[id number]  [place] License #[id number]                        This service was not originating from a related E/M service provided within the previous 7 days nor will  to an E/M service or procedure within the next 24 hours or my soonest available appointment.  Prevailing standard of care was able to be met in this audio-only visit.

## 2022-03-23 NOTE — TELEPHONE ENCOUNTER
Spoke with pt. She states she feels calmer now. Not tearful. Pt is oriented to date, time, location. She sounds more clear this conversation compared to when I spoke with her two hours ago. She states anxiety has worsened x 2-3 days. She reports having bad dreams x 4-5 days and also seeing people in her house, primarily in her bedroom when she is lying in bed. She is aware there is no one actually there but says it is scary seeing this. She also reports heaviness in her chest and sob that comes and goes, this has been going on for several weeks. She sometimes feels dizzy and unsteady and has to hold onto the wall as she walks through the house. She believes this is related to her current medications. Pt has an audio visit with therapist today at 1:00. She is aware that you will call her today. I advised her to call us back with any questions or concerns.

## 2022-03-23 NOTE — TELEPHONE ENCOUNTER
I spoke to the patient.  She reports for the past week she has been feeling dizzy, having some intermittent confusion, SOB, increased anxiety symptoms, possible visual hallucinations at night when laying in bed (aware not real), and has been having bad dreams as well this week.   She reports cutting her morphine dose in half at night because her mind is not right.  No clear UTI symptoms subjectively, but she did have blisters develop on her neck (left side) which have now resolved.  Denies subjective fever.    Pt is not familiar with medications and when asked to review with me, she stated incorrect dosages for Olanzapine (she reports taking 2.5 mg in morning, 5 mg nightly) and Sertraline (she reports taking 100 mg BID).   Pt unable to state if taking Mirtazapine or lorazepam (no Rx given since 11/21).   Pt believes she has is having med side effects.    I have contacted her nurse Sakshi [phone].   Discussed concerns with her and she will arrange for a PRN visit to review meds and contact me back.  Will ask nursing staff to reach out to Sakshi with most current med list to review against pt's home supplies.   Sakshi will provide an update.  I will also cc pt's oncologist Dr. Zhang.

## 2022-03-23 NOTE — ED NOTES
Niece called for update on patient.    [phone] Meseret Urbina (niece)  Godfrey is Meseret's , also allowed to receive information over phone, per niece, Meseret.

## 2022-03-23 NOTE — TELEPHONE ENCOUNTER
See previous message. Pt is extremely confused. She thinks Dr. Hawley is in her house. She says Dr. Hawley is walking in and out of the door. I stayed on phone with pt until she said someone was at her house. I asked for pt to hand her phone to him so I could speak to him. It was the fire dept paramedic. Reviewed pt's current symptoms with him - visual hallucinations, dizziness, SOB, confusion. [facility].

## 2022-03-23 NOTE — TELEPHONE ENCOUNTER
I coordinated with Dr. Holbrook - pt having active visual hallucinations during his appt with him - was seeing me and had to be reoriented to appt with Dr. Holbrook.  Pt should have further evaluation in ED - recommend EMS transport or support person transport her (pt should not drive to ED) - please advise pt, please help to coordinate for her

## 2022-03-23 NOTE — TELEPHONE ENCOUNTER
Spoke with [facility] health nurse to review current med list. She states she is not sure which staff member has been assigned to pt today. She is waiting for that info or she may go see pt today and will call back to compare med list.

## 2022-03-24 ENCOUNTER — TELEPHONE (OUTPATIENT)
Dept: PSYCHIATRY | Facility: CLINIC | Age: 67
End: 2022-03-24
Payer: MEDICARE

## 2022-03-24 LAB
ANION GAP SERPL CALC-SCNC: 8 MMOL/L (ref 8–16)
BASOPHILS # BLD AUTO: 0.01 K/UL (ref 0–0.2)
BASOPHILS NFR BLD: 0.2 % (ref 0–1.9)
BUN SERPL-MCNC: 9 MG/DL (ref 8–23)
CALCIUM SERPL-MCNC: 9.5 MG/DL (ref 8.7–10.5)
CHLORIDE SERPL-SCNC: 102 MMOL/L (ref 95–110)
CO2 SERPL-SCNC: 28 MMOL/L (ref 23–29)
CREAT SERPL-MCNC: 0.6 MG/DL (ref 0.5–1.4)
DIFFERENTIAL METHOD: ABNORMAL
EOSINOPHIL # BLD AUTO: 0 K/UL (ref 0–0.5)
EOSINOPHIL NFR BLD: 0 % (ref 0–8)
ERYTHROCYTE [DISTWIDTH] IN BLOOD BY AUTOMATED COUNT: 13 % (ref 11.5–14.5)
EST. GFR  (AFRICAN AMERICAN): >60 ML/MIN/1.73 M^2
EST. GFR  (NON AFRICAN AMERICAN): >60 ML/MIN/1.73 M^2
GLUCOSE SERPL-MCNC: 108 MG/DL (ref 70–110)
HCT VFR BLD AUTO: 31.6 % (ref 37–48.5)
HGB BLD-MCNC: 10.3 G/DL (ref 12–16)
IMM GRANULOCYTES # BLD AUTO: 0.02 K/UL (ref 0–0.04)
IMM GRANULOCYTES NFR BLD AUTO: 0.3 % (ref 0–0.5)
LYMPHOCYTES # BLD AUTO: 0.5 K/UL (ref 1–4.8)
LYMPHOCYTES NFR BLD: 8.3 % (ref 18–48)
MAGNESIUM SERPL-MCNC: 2 MG/DL (ref 1.6–2.6)
MCH RBC QN AUTO: 31.5 PG (ref 27–31)
MCHC RBC AUTO-ENTMCNC: 32.6 G/DL (ref 32–36)
MCV RBC AUTO: 97 FL (ref 82–98)
MONOCYTES # BLD AUTO: 0.6 K/UL (ref 0.3–1)
MONOCYTES NFR BLD: 9.3 % (ref 4–15)
NEUTROPHILS # BLD AUTO: 5.3 K/UL (ref 1.8–7.7)
NEUTROPHILS NFR BLD: 81.9 % (ref 38–73)
NRBC BLD-RTO: 0 /100 WBC
PLATELET # BLD AUTO: 179 K/UL (ref 150–450)
PMV BLD AUTO: 9.4 FL (ref 9.2–12.9)
POTASSIUM SERPL-SCNC: 3.5 MMOL/L (ref 3.5–5.1)
RBC # BLD AUTO: 3.27 M/UL (ref 4–5.4)
SODIUM SERPL-SCNC: 138 MMOL/L (ref 136–145)
WBC # BLD AUTO: 6.48 K/UL (ref 3.9–12.7)

## 2022-03-24 PROCEDURE — 36415 COLL VENOUS BLD VENIPUNCTURE: CPT | Performed by: NURSE PRACTITIONER

## 2022-03-24 PROCEDURE — 85025 COMPLETE CBC W/AUTO DIFF WBC: CPT | Performed by: NURSE PRACTITIONER

## 2022-03-24 PROCEDURE — 63600175 PHARM REV CODE 636 W HCPCS: Performed by: NURSE PRACTITIONER

## 2022-03-24 PROCEDURE — 25000003 PHARM REV CODE 250: Performed by: NURSE PRACTITIONER

## 2022-03-24 PROCEDURE — 25000003 PHARM REV CODE 250: Performed by: HOSPITALIST

## 2022-03-24 PROCEDURE — 83735 ASSAY OF MAGNESIUM: CPT | Performed by: NURSE PRACTITIONER

## 2022-03-24 PROCEDURE — G0378 HOSPITAL OBSERVATION PER HR: HCPCS | Mod: CS

## 2022-03-24 PROCEDURE — 96375 TX/PRO/DX INJ NEW DRUG ADDON: CPT

## 2022-03-24 PROCEDURE — 80048 BASIC METABOLIC PNL TOTAL CA: CPT | Performed by: NURSE PRACTITIONER

## 2022-03-24 RX ORDER — POTASSIUM CHLORIDE 20 MEQ/1
60 TABLET, EXTENDED RELEASE ORAL ONCE
Status: COMPLETED | OUTPATIENT
Start: 2022-03-24 | End: 2022-03-24

## 2022-03-24 RX ORDER — FENTANYL 25 UG/1
1 PATCH TRANSDERMAL
Status: DISCONTINUED | OUTPATIENT
Start: 2022-03-24 | End: 2022-03-25 | Stop reason: HOSPADM

## 2022-03-24 RX ADMIN — DIPHENHYDRAMINE HYDROCHLORIDE 12.5 MG: 50 INJECTION INTRAMUSCULAR; INTRAVENOUS at 12:03

## 2022-03-24 RX ADMIN — MEMANTINE HYDROCHLORIDE 10 MG: 5 TABLET ORAL at 08:03

## 2022-03-24 RX ADMIN — SERTRALINE HYDROCHLORIDE 100 MG: 50 TABLET ORAL at 08:03

## 2022-03-24 RX ADMIN — SENNOSIDES AND DOCUSATE SODIUM 1 TABLET: 50; 8.6 TABLET ORAL at 08:03

## 2022-03-24 RX ADMIN — OLANZAPINE 5 MG: 5 TABLET, FILM COATED ORAL at 08:03

## 2022-03-24 RX ADMIN — ACETAMINOPHEN 650 MG: 325 TABLET, FILM COATED ORAL at 03:03

## 2022-03-24 RX ADMIN — FAMOTIDINE 40 MG: 20 TABLET, FILM COATED ORAL at 08:03

## 2022-03-24 RX ADMIN — LAMOTRIGINE 200 MG: 100 TABLET ORAL at 08:03

## 2022-03-24 RX ADMIN — OXYCODONE HYDROCHLORIDE 5 MG: 5 TABLET ORAL at 08:03

## 2022-03-24 RX ADMIN — CLONIDINE HYDROCHLORIDE 0.1 MG: 0.1 TABLET ORAL at 12:03

## 2022-03-24 RX ADMIN — PANTOPRAZOLE SODIUM 40 MG: 40 TABLET, DELAYED RELEASE ORAL at 05:03

## 2022-03-24 RX ADMIN — FENTANYL TRANSDERMAL 1 PATCH: 25 PATCH, EXTENDED RELEASE TRANSDERMAL at 04:03

## 2022-03-24 RX ADMIN — PANTOPRAZOLE SODIUM 40 MG: 40 TABLET, DELAYED RELEASE ORAL at 06:03

## 2022-03-24 RX ADMIN — POTASSIUM CHLORIDE 60 MEQ: 20 TABLET, EXTENDED RELEASE ORAL at 12:03

## 2022-03-24 RX ADMIN — THIAMINE HYDROCHLORIDE: 100 INJECTION, SOLUTION INTRAMUSCULAR; INTRAVENOUS at 12:03

## 2022-03-24 RX ADMIN — THERA TABS 1 TABLET: TAB at 08:03

## 2022-03-24 NOTE — PROGRESS NOTES
[facility] Medicine  Progress Note    Patient name: Sheri Broderick  MRN: [id number]  Admit Date: 3/23/2022   LOS: 0 days     SUBJECTIVE:     Principal problem: Altered mental status    Interval History:  Patient was seen and examined bedside.  She seems very appropriate, alert, oriented, has no auditory or visual hallucination, has no suicidal or homicidal ideation.  No other acute events overnight as per nursing staff.  Sitter is present at bedside    Scheduled Meds:   famotidine  40 mg Oral QHS    lamoTRIgine  200 mg Oral QHS    memantine  10 mg Oral BID    multivitamin  1 tablet Oral Daily    OLANZapine  5 mg Oral QHS    pantoprazole  40 mg Oral BID    potassium chloride  60 mEq Oral Once    senna-docusate 8.6-50 mg  1 tablet Oral BID    sertraline  100 mg Oral QHS     Continuous Infusions:  PRN Meds:acetaminophen, calcium chloride IVPB, calcium chloride IVPB, calcium chloride IVPB, calcium chloride IVPB, magnesium oxide, magnesium sulfate IVPB, magnesium sulfate IVPB, magnesium sulfate IVPB, magnesium sulfate IVPB, naloxone, ondansetron, oxyCODONE, polyethylene glycol, potassium chloride, potassium chloride, potassium chloride, potassium chloride, sodium chloride 0.9%    Review of patient's allergies indicates:   Allergen Reactions    Penicillins Rash     Other reaction(s): Rash    Trazodone Anxiety     Severe anxiety        Review of Systems: As per interval history    OBJECTIVE:     Vital Signs (Most Recent)  Temp: 97.8 °F (36.6 °C) (03/24/22 0734)  Pulse: (!) 112 (03/24/22 0734)  Resp: 19 (03/24/22 0837)  BP: (!) 169/89 (03/24/22 0734)  SpO2: 97 % (03/24/22 0734)    Vital Signs Range (Last 24H):  Temp:  [97.4 °F (36.3 °C)-98.9 °F (37.2 °C)]   Pulse:  [101-112]   Resp:  [16-20]   BP: (131-170)/(76-90)   SpO2:  [97 %-98 %]     I & O (Last 24H):    Intake/Output Summary (Last 24 hours) at 3/24/2022 1038  Last data filed at 3/24/2022 0805  Gross per 24 hour   Intake 270 ml    Output 300 ml   Net -30 ml       Physical Exam:  General: Patient resting comfortably in no acute distress. Appears as stated age. Calm  Eyes: No conjunctival injection. No scleral icterus.  ENT: Hearing grossly intact. No discharge from ears. No nasal discharge.   Neck: Supple, trachea midline. No JVD  CVS: RRR. No LE edema BL  Lungs:  No tachypnea or accessory muscle use.  Clear to auscultation bilaterally  Abdomen:  Soft, nontender and nondistended.  No organomegaly  Neuro: AOx3. Moves all extremities. Follows commands. Responds appropriately, mild tremors noted  Skin:  No rash or erythema noted    Laboratory:  I have reviewed all pertinent lab results within the past 24 hours.    Diagnostic Results:  Reviewed all imaging    ASSESSMENT/PLAN:     66 year old female with a past medical history of hypertension, lung ca with bone mets (not currently on treatment), obsessive-compulsive disorder, major depression who presents today at the recommendation of her psychiatrist for visual and auditory hallucinations.    Active Hospital Problems    Diagnosis  POA    *Altered mental status [R41.82]  Yes    Macrocytosis [D75.89]  Yes    Cancer associated pain [G89.3]  Yes    Essential hypertension [I10]  Yes    Malignant neoplasm of lower lobe of right lung-Adenocarcinoma [C34.31]  Yes     Chronic     S/P RLL Lobectomy: 4/27/2018  2.4cm adenocarcinoma  1/4 LNs pos for cancer(station 10)  Completed Cis/Alimta x 4 8/10/2018    XRT to L2-L4 and left hip 9/18/2019    Recurrence biopsy proven L3 8/7/2019  L3 lesion biopsy 8/7/2019:  Adenocarcinoma c/w lung primary  PD-L1: 9%  ALK neg  Ros-1 Neg  EGFR Neg    Nivolumab therapy x 6  10/22/2019-1/9/2020--progression    Radiation:  T5-T8--due to pain          Resolved Hospital Problems   No resolved problems to display.         Plan:   Altered mental status resolved, not suicidal, not homicidal, has no hallucinations.  Today on my exam patient is not a threat to herself or to  other.  Will remove psych hold  Follow-up on pending studies  Aggressive electrolyte replacement  Continue p.r.n. oxycodone, will start low-dose fentanyl patch as patient appears very uncomfortable due to her cancer related pain  Questionable prognosis of cancer  Help from psych appreciated  If she remains stable we may consider discharging her home tomorrow      VTE Risk Mitigation (From admission, onward)         Ordered     IP VTE HIGH RISK PATIENT  Once         03/23/22 2244     Place sequential compression device  Until discontinued         03/23/22 2244                    Department Hospital Medicine  [facility]  Aorn Johnson MD  Date of service: 03/24/2022        Please note: This note was transcribed using voice recognition software. Because of this technology, there are often uinintended grammatical, spelling, and other transcription errors. Please disregard these errors.

## 2022-03-24 NOTE — H&P
"[facility] Medicine  History & Physical    DOS: 03/23/2022  9:38 PM      Patient Name: Sheri Broderick  MRN: [id number]  Patient Class: OP- Observation  Admission Date: 3/23/2022  Attending Physician: Dr. Dasilva  Primary Care Provider: Demetrio Pleitez Jr, MD         Patient information was obtained from patient and ER records.     Subjective:     Principal Problem:Altered mental status    Chief Complaint:   Chief Complaint   Patient presents with    Delusional     Pt believed her psych doctors were at her house this morning. Psych MD office denied. Also noted to not allow fire EMS into her home r/t it was dirty.         HPI: Ms. Broderick is a 66 year old female with a past medical history of hypertension, lung ca with bone mets (not currently on treatment), obsessive-compulsive disorder, major depression who presents today at the recommendation of her psychiatrist for visual and auditory hallucinations. It is severe. It is associated with increased anxiety. She denies fever, chills, N/V/D, chest pain, or SOB. She denies suicidal or homicidal ideation patient does agree that she is having hallucinations. She has tried to leave multiple times and was placed under a PEC in the ED. She tells me she is claustrophobic in the ED room and that is why she is trying to leave. She is visibly shaking her legs and tells me it's d/t the claustrophobia. She is oriented to year and person. Somewhat to situation (states her psychiatrist sent her) and when asked location she states "a hospital" when asked what city, she states "you don't want me to say [place]." She is currently using a fentanyl patch and oral MS contin for pain control. ER MD removed fentanyl patch for concern this is contributing to hallucinations. She had leukocytes in her urine and was started on rocephin for possible UTI. She is also slightly macrocytic. She is also on remeron, lamictal, and zyprexa. She states she has home " health that helps her organize her medications. She denies forgetting to take her meds or accidentally taking too many meds.  There was initial concern for possible brain Mets, she has a CT head with contrast in the ED with no acute abnormality.  She states she cannot tolerate an MRI due to her claustrophobia.      Past Medical History:   Diagnosis Date    Allergy     seasonal    Anemia associated with chemotherapy 7/18/2018    Anxiety 2012    on meds    Arthritis     Asthma 1960    no inhalers    Back pain 2010    PT    Bone metastases 4/13/2020    Chemotherapy-induced neutropenia 5/21/2018    Dizziness     Essential hypertension 4/11/2019    no meds    Fall 05/2019    fx of lumbar spine, left foot and ankle    Fibromyalgia     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     Fracture of right foot     GERD (gastroesophageal reflux disease) 2015    on meds    Hep B w/o coma 1980    no treatment, hospitalized    Hiatal hernia 2015    on meds    Hyperlipidemia     Major depressive disorder, recurrent episode, in partial remission     Malignant neoplasm of lower lobe of right lung-Adenocarcinoma 5/21/2018    MVP (mitral valve prolapse) 2014    no meds    Myalgia and myositis, unspecified     OCD (obsessive compulsive disorder)     Osteoporosis     Polyneuropathy     SOB (shortness of breath) on exertion 10/2018    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 8/23/2019    Trouble in sleeping     Urinary incontinence        Past Surgical History:   Procedure Laterality Date    BONE BIOPSY N/A 8/5/2019    Procedure: BIOPSY, BONE;  Surgeon: Ashley Diagnostic Provider;  [facility];  Service: General;  Laterality: N/A;    CARPAL TUNNEL RELEASE Bilateral 07/18/2014    EYE SURGERY      RK    FRACTURE SURGERY Right     wrist orif    INSERTION OF TUNNELED CENTRAL VENOUS CATHETER (CVC) WITH SUBCUTANEOUS PORT Left 8/30/2019    Procedure: INSERTION, PORT-A-CATH;   Surgeon: Brant Welch MD;  Location: [facility] OR;  Service: Cardiovascular;  Laterality: Left;    LUNG REMOVAL, PARTIAL  04/27/2018    Dr. Brant Welch    port a cath  06/2018    TUBAL LIGATION         Review of patient's allergies indicates:   Allergen Reactions    Penicillins Rash     Other reaction(s): Rash    Trazodone Anxiety     Severe anxiety        No current facility-administered medications on file prior to encounter.     Current Outpatient Medications on File Prior to Encounter   Medication Sig    famotidine (PEPCID) 40 MG tablet Take 1 tablet (40 mg total) by mouth every evening.    fentaNYL (DURAGESIC) 75 mcg/hr Place 1 patch onto the skin every 72 hours.    fentaNYL (DURAGESIC) 75 mcg/hr Place 1 patch onto the skin every 72 hours. Remove and discard old patch before applying new patch on.    lamoTRIgine (LAMICTAL) 100 MG tablet TAKE 1 TABLET BY MOUTH EVERY MORNING AND 2 TABLETS AT BEDTIME    LORazepam (ATIVAN) 0.5 MG tablet Take 1 tablet (0.5 mg total) by mouth once daily.    megestroL (MEGACE) 400 mg/10 mL (40 mg/mL) Susp Take 10 mLs (400 mg total) by mouth 2 (two) times daily.    memantine (NAMENDA) 10 MG Tab Take 1 tablet (10 mg total) by mouth 2 (two) times daily.    mirtazapine (REMERON) 15 MG tablet Take 1 tablet (15 mg total) by mouth every evening.    morphine (MSIR) 15 MG tablet Take one-half tablet (7.5 mg total) by mouth every 4 (four) hours as needed for Pain.    multivitamin (THERAGRAN) per tablet Take 1 tablet by mouth once daily.    OLANZapine (ZYPREXA) 5 MG tablet TAKE 1 TABLET BY MOUTH EVERY NIGHT    ondansetron (ZOFRAN-ODT) 8 MG TbDL DISSOLVE 1 TABLET(8 MG) ON THE TONGUE EVERY 8 HOURS AS NEEDED    pantoprazole (PROTONIX) 40 MG tablet Take 1 tablet (40 mg total) by mouth 2 (two) times daily.    prochlorperazine (COMPAZINE) 10 MG tablet Take 1 tablet (10 mg total) by mouth every 6 (six) hours as needed (as needed for nausea).    promethazine (PHENERGAN) 25 MG  suppository Place 1 suppository (25 mg total) rectally every 6 (six) hours as needed for Nausea.    senna (SENOKOT) 8.6 mg tablet Take 2-4 tablets by mouth 2 (two) times daily.     sertraline (ZOLOFT) 100 MG tablet Take 1 tablet (100 mg total) by mouth every evening.    sucralfate (CARAFATE) 1 gram tablet TAKE 1 TABLET(1 GRAM) BY MOUTH FOUR TIMES DAILY (Patient not taking: Reported on 2/14/2022)     Family History       Problem Relation (Age of Onset)    Diabetes Sister, Maternal Grandmother    Heart disease Mother, Father (57), Sister, Brother (45)    Parkinsonism Sister          Tobacco Use    Smoking status: Never Smoker    Smokeless tobacco: Never Used   Substance and Sexual Activity    Alcohol use: No    Drug use: No    Sexual activity: Yes     Birth control/protection: None     Review of Systems   Constitutional: Negative.    HENT: Negative.     Eyes: Negative.    Respiratory: Negative.     Cardiovascular: Negative.    Gastrointestinal: Negative.    Endocrine: Negative.    Genitourinary: Negative.    Musculoskeletal: Negative.    Skin: Negative.    Neurological: Negative.    Hematological: Negative.    Psychiatric/Behavioral:  Positive for confusion, dysphoric mood and hallucinations. The patient is nervous/anxious.    Objective:     Vital Signs (Most Recent):  Temp: 98.6 °F (37 °C) (03/23/22 1423)  Pulse: 105 (03/23/22 1423)  Resp: 16 (03/23/22 1423)  BP: (!) 159/76 (03/23/22 1423)  SpO2: 98 % (03/23/22 1423)   Vital Signs (24h Range):  Temp:  [98.6 °F (37 °C)] 98.6 °F (37 °C)  Pulse:  [105] 105  Resp:  [16] 16  SpO2:  [98 %] 98 %  BP: (159)/(76) 159/76     Weight: 61.2 kg (135 lb)  Body mass index is 22.47 kg/m².    Physical Exam  Vitals and nursing note reviewed.   Constitutional:       Appearance: Normal appearance.   HENT:      Head: Normocephalic and atraumatic.      Nose: Nose normal.      Mouth/Throat:      Mouth: Mucous membranes are moist.      Pharynx: Oropharynx is clear.   Eyes:       Extraocular Movements: Extraocular movements intact.      Pupils: Pupils are equal, round, and reactive to light.   Cardiovascular:      Rate and Rhythm: Normal rate and regular rhythm.      Pulses: Normal pulses.   Pulmonary:      Effort: Pulmonary effort is normal.      Breath sounds: Normal breath sounds.   Abdominal:      General: Bowel sounds are normal.      Palpations: Abdomen is soft.   Musculoskeletal:         General: Normal range of motion.      Cervical back: Normal range of motion and neck supple.   Skin:     General: Skin is warm and dry.      Capillary Refill: Capillary refill takes less than 2 seconds.   Neurological:      Mental Status: She is alert.      Comments: Oriented to person and time   Psychiatric:      Comments: Appears anxious, tremulous         CRANIAL NERVES     CN III, IV, VI   Pupils are equal, round, and reactive to light.     Significant Labs: All pertinent labs within the past 24 hours have been reviewed.  CBC:   Recent Labs   Lab 03/23/22  1445   WBC 4.79   HGB 10.3*   HCT 31.9*        CMP:   Recent Labs   Lab 03/23/22  1445      K 3.9   CL 96   CO2 31*   *   BUN 10   CREATININE 0.7   CALCIUM 9.5   PROT 7.3   ALBUMIN 3.8   BILITOT 0.6   ALKPHOS 113   AST 15   ALT 9*   ANIONGAP 12   EGFRNONAA >60.0     Urine Studies:   Recent Labs   Lab 03/23/22  1456   COLORU Yellow   APPEARANCEUA Hazy*   PHUR 7.0   SPECGRAV 1.025   PROTEINUA Trace*   GLUCUA Negative   KETONESU Negative   BILIRUBINUA Negative   OCCULTUA Negative   NITRITE Negative   UROBILINOGEN Negative   LEUKOCYTESUR 1+*   RBCUA 2   WBCUA 5   BACTERIA Negative   SQUAMEPITHEL 1   HYALINECASTS 1       Significant Imaging: I have reviewed all pertinent imaging results/findings within the past 24 hours.  I have reviewed and interpreted all pertinent imaging results/findings within the past 24 hours.  CT Head W Wo Contrast    Result Date: 3/23/2022  CMS MANDATED QUALITY DATA - CT RADIATION 436 All CT scans at this  facility utilize dose modulation, iterative reconstruction, and/or weight based dosing when appropriate to reduce radiation dose to as low as reasonably achievable. CLINICAL HISTORY: 66 years (1955) Female Brain metastases suspected TECHNIQUE: CT HEAD WITHOUT THEN WITH IV CONTRAST. 327 images obtained. Axial CT of the brain were obtained with and without contrast contrast using soft tissue and bone algorithm. 50 mL of Omni 350 was administered uneventfully by IV. COMPARISON: CT most recently from November 17, 2021 and MRI from October 27, 2021. FINDINGS: No acute intracranial hemorrhage, hydrocephalus, herniation or midline shift and the basal and suprasellar cisterns are patent. No acute skull fracture is identified. Mild periventricular deep cerebral white matter low attenuation, a nonspecific finding in this age group which can be seen in any diffuse white matter process but which is most commonly associated with chronic microvascular ischemic disease. There are scattered atheromatous calcifications in the intracranial internal carotid arteries. Small rounded extra-axial partially calcified structure measuring 6-7 mm along the calvarium of the left frontal lobe (image 34), unchanged from the previous exam, otherwise no focal abnormal contrast enhancement to specifically suggest intracranial metastatic disease. Orbital contents appear within normal limits. External auditory canals are unremarkable. The visualized paranasal sinuses and mastoid air cells are essentially clear. There is rightward nasal septal deviation. IMPRESSION: 1. No acute intracranial process 2. Unchanged 7 mm structure along the left frontal lobe favored to represent a partially calcified meningioma. 3. No focal abnormal contrast enhancement in the intracranial structures to specifically suggest metastatic disease. 4. Findings compatible with mild unchanged chronic small vessel ischemic disease. . Electronically signed by:  Raz Rebollar  MD  3/23/2022 5:37 PM CDT Workstation: [id number]    X-Ray Chest AP Portable    Result Date: 3/23/2022  Reason: ams AMS FINDINGS: Portable chest at 1600 compared with 11/4/2021 shows unchanged left subclavian port catheter tip in SVC. Cardiomediastinal silhouette unchanged. Lung volumes have decreased. Blunting of the lateral right costophrenic angle unchanged. Lungs remain clear. Pulmonary vasculature is normal. Sclerotic foci in thoracic vertebral bodies unchanged since 1/26/2022 PET/CT representing known osseous metastasis. IMPRESSION: Decreased lung volumes, without acute cardiopulmonary abnormality. Electronically signed by:  Alejandro Rm MD  3/23/2022 4:20 PM CDT Workstation: [id number]       Assessment/Plan:     * Altered mental status  Admit to med/tele  Consulted telepsych  ER MD PEC'ed patient d/t grave disability and threatening to leave, no SI/HI  neurochecks q4h  Polypharmacy likely a factor  Hold fentanyl - patch removed  Will start low dose oxycodone prn as we do not carry morphine sulfate IR and only PRN not scheduled  Check lamictal level  Check vit B panel  Urine with leukocytosis - will treat with rocephin presumptively   Ammonia is WNL  Ethanol is negative  UDS only positive for opiates which is expected      Macrocytosis  Check vit B levels  Banana bag now once      Cancer associated pain   low dose oxycodone prn as we do not carry morphine sulfate IR and only PRN not scheduled      Essential hypertension  Continue home meds      Malignant neoplasm of lower lobe of right lung-Adenocarcinoma  Not currently on treatment  F/U with MD zapata next month      VTE Risk Mitigation (From admission, onward)    None             Andreina Ovalles, NP  Department of Hospital Medicine   [facility]

## 2022-03-24 NOTE — NURSING
Call received from Dr. Brewster, pt known psychologist, who states she was made aware of pt admission and was calling to check on her progress. States she will reach out to pt on her personal phone now that she is no longer PEC and has her phone.

## 2022-03-24 NOTE — ED NOTES
"Adult Physical Assessment  LOC: Patient is awake, alert and oriented X3; anxious, tearful, AH/VH +  APPEARANCE: rocking back and forth, anxious, repeating "I'm not crazy. I'm so mad my dr told me I had to come there", patient appears to well groomed and clean.  SKIN: Skin warm and dry, normal skin turgor, moist mucus membranes, skin Intact, no breakdown noted.  MUSCULOSKELETAL:  Normal ROM noted; moves all extremities equally; tremors noted in BUE; no swelling or tenderness noted; ambulatory.  RESPIRATORY: Airway is open and patent, unlabored, spontaneous bilateral respirations; normal effort and rate.   CARDIAC: Normal rate and rhythm, no peripheral edema noted, capillary refill < 3 seconds.   ABDOMEN/GI: Soft and non-tender upon palpation, no distention noted.   URINARY: Normal frequency, denies retention   PULSES: 2+; symmetrical in all 4 extremities  NEUROLOGIC: PERRLA, symmetrical facial expression; follows commands, facial expressions symmetrical, bilateral hand grasp, equal and even, purposeful motor response noted, normal sensation to all 4 extremities.    PEC protocol remains in place. Sitter at bedside. Denies SI/HI. Is not a harm to self or others at this time.   "

## 2022-03-24 NOTE — NURSING
Pt is awake, alert and oriented x4 at this time; no distress to note. Resting in bed at this time; bed alarm intact. Will continue to assess and monitor.

## 2022-03-24 NOTE — HPI
"Ms. Broderick is a 66 year old female with a past medical history of hypertension, lung ca with bone mets (not currently on treatment), obsessive-compulsive disorder, major depression who presents today at the recommendation of her psychiatrist for visual and auditory hallucinations. It is severe. It is associated with increased anxiety. She denies fever, chills, N/V/D, chest pain, or SOB. She denies suicidal or homicidal ideation patient does agree that she is having hallucinations. She has tried to leave multiple times and was placed under a PEC in the ED. She tells me she is claustrophobic in the ED room and that is why she is trying to leave. She is visibly shaking her legs and tells me it's d/t the claustrophobia. She is oriented to year and person. Somewhat to situation (states her psychiatrist sent her) and when asked location she states "a hospital" when asked what city, she states "you don't want me to say [place]." She is currently using a fentanyl patch and oral MS contin for pain control. ER MD removed fentanyl patch for concern this is contributing to hallucinations. She had leukocytes in her urine and was started on rocephin for possible UTI. She is also slightly macrocytic. She is also on remeron, lamictal, and zyprexa. She states she has home health that helps her organize her medications. She denies forgetting to take her meds or accidentally taking too many meds.  There was initial concern for possible brain Mets, she has a CT head with contrast in the ED with no acute abnormality.  She states she cannot tolerate an MRI due to her claustrophobia.  "

## 2022-03-24 NOTE — ED NOTES
Spoke with Meseret (Patient's niece- with patient's permission).   Meseret received update on patient and would also like to be updated on plan of care and/or any changes in patient's status.     Patient approves of medical information to be shared with Meseret.

## 2022-03-24 NOTE — PLAN OF CARE
Medicare Outpatient Observation Notice was explained and given to patient/caregiver on 03/24/2022 at 10:06am    Spoke with patient at bedside  - patient stated she is not signing anything that might cause her to have to pay anything.  She did not come to the hospital by her on free will.     addressed any questions or concerns.    Medicare Outpatient Observation Notice will be scanned into patient's medical record

## 2022-03-24 NOTE — PLAN OF CARE
[facility]  Initial Discharge Assessment       Primary Care Provider: Demetrio Pleitez Jr, MD    Admission Diagnosis: Altered mental status, unspecified altered mental status type [R41.82]    Admission Date: 3/23/2022  Expected Discharge Date:     Discharge Barriers Identified: (P) None    Assessment completed at bedside.  Patient names Gina peterson [phone] as POA.  Patient lives alone and intends to discharge home with no needs identified at this time    Payor: MEDICARE / Plan: MEDICARE PART A & B / Product Type: Government /     Extended Emergency Contact Information  Primary Emergency Contact: Bonnie Peterson  Address: [place] [address] [facility]  Home Phone: [phone]  Mobile Phone: [phone]  Relation: Relative  Preferred language: English   needed? No  Secondary Emergency Contact: Godfrey Peterson  Mobile Phone: [phone]  Relation: Relative  Preferred language: English   needed? No    Discharge Plan A: (P) Home  Discharge Plan B: (P) Home      [id number] - [place] - [address]  [address] [address]  Phone: [phone] Fax: [phone]    [id number] - [place], [place] - [address] [facility] AT [address] OF [address] [address]  Phone: [phone] Fax: [phone]      Initial Assessment (most recent)       Adult Discharge Assessment - 03/24/22 1445          Discharge Assessment    Assessment Type Discharge Planning Assessment (P)      Confirmed/corrected address, phone number and insurance Yes (P)      Confirmed Demographics Correct on Facesheet (P)      Source of Information patient (P)      When was your last doctors appointment? -- (P)    last week or so    Communicated KIZZY with patient/caregiver Yes (P)      Reason For Admission ams (P)      Lives With alone (P)       Facility Arrived From: home (P)      Do you expect to return to your current living situation? Yes (P)      Do you have help at home or someone to help you manage your care at home? Yes (P)      Who are your caregiver(s) and their phone number(s)? Gina kristen [phone] (P)      Prior to hospitilization cognitive status: Unable to Assess (P)      Current cognitive status: Alert/Oriented (P)      Equipment Currently Used at Home none (P)      Readmission within 30 days? No (P)      Do you currently have service(s) that help you manage your care at home? No (P)      Do you take prescription medications? Yes (P)      Do you have prescription coverage? Yes (P)      Coverage umr (P)      Do you have any problems affording any of your prescribed medications? No (P)      Is the patient taking medications as prescribed? yes (P)      Who is going to help you get home at discharge? Gina peterson [phone] (P)      How do you get to doctors appointments? car, drives self;family or friend will provide (P)      Are you on dialysis? No (P)      Do you take coumadin? No (P)      Discharge Plan A Home (P)      Discharge Plan B Home (P)      DME Needed Upon Discharge  none (P)      Discharge Plan discussed with: Patient (P)      Discharge Barriers Identified None (P)

## 2022-03-24 NOTE — NURSING
Pt awake in bed quietly at this time, alert & oriented to person, place, and situation. No distress to note. Verbalizes understanding of bedside sitter for safety. RN will continue to assess and monitor.

## 2022-03-24 NOTE — NURSING
Per Dr. Johnson, pt PEC order discontinued at this time. Pt awake, alert, and oriented x4, no apparent distress to note. Bed alarm on; will continue to assess and monitor.

## 2022-03-24 NOTE — TELEPHONE ENCOUNTER
Took call from pt's caregiver Bonnie concerning plans for post-D/C. Encouraged her to follow up w/ pt's oncology team regarding tx choices moving forward and to adhere to inpt team tx recommendations.

## 2022-03-24 NOTE — ASSESSMENT & PLAN NOTE
Admit to med/tele  Consulted telepsych  ER MD CAMPBELL'ed patient d/t grave disability and threatening to leave, no SI/HI  neurochecks q4h  Polypharmacy likely a factor  Hold fentanyl - patch removed  Will start low dose oxycodone prn as we do not carry morphine sulfate IR and only PRN not scheduled  Check lamictal level  Check vit B panel  Urine with leukocytosis - will treat with rocephin presumptively   Ammonia is WNL  Ethanol is negative  UDS only positive for opiates which is expected

## 2022-03-24 NOTE — SUBJECTIVE & OBJECTIVE
Past Medical History:   Diagnosis Date    Allergy     seasonal    Anemia associated with chemotherapy 7/18/2018    Anxiety 2012    on meds    Arthritis     Asthma 1960    no inhalers    Back pain 2010    PT    Bone metastases 4/13/2020    Chemotherapy-induced neutropenia 5/21/2018    Dizziness     Essential hypertension 4/11/2019    no meds    Fall 05/2019    fx of lumbar spine, left foot and ankle    Fibromyalgia     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     Fracture of right foot     GERD (gastroesophageal reflux disease) 2015    on meds    Hep B w/o coma 1980    no treatment, hospitalized    Hiatal hernia 2015    on meds    Hyperlipidemia     Major depressive disorder, recurrent episode, in partial remission     Malignant neoplasm of lower lobe of right lung-Adenocarcinoma 5/21/2018    MVP (mitral valve prolapse) 2014    no meds    Myalgia and myositis, unspecified     OCD (obsessive compulsive disorder)     Osteoporosis     Polyneuropathy     SOB (shortness of breath) on exertion 10/2018    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 8/23/2019    Trouble in sleeping     Urinary incontinence        Past Surgical History:   Procedure Laterality Date    BONE BIOPSY N/A 8/5/2019    Procedure: BIOPSY, BONE;  Surgeon: [facility] Provider;  Location: [facility] OR;  Service: General;  Laterality: N/A;    CARPAL TUNNEL RELEASE Bilateral 07/18/2014    EYE SURGERY      RK    FRACTURE SURGERY Right     wrist orif    INSERTION OF TUNNELED CENTRAL VENOUS CATHETER (CVC) WITH SUBCUTANEOUS PORT Left 8/30/2019    Procedure: INSERTION, PORT-A-CATH;  Surgeon: Brant Welch MD;  Location: [facility] OR;  Service: Cardiovascular;  Laterality: Left;    LUNG REMOVAL, PARTIAL  04/27/2018    Dr. Brant Welch    port a cath  06/2018    TUBAL LIGATION         Review of patient's allergies indicates:   Allergen Reactions    Penicillins Rash     Other reaction(s): Rash    Trazodone Anxiety     Severe anxiety         No current facility-administered medications on file prior to encounter.     Current Outpatient Medications on File Prior to Encounter   Medication Sig    famotidine (PEPCID) 40 MG tablet Take 1 tablet (40 mg total) by mouth every evening.    fentaNYL (DURAGESIC) 75 mcg/hr Place 1 patch onto the skin every 72 hours.    fentaNYL (DURAGESIC) 75 mcg/hr Place 1 patch onto the skin every 72 hours. Remove and discard old patch before applying new patch on.    lamoTRIgine (LAMICTAL) 100 MG tablet TAKE 1 TABLET BY MOUTH EVERY MORNING AND 2 TABLETS AT BEDTIME    LORazepam (ATIVAN) 0.5 MG tablet Take 1 tablet (0.5 mg total) by mouth once daily.    megestroL (MEGACE) 400 mg/10 mL (40 mg/mL) Susp Take 10 mLs (400 mg total) by mouth 2 (two) times daily.    memantine (NAMENDA) 10 MG Tab Take 1 tablet (10 mg total) by mouth 2 (two) times daily.    mirtazapine (REMERON) 15 MG tablet Take 1 tablet (15 mg total) by mouth every evening.    morphine (MSIR) 15 MG tablet Take one-half tablet (7.5 mg total) by mouth every 4 (four) hours as needed for Pain.    multivitamin (THERAGRAN) per tablet Take 1 tablet by mouth once daily.    OLANZapine (ZYPREXA) 5 MG tablet TAKE 1 TABLET BY MOUTH EVERY NIGHT    ondansetron (ZOFRAN-ODT) 8 MG TbDL DISSOLVE 1 TABLET(8 MG) ON THE TONGUE EVERY 8 HOURS AS NEEDED    pantoprazole (PROTONIX) 40 MG tablet Take 1 tablet (40 mg total) by mouth 2 (two) times daily.    prochlorperazine (COMPAZINE) 10 MG tablet Take 1 tablet (10 mg total) by mouth every 6 (six) hours as needed (as needed for nausea).    promethazine (PHENERGAN) 25 MG suppository Place 1 suppository (25 mg total) rectally every 6 (six) hours as needed for Nausea.    senna (SENOKOT) 8.6 mg tablet Take 2-4 tablets by mouth 2 (two) times daily.     sertraline (ZOLOFT) 100 MG tablet Take 1 tablet (100 mg total) by mouth every evening.    sucralfate (CARAFATE) 1 gram tablet TAKE 1 TABLET(1 GRAM) BY MOUTH FOUR TIMES DAILY (Patient not taking:  Reported on 2/14/2022)     Family History       Problem Relation (Age of Onset)    Diabetes Sister, Maternal Grandmother    Heart disease Mother, Father (57), Sister, Brother (45)    Parkinsonism Sister          Tobacco Use    Smoking status: Never Smoker    Smokeless tobacco: Never Used   Substance and Sexual Activity    Alcohol use: No    Drug use: No    Sexual activity: Yes     Birth control/protection: None     Review of Systems   Constitutional: Negative.    HENT: Negative.     Eyes: Negative.    Respiratory: Negative.     Cardiovascular: Negative.    Gastrointestinal: Negative.    Endocrine: Negative.    Genitourinary: Negative.    Musculoskeletal: Negative.    Skin: Negative.    Neurological: Negative.    Hematological: Negative.    Psychiatric/Behavioral:  Positive for confusion, dysphoric mood and hallucinations. The patient is nervous/anxious.    Objective:     Vital Signs (Most Recent):  Temp: 98.6 °F (37 °C) (03/23/22 1423)  Pulse: 105 (03/23/22 1423)  Resp: 16 (03/23/22 1423)  BP: (!) 159/76 (03/23/22 1423)  SpO2: 98 % (03/23/22 1423)   Vital Signs (24h Range):  Temp:  [98.6 °F (37 °C)] 98.6 °F (37 °C)  Pulse:  [105] 105  Resp:  [16] 16  SpO2:  [98 %] 98 %  BP: (159)/(76) 159/76     Weight: 61.2 kg (135 lb)  Body mass index is 22.47 kg/m².    Physical Exam  Vitals and nursing note reviewed.   Constitutional:       Appearance: Normal appearance.   HENT:      Head: Normocephalic and atraumatic.      Nose: Nose normal.      Mouth/Throat:      Mouth: Mucous membranes are moist.      Pharynx: Oropharynx is clear.   Eyes:      Extraocular Movements: Extraocular movements intact.      Pupils: Pupils are equal, round, and reactive to light.   Cardiovascular:      Rate and Rhythm: Normal rate and regular rhythm.      Pulses: Normal pulses.   Pulmonary:      Effort: Pulmonary effort is normal.      Breath sounds: Normal breath sounds.   Abdominal:      General: Bowel sounds are normal.      Palpations: Abdomen is  soft.   Musculoskeletal:         General: Normal range of motion.      Cervical back: Normal range of motion and neck supple.   Skin:     General: Skin is warm and dry.      Capillary Refill: Capillary refill takes less than 2 seconds.   Neurological:      Mental Status: She is alert.      Comments: Oriented to person and time   Psychiatric:      Comments: Appears anxious, tremulous         CRANIAL NERVES     CN III, IV, VI   Pupils are equal, round, and reactive to light.     Significant Labs: All pertinent labs within the past 24 hours have been reviewed.  CBC:   Recent Labs   Lab 03/23/22  1445   WBC 4.79   HGB 10.3*   HCT 31.9*        CMP:   Recent Labs   Lab 03/23/22  1445      K 3.9   CL 96   CO2 31*   *   BUN 10   CREATININE 0.7   CALCIUM 9.5   PROT 7.3   ALBUMIN 3.8   BILITOT 0.6   ALKPHOS 113   AST 15   ALT 9*   ANIONGAP 12   EGFRNONAA >60.0     Urine Studies:   Recent Labs   Lab 03/23/22  1456   COLORU Yellow   APPEARANCEUA Hazy*   PHUR 7.0   SPECGRAV 1.025   PROTEINUA Trace*   GLUCUA Negative   KETONESU Negative   BILIRUBINUA Negative   OCCULTUA Negative   NITRITE Negative   UROBILINOGEN Negative   LEUKOCYTESUR 1+*   RBCUA 2   WBCUA 5   BACTERIA Negative   SQUAMEPITHEL 1   HYALINECASTS 1       Significant Imaging: I have reviewed all pertinent imaging results/findings within the past 24 hours.  I have reviewed and interpreted all pertinent imaging results/findings within the past 24 hours.  CT Head W Wo Contrast    Result Date: 3/23/2022  CMS MANDATED QUALITY DATA - CT RADIATION 436 All CT scans at this facility utilize dose modulation, iterative reconstruction, and/or weight based dosing when appropriate to reduce radiation dose to as low as reasonably achievable. CLINICAL HISTORY: 66 years (1955) Female Brain metastases suspected TECHNIQUE: CT HEAD WITHOUT THEN WITH IV CONTRAST. 327 images obtained. Axial CT of the brain were obtained with and without contrast contrast using  soft tissue and bone algorithm. 50 mL of Omni 350 was administered uneventfully by IV. COMPARISON: CT most recently from November 17, 2021 and MRI from October 27, 2021. FINDINGS: No acute intracranial hemorrhage, hydrocephalus, herniation or midline shift and the basal and suprasellar cisterns are patent. No acute skull fracture is identified. Mild periventricular deep cerebral white matter low attenuation, a nonspecific finding in this age group which can be seen in any diffuse white matter process but which is most commonly associated with chronic microvascular ischemic disease. There are scattered atheromatous calcifications in the intracranial internal carotid arteries. Small rounded extra-axial partially calcified structure measuring 6-7 mm along the calvarium of the left frontal lobe (image 34), unchanged from the previous exam, otherwise no focal abnormal contrast enhancement to specifically suggest intracranial metastatic disease. Orbital contents appear within normal limits. External auditory canals are unremarkable. The visualized paranasal sinuses and mastoid air cells are essentially clear. There is rightward nasal septal deviation. IMPRESSION: 1. No acute intracranial process 2. Unchanged 7 mm structure along the left frontal lobe favored to represent a partially calcified meningioma. 3. No focal abnormal contrast enhancement in the intracranial structures to specifically suggest metastatic disease. 4. Findings compatible with mild unchanged chronic small vessel ischemic disease. . Electronically signed by:  Raz Rebollar MD  3/23/2022 5:37 PM CDT Workstation: [id number]    X-Ray Chest AP Portable    Result Date: 3/23/2022  Reason: ams AMS FINDINGS: Portable chest at 1600 compared with 11/4/2021 shows unchanged left subclavian port catheter tip in SVC. Cardiomediastinal silhouette unchanged. Lung volumes have decreased. Blunting of the lateral right costophrenic angle unchanged. Lungs remain clear.  Pulmonary vasculature is normal. Sclerotic foci in thoracic vertebral bodies unchanged since 1/26/2022 PET/CT representing known osseous metastasis. IMPRESSION: Decreased lung volumes, without acute cardiopulmonary abnormality. Electronically signed by:  Alejandro Rm MD  3/23/2022 4:20 PM CDT Workstation: [id number]

## 2022-03-25 ENCOUNTER — TELEPHONE (OUTPATIENT)
Dept: HEMATOLOGY/ONCOLOGY | Facility: CLINIC | Age: 67
End: 2022-03-25
Payer: MEDICARE

## 2022-03-25 ENCOUNTER — TELEPHONE (OUTPATIENT)
Dept: PSYCHIATRY | Facility: CLINIC | Age: 67
End: 2022-03-25
Payer: MEDICARE

## 2022-03-25 ENCOUNTER — TELEPHONE (OUTPATIENT)
Dept: FAMILY MEDICINE | Facility: CLINIC | Age: 67
End: 2022-03-25
Payer: MEDICARE

## 2022-03-25 VITALS
RESPIRATION RATE: 18 BRPM | DIASTOLIC BLOOD PRESSURE: 60 MMHG | TEMPERATURE: 99 F | WEIGHT: 129.63 LBS | SYSTOLIC BLOOD PRESSURE: 119 MMHG | HEIGHT: 65 IN | OXYGEN SATURATION: 98 % | BODY MASS INDEX: 21.6 KG/M2 | HEART RATE: 85 BPM

## 2022-03-25 DIAGNOSIS — C34.31 MALIGNANT NEOPLASM OF LOWER LOBE OF RIGHT LUNG: ICD-10-CM

## 2022-03-25 DIAGNOSIS — G89.3 CANCER RELATED PAIN: Primary | ICD-10-CM

## 2022-03-25 DIAGNOSIS — C79.51 BONE METASTASES: ICD-10-CM

## 2022-03-25 PROBLEM — R41.82 ALTERED MENTAL STATUS: Status: RESOLVED | Noted: 2022-03-23 | Resolved: 2022-03-25

## 2022-03-25 PROCEDURE — G0378 HOSPITAL OBSERVATION PER HR: HCPCS

## 2022-03-25 PROCEDURE — 25000003 PHARM REV CODE 250: Performed by: NURSE PRACTITIONER

## 2022-03-25 RX ORDER — MORPHINE SULFATE 15 MG/1
7.5 TABLET ORAL EVERY 4 HOURS PRN
Qty: 20 TABLET | Refills: 0 | Status: SHIPPED | OUTPATIENT
Start: 2022-03-25 | End: 2022-06-06

## 2022-03-25 RX ORDER — FENTANYL 50 UG/1
1 PATCH TRANSDERMAL
Qty: 5 PATCH | Refills: 0 | Status: SHIPPED | OUTPATIENT
Start: 2022-03-25 | End: 2022-04-05

## 2022-03-25 RX ORDER — LAMOTRIGINE 100 MG/1
200 TABLET ORAL NIGHTLY
Qty: 270 TABLET | Refills: 0
Start: 2022-03-25

## 2022-03-25 RX ADMIN — THERA TABS 1 TABLET: TAB at 09:03

## 2022-03-25 RX ADMIN — SENNOSIDES AND DOCUSATE SODIUM 1 TABLET: 50; 8.6 TABLET ORAL at 09:03

## 2022-03-25 RX ADMIN — PANTOPRAZOLE SODIUM 40 MG: 40 TABLET, DELAYED RELEASE ORAL at 06:03

## 2022-03-25 RX ADMIN — MEMANTINE HYDROCHLORIDE 10 MG: 5 TABLET ORAL at 09:03

## 2022-03-25 NOTE — TELEPHONE ENCOUNTER
----- Message from Daniele Russell sent at 3/25/2022  1:35 PM CDT -----  Contact: Relative/Bonnie  Type:  Sooner Apoointment Request    Caller is requesting a sooner appointment.  Caller declined first available appointment listed below.  Caller will not accept being placed on the waitlist and is requesting a message be sent to doctor.    Name of Caller:  Bonnie/Relative  When is the first available appointment?  4/7  Symptoms:  Hosp follow up/Drug interaction  Best Call Back Number:  [phone]   Additional Information:   Was ordered to see Dr within 1 week. Pt would be ok keeping appt on 4/7, if no openings available.

## 2022-03-25 NOTE — DISCHARGE SUMMARY
"[facility]  Discharge Summary      Patient Name: Sheri Broderick  MRN: [id number]  Patient Class: OP- Observation  Admission Date: 3/23/2022  Hospital Length of Stay: 0 days  Discharge Date and Time:  03/25/2022 4:33 PM  Attending Physician: No att. providers found   Discharging Provider: Aron Johnson MD  Primary Care Provider: Demetrio Pleitez Jr, MD      HPI:   Ms. Broderick is a 66 year old female with a past medical history of hypertension, lung ca with bone mets (not currently on treatment), obsessive-compulsive disorder, major depression who presents today at the recommendation of her psychiatrist for visual and auditory hallucinations. It is severe. It is associated with increased anxiety. She denies fever, chills, N/V/D, chest pain, or SOB. She denies suicidal or homicidal ideation patient does agree that she is having hallucinations. She has tried to leave multiple times and was placed under a PEC in the ED. She tells me she is claustrophobic in the ED room and that is why she is trying to leave. She is visibly shaking her legs and tells me it's d/t the claustrophobia. She is oriented to year and person. Somewhat to situation (states her psychiatrist sent her) and when asked location she states "a hospital" when asked what city, she states "you don't want me to say [place]." She is currently using a fentanyl patch and oral MS contin for pain control. ER MD removed fentanyl patch for concern this is contributing to hallucinations. She had leukocytes in her urine and was started on rocephin for possible UTI. She is also slightly macrocytic. She is also on remeron, lamictal, and zyprexa. She states she has home health that helps her organize her medications. She denies forgetting to take her meds or accidentally taking too many meds.  There was initial concern for possible brain Mets, she has a CT head with contrast in the ED with no acute abnormality.  She states she " cannot tolerate an MRI due to her claustrophobia.      * No surgery found *      Hospital Course:   66 year old female with a past medical history of hypertension, lung ca with bone mets (not currently on treatment), obsessive-compulsive disorder, major depression who presents today at the recommendation of her psychiatrist for visual and auditory hallucinations.  She was petitioned emergency room.  Very next day patient improved remarkably, she had no auditory or visual hallucination, alert, oriented.  I discontinued her psych hold and watched her overnight.  Today on my assessment patient is very pleasant, alert, oriented, not suicidal, not homicidal, has no hallucinations.  He was discharged home in hemodynamically stable condition.  Her psychiatric was kind enough to confirm her discharge medication regimen.  She was advised to cut her fentanyl patch in half until seen by provider.  After she went home I also spoke with her niece who was preparing her pill box.     Instructions provided to follow up with primary care physician as outpatient. Patient verbalized understanding and is aware to contact primary care physician or return to ED if new or worsening symptoms.    Physical exam on the day of discharge:  General: Patient resting comfortably in no acute distress.  Lungs: CTA. Good air entry.  CVS: Regular rate and rhythm. No murmurs. No pedal edema.  Abd: Soft. Nontender. Non-distended.  Neuro: A&O x3. Moving all 4 extremities equally  Ext: No clubbing. No cyanosis.      Vital signs reviewed.  Nursing notes reviewed       Goals of Care Treatment Preferences:  Code Status: Full Code      Consults:   Consults (From admission, onward)        Status Ordering Provider     IP consult to Telemedicine - Psych  Once        Provider:  Arturo George MD    Completed LUCY MENDOZA            Final Active Diagnoses:    Diagnosis Date Noted POA    Macrocytosis [D75.89] 03/23/2022 Yes    Cancer associated pain [G89.3]  10/16/2019 Yes    Essential hypertension [I10] 04/11/2019 Yes    Malignant neoplasm of lower lobe of right lung-Adenocarcinoma [C34.31] 05/21/2018 Yes     Chronic      Problems Resolved During this Admission:    Diagnosis Date Noted Date Resolved POA    PRINCIPAL PROBLEM:  Altered mental status [R41.82] 03/23/2022 03/25/2022 Yes       Discharged Condition: good    Disposition: Home-Health Care [facility]    Follow Up:   Follow-up Information     Demetrio Pleitez Jr, MD Follow up in 1 week(s).    Specialty: Family Medicine  Contact information:  [address]  [address]  [place] [address]  [phone]             Beulah Hawley MD Follow up in 1 week(s).    Specialties: Psychiatry, Addiction Medicine, Behavioral Health, Inpatient Substance Abuse Program  Contact information:  [address]  [address]  [place] [address]  [phone]                       Patient Instructions:      Ambulatory referral/consult to Home Health   Standing Status: Future   Referral Priority: Routine Referral Type: Home Health Care   Referral Reason: Specialty Services Required   Requested Specialty: Home Health Services   Number of Visits Requested: 1     Diet Adult Regular     Notify your health care provider if you experience any of the following:  increased confusion or weakness     Activity as tolerated       Significant Diagnostic Studies: Labs: All labs within the past 24 hours have been reviewed    Pending Diagnostic Studies:     Procedure Component Value Units Date/Time    Lamotrigine level [[id number]] Collected: 03/23/22 1455    Order Status: Sent Lab Status: In process Updated: 03/23/22 1546    Specimen: Blood     Vitamin B1 [[id number]] Collected: 03/23/22 1909    Order Status: Sent Lab Status: In process Updated: 03/23/22 9445    Specimen: Blood          Medications:  Reconciled Home Medications:      Medication List      CHANGE how you take these medications    lamoTRIgine 100 MG tablet  Commonly known as: LAMICTAL  Take  2 tablets (200 mg total) by mouth every evening.  What changed:   · how much to take  · how to take this  · when to take this  · additional instructions        CONTINUE taking these medications    famotidine 40 MG tablet  Commonly known as: PEPCID  Take 1 tablet (40 mg total) by mouth every evening.     megestroL 400 mg/10 mL (40 mg/mL) Susp  Commonly known as: MEGACE  Take 10 mLs (400 mg total) by mouth 2 (two) times daily.     memantine 10 MG Tab  Commonly known as: NAMENDA  Take 1 tablet (10 mg total) by mouth 2 (two) times daily.     multivitamin per tablet  Commonly known as: THERAGRAN  Take 1 tablet by mouth once daily.     OLANZapine 5 MG tablet  Commonly known as: ZyPREXA  TAKE 1 TABLET BY MOUTH EVERY NIGHT     ondansetron 8 MG Tbdl  Commonly known as: ZOFRAN-ODT  DISSOLVE 1 TABLET(8 MG) ON THE TONGUE EVERY 8 HOURS AS NEEDED     pantoprazole 40 MG tablet  Commonly known as: PROTONIX  Take 1 tablet (40 mg total) by mouth 2 (two) times daily.     prochlorperazine 10 MG tablet  Commonly known as: COMPAZINE  Take 1 tablet (10 mg total) by mouth every 6 (six) hours as needed (as needed for nausea).     promethazine 25 MG suppository  Commonly known as: PHENERGAN  Place 1 suppository (25 mg total) rectally every 6 (six) hours as needed for Nausea.     senna 8.6 mg tablet  Commonly known as: SENOKOT  Take 2-4 tablets by mouth 2 (two) times daily.     sertraline 100 MG tablet  Commonly known as: ZOLOFT  Take 1 tablet (100 mg total) by mouth every evening.        STOP taking these medications    fentaNYL 75 mcg/hr  Commonly known as: DURAGESIC     LORazepam 0.5 MG tablet  Commonly known as: ATIVAN     mirtazapine 15 MG tablet  Commonly known as: REMERON     sucralfate 1 gram tablet  Commonly known as: CARAFATE            Indwelling Lines/Drains at time of discharge:   Lines/Drains/Airways     Central Venous Catheter Line  Duration                Port A Cath Single Lumen left atrial -- days                Time spent  on the discharge of patient: 35 minutes         Aron Johnson MD  Department of Hospital Medicine  [facility]

## 2022-03-25 NOTE — TELEPHONE ENCOUNTER
Please disregard below date/time.   My only current available appt next week is 3/29 at 8:00 am. Please offer to patient.

## 2022-03-25 NOTE — HOSPITAL COURSE
66 year old female with a past medical history of hypertension, lung ca with bone mets (not currently on treatment), obsessive-compulsive disorder, major depression who presents today at the recommendation of her psychiatrist for visual and auditory hallucinations.  She was petitioned emergency room.  Very next day patient improved remarkably, she had no auditory or visual hallucination, alert, oriented.  I discontinued her psych hold and watched her overnight.  Today on my assessment patient is very pleasant, alert, oriented, not suicidal, not homicidal, has no hallucinations.  He was discharged home in hemodynamically stable condition.  Her psychiatric was kind enough to confirm her discharge medication regimen.  She was advised to cut her fentanyl patch in half until seen by provider.  After she went home I also spoke with her niece who was preparing her pill box.     Instructions provided to follow up with primary care physician as outpatient. Patient verbalized understanding and is aware to contact primary care physician or return to ED if new or worsening symptoms.    Physical exam on the day of discharge:  General: Patient resting comfortably in no acute distress.  Lungs: CTA. Good air entry.  CVS: Regular rate and rhythm. No murmurs. No pedal edema.  Abd: Soft. Nontender. Non-distended.  Neuro: A&O x3. Moving all 4 extremities equally  Ext: No clubbing. No cyanosis.      Vital signs reviewed.  Nursing notes reviewed

## 2022-03-25 NOTE — TELEPHONE ENCOUNTER
Pt called. She has been discharged from hospital and was advised to see you for an appointment asap. All medications have been changed. Pt has a virtual visit scheduled with you on 4/27. Are you able to see pt sooner? She is also requesting that you call her. [phone]

## 2022-03-25 NOTE — PLAN OF CARE
03/25/22 1450   Final Note   Assessment Type Final Discharge Note   Anticipated Discharge Disposition Home-Health   What phone number can be called within the next 1-3 days to see how you are doing after discharge? [phone]   Post-Acute Status   Post-Acute Authorization Home [facility]   Home Health Status Set-up Complete/Auth obtained   Coverage U[employer]   Discharge Delays None known at this time     Orders and chart reviewed. Patient previously received services from [facility] home health. JONNY contacted [facility] and asked if resumption of service orders were needed, JONNY was informed since patient was in observation status, a new referral/order was not needed. Start of service date will be 3/26/22. No further case management needs identified.

## 2022-03-25 NOTE — TELEPHONE ENCOUNTER
Spoke with the patient and she was discharged from the hospital She stated they told her to cut her Fentanyl patches in half. Notified the patient she cannot cut the patches in half. Will give her a new script for Fentanyl 50mcg and short term fill for the Morphine until she sees Dr. Zhang next week.

## 2022-03-28 LAB
BACTERIA BLD CULT: NORMAL
BACTERIA BLD CULT: NORMAL
LAMOTRIGINE SERPL-MCNC: 7.5 UG/ML (ref 2–20)

## 2022-03-29 ENCOUNTER — INFUSION (OUTPATIENT)
Dept: INFUSION THERAPY | Facility: HOSPITAL | Age: 67
End: 2022-03-29
Attending: INTERNAL MEDICINE
Payer: MEDICARE

## 2022-03-29 ENCOUNTER — OFFICE VISIT (OUTPATIENT)
Dept: PSYCHIATRY | Facility: CLINIC | Age: 67
End: 2022-03-29
Payer: MEDICARE

## 2022-03-29 VITALS
DIASTOLIC BLOOD PRESSURE: 63 MMHG | SYSTOLIC BLOOD PRESSURE: 121 MMHG | HEIGHT: 65 IN | WEIGHT: 128.5 LBS | HEART RATE: 106 BPM | BODY MASS INDEX: 21.41 KG/M2

## 2022-03-29 VITALS
RESPIRATION RATE: 16 BRPM | DIASTOLIC BLOOD PRESSURE: 90 MMHG | WEIGHT: 128.38 LBS | BODY MASS INDEX: 21.39 KG/M2 | SYSTOLIC BLOOD PRESSURE: 161 MMHG | HEIGHT: 65 IN | TEMPERATURE: 98 F | OXYGEN SATURATION: 99 % | HEART RATE: 91 BPM

## 2022-03-29 DIAGNOSIS — C34.31 SQUAMOUS CELL CARCINOMA OF BRONCHUS IN RIGHT LOWER LOBE: ICD-10-CM

## 2022-03-29 DIAGNOSIS — F41.1 GENERALIZED ANXIETY DISORDER: ICD-10-CM

## 2022-03-29 DIAGNOSIS — R52 SEVERE PAIN: ICD-10-CM

## 2022-03-29 DIAGNOSIS — F41.0 PANIC DISORDER WITHOUT AGORAPHOBIA: ICD-10-CM

## 2022-03-29 DIAGNOSIS — C79.51 BONE METASTASES: Primary | ICD-10-CM

## 2022-03-29 DIAGNOSIS — F33.1 MDD (MAJOR DEPRESSIVE DISORDER), RECURRENT EPISODE, MODERATE: ICD-10-CM

## 2022-03-29 DIAGNOSIS — E86.0 DEHYDRATION: ICD-10-CM

## 2022-03-29 DIAGNOSIS — C34.91 ADENOCARCINOMA, LUNG, RIGHT: ICD-10-CM

## 2022-03-29 PROCEDURE — 99215 OFFICE O/P EST HI 40 MIN: CPT | Mod: S$PBB,,, | Performed by: PSYCHIATRY & NEUROLOGY

## 2022-03-29 PROCEDURE — A4216 STERILE WATER/SALINE, 10 ML: HCPCS | Performed by: INTERNAL MEDICINE

## 2022-03-29 PROCEDURE — 63600175 PHARM REV CODE 636 W HCPCS: Performed by: INTERNAL MEDICINE

## 2022-03-29 PROCEDURE — S5010 5% DEXTROSE AND 0.45% SALINE: HCPCS | Performed by: INTERNAL MEDICINE

## 2022-03-29 PROCEDURE — 99999 PR PBB SHADOW E&M-EST. PATIENT-LVL III: ICD-10-PCS | Mod: PBBFAC,,, | Performed by: PSYCHIATRY & NEUROLOGY

## 2022-03-29 PROCEDURE — 99215 PR OFFICE/OUTPT VISIT, EST, LEVL V, 40-54 MIN: ICD-10-PCS | Mod: S$PBB,,, | Performed by: PSYCHIATRY & NEUROLOGY

## 2022-03-29 PROCEDURE — 99213 OFFICE O/P EST LOW 20 MIN: CPT | Mod: PBBFAC,PN | Performed by: PSYCHIATRY & NEUROLOGY

## 2022-03-29 PROCEDURE — 96361 HYDRATE IV INFUSION ADD-ON: CPT

## 2022-03-29 PROCEDURE — 25000003 PHARM REV CODE 250: Performed by: INTERNAL MEDICINE

## 2022-03-29 PROCEDURE — 99999 PR PBB SHADOW E&M-EST. PATIENT-LVL III: CPT | Mod: PBBFAC,,, | Performed by: PSYCHIATRY & NEUROLOGY

## 2022-03-29 PROCEDURE — 96360 HYDRATION IV INFUSION INIT: CPT

## 2022-03-29 RX ORDER — HEPARIN 100 UNIT/ML
500 SYRINGE INTRAVENOUS
Status: CANCELLED | OUTPATIENT
Start: 2022-03-29

## 2022-03-29 RX ORDER — LORAZEPAM 0.5 MG/1
0.5 TABLET ORAL
COMMUNITY
Start: 2021-11-24 | End: 2022-04-05 | Stop reason: ALTCHOICE

## 2022-03-29 RX ORDER — SODIUM CHLORIDE 0.9 % (FLUSH) 0.9 %
10 SYRINGE (ML) INJECTION
Status: DISCONTINUED | OUTPATIENT
Start: 2022-03-29 | End: 2022-03-29 | Stop reason: HOSPADM

## 2022-03-29 RX ORDER — HEPARIN 100 UNIT/ML
500 SYRINGE INTRAVENOUS
Status: DISCONTINUED | OUTPATIENT
Start: 2022-03-29 | End: 2022-03-29 | Stop reason: HOSPADM

## 2022-03-29 RX ORDER — SODIUM CHLORIDE 0.9 % (FLUSH) 0.9 %
10 SYRINGE (ML) INJECTION
Status: CANCELLED | OUTPATIENT
Start: 2022-03-29

## 2022-03-29 RX ADMIN — Medication 500 UNITS: at 11:03

## 2022-03-29 RX ADMIN — SODIUM CHLORIDE, PRESERVATIVE FREE 10 ML: 5 INJECTION INTRAVENOUS at 11:03

## 2022-03-29 RX ADMIN — DEXTROSE AND SODIUM CHLORIDE 1000 ML: 5; .45 INJECTION, SOLUTION INTRAVENOUS at 10:03

## 2022-03-29 NOTE — PLAN OF CARE
Problem: Fatigue  Goal: Improved Activity Tolerance  Outcome: Ongoing, Progressing  Intervention: Promote Improved Energy  Flowsheets (Taken 3/29/2022 1008)  Fatigue Management: frequent rest breaks encouraged  Sleep/Rest Enhancement:   regular sleep/rest pattern promoted   relaxation techniques promoted  Activity Management: Ambulated -L4

## 2022-03-29 NOTE — PROGRESS NOTES
Outpatient Psychiatry Follow-Up Visit (MD/NP)  3/29/2022    Clinical Status of Patient:  Outpatient (Ambulatory)  Length of visit 45 minutes.     Chief Complaint:  Sheri Broderick is a 66 y.o. female who presents today for follow-up of anxiety, depression.   Met with patient and her psychologist, Dr. Kaye Jennings.   Interval History and Content of Current Session:      Interim Events/Subjective Report/Content of Current Session:      67 yo  female presents for follow up appointment for treatment of major depression, generalized anxiety disorder, panic disorder. Pt was recently hospitalized for altered mental status, suspected delirium.     Past Psychiatric Hx:   No prior psychiatric hospitalizations   Suicide risk assessment:   Risks: , age, chronic depression with passive SI  Protective: no prior suicide attempts, strong skip, female, no substance abuse or impulsivity, health issues present both not disabling at this time  She is at low to moderate risk of suicide acutely, and moderate risk over long term     Prior meds: Abilify (no benefit), Buspirone (intolerance) Trazodone (intolerance), Paroxetine (intolerance), Quetiapine, Bupropion (intolerance), Fluoxetine (no benefit), Duloxetine Foltx (no longer covered)     Past Medical Hx:  Adenocarcinoma of the lung, metastatic (bone and brain).  7/15/2020 Completed whole brain XRT for Brain mets.   Osteoporosis   Neuropraxia  HLD  DDD   Fibromyalgia  HTN  Anemia     Interim Hx:   Pt presents for follow up following admission to observation on 3/23 for altered mental status, confusion, active auditory and visual hallucinations which had been worsening in frequency/duration.  Pt was very confused during visit with Dr. Holbrook - believed that she saw me in the home.  Care was coordinated with Dr. Holbrook and my staff called 911. Pt was evaluated by telepsych in ED.  I coordinated with Dr. Alejandro (psych provider) - we were in agreement that pt  did not require psych inpatient tx, but needed further observation on hospital medicine due to abrupt change in mentation.  UA 1+ Leukocytes, pt was treated with Rocephin x 1.  Psych medications were adjusted: Mirtazapine d/c, Sertraline,Olanzapine, and Lamotrigine doses reduced.   Pt had been travelling to [facility] for [facility].  Last visit was March 11th, last treatment February.    Pt has had several MRIs of the spine and PET scan which appear to show disease progression.  She hopes to schedule virtual visit with MD Moffett next week and is considering additional radiation therapy.   Fentanyl dose was lowered from 75 to 50 mcg in hospital - she is continuing to have significant pain.    CT head inpatient: 03/23:  1. No acute intracranial process  2. Unchanged 7 mm structure along the left frontal lobe favored to represent a partially calcified meningioma.  3. No focal abnormal contrast enhancement in the intracranial structures to specifically suggest metastatic disease.  4. Findings compatible with mild unchanged chronic small vessel ischemic disease.    Pt does have support from soraida Carl who lives in [place].  She is struggling to manage living alone, but cannot afford to live in an assisted living facility.  The patient would be open to having some help in home - she is concerned that [facility] agency is too costly. Will coordinate with RUDDY Andrew with [facility].  Pt is struggling with eating - she is hungry but has difficulty tolerating food.  She chews and spits food out.  She used to work with a nutritionist, but has not done so recently.  She is struggling to know what to do next with cancer tx - not sure if she wants to proceed with XRT.  She does have home health with PT/OT.  Transportation is also an issue.  Dr. Jennings is supporting patient more with medical issues - she herself will be having surgery and she has supported the patient's treatment with Dr. Holbrook.    She feels she is doing ok.  Mood is up/down.   Denies symptoms of brad.  Discusses that being on psychiatric hold in ED was stressful, traumatic. She denies further AH/VH.  She denies delusions, paranoia.  Struggles to accept what she cannot do for herself.  Puts a lot of pressure on self.  Likes to be in control.  House is spotless per Dr. Jennings - she feels her concerns about her home not being in order speak to her OCD tendencies.  Sleep is fair (limited by pain).   Mago Carl will be assisting with med mgmt.  Has medic alert button.   Denies suicidal/homicidal ideations. No self harm or violence.     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    Wt Readings from Last 3 Encounters:   03/29/22 0757 58.3 kg (128 lb 8.5 oz)   03/23/22 2234 58.8 kg (129 lb 10.1 oz)   03/23/22 1423 61.2 kg (135 lb)   03/18/22 0958 60 kg (132 lb 4.8 oz)       PHQ9 3/29/2022   Little interest or pleasure in doing things: Several days   Feeling down, depressed or hopeless: Several days   Trouble falling asleep, staying asleep, or sleeping too much: Not at all   Feeling tired or having little energy: Several days   Poor appetite or overeating: More than half the days   Feeling bad about yourself- or that you are a failure or have let yourself or family down Several days   Trouble concentrating on things, such as reading the newspaper or watching television: Several days   Moving or speaking so slowly that other people could have noticed. Or the opposite- being so fidgety or restless that you have been moving around a lot more than usual: Several days   Thoughts that you would be better off dead or hurting yourself in some way: Not at all   If you indicated you have experienced any of the aforementioned problems, how difficult have these problems made it for you to do your work, take care of things at home or get along with other people? Somewhat difficult   Total Score 8     GAD7 3/29/2022  2/14/2022 12/20/2021   1. Feeling nervous, anxious, or on edge? 1 3 2   2. Not being able to stop or control worrying? 1 3 1   3. Worrying too much about different things? 1 3 1   4. Trouble relaxing? 1 3 1   5. Being so restless that it is hard to sit still? 1 3 1   6. Becoming easily annoyed or irritable? 1 3 1   7. Feeling afraid as if something awful might happen? 1 3 1   8. If you checked off any problems, how difficult have these problems made it for you to do your work, take care of things at home, or get along with other people? 1 3 1   ANISH-7 Score 7 21 8     Denies alcohol/drug use. No tobacco. No recent caffeine use.      Review of Systems   · PSYCHIATRIC: Pertinant items are noted in the narrative.  · CONSTITUTIONAL:  No fever, wt loss    · NEURO: memory impairment, last fall in November.   · M/S: 8/10 generalized pain     Past Medical, Family and Social History: The patient's past medical, family and social history have been reviewed and updated as appropriate within the electronic medical record - see encounter notes.    Psychiatric Medications:   Sertraline 100 mg daily   Lorazepam 0.5 mg daily prn anxiety -has not taken recently, including prior to admission   Olanzapine 5 mg nightly   Lamictal 200 mg in pm    Memantine 10 mg BID   Fentanyl 50 mcg daily (dose reduced)   Morphine IR 7.5 mg TID     Compliance: yes, doses adjusted in hospital.     Side effects: Fatigue, pt is a fall risk     Risk Parameters:  Patient reports no suicidal ideations   Patient reports no homicidal ideation  Patient reports no self-injurious behavior  Patient reports no violent behavior      Exam (detailed: at least 9 elements; comprehensive: all 15 elements)   Constitutional  Vitals:  Most recent vital signs, dated more than 90 days prior to this appointment, were reviewed.    Se Epic for today's vitals.       General:  age appropriate, casual attire, adequately groomed, good eye contact, thin         Musculoskeletal  Muscle  "Strength/Tone:  No tremor noted today    Gait & Station:  Slow, steady     Psychiatric  Speech:  no latency; no press , spontaneous, talkative, soft spoken    Mood & Affect:  "ok"   Blunted     Thought Process:  Linear with questions   Associations:  intact   Thought Content:  no active or passive SI today, no homicidality, delusions, or paranoia, hallucinations: (auditory: no, visual: no)       Insight:  Intact    Judgement: Adequate to circumstances   Orientation:  grossly intact. Alert and oriented x 4    Memory: Some ST memory issues noted, able to provide recent and remote hx.    Language: grossly intact   Attention Span & Concentration:  able to focus   Fund of Knowledge:  intact and appropriate to age and level of education     Assessment and Diagnosis   Status/Progress: Based on the examination today, the patient's problem(s) is/are under inadequate control, has been treatment resistant in past. She has been enrolled in clinical trial for treatment of metastatic adenocarcinoma of lung. Comorbidities are complicating management of the primary condition.    Pt presents today s/p recent hospital admission.     General Impression :   Patient had moved to [place] with friends to undergo cancer treatment for metastatic lung cancer at [facility]; There were some conflicts in relationship and she moved back to living alone in [place].  She does have some support from niece and her , Dr. Jennings who accompanied her today.  She has been traveling to [place] to participate in clinical trial.  Limited social support.  Pt had significant fall last fall.  She is considering pausing treatment. Plans to ask for virtual visit with [facility] Oncologist next week. The patient presents for follow up today following hospital admission for altered mental status, recent AH/VH.  Psych med doses have been reduced.     Major Depressive Disorder, Recurrent, moderate  Panic Disorder without Agoraphobia   Generalized Anxiety " Disorder    PTSD  Hx OCD  Personality Disorder, unspecified, Cluster B and C traits   Recent AMS - now resolved.   Hepatis B, tremors,  osteoporosis, myalgias, arthralgia, poor balance, back pain, bulging disc, fibromyalgia, osteoarthritis, neuropathy, LE fracture, metastatic adenocarcinoma of Lung    GAF: 52   Intervention/Counseling/Treatment Plan   · Medication Management: The risks and benefits of medication were discussed with the patient.  · Counseling provided with patient as follows: importance of compliance with chosen treatment options was emphasized, risks and benefits of treatment options, including medications, were discussed with the patient     1. Continue Sertraline 100 mg daily   2. REMAIN OFF of Remeron 15 mg QHS   3. Continue Memantine 10 mg BID - discussed dose reduction, pt declined today   4. Continue Lamictal 200 mg nightly.  Discussed risks of life threatening SJS, need for compliance.   5. Continue psychotherapy with Dr. Holbrook; pt no longer seeing Dr. Jennings for psychotherapy but provider does provide her with social support.   6. Continue Olanzapine 5 mg nightly. Discussed risks of tardive dyskinesia, drug induced parkinsonism, metabolic side effects, including wt gain, neuroleptic malignant syndrome   7.  Pt instructed to go to ED/911 with thoughts of wanting harm self/others  8. Call to report any worsening of symptoms or problems with the medication.   9. Continue Home Health.   10.  Discussed possible transition to assisted living facility. Pt states she cannot afford but would like assistance with finding pt care attendant for home.   11. Pt has been referred to Case Mgmt previously. I will coordinate with Meghana Mae, [facility].   12. lorazepam has been d/c   13. Follow up with Dr. Zhang, Dr. Carrillo ([facility]).         Return to Clinic: 04/27/2022    .

## 2022-03-31 ENCOUNTER — OFFICE VISIT (OUTPATIENT)
Dept: HEMATOLOGY/ONCOLOGY | Facility: CLINIC | Age: 67
End: 2022-03-31
Payer: MEDICARE

## 2022-03-31 ENCOUNTER — SOCIAL WORK (OUTPATIENT)
Dept: HEMATOLOGY/ONCOLOGY | Facility: CLINIC | Age: 67
End: 2022-03-31

## 2022-03-31 VITALS
RESPIRATION RATE: 18 BRPM | BODY MASS INDEX: 21.37 KG/M2 | HEIGHT: 65 IN | SYSTOLIC BLOOD PRESSURE: 125 MMHG | HEART RATE: 95 BPM | TEMPERATURE: 98 F | DIASTOLIC BLOOD PRESSURE: 78 MMHG

## 2022-03-31 DIAGNOSIS — G89.3 CANCER ASSOCIATED PAIN: ICD-10-CM

## 2022-03-31 DIAGNOSIS — C34.31 MALIGNANT NEOPLASM OF LOWER LOBE OF RIGHT LUNG: ICD-10-CM

## 2022-03-31 PROCEDURE — 99214 PR OFFICE/OUTPT VISIT, EST, LEVL IV, 30-39 MIN: ICD-10-PCS | Mod: S$GLB,,, | Performed by: INTERNAL MEDICINE

## 2022-03-31 PROCEDURE — 99214 OFFICE O/P EST MOD 30 MIN: CPT | Mod: S$GLB,,, | Performed by: INTERNAL MEDICINE

## 2022-03-31 NOTE — ASSESSMENT & PLAN NOTE
Patient has several MRIs of the spine and a PET scan all of which appear to show progression of disease.  She has a phone visit next week at [facility] and will see what their recommendation is.  I feel she could go back on more traditional chemotherapy such as Taxotere/Silvio and discussed this today.      They are also evaluating for radiation therapy at [facility] and will see what they say on this approach.      I will have her back with me in four weeks but patient will call with result of MD Zuhair phone visit.

## 2022-03-31 NOTE — ASSESSMENT & PLAN NOTE
Patient continues to have pain.  Her fentnyl was lowered from 75 to 50 in the hospital and she feels she is having significant pain.  I feel she could try the 75mcg patch again. Will watch closely for mood changes.

## 2022-03-31 NOTE — PROGRESS NOTES
PROGRESS NOTE    Subjective:       Patient ID: Sheri Broderick is a 66 y.o. female.    S/P RLL Lobectomy: 4/27/2018  2.4cm adenocarcinoma  1/4 LNs pos for cancer(station 10)  Completed Cis/Alimta x 4 8/10/2018    XRT to L2-L4 and left hip 9/18/2019    Recurrence biopsy proven L3 8/7/2019  L3 lesion biopsy 8/7/2019:  Adenocarcinoma c/w lung primary  PD-L1: 9%  ALK neg  Ros-1 Neg  EGFR Neg    Nivolumab therapy x 6  10/22/2019-1/9/2020--progression    Radiation:  T5-T8--due to pain  Also XRT to C1, T1/2 and T11/12 at [facility]    Liquid Bx done at [facility]  Post: ERBB2 mutation    7/15/2020  Completed whole brain XRT for Brain mets    8/6/2020:  Began Enhertu(Tras-deruxtecan)  Monthly x as of 3/2021  Cycle 7: 4/28/2021(in [place])  Cycle 8: 5/19/2021  Cycle 9: 6/16/2021-due    9/2/2021:  PET: no pulmonary nodules  Increase size and uptake of T3, T4 and L2 vertebral bodies.   Partial note from MD Zuhair 9/8/2021:  Today's restaging scans show continued excellent response to Enhertu. We will therefore proceed with her next cycle of Enhertu today as scheduled pending today's labs. She will return for follow up in 4 weeks.    1/26/2022:  PET:(compared to 10/20/2021)  New 7mm right hilar node.   Progression of activity throughout the axial skeleton    2/7/2022:  MRI C/T/L spine:  L1 vertebral body met lesion increased in size.     Chief Complaint:  No chief complaint on file.  Lung cancer follow up.     History of Present Illness:   Sheri Broderick is a 66 y.o. female who presents for follow up of above.     Patient had MRI of the L-spine done which showed ? Progression.  Continuing TDXD until confirmation scans done.      Patient's last tras/derux was 1/12/2022.     1/12/2022  MRI L-Spine:   Interval growth of L1 metastases with development of a new L2 metastases indicative of progression      11/4/2021  PET at [facility]:  No change in diffuse  sclerotic bone metastases on CT. The majority of the bone lesions are not FDG avid. Of the FDG avid bone lesions, a few lesions in the thoracic and lumbar spine show increase in FDG uptake. FDG avid left adrenal metastasis is unchanged. No new disease.      Patient had a fall on 11/4/2021 and fractured her zygomatic arch and a subarachnoid bleed.  New CT scan done 11/17 showed improvement.  She is on clinidamycin at this time.      PMH.    Patient has been started on tras/derux and has been doing well on this.  She has had a very good clinical response and is tolerating this fairly well.  She does get significant dehydration and is getting fluids twice weekly to assist.      She states she is feeling pretty good at this time.      She moved to [place] with a life-long friend and stayed out there until now.  She came back home to [place] to take care of her home and she felt it was just time to be back home.     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  She is off anticoagulation as of todays visit, 4/6/2021.       Enhertu Monitoring Parameters   Human epidermal growth factor receptor 2 status. CBC (prior to treatment initiation, prior to each dose, and as clinically indicated). Assess left ventricular ejection fraction prior to fam-trastuzumab deruxtecan initiation and at regular intervals during treatment as clinically indicated. Evaluate pregnancy status prior to therapy (in females of reproductive potential). Monitor for signs/symptoms of interstitial lung disease/pneumonitis (cough, dyspnea, fever, and/or any new or worsening respiratory symptoms; radiographic imaging for suspected ILD) and for infusion reactions.  The American Society of Clinical Oncology hepatitis B virus (HBV) screening and management provisional clinical opinion (ASCO [[place] 2020]) recommends HBV screening with hepatitis B surface antigen, hepatitis B core antibody, total Ig or IgG, and antibody to hepatitis B surface  antigen prior to beginning (or at the beginning of) systemic anticancer therapy; do not delay treatment for screening/results. Detection of chronic or past HBV infection requires a risk assessment to determine antiviral prophylaxis requirements, monitoring, and follow up.    Family and Social history reviewed and is unchanged from last visit.       ROS:  Review of Systems   Constitutional: Negative for fever.   Respiratory: Negative for shortness of breath.    Cardiovascular: Negative for chest pain and leg swelling.   Gastrointestinal: Negative for abdominal pain and blood in stool.   Genitourinary: Negative for hematuria.   Skin: Negative for rash.          Current Outpatient Medications:     famotidine (PEPCID) 40 MG tablet, Take 1 tablet (40 mg total) by mouth every evening., Disp: 30 tablet, Rfl: 11    fentaNYL (DURAGESIC) 50 mcg/hr, Place 1 patch onto the skin every 72 hours., Disp: 5 patch, Rfl: 0    lamoTRIgine (LAMICTAL) 100 MG tablet, Take 2 tablets (200 mg total) by mouth every evening., Disp: 270 tablet, Rfl: 0    LORazepam (ATIVAN) 0.5 MG tablet, Take 0.5 mg by mouth., Disp: , Rfl:     memantine (NAMENDA) 10 MG Tab, Take 1 tablet (10 mg total) by mouth 2 (two) times daily., Disp: 180 tablet, Rfl: 0    morphine (MSIR) 15 MG tablet, Take one-half tablet (7.5 mg total) by mouth every 4 (four) hours as needed for Pain., Disp: 20 tablet, Rfl: 0    multivitamin (THERAGRAN) per tablet, Take 1 tablet by mouth once daily., Disp: , Rfl:     OLANZapine (ZYPREXA) 5 MG tablet, TAKE 1 TABLET BY MOUTH EVERY NIGHT, Disp: 90 tablet, Rfl: 0    ondansetron (ZOFRAN-ODT) 8 MG TbDL, DISSOLVE 1 TABLET(8 MG) ON THE TONGUE EVERY 8 HOURS AS NEEDED, Disp: 30 tablet, Rfl: 5    pantoprazole (PROTONIX) 40 MG tablet, Take 1 tablet (40 mg total) by mouth 2 (two) times daily., Disp: 180 tablet, Rfl: 3    prochlorperazine (COMPAZINE) 10 MG tablet, Take 1 tablet (10 mg total) by mouth every 6 (six) hours as needed (as needed  "for nausea)., Disp: 30 tablet, Rfl: 3    promethazine (PHENERGAN) 25 MG suppository, Place 1 suppository (25 mg total) rectally every 6 (six) hours as needed for Nausea., Disp: 10 suppository, Rfl: 0    senna (SENOKOT) 8.6 mg tablet, Take 2-4 tablets by mouth 2 (two) times daily. , Disp: , Rfl:     sertraline (ZOLOFT) 100 MG tablet, Take 1 tablet (100 mg total) by mouth every evening., Disp: , Rfl:     megestroL (MEGACE) 400 mg/10 mL (40 mg/mL) Susp, Take 10 mLs (400 mg total) by mouth 2 (two) times daily. (Patient not taking: No sig reported), Disp: 600 mL, Rfl: 11        Objective:       Physical Examination:     /78   Pulse 95   Temp 98.3 °F (36.8 °C)   Resp 18   Ht 5' 5" (1.651 m)   BMI 21.37 kg/m²     Physical Exam  Constitutional:       Appearance: She is well-developed.   HENT:      Head: Normocephalic and atraumatic.      Right Ear: External ear normal.      Left Ear: External ear normal.   Eyes:      Conjunctiva/sclera: Conjunctivae normal.      Pupils: Pupils are equal, round, and reactive to light.   Neck:      Thyroid: No thyromegaly.      Trachea: No tracheal deviation.   Cardiovascular:      Rate and Rhythm: Normal rate and regular rhythm.      Heart sounds: Normal heart sounds.   Pulmonary:      Effort: Pulmonary effort is normal.      Breath sounds: Normal breath sounds.   Abdominal:      General: Bowel sounds are normal. There is no distension.      Palpations: Abdomen is soft. There is no mass.      Tenderness: There is no abdominal tenderness.   Skin:     Findings: No rash.   Neurological:      Comments: Neuro intact througout   Psychiatric:         Behavior: Behavior normal.         Thought Content: Thought content normal.         Judgment: Judgment normal.         Labs:   Recent Results (from the past 336 hour(s))   CBC with Automated Differential    Collection Time: 03/24/22  5:14 AM   Result Value Ref Range    WBC 6.48 3.90 - 12.70 K/uL    Hemoglobin 10.3 (L) 12.0 - 16.0 g/dL    " Hematocrit 31.6 (L) 37.0 - 48.5 %    Platelets 179 150 - 450 K/uL   CBC auto differential    Collection Time: 03/23/22  2:45 PM   Result Value Ref Range    WBC 4.79 3.90 - 12.70 K/uL    Hemoglobin 10.3 (L) 12.0 - 16.0 g/dL    Hematocrit 31.9 (L) 37.0 - 48.5 %    Platelets 162 150 - 450 K/uL     CMP  Sodium   Date Value Ref Range Status   03/24/2022 138 136 - 145 mmol/L Final   10/09/2018 140 134 - 144 mmol/L      Potassium   Date Value Ref Range Status   03/24/2022 3.5 3.5 - 5.1 mmol/L Final     Chloride   Date Value Ref Range Status   03/24/2022 102 95 - 110 mmol/L Final   10/09/2018 105 98 - 110 mmol/L      CO2   Date Value Ref Range Status   03/24/2022 28 23 - 29 mmol/L Final     Glucose   Date Value Ref Range Status   03/24/2022 108 70 - 110 mg/dL Final   10/09/2018 101 (H) 70 - 99 mg/dL      BUN   Date Value Ref Range Status   03/24/2022 9 8 - 23 mg/dL Final     Creatinine   Date Value Ref Range Status   03/24/2022 0.6 0.5 - 1.4 mg/dL Final   10/09/2018 0.80 0.60 - 1.40 mg/dL    11/29/2012 0.7 0.5 - 1.4 mg/dL Final     Calcium   Date Value Ref Range Status   03/24/2022 9.5 8.7 - 10.5 mg/dL Final   11/29/2012 9.0 8.7 - 10.5 mg/dL Final     Total Protein   Date Value Ref Range Status   03/23/2022 7.3 6.0 - 8.4 g/dL Final     Albumin   Date Value Ref Range Status   03/23/2022 3.8 3.5 - 5.2 g/dL Final   10/09/2018 4.1 3.1 - 4.7 g/dL      Total Bilirubin   Date Value Ref Range Status   03/23/2022 0.6 0.1 - 1.0 mg/dL Final     Comment:     For infants and newborns, interpretation of results should be based  on gestational age, weight and in agreement with clinical  observations.    Premature Infant recommended reference ranges:  Up to 24 hours.............<8.0 mg/dL  Up to 48 hours............<12.0 mg/dL  3-5 days..................<15.0 mg/dL  6-29 days.................<15.0 mg/dL       Alkaline Phosphatase   Date Value Ref Range Status   03/23/2022 113 55 - 135 U/L Final     AST   Date Value Ref Range Status    03/23/2022 15 10 - 40 U/L Final     ALT   Date Value Ref Range Status   03/23/2022 9 (L) 10 - 44 U/L Final     Anion Gap   Date Value Ref Range Status   03/24/2022 8 8 - 16 mmol/L Final   11/29/2012 10 5 - 15 meq/L Final     eGFR if    Date Value Ref Range Status   03/24/2022 >60.0 >60 mL/min/1.73 m^2 Final     eGFR if non    Date Value Ref Range Status   03/24/2022 >60.0 >60 mL/min/1.73 m^2 Final     Comment:     Calculation used to obtain the estimated glomerular filtration  rate (eGFR) is the CKD-EPI equation.        No results found for: CEA  No results found for: PSA        Assessment/Plan:     Problem List Items Addressed This Visit     Malignant neoplasm of lower lobe of right lung     Patient has several MRIs of the spine and a PET scan all of which appear to show progression of disease.  She has a phone visit next week at [facility] and will see what their recommendation is.  I feel she could go back on more traditional chemotherapy such as Taxotere/Silvio and discussed this today.      They are also evaluating for radiation therapy at [facility] and will see what they say on this approach.      I will have her back with me in four weeks but patient will call with result of [facility] phone visit.             Cancer associated pain     Patient continues to have pain.  Her fentnyl was lowered from 75 to 50 in the hospital and she feels she is having significant pain.  I feel she could try the 75mcg patch again. Will watch closely for mood changes.                   Discussion:     Follow up in about 4 weeks (around 4/28/2022).      Electronically signed by Segundo Lamb

## 2022-04-01 ENCOUNTER — INFUSION (OUTPATIENT)
Dept: INFUSION THERAPY | Facility: HOSPITAL | Age: 67
End: 2022-04-01
Attending: INTERNAL MEDICINE
Payer: MEDICARE

## 2022-04-01 VITALS
BODY MASS INDEX: 21.73 KG/M2 | HEART RATE: 83 BPM | TEMPERATURE: 97 F | RESPIRATION RATE: 17 BRPM | OXYGEN SATURATION: 100 % | DIASTOLIC BLOOD PRESSURE: 85 MMHG | SYSTOLIC BLOOD PRESSURE: 136 MMHG | WEIGHT: 130.63 LBS

## 2022-04-01 DIAGNOSIS — C79.51 BONE METASTASES: Primary | ICD-10-CM

## 2022-04-01 DIAGNOSIS — E86.0 DEHYDRATION: ICD-10-CM

## 2022-04-01 DIAGNOSIS — C34.31 SQUAMOUS CELL CARCINOMA OF BRONCHUS IN RIGHT LOWER LOBE: ICD-10-CM

## 2022-04-01 PROCEDURE — 25000003 PHARM REV CODE 250: Performed by: INTERNAL MEDICINE

## 2022-04-01 PROCEDURE — 96360 HYDRATION IV INFUSION INIT: CPT

## 2022-04-01 PROCEDURE — S5010 5% DEXTROSE AND 0.45% SALINE: HCPCS | Performed by: INTERNAL MEDICINE

## 2022-04-01 PROCEDURE — 63600175 PHARM REV CODE 636 W HCPCS

## 2022-04-01 PROCEDURE — 96361 HYDRATE IV INFUSION ADD-ON: CPT

## 2022-04-01 PROCEDURE — A4216 STERILE WATER/SALINE, 10 ML: HCPCS | Performed by: INTERNAL MEDICINE

## 2022-04-01 RX ORDER — HEPARIN 100 UNIT/ML
SYRINGE INTRAVENOUS
Status: COMPLETED
Start: 2022-04-01 | End: 2022-04-01

## 2022-04-01 RX ORDER — HEPARIN 100 UNIT/ML
500 SYRINGE INTRAVENOUS
Status: DISCONTINUED | OUTPATIENT
Start: 2022-04-01 | End: 2022-04-01 | Stop reason: HOSPADM

## 2022-04-01 RX ORDER — HEPARIN 100 UNIT/ML
500 SYRINGE INTRAVENOUS
Status: CANCELLED | OUTPATIENT
Start: 2022-04-01

## 2022-04-01 RX ORDER — SODIUM CHLORIDE 0.9 % (FLUSH) 0.9 %
10 SYRINGE (ML) INJECTION
Status: DISCONTINUED | OUTPATIENT
Start: 2022-04-01 | End: 2022-04-01 | Stop reason: HOSPADM

## 2022-04-01 RX ORDER — SODIUM CHLORIDE 0.9 % (FLUSH) 0.9 %
10 SYRINGE (ML) INJECTION
Status: CANCELLED | OUTPATIENT
Start: 2022-04-01

## 2022-04-01 RX ADMIN — DEXTROSE AND SODIUM CHLORIDE 1000 ML: 5; .45 INJECTION, SOLUTION INTRAVENOUS at 11:04

## 2022-04-01 RX ADMIN — Medication 500 UNITS: at 01:04

## 2022-04-01 RX ADMIN — SODIUM CHLORIDE, PRESERVATIVE FREE 10 ML: 5 INJECTION INTRAVENOUS at 01:04

## 2022-04-01 RX ADMIN — HEPARIN 500 UNITS: 100 SYRINGE at 01:04

## 2022-04-04 ENCOUNTER — PATIENT MESSAGE (OUTPATIENT)
Dept: PSYCHIATRY | Facility: CLINIC | Age: 67
End: 2022-04-04
Payer: MEDICARE

## 2022-04-04 DIAGNOSIS — C34.31 MALIGNANT NEOPLASM OF LOWER LOBE OF RIGHT LUNG: ICD-10-CM

## 2022-04-04 DIAGNOSIS — G89.3 CANCER RELATED PAIN: ICD-10-CM

## 2022-04-05 ENCOUNTER — PATIENT MESSAGE (OUTPATIENT)
Dept: HEMATOLOGY/ONCOLOGY | Facility: CLINIC | Age: 67
End: 2022-04-05

## 2022-04-05 ENCOUNTER — OUTPATIENT CASE MANAGEMENT (OUTPATIENT)
Dept: ADMINISTRATIVE | Facility: OTHER | Age: 67
End: 2022-04-05
Payer: MEDICARE

## 2022-04-05 ENCOUNTER — INFUSION (OUTPATIENT)
Dept: INFUSION THERAPY | Facility: HOSPITAL | Age: 67
End: 2022-04-05
Attending: INTERNAL MEDICINE
Payer: MEDICARE

## 2022-04-05 VITALS
HEART RATE: 86 BPM | OXYGEN SATURATION: 98 % | WEIGHT: 129.69 LBS | SYSTOLIC BLOOD PRESSURE: 151 MMHG | DIASTOLIC BLOOD PRESSURE: 84 MMHG | HEIGHT: 65 IN | RESPIRATION RATE: 16 BRPM | BODY MASS INDEX: 21.61 KG/M2 | TEMPERATURE: 97 F

## 2022-04-05 DIAGNOSIS — E86.0 DEHYDRATION: ICD-10-CM

## 2022-04-05 DIAGNOSIS — C34.31 SQUAMOUS CELL CARCINOMA OF BRONCHUS IN RIGHT LOWER LOBE: ICD-10-CM

## 2022-04-05 DIAGNOSIS — C79.51 BONE METASTASES: Primary | ICD-10-CM

## 2022-04-05 PROCEDURE — 96360 HYDRATION IV INFUSION INIT: CPT

## 2022-04-05 PROCEDURE — 25000003 PHARM REV CODE 250: Performed by: INTERNAL MEDICINE

## 2022-04-05 PROCEDURE — S5010 5% DEXTROSE AND 0.45% SALINE: HCPCS | Performed by: INTERNAL MEDICINE

## 2022-04-05 PROCEDURE — 63600175 PHARM REV CODE 636 W HCPCS: Performed by: INTERNAL MEDICINE

## 2022-04-05 PROCEDURE — A4216 STERILE WATER/SALINE, 10 ML: HCPCS | Performed by: INTERNAL MEDICINE

## 2022-04-05 PROCEDURE — 96361 HYDRATE IV INFUSION ADD-ON: CPT

## 2022-04-05 RX ORDER — SODIUM CHLORIDE 0.9 % (FLUSH) 0.9 %
10 SYRINGE (ML) INJECTION
Status: CANCELLED | OUTPATIENT
Start: 2022-04-05

## 2022-04-05 RX ORDER — HEPARIN 100 UNIT/ML
500 SYRINGE INTRAVENOUS
Status: CANCELLED | OUTPATIENT
Start: 2022-04-05

## 2022-04-05 RX ORDER — HEPARIN 100 UNIT/ML
500 SYRINGE INTRAVENOUS
Status: DISCONTINUED | OUTPATIENT
Start: 2022-04-05 | End: 2022-04-05 | Stop reason: HOSPADM

## 2022-04-05 RX ORDER — SODIUM CHLORIDE 0.9 % (FLUSH) 0.9 %
10 SYRINGE (ML) INJECTION
Status: DISCONTINUED | OUTPATIENT
Start: 2022-04-05 | End: 2022-04-05 | Stop reason: HOSPADM

## 2022-04-05 RX ORDER — FENTANYL 50 UG/1
PATCH TRANSDERMAL
Start: 2022-04-05 | End: 2022-04-07 | Stop reason: DRUGHIGH

## 2022-04-05 RX ADMIN — DEXTROSE AND SODIUM CHLORIDE 1000 ML: 5; .45 INJECTION, SOLUTION INTRAVENOUS at 10:04

## 2022-04-05 RX ADMIN — SODIUM CHLORIDE, PRESERVATIVE FREE 10 ML: 5 INJECTION INTRAVENOUS at 12:04

## 2022-04-05 RX ADMIN — Medication 500 UNITS: at 12:04

## 2022-04-05 NOTE — PROGRESS NOTES
Outpatient Care Management  Plan of Care Follow Up Visit    Patient: Sheri Broderick  MRN: [id number]  Date of Service: 04/05/2022  Completed by: Eliza Rosario RN  Referral Date: 11/17/2021  Program: Case Management (High Risk)    Reason for Visit   Patient presents with    OPCM RN First Follow-Up Attempt     0/05/22    OPCM RN Second Follow-Up Attempt     04/13/22    Update Plan Of Care     05/02/22    OPCM RN Third Follow-Up Attempt     04/20/22-letter mailed        Brief Summary: 04/05/22-Conference call with SW to patient, not available at this time. Asked for call back later today. Called back and not available. RN OPCM 1'st follow up attempt.   04/13/22-Conference call with SW to patient. Patient is at [facility] today. Will follow up with her next week. RN OPCM 2'nd follow up attempt.   04/20/22-Conference call with SW to patient. Attempt follow up with  patient for outpatient case management. No answer. Left message requesting call back. RN OPCM 3'rd follow up attempt. Letter mailed.   05/02/22-Conference call with SW to patient and niece at the [facility]. Weight 125 pounds. Niece and Ms Wade arrived in [place] on 04/24/22 at [facility]. Ms Wade to begin her radiation treatments on 04/25/22 at [facility] for 10 days, Monday thru Friday. She was given $50.00 in gas cards. JONNY at cancer center is investigating any resources for paying for hotel accommodations. Ms Wade is having some nausea but states that it is her nerves. She denies diarrhea. They are going to change her chemo treatment. She will have an MRI in three months. After radiation [facility] will talk to them about next steps for her cancer treatment. Radiation treatments are for 30 minutes.   CM ACTION PLAN   : Follow up in two weeks with JONNY    Patient Summary     Involvement of Care:  Do I have permission to speak with other family members about your care?       Patient Reported Labs & Vitals:  1.  Any Patient Reported Labs &  Vitals?     2.  Patient Reported Blood Pressure:     3.  Patient Reported Pulse:     4.  Patient Reported Weight (Kg):     5.  Patient Reported Blood Glucose (mg/dl):       Medical and social history was reviewed with patient and/or caregiver.     Clinical Assessment     Reviewed and provided basic information on available community resources for mental health, transportation, wellness resources, and palliative care programs with patient and/or caregiver.     Complex Care Plan     Care plan was discussed and completed today with input from patient and/or caregiver.    Patient Instructions     Instructions were provided via the [employer] patient [employer] and are available for the patient to view on the patient portal.    Follow up in about 2 weeks (around 5/16/2022) for RN Follow up call.    Todays OPCM Self-Management Care Plan was developed with the patients/caregivers input and was based on identified barriers from todays assessment.  Goals were written today with the patient/caregiver and the patient has agreed to work towards these goals to improve his/her overall well-being. Patient verbalized understanding of the care plan, goals, and all of today's instructions. Encouraged patient/caregiver to communicate with his/her physician and health care team about health conditions and the treatment plan.  Provided my contact information today and encouraged patient/caregiver to call me with any questions as needed.

## 2022-04-05 NOTE — LETTER
April 20, 2022    Sheri Broderick  [address]  [place] [address]         [facility] [address] [place] [address] Dear Ms Broderick:    I work with [facility]'s Outpatient Case Management Department. I have been unsuccessful at reaching you to follow-up to see how you have been doing. Please call me back at your earliest convenience to discuss your health care needs.      I can be reached  from 8:00AM to 4:30 PM on Monday thru Friday. [facility] is a program offered through [facility] where a nurse is available 24/7 to answer questions or provide medical advice, their number is [phone].    Thanks,  Eliza Rosario, RN [facility]   [phone]  Rebeca Lam, [facility]       [phone]  Outpatient Case Management

## 2022-04-05 NOTE — PLAN OF CARE
Problem: Fatigue  Goal: Improved Activity Tolerance  Outcome: Ongoing, Progressing  Intervention: Promote Improved Energy  Flowsheets (Taken 4/5/2022 1001)  Fatigue Management: frequent rest breaks encouraged  Sleep/Rest Enhancement:   regular sleep/rest pattern promoted   relaxation techniques promoted  Activity Management: Ambulated -L4

## 2022-04-05 NOTE — PROGRESS NOTES
OPCM  and RN attempted to contact pt today for follow up call.  Pt answered the phone but stated she was sleeping and asked if we could call back at another date and time.

## 2022-04-06 ENCOUNTER — TELEPHONE (OUTPATIENT)
Dept: HEMATOLOGY/ONCOLOGY | Facility: CLINIC | Age: 67
End: 2022-04-06
Payer: MEDICARE

## 2022-04-06 NOTE — PROGRESS NOTES
Spoke with patient to provide her with the number to [facility] for possible assistance with adult sitter services.  Patient stated that that agency is assisting her with other supports; she will contact them.

## 2022-04-07 ENCOUNTER — OFFICE VISIT (OUTPATIENT)
Dept: FAMILY MEDICINE | Facility: CLINIC | Age: 67
End: 2022-04-07
Payer: MEDICARE

## 2022-04-07 VITALS
SYSTOLIC BLOOD PRESSURE: 132 MMHG | HEART RATE: 79 BPM | BODY MASS INDEX: 21.81 KG/M2 | WEIGHT: 130.94 LBS | OXYGEN SATURATION: 97 % | HEIGHT: 65 IN | DIASTOLIC BLOOD PRESSURE: 76 MMHG | TEMPERATURE: 99 F

## 2022-04-07 DIAGNOSIS — T45.1X5A CHEMOTHERAPY-INDUCED NEUTROPENIA: ICD-10-CM

## 2022-04-07 DIAGNOSIS — D69.6 THROMBOCYTOPENIA, UNSPECIFIED: ICD-10-CM

## 2022-04-07 DIAGNOSIS — M81.0 OSTEOPOROSIS, SENILE: ICD-10-CM

## 2022-04-07 DIAGNOSIS — I10 ESSENTIAL HYPERTENSION: Primary | ICD-10-CM

## 2022-04-07 DIAGNOSIS — C34.31 MALIGNANT NEOPLASM OF LOWER LOBE OF RIGHT LUNG: Chronic | ICD-10-CM

## 2022-04-07 DIAGNOSIS — F41.1 GENERALIZED ANXIETY DISORDER: ICD-10-CM

## 2022-04-07 DIAGNOSIS — C79.51 BONE METASTASES: ICD-10-CM

## 2022-04-07 DIAGNOSIS — E78.2 MIXED HYPERLIPIDEMIA: ICD-10-CM

## 2022-04-07 DIAGNOSIS — D70.1 CHEMOTHERAPY-INDUCED NEUTROPENIA: ICD-10-CM

## 2022-04-07 DIAGNOSIS — F33.1 MDD (MAJOR DEPRESSIVE DISORDER), RECURRENT EPISODE, MODERATE: ICD-10-CM

## 2022-04-07 LAB — VIT B1 BLD-SCNC: 83.8 NMOL/L (ref 66.5–200)

## 2022-04-07 PROCEDURE — 99213 OFFICE O/P EST LOW 20 MIN: CPT | Mod: PBBFAC,PN | Performed by: FAMILY MEDICINE

## 2022-04-07 PROCEDURE — 99214 OFFICE O/P EST MOD 30 MIN: CPT | Mod: S$PBB,,, | Performed by: FAMILY MEDICINE

## 2022-04-07 PROCEDURE — 99999 PR PBB SHADOW E&M-EST. PATIENT-LVL III: CPT | Mod: PBBFAC,,, | Performed by: FAMILY MEDICINE

## 2022-04-07 PROCEDURE — 99214 PR OFFICE/OUTPT VISIT, EST, LEVL IV, 30-39 MIN: ICD-10-PCS | Mod: S$PBB,,, | Performed by: FAMILY MEDICINE

## 2022-04-07 PROCEDURE — 99999 PR PBB SHADOW E&M-EST. PATIENT-LVL III: ICD-10-PCS | Mod: PBBFAC,,, | Performed by: FAMILY MEDICINE

## 2022-04-07 RX ORDER — FENTANYL 75 UG/H
75 PATCH TRANSDERMAL
COMMUNITY
Start: 2022-04-05 | End: 2022-06-01 | Stop reason: SDUPTHER

## 2022-04-08 ENCOUNTER — TELEPHONE (OUTPATIENT)
Dept: FAMILY MEDICINE | Facility: CLINIC | Age: 67
End: 2022-04-08
Payer: MEDICARE

## 2022-04-08 ENCOUNTER — INFUSION (OUTPATIENT)
Dept: INFUSION THERAPY | Facility: HOSPITAL | Age: 67
End: 2022-04-08
Attending: INTERNAL MEDICINE
Payer: MEDICARE

## 2022-04-08 VITALS
OXYGEN SATURATION: 100 % | WEIGHT: 131.19 LBS | RESPIRATION RATE: 18 BRPM | HEIGHT: 65 IN | TEMPERATURE: 98 F | DIASTOLIC BLOOD PRESSURE: 83 MMHG | SYSTOLIC BLOOD PRESSURE: 154 MMHG | BODY MASS INDEX: 21.86 KG/M2 | HEART RATE: 93 BPM

## 2022-04-08 DIAGNOSIS — E86.0 DEHYDRATION: ICD-10-CM

## 2022-04-08 DIAGNOSIS — C34.31 SQUAMOUS CELL CARCINOMA OF BRONCHUS IN RIGHT LOWER LOBE: ICD-10-CM

## 2022-04-08 DIAGNOSIS — C79.51 BONE METASTASES: Primary | ICD-10-CM

## 2022-04-08 PROCEDURE — A4216 STERILE WATER/SALINE, 10 ML: HCPCS | Performed by: INTERNAL MEDICINE

## 2022-04-08 PROCEDURE — 63600175 PHARM REV CODE 636 W HCPCS: Performed by: INTERNAL MEDICINE

## 2022-04-08 PROCEDURE — 25000003 PHARM REV CODE 250: Performed by: INTERNAL MEDICINE

## 2022-04-08 PROCEDURE — 96361 HYDRATE IV INFUSION ADD-ON: CPT

## 2022-04-08 PROCEDURE — S5010 5% DEXTROSE AND 0.45% SALINE: HCPCS | Performed by: INTERNAL MEDICINE

## 2022-04-08 PROCEDURE — 96360 HYDRATION IV INFUSION INIT: CPT

## 2022-04-08 RX ORDER — SODIUM CHLORIDE 0.9 % (FLUSH) 0.9 %
10 SYRINGE (ML) INJECTION
Status: CANCELLED | OUTPATIENT
Start: 2022-04-08

## 2022-04-08 RX ORDER — SODIUM CHLORIDE 0.9 % (FLUSH) 0.9 %
10 SYRINGE (ML) INJECTION
Status: DISCONTINUED | OUTPATIENT
Start: 2022-04-08 | End: 2022-04-08 | Stop reason: HOSPADM

## 2022-04-08 RX ORDER — HEPARIN 100 UNIT/ML
500 SYRINGE INTRAVENOUS
Status: DISCONTINUED | OUTPATIENT
Start: 2022-04-08 | End: 2022-04-08 | Stop reason: HOSPADM

## 2022-04-08 RX ORDER — HEPARIN 100 UNIT/ML
500 SYRINGE INTRAVENOUS
Status: CANCELLED | OUTPATIENT
Start: 2022-04-08

## 2022-04-08 RX ADMIN — Medication 500 UNITS: at 12:04

## 2022-04-08 RX ADMIN — DEXTROSE AND SODIUM CHLORIDE 1000 ML: 5; .45 INJECTION, SOLUTION INTRAVENOUS at 10:04

## 2022-04-08 RX ADMIN — SODIUM CHLORIDE, PRESERVATIVE FREE 10 ML: 5 INJECTION INTRAVENOUS at 12:04

## 2022-04-08 NOTE — TELEPHONE ENCOUNTER
Lucero with [facility] states patient has been on home health with [facility] for approximately a year due to her Cancer diagnosis. [facility] Medicare will no longer continue to cover cost due to the length of time on home health with no change in status. Patient is being discharged for [facility]. Mrs. Barker is requesting a referral to [facility] as they have a psych nurse and patient has some psych issues and is currently seeing an oncologist psych provider. Mrs. Barker states she believes Medicare will cover [facility] under the new diagnosis of psych. Please advise. Thank you.          ----- Message from Sandrita Mckeon sent at 4/8/2022  3:13 PM CDT -----  Regarding: home [facility] called  Name of Who is Calling: TRACI BENJAMIN [[id number]] Lucero ( [facility])      What is the request in detail: pt was discharged [facility] and she would like her to referred to [facility] for a psych nurse. Please advise       Can the clinic reply by [facility]: No      What Number to Call Back if not in [facility]: [phone]

## 2022-04-10 PROBLEM — R05.9 COUGH: Status: RESOLVED | Noted: 2018-08-28 | Resolved: 2022-04-10

## 2022-04-11 ENCOUNTER — TELEPHONE (OUTPATIENT)
Dept: FAMILY MEDICINE | Facility: CLINIC | Age: 67
End: 2022-04-11
Payer: MEDICARE

## 2022-04-11 DIAGNOSIS — C34.31 MALIGNANT NEOPLASM OF LOWER LOBE OF RIGHT LUNG: Primary | Chronic | ICD-10-CM

## 2022-04-11 DIAGNOSIS — F33.1 MDD (MAJOR DEPRESSIVE DISORDER), RECURRENT EPISODE, MODERATE: ICD-10-CM

## 2022-04-11 NOTE — PROGRESS NOTES
Subjective:       Patient ID: Sheri Broderick is a 66 y.o. female.    Chief Complaint: Hypertension, Hyperlipidemia, and Lung Cancer    Patient presents here for follow-up of multiple medical problems including hypertension, hyperlipidemia, GERD, lung cancer with bony metastasis, depression, and anxiety.  Patient has been followed by Oncology and has undergone multiple treatments for her lung cancer with metastasis to the spine.  She has also been followed at [facility] [place] and is presently going there for her directed treatments.  She recently had an enlargement of 1 of the metastatic lesions and she will be starting a different chemotherapy regimen and possibly radiation.  She is having trouble eating and I did discuss several ways to increase her caloric intake, especially proteins.  She is followed locally by Dr. Zhang.  She is also followed by Psychiatry for her depression and anxiety, among other things.  These are under fair control at this time.  Her hypertension is under control with diet control only as her blood pressure today is 132/76.  Her hyperlipidemia is also being treated with diet only but her last lipid profile was elevated and this was 2016. As far as her GERD, this is under fair control with her present use of pantoprazole 40 daily.  As far as health maintenance, she is up-to-date with all of her recommended screening exams and immunizations with exception of pneumococcal vaccine, shingles vaccine, lipid profile, mammogram, 3rd COVID vaccine, and flu vaccine.  She cannot take the shingles vaccine at this time due to the fact that she is on active chemotherapy.  She also tells me that she is being discharged from her home health due to the fact that they have reached maximum medical benefit at this time.  However, she is eligible for continue home health due to her psychiatric illnesses.  I will re-consult home health but with a company that has psychiatric nurses on  staff.    Hypertension  This is a chronic problem. The problem is unchanged. The problem is controlled. Associated symptoms include anxiety, headaches, malaise/fatigue and shortness of breath (Occasional). Pertinent negatives include no chest pain, palpitations or peripheral edema. Risk factors for coronary artery disease include dyslipidemia, post-menopausal state and sedentary lifestyle. Past treatments include lifestyle changes. The current treatment provides moderate improvement. Compliance problems include exercise.  There is no history of kidney disease, CAD/MI, CVA or heart failure. There is no history of chronic renal disease.   Hyperlipidemia  This is a chronic problem. The problem is uncontrolled. Recent lipid tests were reviewed and are high. She has no history of chronic renal disease, diabetes, hypothyroidism or obesity. There are no known factors aggravating her hyperlipidemia. Associated symptoms include myalgias and shortness of breath (Occasional). Pertinent negatives include no chest pain. Current antihyperlipidemic treatment includes diet change. The current treatment provides mild improvement of lipids. Compliance problems include adherence to exercise.  Risk factors for coronary artery disease include dyslipidemia, hypertension and post-menopausal.     Review of Systems   Constitutional: Positive for appetite change, fatigue and malaise/fatigue. Negative for chills and fever.   HENT: Negative for nasal congestion, postnasal drip and sore throat.    Eyes: Negative for pain and visual disturbance.   Respiratory: Positive for shortness of breath (Occasional). Negative for cough and wheezing.    Cardiovascular: Negative for chest pain and palpitations.   Gastrointestinal: Positive for nausea and vomiting. Negative for abdominal pain and diarrhea.   Genitourinary: Negative for difficulty urinating, dysuria and flank pain.   Musculoskeletal: Positive for arthralgias, back pain ( due to bony  metastases) and myalgias.   Neurological: Positive for headaches. Negative for dizziness and light-headedness.   Hematological: Negative for adenopathy. Does not bruise/bleed easily.   Psychiatric/Behavioral: Positive for agitation ( occasional) and sleep disturbance. The patient is nervous/anxious.         Depressive disorder which is under fair control at this time.  She is followed and monitored by Psychiatry on a regular basis         Objective:      Physical Exam  Vitals reviewed.   Constitutional:       General: She is not in acute distress.     Appearance: Normal appearance. She is well-developed. She is ill-appearing.   HENT:      Right Ear: Tympanic membrane and external ear normal.      Left Ear: Tympanic membrane and external ear normal.      Mouth/Throat:      Pharynx: Oropharynx is clear. No posterior oropharyngeal erythema.   Neck:      Thyroid: No thyromegaly.   Cardiovascular:      Rate and Rhythm: Normal rate. Rhythm irregular.      Pulses: Normal pulses.      Heart sounds: Normal heart sounds. No murmur heard.  Pulmonary:      Effort: Pulmonary effort is normal.      Breath sounds: Normal breath sounds. No wheezing or rhonchi.   Musculoskeletal:         General: No tenderness. Normal range of motion.      Cervical back: Normal range of motion and neck supple.      Right lower leg: No edema.      Left lower leg: No edema.   Lymphadenopathy:      Cervical: No cervical adenopathy.   Neurological:      General: No focal deficit present.      Mental Status: She is alert and oriented to person, place, and time.      Cranial Nerves: No cranial nerve deficit.      Deep Tendon Reflexes: Reflexes are normal and symmetric.   Psychiatric:         Mood and Affect: Mood normal.         Behavior: Behavior normal.         Assessment:       Problem List Items Addressed This Visit     Malignant neoplasm of lower lobe of right lung-Adenocarcinoma (Chronic)    MDD (major depressive disorder), recurrent episode,  moderate    Generalized anxiety disorder    Osteoporosis, senile    Hyperlipidemia    Chemotherapy-induced neutropenia    Essential hypertension - Primary    Bone metastases    Thrombocytopenia, unspecified          Plan:       1. Continue present medication as her hypertension, hyperlipidemia, and GERD are stable and controlled  2. Continue follow-up with psychiatry to manage her major depressive disorder and generalized anxiety disorder  3. Continue follow-up with local oncology as well as Oncology at [facility] [place] for her treatment of lung cancer with bony metastasis  4. I did discuss with patient the importance of diet and keeping her strength up.  We did go over several medications that she can eat that can be high in protein and caloric content as well as the fact that because she is not eating much and because of the nausea, she should try to eat multiple small meals during the day  5. Lipid profile in the near future  6. Referral to [facility] for home health care follow-up of her psychiatric illnesses  7. Follow up with me in 6 months or p.r.n.

## 2022-04-11 NOTE — TELEPHONE ENCOUNTER
----- Message from Hayley Norris sent at 4/11/2022 10:06 AM CDT -----  Contact: @[facility]  Type: Needs Medical Advice  Who Called:  Home Health Nurse   Best Call Back Number:    Additional Information: Per home health nurse received referral but needs additional information, most recent clinic notes from Dr Pleitez, her medication list, and a face sheet with demographic information (insurance)-please advise-thank you

## 2022-04-11 NOTE — TELEPHONE ENCOUNTER
----- Message from Josselin  sent at 4/11/2022 12:01 PM CDT -----  Regarding: advise  Type: Needs Medical Advice    Who Called:  Gadiel Erwin [facility]     Best Call Back Number:   Additional Information: Requesting a call back from Nurse, regarding home health can not staff this pt ,please advise

## 2022-04-12 ENCOUNTER — INFUSION (OUTPATIENT)
Dept: INFUSION THERAPY | Facility: HOSPITAL | Age: 67
End: 2022-04-12
Attending: INTERNAL MEDICINE
Payer: MEDICARE

## 2022-04-12 VITALS
DIASTOLIC BLOOD PRESSURE: 81 MMHG | BODY MASS INDEX: 21.68 KG/M2 | RESPIRATION RATE: 18 BRPM | TEMPERATURE: 98 F | HEIGHT: 65 IN | SYSTOLIC BLOOD PRESSURE: 137 MMHG | HEART RATE: 86 BPM | WEIGHT: 130.13 LBS

## 2022-04-12 DIAGNOSIS — C79.51 BONE METASTASES: Primary | ICD-10-CM

## 2022-04-12 DIAGNOSIS — E86.0 DEHYDRATION: ICD-10-CM

## 2022-04-12 DIAGNOSIS — C34.31 SQUAMOUS CELL CARCINOMA OF BRONCHUS IN RIGHT LOWER LOBE: ICD-10-CM

## 2022-04-12 PROCEDURE — 25000003 PHARM REV CODE 250: Performed by: INTERNAL MEDICINE

## 2022-04-12 PROCEDURE — 96361 HYDRATE IV INFUSION ADD-ON: CPT

## 2022-04-12 PROCEDURE — A4216 STERILE WATER/SALINE, 10 ML: HCPCS | Performed by: INTERNAL MEDICINE

## 2022-04-12 PROCEDURE — 63600175 PHARM REV CODE 636 W HCPCS: Performed by: INTERNAL MEDICINE

## 2022-04-12 PROCEDURE — S5010 5% DEXTROSE AND 0.45% SALINE: HCPCS | Performed by: INTERNAL MEDICINE

## 2022-04-12 PROCEDURE — 96360 HYDRATION IV INFUSION INIT: CPT

## 2022-04-12 RX ORDER — SODIUM CHLORIDE 0.9 % (FLUSH) 0.9 %
10 SYRINGE (ML) INJECTION
Status: CANCELLED | OUTPATIENT
Start: 2022-04-12

## 2022-04-12 RX ORDER — HEPARIN 100 UNIT/ML
500 SYRINGE INTRAVENOUS
Status: CANCELLED | OUTPATIENT
Start: 2022-04-12

## 2022-04-12 RX ORDER — HEPARIN 100 UNIT/ML
500 SYRINGE INTRAVENOUS
Status: DISCONTINUED | OUTPATIENT
Start: 2022-04-12 | End: 2022-04-12 | Stop reason: HOSPADM

## 2022-04-12 RX ORDER — SODIUM CHLORIDE 0.9 % (FLUSH) 0.9 %
10 SYRINGE (ML) INJECTION
Status: DISCONTINUED | OUTPATIENT
Start: 2022-04-12 | End: 2022-04-12 | Stop reason: HOSPADM

## 2022-04-12 RX ADMIN — Medication 500 UNITS: at 12:04

## 2022-04-12 RX ADMIN — SODIUM CHLORIDE, PRESERVATIVE FREE 10 ML: 5 INJECTION INTRAVENOUS at 12:04

## 2022-04-12 RX ADMIN — DEXTROSE AND SODIUM CHLORIDE 1000 ML: 5; .45 INJECTION, SOLUTION INTRAVENOUS at 10:04

## 2022-04-13 ENCOUNTER — PATIENT MESSAGE (OUTPATIENT)
Dept: PSYCHIATRY | Facility: CLINIC | Age: 67
End: 2022-04-13
Payer: MEDICARE

## 2022-04-13 NOTE — PROGRESS NOTES
OPCM  and RN attempted to complete follow up call today but pt reports she is at [facility] until tomorrow and asked for a follow up call next week.

## 2022-04-19 ENCOUNTER — TELEPHONE (OUTPATIENT)
Dept: HEMATOLOGY/ONCOLOGY | Facility: CLINIC | Age: 67
End: 2022-04-19

## 2022-04-19 ENCOUNTER — INFUSION (OUTPATIENT)
Dept: INFUSION THERAPY | Facility: HOSPITAL | Age: 67
End: 2022-04-19
Attending: INTERNAL MEDICINE
Payer: MEDICARE

## 2022-04-19 VITALS
SYSTOLIC BLOOD PRESSURE: 150 MMHG | TEMPERATURE: 98 F | HEIGHT: 65 IN | RESPIRATION RATE: 18 BRPM | OXYGEN SATURATION: 98 % | WEIGHT: 127.88 LBS | DIASTOLIC BLOOD PRESSURE: 87 MMHG | HEART RATE: 86 BPM | BODY MASS INDEX: 21.31 KG/M2

## 2022-04-19 DIAGNOSIS — C34.31 SQUAMOUS CELL CARCINOMA OF BRONCHUS IN RIGHT LOWER LOBE: ICD-10-CM

## 2022-04-19 DIAGNOSIS — E86.0 DEHYDRATION: ICD-10-CM

## 2022-04-19 DIAGNOSIS — C79.51 BONE METASTASES: Primary | ICD-10-CM

## 2022-04-19 PROCEDURE — S5010 5% DEXTROSE AND 0.45% SALINE: HCPCS | Performed by: INTERNAL MEDICINE

## 2022-04-19 PROCEDURE — 96361 HYDRATE IV INFUSION ADD-ON: CPT

## 2022-04-19 PROCEDURE — 63600175 PHARM REV CODE 636 W HCPCS: Performed by: INTERNAL MEDICINE

## 2022-04-19 PROCEDURE — 25000003 PHARM REV CODE 250: Performed by: INTERNAL MEDICINE

## 2022-04-19 PROCEDURE — 96360 HYDRATION IV INFUSION INIT: CPT

## 2022-04-19 RX ORDER — HEPARIN 100 UNIT/ML
500 SYRINGE INTRAVENOUS
Status: CANCELLED | OUTPATIENT
Start: 2022-04-19

## 2022-04-19 RX ORDER — HEPARIN 100 UNIT/ML
500 SYRINGE INTRAVENOUS
Status: DISCONTINUED | OUTPATIENT
Start: 2022-04-19 | End: 2022-04-19 | Stop reason: HOSPADM

## 2022-04-19 RX ORDER — SODIUM CHLORIDE 0.9 % (FLUSH) 0.9 %
10 SYRINGE (ML) INJECTION
Status: CANCELLED | OUTPATIENT
Start: 2022-04-19

## 2022-04-19 RX ADMIN — Medication 500 UNITS: at 12:04

## 2022-04-19 RX ADMIN — DEXTROSE AND SODIUM CHLORIDE 1000 ML: 5; .45 INJECTION, SOLUTION INTRAVENOUS at 10:04

## 2022-04-19 NOTE — PROGRESS NOTES
Met with patient while in infusion.  She stated that she is going to [facility] in two weeks and was quoted a price of $3000 for her stay.  I encouraged her to speak with her  at [facility].  I also provided her the number to the [employer] to inquire if they have lodging resources.  I emailed [facility] and [employer] to inquire if she has any available funds; both stated they will mail her additional gas cards.  I also provided patient with the number to [address] [place] [address], [facility], and [employer] for possible financial assistance.

## 2022-04-19 NOTE — PLAN OF CARE
Problem: Fatigue  Goal: Improved Activity Tolerance  Intervention: Promote Improved Energy  Flowsheets (Taken 4/19/2022 1015)  Fatigue Management: fatigue-related activity identified  Activity Management: Ambulated -L4

## 2022-04-20 NOTE — PROGRESS NOTES
OPCM  and RN made 3rd attempt for a follow up call with pt.  Voicemail message left with contact info and a letter will be mailed to pt's home today.  Will follow up on 5/2/22.

## 2022-05-02 NOTE — PROGRESS NOTES
"Outpatient Care Management   - Care Plan Follow Up    Patient: Sheri Broderick  MRN:  [id number]  Date of Service:  5/2/2022  Completed by:  Rebeca Lam LCSW  Referral Date: 11/17/2021  Program: Case Management (High Risk)    Reason for Visit   Patient presents with    OPCM Chart Review    OPCM SW First Follow-up Attempt     4/5/22    OPCM SW Second Follow-up Attempt    OPCM SW Third Follow-up Attempt     4/20/22    mailed letter--4/20/22    Update Plan Of Care       Brief Summary: OPCM  and RN completed follow up call with pt's soraida Gina, who is currently staying with pt at [facility] for 2 weeks for daily radiation tx.  Soraida reports that a $50 gas card was given to pt to make the trip to [facility] but the  there is trying to assist pt with paying for her hotel room for the 2 weeks that pt is there.  As of today, they have not heard back from this .  Gina states she has "retired" from her job and plans on staying with pt from Tuesday-Thursday on a weekly basis to bring pt to and from her infusion txs when pt returns to [place].  No other social work needs identified.  Will follow up in 2 weeks.     Complex Care Plan     Care plan was discussed and completed today with input from patient and/or caregiver.    Patient Instructions     Follow up in about 6 weeks (around 5/16/2022) for call with SW.    Todays OPCM Self-Management Care Plan was developed with the patients/caregivers input and was based on identified barriers from todays assessment.  Goals were written today with the patient/caregiver and the patient has agreed to work towards these goals to improve his/her overall well-being. Patient verbalized understanding of the care plan, goals, and all of today's instructions. Encouraged patient/caregiver to communicate with his/her physician and health care team about health conditions and the treatment plan.  Provided my contact " information today and encouraged patient/caregiver to call me with any questions as needed.

## 2022-05-09 ENCOUNTER — PATIENT MESSAGE (OUTPATIENT)
Dept: HEMATOLOGY/ONCOLOGY | Facility: CLINIC | Age: 67
End: 2022-05-09

## 2022-05-10 ENCOUNTER — INFUSION (OUTPATIENT)
Dept: INFUSION THERAPY | Facility: HOSPITAL | Age: 67
End: 2022-05-10
Attending: INTERNAL MEDICINE
Payer: MEDICARE

## 2022-05-10 ENCOUNTER — PATIENT MESSAGE (OUTPATIENT)
Dept: FAMILY MEDICINE | Facility: CLINIC | Age: 67
End: 2022-05-10
Payer: MEDICARE

## 2022-05-10 VITALS
DIASTOLIC BLOOD PRESSURE: 79 MMHG | TEMPERATURE: 98 F | SYSTOLIC BLOOD PRESSURE: 128 MMHG | WEIGHT: 121.56 LBS | HEIGHT: 65 IN | RESPIRATION RATE: 18 BRPM | BODY MASS INDEX: 20.25 KG/M2 | HEART RATE: 99 BPM

## 2022-05-10 DIAGNOSIS — C34.31 SQUAMOUS CELL CARCINOMA OF BRONCHUS IN RIGHT LOWER LOBE: ICD-10-CM

## 2022-05-10 DIAGNOSIS — K21.9 GASTROESOPHAGEAL REFLUX DISEASE, UNSPECIFIED WHETHER ESOPHAGITIS PRESENT: ICD-10-CM

## 2022-05-10 DIAGNOSIS — C79.51 BONE METASTASES: Primary | ICD-10-CM

## 2022-05-10 DIAGNOSIS — E86.0 DEHYDRATION: ICD-10-CM

## 2022-05-10 PROCEDURE — S5010 5% DEXTROSE AND 0.45% SALINE: HCPCS | Performed by: INTERNAL MEDICINE

## 2022-05-10 PROCEDURE — A4216 STERILE WATER/SALINE, 10 ML: HCPCS | Performed by: INTERNAL MEDICINE

## 2022-05-10 PROCEDURE — 96361 HYDRATE IV INFUSION ADD-ON: CPT

## 2022-05-10 PROCEDURE — 63600175 PHARM REV CODE 636 W HCPCS: Performed by: INTERNAL MEDICINE

## 2022-05-10 PROCEDURE — 25000003 PHARM REV CODE 250: Performed by: INTERNAL MEDICINE

## 2022-05-10 PROCEDURE — 96360 HYDRATION IV INFUSION INIT: CPT

## 2022-05-10 RX ORDER — SODIUM CHLORIDE 0.9 % (FLUSH) 0.9 %
10 SYRINGE (ML) INJECTION
Status: DISCONTINUED | OUTPATIENT
Start: 2022-05-10 | End: 2022-05-10 | Stop reason: HOSPADM

## 2022-05-10 RX ORDER — HEPARIN 100 UNIT/ML
500 SYRINGE INTRAVENOUS
Status: CANCELLED | OUTPATIENT
Start: 2022-05-10

## 2022-05-10 RX ORDER — SODIUM CHLORIDE 0.9 % (FLUSH) 0.9 %
10 SYRINGE (ML) INJECTION
Status: CANCELLED | OUTPATIENT
Start: 2022-05-10

## 2022-05-10 RX ORDER — HEPARIN 100 UNIT/ML
500 SYRINGE INTRAVENOUS
Status: DISCONTINUED | OUTPATIENT
Start: 2022-05-10 | End: 2022-05-10 | Stop reason: HOSPADM

## 2022-05-10 RX ORDER — PANTOPRAZOLE SODIUM 40 MG/1
40 TABLET, DELAYED RELEASE ORAL 2 TIMES DAILY
Qty: 180 TABLET | Refills: 3 | Status: SHIPPED | OUTPATIENT
Start: 2022-05-10

## 2022-05-10 RX ADMIN — DEXTROSE AND SODIUM CHLORIDE 1000 ML: 5; .45 INJECTION, SOLUTION INTRAVENOUS at 01:05

## 2022-05-10 RX ADMIN — Medication 500 UNITS: at 03:05

## 2022-05-10 RX ADMIN — SODIUM CHLORIDE, PRESERVATIVE FREE 10 ML: 5 INJECTION INTRAVENOUS at 03:05

## 2022-05-10 NOTE — PLAN OF CARE
Problem: Fatigue  Goal: Improved Activity Tolerance  5/10/2022 1334 by Geeta Nguyen RN  Outcome: Met  5/10/2022 1334 by Geeta Nguyen RN  Outcome: Ongoing, Progressing

## 2022-05-15 DIAGNOSIS — K21.9 GASTROESOPHAGEAL REFLUX DISEASE, UNSPECIFIED WHETHER ESOPHAGITIS PRESENT: ICD-10-CM

## 2022-05-15 NOTE — TELEPHONE ENCOUNTER
No new care gaps identified.  [facility] Embedded Care Gaps. Reference number: [id number]. 5/15/2022   3:24:15 AM CDT

## 2022-05-16 ENCOUNTER — OUTPATIENT CASE MANAGEMENT (OUTPATIENT)
Dept: ADMINISTRATIVE | Facility: OTHER | Age: 67
End: 2022-05-16
Payer: MEDICARE

## 2022-05-16 RX ORDER — PANTOPRAZOLE SODIUM 40 MG/1
TABLET, DELAYED RELEASE ORAL
Qty: 180 TABLET | Refills: 3 | OUTPATIENT
Start: 2022-05-16

## 2022-05-16 NOTE — PROGRESS NOTES
Outpatient Care Management  Plan of Care Follow Up Visit    Patient: Sheri Broderick  MRN: [id number]  Date of Service: 05/16/2022  Completed by: Eliza Rosario RN  Referral Date: 11/17/2021  Program: Case Management (High Risk)    Reason for Visit   Patient presents with    Update Plan Of Care       Brief Summary:   She is complaining of her legs hurting this morning. She has pain in her hips down and in her shoulders. She returned from [facility] radiation treatments around 5 days ago.She states that the cancer has mestasized She is still getting IV fluids two times a week on Tuesdays and Thursdays. Her niece stays with her Tuesday thru Thursday. She talked to her niece already this morning. She is having some nausea but is eating a bagel this morning. She is eating lots of small meals. Pain is worse after radiation treatments. She received $350.00 card towards hotel bills. Hotel bills was $2,000.00.      Reviewed with her sending messages to [facility] and hematology/oncology about her pain and other symptoms.  She does deny shortness of breath or edema in ankles/legs. She does seem like she if very fatigued when talking to OPCM RN and SW on the phone during the conference call. She understands that she needs to ask [facility] if they are going to do chemo before three months when she has her next appt. She seems unclear as to the next steps in her cancer treatment.             CM ACTION PLAN   : Follow up in two weeks with JONNY        Patient Summary     Involvement of Care:  Do I have permission to speak with other family members about your care?       Patient Reported Labs & Vitals:  1.  Any Patient Reported Labs & Vitals?     2.  Patient Reported Blood Pressure:     3.  Patient Reported Pulse:     4.  Patient Reported Weight (Kg):     5.  Patient Reported Blood Glucose (mg/dl):       Medical and social history was reviewed with patient and/or caregiver.     Clinical Assessment     Reviewed and provided  basic information on available community resources for mental health, transportation, wellness resources, and palliative care programs with patient and/or caregiver.     Complex Care Plan     Care plan was discussed and completed today with input from patient and/or caregiver.    Patient Instructions     Instructions were provided via the [employer] patient resources and are available for the patient to view on the patient portal.        Follow up in about 2 weeks (around 5/30/2022) for RN Follow up call.    Todays OPCM Self-Management Care Plan was developed with the patients/caregivers input and was based on identified barriers from todays assessment.  Goals were written today with the patient/caregiver and the patient has agreed to work towards these goals to improve his/her overall well-being. Patient verbalized understanding of the care plan, goals, and all of today's instructions. Encouraged patient/caregiver to communicate with his/her physician and health care team about health conditions and the treatment plan.  Provided my contact information today and encouraged patient/caregiver to call me with any questions as needed.

## 2022-05-17 ENCOUNTER — PATIENT MESSAGE (OUTPATIENT)
Dept: PSYCHIATRY | Facility: CLINIC | Age: 67
End: 2022-05-17
Payer: MEDICARE

## 2022-05-17 ENCOUNTER — INFUSION (OUTPATIENT)
Dept: INFUSION THERAPY | Facility: HOSPITAL | Age: 67
End: 2022-05-17
Attending: INTERNAL MEDICINE
Payer: MEDICARE

## 2022-05-17 VITALS
DIASTOLIC BLOOD PRESSURE: 77 MMHG | HEART RATE: 95 BPM | SYSTOLIC BLOOD PRESSURE: 126 MMHG | TEMPERATURE: 97 F | OXYGEN SATURATION: 100 % | RESPIRATION RATE: 18 BRPM | BODY MASS INDEX: 20.4 KG/M2 | WEIGHT: 122.63 LBS

## 2022-05-17 DIAGNOSIS — C34.31 SQUAMOUS CELL CARCINOMA OF BRONCHUS IN RIGHT LOWER LOBE: ICD-10-CM

## 2022-05-17 DIAGNOSIS — E86.0 DEHYDRATION: ICD-10-CM

## 2022-05-17 DIAGNOSIS — C79.51 BONE METASTASES: Primary | ICD-10-CM

## 2022-05-17 PROCEDURE — 96361 HYDRATE IV INFUSION ADD-ON: CPT

## 2022-05-17 PROCEDURE — 25000003 PHARM REV CODE 250: Performed by: INTERNAL MEDICINE

## 2022-05-17 PROCEDURE — 96360 HYDRATION IV INFUSION INIT: CPT

## 2022-05-17 PROCEDURE — 63600175 PHARM REV CODE 636 W HCPCS: Performed by: INTERNAL MEDICINE

## 2022-05-17 PROCEDURE — A4216 STERILE WATER/SALINE, 10 ML: HCPCS | Performed by: INTERNAL MEDICINE

## 2022-05-17 PROCEDURE — S5010 5% DEXTROSE AND 0.45% SALINE: HCPCS | Performed by: INTERNAL MEDICINE

## 2022-05-17 RX ORDER — SODIUM CHLORIDE 0.9 % (FLUSH) 0.9 %
10 SYRINGE (ML) INJECTION
Status: DISCONTINUED | OUTPATIENT
Start: 2022-05-17 | End: 2022-05-17 | Stop reason: HOSPADM

## 2022-05-17 RX ORDER — HEPARIN 100 UNIT/ML
500 SYRINGE INTRAVENOUS
Status: DISCONTINUED | OUTPATIENT
Start: 2022-05-17 | End: 2022-05-17 | Stop reason: HOSPADM

## 2022-05-17 RX ORDER — SODIUM CHLORIDE 0.9 % (FLUSH) 0.9 %
10 SYRINGE (ML) INJECTION
Status: CANCELLED | OUTPATIENT
Start: 2022-05-17

## 2022-05-17 RX ORDER — HEPARIN 100 UNIT/ML
500 SYRINGE INTRAVENOUS
Status: CANCELLED | OUTPATIENT
Start: 2022-05-17

## 2022-05-17 RX ADMIN — DEXTROSE AND SODIUM CHLORIDE 1000 ML: 5; .45 INJECTION, SOLUTION INTRAVENOUS at 01:05

## 2022-05-17 RX ADMIN — Medication 500 UNITS: at 03:05

## 2022-05-17 RX ADMIN — SODIUM CHLORIDE, PRESERVATIVE FREE 10 ML: 5 INJECTION INTRAVENOUS at 03:05

## 2022-05-17 NOTE — PROGRESS NOTES
Outpatient Care Management   - Care Plan Follow Up    Patient: Sheri Broderick  MRN:  [id number]  Date of Service:  5/17/2022  Completed by:  Rebeca Lam LCSW  Referral Date: 11/17/2021  Program: Case Management (High Risk)    Reason for Visit   Patient presents with    OPCM Chart Review    Update Plan Of Care       Brief Summary: OPCM RN and  completed follow up call together with pt who reports pain from her shoulders down to her legs.  Pt states the pain is so bad that pt reports it was making her cry on Sunday.  Pt states the pain has gotten worse since she returned from [facility].  RN and  encouraged pt to message her doctors to report the pain she is experiencing.  Pt states she will do so.  Pt's niece coming to stay with pt from Tuesday thru Thursday each week so pt can go to [facility] for her infusions.  Pt states she was told that she would not need to go back to [facility] for another 3 mos.  As far as any chemo tx, pt was unsure because she reports that her drs are waiting to see after 3 mos and how she responded to the radiation tx.  Pt reports her appetite is improving and was fixing a bagel at the time of the call.  No social work needs verbalized today.  Will follow up with pt in 2 weeks.     Complex Care Plan     Care plan was discussed and completed today with input from patient and/or caregiver.    Patient Instructions     Follow up in about 2 weeks (around 5/30/2022) for call with SW.    Todays OPCM Self-Management Care Plan was developed with the patients/caregivers input and was based on identified barriers from todays assessment.  Goals were written today with the patient/caregiver and the patient has agreed to work towards these goals to improve his/her overall well-being. Patient verbalized understanding of the care plan, goals, and all of today's instructions. Encouraged patient/caregiver to communicate with his/her physician and health care team  about health conditions and the treatment plan.  Provided my contact information today and encouraged patient/caregiver to call me with any questions as needed.

## 2022-05-18 ENCOUNTER — PATIENT MESSAGE (OUTPATIENT)
Dept: HEMATOLOGY/ONCOLOGY | Facility: CLINIC | Age: 67
End: 2022-05-18

## 2022-05-19 ENCOUNTER — PATIENT MESSAGE (OUTPATIENT)
Dept: FAMILY MEDICINE | Facility: CLINIC | Age: 67
End: 2022-05-19
Payer: MEDICARE

## 2022-05-19 NOTE — TELEPHONE ENCOUNTER
Spoke with [facility] they no longer have a psychiatric nurse available for this area.  Any other suggestions?

## 2022-05-24 ENCOUNTER — INFUSION (OUTPATIENT)
Dept: INFUSION THERAPY | Facility: HOSPITAL | Age: 67
End: 2022-05-24
Attending: INTERNAL MEDICINE
Payer: MEDICARE

## 2022-05-24 VITALS
HEIGHT: 65 IN | OXYGEN SATURATION: 97 % | DIASTOLIC BLOOD PRESSURE: 81 MMHG | SYSTOLIC BLOOD PRESSURE: 134 MMHG | RESPIRATION RATE: 18 BRPM | HEART RATE: 95 BPM | WEIGHT: 121.5 LBS | BODY MASS INDEX: 20.24 KG/M2 | TEMPERATURE: 98 F

## 2022-05-24 DIAGNOSIS — C79.51 BONE METASTASES: Primary | ICD-10-CM

## 2022-05-24 DIAGNOSIS — E86.0 DEHYDRATION: ICD-10-CM

## 2022-05-24 DIAGNOSIS — C34.31 SQUAMOUS CELL CARCINOMA OF BRONCHUS IN RIGHT LOWER LOBE: ICD-10-CM

## 2022-05-24 PROCEDURE — 96360 HYDRATION IV INFUSION INIT: CPT

## 2022-05-24 PROCEDURE — A4216 STERILE WATER/SALINE, 10 ML: HCPCS | Performed by: INTERNAL MEDICINE

## 2022-05-24 PROCEDURE — 25000003 PHARM REV CODE 250: Performed by: INTERNAL MEDICINE

## 2022-05-24 PROCEDURE — 96361 HYDRATE IV INFUSION ADD-ON: CPT

## 2022-05-24 PROCEDURE — S5010 5% DEXTROSE AND 0.45% SALINE: HCPCS | Performed by: INTERNAL MEDICINE

## 2022-05-24 PROCEDURE — 63600175 PHARM REV CODE 636 W HCPCS: Performed by: INTERNAL MEDICINE

## 2022-05-24 RX ORDER — HEPARIN 100 UNIT/ML
500 SYRINGE INTRAVENOUS
Status: CANCELLED | OUTPATIENT
Start: 2022-05-24

## 2022-05-24 RX ORDER — SODIUM CHLORIDE 0.9 % (FLUSH) 0.9 %
10 SYRINGE (ML) INJECTION
Status: DISCONTINUED | OUTPATIENT
Start: 2022-05-24 | End: 2022-05-24 | Stop reason: HOSPADM

## 2022-05-24 RX ORDER — HEPARIN 100 UNIT/ML
500 SYRINGE INTRAVENOUS
Status: DISCONTINUED | OUTPATIENT
Start: 2022-05-24 | End: 2022-05-24 | Stop reason: HOSPADM

## 2022-05-24 RX ORDER — SODIUM CHLORIDE 0.9 % (FLUSH) 0.9 %
10 SYRINGE (ML) INJECTION
Status: CANCELLED | OUTPATIENT
Start: 2022-05-24

## 2022-05-24 RX ADMIN — DEXTROSE AND SODIUM CHLORIDE 1000 ML: 5; .45 INJECTION, SOLUTION INTRAVENOUS at 01:05

## 2022-05-24 RX ADMIN — SODIUM CHLORIDE, PRESERVATIVE FREE 10 ML: 5 INJECTION INTRAVENOUS at 03:05

## 2022-05-24 RX ADMIN — Medication 500 UNITS: at 03:05

## 2022-05-25 ENCOUNTER — OFFICE VISIT (OUTPATIENT)
Dept: PSYCHIATRY | Facility: CLINIC | Age: 67
End: 2022-05-25
Payer: MEDICARE

## 2022-05-25 ENCOUNTER — TELEPHONE (OUTPATIENT)
Dept: PSYCHIATRY | Facility: CLINIC | Age: 67
End: 2022-05-25
Payer: MEDICARE

## 2022-05-25 DIAGNOSIS — F33.1 MDD (MAJOR DEPRESSIVE DISORDER), RECURRENT EPISODE, MODERATE: Primary | ICD-10-CM

## 2022-05-25 PROCEDURE — 90834 PSYTX W PT 45 MINUTES: CPT | Mod: FQ,95,, | Performed by: PSYCHOLOGIST

## 2022-05-25 PROCEDURE — 90834 PR PSYCHOTHERAPY W/PATIENT, 45 MIN: ICD-10-PCS | Mod: FQ,95,, | Performed by: PSYCHOLOGIST

## 2022-05-25 NOTE — TELEPHONE ENCOUNTER
----- Message from Kam Holbrook PsyD sent at 5/25/2022 10:40 AM CDT -----  FU 1 month audio, please

## 2022-05-25 NOTE — TELEPHONE ENCOUNTER
Scheduled follow up audio visit for 6/22/22 at 11:00 AM.  Patient agreed to appointment date and time.

## 2022-05-25 NOTE — PROGRESS NOTES
Established Patient - Audio Only Telehealth Visit     The patient location is: home ([facility] [place])  Visit type: Virtual visit with audio only (telephone)       The reason for the audio only service rather than synchronous audio and video virtual visit was related to technical difficulties or patient preference/necessity.     Each patient to whom I provide medical services by telemedicine is:  (1) informed of the relationship between the physician and patient and the respective role of any other health care provider with respect to management of the patient; and (2) notified that they may decline to receive medical services by telemedicine and may withdraw from such care at any time. Patient verbally consented to receive this service via voice-only telephone call.       PSYCHO-ONCOLOGY NOTE/ Individual Psychotherapy     Date: 5/25/2022   Site:  [facility]      Therapeutic Intervention: Met with patient and caregiver.  Outpatient - Behavior modifying psychotherapy 45 min - CPT code 55824 and Outpatient - Supportive psychotherapy 45 min - CPT Code 74524      Patient was last seen by me on 3/23/2022    Problem list  Patient Active Problem List   Diagnosis    MDD (major depressive disorder), recurrent episode, moderate    Generalized anxiety disorder    Tremor    Osteoporosis, senile    Neuropraxia of median nerve    Hyperlipidemia    Degenerative disc disease, cervical    DDD (degenerative disc disease), lumbar    Fibromyalgia    Constipation    Benzodiazepine dependence    OCD (obsessive compulsive disorder)    Malignant neoplasm of lower lobe of right lung-Adenocarcinoma    Chemotherapy-induced neutropenia    Malignant neoplasm of lower lobe of right lung    Encounter for other specified aftercare    Anemia associated with chemotherapy    Muscle spasm    Panic disorder without agoraphobia    Essential hypertension    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    Cancer associated  "pain    PTSD (post-traumatic stress disorder)    Personality disorder    Dehydration    Nausea    GERD (gastroesophageal reflux disease)    Bone metastases    Thrombocytopenia, unspecified    Closed fracture of left zygomatic arch with routine healing    Frailty    Macrocytosis       Chief complaint/reason for encounter: depression, adaptive coping   Met with patient to evaluate psychosocial adaptation to diagnosis/treatment of malignant neoplasm of lower lobe of right lung    Current Medications  Current Outpatient Medications   Medication    famotidine (PEPCID) 40 MG tablet    fentaNYL (DURAGESIC) 75 mcg/hr    lamoTRIgine (LAMICTAL) 100 MG tablet    megestroL (MEGACE) 400 mg/10 mL (40 mg/mL) Susp    memantine (NAMENDA) 10 MG Tab    morphine (MSIR) 15 MG tablet    multivitamin (THERAGRAN) per tablet    OLANZapine (ZYPREXA) 5 MG tablet    ondansetron (ZOFRAN-ODT) 8 MG TbDL    pantoprazole (PROTONIX) 40 MG tablet    prochlorperazine (COMPAZINE) 10 MG tablet    promethazine (PHENERGAN) 25 MG suppository    senna (SENOKOT) 8.6 mg tablet    sertraline (ZOLOFT) 100 MG tablet     No current facility-administered medications for this visit.       Objective:  Sheri Broderick arrived promptly for the session. Her niece, Meseret, was also present at the end of the appt, with the consent of Sheri Broderick. The patient was fully cooperative and pleasant throughout the session.  Appearance: N/A  Behavior/Cooperation: friendly and cooperative  Speech: appropriate quality, quantity and organization of sentences, quiet and slowed   Mood: "tired"  Affect: flat, mood congruent and appropriate  Thought Process: goal-directed, logical  Thought Content: normal,  No delusions or paranoia; did not appear to be responding to internal stimuli during the session  Orientation: grossly intact  Memory: grossly intact  Attention Span/Concentration: Attends to session without distraction; reports no " "difficulty  Fund of Knowledge: average  Estimate of Intelligence: average from verbal skills and history  Cognition: grossly intact  Insight: patient has awareness of illness; good insight into own behavior and behavior of others  Judgment: the patient's behavior is adequate to circumstances    Interval history and content of current session: Discussed current adaptation to illness, tx, and illness progression. Reports to be coping in a passive manner, expressing that she feels that she has "little control" in her present situation. Associated negative cognitions are of a moderate severity and are further connected to limited self-efficacy. Provided cognitive behavioral therapy to address negative cognitions, further exploring strategies to aid in cognitive engagement/add variety (puzzles, word searches, etc.). Reviewed said strategies w/ niece at end of appt to ensure a higher degree of adherence. Reported frustrations associated w/ home health referrals. Described relative mood stability since prior appt, inpt stay, and associated changes to medication management. Identified and evaluated psychosocial and environmental stressors secondary to illness advancement and treatment.  Examined additional proactive behaviors that may be implemented to minimize or ameliorate psychosocial stressors secondary to diagnosis and treatment.     Risk parameters:   Patient reports no suicidal ideation  Patient reports no homicidal ideation  Patient reports no self-injurious behavior  Patient reports no violent behavior   Safety needs:  None at this time      Verbal deficits: None     Patient's response to intervention:The patient's response to intervention is accepting.     Progress toward goals and other mental status changes:  The patient's progress toward goals is fair .      Progress to date:Progress - Ongoing, but Slow      Goals from last visit: Met        Patient Strengths: verbal, successful, good social support, good " insight, commitment to wellness, strong cultural traditions      Treatment Plan:individual psychotherapy and medication management by physician  · Target symptoms: recurrent depression, anxiety   · Why chosen therapy is appropriate versus another modality: relevant to diagnosis, patient responds to this modality, evidence based practice  · Outcome monitoring methods: self-report, observation, feedback from family  · Therapeutic intervention type: behavior modifying psychotherapy, supportive psychotherapy  · Prognosis: Fair      Behavioral goals:    Exercise: as medically advised/PT   Stress management: medication management   Social engagement: continued   Therapy: cognitive engagement, cognitive restructuring      Return to clinic: 1 month     Length of Service (minutes direct face-to-face contact): 45    Diagnosis:     ICD-10-CM ICD-9-CM   1. MDD (major depressive disorder), recurrent episode, moderate  F33.1 296.32                Kam Holbrook Psy.D.  [place] License #[id number]  [place] License #[id number]                          This service was not originating from a related E/M service provided within the previous 7 days nor will  to an E/M service or procedure within the next 24 hours or my soonest available appointment.  Prevailing standard of care was able to be met in this audio-only visit.

## 2022-05-26 ENCOUNTER — INFUSION (OUTPATIENT)
Dept: INFUSION THERAPY | Facility: HOSPITAL | Age: 67
End: 2022-05-26
Attending: INTERNAL MEDICINE
Payer: MEDICARE

## 2022-05-26 VITALS
HEART RATE: 97 BPM | HEIGHT: 65 IN | SYSTOLIC BLOOD PRESSURE: 137 MMHG | BODY MASS INDEX: 20.66 KG/M2 | DIASTOLIC BLOOD PRESSURE: 80 MMHG | RESPIRATION RATE: 18 BRPM | TEMPERATURE: 98 F | WEIGHT: 124 LBS

## 2022-05-26 DIAGNOSIS — C79.51 BONE METASTASES: Primary | ICD-10-CM

## 2022-05-26 DIAGNOSIS — E86.0 DEHYDRATION: ICD-10-CM

## 2022-05-26 DIAGNOSIS — C34.31 SQUAMOUS CELL CARCINOMA OF BRONCHUS IN RIGHT LOWER LOBE: ICD-10-CM

## 2022-05-26 PROCEDURE — 63600175 PHARM REV CODE 636 W HCPCS: Performed by: INTERNAL MEDICINE

## 2022-05-26 PROCEDURE — 96361 HYDRATE IV INFUSION ADD-ON: CPT

## 2022-05-26 PROCEDURE — S5010 5% DEXTROSE AND 0.45% SALINE: HCPCS | Performed by: INTERNAL MEDICINE

## 2022-05-26 PROCEDURE — 25000003 PHARM REV CODE 250: Performed by: INTERNAL MEDICINE

## 2022-05-26 PROCEDURE — 96360 HYDRATION IV INFUSION INIT: CPT

## 2022-05-26 RX ORDER — SODIUM CHLORIDE 0.9 % (FLUSH) 0.9 %
10 SYRINGE (ML) INJECTION
Status: DISCONTINUED | OUTPATIENT
Start: 2022-05-26 | End: 2022-05-26 | Stop reason: HOSPADM

## 2022-05-26 RX ORDER — HEPARIN 100 UNIT/ML
500 SYRINGE INTRAVENOUS
Status: DISCONTINUED | OUTPATIENT
Start: 2022-05-26 | End: 2022-05-26 | Stop reason: HOSPADM

## 2022-05-26 RX ORDER — HEPARIN 100 UNIT/ML
500 SYRINGE INTRAVENOUS
Status: CANCELLED | OUTPATIENT
Start: 2022-05-26

## 2022-05-26 RX ORDER — SODIUM CHLORIDE 0.9 % (FLUSH) 0.9 %
10 SYRINGE (ML) INJECTION
Status: CANCELLED | OUTPATIENT
Start: 2022-05-26

## 2022-05-26 RX ADMIN — Medication 500 UNITS: at 10:05

## 2022-05-26 RX ADMIN — DEXTROSE AND SODIUM CHLORIDE 1000 ML: 5; .45 INJECTION, SOLUTION INTRAVENOUS at 08:05

## 2022-05-26 NOTE — PLAN OF CARE
Problem: Fatigue  Goal: Improved Activity Tolerance  5/26/2022 0843 by Geeta Nguyen RN  Outcome: Met  5/26/2022 0843 by Geeta Nguyen RN  Outcome: Ongoing, Progressing

## 2022-05-30 ENCOUNTER — OUTPATIENT CASE MANAGEMENT (OUTPATIENT)
Dept: ADMINISTRATIVE | Facility: OTHER | Age: 67
End: 2022-05-30
Payer: MEDICARE

## 2022-05-30 NOTE — LETTER
June 13, 2022    Sheri Broderick  [address]  [place] [address]             [facility] [address] [place] [address] Dear Ms Broderick:    I work with [facility]'s Outpatient Case Management Department. I have been unsuccessful at reaching you to follow-up to see how you have been doing. Please call me back at your earliest convenience to discuss your health care needs.      I can be reached  from 8:00AM to 4:30 PM on Monday thru Friday. [facility] is a program offered through [facility] where a nurse is available 24/7 to answer questions or provide medical advice, their number is [phone].    Thanks,  Eliza Rosario, RN [facility] [phone]  Rebeca Lam, [facility]     [phone]  Outpatient Case Management

## 2022-05-30 NOTE — PROGRESS NOTES
05/30/22-Conference call with SW to patient. Attempt follow up with  patient for outpatient case management. No answer. Left message requesting call back. RN OPCM 1'st follow up attempt.   06/13/22--Conference call with SW to patient. Attempt follow up with  patient for outpatient case management. No answer. Left message requesting call back. RN OPCM 2'nd follow up attempt. Letter mailed.   06/27/22-Received notification from SW that patient has been admitted to hospice care. Will close case and dis-enroll from OPCM.

## 2022-05-31 ENCOUNTER — PATIENT MESSAGE (OUTPATIENT)
Dept: HEMATOLOGY/ONCOLOGY | Facility: CLINIC | Age: 67
End: 2022-05-31

## 2022-05-31 ENCOUNTER — PATIENT MESSAGE (OUTPATIENT)
Dept: ADMINISTRATIVE | Facility: HOSPITAL | Age: 67
End: 2022-05-31
Payer: MEDICARE

## 2022-05-31 ENCOUNTER — INFUSION (OUTPATIENT)
Dept: INFUSION THERAPY | Facility: HOSPITAL | Age: 67
End: 2022-05-31
Attending: INTERNAL MEDICINE
Payer: MEDICARE

## 2022-05-31 VITALS
WEIGHT: 121.13 LBS | HEIGHT: 65 IN | BODY MASS INDEX: 20.18 KG/M2 | SYSTOLIC BLOOD PRESSURE: 147 MMHG | HEART RATE: 104 BPM | TEMPERATURE: 98 F | RESPIRATION RATE: 18 BRPM | DIASTOLIC BLOOD PRESSURE: 80 MMHG

## 2022-05-31 DIAGNOSIS — C79.51 BONE METASTASES: Primary | ICD-10-CM

## 2022-05-31 DIAGNOSIS — E86.0 DEHYDRATION: ICD-10-CM

## 2022-05-31 DIAGNOSIS — C34.31 SQUAMOUS CELL CARCINOMA OF BRONCHUS IN RIGHT LOWER LOBE: ICD-10-CM

## 2022-05-31 PROCEDURE — A4216 STERILE WATER/SALINE, 10 ML: HCPCS | Performed by: INTERNAL MEDICINE

## 2022-05-31 PROCEDURE — S5010 5% DEXTROSE AND 0.45% SALINE: HCPCS | Performed by: INTERNAL MEDICINE

## 2022-05-31 PROCEDURE — 96361 HYDRATE IV INFUSION ADD-ON: CPT

## 2022-05-31 PROCEDURE — 25000003 PHARM REV CODE 250: Performed by: INTERNAL MEDICINE

## 2022-05-31 PROCEDURE — 63600175 PHARM REV CODE 636 W HCPCS: Performed by: INTERNAL MEDICINE

## 2022-05-31 PROCEDURE — 96360 HYDRATION IV INFUSION INIT: CPT

## 2022-05-31 RX ORDER — HEPARIN 100 UNIT/ML
500 SYRINGE INTRAVENOUS
Status: DISCONTINUED | OUTPATIENT
Start: 2022-05-31 | End: 2022-05-31 | Stop reason: HOSPADM

## 2022-05-31 RX ORDER — SODIUM CHLORIDE 0.9 % (FLUSH) 0.9 %
10 SYRINGE (ML) INJECTION
Status: CANCELLED | OUTPATIENT
Start: 2022-05-31

## 2022-05-31 RX ORDER — HEPARIN 100 UNIT/ML
500 SYRINGE INTRAVENOUS
Status: CANCELLED | OUTPATIENT
Start: 2022-05-31

## 2022-05-31 RX ORDER — SODIUM CHLORIDE 0.9 % (FLUSH) 0.9 %
10 SYRINGE (ML) INJECTION
Status: DISCONTINUED | OUTPATIENT
Start: 2022-05-31 | End: 2022-05-31 | Stop reason: HOSPADM

## 2022-05-31 RX ADMIN — SODIUM CHLORIDE, PRESERVATIVE FREE 10 ML: 5 INJECTION INTRAVENOUS at 01:05

## 2022-05-31 RX ADMIN — Medication 500 UNITS: at 01:05

## 2022-05-31 RX ADMIN — DEXTROSE AND SODIUM CHLORIDE 1000 ML: 5; .45 INJECTION, SOLUTION INTRAVENOUS at 11:05

## 2022-05-31 NOTE — PLAN OF CARE
Problem: Activity Intolerance  Goal: Enhanced Capacity and Energy  5/31/2022 1115 by Geeta Nguyen RN  Outcome: Met  5/31/2022 1115 by Geeta Nguyen RN  Outcome: Ongoing, Progressing

## 2022-06-01 DIAGNOSIS — G89.3 CANCER ASSOCIATED PAIN: Primary | ICD-10-CM

## 2022-06-01 DIAGNOSIS — C34.31 MALIGNANT NEOPLASM OF LOWER LOBE OF RIGHT LUNG: ICD-10-CM

## 2022-06-01 RX ORDER — FENTANYL 75 UG/H
1 PATCH TRANSDERMAL
Qty: 10 PATCH | Refills: 0 | Status: SHIPPED | OUTPATIENT
Start: 2022-06-01

## 2022-06-02 ENCOUNTER — INFUSION (OUTPATIENT)
Dept: INFUSION THERAPY | Facility: HOSPITAL | Age: 67
End: 2022-06-02
Attending: INTERNAL MEDICINE
Payer: MEDICARE

## 2022-06-02 ENCOUNTER — OFFICE VISIT (OUTPATIENT)
Dept: HEMATOLOGY/ONCOLOGY | Facility: CLINIC | Age: 67
End: 2022-06-02
Payer: MEDICARE

## 2022-06-02 VITALS
HEART RATE: 109 BPM | DIASTOLIC BLOOD PRESSURE: 82 MMHG | SYSTOLIC BLOOD PRESSURE: 128 MMHG | BODY MASS INDEX: 20.66 KG/M2 | HEIGHT: 65 IN | WEIGHT: 124 LBS | TEMPERATURE: 98 F | RESPIRATION RATE: 18 BRPM

## 2022-06-02 VITALS
TEMPERATURE: 98 F | DIASTOLIC BLOOD PRESSURE: 76 MMHG | SYSTOLIC BLOOD PRESSURE: 138 MMHG | BODY MASS INDEX: 20.66 KG/M2 | HEIGHT: 65 IN | RESPIRATION RATE: 18 BRPM | HEART RATE: 106 BPM | WEIGHT: 124 LBS

## 2022-06-02 DIAGNOSIS — G89.3 CANCER ASSOCIATED PAIN: ICD-10-CM

## 2022-06-02 DIAGNOSIS — R63.0 ANOREXIA: ICD-10-CM

## 2022-06-02 DIAGNOSIS — E86.0 DEHYDRATION: ICD-10-CM

## 2022-06-02 DIAGNOSIS — C34.31 SQUAMOUS CELL CARCINOMA OF BRONCHUS IN RIGHT LOWER LOBE: ICD-10-CM

## 2022-06-02 DIAGNOSIS — C34.31 MALIGNANT NEOPLASM OF LOWER LOBE OF RIGHT LUNG: Primary | Chronic | ICD-10-CM

## 2022-06-02 DIAGNOSIS — C79.51 BONE METASTASES: Primary | ICD-10-CM

## 2022-06-02 DIAGNOSIS — C79.51 BONE METASTASES: ICD-10-CM

## 2022-06-02 PROCEDURE — A4216 STERILE WATER/SALINE, 10 ML: HCPCS | Performed by: INTERNAL MEDICINE

## 2022-06-02 PROCEDURE — 99213 OFFICE O/P EST LOW 20 MIN: CPT | Mod: S$GLB,,, | Performed by: NURSE PRACTITIONER

## 2022-06-02 PROCEDURE — 25000003 PHARM REV CODE 250: Performed by: INTERNAL MEDICINE

## 2022-06-02 PROCEDURE — 99213 PR OFFICE/OUTPT VISIT, EST, LEVL III, 20-29 MIN: ICD-10-PCS | Mod: S$GLB,,, | Performed by: NURSE PRACTITIONER

## 2022-06-02 PROCEDURE — 96360 HYDRATION IV INFUSION INIT: CPT

## 2022-06-02 PROCEDURE — S5010 5% DEXTROSE AND 0.45% SALINE: HCPCS | Performed by: INTERNAL MEDICINE

## 2022-06-02 PROCEDURE — 63600175 PHARM REV CODE 636 W HCPCS: Performed by: INTERNAL MEDICINE

## 2022-06-02 PROCEDURE — 96361 HYDRATE IV INFUSION ADD-ON: CPT

## 2022-06-02 RX ORDER — HEPARIN 100 UNIT/ML
500 SYRINGE INTRAVENOUS
Status: CANCELLED | OUTPATIENT
Start: 2022-06-02

## 2022-06-02 RX ORDER — SODIUM CHLORIDE 0.9 % (FLUSH) 0.9 %
10 SYRINGE (ML) INJECTION
Status: DISCONTINUED | OUTPATIENT
Start: 2022-06-02 | End: 2022-06-02 | Stop reason: HOSPADM

## 2022-06-02 RX ORDER — HEPARIN 100 UNIT/ML
500 SYRINGE INTRAVENOUS
Status: DISCONTINUED | OUTPATIENT
Start: 2022-06-02 | End: 2022-06-02 | Stop reason: HOSPADM

## 2022-06-02 RX ORDER — SODIUM CHLORIDE 0.9 % (FLUSH) 0.9 %
10 SYRINGE (ML) INJECTION
Status: CANCELLED | OUTPATIENT
Start: 2022-06-02

## 2022-06-02 RX ADMIN — HEPARIN 500 UNITS: 100 SYRINGE at 12:06

## 2022-06-02 RX ADMIN — DEXTROSE AND SODIUM CHLORIDE 1000 ML: 5; .45 INJECTION, SOLUTION INTRAVENOUS at 10:06

## 2022-06-02 RX ADMIN — SODIUM CHLORIDE, PRESERVATIVE FREE 10 ML: 5 INJECTION INTRAVENOUS at 12:06

## 2022-06-02 NOTE — PROGRESS NOTES
PROGRESS NOTE    Subjective:       Patient ID: Sheri Broderick is a 66 y.o. female.    S/P RLL Lobectomy: 4/27/2018  2.4cm adenocarcinoma  1/4 LNs pos for cancer(station 10)  Completed Cis/Alimta x 4 8/10/2018    XRT to L2-L4 and left hip 9/18/2019    Recurrence biopsy proven L3 8/7/2019  L3 lesion biopsy 8/7/2019:  Adenocarcinoma c/w lung primary  PD-L1: 9%  ALK neg  Ros-1 Neg  EGFR Neg    Nivolumab therapy x 6  10/22/2019-1/9/2020--progression    Radiation:  T5-T8--due to pain  Also XRT to C1, T1/2 and T11/12 at [facility]    Liquid Bx done at [facility]  Post: ERBB2 mutation    7/15/2020  Completed whole brain XRT for Brain mets    8/6/2020:  Began Enhertu(Tras-deruxtecan)  Monthly x as of 3/2021  Cycle 7: 4/28/2021(in [place])  Cycle 8: 5/19/2021  Cycle 9: 6/16/2021-due    9/2/2021:  PET: no pulmonary nodules  Increase size and uptake of T3, T4 and L2 vertebral bodies.   Partial note from MD Zuhair 9/8/2021:  Today's restaging scans show continued excellent response to Enhertu. We will therefore proceed with her next cycle of Enhertu today as scheduled pending today's labs. She will return for follow up in 4 weeks.    1/26/2022:  PET:(compared to 10/20/2021)  New 7mm right hilar node.   Progression of activity throughout the axial skeleton    2/7/2022:  MRI C/T/L spine:  L1 vertebral body met lesion increased in size.     Chief Complaint:  Lung cancer follow up.     History of Present Illness:   Sheri Broderick is a 66 y.o. female who presents for follow up of above.     Patient had MRI of the L-spine done which showed Progression.  She had radiation to upper spine at [facility]. She is having a PET scan and following up with Dr. Carrillo June 27-28.     She is using Megace which is helping with her appetite. Pain seems to be under control. She continues to get hydration twice a week which is helping.    Patient's last tras/derux was 1/12/2022.      1/12/2022  MRI L-Spine:   Interval growth of L1 metastases with development of a new L2 metastases indicative of progression      11/4/2021  PET at [facility]:  No change in diffuse sclerotic bone metastases on CT. The majority of the bone lesions are not FDG avid. Of the FDG avid bone lesions, a few lesions in the thoracic and lumbar spine show increase in FDG uptake. FDG avid left adrenal metastasis is unchanged. No new disease.      Patient had a fall on 11/4/2021 and fractured her zygomatic arch and a subarachnoid bleed.  New CT scan done 11/17 showed improvement.  She is on clinidamycin at this time.      PMH.    Patient has been started on tras/derux and has been doing well on this.  She has had a very good clinical response and is tolerating this fairly well.  She does get significant dehydration and is getting fluids twice weekly to assist.      She states she is feeling pretty good at this time.      She moved to [place] with a life-long friend and stayed out there until now.  She came back home to [place] to take care of her home and she felt it was just time to be back home.     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  She is off anticoagulation as of todays visit, 4/6/2021.       Enhertu Monitoring Parameters   Human epidermal growth factor receptor 2 status. CBC (prior to treatment initiation, prior to each dose, and as clinically indicated). Assess left ventricular ejection fraction prior to fam-trastuzumab deruxtecan initiation and at regular intervals during treatment as clinically indicated. Evaluate pregnancy status prior to therapy (in females of reproductive potential). Monitor for signs/symptoms of interstitial lung disease/pneumonitis (cough, dyspnea, fever, and/or any new or worsening respiratory symptoms; radiographic imaging for suspected ILD) and for infusion reactions.  The American Society of Clinical Oncology hepatitis B virus (HBV) screening and management  provisional clinical opinion (ASCO [[place] 2020]) recommends HBV screening with hepatitis B surface antigen, hepatitis B core antibody, total Ig or IgG, and antibody to hepatitis B surface antigen prior to beginning (or at the beginning of) systemic anticancer therapy; do not delay treatment for screening/results. Detection of chronic or past HBV infection requires a risk assessment to determine antiviral prophylaxis requirements, monitoring, and follow up.    Family and Social history reviewed and is unchanged from last visit.       ROS:  Review of Systems   Constitutional: Positive for fatigue. Negative for fever.   Respiratory: Negative for shortness of breath.    Cardiovascular: Negative for chest pain and leg swelling.   Gastrointestinal: Negative for abdominal pain and blood in stool.   Genitourinary: Negative for hematuria.   Musculoskeletal: Positive for back pain and neck pain.   Skin: Negative for rash.          Current Outpatient Medications:     famotidine (PEPCID) 40 MG tablet, Take 1 tablet (40 mg total) by mouth every evening., Disp: 30 tablet, Rfl: 11    fentaNYL (DURAGESIC) 75 mcg/hr, Place 1 patch onto the skin every 72 hours., Disp: 10 patch, Rfl: 0    lamoTRIgine (LAMICTAL) 100 MG tablet, Take 2 tablets (200 mg total) by mouth every evening., Disp: 270 tablet, Rfl: 0    megestroL (MEGACE) 400 mg/10 mL (40 mg/mL) Susp, Take 10 mLs (400 mg total) by mouth 2 (two) times daily. (Patient not taking: No sig reported), Disp: 600 mL, Rfl: 11    memantine (NAMENDA) 10 MG Tab, TAKE 1 TABLET(10 MG) BY MOUTH TWICE DAILY, Disp: 180 tablet, Rfl: 0    morphine (MSIR) 15 MG tablet, Take one-half tablet (7.5 mg total) by mouth every 4 (four) hours as needed for Pain., Disp: 20 tablet, Rfl: 0    multivitamin (THERAGRAN) per tablet, Take 1 tablet by mouth once daily., Disp: , Rfl:     OLANZapine (ZYPREXA) 5 MG tablet, TAKE 1 TABLET BY MOUTH EVERY NIGHT, Disp: 90 tablet, Rfl: 0    ondansetron (ZOFRAN-ODT) 8  "MG TbDL, DISSOLVE 1 TABLET(8 MG) ON THE TONGUE EVERY 8 HOURS AS NEEDED, Disp: 30 tablet, Rfl: 5    pantoprazole (PROTONIX) 40 MG tablet, Take 1 tablet (40 mg total) by mouth 2 (two) times daily., Disp: 180 tablet, Rfl: 3    prochlorperazine (COMPAZINE) 10 MG tablet, Take 1 tablet (10 mg total) by mouth every 6 (six) hours as needed (as needed for nausea). (Patient not taking: Reported on 4/7/2022), Disp: 30 tablet, Rfl: 3    promethazine (PHENERGAN) 25 MG suppository, Place 1 suppository (25 mg total) rectally every 6 (six) hours as needed for Nausea., Disp: 10 suppository, Rfl: 0    senna (SENOKOT) 8.6 mg tablet, Take 2-4 tablets by mouth 2 (two) times daily. , Disp: , Rfl:     sertraline (ZOLOFT) 100 MG tablet, Take 1 tablet (100 mg total) by mouth every evening., Disp: , Rfl:   No current facility-administered medications for this visit.    Facility-Administered Medications Ordered in Other Visits:     heparin, porcine (PF) 100 unit/mL injection flush 500 Units, 500 Units, Intravenous, PRN, Segundo Zhang MD, 500 Units at 06/02/22 1229    sodium chloride 0.9% flush 10 mL, 10 mL, Intravenous, PRN, Segundo Zhang MD, 10 mL at 06/02/22 1229        Objective:       Physical Examination:     /82   Pulse 109   Temp 97.9 °F (36.6 °C)   Resp 18   Ht 5' 5" (1.651 m)   Wt 56.2 kg (124 lb)   BMI 20.63 kg/m²     Physical Exam  Constitutional:       Appearance: Normal appearance. She is well-developed. She is ill-appearing.   HENT:      Head: Normocephalic and atraumatic.      Right Ear: External ear normal.      Left Ear: External ear normal.   Eyes:      Conjunctiva/sclera: Conjunctivae normal.      Pupils: Pupils are equal, round, and reactive to light.   Neck:      Thyroid: No thyromegaly.      Trachea: No tracheal deviation.   Cardiovascular:      Rate and Rhythm: Normal rate and regular rhythm.      Heart sounds: Normal heart sounds.   Pulmonary:      Effort: Pulmonary effort is normal.    "   Breath sounds: Normal breath sounds.   Abdominal:      General: Bowel sounds are normal. There is no distension.      Palpations: Abdomen is soft. There is no mass.      Tenderness: There is no abdominal tenderness.   Musculoskeletal:      Right lower leg: No edema.      Left lower leg: No edema.   Skin:     General: Skin is warm and dry.      Findings: No rash.   Neurological:      General: No focal deficit present.      Mental Status: She is alert and oriented to person, place, and time.      Comments: Neuro intact througout   Psychiatric:         Mood and Affect: Mood normal.         Behavior: Behavior normal.         Thought Content: Thought content normal.         Judgment: Judgment normal.         Labs:   No results found for this or any previous visit (from the past 336 hour(s)).  CMP  Sodium   Date Value Ref Range Status   03/24/2022 138 136 - 145 mmol/L Final   10/09/2018 140 134 - 144 mmol/L      Potassium   Date Value Ref Range Status   03/24/2022 3.5 3.5 - 5.1 mmol/L Final     Chloride   Date Value Ref Range Status   03/24/2022 102 95 - 110 mmol/L Final   10/09/2018 105 98 - 110 mmol/L      CO2   Date Value Ref Range Status   03/24/2022 28 23 - 29 mmol/L Final     Glucose   Date Value Ref Range Status   03/24/2022 108 70 - 110 mg/dL Final   10/09/2018 101 (H) 70 - 99 mg/dL      BUN   Date Value Ref Range Status   03/24/2022 9 8 - 23 mg/dL Final     Creatinine   Date Value Ref Range Status   03/24/2022 0.6 0.5 - 1.4 mg/dL Final   10/09/2018 0.80 0.60 - 1.40 mg/dL    11/29/2012 0.7 0.5 - 1.4 mg/dL Final     Calcium   Date Value Ref Range Status   03/24/2022 9.5 8.7 - 10.5 mg/dL Final   11/29/2012 9.0 8.7 - 10.5 mg/dL Final     Total Protein   Date Value Ref Range Status   03/23/2022 7.3 6.0 - 8.4 g/dL Final     Albumin   Date Value Ref Range Status   03/23/2022 3.8 3.5 - 5.2 g/dL Final   10/09/2018 4.1 3.1 - 4.7 g/dL      Total Bilirubin   Date Value Ref Range Status   03/23/2022 0.6 0.1 - 1.0 mg/dL  Final     Comment:     For infants and newborns, interpretation of results should be based  on gestational age, weight and in agreement with clinical  observations.    Premature Infant recommended reference ranges:  Up to 24 hours.............<8.0 mg/dL  Up to 48 hours............<12.0 mg/dL  3-5 days..................<15.0 mg/dL  6-29 days.................<15.0 mg/dL       Alkaline Phosphatase   Date Value Ref Range Status   03/23/2022 113 55 - 135 U/L Final     AST   Date Value Ref Range Status   03/23/2022 15 10 - 40 U/L Final     ALT   Date Value Ref Range Status   03/23/2022 9 (L) 10 - 44 U/L Final     Anion Gap   Date Value Ref Range Status   03/24/2022 8 8 - 16 mmol/L Final   11/29/2012 10 5 - 15 meq/L Final     eGFR if    Date Value Ref Range Status   03/24/2022 >60.0 >60 mL/min/1.73 m^2 Final     eGFR if non    Date Value Ref Range Status   03/24/2022 >60.0 >60 mL/min/1.73 m^2 Final     Comment:     Calculation used to obtain the estimated glomerular filtration  rate (eGFR) is the CKD-EPI equation.        No results found for: CEA    Assessment/Plan:     Problem List Items Addressed This Visit        Renal/    Dehydration       Oncology    Malignant neoplasm of lower lobe of right lung-Adenocarcinoma - Primary (Chronic)    Cancer associated pain    Bone metastases      Other Visit Diagnoses     Anorexia            1. Met Lung Cancer- Follow up with Dr. Carrillo at [facility] as scheduled with PET scan  2. Cancer associated pain- Continue Morphine and Fentanyl as prescribed  3. Bone Mets- Continue follow up with [facility]  4. Dehydration Continue twice weekly hydration  5. Anorexia- Continue Megace BID    Discussion:     Follow up in about 4 weeks (around 6/30/2022) for with Dr. Zhang.      Electronically signed by Ellen Dubois, MSN, APRN, AGNP-C, OCN

## 2022-06-02 NOTE — PLAN OF CARE
Problem: Fatigue  Goal: Improved Activity Tolerance  6/2/2022 1035 by Geeta Nguyen RN  Outcome: Met  6/2/2022 1035 by Geeta Nguyen RN  Outcome: Ongoing, Progressing

## 2022-06-06 ENCOUNTER — HOSPITAL ENCOUNTER (OUTPATIENT)
Facility: HOSPITAL | Age: 67
Discharge: HOME | End: 2022-06-07
Attending: EMERGENCY MEDICINE | Admitting: INTERNAL MEDICINE
Payer: MEDICARE

## 2022-06-06 ENCOUNTER — PATIENT MESSAGE (OUTPATIENT)
Dept: FAMILY MEDICINE | Facility: CLINIC | Age: 67
End: 2022-06-06
Payer: MEDICARE

## 2022-06-06 DIAGNOSIS — R53.1 GENERALIZED WEAKNESS: ICD-10-CM

## 2022-06-06 DIAGNOSIS — E86.0 DEHYDRATION: ICD-10-CM

## 2022-06-06 DIAGNOSIS — R41.82 ALTERED MENTAL STATUS, UNSPECIFIED ALTERED MENTAL STATUS TYPE: Primary | ICD-10-CM

## 2022-06-06 DIAGNOSIS — R40.20 COMA, UNSPECIFIED COMA DEPTH: ICD-10-CM

## 2022-06-06 DIAGNOSIS — C34.90 PRIMARY MALIGNANT NEOPLASM OF LUNG METASTATIC TO OTHER SITE, UNSPECIFIED LATERALITY: ICD-10-CM

## 2022-06-06 DIAGNOSIS — R00.0 TACHYCARDIA: ICD-10-CM

## 2022-06-06 DIAGNOSIS — R41.82 AMS (ALTERED MENTAL STATUS): ICD-10-CM

## 2022-06-06 LAB
ALBUMIN SERPL BCP-MCNC: 3.1 G/DL (ref 3.5–5.2)
ALP SERPL-CCNC: 125 U/L (ref 55–135)
ALT SERPL W/O P-5'-P-CCNC: 10 U/L (ref 10–44)
ANION GAP SERPL CALC-SCNC: 12 MMOL/L (ref 8–16)
AST SERPL-CCNC: 15 U/L (ref 10–40)
BASOPHILS # BLD AUTO: 0 K/UL (ref 0–0.2)
BASOPHILS NFR BLD: 0 % (ref 0–1.9)
BILIRUB SERPL-MCNC: 0.4 MG/DL (ref 0.1–1)
BILIRUB UR QL STRIP: NEGATIVE
BUN SERPL-MCNC: 17 MG/DL (ref 8–23)
CALCIUM SERPL-MCNC: 9.3 MG/DL (ref 8.7–10.5)
CHLORIDE SERPL-SCNC: 102 MMOL/L (ref 95–110)
CLARITY UR: CLEAR
CO2 SERPL-SCNC: 23 MMOL/L (ref 23–29)
COLOR UR: YELLOW
CREAT SERPL-MCNC: 0.8 MG/DL (ref 0.5–1.4)
DIFFERENTIAL METHOD: ABNORMAL
EOSINOPHIL # BLD AUTO: 0 K/UL (ref 0–0.5)
EOSINOPHIL NFR BLD: 0.4 % (ref 0–8)
ERYTHROCYTE [DISTWIDTH] IN BLOOD BY AUTOMATED COUNT: 15.2 % (ref 11.5–14.5)
EST. GFR  (AFRICAN AMERICAN): >60 ML/MIN/1.73 M^2
EST. GFR  (NON AFRICAN AMERICAN): >60 ML/MIN/1.73 M^2
GLUCOSE SERPL-MCNC: 172 MG/DL (ref 70–110)
GLUCOSE UR QL STRIP: NEGATIVE
HCT VFR BLD AUTO: 27.8 % (ref 37–48.5)
HGB BLD-MCNC: 9.5 G/DL (ref 12–16)
HGB UR QL STRIP: NEGATIVE
IMM GRANULOCYTES # BLD AUTO: 0.01 K/UL (ref 0–0.04)
IMM GRANULOCYTES NFR BLD AUTO: 0.4 % (ref 0–0.5)
KETONES UR QL STRIP: NEGATIVE
LEUKOCYTE ESTERASE UR QL STRIP: NEGATIVE
LIPASE SERPL-CCNC: <3 U/L (ref 4–60)
LYMPHOCYTES # BLD AUTO: 0.2 K/UL (ref 1–4.8)
LYMPHOCYTES NFR BLD: 6.8 % (ref 18–48)
MCH RBC QN AUTO: 32.5 PG (ref 27–31)
MCHC RBC AUTO-ENTMCNC: 34.2 G/DL (ref 32–36)
MCV RBC AUTO: 95 FL (ref 82–98)
MONOCYTES # BLD AUTO: 0.3 K/UL (ref 0.3–1)
MONOCYTES NFR BLD: 11.4 % (ref 4–15)
NEUTROPHILS # BLD AUTO: 2.3 K/UL (ref 1.8–7.7)
NEUTROPHILS NFR BLD: 81 % (ref 38–73)
NITRITE UR QL STRIP: NEGATIVE
NRBC BLD-RTO: 0 /100 WBC
PH UR STRIP: 7 [PH] (ref 5–8)
PLATELET # BLD AUTO: 78 K/UL (ref 150–450)
PMV BLD AUTO: 9.9 FL (ref 9.2–12.9)
POTASSIUM SERPL-SCNC: 4.2 MMOL/L (ref 3.5–5.1)
PROT SERPL-MCNC: 6.6 G/DL (ref 6–8.4)
PROT UR QL STRIP: NEGATIVE
RBC # BLD AUTO: 2.92 M/UL (ref 4–5.4)
SARS-COV-2 RDRP RESP QL NAA+PROBE: NEGATIVE
SODIUM SERPL-SCNC: 137 MMOL/L (ref 136–145)
SP GR UR STRIP: 1.01 (ref 1–1.03)
URN SPEC COLLECT METH UR: ABNORMAL
UROBILINOGEN UR STRIP-ACNC: ABNORMAL EU/DL
WBC # BLD AUTO: 2.81 K/UL (ref 3.9–12.7)

## 2022-06-06 PROCEDURE — 96374 THER/PROPH/DIAG INJ IV PUSH: CPT

## 2022-06-06 PROCEDURE — 93010 ELECTROCARDIOGRAM REPORT: CPT | Mod: ,,, | Performed by: INTERNAL MEDICINE

## 2022-06-06 PROCEDURE — 85025 COMPLETE CBC W/AUTO DIFF WBC: CPT | Performed by: EMERGENCY MEDICINE

## 2022-06-06 PROCEDURE — 80053 COMPREHEN METABOLIC PANEL: CPT | Performed by: EMERGENCY MEDICINE

## 2022-06-06 PROCEDURE — G0378 HOSPITAL OBSERVATION PER HR: HCPCS

## 2022-06-06 PROCEDURE — 96361 HYDRATE IV INFUSION ADD-ON: CPT

## 2022-06-06 PROCEDURE — 25000003 PHARM REV CODE 250: Performed by: EMERGENCY MEDICINE

## 2022-06-06 PROCEDURE — 81003 URINALYSIS AUTO W/O SCOPE: CPT | Performed by: EMERGENCY MEDICINE

## 2022-06-06 PROCEDURE — U0002 COVID-19 LAB TEST NON-CDC: HCPCS | Performed by: EMERGENCY MEDICINE

## 2022-06-06 PROCEDURE — 63600175 PHARM REV CODE 636 W HCPCS: Performed by: EMERGENCY MEDICINE

## 2022-06-06 PROCEDURE — 99285 EMERGENCY DEPT VISIT HI MDM: CPT | Mod: 25

## 2022-06-06 PROCEDURE — 93010 EKG 12-LEAD: ICD-10-PCS | Mod: ,,, | Performed by: INTERNAL MEDICINE

## 2022-06-06 PROCEDURE — 93005 ELECTROCARDIOGRAM TRACING: CPT

## 2022-06-06 PROCEDURE — 83690 ASSAY OF LIPASE: CPT | Performed by: EMERGENCY MEDICINE

## 2022-06-06 PROCEDURE — 36415 COLL VENOUS BLD VENIPUNCTURE: CPT | Performed by: EMERGENCY MEDICINE

## 2022-06-06 RX ORDER — ONDANSETRON 2 MG/ML
4 INJECTION INTRAMUSCULAR; INTRAVENOUS
Status: COMPLETED | OUTPATIENT
Start: 2022-06-06 | End: 2022-06-06

## 2022-06-06 RX ORDER — AMOXICILLIN 250 MG
1 CAPSULE ORAL 2 TIMES DAILY
Status: DISCONTINUED | OUTPATIENT
Start: 2022-06-06 | End: 2022-06-07 | Stop reason: HOSPADM

## 2022-06-06 RX ORDER — GLUCAGON 1 MG
1 KIT INJECTION
Status: DISCONTINUED | OUTPATIENT
Start: 2022-06-07 | End: 2022-06-07 | Stop reason: HOSPADM

## 2022-06-06 RX ORDER — IBUPROFEN 200 MG
24 TABLET ORAL
Status: DISCONTINUED | OUTPATIENT
Start: 2022-06-07 | End: 2022-06-07 | Stop reason: HOSPADM

## 2022-06-06 RX ORDER — NALOXONE HCL 0.4 MG/ML
0.02 VIAL (ML) INJECTION
Status: DISCONTINUED | OUTPATIENT
Start: 2022-06-07 | End: 2022-06-07 | Stop reason: HOSPADM

## 2022-06-06 RX ORDER — ONDANSETRON 2 MG/ML
4 INJECTION INTRAMUSCULAR; INTRAVENOUS EVERY 6 HOURS PRN
Status: DISCONTINUED | OUTPATIENT
Start: 2022-06-07 | End: 2022-06-07 | Stop reason: HOSPADM

## 2022-06-06 RX ORDER — ACETAMINOPHEN 325 MG/1
650 TABLET ORAL EVERY 4 HOURS PRN
Status: DISCONTINUED | OUTPATIENT
Start: 2022-06-07 | End: 2022-06-07 | Stop reason: HOSPADM

## 2022-06-06 RX ORDER — SODIUM CHLORIDE 9 MG/ML
INJECTION, SOLUTION INTRAVENOUS CONTINUOUS
Status: DISCONTINUED | OUTPATIENT
Start: 2022-06-07 | End: 2022-06-07 | Stop reason: HOSPADM

## 2022-06-06 RX ORDER — SODIUM CHLORIDE 0.9 % (FLUSH) 0.9 %
3 SYRINGE (ML) INJECTION
Status: DISCONTINUED | OUTPATIENT
Start: 2022-06-07 | End: 2022-06-07 | Stop reason: HOSPADM

## 2022-06-06 RX ORDER — TALC
9 POWDER (GRAM) TOPICAL NIGHTLY PRN
Status: DISCONTINUED | OUTPATIENT
Start: 2022-06-07 | End: 2022-06-07 | Stop reason: HOSPADM

## 2022-06-06 RX ORDER — IBUPROFEN 200 MG
16 TABLET ORAL
Status: DISCONTINUED | OUTPATIENT
Start: 2022-06-07 | End: 2022-06-07 | Stop reason: HOSPADM

## 2022-06-06 RX ORDER — LANOLIN ALCOHOL/MO/W.PET/CERES
800 CREAM (GRAM) TOPICAL
Status: DISCONTINUED | OUTPATIENT
Start: 2022-06-07 | End: 2022-06-07 | Stop reason: HOSPADM

## 2022-06-06 RX ADMIN — SODIUM CHLORIDE 1000 ML: 0.9 INJECTION, SOLUTION INTRAVENOUS at 08:06

## 2022-06-06 RX ADMIN — ONDANSETRON 4 MG: 2 INJECTION INTRAMUSCULAR; INTRAVENOUS at 08:06

## 2022-06-06 NOTE — PROGRESS NOTES
Outpatient Care Management   - Care Plan Follow Up    Patient: Sheri Broderick  MRN:  [id number]  Date of Service:  6/6/2022  Completed by:  Rebeca Lam LCSW  Referral Date: 11/17/2021  Program: Case Management (High Risk)    Reason for Visit   Patient presents with    OPCM Chart Review    OPCM SW First Follow-up Attempt     5/30/22    Update Plan Of Care     6/6/22       Brief Summary: OPCM  completed follow up call with pt who reports that her niece is still coming Tues-Thursday each week to stay with pt and bring pt to her infusion appts at the cancer center.  Pt continues to participate in therapy appts with Dr. Holbrook, psychologist, with [facility].  Pt states she should find out her next course of action related to her tx regimen tomorrow at her infusion appt.  Pt reports no social work needs at this time.  Will follow up in 3 weeks with possible case closure.     Complex Care Plan     Care plan was discussed and completed today with input from patient and/or caregiver.    Patient Instructions     Instructions were provided via the [employer] patient resources and are available for the patient to view on the patient portal.    Follow up in about 4 weeks (around 6/27/2022).    Todays OPCM Self-Management Care Plan was developed with the patients/caregivers input and was based on identified barriers from todays assessment.  Goals were written today with the patient/caregiver and the patient has agreed to work towards these goals to improve his/her overall well-being. Patient verbalized understanding of the care plan, goals, and all of today's instructions. Encouraged patient/caregiver to communicate with his/her physician and health care team about health conditions and the treatment plan.  Provided my contact information today and encouraged patient/caregiver to call me with any questions as needed.

## 2022-06-07 VITALS
RESPIRATION RATE: 16 BRPM | WEIGHT: 120 LBS | HEIGHT: 65 IN | DIASTOLIC BLOOD PRESSURE: 73 MMHG | TEMPERATURE: 96 F | BODY MASS INDEX: 19.99 KG/M2 | HEART RATE: 93 BPM | SYSTOLIC BLOOD PRESSURE: 144 MMHG | OXYGEN SATURATION: 98 %

## 2022-06-07 PROBLEM — R53.1 WEAKNESS: Status: ACTIVE | Noted: 2022-06-07

## 2022-06-07 LAB
ALBUMIN SERPL BCP-MCNC: 2.9 G/DL (ref 3.5–5.2)
ALP SERPL-CCNC: 115 U/L (ref 55–135)
ALT SERPL W/O P-5'-P-CCNC: 7 U/L (ref 10–44)
AMMONIA PLAS-SCNC: 34 UMOL/L (ref 10–50)
ANION GAP SERPL CALC-SCNC: 10 MMOL/L (ref 8–16)
AST SERPL-CCNC: 15 U/L (ref 10–40)
BASOPHILS # BLD AUTO: 0.01 K/UL (ref 0–0.2)
BASOPHILS NFR BLD: 0.4 % (ref 0–1.9)
BILIRUB SERPL-MCNC: 0.4 MG/DL (ref 0.1–1)
BUN SERPL-MCNC: 12 MG/DL (ref 8–23)
CALCIUM SERPL-MCNC: 9 MG/DL (ref 8.7–10.5)
CHLORIDE SERPL-SCNC: 108 MMOL/L (ref 95–110)
CO2 SERPL-SCNC: 22 MMOL/L (ref 23–29)
CREAT SERPL-MCNC: 0.7 MG/DL (ref 0.5–1.4)
DIFFERENTIAL METHOD: ABNORMAL
EOSINOPHIL # BLD AUTO: 0 K/UL (ref 0–0.5)
EOSINOPHIL NFR BLD: 0.8 % (ref 0–8)
ERYTHROCYTE [DISTWIDTH] IN BLOOD BY AUTOMATED COUNT: 15.4 % (ref 11.5–14.5)
EST. GFR  (AFRICAN AMERICAN): >60 ML/MIN/1.73 M^2
EST. GFR  (NON AFRICAN AMERICAN): >60 ML/MIN/1.73 M^2
GLUCOSE SERPL-MCNC: 87 MG/DL (ref 70–110)
HCT VFR BLD AUTO: 27.4 % (ref 37–48.5)
HGB BLD-MCNC: 9.1 G/DL (ref 12–16)
IMM GRANULOCYTES # BLD AUTO: 0.01 K/UL (ref 0–0.04)
IMM GRANULOCYTES NFR BLD AUTO: 0.4 % (ref 0–0.5)
LYMPHOCYTES # BLD AUTO: 0.3 K/UL (ref 1–4.8)
LYMPHOCYTES NFR BLD: 10.2 % (ref 18–48)
MAGNESIUM SERPL-MCNC: 2.1 MG/DL (ref 1.6–2.6)
MCH RBC QN AUTO: 31.9 PG (ref 27–31)
MCHC RBC AUTO-ENTMCNC: 33.2 G/DL (ref 32–36)
MCV RBC AUTO: 96 FL (ref 82–98)
MONOCYTES # BLD AUTO: 0.4 K/UL (ref 0.3–1)
MONOCYTES NFR BLD: 15.3 % (ref 4–15)
NEUTROPHILS # BLD AUTO: 1.9 K/UL (ref 1.8–7.7)
NEUTROPHILS NFR BLD: 72.9 % (ref 38–73)
NRBC BLD-RTO: 0 /100 WBC
PHOSPHATE SERPL-MCNC: 2.8 MG/DL (ref 2.7–4.5)
PLATELET # BLD AUTO: 74 K/UL (ref 150–450)
PMV BLD AUTO: 9.1 FL (ref 9.2–12.9)
POTASSIUM SERPL-SCNC: 4.2 MMOL/L (ref 3.5–5.1)
PROT SERPL-MCNC: 6.2 G/DL (ref 6–8.4)
RBC # BLD AUTO: 2.85 M/UL (ref 4–5.4)
SODIUM SERPL-SCNC: 140 MMOL/L (ref 136–145)
WBC # BLD AUTO: 2.55 K/UL (ref 3.9–12.7)

## 2022-06-07 PROCEDURE — 97535 SELF CARE MNGMENT TRAINING: CPT

## 2022-06-07 PROCEDURE — 96361 HYDRATE IV INFUSION ADD-ON: CPT

## 2022-06-07 PROCEDURE — 97161 PT EVAL LOW COMPLEX 20 MIN: CPT

## 2022-06-07 PROCEDURE — 97165 OT EVAL LOW COMPLEX 30 MIN: CPT

## 2022-06-07 PROCEDURE — 25000003 PHARM REV CODE 250

## 2022-06-07 PROCEDURE — 97116 GAIT TRAINING THERAPY: CPT

## 2022-06-07 PROCEDURE — G0378 HOSPITAL OBSERVATION PER HR: HCPCS

## 2022-06-07 PROCEDURE — 85025 COMPLETE CBC W/AUTO DIFF WBC: CPT

## 2022-06-07 PROCEDURE — 80053 COMPREHEN METABOLIC PANEL: CPT

## 2022-06-07 PROCEDURE — 97530 THERAPEUTIC ACTIVITIES: CPT

## 2022-06-07 PROCEDURE — 36415 COLL VENOUS BLD VENIPUNCTURE: CPT

## 2022-06-07 PROCEDURE — 82140 ASSAY OF AMMONIA: CPT

## 2022-06-07 PROCEDURE — 84100 ASSAY OF PHOSPHORUS: CPT

## 2022-06-07 PROCEDURE — 83735 ASSAY OF MAGNESIUM: CPT

## 2022-06-07 RX ORDER — OLANZAPINE 2.5 MG/1
5 TABLET ORAL NIGHTLY
Status: DISCONTINUED | OUTPATIENT
Start: 2022-06-08 | End: 2022-06-07 | Stop reason: HOSPADM

## 2022-06-07 RX ORDER — LAMOTRIGINE 100 MG/1
200 TABLET ORAL NIGHTLY
Status: DISCONTINUED | OUTPATIENT
Start: 2022-06-07 | End: 2022-06-07 | Stop reason: HOSPADM

## 2022-06-07 RX ORDER — MEMANTINE HYDROCHLORIDE 5 MG/1
10 TABLET ORAL 2 TIMES DAILY
Status: DISCONTINUED | OUTPATIENT
Start: 2022-06-07 | End: 2022-06-07 | Stop reason: HOSPADM

## 2022-06-07 RX ORDER — SERTRALINE HYDROCHLORIDE 50 MG/1
100 TABLET, FILM COATED ORAL NIGHTLY
Status: DISCONTINUED | OUTPATIENT
Start: 2022-06-07 | End: 2022-06-07 | Stop reason: HOSPADM

## 2022-06-07 RX ORDER — FAMOTIDINE 20 MG/1
40 TABLET, FILM COATED ORAL NIGHTLY
Status: DISCONTINUED | OUTPATIENT
Start: 2022-06-08 | End: 2022-06-07 | Stop reason: HOSPADM

## 2022-06-07 RX ADMIN — SODIUM CHLORIDE: 0.9 INJECTION, SOLUTION INTRAVENOUS at 09:06

## 2022-06-07 RX ADMIN — SERTRALINE HYDROCHLORIDE 100 MG: 50 TABLET ORAL at 02:06

## 2022-06-07 RX ADMIN — MEMANTINE 10 MG: 5 TABLET ORAL at 02:06

## 2022-06-07 RX ADMIN — LAMOTRIGINE 200 MG: 100 TABLET ORAL at 02:06

## 2022-06-07 RX ADMIN — SODIUM CHLORIDE: 0.9 INJECTION, SOLUTION INTRAVENOUS at 01:06

## 2022-06-07 RX ADMIN — DOCUSATE SODIUM AND SENNOSIDES 1 TABLET: 8.6; 5 TABLET, FILM COATED ORAL at 01:06

## 2022-06-07 RX ADMIN — DOCUSATE SODIUM AND SENNOSIDES 1 TABLET: 8.6; 5 TABLET, FILM COATED ORAL at 08:06

## 2022-06-07 RX ADMIN — THERA TABS 1 TABLET: TAB at 08:06

## 2022-06-07 NOTE — H&P
[facility] Medicine  History & Physical    Patient Name: Sheri Broderick  MRN: [id number]  Patient Class: OP- Observation  Admission Date: 6/6/2022  Attending Physician: Gale Rosenberg MD   Primary Care Provider: Demetrio Pleitez Jr, MD         Patient information was obtained from patient, past medical records and ER records.     Subjective:     Principal Problem:AMS (altered mental status)    Chief Complaint:   Chief Complaint   Patient presents with    Weakness     BIB EMS as patient has been feeling weak and disoriented x 3 days. Patient currently receiving chemo for lung cancer. Last treatment was last Tues-Thurs        HPI: Sheri Broderick is a 66 y.o. y.o. 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 She states she sat on her porch for a few hours today because she was not able to stand up. She reports BLE weakness, specifically when going from seated position to standing position. Patient reports changes in her medications on 6/2 prior to fatigue and confusion. She states her psychiatrist recommended her be evaluated in the ED. She also reports intermittent palpitation (resolved), intermittent CP (resolved), blurry vision, dry cough, chronic back pain, intermittent HA, and intermittent light headedness. ED workup was significant for low WBC, thrombocytopenia, mild anemia, thrombocytopenia, and tachycardia. The patient was placed in observation under the care of Hospital Medicine.             Past Medical History:   Diagnosis Date    Allergy     seasonal    Anemia associated with chemotherapy 7/18/2018     Anxiety 2012    on meds    Arthritis     Asthma 1960    no inhalers    Back pain 2010    PT    Bone metastases 4/13/2020    Chemotherapy-induced neutropenia 5/21/2018    Dizziness     Essential hypertension 4/11/2019    no meds    Fall 05/2019    fx of lumbar spine, left foot and ankle    Fibromyalgia     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     Fracture of right foot     GERD (gastroesophageal reflux disease) 2015    on meds    Hep B w/o coma 1980    no treatment, hospitalized    Hiatal hernia 2015    on meds    Hyperlipidemia     Major depressive disorder, recurrent episode, in partial remission     Malignant neoplasm of lower lobe of right lung-Adenocarcinoma 5/21/2018    MVP (mitral valve prolapse) 2014    no meds    Myalgia and myositis, unspecified     OCD (obsessive compulsive disorder)     Osteoporosis     Polyneuropathy     SOB (shortness of breath) on exertion 10/2018    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 8/23/2019    Trouble in sleeping     Urinary incontinence        Past Surgical History:   Procedure Laterality Date    BONE BIOPSY N/A 8/5/2019    Procedure: BIOPSY, BONE;  Surgeon: Ashley Diagnostic Provider;  Location: [facility] OR;  Service: General;  Laterality: N/A;    CARPAL TUNNEL RELEASE Bilateral 07/18/2014    EYE SURGERY      RK    FRACTURE SURGERY Right     wrist orif    INSERTION OF TUNNELED CENTRAL VENOUS CATHETER (CVC) WITH SUBCUTANEOUS PORT Left 8/30/2019    Procedure: INSERTION, PORT-A-CATH;  Surgeon: Brant Welch MD;  Location: [facility] OR;  Service: Cardiovascular;  Laterality: Left;    LUNG REMOVAL, PARTIAL  04/27/2018    Dr. Brant Welch    port a cath  06/2018    TUBAL LIGATION         Review of patient's allergies indicates:   Allergen Reactions    Penicillins Rash     Other reaction(s): Rash    Trazodone Anxiety     Severe anxiety        No current facility-administered medications on file prior to encounter.      Current Outpatient Medications on File Prior to Encounter   Medication Sig    famotidine (PEPCID) 40 MG tablet Take 1 tablet (40 mg total) by mouth every evening.    fentaNYL (DURAGESIC) 75 mcg/hr Place 1 patch onto the skin every 72 hours.    lamoTRIgine (LAMICTAL) 100 MG tablet Take 2 tablets (200 mg total) by mouth every evening.    megestroL (MEGACE) 400 mg/10 mL (40 mg/mL) Susp Take 10 mLs (400 mg total) by mouth 2 (two) times daily.    memantine (NAMENDA) 10 MG Tab TAKE 1 TABLET(10 MG) BY MOUTH TWICE DAILY    multivitamin (THERAGRAN) per tablet Take 1 tablet by mouth once daily.    OLANZapine (ZYPREXA) 5 MG tablet TAKE 1 TABLET BY MOUTH EVERY NIGHT    ondansetron (ZOFRAN-ODT) 8 MG TbDL DISSOLVE 1 TABLET(8 MG) ON THE TONGUE EVERY 8 HOURS AS NEEDED    pantoprazole (PROTONIX) 40 MG tablet Take 1 tablet (40 mg total) by mouth 2 (two) times daily.    sertraline (ZOLOFT) 100 MG tablet Take 1 tablet (100 mg total) by mouth every evening.    [DISCONTINUED] mirtazapine (REMERON) 15 MG tablet Take 1 tablet (15 mg total) by mouth every evening.    [DISCONTINUED] morphine (MSIR) 15 MG tablet Take one-half tablet (7.5 mg total) by mouth every 4 (four) hours as needed for Pain.    [DISCONTINUED] prochlorperazine (COMPAZINE) 10 MG tablet Take 1 tablet (10 mg total) by mouth every 6 (six) hours as needed (as needed for nausea). (Patient not taking: Reported on 4/7/2022)    [DISCONTINUED] promethazine (PHENERGAN) 25 MG suppository Place 1 suppository (25 mg total) rectally every 6 (six) hours as needed for Nausea.    [DISCONTINUED] senna (SENOKOT) 8.6 mg tablet Take 2-4 tablets by mouth 2 (two) times daily.      Family History       Problem Relation (Age of Onset)    Diabetes Sister, Maternal Grandmother    Heart disease Mother, Father (57), Sister, Brother (45)    Parkinsonism Sister          Tobacco Use    Smoking status: Never Smoker    Smokeless tobacco: Never Used   Substance and Sexual Activity     Alcohol use: No    Drug use: No    Sexual activity: Yes     Birth control/protection: None     Review of Systems   Constitutional:  Positive for fatigue. Negative for chills and fever.   HENT:  Negative for congestion, sore throat and trouble swallowing.    Eyes:  Positive for visual disturbance.   Respiratory:  Positive for cough (dry). Negative for shortness of breath and wheezing.    Cardiovascular:  Positive for chest pain and palpitations (intermittent; resolved). Negative for leg swelling.   Gastrointestinal:  Positive for nausea. Negative for abdominal pain, diarrhea and vomiting.   Genitourinary:  Negative for difficulty urinating, dysuria and hematuria.   Musculoskeletal:  Positive for back pain.   Skin:  Negative for rash.   Neurological:  Positive for weakness (BLE), light-headedness and headaches (intermittent). Negative for dizziness.   Psychiatric/Behavioral:  Positive for confusion.    Objective:     Vital Signs (Most Recent):  Temp: 98.4 °F (36.9 °C) (06/06/22 1843)  Pulse: (!) 118 (06/06/22 1843)  Resp: 17 (06/06/22 1843)  BP: 120/72 (06/06/22 1843)  SpO2: 98 % (06/06/22 1843)   Vital Signs (24h Range):  Temp:  [98.4 °F (36.9 °C)] 98.4 °F (36.9 °C)  Pulse:  [118] 118  Resp:  [17] 17  SpO2:  [98 %] 98 %  BP: (120)/(72) 120/72     Weight: 54.4 kg (120 lb)  Body mass index is 19.97 kg/m².    Physical Exam  Vitals and nursing note reviewed.   Constitutional:       General: She is not in acute distress.     Appearance: Normal appearance. She is normal weight. She is ill-appearing (chronically).   HENT:      Head: Normocephalic and atraumatic.      Mouth/Throat:      Mouth: Mucous membranes are dry.      Pharynx: Oropharynx is clear.   Eyes:      Extraocular Movements: Extraocular movements intact.      Conjunctiva/sclera: Conjunctivae normal.      Pupils: Pupils are equal, round, and reactive to light.   Cardiovascular:      Rate and Rhythm: Normal rate and regular rhythm.      Pulses: Normal pulses.       Heart sounds: Normal heart sounds.   Pulmonary:      Effort: Pulmonary effort is normal. No respiratory distress.      Breath sounds: Normal breath sounds. No wheezing, rhonchi or rales.   Abdominal:      General: Abdomen is flat. Bowel sounds are normal. There is no distension.      Palpations: Abdomen is soft.      Tenderness: There is abdominal tenderness (mild) in the epigastric area. There is no guarding or rebound.   Musculoskeletal:         General: Normal range of motion.      Cervical back: Normal range of motion and neck supple.   Skin:     General: Skin is warm and dry.      Capillary Refill: Capillary refill takes 2 to 3 seconds.   Neurological:      General: No focal deficit present.      Mental Status: She is alert and oriented to person, place, and time. Mental status is at baseline.      Comments: Patient is AAOx3.   BLE strength equal and symmetric 3/5.  BUE strength equal and symmetric 5/5.  Sensation intact.   No focal deficits.   Normal FTN.   No pronator drift.   Speech clear.    Psychiatric:         Mood and Affect: Mood normal.         Behavior: Behavior is withdrawn.      Comments: AAOx3.   Speech is clear but slow.   Patient is somewhat inattentive, forgets train of thought easily, and requires redirection/ repeat question.    Follows all commands. Moves all extremities.          CRANIAL NERVES     CN III, IV, VI   Pupils are equal, round, and reactive to light.     Significant Labs: All pertinent labs within the past 24 hours have been reviewed.  CBC:   Recent Labs   Lab 06/06/22  1950   WBC 2.81*   HGB 9.5*   HCT 27.8*   PLT 78*     CMP:   Recent Labs   Lab 06/06/22 1949      K 4.2      CO2 23   *   BUN 17   CREATININE 0.8   CALCIUM 9.3   PROT 6.6   ALBUMIN 3.1*   BILITOT 0.4   ALKPHOS 125   AST 15   ALT 10   ANIONGAP 12   EGFRNONAA >60     Urine Studies:   Recent Labs   Lab 06/06/22 2137   COLORU Yellow   APPEARANCEUA Clear   PHUR 7.0   SPECGRAV 1.015   PROTEINUA  Negative   GLUCUA Negative   KETONESU Negative   BILIRUBINUA Negative   OCCULTUA Negative   NITRITE Negative   UROBILINOGEN 2.0-3.0*   LEUKOCYTESUR Negative       Significant Imaging: I have reviewed all pertinent imaging results/findings within the past 24 hours.    CT head:  No acute intracranial abnormality.    Assessment/Plan:     * AMS (altered mental status)  Acute:  - likely 2/2 recent medication changes  - psych consulted: appreciate reccomednations  - UA negative for UTI  - ammonia ordered for am        Malignant neoplasm of lower lobe of right lung  [place]:  - followed by Heme/onc  - undergoing chemotherapy with last infusion on 6/2      Bone metastases  Noted.       Cancer associated pain  Chronic:  - continue home medications       Weakness  Chronic:  - BLE weakness  - reports difficulty with positional change from seated to standing  - PT/OT consulted       Generalized anxiety disorder  Chronic:  - continue home medications      MDD (major depressive disorder), recurrent episode, moderate  Chronic:  - continue home medications        VTE Risk Mitigation (From admission, onward)         Ordered     Reason for No Pharmacological VTE Prophylaxis  Once        Question:  Reasons:  Answer:  Thrombocytopenia    06/06/22 2337     IP VTE HIGH RISK PATIENT  Once         06/06/22 2337     Place sequential compression device  Until discontinued         06/06/22 2337                   Karen López PA-C  Department of Hospital Medicine   [facility]

## 2022-06-07 NOTE — PT/OT/SLP EVAL
Occupational Therapy   Evaluation    Name: Sheri Broderick  MRN: [id number]  Admitting Diagnosis:  AMS (altered mental status)  Recent Surgery: * No surgery found *      Recommendations:     Discharge Recommendations: nursing facility, skilled, home health OT  Discharge Equipment Recommendations:  walker, rolling  Barriers to discharge:  Decreased caregiver support    Assessment:     Sheri Broderick is a 66 y.o. female with a medical diagnosis of AMS (altered mental status).  Patient oriented x4 but noted tangential speech at times. Patient c/o back pain, but was still participatory. Noted unsteadiness when ambulating to sink and bathroom using RW, requiring CGA > Min A for balance. Patient is able to perform self care tasks, but requires CGA > Supervision for safety. At this time, patient would be unsafe to go home given intermittent confusion and unsteadiness especially with directional changes and with avoiding corners with RW. Patient would benefit from SNF vs HHOT (with Supervision).  Performance deficits affecting function: weakness, impaired endurance, impaired self care skills, impaired functional mobilty, gait instability, impaired balance, decreased lower extremity function, decreased safety awareness, pain, decreased coordination.      Rehab Prognosis: Good; patient would benefit from acute skilled OT services to address these deficits and reach maximum level of function.       Plan:     Patient to be seen 4 x/week to address the above listed problems via self-care/home management, therapeutic activities, therapeutic exercises  · Plan of Care Expires: 06/28/22  · Plan of Care Reviewed with: patient, friend (niece)    Subjective     Chief Complaint: back pain  Patient/Family Comments/goals: none    Occupational Profile:  Living Environment: Patient lives alone in a [facility] with 3 NICHOLAS.   Previous level of function: Patient was independent with ADLs. Patient was ambulatory w/o AD. Patient stated she used  her rollator when standing from her bed.  Equipment Used at Home:  rollator, bedside commode  Assistance upon Discharge: Patient will receive limited assistance from niece who lives in [place], [place]. Patient's niece comes to patient's home 3x/wk to transport her to medical appointments.    Pain/Comfort:  · Pain Rating 1: 5/10  · Location - Side 1: Bilateral  · Location - Orientation 1: generalized  · Location 1: back  · Pain Addressed 1: Reposition, Distraction  · Pain Rating Post-Intervention 1: 5/10    Patients cultural, spiritual, Hoahaoism conflicts given the current situation:      Objective:     Communicated with: nurse Marlin prior to session.  Patient found HOB elevated with telemetry, peripheral IV upon OT entry to room.    General Precautions: Standard, fall   Orthopedic Precautions:N/A   Braces: N/A  Respiratory Status: Room air    Occupational Performance:    Bed Mobility:    · Patient completed Scooting/Bridging with stand by assistance  · Patient completed Supine to Sit with stand by assistance  · Patient completed Sit to Supine with Min A to get BLE into bed    Functional Mobility/Transfers:  · Patient completed Sit <> Stand Transfer with minimum assistance  with  rolling walker   · Patient completed Toilet Transfer Stand Pivot technique with minimum assistance with  rolling walker    Activities of Daily Living:  · Grooming: contact guard assistance with hand hygiene while standing at sink  · Upper Body Dressing: minimum assistance   · Lower Body Dressing: stand by assistance to don/doff socks while seated EOB  · Toileting: minimum assistance     Cognitive/Visual Perceptual:  Cognitive/Psychosocial Skills:     -       Oriented to: x4   -       Follows Commands/attention:Follows multistep  commands  -       Communication: clear/fluent  -       Safety awareness/insight to disability: impaired   -       Mood/Affect/Coping skills/emotional control: Appropriate to situation and Cooperative  Visual/Perceptual:       -Intact     Physical Exam:  Postural examination/scapula alignment:    -       Rounded shoulders  -       Forward head  Upper Extremity Range of Motion:     -       Right Upper Extremity: WFL  -       Left Upper Extremity: WFL  Upper Extremity Strength:    -       Right Upper Extremity: WFL  -       Left Upper Extremity: WFL   Strength:    -       Right Upper Extremity: WFL  -       Left Upper Extremity: WFL  Fine Motor Coordination:    -       Intact  Gross motor coordination:   WFL in BUE    AMPAC 6 Click ADL:  AMPAC Total Score: 20    Treatment & Education:  OT ed pt on OT role & POC as well as discharge recommendations.  OT ed patient on safety with walker use for functional mobility with cues for hand placement & sequencing.     Education:    Patient left HOB elevated with all lines intact, call button in reach, bed alarm on, nurse notified and niece and friend present    GOALS:   Multidisciplinary Problems     Occupational Therapy Goals        Problem: Occupational Therapy    Goal Priority Disciplines Outcome Interventions   Occupational Therapy Goal     OT, PT/OT Ongoing, Progressing    Description: Goals to be met by: 6/28/2022     Patient will increase functional independence with ADLs by performing:    LE Dressing with Modified [place].  Grooming while standing at sink with Modified [place].  Toileting from toilet with Modified [place] for hygiene and clothing management.   Supine to sit with Modified [place].  Toilet transfer to toilet with Modified [place].                     History:     Past Medical History:   Diagnosis Date    Allergy     seasonal    Anemia associated with chemotherapy 7/18/2018    Anxiety 2012    on meds    Arthritis     Asthma 1960    no inhalers    Back pain 2010    PT    Bone metastases 4/13/2020    Chemotherapy-induced neutropenia 5/21/2018    Dizziness     Essential hypertension 4/11/2019    no meds    Fall 05/2019    fx of lumbar  spine, left foot and ankle    Fibromyalgia     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     Fracture of right foot     GERD (gastroesophageal reflux disease) 2015    on meds    Hep B w/o coma 1980    no treatment, hospitalized    Hiatal hernia 2015    on meds    Hyperlipidemia     Major depressive disorder, recurrent episode, in partial remission     Malignant neoplasm of lower lobe of right lung-Adenocarcinoma 5/21/2018    MVP (mitral valve prolapse) 2014    no meds    Myalgia and myositis, unspecified     OCD (obsessive compulsive disorder)     Osteoporosis     Polyneuropathy     SOB (shortness of breath) on exertion 10/2018    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 8/23/2019    Trouble in sleeping     Urinary incontinence        Past Surgical History:   Procedure Laterality Date    BONE BIOPSY N/A 8/5/2019    Procedure: BIOPSY, BONE;  Surgeon: [facility] Diagnostic Provider;  Location: [facility];  Service: General;  Laterality: N/A;    CARPAL TUNNEL RELEASE Bilateral 07/18/2014    EYE SURGERY      RK    FRACTURE SURGERY Right     wrist orif    INSERTION OF TUNNELED CENTRAL VENOUS CATHETER (CVC) WITH SUBCUTANEOUS PORT Left 8/30/2019    Procedure: INSERTION, PORT-A-CATH;  Surgeon: Brant Welch MD;  Location: [facility];  Service: Cardiovascular;  Laterality: Left;    LUNG REMOVAL, PARTIAL  04/27/2018    Dr. Brant Welch    port a cath  06/2018    TUBAL LIGATION         Time Tracking:     OT Date of Treatment: 06/07/22  OT Start Time: 1037  OT Stop Time: 1108  OT Total Time (min): 31 min    Billable Minutes:Evaluation 8  Self Care/Home Management 23 6/7/2022

## 2022-06-07 NOTE — ASSESSMENT & PLAN NOTE
Chronic:  - BLE weakness  - reports difficulty with positional change from seated to standing  - PT/OT consulted

## 2022-06-07 NOTE — PLAN OF CARE
Pt in bed asleep, no distress noted and pt denies any pain.   Problem: Adult Inpatient Plan of Care  Goal: Plan of Care Review  Outcome: Ongoing, Progressing  Goal: Patient-Specific Goal (Individualized)  Outcome: Ongoing, Progressing  Goal: Absence of Hospital-Acquired Illness or Injury  Outcome: Ongoing, Progressing  Goal: Optimal Comfort and Wellbeing  Outcome: Ongoing, Progressing

## 2022-06-07 NOTE — HPI
Sheri Broderick is a 66 y.o. y.o. 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 She states she sat on her porch for a few hours today because she was not able to stand up. She reports BLE weakness, specifically when going from seated position to standing position. Patient reports changes in her medications on 6/2 prior to fatigue and confusion. She states her psychiatrist recommended her be evaluated in the ED. She also reports intermittent palpitation (resolved), intermittent CP (resolved), blurry vision, dry cough, chronic back pain, intermittent HA, and intermittent light headedness. ED workup was significant for low WBC, thrombocytopenia, mild anemia, thrombocytopenia, and tachycardia. The patient was placed in observation under the care of Hospital Medicine.

## 2022-06-07 NOTE — PLAN OF CARE
I updated LORETTA Lara that pt is requesting HH - CM is awaiting HH orders.    06/07/22 1401   Post-Acute Status   Post-Acute Authorization Home Health

## 2022-06-07 NOTE — PLAN OF CARE
Pt does not have a HH preference. HH orders and packet sent to [facility] for review. I also provided pts caregiver at bedside printed information on new sitter listings and discussed SNF in detail. Pt agreeable to going home with HH. CM following for acceptance. AVS updated.    06/07/22 1502   Post-Acute Status   Post-Acute Authorization Home Health   Home Health Status Referrals Sent   Patient choice form signed by patient/caregiver List with quality metrics by geographic area provided

## 2022-06-07 NOTE — PLAN OF CARE
Reviewed discharge instructions with patient. All questions answered. Patient verbalizes understanding. Patient w/c to car by transporter. NAD noted.

## 2022-06-07 NOTE — PLAN OF CARE
06/07/22 1209   VILLA Message   Medicare Outpatient and Observation Notification regarding financial responsibility Given to patient/caregiver;Explained to patient/caregiver;Signed/date by patient/caregiver   Date VILLA was signed 06/07/22   Time VILLA was signed 1214

## 2022-06-07 NOTE — PT/OT/SLP EVAL
Physical Therapy Evaluation    Patient Name:  Sheri Broderick   MRN:  [id number]    Recommendations:     Discharge Recommendations:  nursing facility, skilled based on increased weakness and patient's niece unable to provide 24/7 assistance upon D/C - per CM patient does not qualify for SNF; patient is not safe to be alone and needs 24/7 assistance-  provided sitter resources to niece (niece reports the one sitter service she reached out to does not have availability for 2 weeks)  Discharge Equipment Recommendations: walker, rolling   Barriers to discharge: Decreased caregiver support and increased fall risk    Assessment:     Sheri Broderick is a 66 y.o. female admitted with a medical diagnosis of AMS (altered mental status).  She presents with the following impairments/functional limitations:  weakness, impaired endurance, impaired self care skills, impaired functional mobilty, gait instability, impaired balance, decreased upper extremity function, decreased lower extremity function, decreased safety awareness, pain. Patient is agreeable to participation with PT evaluation. Niece present who assists with providing history. Patient lives alone in a [facility] with 3 NICHOLAS and no handrail. Niece stays with patient from Tuesday at 10 a.m. until Thursday at 2 p.m (she lives in [place] and is unable to provide 24/7 assistance). Niece reports patient does not like to use rollator for ambulation and she usually just holds onto the wall. Patient endorses increased weakness for a few weeks.She requires SBA for supine to sit and ModA for sit to stand with RW and bed elevated. She ambulated 25' with RW, Min-ModA, and posterior lean with small shuffling steps. She is agreeable to sit up in chair with chair alarm on, niece present, and RN notified. Patient notes feeling anxious with all the discussion about D/C and RN notified.     Rehab Prognosis: Fair; patient would benefit from acute skilled PT services to address these  "deficits and reach maximum level of function.    Recent Surgery: * No surgery found *      Plan:     During this hospitalization, patient to be seen 6 x/week to address the identified rehab impairments via gait training, therapeutic activities, therapeutic exercises and progress toward the following goals:    · Plan of Care Expires:  07/07/22    Subjective     Chief Complaint: weak  Patient/Family Comments/goals: home  Pain/Comfort:  · Pain Rating 1:  (not rated)  · Location - Side 1: Left  · Location 1:  ("shoulder to hip" - patient attributes to bone mets)    Patients cultural, spiritual, L[employer] conflicts given the current situation:      Living Environment:  Patient lives alone in a [facility] with 3 NICHOLAS and no handrail. Niece stays with patient from Tuesday at 10 a.m. until Thursday at 2 p.m (she lives in [place] and is unable to provide 24/7 assistance).  Prior to admission, patients level of function was patient reports using cane or rollator for ambulation, but niece states patient does not like to use rollator for ambulation and she usually just holds onto the wall. She is unable to quantify number of falls in the past year, but notes "quite a few".  Equipment used at home: rollator, bedside commode, cane, straight, shower chair (life alert).  DME owned (not currently used): none.  Upon discharge, patient will have assistance from HHPT, soraida, sitters?    Objective:     Communicated with ONIEL Isaac prior to session.  Patient found HOB elevated with bed alarm, peripheral IV, telemetry  upon PT entry to room.    General Precautions: Standard, fall   Orthopedic Precautions:N/A   Braces: N/A  Respiratory Status: Room air    Exams:  · RLE Strength: 3/5  · LLE Strength: 3/5    Functional Mobility:  · Bed Mobility:     · Supine to Sit: stand by assistance  · Transfers:     · Sit to Stand:  moderate assistance with rolling walker  · Gait: 25' with RW, Min-ModA, and posterior lean with small shuffling steps    Therapeutic " Activities and Exercises:   Patient was educated on the importance of OOB activity and functional mobility to negate negative effects of prolonged bed rest during hospitalization, safe transfers and ambulation, SNF versus HHPT with / assistance, increased fall risk with rollator versus RW, home safety until evaluated by HHPT, fire department to assist with stair navigation, and D/C planning      AM-PAC 6 CLICK MOBILITY  Total Score:15     Patient left up in chair with all lines intact, call button in reach, chair alarm on, RN notified and niece and CM present.    GOALS:   Multidisciplinary Problems     Physical Therapy Goals        Problem: Physical Therapy    Goal Priority Disciplines Outcome Goal Variances Interventions   Physical Therapy Goal     PT, PT/OT      Description: Goals to be met by: 22    Patient will increase functional independence with mobility by performin. Supine to sit with Supervision  2. Sit to stand transfer with Supervision  3. Bed to chair transfer with Supervision using Rolling Walker  4. Gait  x 250 feet with Supervision using Rolling Walker.   5. Ascend/descend 3 stairs with no handrail with Supervision   6. Lower extremity exercise program x20 reps per handout, with supervision                   History:     Past Medical History:   Diagnosis Date    Allergy     seasonal    Anemia associated with chemotherapy 2018    Anxiety 2012    on meds    Arthritis     Asthma 1960    no inhalers    Back pain 2010    PT    Bone metastases 2020    Chemotherapy-induced neutropenia 2018    Dizziness     Essential hypertension 2019    no meds    Fall 2019    fx of lumbar spine, left foot and ankle    Fibromyalgia     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     Fracture of right foot     GERD (gastroesophageal reflux disease) 2015    on meds    Hep B w/o coma 1980    no treatment, hospitalized    Hiatal hernia     on meds     Hyperlipidemia     Major depressive disorder, recurrent episode, in partial remission     Malignant neoplasm of lower lobe of right lung-Adenocarcinoma 5/21/2018    MVP (mitral valve prolapse) 2014    no meds    Myalgia and myositis, unspecified     OCD (obsessive compulsive disorder)     Osteoporosis     Polyneuropathy     SOB (shortness of breath) on exertion 10/2018    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 8/23/2019    Trouble in sleeping     Urinary incontinence        Past Surgical History:   Procedure Laterality Date    BONE BIOPSY N/A 8/5/2019    Procedure: BIOPSY, BONE;  Surgeon[facility];  Location: [facility];  Service: General;  Laterality: N/A;    CARPAL TUNNEL RELEASE Bilateral 07/18/2014    EYE SURGERY      RK    FRACTURE SURGERY Right     wrist orif    INSERTION OF TUNNELED CENTRAL VENOUS CATHETER (CVC) WITH SUBCUTANEOUS PORT Left 8/30/2019    Procedure: INSERTION, PORT-A-CATH;  Surgeon: Brant Welch MD;  Location: [facility];  Service: Cardiovascular;  Laterality: Left;    LUNG REMOVAL, PARTIAL  04/27/2018    Dr. Brant Welch    port a cath  06/2018    TUBAL LIGATION         Time Tracking:     PT Received On: 06/07/22  PT Start Time: 1419     PT Stop Time: 1500  PT Total Time (min): 41 min     Billable Minutes: Evaluation 10, Gait Training 21 and Therapeutic Activity 10      06/07/2022

## 2022-06-07 NOTE — PLAN OF CARE
Gerald Flood- pt is accepted for  services and will be admitted tomorrow.    06/07/22 1524   Post-Acute Status   Post-Acute Authorization Home Health   Home Health Status Set-up Complete/Auth obtained

## 2022-06-07 NOTE — PLAN OF CARE
The pt is cleared for discharge home with [facility] and has follow up appointments added to her AVS.    06/07/22 1524   Final Note   Assessment Type Final Discharge Note   Anticipated Discharge Disposition Home-Health   Hospital Resources/Appts/Education Provided Appointments scheduled and added to AVS   Post-Acute Status   Post-Acute Authorization Home Health   Home Health Status Set-up Complete/Auth obtained

## 2022-06-07 NOTE — PLAN OF CARE
[facility]  Initial Discharge Assessment       Primary Care Provider: Demetrio Pleitez Jr, MD    Admission Diagnosis: Dehydration [E86.0]  Tachycardia [R00.0]  Generalized weakness [R53.1]  Primary malignant neoplasm of lung metastatic to other site, unspecified laterality [C34.90]  Coma, unspecified coma depth [R40.20]  Altered mental status, unspecified altered mental status type [R41.82]  AMS (altered mental status) [R41.82]    Admission Date: 6/6/2022     Assessment completed with pt and  Friend Bonnie Urbina [phone] at bedside. Pt confirmed her home address and denied current HH.  Pt has a home walker . Pt not currently doing chemo- last done in January. Last radiation in April but currently doing hydration infusions at [facility]- last completed 6 days ago. Pt is requesting new HH orders . PCP confirmed as Dr. Pleitez and insurance as Medicare A/B. Pt stated that her friend Bonnie will provide transport home. Ms. Fuentes stated that she assists as she can but lives in [place] and can not be present 24/7. She asked for resources regarding sitter/caregiver support and I provided her with a senior resource guide and showed her where the resources started including services such as [facility]. CM following.   Expected Discharge Date:     Discharge Barriers Identified: None    Payor: MEDICARE / Plan: MEDICARE PART A & B / Product Type: Government /     Extended Emergency Contact Information  Primary Emergency Contact: Bonnie Urbina  Address: [address] [facility]  Home Phone: [phone]  Mobile Phone: [phone]  Relation: Relative  Preferred language: English   needed? No  Secondary Emergency Contact: Godfrey Urbina  Mobile Phone: [phone]  Relation: Relative  Preferred language: English   needed? No    Discharge Plan A: Home, Home Health  Discharge Plan B: Home      [id number] [place], [place] -  [address]  [address] [address]  Phone: [phone] Fax: [phone]    [id number] - [place], [place] - [address]  [address]  [facility] [address]  Phone: [phone] Fax: [phone]      Initial Assessment (most recent)     Adult Discharge Assessment - 06/07/22 1204        Discharge Assessment    Assessment Type Discharge Planning Assessment     Confirmed/corrected address, phone number and insurance Yes     Confirmed Demographics Correct on Facesheet     Source of Information patient;family     Does patient/caregiver understand observation status Yes     Communicated KIZZY with patient/caregiver Yes     Reason For Admission dehydration     Lives With alone     Facility Arrived From: home     Do you expect to return to your current living situation? Yes     Do you have help at home or someone to help you manage your care at home? Yes     Who are your caregiver(s) and their phone number(s)? Friend Bonnie Urbina [phone]     Prior to hospitilization cognitive status: Alert/Oriented     Current cognitive status: Alert/Oriented     Walking or Climbing Stairs Difficulty ambulation difficulty, requires equipment     Mobility Management walker     Dressing/Bathing Difficulty none     Home Accessibility wheelchair accessible     Home Layout Able to live on 1st floor     Equipment Currently Used at Home walker, rolling     Readmission within 30 days? No     Patient currently being followed by outpatient case management? No     Do you currently have service(s) that help you manage your care at home? No     Do you take prescription medications? Yes     Do you have prescription coverage? Yes     Do you have any problems affording any of your prescribed medications? No     Is the patient taking medications as prescribed? yes     Who is going to help you get home at  discharge? Friend Bonnie Urbina [phone]     How do you get to doctors appointments? family or friend will provide     Are you on dialysis? No     Discharge Plan A Home;Home Health     Discharge Plan B Home     DME Needed Upon Discharge  none     Discharge Plan discussed with: Patient;Friend     Name(s) and Number(s) Friend Bonnie Urbina [phone]     Discharge Barriers Identified None

## 2022-06-07 NOTE — ED PROVIDER NOTES
Encounter Date: 6/6/2022    SCRIBE #1 NOTE: I, Wendy Tarango, am scribing for, and in the presence of, Alejandro Quan MD.       History     Chief Complaint   Patient presents with    Weakness     BIB EMS as patient has been feeling weak and disoriented x 3 days. Patient currently receiving chemo for lung cancer. Last treatment was last Tues-Thurs     Time seen by provider: 7:15 PM on 06/06/2022    Sheri Broderick is a 66 y.o. female who presents to the ED via EMS with an onset of general weakness, sleepiness, upper abdominal pain, nausea, vomiting, diarrhea, and decreased appetite for 3 days. The patient states that she lives at home by herself and that she was the one who called 911. She says that she was able to walk today. Patient is receiving chemotherapy for lung cancer and received her most recent treatment about 6 days ago. The patient denies difficulty walking/speaking, chest pain or any other symptoms at this time. 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 She also has a PSHx of partial lung removal, port a cath, and CVC with subcutaneous port.      The history is provided by the patient. The history is limited by the condition of the patient.     Review of patient's allergies indicates:   Allergen Reactions    Penicillins Rash     Other reaction(s): Rash    Trazodone Anxiety     Severe anxiety      Past Medical History:   Diagnosis Date    Allergy     seasonal    Anemia associated with chemotherapy 7/18/2018    Anxiety 2012    on meds    Arthritis     Asthma 1960    no inhalers    Back pain 2010    PT    Bone metastases 4/13/2020    Chemotherapy-induced neutropenia 5/21/2018    Dizziness      Essential hypertension 2019    no meds    Fall 2019    fx of lumbar spine, left foot and ankle    Fibromyalgia     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     Fracture of right foot     GERD (gastroesophageal reflux disease) 2015    on meds    Hep B w/o coma 1980    no treatment, hospitalized    Hiatal hernia     on meds    Hyperlipidemia     Major depressive disorder, recurrent episode, in partial remission     Malignant neoplasm of lower lobe of right lung-Adenocarcinoma 2018    MVP (mitral valve prolapse) 2014    no meds    Myalgia and myositis, unspecified     OCD (obsessive compulsive disorder)     Osteoporosis     Polyneuropathy     SOB (shortness of breath) on exertion 10/2018    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    Trouble in sleeping     Urinary incontinence      Past Surgical History:   Procedure Laterality Date    BONE BIOPSY N/A 2019    Procedure: BIOPSY, BONE;  Surgeon: Ashley Diagnostic Provider;  Location: [facility];  Service: General;  Laterality: N/A;    CARPAL TUNNEL RELEASE Bilateral 2014    EYE SURGERY      RK    FRACTURE SURGERY Right     wrist orif    INSERTION OF TUNNELED CENTRAL VENOUS CATHETER (CVC) WITH SUBCUTANEOUS PORT Left 2019    Procedure: INSERTION, PORT-A-CATH;  Surgeon: Brant Welch MD;  Location: [facility];  Service: Cardiovascular;  Laterality: Left;    LUNG REMOVAL, PARTIAL  2018    Dr. Brant Welch    port a cath  2018    TUBAL LIGATION       Family History   Problem Relation Age of Onset    Heart disease Mother          to to coma (hypothermia)     Heart disease Father 57        heart attack    Heart disease Sister         stents    Diabetes Sister     Parkinsonism Sister     Heart disease Brother 45        death at 45 years old due to hearth disease     Diabetes Maternal Grandmother      Social History     Tobacco Use    Smoking status: Never Smoker     Smokeless tobacco: Never Used   Substance Use Topics    Alcohol use: No    Drug use: No     Review of Systems   Constitutional: Positive for appetite change and fatigue. Negative for fever.        Positive for sleepiness   HENT: Negative for sore throat.    Respiratory: Negative for shortness of breath.    Cardiovascular: Negative for chest pain.   Gastrointestinal: Positive for abdominal pain, diarrhea, nausea and vomiting.   Genitourinary: Negative for dysuria.   Musculoskeletal: Negative for back pain and gait problem.   Skin: Negative for rash.   Neurological: Negative for speech difficulty and weakness.   Hematological: Does not bruise/bleed easily.       Physical Exam     Initial Vitals [06/06/22 1843]   BP Pulse Resp Temp SpO2   120/72 (!) 118 17 98.4 °F (36.9 °C) 98 %      MAP       --         Physical Exam    Nursing note and vitals reviewed.  Constitutional: She appears well-developed.  Non-toxic appearance.   HENT:   Head: Normocephalic and atraumatic.   Mouth/Throat: Mucous membranes are dry.   Eyes: Conjunctivae, EOM and lids are normal. Pupils are equal, round, and reactive to light.   Neck: Neck supple.   Cardiovascular: Regular rhythm, normal heart sounds and intact distal pulses. Tachycardia present.  Exam reveals no gallop and no friction rub.    No murmur heard.  Pulmonary/Chest: Breath sounds normal. No respiratory distress. She has no decreased breath sounds. She has no wheezes. She has no rhonchi. She has no rales.   Abdominal: Abdomen is soft. Bowel sounds are normal. She exhibits no distension. There is no abdominal tenderness.   Patient has epigastric discomfort   Musculoskeletal:         General: Normal range of motion.      Cervical back: Neck supple.     Neurological: She is alert and oriented to person, place, and time. She has normal strength.   Patient has 5/5 strength for upper and lower extremities, no facial droop.   Skin: Skin is warm and dry.   Psychiatric: She has a normal  mood and affect. She is withdrawn.         ED Course   Procedures  Labs Reviewed   CBC W/ AUTO DIFFERENTIAL - Abnormal; Notable for the following components:       Result Value    WBC 2.81 (*)     RBC 2.92 (*)     Hemoglobin 9.5 (*)     Hematocrit 27.8 (*)     MCH 32.5 (*)     RDW 15.2 (*)     Platelets 78 (*)     Lymph # 0.2 (*)     Gran % 81.0 (*)     Lymph % 6.8 (*)     All other components within normal limits   COMPREHENSIVE METABOLIC PANEL - Abnormal; Notable for the following components:    Glucose 172 (*)     Albumin 3.1 (*)     All other components within normal limits   URINALYSIS, REFLEX TO URINE CULTURE - Abnormal; Notable for the following components:    Urobilinogen, UA 2.0-3.0 (*)     All other components within normal limits    Narrative:     Specimen Source->Urine   LIPASE - Abnormal; Notable for the following components:    Lipase <3 (*)     All other components within normal limits   SARS-COV-2 RNA AMPLIFICATION, QUAL          Imaging Results          CT Head Without Contrast (Final result)  Result time 06/06/22 20:38:23    Final result by Brent Maciel DO (06/06/22 20:38:23)                 Impression:      No acute intracranial abnormality.      Electronically signed by: Brent Maciel  Date:    06/06/2022  Time:    20:38             Narrative:    EXAMINATION:  CT HEAD WITHOUT CONTRAST    CLINICAL HISTORY:  Mental status change, unknown cause;    TECHNIQUE:  Low dose axial CT images obtained throughout the head without intravenous contrast. Sagittal and coronal reconstructions were performed.    COMPARISON:  03/23/2022.    FINDINGS:  There is age advanced brain parenchymal volume loss and prominence of the CSF spaces.  There is no hydrocephalus.    There is confluent hypoattenuation in the supratentorial white matter compatible with chronic microvascular ischemic changes, similar to prior.    There is a probable left frontal calcified meningioma, unchanged from prior.    No extra-axial blood  or fluid collections.    There is a hypodensity in the left caudate head compatible with a remote lacunar infarction.    No parenchymal mass, hemorrhage, edema or major vascular distribution infarct.    No calvarial fracture.  The scalp is unremarkable.  Bilateral paranasal sinuses and mastoid air cells are clear.                                 Medications   sodium chloride 0.9% bolus 1,000 mL (0 mLs Intravenous Stopped 6/6/22 2126)   ondansetron injection 4 mg (4 mg Intravenous Given 6/6/22 2026)     Medical Decision Making:   History:   Old Medical Records: I decided to obtain old medical records.  Independently Interpreted Test(s):   I have ordered and independently interpreted X-rays - see prior notes.  I have ordered and independently interpreted EKG Reading(s) - see prior notes  Clinical Tests:   Lab Tests: Ordered and Reviewed  Radiological Study: Ordered and Reviewed  Medical Tests: Ordered and Reviewed          Scribe Attestation:   Scribe #1: I performed the above scribed service and the documentation accurately describes the services I performed. I attest to the accuracy of the note.        ED Course as of 06/06/22 2239 Mon Jun 06, 2022   1936 Last clinic visit summary from 4 days ago:     Malignant neoplasm of lower lobe of right lung-Adenocarcinoma - Primary (Chronic)    Cancer associated pain    Bone metastases     Other Visit Diagnoses     Anorexia            1. Met Lung Cancer- Follow up with Dr. Carrillo at [facility] as scheduled with PET scan  2. Cancer associated pain- Continue Morphine and Fentanyl as prescribed  3. Bone Mets- Continue follow up with [facility]  4. Dehydration Continue twice weekly hydration  5. Anorexia- Continue Megace BID      In 3/22 CT head w and w/o show no brain metastasis. Known meningioma.      Clinical exam today shows what appears to be withdrawn depressed mood.  Unclear if this secondary to psychiatric medications.  Patient does have some epigastric abdominal discomfort.  Lipase  is been added on.  Will give IV fluids.  Also get urinalysis..  CT head to further evaluate for altered mental status.  Will hydrate.  Suspect significant dehydration given tachycardia.   [JS]   1946 EKG independently interpreted by me as rate 107 sinus tachycardia normal axis and intervals no ST segment elevation or depression. [JS]   2014 Patient's psychologist called to check on her.  She states she is living alone and likely should not be.  She is concerned that she will be highly anxious because of her ER visit and she is alone.  She states that she talked to the patient today and she was concerned about delirium and the psychiatrist is concerned she needs to be admitted to the hospital for deteriorating symptoms and likely should not be living alone anymore.  Her psychiatrist is Dr. Kaye Carvajal [phone].  [JS]   2238 I had a lengthy discussion with the patient's niece who also states that she has been confused.  Apparently the patient set on the ground for an hour and half today and talked to the niece.  She was not really able to fully make it up 2-3 steps to get her house.  The niece is concerned that she is not doing well at home alone.  The psychologist is concerned she is not doing well at home alone.  She does not have home health.  The patient did appear to be weak and generally tremulous.  She may be having polypharmacy as well as depression as well as dehydration.  We will admitted to the hospital for observation and hold her meds give her fluids.  Niece will come visit in the morning. [JS]      ED Course User Index  [JS] Alejandro Quan MD           I, Dr. Alejandro Quan personally performed the services described in this documentation. All medical record entries made by the scribe were at my direction and in my presence.  I have reviewed the chart and agree that the record reflects my personal performance and is accurate and complete. Alejandro Quan MD.  10:39 PM  06/06/2022    DISCLAIMER: This note was prepared with Dragon NaturallySpeaking voice recognition transcription software. Garbled syntax, mangled pronouns, and other bizarre constructions may be attributed to that software system   Clinical Impression:   Final diagnoses:  [R00.0] Tachycardia  [R40.20] Coma, unspecified coma depth (Primary)  [R41.82] Altered mental status, unspecified altered mental status type  [C34.90] Primary malignant neoplasm of lung metastatic to other site, unspecified laterality  [E86.0] Dehydration  [R53.1] Generalized weakness          ED Disposition Condition    Observation               Alejandro Quan MD  06/06/22 8266

## 2022-06-07 NOTE — ASSESSMENT & PLAN NOTE
Acute:  - likely 2/2 recent medication changes  - psych consulted: appreciate reccomednations  - UA negative for UTI  - ammonia ordered for am

## 2022-06-07 NOTE — SUBJECTIVE & OBJECTIVE
Past Medical History:   Diagnosis Date    Allergy     seasonal    Anemia associated with chemotherapy 7/18/2018    Anxiety 2012    on meds    Arthritis     Asthma 1960    no inhalers    Back pain 2010    PT    Bone metastases 4/13/2020    Chemotherapy-induced neutropenia 5/21/2018    Dizziness     Essential hypertension 4/11/2019    no meds    Fall 05/2019    fx of lumbar spine, left foot and ankle    Fibromyalgia     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     Fracture of right foot     GERD (gastroesophageal reflux disease) 2015    on meds    Hep B w/o coma 1980    no treatment, hospitalized    Hiatal hernia 2015    on meds    Hyperlipidemia     Major depressive disorder, recurrent episode, in partial remission     Malignant neoplasm of lower lobe of right lung-Adenocarcinoma 5/21/2018    MVP (mitral valve prolapse) 2014    no meds    Myalgia and myositis, unspecified     OCD (obsessive compulsive disorder)     Osteoporosis     Polyneuropathy     SOB (shortness of breath) on exertion 10/2018    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 8/23/2019    Trouble in sleeping     Urinary incontinence        Past Surgical History:   Procedure Laterality Date    BONE BIOPSY N/A 8/5/2019    Procedure: BIOPSY, BONE;  Surgeon: [facility] Diagnostic Provider;  Location: [facility] OR;  Service: General;  Laterality: N/A;    CARPAL TUNNEL RELEASE Bilateral 07/18/2014    EYE SURGERY      RK    FRACTURE SURGERY Right     wrist orif    INSERTION OF TUNNELED CENTRAL VENOUS CATHETER (CVC) WITH SUBCUTANEOUS PORT Left 8/30/2019    Procedure: INSERTION, PORT-A-CATH;  Surgeon: Brant Welch MD;  Location: [facility] OR;  Service: Cardiovascular;  Laterality: Left;    LUNG REMOVAL, PARTIAL  04/27/2018    Dr. Brant Welch    port a cath  06/2018    TUBAL LIGATION         Review of patient's allergies indicates:   Allergen Reactions    Penicillins Rash     Other reaction(s): Rash    Trazodone Anxiety     Severe anxiety         No current facility-administered medications on file prior to encounter.     Current Outpatient Medications on File Prior to Encounter   Medication Sig    famotidine (PEPCID) 40 MG tablet Take 1 tablet (40 mg total) by mouth every evening.    fentaNYL (DURAGESIC) 75 mcg/hr Place 1 patch onto the skin every 72 hours.    lamoTRIgine (LAMICTAL) 100 MG tablet Take 2 tablets (200 mg total) by mouth every evening.    megestroL (MEGACE) 400 mg/10 mL (40 mg/mL) Susp Take 10 mLs (400 mg total) by mouth 2 (two) times daily.    memantine (NAMENDA) 10 MG Tab TAKE 1 TABLET(10 MG) BY MOUTH TWICE DAILY    multivitamin (THERAGRAN) per tablet Take 1 tablet by mouth once daily.    OLANZapine (ZYPREXA) 5 MG tablet TAKE 1 TABLET BY MOUTH EVERY NIGHT    ondansetron (ZOFRAN-ODT) 8 MG TbDL DISSOLVE 1 TABLET(8 MG) ON THE TONGUE EVERY 8 HOURS AS NEEDED    pantoprazole (PROTONIX) 40 MG tablet Take 1 tablet (40 mg total) by mouth 2 (two) times daily.    sertraline (ZOLOFT) 100 MG tablet Take 1 tablet (100 mg total) by mouth every evening.    [DISCONTINUED] mirtazapine (REMERON) 15 MG tablet Take 1 tablet (15 mg total) by mouth every evening.    [DISCONTINUED] morphine (MSIR) 15 MG tablet Take one-half tablet (7.5 mg total) by mouth every 4 (four) hours as needed for Pain.    [DISCONTINUED] prochlorperazine (COMPAZINE) 10 MG tablet Take 1 tablet (10 mg total) by mouth every 6 (six) hours as needed (as needed for nausea). (Patient not taking: Reported on 4/7/2022)    [DISCONTINUED] promethazine (PHENERGAN) 25 MG suppository Place 1 suppository (25 mg total) rectally every 6 (six) hours as needed for Nausea.    [DISCONTINUED] senna (SENOKOT) 8.6 mg tablet Take 2-4 tablets by mouth 2 (two) times daily.      Family History       Problem Relation (Age of Onset)    Diabetes Sister, Maternal Grandmother    Heart disease Mother, Father (57), Sister, Brother (45)    Parkinsonism Sister          Tobacco Use    Smoking status: Never Smoker     Smokeless tobacco: Never Used   Substance and Sexual Activity    Alcohol use: No    Drug use: No    Sexual activity: Yes     Birth control/protection: None     Review of Systems   Constitutional:  Positive for fatigue. Negative for chills and fever.   HENT:  Negative for congestion, sore throat and trouble swallowing.    Eyes:  Positive for visual disturbance.   Respiratory:  Positive for cough (dry). Negative for shortness of breath and wheezing.    Cardiovascular:  Positive for chest pain and palpitations (intermittent; resolved). Negative for leg swelling.   Gastrointestinal:  Positive for nausea. Negative for abdominal pain, diarrhea and vomiting.   Genitourinary:  Negative for difficulty urinating, dysuria and hematuria.   Musculoskeletal:  Positive for back pain.   Skin:  Negative for rash.   Neurological:  Positive for weakness (BLE), light-headedness and headaches (intermittent). Negative for dizziness.   Psychiatric/Behavioral:  Positive for confusion.    Objective:     Vital Signs (Most Recent):  Temp: 98.4 °F (36.9 °C) (06/06/22 1843)  Pulse: (!) 118 (06/06/22 1843)  Resp: 17 (06/06/22 1843)  BP: 120/72 (06/06/22 1843)  SpO2: 98 % (06/06/22 1843)   Vital Signs (24h Range):  Temp:  [98.4 °F (36.9 °C)] 98.4 °F (36.9 °C)  Pulse:  [118] 118  Resp:  [17] 17  SpO2:  [98 %] 98 %  BP: (120)/(72) 120/72     Weight: 54.4 kg (120 lb)  Body mass index is 19.97 kg/m².    Physical Exam  Vitals and nursing note reviewed.   Constitutional:       General: She is not in acute distress.     Appearance: Normal appearance. She is normal weight. She is ill-appearing (chronically).   HENT:      Head: Normocephalic and atraumatic.      Mouth/Throat:      Mouth: Mucous membranes are dry.      Pharynx: Oropharynx is clear.   Eyes:      Extraocular Movements: Extraocular movements intact.      Conjunctiva/sclera: Conjunctivae normal.      Pupils: Pupils are equal, round, and reactive to light.   Cardiovascular:      Rate and  Rhythm: Normal rate and regular rhythm.      Pulses: Normal pulses.      Heart sounds: Normal heart sounds.   Pulmonary:      Effort: Pulmonary effort is normal. No respiratory distress.      Breath sounds: Normal breath sounds. No wheezing, rhonchi or rales.   Abdominal:      General: Abdomen is flat. Bowel sounds are normal. There is no distension.      Palpations: Abdomen is soft.      Tenderness: There is abdominal tenderness (mild) in the epigastric area. There is no guarding or rebound.   Musculoskeletal:         General: Normal range of motion.      Cervical back: Normal range of motion and neck supple.   Skin:     General: Skin is warm and dry.      Capillary Refill: Capillary refill takes 2 to 3 seconds.   Neurological:      General: No focal deficit present.      Mental Status: She is alert and oriented to person, place, and time. Mental status is at baseline.      Comments: Patient is AAOx3.   BLE strength equal and symmetric 3/5.  BUE strength equal and symmetric 5/5.  Sensation intact.   No focal deficits.   Normal FTN.   No pronator drift.   Speech clear.    Psychiatric:         Mood and Affect: Mood normal.         Behavior: Behavior is withdrawn.      Comments: AAOx3.   Speech is clear but slow.   Patient is somewhat inattentive, forgets train of thought easily, and requires redirection/ repeat question.    Follows all commands. Moves all extremities.          CRANIAL NERVES     CN III, IV, VI   Pupils are equal, round, and reactive to light.     Significant Labs: All pertinent labs within the past 24 hours have been reviewed.  CBC:   Recent Labs   Lab 06/06/22 1950   WBC 2.81*   HGB 9.5*   HCT 27.8*   PLT 78*     CMP:   Recent Labs   Lab 06/06/22 1949      K 4.2      CO2 23   *   BUN 17   CREATININE 0.8   CALCIUM 9.3   PROT 6.6   ALBUMIN 3.1*   BILITOT 0.4   ALKPHOS 125   AST 15   ALT 10   ANIONGAP 12   EGFRNONAA >60     Urine Studies:   Recent Labs   Lab 06/06/22 2137   COLORU  Yellow   APPEARANCEUA Clear   PHUR 7.0   SPECGRAV 1.015   PROTEINUA Negative   GLUCUA Negative   KETONESU Negative   BILIRUBINUA Negative   OCCULTUA Negative   NITRITE Negative   UROBILINOGEN 2.0-3.0*   LEUKOCYTESUR Negative       Significant Imaging: I have reviewed all pertinent imaging results/findings within the past 24 hours.    CT head:  No acute intracranial abnormality.

## 2022-06-07 NOTE — PLAN OF CARE
Problem: Occupational Therapy  Goal: Occupational Therapy Goal  Description: Goals to be met by: 6/28/2022     Patient will increase functional independence with ADLs by performing:    LE Dressing with Modified Gage.  Grooming while standing at sink with Modified Gage.  Toileting from toilet with Modified Gage for hygiene and clothing management.   Supine to sit with Modified Gage.  Toilet transfer to toilet with Modified Gage.    Outcome: Ongoing, Progressing

## 2022-06-07 NOTE — PHARMACY MED REC
"Admission Medication History     The home medication history was taken by Pat Burrell CPhT.    Medication history obtained from Patient     You may go to "Admission" then "Reconcile Home Medications" tabs to review and/or act upon these items.      The home medication list has been updated by the Pharmacy department.    Please read ALL comments highlighted in yellow.    Please address this information as you see fit.     Feel free to contact us if you have any questions or require assistance.      The medications listed below were removed from the home medication list.  Please reorder if appropriate:  Patient reports no longer taking the following medication(s):   Mirtazapine 15mg   Morphine 15mg      Pat Burrell CPhT.  [phone]      .          "

## 2022-06-08 PROCEDURE — G0180 PR HOME HEALTH MD CERTIFICATION: ICD-10-PCS | Mod: ,,, | Performed by: FAMILY MEDICINE

## 2022-06-08 PROCEDURE — G0180 MD CERTIFICATION HHA PATIENT: HCPCS | Mod: ,,, | Performed by: FAMILY MEDICINE

## 2022-06-08 NOTE — DISCHARGE SUMMARY
[facility] Medicine  Discharge Summary      Patient Name: Sheri Broderick  MRN: [id number]  Patient Class: OP- Observation  Admission Date: 6/6/2022  Hospital Length of Stay: 0 days  Discharge Date and Time: 6/7/2022  3:55 PM  Attending Physician: No att. providers found   Discharging Provider: Jane Cain NP  Primary Care Provider: Demetrio Pleitez Jr, MD      HPI:   Sheri Broderick is a 66 y.o. y.o. 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 She states she sat on her porch for a few hours today because she was not able to stand up. She reports BLE weakness, specifically when going from seated position to standing position. Patient reports changes in her medications on 6/2 prior to fatigue and confusion. She states her psychiatrist recommended her be evaluated in the ED. She also reports intermittent palpitation (resolved), intermittent CP (resolved), blurry vision, dry cough, chronic back pain, intermittent HA, and intermittent light headedness. ED workup was significant for low WBC, thrombocytopenia, mild anemia, thrombocytopenia, and tachycardia. The patient was placed in observation under the care of Hospital Medicine.             * No surgery found *      Hospital Course:   Patient monitored closely during observation stay. CT head negative for acute process. PT/OT consulted and recommended home health.  She is at baseline mental status per friend at bedside. She is medically stable for DC with outpatient follow up with oncology.        Goals of Care Treatment Preferences:  Code Status: Full  Code      Consults:   Consults (From admission, onward)        Status Ordering Provider     Case Management/  Once        Provider:  (Not yet assigned)    MINGO Albert          No new Assessment & Plan notes have been filed under this hospital service since the last note was generated.  Service: Hospital Medicine    Final Active Diagnoses:    Diagnosis Date Noted POA    PRINCIPAL PROBLEM:  AMS (altered mental status) [R41.82] 03/23/2022 Yes    Weakness [R53.1] 06/07/2022 Yes    Bone metastases [C79.51] 04/13/2020 Yes    Cancer associated pain [G89.3] 10/16/2019 Yes    Malignant neoplasm of lower lobe of right lung [C34.31] 05/29/2018 Yes    Generalized anxiety disorder [F41.1] 08/13/2012 Yes    MDD (major depressive disorder), recurrent episode, moderate [F33.1] 12/21/2011 Yes      Problems Resolved During this Admission:       Discharged Condition: stable    Disposition: Home-Health Care [facility] [place] [facility]    Follow Up:   Follow-up Information     Demetrio Pleitez Jr, MD Follow up in 1 week(s).    Specialty: Family Medicine  Why: see appt with Chelsie Delacruz below  Contact information:  [address]  [address]  [place] [address]  [phone]             Segundo Lamb MD. Schedule an appointment as soon as possible for a visit.    Specialties: Hematology, Oncology, Hematology and Oncology  Contact information:  [address]  [address]  [place] [address]  [phone]             JASPAL Begum Follow up on 6/15/2022.    Specialty: Internal Medicine  Why: 11:20 am- Hospital follow up  Contact information:  [address]  [place] [address]  [phone]             [facility] Follow up.    Specialty: Home Health Services  Why: Home Health  Contact information:  [phone]                       Patient Instructions:      Ambulatory referral/consult to Home Health   Standing Status: Future   Referral Priority: Routine Referral Type: Home Health   Referral  Reason: Specialty Services Required   Requested Specialty: Home Health Services   Number of Visits Requested: 1     Diet Adult Regular     Notify your health care provider if you experience any of the following:  increased confusion or weakness     Notify your health care provider if you experience any of the following:  persistent dizziness, light-headedness, or visual disturbances     SUBSEQUENT HOME HEALTH ORDERS   Order Comments: Subsequent Home Health Orders    Current Medications:  Current Facility-Administered Medications:  0.9%  NaCl infusion, , Intravenous, Continuous, Karen López PA-C, Last Rate: 125 mL/hr at 06/07/22 0958, New Bag at 06/07/22 0958  acetaminophen tablet 650 mg, 650 mg, Oral, Q4H PRN, Karen López PA-C  dextrose 50% injection 12.5 g, 12.5 g, Intravenous, PRN, JASPAL Arnold-OSMIN  dextrose 50% injection 25 g, 25 g, Intravenous, PRN, Karen López PA-C  [START ON 6/8/2022] famotidine tablet 40 mg, 40 mg, Oral, QHS, JASPAL Arnold-OSMIN  glucagon (human recombinant) injection 1 mg, 1 mg, Intramuscular, PRN, Karen López PA-C  glucose chewable tablet 16 g, 16 g, Oral, PRN, JASPAL Arnold-OSMIN  glucose chewable tablet 24 g, 24 g, Oral, PRN, Karen López PA-C  lamoTRIgine tablet 200 mg, 200 mg, Oral, QHS, JASPAL Aronld-OSMIN, 200 mg at 06/07/22 0245  magnesium oxide tablet 800 mg, 800 mg, Oral, PRN, Karen López PA-C  magnesium oxide tablet 800 mg, 800 mg, Oral, PRN, JASPAL Arnold-OSMIN  melatonin tablet 9 mg, 9 mg, Oral, Nightly PRN, JASPAL Arnold-OSMIN  memantine tablet 10 mg, 10 mg, Oral, BID, JASPAL Arnold-OSMIN, 10 mg at 06/07/22 0245  multivitamin tablet, 1 tablet, Oral, Daily, JASPAL Arnold-OSMIN, 1 tablet at 06/07/22 0840  naloxone 0.4 mg/mL injection 0.02 mg, 0.02 mg, Intravenous, PRN, Karen López PA-C  [START ON 6/8/2022] OLANZapine tablet 5 mg, 5 mg, Oral, QHS, Karen López PA-C  ondansetron injection 4 mg, 4 mg, Intravenous, Q6H PRN, Karen López,  PA-C  potassium bicarbonate disintegrating tablet 35 mEq, 35 mEq, Oral, PRN, Karen ARCEO Ortego, PA-C  potassium bicarbonate disintegrating tablet 50 mEq, 50 mEq, Oral, PRN, Karen MARIELOS Ortego, PA-C  potassium bicarbonate disintegrating tablet 60 mEq, 60 mEq, Oral, PRN, Karen Yootego, PA-C  senna-docusate 8.6-50 mg per tablet 1 tablet, 1 tablet, Oral, BID, Karen López, PA-C, 1 tablet at 06/07/22 0840  sertraline tablet 100 mg, 100 mg, Oral, QHS, Karen MARIELOS Yootemaria esther, PA-C, 100 mg at 06/07/22 0245  sodium chloride 0.9% flush 3 mL, 3 mL, Intravenous, PRN, Karen Yootemaria esther, PA-C        Diet:   regular diet    Activities:   activity as tolerated    Labs:  na    Referrals/ Consults  Physical Therapy to evaluate and treat. Evaluate for home safety and equipment needs; Establish/upgrade home exercise program. Perform / instruct on therapeutic exercises, gait training, transfer training, and Range of Motion.  Occupational Therapy to evaluate and treat. Evaluate home environment for safety and equipment needs. Perform/Instruct on transfers, ADL training, ROM, and therapeutic exercises.    Home Health Aide:  Physical Therapy Three times weekly and Occupational Therapy Three times weekly     Order Specific Question Answer Comments   What Home Health Agency is the patient currently using? Other/External      Activity as tolerated       Significant Diagnostic Studies: Labs:   BMP:   Recent Labs   Lab 06/06/22 1949 06/07/22  0453   * 87    140   K 4.2 4.2    108   CO2 23 22*   BUN 17 12   CREATININE 0.8 0.7   CALCIUM 9.3 9.0   MG  --  2.1    and CMP   Recent Labs   Lab 06/06/22 1949 06/07/22  0453    140   K 4.2 4.2    108   CO2 23 22*   * 87   BUN 17 12   CREATININE 0.8 0.7   CALCIUM 9.3 9.0   PROT 6.6 6.2   ALBUMIN 3.1* 2.9*   BILITOT 0.4 0.4   ALKPHOS 125 115   AST 15 15   ALT 10 7*   ANIONGAP 12 10   ESTGFRAFRICA >60 >60   EGFRNONAA >60 >60       Pending Diagnostic Studies:     None          Medications:  Reconciled Home Medications:      Medication List      CONTINUE taking these medications    famotidine 40 MG tablet  Commonly known as: PEPCID  Take 1 tablet (40 mg total) by mouth every evening.     fentaNYL 75 mcg/hr  Commonly known as: DURAGESIC  Place 1 patch onto the skin every 72 hours.     lamoTRIgine 100 MG tablet  Commonly known as: LAMICTAL  Take 2 tablets (200 mg total) by mouth every evening.     megestroL 400 mg/10 mL (40 mg/mL) Susp  Commonly known as: MEGACE  Take 10 mLs (400 mg total) by mouth 2 (two) times daily.     memantine 10 MG Tab  Commonly known as: NAMENDA  TAKE 1 TABLET(10 MG) BY MOUTH TWICE DAILY     multivitamin per tablet  Commonly known as: THERAGRAN  Take 1 tablet by mouth once daily.     OLANZapine 5 MG tablet  Commonly known as: ZyPREXA  TAKE 1 TABLET BY MOUTH EVERY NIGHT     ondansetron 8 MG Tbdl  Commonly known as: ZOFRAN-ODT  DISSOLVE 1 TABLET(8 MG) ON THE TONGUE EVERY 8 HOURS AS NEEDED     pantoprazole 40 MG tablet  Commonly known as: PROTONIX  Take 1 tablet (40 mg total) by mouth 2 (two) times daily.     sertraline 100 MG tablet  Commonly known as: ZOLOFT  Take 1 tablet (100 mg total) by mouth every evening.        ASK your doctor about these medications    mirtazapine 15 MG tablet  Commonly known as: REMERON  Take 1 tablet (15 mg total) by mouth every evening.            Indwelling Lines/Drains at time of discharge:   Lines/Drains/Airways     Central Venous Catheter Line  Duration                Port A Cath Single Lumen left atrial -- days                Time spent on the discharge of patient: 35 minutes         Jane Cain NP  Department of Hospital Medicine  [facility]

## 2022-06-08 NOTE — HOSPITAL COURSE
Patient monitored closely during observation stay. CT head negative for acute process. PT/OT consulted and recommended home health.  She is at baseline mental status per friend at bedside. She is medically stable for DC with outpatient follow up with oncology.

## 2022-06-09 ENCOUNTER — INFUSION (OUTPATIENT)
Dept: INFUSION THERAPY | Facility: HOSPITAL | Age: 67
End: 2022-06-09
Attending: INTERNAL MEDICINE
Payer: MEDICARE

## 2022-06-09 VITALS
OXYGEN SATURATION: 98 % | RESPIRATION RATE: 16 BRPM | WEIGHT: 122.31 LBS | DIASTOLIC BLOOD PRESSURE: 75 MMHG | TEMPERATURE: 98 F | BODY MASS INDEX: 20.38 KG/M2 | HEART RATE: 99 BPM | SYSTOLIC BLOOD PRESSURE: 127 MMHG | HEIGHT: 65 IN

## 2022-06-09 DIAGNOSIS — C79.51 BONE METASTASES: Primary | ICD-10-CM

## 2022-06-09 DIAGNOSIS — E86.0 DEHYDRATION: ICD-10-CM

## 2022-06-09 DIAGNOSIS — C34.31 SQUAMOUS CELL CARCINOMA OF BRONCHUS IN RIGHT LOWER LOBE: ICD-10-CM

## 2022-06-09 PROCEDURE — 63600175 PHARM REV CODE 636 W HCPCS: Performed by: INTERNAL MEDICINE

## 2022-06-09 PROCEDURE — 96360 HYDRATION IV INFUSION INIT: CPT

## 2022-06-09 PROCEDURE — S5010 5% DEXTROSE AND 0.45% SALINE: HCPCS | Performed by: INTERNAL MEDICINE

## 2022-06-09 PROCEDURE — 25000003 PHARM REV CODE 250: Performed by: INTERNAL MEDICINE

## 2022-06-09 PROCEDURE — A4216 STERILE WATER/SALINE, 10 ML: HCPCS | Performed by: INTERNAL MEDICINE

## 2022-06-09 PROCEDURE — 96361 HYDRATE IV INFUSION ADD-ON: CPT

## 2022-06-09 RX ORDER — SODIUM CHLORIDE 0.9 % (FLUSH) 0.9 %
10 SYRINGE (ML) INJECTION
Status: DISCONTINUED | OUTPATIENT
Start: 2022-06-09 | End: 2022-06-09 | Stop reason: HOSPADM

## 2022-06-09 RX ORDER — HEPARIN 100 UNIT/ML
500 SYRINGE INTRAVENOUS
Status: CANCELLED | OUTPATIENT
Start: 2022-06-09

## 2022-06-09 RX ORDER — HEPARIN 100 UNIT/ML
500 SYRINGE INTRAVENOUS
Status: DISCONTINUED | OUTPATIENT
Start: 2022-06-09 | End: 2022-06-09 | Stop reason: HOSPADM

## 2022-06-09 RX ORDER — SODIUM CHLORIDE 0.9 % (FLUSH) 0.9 %
10 SYRINGE (ML) INJECTION
Status: CANCELLED | OUTPATIENT
Start: 2022-06-09

## 2022-06-09 RX ADMIN — DEXTROSE AND SODIUM CHLORIDE 1000 ML: 5; .45 INJECTION, SOLUTION INTRAVENOUS at 10:06

## 2022-06-09 RX ADMIN — HEPARIN 500 UNITS: 100 SYRINGE at 12:06

## 2022-06-09 RX ADMIN — SODIUM CHLORIDE, PRESERVATIVE FREE 10 ML: 5 INJECTION INTRAVENOUS at 12:06

## 2022-06-09 NOTE — PLAN OF CARE
Problem: Fatigue  Goal: Improved Activity Tolerance  Outcome: Ongoing, Progressing  Intervention: Promote Improved Energy  Flowsheets (Taken 6/9/2022 0207)  Fatigue Management: frequent rest breaks encouraged  Sleep/Rest Enhancement:   regular sleep/rest pattern promoted   relaxation techniques promoted  Activity Management: Ambulated -L4

## 2022-06-10 ENCOUNTER — TELEPHONE (OUTPATIENT)
Dept: MEDSURG UNIT | Facility: HOSPITAL | Age: 67
End: 2022-06-10
Payer: MEDICARE

## 2022-06-10 ENCOUNTER — TELEPHONE (OUTPATIENT)
Dept: HEMATOLOGY/ONCOLOGY | Facility: CLINIC | Age: 67
End: 2022-06-10

## 2022-06-12 DIAGNOSIS — C34.31 MALIGNANT NEOPLASM OF LOWER LOBE OF RIGHT LUNG: Primary | ICD-10-CM

## 2022-06-12 NOTE — PROGRESS NOTES
Spoke with niece, Meseret Urbina today.  She is concerned that Ms. Broderick has not been moving around much, getting more confused.  Was in ED and head CT negative.  Concern for worsening cancer as a possible cause.  Arranging a PET scan and will discuss disposition.   She states that patient is able to walk with assistance at this time.

## 2022-06-13 ENCOUNTER — TELEPHONE (OUTPATIENT)
Dept: HEMATOLOGY/ONCOLOGY | Facility: CLINIC | Age: 67
End: 2022-06-13

## 2022-06-13 ENCOUNTER — OUTPATIENT CASE MANAGEMENT (OUTPATIENT)
Dept: ADMINISTRATIVE | Facility: OTHER | Age: 67
End: 2022-06-13
Payer: MEDICARE

## 2022-06-13 DIAGNOSIS — C34.31 MALIGNANT NEOPLASM OF LOWER LOBE OF RIGHT LUNG: Primary | ICD-10-CM

## 2022-06-13 NOTE — TELEPHONE ENCOUNTER
Spoke with the patients Nivamsi Nicole and notified her I would try to have the patients PET scan moved to an earlier appt. Also discussed options of assisted living,. Vs nursing home vs hospice. Will see the results of the scan and then have her meet with Dr. Zhang to discuss.

## 2022-06-14 ENCOUNTER — HOSPITAL ENCOUNTER (OUTPATIENT)
Dept: RADIOLOGY | Facility: HOSPITAL | Age: 67
Discharge: HOME | End: 2022-06-14
Attending: INTERNAL MEDICINE
Payer: MEDICARE

## 2022-06-14 VITALS — WEIGHT: 122 LBS | BODY MASS INDEX: 20.33 KG/M2 | HEIGHT: 65 IN

## 2022-06-14 DIAGNOSIS — C34.31 MALIGNANT NEOPLASM OF LOWER LOBE OF RIGHT LUNG: ICD-10-CM

## 2022-06-14 LAB — GLUCOSE SERPL-MCNC: 98 MG/DL (ref 70–110)

## 2022-06-14 PROCEDURE — 78815 PET IMAGE W/CT SKULL-THIGH: CPT | Mod: TC,PO

## 2022-06-14 PROCEDURE — A9552 F18 FDG: HCPCS | Mod: PO,GZ

## 2022-06-14 NOTE — PROGRESS NOTES
Spoke to patient's niece Bonnie to provide her with information on paid sitter services available.  Also discussed assisted living/nursing home information.

## 2022-06-16 ENCOUNTER — INFUSION (OUTPATIENT)
Dept: INFUSION THERAPY | Facility: HOSPITAL | Age: 67
End: 2022-06-16
Attending: INTERNAL MEDICINE
Payer: MEDICARE

## 2022-06-16 VITALS
RESPIRATION RATE: 18 BRPM | DIASTOLIC BLOOD PRESSURE: 80 MMHG | OXYGEN SATURATION: 96 % | SYSTOLIC BLOOD PRESSURE: 128 MMHG | HEIGHT: 65 IN | HEART RATE: 102 BPM | TEMPERATURE: 98 F | BODY MASS INDEX: 20.3 KG/M2

## 2022-06-16 DIAGNOSIS — C34.31 SQUAMOUS CELL CARCINOMA OF BRONCHUS IN RIGHT LOWER LOBE: ICD-10-CM

## 2022-06-16 DIAGNOSIS — E86.0 DEHYDRATION: ICD-10-CM

## 2022-06-16 DIAGNOSIS — C79.51 BONE METASTASES: Primary | ICD-10-CM

## 2022-06-16 PROCEDURE — 96361 HYDRATE IV INFUSION ADD-ON: CPT

## 2022-06-16 PROCEDURE — 96360 HYDRATION IV INFUSION INIT: CPT

## 2022-06-16 PROCEDURE — 63600175 PHARM REV CODE 636 W HCPCS: Performed by: INTERNAL MEDICINE

## 2022-06-16 PROCEDURE — S5010 5% DEXTROSE AND 0.45% SALINE: HCPCS | Performed by: INTERNAL MEDICINE

## 2022-06-16 PROCEDURE — 25000003 PHARM REV CODE 250: Performed by: INTERNAL MEDICINE

## 2022-06-16 PROCEDURE — A4216 STERILE WATER/SALINE, 10 ML: HCPCS | Performed by: INTERNAL MEDICINE

## 2022-06-16 RX ORDER — HEPARIN 100 UNIT/ML
500 SYRINGE INTRAVENOUS
Status: CANCELLED | OUTPATIENT
Start: 2022-06-16

## 2022-06-16 RX ORDER — HEPARIN 100 UNIT/ML
500 SYRINGE INTRAVENOUS
Status: DISCONTINUED | OUTPATIENT
Start: 2022-06-16 | End: 2022-06-16 | Stop reason: HOSPADM

## 2022-06-16 RX ORDER — SODIUM CHLORIDE 0.9 % (FLUSH) 0.9 %
10 SYRINGE (ML) INJECTION
Status: CANCELLED | OUTPATIENT
Start: 2022-06-16

## 2022-06-16 RX ORDER — SODIUM CHLORIDE 0.9 % (FLUSH) 0.9 %
10 SYRINGE (ML) INJECTION
Status: DISCONTINUED | OUTPATIENT
Start: 2022-06-16 | End: 2022-06-16 | Stop reason: HOSPADM

## 2022-06-16 RX ADMIN — HEPARIN 500 UNITS: 100 SYRINGE at 12:06

## 2022-06-16 RX ADMIN — SODIUM CHLORIDE, PRESERVATIVE FREE 10 ML: 5 INJECTION INTRAVENOUS at 12:06

## 2022-06-16 RX ADMIN — DEXTROSE AND SODIUM CHLORIDE 1000 ML: 5; .45 INJECTION, SOLUTION INTRAVENOUS at 10:06

## 2022-06-20 ENCOUNTER — TELEPHONE (OUTPATIENT)
Dept: PSYCHIATRY | Facility: CLINIC | Age: 67
End: 2022-06-20
Payer: MEDICARE

## 2022-06-20 NOTE — TELEPHONE ENCOUNTER
"Spoke with her niece on the phone. Long conversation with Bonnie. She states Sheri has been confused and has needed 24/7 care. A few weeks ago, she was weak and disoriented, went to the ED and was d/c'd. Bonnie is at Sheri's home today, will be leaving on Tuesday. Yesterday, she went out the front door when caregiver was there. She has called the police. Bonnie called the oncologist after Sheri's last fall, had PET scan. Did not want to tell her about results of PET scan. Niece says she is "close to an ER evaluation". She is wondering when to let her know about PET scan - I recommended that appropriate physicians relay the information which she is in agreement with. Bonnie does have medical POA so recommended inquiring with hospice agencies. Recommended ER if concerns continue in the interim - sounds like she is doing better at this time. Recommended redirection instead of reorientation with Ms. Wade as well. Sitters are presents 24/7.  She is inquiring about hospice referral. Inquiring about anxiety medication as well. She seems satisfied with recommendations until Dr. Hawley is able to coordinate with herself and other team members.   "

## 2022-06-20 NOTE — TELEPHONE ENCOUNTER
DR HAWLEY PATIENT    Patients niece called and stated that patient is not been having good days. She states she is not able to be left alone because of her safety. She has been more week and falling more often and since the weekend she has been more disoriented. She has been calling people and telling them she has been being held hostage and against her will. The police has just left there and told care giver soraida to contact our office.  I explained that I would have Esha Sánchez who is covering for Dr Hawley call her. While waiting since home health does see the patient need to put a call into them to get them to the home to evaluate for any medical conditions that can be causing the patient this mental status. Please call when available

## 2022-06-21 ENCOUNTER — TELEPHONE (OUTPATIENT)
Dept: HEMATOLOGY/ONCOLOGY | Facility: CLINIC | Age: 67
End: 2022-06-21

## 2022-06-21 ENCOUNTER — INFUSION (OUTPATIENT)
Dept: INFUSION THERAPY | Facility: HOSPITAL | Age: 67
End: 2022-06-21
Attending: INTERNAL MEDICINE
Payer: MEDICARE

## 2022-06-21 VITALS
HEIGHT: 65 IN | TEMPERATURE: 98 F | HEART RATE: 104 BPM | RESPIRATION RATE: 17 BRPM | OXYGEN SATURATION: 97 % | WEIGHT: 121 LBS | SYSTOLIC BLOOD PRESSURE: 113 MMHG | BODY MASS INDEX: 20.16 KG/M2 | DIASTOLIC BLOOD PRESSURE: 61 MMHG

## 2022-06-21 DIAGNOSIS — C34.31 MALIGNANT NEOPLASM OF LOWER LOBE OF RIGHT LUNG: Primary | ICD-10-CM

## 2022-06-21 DIAGNOSIS — C34.31 SQUAMOUS CELL CARCINOMA OF BRONCHUS IN RIGHT LOWER LOBE: ICD-10-CM

## 2022-06-21 DIAGNOSIS — E86.0 DEHYDRATION: ICD-10-CM

## 2022-06-21 DIAGNOSIS — C79.51 BONE METASTASES: Primary | ICD-10-CM

## 2022-06-21 PROCEDURE — 96361 HYDRATE IV INFUSION ADD-ON: CPT

## 2022-06-21 PROCEDURE — 25000003 PHARM REV CODE 250: Performed by: INTERNAL MEDICINE

## 2022-06-21 PROCEDURE — 63600175 PHARM REV CODE 636 W HCPCS: Performed by: INTERNAL MEDICINE

## 2022-06-21 PROCEDURE — 96360 HYDRATION IV INFUSION INIT: CPT

## 2022-06-21 PROCEDURE — S5010 5% DEXTROSE AND 0.45% SALINE: HCPCS | Performed by: INTERNAL MEDICINE

## 2022-06-21 RX ORDER — OLANZAPINE 5 MG/1
TABLET ORAL
Qty: 45 TABLET | Refills: 1 | Status: SHIPPED | OUTPATIENT
Start: 2022-06-21

## 2022-06-21 RX ORDER — OLANZAPINE 5 MG/1
TABLET ORAL
Qty: 45 TABLET | Refills: 1 | Status: SHIPPED | OUTPATIENT
Start: 2022-06-21 | End: 2022-06-21 | Stop reason: SDUPTHER

## 2022-06-21 RX ORDER — SODIUM CHLORIDE 0.9 % (FLUSH) 0.9 %
10 SYRINGE (ML) INJECTION
Status: DISCONTINUED | OUTPATIENT
Start: 2022-06-21 | End: 2022-06-21 | Stop reason: HOSPADM

## 2022-06-21 RX ORDER — OLANZAPINE 5 MG/1
TABLET ORAL
Qty: 30 TABLET | Refills: 1 | Status: SHIPPED | OUTPATIENT
Start: 2022-06-21 | End: 2022-06-21 | Stop reason: SDUPTHER

## 2022-06-21 RX ORDER — SODIUM CHLORIDE 0.9 % (FLUSH) 0.9 %
10 SYRINGE (ML) INJECTION
Status: CANCELLED | OUTPATIENT
Start: 2022-06-21

## 2022-06-21 RX ORDER — HEPARIN 100 UNIT/ML
500 SYRINGE INTRAVENOUS
Status: CANCELLED | OUTPATIENT
Start: 2022-06-21

## 2022-06-21 RX ORDER — HEPARIN 100 UNIT/ML
500 SYRINGE INTRAVENOUS
Status: DISCONTINUED | OUTPATIENT
Start: 2022-06-21 | End: 2022-06-21 | Stop reason: HOSPADM

## 2022-06-21 RX ADMIN — DEXTROSE AND SODIUM CHLORIDE 1000 ML: 5; .45 INJECTION, SOLUTION INTRAVENOUS at 10:06

## 2022-06-21 RX ADMIN — HEPARIN 500 UNITS: 100 SYRINGE at 12:06

## 2022-06-21 NOTE — PLAN OF CARE
Problem: Fatigue  Goal: Improved Activity Tolerance  Intervention: Promote Improved Energy  Flowsheets (Taken 6/21/2022 1008)  Fatigue Management:   frequent rest breaks encouraged   fatigue-related activity identified

## 2022-06-21 NOTE — TELEPHONE ENCOUNTER
I spoke to the patient's niece Bonnie who is also patient's POA for approximately 30 mins by phone.   She updated me on recent events with Sheri.  She has been increasingly weak with significant difficulty ambulating, pt has been refusing walker, was hospitalized 06/06 for altered mental status.  Niece has been staying with her 3 days a week and has arranged 24/7 care through a local agency.  Pt has been having episodic confusion, agitation, and has been manipulative and oppositional at times with increased paranoia.   She has been slowly deteriorating since most recent XRT at [facility] to upper spine after MRI of the L-spine done showed progression.    Bonnie spoke to Dr. Zhang/team and understands results of recent PET scan which shows cancer progression.  She has not had chance to tell pt as her father requires pacemaker placement and she had to return to [place].   Pt has been acting out, has called the police, left out front door and screaming outdoors she was being held hostage (by sitter inside).   She questions if patient is hallucinating intermittently.   Pt's  contacted with information about [facility] - pt's niece has been in touch and was told that they had received a referral from Dr. Zhang's office.  She plans to reach out to pt's oncologist and arrange for telephone call with patient when she is present to discuss PET scan results. She will arrange for Dr. Jennings to also participate and will notify me so that I can reach out to pt.  I will also forward message to Dr. Holbrook.   Regarding medication, will titrate Olanzapine to 2.5 mg in am and 5 mg nightly to target agitation, paranoid. Discuss this may increase risk of falling.

## 2022-06-21 NOTE — TELEPHONE ENCOUNTER
Requesting refill on olanzapine 5 mg  Last refill: 3/15  Last visit: 3/29  Follow up: none scheduled

## 2022-06-21 NOTE — TELEPHONE ENCOUNTER
I spoke this morning to Dr. Kaye Jennings (psychologist, [facility]) and also coordinated yesterday with this provider.   She and her mother have been assisting pt's family with care.  Dr. Jennings has been in close communication with Bonnie.  Pt has been having episodic delirium as well as some acting out behaviors which caregivers believe is for attention.  Recently, she asked a caregiver for phone and then ran out of home asking for help.  Family has not yet shared PET scan results with her - discussed importance of doing so.   I will plan to contact Bonnie today - family is unable to personally provide care that pt needs ongoing and are open to hospice referral.  I will notify Dr. Zhang and team regarding Hospice referral.  Family was also able to discuss this plan with Dr. Carrillo who was in agreement.   Family would like to also see if IVF infusions can be coordinated through hospice.

## 2022-06-22 ENCOUNTER — TELEPHONE (OUTPATIENT)
Dept: PSYCHIATRY | Facility: CLINIC | Age: 67
End: 2022-06-22
Payer: MEDICARE

## 2022-06-22 ENCOUNTER — DOCUMENTATION ONLY (OUTPATIENT)
Dept: INFUSION THERAPY | Facility: HOSPITAL | Age: 67
End: 2022-06-22

## 2022-06-22 NOTE — TELEPHONE ENCOUNTER
([facility]) LVM requesting pt return call for scheduled appt at 11:00 AM today. Clinic contact info provided.    *reached out to pt's caregiver to inform of attempt-caregiver not currently w/ pt but expressed intent to cancel today's appt given recent changes in care plan

## 2022-06-22 NOTE — NURSING
Patient is on Hospice and will not be coming for her fluids tomorrow per secure chat message with NATASHA Sánchez RN

## 2022-06-27 ENCOUNTER — EXTERNAL HOME HEALTH (OUTPATIENT)
Dept: HOME HEALTH SERVICES | Facility: HOSPITAL | Age: 67
End: 2022-06-27
Payer: MEDICARE

## 2022-07-07 ENCOUNTER — TELEPHONE (OUTPATIENT)
Dept: PSYCHIATRY | Facility: CLINIC | Age: 67
End: 2022-07-07
Payer: MEDICARE

## 2022-07-07 NOTE — TELEPHONE ENCOUNTER
I received a message today from Dr. Jennings that the patient has declined significantly and has been unresponsive for a few days.  Niece Bonnie is with the patient.  I called and left a message with Bonnie letting her know that I have been in contact with Dr. Jennings and that Sheri, she, and their family are in my thoughts and prayers.

## 2022-07-08 ENCOUNTER — PATIENT MESSAGE (OUTPATIENT)
Dept: PSYCHIATRY | Facility: CLINIC | Age: 67
End: 2022-07-08
Payer: MEDICARE

## 2022-07-08 ENCOUNTER — PATIENT MESSAGE (OUTPATIENT)
Dept: FAMILY MEDICINE | Facility: CLINIC | Age: 67
End: 2022-07-08
Payer: MEDICARE

## 2022-07-08 ENCOUNTER — PATIENT MESSAGE (OUTPATIENT)
Dept: HEMATOLOGY/ONCOLOGY | Facility: CLINIC | Age: 67
End: 2022-07-08

## 2022-07-11 NOTE — TELEPHONE ENCOUNTER
Noted - unable to respond in the patient's chart.  I had left a message for her niece Bonnie in the days prior to her passing.

## 2022-07-22 ENCOUNTER — DOCUMENT SCAN (OUTPATIENT)
Dept: HOME HEALTH SERVICES | Facility: HOSPITAL | Age: 67
End: 2022-07-22
Payer: MEDICARE

## 2022-11-26 NOTE — PROGRESS NOTES
Freeman Kaur) Subjective:       Patient ID: Sheri Broderick is a 64 y.o. female.    Chief Complaint: lung cancer follow up.     Radiation therapy to C1, T1-2, T11-12  To begin 6/1/2020    HPI:  Patient returns for visit today.  She is feeling better with decreased pain.        All medications and past medical and surgical history have been reviewed.     The patient location is: Home  Visit type: Virtual visit with synchronous audio and video due to covid 19 pandemic crisis.  Video failed and visit continued in audio.     Review of patient's allergies indicates:   Allergen Reactions    Penicillins Rash     Other reaction(s): Rash    Trazodone Anxiety     Severe anxiety        ROS  GEN:   normal without any fever, night sweats or weight loss  HEENT:  normal with no HA's,  changes in vision  CV:   normal with no CP, SOB  PULM:  normal with no SOB, cough  GI:   normal with no abdominal pain, nausea, vomiting  :   normal with no hematuria, dysuria  SKIN:   normal with no rash      Previous FAMHX and SOCHX information reviewed and remains unchanged         Objective:        Physical Exam  There were no vitals taken for this visit.  GEN:   no apparent distress, comfortable; AAOx3  HEAD:  atraumatic and normocephalic  EYES:   no pallor, no icterus,  PSYCH:  normal mood, affect and behavior  PULM:   no apparent distress, breathing unlabored.  NEURO:  Grossly intact.   MUSC:   Visibly normal ROM throughout.           All lab results and imaging results have been reviewed and discussed with the patient  Recent Results (from the past 336 hour(s))   CBC auto differential    Collection Time: 05/18/20  1:57 PM   Result Value Ref Range    WBC 7.39 3.90 - 12.70 K/uL    Hemoglobin 10.6 (L) 12.0 - 16.0 g/dL    Hematocrit 33.4 (L) 37.0 - 48.5 %    Platelets 232 150 - 350 K/uL     CMP  Sodium   Date Value Ref Range Status   05/18/2020 138 136 - 145 mmol/L Final   10/09/2018 140 134 - 144 mmol/L      Potassium   Date Value Ref Range Status    05/18/2020 3.9 3.5 - 5.1 mmol/L Final     Chloride   Date Value Ref Range Status   05/18/2020 106 95 - 110 mmol/L Final   10/09/2018 105 98 - 110 mmol/L      CO2   Date Value Ref Range Status   05/18/2020 23 23 - 29 mmol/L Final     Glucose   Date Value Ref Range Status   05/18/2020 109 70 - 110 mg/dL Final   10/09/2018 101 (H) 70 - 99 mg/dL      BUN, Bld   Date Value Ref Range Status   05/18/2020 11 8 - 23 mg/dL Final     Creatinine   Date Value Ref Range Status   05/18/2020 0.6 0.5 - 1.4 mg/dL Final   10/09/2018 0.80 0.60 - 1.40 mg/dL    11/29/2012 0.7 0.5 - 1.4 mg/dL Final     Calcium   Date Value Ref Range Status   05/18/2020 8.8 8.7 - 10.5 mg/dL Final   11/29/2012 9.0 8.7 - 10.5 mg/dL Final     Total Protein   Date Value Ref Range Status   05/18/2020 7.6 6.0 - 8.4 g/dL Final     Albumin   Date Value Ref Range Status   05/18/2020 3.8 3.5 - 5.2 g/dL Final   10/09/2018 4.1 3.1 - 4.7 g/dL      Total Bilirubin   Date Value Ref Range Status   05/18/2020 0.7 0.1 - 1.0 mg/dL Final     Comment:     For infants and newborns, interpretation of results should be based  on gestational age, weight and in agreement with clinical  observations.  Premature Infant recommended reference ranges:  Up to 24 hours.............<8.0 mg/dL  Up to 48 hours............<12.0 mg/dL  3-5 days..................<15.0 mg/dL  6-29 days.................<15.0 mg/dL       Alkaline Phosphatase   Date Value Ref Range Status   05/18/2020 173 (H) 55 - 135 U/L Final     AST   Date Value Ref Range Status   05/18/2020 15 10 - 40 U/L Final     ALT   Date Value Ref Range Status   05/18/2020 12 10 - 44 U/L Final     Anion Gap   Date Value Ref Range Status   05/18/2020 9 8 - 16 mmol/L Final   11/29/2012 10 5 - 15 meq/L Final     eGFR if    Date Value Ref Range Status   05/18/2020 >60.0 >60 mL/min/1.73 m^2 Final     eGFR if non    Date Value Ref Range Status   05/18/2020 >60.0 >60 mL/min/1.73 m^2 Final     Comment:      Calculation used to obtain the estimated glomerular filtration  rate (eGFR) is the CKD-EPI equation.          Total time spent with patient: 12 min  Assessment:      1. Malignant neoplasm of lower lobe of right lung-Adenocarcinoma    2. Cancer associated pain    3. Panic disorder without agoraphobia      Problem List Items Addressed This Visit     Malignant neoplasm of lower lobe of right lung-Adenocarcinoma (Chronic)     I had a long discussion with Ms. Broderick about the path moving forward.  She is still hoping to get approval from her insurance co to visit [facility] but I feel that waiting for treatment till this is done could be hazardous.  She will begin radiation therapy and was due to begin today.  I will begin chemotherapy next week when radiation is complete.      I would like to begin her on ramucirimab/Taxotere but I'm not sure she has the PS to tolerate this regimen.  I will begin her with weekly Taxotere 35mg/m2 and if she does ok with this then consider a switch to the above combination.  I explained this to her in detail and explained my reasoning behind this decision and she is ok with this.           Cancer associated pain     Patient is doing better from a pain control standpoint.  I will continue her regimen which she is on and adjust as needed.  She is currently on Duragesic 75mcg with Norco 10mg breakthrough.           Panic disorder without agoraphobia     She continues to follow with psychiatry and seems to be doing ok at this time.  Continue to monitor.                 Plan:   As Above in Assessment      The plan was discussed with the patient and all questions/concerns have been answered to the patient's satisfaction.          Thank you for allowing me to participate in this pleasant patient's care. Please call with any questions or concerns.

## 2024-05-23 NOTE — TELEPHONE ENCOUNTER
Left message for patient to call me back regarding nausea.  2nd message left.     Hpi Title: Evaluation of Skin Lesions

## 2024-07-12 NOTE — TELEPHONE ENCOUNTER
Second attempt to call patient. Left voicemail to return our call at [phone]   Less than Monthly

## 2025-01-24 NOTE — TELEPHONE ENCOUNTER
Call to patient to relay colonoscopy results and recommendations.  Left VM informing patient results/recommendations are viewable via [facility] and advised to contact GI clinic with any questions after viewing results.    HM updated and Recall entered.   Noted.

## (undated) DEVICE — DRAPE C-ARM (FITS NEW C-ARM) 07-CA108

## (undated) DEVICE — STERISTRIP 1/2 R1547

## (undated) DEVICE — ADHESIVE MASTISOL VIAL 0523-48

## (undated) DEVICE — SOLUTION PREP IODINE 4OZ

## (undated) DEVICE — TRAY SKIN PREP DRY

## (undated) DEVICE — SUTURE VICRYL 3-0 SH 27 VCP416H

## (undated) DEVICE — BAG DECANTER 10-102

## (undated) DEVICE — DRAPE LAPAROTOMY TRANSVERSE 89281

## (undated) DEVICE — PAD BOVIE ADULT

## (undated) DEVICE — DEGREASER SKIN MS-4000

## (undated) DEVICE — DRAPE IOBAN 23X17 6650EZ

## (undated) DEVICE — SOLUTION IRRI NS BOTTLE 1000ML R5200-01

## (undated) DEVICE — SYRINGE 10ML 302995

## (undated) DEVICE — GLOVE BIOGEL PI ULTRA TOUCH GRAY SZ7.5

## (undated) DEVICE — MARKER SKIN W/ RULER 77734

## (undated) DEVICE — NEEDLE SAFETY ECLIPSE 25G 1-1/2IN 305767

## (undated) DEVICE — SUTURE VICRYL PS-2 4-0 27 J426H

## (undated) DEVICE — TRAY GENERAL SURGERY

## (undated) DEVICE — COVER LIGHT HANDLE LB53

## (undated) DEVICE — DRESSING TEGADERM 4X4 3/4 TD1004

## (undated) DEVICE — ALCOHOL 16OZ

## (undated) DEVICE — SUTURE PROLENE 2-0 SH 36 8523H